# Patient Record
Sex: FEMALE | Race: WHITE | NOT HISPANIC OR LATINO | Employment: OTHER | URBAN - METROPOLITAN AREA
[De-identification: names, ages, dates, MRNs, and addresses within clinical notes are randomized per-mention and may not be internally consistent; named-entity substitution may affect disease eponyms.]

---

## 2017-01-11 ENCOUNTER — OFFICE VISIT (OUTPATIENT)
Dept: INTERNAL MEDICINE | Facility: CLINIC | Age: 77
End: 2017-01-11

## 2017-01-11 VITALS
OXYGEN SATURATION: 98 % | HEART RATE: 94 BPM | SYSTOLIC BLOOD PRESSURE: 124 MMHG | WEIGHT: 241 LBS | BODY MASS INDEX: 38.73 KG/M2 | HEIGHT: 66 IN | DIASTOLIC BLOOD PRESSURE: 70 MMHG

## 2017-01-11 DIAGNOSIS — R30.0 DYSURIA: Primary | ICD-10-CM

## 2017-01-11 DIAGNOSIS — J40 BRONCHITIS: ICD-10-CM

## 2017-01-11 LAB
BILIRUB BLD-MCNC: NEGATIVE MG/DL
CLARITY, POC: CLEAR
COLOR UR: ABNORMAL
GLUCOSE UR STRIP-MCNC: NEGATIVE MG/DL
KETONES UR QL: NEGATIVE
LEUKOCYTE EST, POC: ABNORMAL
NITRITE UR-MCNC: NEGATIVE MG/ML
PH UR: 5.5 [PH] (ref 5–8)
PROT UR STRIP-MCNC: NEGATIVE MG/DL
RBC # UR STRIP: NEGATIVE /UL
SP GR UR: 1.02 (ref 1–1.03)
UROBILINOGEN UR QL: NORMAL

## 2017-01-11 PROCEDURE — 81003 URINALYSIS AUTO W/O SCOPE: CPT | Performed by: INTERNAL MEDICINE

## 2017-01-11 PROCEDURE — 99214 OFFICE O/P EST MOD 30 MIN: CPT | Performed by: INTERNAL MEDICINE

## 2017-01-11 RX ORDER — AMOXICILLIN 500 MG/1
1000 CAPSULE ORAL 2 TIMES DAILY
Qty: 40 CAPSULE | Refills: 0 | Status: SHIPPED | OUTPATIENT
Start: 2017-01-11 | End: 2017-01-17 | Stop reason: CLARIF

## 2017-01-11 NOTE — MR AVS SNAPSHOT
Anitra Orta   1/11/2017 11:50 AM   Office Visit    Dept Phone:  [phone]   Encounter #:  [id number]    Provider:  Rosibel Hamilton MD   Department:  [facility] INTERNAL MED AND PEDS                Your Full Care Plan              Today's Medication Changes          These changes are accurate as of: 1/11/17 12:48 PM.  If you have any questions, ask your nurse or doctor.               New Medication(s)Ordered:     amoxicillin 500 MG capsule   Commonly known as:  AMOXIL   Take 2 capsules by mouth 2 (Two) Times a Day.   Started by:  Rosibel Hamilton MD            Where to Get Your Medications      These medications were sent to [id number] - [place], [place] - [address] AT [facility] [address] - [phone]  - [phone]   [address], [place] [address]     Phone:  [phone]     amoxicillin 500 MG capsule                  Your Updated Medication List          This list is accurate as of: 1/11/17 12:48 PM.  Always use your most recent med list.                amoxicillin 500 MG capsule   Commonly known as:  AMOXIL   Take 2 capsules by mouth 2 (Two) Times a Day.       aspirin 81 MG EC tablet       benzonatate 200 MG capsule   Commonly known as:  TESSALON   One PO Q8H for cough       Biotin 10 MG capsule       calcium citrate-vitamin d 200-250 MG-UNIT tablet tablet   Commonly known as:  CITRACAL       folic acid 1 MG tablet   Commonly known as:  FOLVITE       guaifenesin-codeine 100-10 MG/5ML liquid   Commonly known as:  CHERATUSSIN AC   10 ml po Q4H prn cough       imipramine 50 MG tablet   Commonly known as:  TOFRANIL   TAKE 2 TO 3 TABLETS BY MOUTH EVERY NIGHT AT BEDTIME       lisinopril 20 MG tablet   Commonly known as:  PRINIVIL,ZESTRIL   Take 1 tablet by mouth Daily With Breakfast.       omeprazole 20 MG capsule   Commonly known as:  priLOSEC   Take 1 capsule by mouth Daily.       simvastatin 20 MG tablet      Commonly known as:  ZOCOR   Take 1 tablet by mouth Every Night.       vitamin C 500 MG tablet   Commonly known as:  ASCORBIC ACID               We Performed the Following     POC Urinalysis Dipstick, Automated       You Were Diagnosed With        Codes Comments    Bronchitis    -  Primary ICD-10-CM: J40  ICD-9-CM: 490     Dysuria     ICD-10-CM: R30.0  ICD-9-CM: 788.1       Instructions     None    Patient Instructions History      Upcoming Appointments     Visit Type Date Time Department    ACUTE           1/11/2017 11:50 AM [facility] [place]    [facility] 1/13/2017 10:20 AM [facility] [place]    FOLLOW UP 1/18/2017  3:20 PM [facility] [place]    [facility] RENAL BILATERAL 10/18/2017  2:00 PM [facility]      MyChart Signup     Our records indicate that you have an active [employer] account.    You can view your After Visit Summary by going to [employer] and logging in with your [employer] username and password.  If you don't have a [employer] username and password but a parent or guardian has access to your record, the parent or guardian should login with their own [employer] username and password and access your record to view the After Visit Summary.    If you have questions, you can email [email or web address] or call [phone] to talk to our Map[employer] staff.  Remember, Mapori is NOT to be used for urgent needs.  For medical emergencies, dial 911.               Other Info from Your Visit           Your Appointments     Jan 13, 2017 10:20 AM EST   [facility]   [facility] INTERNAL MED AND PEDS (--)    [address]  [place] [address] [phone]            Jan 18, 2017  3:20 PM EST   Follow Up with Rosibel Hamilton MD   [facility] INTERNAL MED AND PEDS (--)    [address]  [place] [address] [phone]           Arrive 15 minutes prior to appointment.            Oct 18, 2017   "2:00 PM EDT   US lag renal bilateral with [facility] US 1   [facility] [place] [facility] ([place])    [address]  [place] [address] [phone]           No prep. Arrive 15 minutes before appointment.              Allergies     Bactrim [Sulfamethoxazole-trimethoprim]  Other (See Comments), Nausea Only    UNKNOWN    Ciprofloxacin  Other (See Comments)    UNKNOWN    Levaquin [Levofloxacin]  Other (See Comments)    SEVERE HEADACHE AND N/V    Macrobid [Nitrofurantoin]  Other (See Comments)    UNKNOWN; PT CAN TAKE IN SMALL DOSES- FLU LIKE SYMPTOMS    Cortisone Contraindication     Pt states had headache with IM injection states has been ok with epidural steroid injections      Reason for Visit     Cough X1wk, went to  on Sunday. Stuffy nose, phelgm. Was given tessalon and guaifnesin, says that they are not helping.      Vital Signs     Blood Pressure Pulse Height Weight Oxygen Saturation Body Mass Index    124/70 (BP Location: Left arm, Patient Position: Sitting, Cuff Size: Adult) 94 66\" (167.6 cm) 241 lb (109 kg) 98% 38.9 kg/m2    Smoking Status                   Former Smoker           Problems and Diagnoses Noted     Bronchitis    -  Primary    Difficult or painful urination            "

## 2017-01-13 LAB
BACTERIA UR CULT: ABNORMAL
BACTERIA UR CULT: ABNORMAL
OTHER ANTIBIOTIC SUSC ISLT: ABNORMAL

## 2017-01-17 ENCOUNTER — TELEPHONE (OUTPATIENT)
Dept: INTERNAL MEDICINE | Facility: CLINIC | Age: 77
End: 2017-01-17

## 2017-01-17 RX ORDER — CEFDINIR 300 MG/1
300 CAPSULE ORAL 2 TIMES DAILY
Qty: 14 CAPSULE | Refills: 0 | Status: SHIPPED | OUTPATIENT
Start: 2017-01-17 | End: 2017-02-06

## 2017-01-17 NOTE — TELEPHONE ENCOUNTER
Patient has been advised of results and voiced understanding. Patient states there is no pain, no odor but she has had frequent urination for some time. She states when she has to use the restroom, she will be headed that way and start to urinated on herself. Dr. Hamilton has been notified and advises the patient to discontinue RX amoxicillin and start RX omnicef 300mg bid x7 days- e scribed to [facility] in [place].  Dr. Hamilton also wants patient to contact Urologist (Dr. Clark) to follow up on this issue- pt states she will call today/tomorrow to schedule with his office. Also pt is advised to return to our office to leave a UA after finishing rx omnicef    ----- Message from Rosibel Hamilton MD sent at 1/15/2017 10:22 PM EST -----  Call pt about labs.  Pt did have e. Coli UTI.  Are urinary sx improved?  If not switch abx to omnicef 300mg bid x 7 days

## 2017-01-30 ENCOUNTER — LAB (OUTPATIENT)
Dept: INTERNAL MEDICINE | Facility: CLINIC | Age: 77
End: 2017-01-30

## 2017-01-30 DIAGNOSIS — N39.0 URINARY TRACT INFECTION, SITE UNSPECIFIED: Primary | ICD-10-CM

## 2017-01-30 LAB
BILIRUB BLD-MCNC: NEGATIVE MG/DL
CLARITY, POC: CLEAR
COLOR UR: YELLOW
GLUCOSE UR STRIP-MCNC: NEGATIVE MG/DL
KETONES UR QL: NEGATIVE
LEUKOCYTE EST, POC: ABNORMAL
NITRITE UR-MCNC: NEGATIVE MG/ML
PH UR: 5.5 [PH] (ref 5–8)
PROT UR STRIP-MCNC: NEGATIVE MG/DL
RBC # UR STRIP: NEGATIVE /UL
SP GR UR: 1.03 (ref 1–1.03)
UROBILINOGEN UR QL: NORMAL

## 2017-02-06 ENCOUNTER — OFFICE VISIT (OUTPATIENT)
Dept: INTERNAL MEDICINE | Facility: CLINIC | Age: 77
End: 2017-02-06

## 2017-02-06 VITALS
HEART RATE: 75 BPM | OXYGEN SATURATION: 97 % | SYSTOLIC BLOOD PRESSURE: 134 MMHG | BODY MASS INDEX: 38.09 KG/M2 | HEIGHT: 66 IN | WEIGHT: 237 LBS | DIASTOLIC BLOOD PRESSURE: 88 MMHG

## 2017-02-06 DIAGNOSIS — E78.2 MIXED HYPERLIPIDEMIA: ICD-10-CM

## 2017-02-06 DIAGNOSIS — I10 ESSENTIAL HYPERTENSION: Primary | ICD-10-CM

## 2017-02-06 DIAGNOSIS — H65.93 MIDDLE EAR EFFUSION, BILATERAL: ICD-10-CM

## 2017-02-06 DIAGNOSIS — R09.82 POST-NASAL DRIP: ICD-10-CM

## 2017-02-06 DIAGNOSIS — R73.03 PREDIABETES: ICD-10-CM

## 2017-02-06 PROCEDURE — 99214 OFFICE O/P EST MOD 30 MIN: CPT | Performed by: INTERNAL MEDICINE

## 2017-02-06 RX ORDER — FLUTICASONE PROPIONATE 50 MCG
2 SPRAY, SUSPENSION (ML) NASAL DAILY
Qty: 1 EACH | Refills: 0 | Status: SHIPPED | OUTPATIENT
Start: 2017-02-06 | End: 2017-03-08

## 2017-02-06 NOTE — PROGRESS NOTES
Subjective     Anitra Orta is a 76 y.o. female, who presents with a chief complaint of   Chief Complaint   Patient presents with   • Follow-up   • Hypertension       Cough   This is a new problem. The current episode started 1 to 4 weeks ago. The problem has been unchanged. The problem occurs constantly. The cough is non-productive and productive of sputum. Associated symptoms include ear congestion, nasal congestion, postnasal drip, rhinorrhea and a sore throat. The symptoms are aggravated by other. Risk factors for lung disease include smoking/tobacco exposure.      The pt is here for f/u    She saw dr. Clark for recurrent UTI's and was put on estrace.  She has also been on vesicare.    She recently had a URI and still had a cough.  She has coughing spells.  Her ears keep popping.     htn - on lisinopril.  She has been on this med about 9 years.  No previous cough with that med.      Pre-diabetes - watching diet.      HLD - on statin. No cramps or myalgias.      The following portions of the patient's history were reviewed and updated as appropriate: allergies, current medications, past family history, past medical history, past social history, past surgical history and problem list.    Allergies: Bactrim [sulfamethoxazole-trimethoprim]; Ciprofloxacin; Levaquin [levofloxacin]; Macrobid [nitrofurantoin]; and Cortisone    Review of Systems   Constitutional: Negative.    HENT: Positive for postnasal drip, rhinorrhea and sore throat.    Eyes: Negative.    Respiratory: Positive for cough.    Cardiovascular: Negative.    Gastrointestinal: Negative.    Endocrine: Negative.    Genitourinary: Negative.    Musculoskeletal: Negative.    Skin: Negative.    Allergic/Immunologic: Negative.    Neurological: Negative.    Hematological: Negative.    Psychiatric/Behavioral: Negative.    All other systems reviewed and are negative.      Objective     Wt Readings from Last 3 Encounters:   02/06/17 237 lb (108 kg)   01/11/17  241 lb (109 kg)   11/30/16 236 lb 1.6 oz (107 kg)     Temp Readings from Last 3 Encounters:   01/08/17 98.5 °F (36.9 °C) (Oral)   11/30/16 97.6 °F (36.4 °C) (Oral)   11/15/16 97.8 °F (36.6 °C) (Oral)     BP Readings from Last 3 Encounters:   02/06/17 134/88   01/11/17 124/70   01/08/17 131/84     Pulse Readings from Last 3 Encounters:   02/06/17 75   01/11/17 94   01/08/17 93     Body mass index is 38.25 kg/(m^2).  SpO2 Readings from Last 3 Encounters:   02/06/17 97%   01/11/17 98%   01/08/17 97%       Physical Exam   Constitutional: She is oriented to person, place, and time. She appears well-developed and well-nourished. No distress.   HENT:   Head: Normocephalic and atraumatic.   Right Ear: External ear normal.   Left Ear: External ear normal.   Nose: Nose normal.   Mouth/Throat: Oropharynx is clear and moist.   Bilateral serous effusion of TM's   Eyes: Conjunctivae and EOM are normal. Pupils are equal, round, and reactive to light.   Neck: Normal range of motion. Neck supple.   Cardiovascular: Normal rate, regular rhythm, normal heart sounds and intact distal pulses.    Pulmonary/Chest: Effort normal and breath sounds normal. No respiratory distress. She has no wheezes.   Musculoskeletal: Normal range of motion.   Normal gait   Neurological: She is alert and oriented to person, place, and time.   Skin: Skin is warm and dry.   Psychiatric: She has a normal mood and affect. Her behavior is normal. Judgment and thought content normal.   Nursing note and vitals reviewed.      Results for orders placed or performed in visit on 01/30/17   POC Urinalysis Dipstick, Automated   Result Value Ref Range    Color Yellow Yellow, Straw, Dark Yellow, Lindsey    Clarity, UA Clear Clear    Glucose, UA Negative Negative, 1000 mg/dL (3+) mg/dL    Bilirubin Negative Negative    Ketones, UA Negative Negative    Specific Gravity  1.030 1.005 - 1.030    Blood, UA Negative Negative    pH, Urine 5.5 5.0 - 8.0    Protein, POC Negative  Negative mg/dL    Urobilinogen, UA Normal Normal    Leukocytes Trace (A) Negative    Nitrite, UA Negative Negative       Assessment/Plan   Anitra was seen today for follow-up and hypertension.    Diagnoses and all orders for this visit:    Essential hypertension    Middle ear effusion, bilateral  -     fluticasone (FLONASE) 50 MCG/ACT nasal spray; 2 sprays into each nostril Daily for 30 days. Administer 2 sprays in each nostril for each dose.    Post-nasal drip  -     fluticasone (FLONASE) 50 MCG/ACT nasal spray; 2 sprays into each nostril Daily for 30 days. Administer 2 sprays in each nostril for each dose.    Mixed hyperlipidemia    Prediabetes      Zyrtec or claritin daily    Cont current meds.  Ov/labs in 6mo    Outpatient Medications Prior to Visit   Medication Sig Dispense Refill   • aspirin 81 MG EC tablet Take 81 mg by mouth Daily. PT TO STOP PER MD INSTRUCTION     • Biotin 10 MG capsule Take 10 mg by mouth Daily.     • calcium citrate-vitamin d (CITRACAL) 200-250 MG-UNIT tablet tablet Take 1 tablet by mouth.     • folic acid (FOLVITE) 1 MG tablet Take 1 mg by mouth daily.     • imipramine (TOFRANIL) 50 MG tablet TAKE 2 TO 3 TABLETS BY MOUTH EVERY NIGHT AT BEDTIME 270 tablet 3   • lisinopril (PRINIVIL,ZESTRIL) 20 MG tablet Take 1 tablet by mouth Daily With Breakfast. 90 tablet 3   • omeprazole (PriLOSEC) 20 MG capsule Take 1 capsule by mouth Daily.     • simvastatin (ZOCOR) 20 MG tablet Take 1 tablet by mouth Every Night. 90 tablet 3   • vitamin C (ASCORBIC ACID) 500 MG tablet Take 500 mg by mouth Daily.     • benzonatate (TESSALON) 200 MG capsule One PO Q8H for cough 30 capsule 1   • cefdinir (OMNICEF) 300 MG capsule Take 1 capsule by mouth 2 (Two) Times a Day. 14 capsule 0   • guaifenesin-codeine (CHERATUSSIN AC) 100-10 MG/5ML liquid 10 ml po Q4H prn cough 118 mL 1     No facility-administered medications prior to visit.      New Medications Ordered This Visit   Medications   • fluticasone (FLONASE) 50  MCG/ACT nasal spray     Si sprays into each nostril Daily for 30 days. Administer 2 sprays in each nostril for each dose.     Dispense:  1 each     Refill:  0     Medications Discontinued During This Encounter   Medication Reason   • benzonatate (TESSALON) 200 MG capsule    • cefdinir (OMNICEF) 300 MG capsule    • guaifenesin-codeine (CHERATUSSIN AC) 100-10 MG/5ML liquid        Return in about 6 months (around 2017) for Recheck, labs.

## 2017-03-29 ENCOUNTER — HOSPITAL ENCOUNTER (EMERGENCY)
Facility: HOSPITAL | Age: 77
Discharge: HOME | End: 2017-03-29
Attending: EMERGENCY MEDICINE | Admitting: EMERGENCY MEDICINE

## 2017-03-29 ENCOUNTER — APPOINTMENT (OUTPATIENT)
Dept: GENERAL RADIOLOGY | Facility: HOSPITAL | Age: 77
End: 2017-03-29

## 2017-03-29 ENCOUNTER — APPOINTMENT (OUTPATIENT)
Dept: CT IMAGING | Facility: HOSPITAL | Age: 77
End: 2017-03-29

## 2017-03-29 VITALS
TEMPERATURE: 97.8 F | WEIGHT: 235.44 LBS | BODY MASS INDEX: 37.84 KG/M2 | RESPIRATION RATE: 16 BRPM | SYSTOLIC BLOOD PRESSURE: 163 MMHG | HEART RATE: 84 BPM | OXYGEN SATURATION: 98 % | HEIGHT: 66 IN | DIASTOLIC BLOOD PRESSURE: 78 MMHG

## 2017-03-29 DIAGNOSIS — J18.9 PNEUMONIA OF LEFT LOWER LOBE DUE TO INFECTIOUS ORGANISM: Primary | ICD-10-CM

## 2017-03-29 DIAGNOSIS — R07.89 LEFT-SIDED CHEST WALL PAIN: ICD-10-CM

## 2017-03-29 LAB
ALBUMIN SERPL-MCNC: 4.2 G/DL (ref 3.5–5.2)
ALBUMIN/GLOB SERPL: 1.3 G/DL
ALP SERPL-CCNC: 124 U/L (ref 40–129)
ALT SERPL W P-5'-P-CCNC: 19 U/L (ref 5–33)
ANION GAP SERPL CALCULATED.3IONS-SCNC: 14.2 MMOL/L
AST SERPL-CCNC: 17 U/L (ref 5–32)
BASOPHILS # BLD AUTO: 0.04 10*3/MM3 (ref 0–0.2)
BASOPHILS NFR BLD AUTO: 0.4 % (ref 0–2)
BILIRUB SERPL-MCNC: 0.4 MG/DL (ref 0.2–1.2)
BUN BLD-MCNC: 18 MG/DL (ref 8–23)
BUN/CREAT SERPL: 19.8 (ref 7–25)
CALCIUM SPEC-SCNC: 9.4 MG/DL (ref 8.8–10.5)
CHLORIDE SERPL-SCNC: 102 MMOL/L (ref 98–107)
CO2 SERPL-SCNC: 27.8 MMOL/L (ref 22–29)
CREAT BLD-MCNC: 0.91 MG/DL (ref 0.57–1)
D DIMER PPP FEU-MCNC: 0.59 MCGFEU/ML (ref 0–0.46)
DEPRECATED RDW RBC AUTO: 45.1 FL (ref 37–54)
EOSINOPHIL # BLD AUTO: 0.11 10*3/MM3 (ref 0.1–0.3)
EOSINOPHIL NFR BLD AUTO: 1 % (ref 0–4)
ERYTHROCYTE [DISTWIDTH] IN BLOOD BY AUTOMATED COUNT: 14.2 % (ref 11.5–14.5)
GFR SERPL CREATININE-BSD FRML MDRD: 60 ML/MIN/1.73
GLOBULIN UR ELPH-MCNC: 3.3 GM/DL
GLUCOSE BLD-MCNC: 91 MG/DL (ref 65–99)
HCT VFR BLD AUTO: 48 % (ref 37–47)
HGB BLD-MCNC: 15.1 G/DL (ref 12–16)
IMM GRANULOCYTES # BLD: 0.05 10*3/MM3 (ref 0–0.03)
IMM GRANULOCYTES NFR BLD: 0.5 % (ref 0–0.5)
LYMPHOCYTES # BLD AUTO: 1.68 10*3/MM3 (ref 0.6–4.8)
LYMPHOCYTES NFR BLD AUTO: 15.9 % (ref 20–45)
MCH RBC QN AUTO: 27.9 PG (ref 27–31)
MCHC RBC AUTO-ENTMCNC: 31.5 G/DL (ref 31–37)
MCV RBC AUTO: 88.7 FL (ref 81–99)
MONOCYTES # BLD AUTO: 0.86 10*3/MM3 (ref 0–1)
MONOCYTES NFR BLD AUTO: 8.1 % (ref 3–8)
NEUTROPHILS # BLD AUTO: 7.83 10*3/MM3 (ref 1.5–8.3)
NEUTROPHILS NFR BLD AUTO: 74.1 % (ref 45–70)
NRBC BLD MANUAL-RTO: 0 /100 WBC (ref 0–0)
PLATELET # BLD AUTO: 259 10*3/MM3 (ref 140–500)
PMV BLD AUTO: 8.8 FL (ref 7.4–10.4)
POTASSIUM BLD-SCNC: 3.9 MMOL/L (ref 3.5–5.2)
PROT SERPL-MCNC: 7.5 G/DL (ref 6–8.5)
RBC # BLD AUTO: 5.41 10*6/MM3 (ref 4.2–5.4)
SODIUM BLD-SCNC: 144 MMOL/L (ref 136–145)
TROPONIN T SERPL-MCNC: <0.01 NG/ML (ref 0–0.03)
WBC NRBC COR # BLD: 10.57 10*3/MM3 (ref 4.8–10.8)

## 2017-03-29 PROCEDURE — 93005 ELECTROCARDIOGRAM TRACING: CPT

## 2017-03-29 PROCEDURE — 84484 ASSAY OF TROPONIN QUANT: CPT | Performed by: EMERGENCY MEDICINE

## 2017-03-29 PROCEDURE — 71020 HC CHEST PA AND LATERAL: CPT

## 2017-03-29 PROCEDURE — 85025 COMPLETE CBC W/AUTO DIFF WBC: CPT | Performed by: EMERGENCY MEDICINE

## 2017-03-29 PROCEDURE — 96361 HYDRATE IV INFUSION ADD-ON: CPT

## 2017-03-29 PROCEDURE — 85379 FIBRIN DEGRADATION QUANT: CPT | Performed by: EMERGENCY MEDICINE

## 2017-03-29 PROCEDURE — 71275 CT ANGIOGRAPHY CHEST: CPT

## 2017-03-29 PROCEDURE — 99284 EMERGENCY DEPT VISIT MOD MDM: CPT | Performed by: EMERGENCY MEDICINE

## 2017-03-29 PROCEDURE — 93010 ELECTROCARDIOGRAM REPORT: CPT | Performed by: INTERNAL MEDICINE

## 2017-03-29 PROCEDURE — 96374 THER/PROPH/DIAG INJ IV PUSH: CPT

## 2017-03-29 PROCEDURE — 80053 COMPREHEN METABOLIC PANEL: CPT | Performed by: EMERGENCY MEDICINE

## 2017-03-29 PROCEDURE — 99284 EMERGENCY DEPT VISIT MOD MDM: CPT

## 2017-03-29 PROCEDURE — 25010000002 KETOROLAC TROMETHAMINE PER 15 MG: Performed by: EMERGENCY MEDICINE

## 2017-03-29 PROCEDURE — 0 IOPAMIDOL PER 1 ML: Performed by: EMERGENCY MEDICINE

## 2017-03-29 RX ORDER — KETOROLAC TROMETHAMINE 30 MG/ML
15 INJECTION, SOLUTION INTRAMUSCULAR; INTRAVENOUS ONCE
Status: COMPLETED | OUTPATIENT
Start: 2017-03-29 | End: 2017-03-29

## 2017-03-29 RX ORDER — AMOXICILLIN AND CLAVULANATE POTASSIUM 875; 125 MG/1; MG/1
1 TABLET, FILM COATED ORAL 2 TIMES DAILY
COMMUNITY
End: 2017-04-25

## 2017-03-29 RX ORDER — AZITHROMYCIN 250 MG/1
TABLET, FILM COATED ORAL
Qty: 6 TABLET | Refills: 0 | Status: SHIPPED | OUTPATIENT
Start: 2017-03-29 | End: 2017-04-25

## 2017-03-29 RX ORDER — SODIUM CHLORIDE 0.9 % (FLUSH) 0.9 %
10 SYRINGE (ML) INJECTION AS NEEDED
Status: DISCONTINUED | OUTPATIENT
Start: 2017-03-29 | End: 2017-03-29 | Stop reason: HOSPADM

## 2017-03-29 RX ORDER — LIDOCAINE 50 MG/G
PATCH TOPICAL
Qty: 5 PATCH | Refills: 0 | Status: SHIPPED | OUTPATIENT
Start: 2017-03-29 | End: 2017-04-25

## 2017-03-29 RX ADMIN — SODIUM CHLORIDE 500 ML: 900 INJECTION, SOLUTION INTRAVENOUS at 16:51

## 2017-03-29 RX ADMIN — IOPAMIDOL 100 ML: 755 INJECTION, SOLUTION INTRAVENOUS at 18:12

## 2017-03-29 RX ADMIN — KETOROLAC TROMETHAMINE 15 MG: 30 INJECTION, SOLUTION INTRAMUSCULAR at 16:08

## 2017-03-29 NOTE — ED PROVIDER NOTES
Subjective   History of Present Illness  History of Present Illness    Chief complaint: chest pain    Location: left chest wall, below left breast; non-radiating    Quality/Severity:  Moderately severe    Timing/Duration: abrupt onset last pm 22:30, constant since onset    Modifying Factors: worse c inspiration, rotation of thorax and lying supine    Associated Symptoms: pain c breathing but denies dyspnea. + cough without hemoptysis    Narrative: 75 yo female c recent cough who developed sharp left sided chest pain at 22:30 last pm without nausea, vomiting, fever, dyspnea.  No relief with Elham-Helen.    Review of Systems  No lower extremity swelling.  No hemoptysis.  No known trauma.  Patient does have regular whole body pain.  All other systems reviewed and are otherwise negative  Past Medical History:   Diagnosis Date   • Anxiety    • Chronic pain disorder    • Constipation    • DDD (degenerative disc disease), lumbar    • GERD (gastroesophageal reflux disease)    • HTN (hypertension)    • Hyperlipidemia    • Kidney stones    • Low back pain    • Osteoarthritis    • PONV (postoperative nausea and vomiting)    • Psoriasis    • Urinary tract infection        Allergies   Allergen Reactions   • Bactrim [Sulfamethoxazole-Trimethoprim] Other (See Comments) and Nausea Only     UNKNOWN   • Ciprofloxacin Other (See Comments)     UNKNOWN   • Levaquin [Levofloxacin] Other (See Comments)     SEVERE HEADACHE AND N/V   • Macrobid [Nitrofurantoin] Other (See Comments)     UNKNOWN; PT CAN TAKE IN SMALL DOSES- FLU LIKE SYMPTOMS   • Cortisone      Pt states had headache with IM injection states has been ok with epidural steroid injections       Past Surgical History:   Procedure Laterality Date   • BREAST LUMPECTOMY Left    • BUNIONECTOMY Right    • COLONOSCOPY N/A 11/30/2016    Procedure: COLONOSCOPY polypectomy;  Surgeon: Adali Willard MD;  Location: [facility];  Service:    • CYSTOSCOPY W/ LITHOLAPAXY / EHL     • CYSTOSCOPY  W/ URETERAL STENT PLACEMENT Left 9/12/2016    Procedure: CYSTOSCOPY URETERAL STENT INSERTION;  Surgeon: Kevon Clark MD;  Location:  [facility];  Service:    • KNEE ARTHROPLASTY Right    • TONSILLECTOMY AND ADENOIDECTOMY     • URETEROSCOPY LASER LITHOTRIPSY WITH STENT INSERTION Left 10/5/2016    Procedure: LT URETEROSCOPY LASER LITHOTRIPSY STONE BASKET EXTRACTION AND STENT ;  Surgeon: Kevon Clark MD;  Location: [facility] MAIN OR;  Service:        Family History   Problem Relation Age of Onset   • Heart defect Mother    • Heart defect Father        Social History     Social History   • Marital status:      Spouse name: N/A   • Number of children: N/A   • Years of education: N/A     Social History Main Topics   • Smoking status: Former Smoker     Years: 40.00     Quit date: 10/4/1985   • Smokeless tobacco: Never Used      Comment: quit 1994   • Alcohol use No   • Drug use: No   • Sexual activity: Defer     Other Topics Concern   • None     Social History Narrative   • None     ED Triage Vitals   Temp Heart Rate Resp BP SpO2   03/29/17 1517 03/29/17 1517 03/29/17 1517 03/29/17 1517 03/29/17 1517   97.8 °F (36.6 °C) 108 24 153/72 97 %      Temp src Heart Rate Source Patient Position BP Location FiO2 (%)   -- 03/29/17 1517 -- -- --    Monitor          Objective   Physical Exam   Constitutional: She is oriented to person, place, and time. She appears well-developed. No distress.   Uncomfortable appearing but in no acute distress.  Nontoxic.   HENT:   Head: Normocephalic.   Mouth/Throat: Oropharynx is clear and moist.   Eyes: Conjunctivae are normal. No scleral icterus.   Neck: Neck supple.   Painless movement   Cardiovascular: Regular rhythm and normal pulses.  Tachycardia present.        Pulmonary/Chest: Effort normal and breath sounds normal. No respiratory distress.   Abdominal: Soft. There is no tenderness.   Musculoskeletal:   MAEE, normal strength   Neurological: She is alert and oriented to person,  place, and time.   Skin: Skin is warm and dry.   Psychiatric: She has a normal mood and affect. Thought content normal.   Nursing note and vitals reviewed.      Procedures    EKG           EKG time / Interpretation time: 1513 / 1515  Rhythm/Rate: Sinus, 100  P waves and MT: GOKUL within normal limits  QRS, axis: Narrow QRS complex   ST and T waves: No ST elevation or depression to suggest acute ischemia; inferior T wave inversions; QTC within normal limits     Interpreted Contemporaneously by me, independently viewed  Unchanged compared to prior 9/8/16    Results for orders placed or performed during the hospital encounter of 03/29/17   Comprehensive Metabolic Panel   Result Value Ref Range    Glucose 91 65 - 99 mg/dL    BUN 18 8 - 23 mg/dL    Creatinine 0.91 0.57 - 1.00 mg/dL    Sodium 144 136 - 145 mmol/L    Potassium 3.9 3.5 - 5.2 mmol/L    Chloride 102 98 - 107 mmol/L    CO2 27.8 22.0 - 29.0 mmol/L    Calcium 9.4 8.8 - 10.5 mg/dL    Total Protein 7.5 6.0 - 8.5 g/dL    Albumin 4.20 3.50 - 5.20 g/dL    ALT (SGPT) 19 5 - 33 U/L    AST (SGOT) 17 5 - 32 U/L    Alkaline Phosphatase 124 40 - 129 U/L    Total Bilirubin 0.4 0.2 - 1.2 mg/dL    eGFR Non African Amer 60 (L) >60 mL/min/1.73    Globulin 3.3 gm/dL    A/G Ratio 1.3 g/dL    BUN/Creatinine Ratio 19.8 7.0 - 25.0    Anion Gap 14.2 mmol/L   Troponin   Result Value Ref Range    Troponin T <0.010 0.000 - 0.030 ng/mL   CBC Auto Differential   Result Value Ref Range    WBC 10.57 4.80 - 10.80 10*3/mm3    RBC 5.41 (H) 4.20 - 5.40 10*6/mm3    Hemoglobin 15.1 12.0 - 16.0 g/dL    Hematocrit 48.0 (H) 37.0 - 47.0 %    MCV 88.7 81.0 - 99.0 fL    MCH 27.9 27.0 - 31.0 pg    MCHC 31.5 31.0 - 37.0 g/dL    RDW 14.2 11.5 - 14.5 %    RDW-SD 45.1 37.0 - 54.0 fl    MPV 8.8 7.4 - 10.4 fL    Platelets 259 140 - 500 10*3/mm3    Neutrophil % 74.1 (H) 45.0 - 70.0 %    Lymphocyte % 15.9 (L) 20.0 - 45.0 %    Monocyte % 8.1 (H) 3.0 - 8.0 %    Eosinophil % 1.0 0.0 - 4.0 %    Basophil % 0.4 0.0 -  2.0 %    Immature Grans % 0.5 0.0 - 0.5 %    Neutrophils, Absolute 7.83 1.50 - 8.30 10*3/mm3    Lymphocytes, Absolute 1.68 0.60 - 4.80 10*3/mm3    Monocytes, Absolute 0.86 0.00 - 1.00 10*3/mm3    Eosinophils, Absolute 0.11 0.10 - 0.30 10*3/mm3    Basophils, Absolute 0.04 0.00 - 0.20 10*3/mm3    Immature Grans, Absolute 0.05 (H) 0.00 - 0.03 10*3/mm3    nRBC 0.0 0.0 - 0.0 /100 WBC   D-dimer, Quantitative   Result Value Ref Range    D-Dimer, Quantitative 0.59 (H) 0.00 - 0.46 MCGFEU/mL     Xr Chest 2 View    Result Date: 3/29/2017  Narrative: INDICATION:  Shortness of air  COMPARISON:  09/14/2016  FINDINGS: PA and lateral views of the chest.  Heart and mediastinal contours are normal.  There is a left lower lobe airspace opacity most consistent with pneumonia. Mild background COPD..  No pleural effusion..      Impression: Left lower lobe airspace opacity consistent with a pneumonia. Follow-up radiographs are recommended.  This report was finalized on 3/29/2017 4:16 PM by Dr. Ramos Leiva MD.      RADIOLOGY        Study: CT chest with IV contrast    Findings: No pulmonary most.  Left lung base finding concerning for infectious versus inflammatory process.  Small left pleural effusion.  Numerous other benign findings.  Stable 5 mm nodule right lower lobe unchanged since 8/16    Interpreted Contemporaneously by Dr. Bliss-radiologist, independently viewed by me             ED Course  ED Course   Comment By Time   Pain not significantly improved though pt able to recline at this time. No coughing in ED.  Recommend CT at this time, pt agreeable. Ramos Ndiaye MD 03/29 5498   I reviewed CT result with the patient, recommended outpatient follow-up for the lung nodule and resolution of pulmonary infiltrate.  We also reviewed red flags in for which the patient should return to the ED.  She expresses understanding agreement with plan.  Add azithromycin to her Augmentin that she is already taking to cover atypicals. Raoms LYNN  MD Senthil 03/29 1918                  [facility]  Number of Diagnoses or Management Options     Amount and/or Complexity of Data Reviewed  Clinical lab tests: reviewed and ordered  Tests in the radiology section of CPT®: ordered and reviewed  Decide to obtain previous medical records or to obtain history from someone other than the patient: yes  Review and summarize past medical records: yes  Independent visualization of images, tracings, or specimens: yes        Final diagnoses:   Pneumonia of left lower lobe due to infectious organism   Left-sided chest wall pain              Medication List      New Prescriptions          azithromycin 250 MG tablet   Commonly known as:  ZITHROMAX   Take 2 tablets the first day, then 1 tablet daily for 4 days.       lidocaine 5 %   Commonly known as:  LIDODERM   Place 1 patch on the skin every day overlying the area of discomfort.   Remove & Discard patch within 12 hours                  Ramos Ndiaye MD  03/29/17 1918

## 2017-03-29 NOTE — ED NOTES
Pt was treated by Dr. Clark for a UTI with Augmentin on 3/23     Christina Riggs, RN  03/29/17 9109

## 2017-03-29 NOTE — ED NOTES
Pt feels better with sitting up on the edge of the bed.     Christina Riggs, CONNIE  03/29/17 0533

## 2017-03-30 ENCOUNTER — HOSPITAL ENCOUNTER (EMERGENCY)
Facility: HOSPITAL | Age: 77
Discharge: HOME | End: 2017-03-31
Attending: EMERGENCY MEDICINE | Admitting: EMERGENCY MEDICINE

## 2017-03-30 VITALS
DIASTOLIC BLOOD PRESSURE: 102 MMHG | HEIGHT: 66 IN | HEART RATE: 110 BPM | SYSTOLIC BLOOD PRESSURE: 137 MMHG | OXYGEN SATURATION: 97 % | TEMPERATURE: 98.2 F | WEIGHT: 240 LBS | BODY MASS INDEX: 38.57 KG/M2 | RESPIRATION RATE: 18 BRPM

## 2017-03-30 DIAGNOSIS — M62.838 MUSCLE SPASM: ICD-10-CM

## 2017-03-30 DIAGNOSIS — R07.89 LEFT-SIDED CHEST WALL PAIN: Primary | ICD-10-CM

## 2017-03-30 PROCEDURE — 99283 EMERGENCY DEPT VISIT LOW MDM: CPT

## 2017-03-30 PROCEDURE — 99284 EMERGENCY DEPT VISIT MOD MDM: CPT | Performed by: EMERGENCY MEDICINE

## 2017-03-31 RX ORDER — CYCLOBENZAPRINE HCL 10 MG
10 TABLET ORAL ONCE
Status: COMPLETED | OUTPATIENT
Start: 2017-03-31 | End: 2017-03-31

## 2017-03-31 RX ORDER — CYCLOBENZAPRINE HCL 10 MG
TABLET ORAL
Qty: 20 TABLET | Refills: 0 | Status: SHIPPED | OUTPATIENT
Start: 2017-03-31 | End: 2017-04-25

## 2017-03-31 RX ADMIN — CYCLOBENZAPRINE HYDROCHLORIDE 10 MG: 10 TABLET, FILM COATED ORAL at 00:12

## 2017-04-24 PROBLEM — N39.0 URINARY TRACT INFECTION: Status: ACTIVE | Noted: 2017-04-24

## 2017-04-25 ENCOUNTER — HOSPITAL ENCOUNTER (OUTPATIENT)
Dept: INFUSION THERAPY | Facility: HOSPITAL | Age: 77
Discharge: HOME | End: 2017-04-25
Admitting: UROLOGY

## 2017-04-25 VITALS
TEMPERATURE: 97.8 F | HEART RATE: 95 BPM | RESPIRATION RATE: 16 BRPM | SYSTOLIC BLOOD PRESSURE: 146 MMHG | DIASTOLIC BLOOD PRESSURE: 68 MMHG | OXYGEN SATURATION: 97 %

## 2017-04-25 DIAGNOSIS — N39.0 URINARY TRACT INFECTION, SITE UNSPECIFIED: Primary | ICD-10-CM

## 2017-04-25 PROCEDURE — 96372 THER/PROPH/DIAG INJ SC/IM: CPT

## 2017-04-25 PROCEDURE — 25010000002 CEFTRIAXONE PER 250 MG: Performed by: UROLOGY

## 2017-04-25 RX ORDER — OXYBUTYNIN CHLORIDE 10 MG/1
10 TABLET, EXTENDED RELEASE ORAL
COMMUNITY
Start: 2016-02-08 | End: 2017-07-14

## 2017-04-25 RX ORDER — CEFTRIAXONE 1 G/1
1 INJECTION, POWDER, FOR SOLUTION INTRAMUSCULAR; INTRAVENOUS ONCE
Status: DISCONTINUED | OUTPATIENT
Start: 2017-04-26 | End: 2017-04-26 | Stop reason: CLARIF

## 2017-04-25 RX ORDER — CEFTRIAXONE 1 G/1
1 INJECTION, POWDER, FOR SOLUTION INTRAMUSCULAR; INTRAVENOUS ONCE
Status: CANCELLED | OUTPATIENT
Start: 2017-04-26 | End: 2017-04-26

## 2017-04-25 RX ADMIN — LIDOCAINE HYDROCHLORIDE 1 G: 10 INJECTION, SOLUTION EPIDURAL; INFILTRATION; INTRACAUDAL; PERINEURAL at 15:49

## 2017-04-25 NOTE — PATIENT INSTRUCTIONS
Call  Dr. Kevon Clark, [facility] at [phone] Ceftriaxone injection  What is this medicine?  CEFTRIAXONE (sef try AX one) is a cephalosporin antibiotic. It is used to treat certain kinds of bacterial infections. It will not work for colds, flu, or other viral infections.  This medicine may be used for other purposes; ask your health care provider or pharmacist if you have questions.  COMMON BRAND NAME(S): Delaney  What should I tell my health care provider before I take this medicine?  They need to know if you have any of these conditions:  -any chronic illness  -bowel disease, like colitis  -both kidney and liver disease  -high bilirubin level in  patients  -an unusual or allergic reaction to ceftriaxone, other cephalosporin or penicillin antibiotics, foods, dyes, or preservatives  -pregnant or trying to get pregnant  -breast-feeding  How should I use this medicine?  This medicine is injected into a muscle or infused it into a vein. It is usually given in a medical office or clinic. If you are to give this medicine you will be taught how to inject it. Follow instructions carefully. Use your doses at regular intervals. Do not take your medicine more often than directed. Do not skip doses or stop your medicine early even if you feel better. Do not stop taking except on your doctor's advice.  Talk to your pediatrician regarding the use of this medicine in children. Special care may be needed.  Overdosage: If you think you have taken too much of this medicine contact a poison control center or emergency room at once.  NOTE: This medicine is only for you. Do not share this medicine with others.  What if I miss a dose?  If you miss a dose, take it as soon as you can. If it is almost time for your next dose, take only that dose. Do not take double or extra doses.  What may interact with this medicine?  Do not take this medicine with any of the following medications:  -intravenous calcium  This  medicine may also interact with the following medications:  -birth control pills  This list may not describe all possible interactions. Give your health care provider a list of all the medicines, herbs, non-prescription drugs, or dietary supplements you use. Also tell them if you smoke, drink alcohol, or use illegal drugs. Some items may interact with your medicine.  What should I watch for while using this medicine?  Tell your doctor or health care professional if your symptoms do not improve or if they get worse.  Do not treat diarrhea with over the counter products. Contact your doctor if you have diarrhea that lasts more than 2 days or if it is severe and watery.  If you are being treated for a sexually transmitted disease, avoid sexual contact until you have finished your treatment. Having sex can infect your sexual partner.  Calcium may bind to this medicine and cause lung or kidney problems. Avoid calcium products while taking this medicine and for 48 hours after taking the last dose of this medicine.  What side effects may I notice from receiving this medicine?  Side effects that you should report to your doctor or health care professional as soon as possible:  -allergic reactions like skin rash, itching or hives, swelling of the face, lips, or tongue  -breathing problems  -fever, chills  -irregular heartbeat  -pain when passing urine  -seizures  -stomach pain, cramps  -unusual bleeding, bruising  -unusually weak or tired  Side effects that usually do not require medical attention (report to your doctor or health care professional if they continue or are bothersome):  -diarrhea  -dizzy, drowsy  -headache  -nausea, vomiting  -pain, swelling, irritation where injected  -stomach upset  -sweating  This list may not describe all possible side effects. Call your doctor for medical advice about side effects. You may report side effects to FDA at 0-197-FDA-0697.  Where should I keep my medicine?  Keep out of the  "reach of children.  Store at room temperature below 25 degrees C (77 degrees F). Protect from light. Throw away any unused vials after the expiration date.  NOTE: This sheet is a summary. It may not cover all possible information. If you have questions about this medicine, talk to your doctor, pharmacist, or health care provider.     © 2017, [employer]/Gold [employer]. (2015-07-06 09:14:54)   if you have any problems or concerns.    We know you have  Choice in healthcare and appreciate you using [facility].  Our purpose is to provide you \"Excellent Care\".  We hope that you will always choose us in the future and continue to recommend us to your family and friends.                "

## 2017-04-25 NOTE — PROGRESS NOTES
NURSE PROGRESS NOTE    PT. ARRIVED @ [facility] FOR SCHEDULED INJECTION AT 1500 .  INJECTION WAS PERFORMED WITHOUT INCIDENT.  PT. DISCHARGED TO HOME AT 1618.AVS PRINTED & REVIEWED WITH PT. & , DISCHARGED AMBULATORY WITHOUT C/O. TKatie CHIRINOS RN

## 2017-04-26 ENCOUNTER — HOSPITAL ENCOUNTER (OUTPATIENT)
Dept: INFUSION THERAPY | Facility: HOSPITAL | Age: 77
Discharge: HOME | End: 2017-04-26
Admitting: UROLOGY

## 2017-04-26 VITALS
DIASTOLIC BLOOD PRESSURE: 81 MMHG | RESPIRATION RATE: 18 BRPM | SYSTOLIC BLOOD PRESSURE: 164 MMHG | WEIGHT: 240 LBS | HEIGHT: 66 IN | OXYGEN SATURATION: 98 % | BODY MASS INDEX: 38.57 KG/M2 | HEART RATE: 96 BPM

## 2017-04-26 DIAGNOSIS — N39.0 URINARY TRACT INFECTION, SITE UNSPECIFIED: ICD-10-CM

## 2017-04-26 PROCEDURE — 96372 THER/PROPH/DIAG INJ SC/IM: CPT

## 2017-04-26 PROCEDURE — 25010000002 CEFTRIAXONE PER 250 MG: Performed by: UROLOGY

## 2017-04-26 RX ORDER — CEFTRIAXONE 1 G/1
1 INJECTION, POWDER, FOR SOLUTION INTRAMUSCULAR; INTRAVENOUS ONCE
Status: DISCONTINUED | OUTPATIENT
Start: 2017-04-27 | End: 2017-04-26 | Stop reason: CLARIF

## 2017-04-26 RX ORDER — CEFTRIAXONE 1 G/1
1 INJECTION, POWDER, FOR SOLUTION INTRAMUSCULAR; INTRAVENOUS ONCE
Status: CANCELLED | OUTPATIENT
Start: 2017-04-27 | End: 2017-04-27

## 2017-04-26 RX ADMIN — CEFTRIAXONE 1 G: 1 INJECTION, POWDER, FOR SOLUTION INTRAMUSCULAR; INTRAVENOUS at 15:32

## 2017-04-26 NOTE — PROGRESS NOTES
NURSING PROGRESS NOTE:    PATIENT ARRIVED AMBULATORY TO [facility] AT 1455 FOR SCHEDULED INJECTION.  PROCEDURE PERFORMED WITHOUT INCIDENT.  DISCHARGED HOME AT 1535.  CONNIE DE LEÓN

## 2017-04-27 ENCOUNTER — HOSPITAL ENCOUNTER (OUTPATIENT)
Dept: INFUSION THERAPY | Facility: HOSPITAL | Age: 77
Discharge: HOME | End: 2017-04-27
Admitting: UROLOGY

## 2017-04-27 VITALS
RESPIRATION RATE: 16 BRPM | WEIGHT: 240 LBS | DIASTOLIC BLOOD PRESSURE: 74 MMHG | SYSTOLIC BLOOD PRESSURE: 131 MMHG | HEIGHT: 66 IN | HEART RATE: 87 BPM | BODY MASS INDEX: 38.57 KG/M2 | TEMPERATURE: 98 F | OXYGEN SATURATION: 94 %

## 2017-04-27 DIAGNOSIS — N39.0 URINARY TRACT INFECTION, SITE UNSPECIFIED: ICD-10-CM

## 2017-04-27 DIAGNOSIS — B96.89 URINARY TRACT INFECTION DUE TO ESBL KLEBSIELLA: ICD-10-CM

## 2017-04-27 DIAGNOSIS — N39.0 URINARY TRACT INFECTION DUE TO ESBL KLEBSIELLA: ICD-10-CM

## 2017-04-27 PROCEDURE — 25010000002 CEFTRIAXONE PER 250 MG: Performed by: UROLOGY

## 2017-04-27 PROCEDURE — 96372 THER/PROPH/DIAG INJ SC/IM: CPT

## 2017-04-27 RX ORDER — CEFTRIAXONE 1 G/1
1 INJECTION, POWDER, FOR SOLUTION INTRAMUSCULAR; INTRAVENOUS ONCE
Status: CANCELLED | OUTPATIENT
Start: 2017-04-28 | End: 2017-04-28

## 2017-04-27 RX ORDER — CEFTRIAXONE 1 G/1
1 INJECTION, POWDER, FOR SOLUTION INTRAMUSCULAR; INTRAVENOUS ONCE
Status: DISCONTINUED | OUTPATIENT
Start: 2017-04-28 | End: 2017-04-27

## 2017-04-27 RX ORDER — CEFTRIAXONE 1 G/1
1 INJECTION, POWDER, FOR SOLUTION INTRAMUSCULAR; INTRAVENOUS ONCE
Status: CANCELLED | OUTPATIENT
Start: 2017-04-27 | End: 2017-04-27

## 2017-04-27 RX ORDER — CEFTRIAXONE 1 G/1
1 INJECTION, POWDER, FOR SOLUTION INTRAMUSCULAR; INTRAVENOUS ONCE
Status: COMPLETED | OUTPATIENT
Start: 2017-04-27 | End: 2017-04-27

## 2017-04-27 RX ADMIN — CEFTRIAXONE 1 G: 1 INJECTION, POWDER, FOR SOLUTION INTRAMUSCULAR; INTRAVENOUS at 15:36

## 2017-04-28 ENCOUNTER — HOSPITAL ENCOUNTER (OUTPATIENT)
Dept: INFUSION THERAPY | Facility: HOSPITAL | Age: 77
Discharge: HOME | End: 2017-04-28
Admitting: UROLOGY

## 2017-04-28 VITALS
TEMPERATURE: 97.8 F | HEART RATE: 88 BPM | RESPIRATION RATE: 16 BRPM | OXYGEN SATURATION: 95 % | SYSTOLIC BLOOD PRESSURE: 129 MMHG | DIASTOLIC BLOOD PRESSURE: 66 MMHG

## 2017-04-28 DIAGNOSIS — N39.0 URINARY TRACT INFECTION DUE TO ESBL KLEBSIELLA: ICD-10-CM

## 2017-04-28 DIAGNOSIS — B96.89 URINARY TRACT INFECTION DUE TO ESBL KLEBSIELLA: ICD-10-CM

## 2017-04-28 DIAGNOSIS — N39.0 URINARY TRACT INFECTION, SITE UNSPECIFIED: Primary | ICD-10-CM

## 2017-04-28 PROCEDURE — 25010000002 CEFTRIAXONE PER 250 MG: Performed by: UROLOGY

## 2017-04-28 PROCEDURE — 96372 THER/PROPH/DIAG INJ SC/IM: CPT

## 2017-04-28 RX ORDER — CEFTRIAXONE 1 G/1
1 INJECTION, POWDER, FOR SOLUTION INTRAMUSCULAR; INTRAVENOUS ONCE
Status: COMPLETED | OUTPATIENT
Start: 2017-04-28 | End: 2017-04-28

## 2017-04-28 RX ORDER — CEFTRIAXONE 1 G/1
1 INJECTION, POWDER, FOR SOLUTION INTRAMUSCULAR; INTRAVENOUS ONCE
Status: CANCELLED | OUTPATIENT
Start: 2017-04-28 | End: 2017-04-28

## 2017-04-28 RX ADMIN — CEFTRIAXONE 1 G: 1 INJECTION, POWDER, FOR SOLUTION INTRAMUSCULAR; INTRAVENOUS at 15:10

## 2017-04-29 ENCOUNTER — HOSPITAL ENCOUNTER (OUTPATIENT)
Dept: INFUSION THERAPY | Facility: HOSPITAL | Age: 77
Discharge: HOME | End: 2017-04-29
Admitting: UROLOGY

## 2017-04-29 VITALS — HEART RATE: 108 BPM | SYSTOLIC BLOOD PRESSURE: 141 MMHG | DIASTOLIC BLOOD PRESSURE: 88 MMHG | TEMPERATURE: 97.7 F

## 2017-04-29 DIAGNOSIS — N39.0 URINARY TRACT INFECTION, SITE UNSPECIFIED: Primary | ICD-10-CM

## 2017-04-29 DIAGNOSIS — B96.89 URINARY TRACT INFECTION DUE TO ESBL KLEBSIELLA: ICD-10-CM

## 2017-04-29 DIAGNOSIS — N39.0 URINARY TRACT INFECTION DUE TO ESBL KLEBSIELLA: ICD-10-CM

## 2017-04-29 PROCEDURE — 96372 THER/PROPH/DIAG INJ SC/IM: CPT

## 2017-04-29 PROCEDURE — 25010000002 CEFTRIAXONE PER 250 MG: Performed by: UROLOGY

## 2017-04-29 RX ORDER — CEFTRIAXONE 1 G/1
1 INJECTION, POWDER, FOR SOLUTION INTRAMUSCULAR; INTRAVENOUS ONCE
Status: COMPLETED | OUTPATIENT
Start: 2017-04-29 | End: 2017-04-29

## 2017-04-29 RX ORDER — CEFTRIAXONE 1 G/1
1 INJECTION, POWDER, FOR SOLUTION INTRAMUSCULAR; INTRAVENOUS ONCE
Status: CANCELLED | OUTPATIENT
Start: 2017-04-29 | End: 2017-04-29

## 2017-04-29 RX ADMIN — CEFTRIAXONE 1 G: 1 INJECTION, POWDER, FOR SOLUTION INTRAMUSCULAR; INTRAVENOUS at 15:34

## 2017-04-30 ENCOUNTER — HOSPITAL ENCOUNTER (OUTPATIENT)
Dept: INFUSION THERAPY | Facility: HOSPITAL | Age: 77
Discharge: HOME | End: 2017-04-30
Admitting: UROLOGY

## 2017-04-30 ENCOUNTER — HOSPITAL ENCOUNTER (EMERGENCY)
Facility: HOSPITAL | Age: 77
End: 2017-04-30

## 2017-04-30 VITALS
OXYGEN SATURATION: 95 % | SYSTOLIC BLOOD PRESSURE: 124 MMHG | TEMPERATURE: 97.9 F | RESPIRATION RATE: 16 BRPM | HEART RATE: 94 BPM | DIASTOLIC BLOOD PRESSURE: 77 MMHG

## 2017-04-30 DIAGNOSIS — N39.0 URINARY TRACT INFECTION, SITE UNSPECIFIED: Primary | ICD-10-CM

## 2017-04-30 DIAGNOSIS — N39.0 URINARY TRACT INFECTION DUE TO ESBL KLEBSIELLA: ICD-10-CM

## 2017-04-30 DIAGNOSIS — B96.89 URINARY TRACT INFECTION DUE TO ESBL KLEBSIELLA: ICD-10-CM

## 2017-04-30 PROCEDURE — 96372 THER/PROPH/DIAG INJ SC/IM: CPT

## 2017-04-30 PROCEDURE — 25010000002 CEFTRIAXONE PER 250 MG: Performed by: UROLOGY

## 2017-04-30 RX ORDER — CEFTRIAXONE 1 G/1
1 INJECTION, POWDER, FOR SOLUTION INTRAMUSCULAR; INTRAVENOUS ONCE
Status: CANCELLED | OUTPATIENT
Start: 2017-04-30 | End: 2017-04-30

## 2017-04-30 RX ORDER — CEFTRIAXONE 1 G/1
1 INJECTION, POWDER, FOR SOLUTION INTRAMUSCULAR; INTRAVENOUS ONCE
Status: COMPLETED | OUTPATIENT
Start: 2017-04-30 | End: 2017-04-30

## 2017-04-30 RX ADMIN — CEFTRIAXONE 1 G: 1 INJECTION, POWDER, FOR SOLUTION INTRAMUSCULAR; INTRAVENOUS at 15:11

## 2017-05-01 ENCOUNTER — HOSPITAL ENCOUNTER (OUTPATIENT)
Dept: INFUSION THERAPY | Facility: HOSPITAL | Age: 77
Discharge: HOME | End: 2017-05-01
Admitting: UROLOGY

## 2017-05-01 VITALS
WEIGHT: 240 LBS | HEART RATE: 100 BPM | DIASTOLIC BLOOD PRESSURE: 70 MMHG | SYSTOLIC BLOOD PRESSURE: 118 MMHG | RESPIRATION RATE: 18 BRPM | OXYGEN SATURATION: 97 % | HEIGHT: 66 IN | TEMPERATURE: 98 F | BODY MASS INDEX: 38.57 KG/M2

## 2017-05-01 DIAGNOSIS — B96.89 URINARY TRACT INFECTION DUE TO ESBL KLEBSIELLA: ICD-10-CM

## 2017-05-01 DIAGNOSIS — N39.0 URINARY TRACT INFECTION DUE TO ESBL KLEBSIELLA: ICD-10-CM

## 2017-05-01 DIAGNOSIS — N39.0 URINARY TRACT INFECTION, SITE UNSPECIFIED: Primary | ICD-10-CM

## 2017-05-01 PROCEDURE — 25010000002 CEFTRIAXONE PER 250 MG: Performed by: UROLOGY

## 2017-05-01 PROCEDURE — 96372 THER/PROPH/DIAG INJ SC/IM: CPT

## 2017-05-01 RX ORDER — CEFTRIAXONE 1 G/1
1 INJECTION, POWDER, FOR SOLUTION INTRAMUSCULAR; INTRAVENOUS ONCE
Status: COMPLETED | OUTPATIENT
Start: 2017-05-01 | End: 2017-05-01

## 2017-05-01 RX ORDER — CEFTRIAXONE 1 G/1
1 INJECTION, POWDER, FOR SOLUTION INTRAMUSCULAR; INTRAVENOUS ONCE
Status: CANCELLED | OUTPATIENT
Start: 2017-05-01 | End: 2017-05-01

## 2017-05-01 RX ADMIN — CEFTRIAXONE 1 G: 1 INJECTION, POWDER, FOR SOLUTION INTRAMUSCULAR; INTRAVENOUS at 15:49

## 2017-07-14 ENCOUNTER — APPOINTMENT (OUTPATIENT)
Dept: PREADMISSION TESTING | Facility: HOSPITAL | Age: 77
End: 2017-07-14

## 2017-07-14 VITALS
RESPIRATION RATE: 16 BRPM | HEIGHT: 66 IN | DIASTOLIC BLOOD PRESSURE: 79 MMHG | BODY MASS INDEX: 39.86 KG/M2 | OXYGEN SATURATION: 98 % | HEART RATE: 83 BPM | WEIGHT: 248 LBS | TEMPERATURE: 97.5 F | SYSTOLIC BLOOD PRESSURE: 146 MMHG

## 2017-07-14 LAB
ANION GAP SERPL CALCULATED.3IONS-SCNC: 11.8 MMOL/L
BACTERIA UR QL AUTO: ABNORMAL /HPF
BILIRUB UR QL STRIP: NEGATIVE
BUN BLD-MCNC: 17 MG/DL (ref 8–23)
BUN/CREAT SERPL: 20.5 (ref 7–25)
CALCIUM SPEC-SCNC: 9.6 MG/DL (ref 8.6–10.5)
CHLORIDE SERPL-SCNC: 104 MMOL/L (ref 98–107)
CLARITY UR: CLEAR
CO2 SERPL-SCNC: 27.2 MMOL/L (ref 22–29)
COLOR UR: YELLOW
CREAT BLD-MCNC: 0.83 MG/DL (ref 0.57–1)
DEPRECATED RDW RBC AUTO: 45 FL (ref 37–54)
ERYTHROCYTE [DISTWIDTH] IN BLOOD BY AUTOMATED COUNT: 13.5 % (ref 11.7–13)
GFR SERPL CREATININE-BSD FRML MDRD: 67 ML/MIN/1.73
GLUCOSE BLD-MCNC: 104 MG/DL (ref 65–99)
GLUCOSE UR STRIP-MCNC: NEGATIVE MG/DL
HCT VFR BLD AUTO: 43.6 % (ref 35.6–45.5)
HGB BLD-MCNC: 13.9 G/DL (ref 11.9–15.5)
HGB UR QL STRIP.AUTO: NEGATIVE
HYALINE CASTS UR QL AUTO: ABNORMAL /LPF
KETONES UR QL STRIP: NEGATIVE
LEUKOCYTE ESTERASE UR QL STRIP.AUTO: ABNORMAL
MCH RBC QN AUTO: 29.4 PG (ref 26.9–32)
MCHC RBC AUTO-ENTMCNC: 31.9 G/DL (ref 32.4–36.3)
MCV RBC AUTO: 92.2 FL (ref 80.5–98.2)
NITRITE UR QL STRIP: NEGATIVE
PH UR STRIP.AUTO: 6.5 [PH] (ref 5–8)
PLATELET # BLD AUTO: 196 10*3/MM3 (ref 140–500)
PMV BLD AUTO: 9.5 FL (ref 6–12)
POTASSIUM BLD-SCNC: 4.4 MMOL/L (ref 3.5–5.2)
PROT UR QL STRIP: NEGATIVE
RBC # BLD AUTO: 4.73 10*6/MM3 (ref 3.9–5.2)
RBC # UR: ABNORMAL /HPF
REF LAB TEST METHOD: ABNORMAL
SODIUM BLD-SCNC: 143 MMOL/L (ref 136–145)
SP GR UR STRIP: 1.02 (ref 1–1.03)
SQUAMOUS #/AREA URNS HPF: ABNORMAL /HPF
UROBILINOGEN UR QL STRIP: ABNORMAL
WBC NRBC COR # BLD: 5.65 10*3/MM3 (ref 4.5–10.7)
WBC UR QL AUTO: ABNORMAL /HPF

## 2017-07-14 PROCEDURE — 36415 COLL VENOUS BLD VENIPUNCTURE: CPT

## 2017-07-14 PROCEDURE — 80048 BASIC METABOLIC PNL TOTAL CA: CPT | Performed by: UROLOGY

## 2017-07-14 PROCEDURE — 87086 URINE CULTURE/COLONY COUNT: CPT | Performed by: UROLOGY

## 2017-07-14 PROCEDURE — 85027 COMPLETE CBC AUTOMATED: CPT | Performed by: UROLOGY

## 2017-07-14 PROCEDURE — 93010 ELECTROCARDIOGRAM REPORT: CPT | Performed by: INTERNAL MEDICINE

## 2017-07-14 PROCEDURE — 81001 URINALYSIS AUTO W/SCOPE: CPT | Performed by: UROLOGY

## 2017-07-14 PROCEDURE — 93005 ELECTROCARDIOGRAM TRACING: CPT

## 2017-07-14 PROCEDURE — 87186 SC STD MICRODIL/AGAR DIL: CPT | Performed by: UROLOGY

## 2017-07-14 RX ORDER — IMIPRAMINE HCL 50 MG
50 TABLET ORAL NIGHTLY
COMMUNITY
End: 2018-01-22 | Stop reason: SDUPTHER

## 2017-07-14 NOTE — DISCHARGE INSTRUCTIONS
Take the following medications the morning of surgery with a small sip of water:        General Instructions:  • Do not eat solid food after midnight the night before surgery.  • You may drink clear liquids day of surgery but must stop at least one hour before your hospital arrival time.  • It is beneficial for you to have a clear drink that contains carbohydrates the day of surgery.  We suggest a 20 ounce bottle of Gatorade or Powerade for non-diabetic patients or a 20 ounce bottle of G2 or Powerade Zero for diabetic patients. (Pediatric patients, are not advised to drink a 20 ounce carbohydrate drink)    Clear liquids are liquids you can see through.  Nothing red in color.     Plain water                               Sports drinks  Sodas                                   Gelatin (Jell-O)  Fruit juices without pulp such as white grape juice and apple juice  Popsicles that contain no fruit or yogurt  Tea or coffee (no cream or milk added)  Gatorade / Powerade  G2 / Powerade Zero    • Infants may have breast milk up to four hours before surgery.  • Infants drinking formula may drink formula up to six hours before surgery.   • Patients who avoid smoking, chewing tobacco and alcohol for 4 weeks prior to surgery have a reduced risk of post-operative complications.  Quit smoking as many days before surgery as you can.  • Do not smoke, use chewing tobacco or drink alcohol the day of surgery.   • If applicable bring your C-PAP/ BI-PAP machine.  • Bring any papers given to you in the doctor’s office.  • Wear clean comfortable clothes and socks.  • Do not wear contact lenses or make-up.  Bring a case for your glasses.   • Bring crutches or walker if applicable.  • Leave all other valuables and jewelry at home.  • The Pre-Admission Testing nurse will instruct you to bring medications if unable to obtain an accurate list in Pre-Admission Testing.        If you were given a blood bank ID arm band remember to bring it with you  the day of surgery.    Preventing a Surgical Site Infection:  • For 2 to 3 days before surgery, avoid shaving with a razor because the razor can irritate skin and make it easier to develop an infection.  • The night prior to surgery sleep in a clean bed with clean clothing.  Do not allow pets to sleep with you.  • Shower on the morning of surgery using a fresh bar of anti-bacterial soap (such as Dial) and clean washcloth.  Dry with a clean towel and dress in clean clothing.  • Ask your surgeon if you will be receiving antibiotics prior to surgery.  • Make sure you, your family, and all healthcare providers clean their hands with soap and water or an alcohol based hand  before caring for you or your wound.    Day of surgery:  Upon arrival, a Pre-op nurse and Anesthesiologist will review your health history, obtain vital signs, and answer questions you may have.  The only belongings needed at this time will be your home medications and if applicable your C-PAP/BI-PAP machine.  If you are staying overnight your family can leave the rest of your belongings in the car and bring them to your room later.  A Pre-op nurse will start an IV and you may receive medication in preparation for surgery, including something to help you relax.  Your family will be able to see you in the Pre-op area.  While you are in surgery your family should notify the waiting room  if they leave the waiting room area and provide a contact phone number.    Please be aware that surgery does come with discomfort.  We want to make every effort to control your discomfort so please discuss any uncontrolled symptoms with your nurse.   Your doctor will most likely have prescribed pain medications.      If you are going home after surgery you will receive individualized written care instructions before being discharged.  A responsible adult must drive you to and from the hospital on the day of your surgery and stay with you for 24  hours.    If you are staying overnight following surgery, you will be transported to your hospital room following the recovery period.  [facility] has all private rooms.    If you have any questions please call Pre-Admission Testing at [phone].  Deductibles and co-payments are collected on the day of service. Please be prepared to pay the required co-pay, deductible or deposit on the day of service as defined by your plan.

## 2017-07-16 LAB — BACTERIA SPEC AEROBE CULT: ABNORMAL

## 2017-07-20 RX ORDER — AMOXICILLIN AND CLAVULANATE POTASSIUM 500; 125 MG/1; MG/1
1 TABLET, FILM COATED ORAL EVERY 12 HOURS SCHEDULED
Status: CANCELLED | OUTPATIENT
Start: 2017-07-20

## 2017-07-21 ENCOUNTER — HOSPITAL ENCOUNTER (OUTPATIENT)
Facility: HOSPITAL | Age: 77
Setting detail: HOSPITAL OUTPATIENT SURGERY
Discharge: HOME | End: 2017-07-21
Attending: UROLOGY | Admitting: UROLOGY

## 2017-07-21 ENCOUNTER — ANESTHESIA EVENT (OUTPATIENT)
Dept: PERIOP | Facility: HOSPITAL | Age: 77
End: 2017-07-21

## 2017-07-21 ENCOUNTER — ANESTHESIA (OUTPATIENT)
Dept: PERIOP | Facility: HOSPITAL | Age: 77
End: 2017-07-21

## 2017-07-21 VITALS
BODY MASS INDEX: 39.53 KG/M2 | WEIGHT: 246 LBS | TEMPERATURE: 97.9 F | SYSTOLIC BLOOD PRESSURE: 150 MMHG | OXYGEN SATURATION: 96 % | HEIGHT: 66 IN | HEART RATE: 79 BPM | RESPIRATION RATE: 18 BRPM | DIASTOLIC BLOOD PRESSURE: 80 MMHG

## 2017-07-21 PROCEDURE — L8606 SYNTHETIC IMPLNT URINARY 1ML: HCPCS | Performed by: UROLOGY

## 2017-07-21 PROCEDURE — 25010000002 PROPOFOL 10 MG/ML EMULSION: Performed by: ANESTHESIOLOGY

## 2017-07-21 PROCEDURE — 25010000002 DEXAMETHASONE PER 1 MG: Performed by: ANESTHESIOLOGY

## 2017-07-21 PROCEDURE — 87086 URINE CULTURE/COLONY COUNT: CPT | Performed by: UROLOGY

## 2017-07-21 PROCEDURE — 25010000002 ONDANSETRON PER 1 MG: Performed by: ANESTHESIOLOGY

## 2017-07-21 PROCEDURE — 25010000002 KETOROLAC TROMETHAMINE PER 15 MG: Performed by: ANESTHESIOLOGY

## 2017-07-21 PROCEDURE — 25010000002 FENTANYL CITRATE (PF) 100 MCG/2ML SOLUTION: Performed by: ANESTHESIOLOGY

## 2017-07-21 DEVICE — INJ AGENT/BULK URETH COAPITITE 1ML: Type: IMPLANTABLE DEVICE | Status: FUNCTIONAL

## 2017-07-21 RX ORDER — DIPHENHYDRAMINE HYDROCHLORIDE 50 MG/ML
12.5 INJECTION INTRAMUSCULAR; INTRAVENOUS
Status: DISCONTINUED | OUTPATIENT
Start: 2017-07-21 | End: 2017-07-21 | Stop reason: HOSPADM

## 2017-07-21 RX ORDER — MIDAZOLAM HYDROCHLORIDE 1 MG/ML
1 INJECTION INTRAMUSCULAR; INTRAVENOUS
Status: DISCONTINUED | OUTPATIENT
Start: 2017-07-21 | End: 2017-07-21 | Stop reason: HOSPADM

## 2017-07-21 RX ORDER — HYDROMORPHONE HYDROCHLORIDE 1 MG/ML
0.5 INJECTION, SOLUTION INTRAMUSCULAR; INTRAVENOUS; SUBCUTANEOUS
Status: DISCONTINUED | OUTPATIENT
Start: 2017-07-21 | End: 2017-07-21 | Stop reason: HOSPADM

## 2017-07-21 RX ORDER — PROMETHAZINE HYDROCHLORIDE 25 MG/1
25 TABLET ORAL ONCE AS NEEDED
Status: DISCONTINUED | OUTPATIENT
Start: 2017-07-21 | End: 2017-07-21 | Stop reason: HOSPADM

## 2017-07-21 RX ORDER — PROPOFOL 10 MG/ML
VIAL (ML) INTRAVENOUS AS NEEDED
Status: DISCONTINUED | OUTPATIENT
Start: 2017-07-21 | End: 2017-07-21 | Stop reason: SURG

## 2017-07-21 RX ORDER — HYDRALAZINE HYDROCHLORIDE 20 MG/ML
5 INJECTION INTRAMUSCULAR; INTRAVENOUS
Status: DISCONTINUED | OUTPATIENT
Start: 2017-07-21 | End: 2017-07-21 | Stop reason: HOSPADM

## 2017-07-21 RX ORDER — IBUPROFEN 200 MG
800 TABLET ORAL 2 TIMES DAILY
Status: ON HOLD | COMMUNITY
End: 2019-07-31

## 2017-07-21 RX ORDER — FAMOTIDINE 10 MG/ML
20 INJECTION, SOLUTION INTRAVENOUS ONCE
Status: COMPLETED | OUTPATIENT
Start: 2017-07-21 | End: 2017-07-21

## 2017-07-21 RX ORDER — LIDOCAINE HYDROCHLORIDE 10 MG/ML
0.5 INJECTION, SOLUTION EPIDURAL; INFILTRATION; INTRACAUDAL; PERINEURAL ONCE AS NEEDED
Status: DISCONTINUED | OUTPATIENT
Start: 2017-07-21 | End: 2017-07-21 | Stop reason: HOSPADM

## 2017-07-21 RX ORDER — FENTANYL CITRATE 50 UG/ML
50 INJECTION, SOLUTION INTRAMUSCULAR; INTRAVENOUS
Status: DISCONTINUED | OUTPATIENT
Start: 2017-07-21 | End: 2017-07-21 | Stop reason: HOSPADM

## 2017-07-21 RX ORDER — PROMETHAZINE HYDROCHLORIDE 25 MG/ML
12.5 INJECTION, SOLUTION INTRAMUSCULAR; INTRAVENOUS ONCE AS NEEDED
Status: DISCONTINUED | OUTPATIENT
Start: 2017-07-21 | End: 2017-07-21 | Stop reason: HOSPADM

## 2017-07-21 RX ORDER — NALOXONE HCL 0.4 MG/ML
0.2 VIAL (ML) INJECTION AS NEEDED
Status: DISCONTINUED | OUTPATIENT
Start: 2017-07-21 | End: 2017-07-21 | Stop reason: HOSPADM

## 2017-07-21 RX ORDER — HYDROCODONE BITARTRATE AND ACETAMINOPHEN 7.5; 325 MG/1; MG/1
1 TABLET ORAL ONCE AS NEEDED
Status: DISCONTINUED | OUTPATIENT
Start: 2017-07-21 | End: 2017-07-21 | Stop reason: HOSPADM

## 2017-07-21 RX ORDER — FENTANYL CITRATE 50 UG/ML
INJECTION, SOLUTION INTRAMUSCULAR; INTRAVENOUS AS NEEDED
Status: DISCONTINUED | OUTPATIENT
Start: 2017-07-21 | End: 2017-07-21 | Stop reason: SURG

## 2017-07-21 RX ORDER — KETOROLAC TROMETHAMINE 30 MG/ML
INJECTION, SOLUTION INTRAMUSCULAR; INTRAVENOUS AS NEEDED
Status: DISCONTINUED | OUTPATIENT
Start: 2017-07-21 | End: 2017-07-21 | Stop reason: SURG

## 2017-07-21 RX ORDER — SODIUM CHLORIDE 0.9 % (FLUSH) 0.9 %
1-10 SYRINGE (ML) INJECTION AS NEEDED
Status: DISCONTINUED | OUTPATIENT
Start: 2017-07-21 | End: 2017-07-21 | Stop reason: HOSPADM

## 2017-07-21 RX ORDER — PROMETHAZINE HYDROCHLORIDE 25 MG/1
12.5 TABLET ORAL ONCE AS NEEDED
Status: DISCONTINUED | OUTPATIENT
Start: 2017-07-21 | End: 2017-07-21 | Stop reason: HOSPADM

## 2017-07-21 RX ORDER — FLUMAZENIL 0.1 MG/ML
0.2 INJECTION INTRAVENOUS AS NEEDED
Status: DISCONTINUED | OUTPATIENT
Start: 2017-07-21 | End: 2017-07-21 | Stop reason: HOSPADM

## 2017-07-21 RX ORDER — CEPHALEXIN 500 MG/1
500 CAPSULE ORAL 4 TIMES DAILY
COMMUNITY
End: 2018-02-19

## 2017-07-21 RX ORDER — PROMETHAZINE HYDROCHLORIDE 25 MG/1
25 SUPPOSITORY RECTAL ONCE AS NEEDED
Status: DISCONTINUED | OUTPATIENT
Start: 2017-07-21 | End: 2017-07-21 | Stop reason: HOSPADM

## 2017-07-21 RX ORDER — EPHEDRINE SULFATE 50 MG/ML
5 INJECTION, SOLUTION INTRAVENOUS ONCE AS NEEDED
Status: DISCONTINUED | OUTPATIENT
Start: 2017-07-21 | End: 2017-07-21 | Stop reason: HOSPADM

## 2017-07-21 RX ORDER — ONDANSETRON 2 MG/ML
INJECTION INTRAMUSCULAR; INTRAVENOUS AS NEEDED
Status: DISCONTINUED | OUTPATIENT
Start: 2017-07-21 | End: 2017-07-21 | Stop reason: SURG

## 2017-07-21 RX ORDER — LIDOCAINE HYDROCHLORIDE 20 MG/ML
INJECTION, SOLUTION INFILTRATION; PERINEURAL AS NEEDED
Status: DISCONTINUED | OUTPATIENT
Start: 2017-07-21 | End: 2017-07-21 | Stop reason: SURG

## 2017-07-21 RX ORDER — AMOXICILLIN AND CLAVULANATE POTASSIUM 500; 125 MG/1; MG/1
1 TABLET, FILM COATED ORAL ONCE
Status: COMPLETED | OUTPATIENT
Start: 2017-07-21 | End: 2017-07-21

## 2017-07-21 RX ORDER — MAGNESIUM HYDROXIDE 1200 MG/15ML
LIQUID ORAL AS NEEDED
Status: DISCONTINUED | OUTPATIENT
Start: 2017-07-21 | End: 2017-07-21 | Stop reason: HOSPADM

## 2017-07-21 RX ORDER — ONDANSETRON 2 MG/ML
4 INJECTION INTRAMUSCULAR; INTRAVENOUS ONCE AS NEEDED
Status: DISCONTINUED | OUTPATIENT
Start: 2017-07-21 | End: 2017-07-21 | Stop reason: HOSPADM

## 2017-07-21 RX ORDER — SCOLOPAMINE TRANSDERMAL SYSTEM 1 MG/1
1 PATCH, EXTENDED RELEASE TRANSDERMAL ONCE
Status: DISCONTINUED | OUTPATIENT
Start: 2017-07-21 | End: 2017-07-21 | Stop reason: HOSPADM

## 2017-07-21 RX ORDER — FENTANYL CITRATE 50 UG/ML
100 INJECTION, SOLUTION INTRAMUSCULAR; INTRAVENOUS
Status: DISCONTINUED | OUTPATIENT
Start: 2017-07-21 | End: 2017-07-21 | Stop reason: HOSPADM

## 2017-07-21 RX ORDER — OXYCODONE AND ACETAMINOPHEN 7.5; 325 MG/1; MG/1
1 TABLET ORAL ONCE AS NEEDED
Status: DISCONTINUED | OUTPATIENT
Start: 2017-07-21 | End: 2017-07-21 | Stop reason: HOSPADM

## 2017-07-21 RX ORDER — SODIUM CHLORIDE, SODIUM LACTATE, POTASSIUM CHLORIDE, CALCIUM CHLORIDE 600; 310; 30; 20 MG/100ML; MG/100ML; MG/100ML; MG/100ML
9 INJECTION, SOLUTION INTRAVENOUS CONTINUOUS
Status: DISCONTINUED | OUTPATIENT
Start: 2017-07-21 | End: 2017-07-21 | Stop reason: HOSPADM

## 2017-07-21 RX ORDER — LABETALOL HYDROCHLORIDE 5 MG/ML
5 INJECTION, SOLUTION INTRAVENOUS
Status: DISCONTINUED | OUTPATIENT
Start: 2017-07-21 | End: 2017-07-21 | Stop reason: HOSPADM

## 2017-07-21 RX ORDER — DEXAMETHASONE SODIUM PHOSPHATE 10 MG/ML
INJECTION INTRAMUSCULAR; INTRAVENOUS AS NEEDED
Status: DISCONTINUED | OUTPATIENT
Start: 2017-07-21 | End: 2017-07-21 | Stop reason: SURG

## 2017-07-21 RX ORDER — MIDAZOLAM HYDROCHLORIDE 1 MG/ML
2 INJECTION INTRAMUSCULAR; INTRAVENOUS
Status: DISCONTINUED | OUTPATIENT
Start: 2017-07-21 | End: 2017-07-21 | Stop reason: HOSPADM

## 2017-07-21 RX ADMIN — KETOROLAC TROMETHAMINE 30 MG: 30 INJECTION, SOLUTION INTRAMUSCULAR; INTRAVENOUS at 09:45

## 2017-07-21 RX ADMIN — PROPOFOL 200 MG: 10 INJECTION, EMULSION INTRAVENOUS at 09:36

## 2017-07-21 RX ADMIN — ONDANSETRON 4 MG: 2 INJECTION INTRAMUSCULAR; INTRAVENOUS at 09:45

## 2017-07-21 RX ADMIN — DEXAMETHASONE SODIUM PHOSPHATE 8 MG: 10 INJECTION INTRAMUSCULAR; INTRAVENOUS at 09:45

## 2017-07-21 RX ADMIN — FAMOTIDINE 20 MG: 10 INJECTION INTRAVENOUS at 08:31

## 2017-07-21 RX ADMIN — SCOPALAMINE 1 PATCH: 1 PATCH, EXTENDED RELEASE TRANSDERMAL at 08:31

## 2017-07-21 RX ADMIN — AMOXICILLIN AND CLAVULANATE POTASSIUM 500 MG: 500; 125 TABLET, FILM COATED ORAL at 08:03

## 2017-07-21 RX ADMIN — FENTANYL CITRATE 50 MCG: 50 INJECTION INTRAMUSCULAR; INTRAVENOUS at 09:46

## 2017-07-21 RX ADMIN — SODIUM CHLORIDE, POTASSIUM CHLORIDE, SODIUM LACTATE AND CALCIUM CHLORIDE 9 ML/HR: 600; 310; 30; 20 INJECTION, SOLUTION INTRAVENOUS at 08:31

## 2017-07-21 RX ADMIN — FENTANYL CITRATE 50 MCG: 50 INJECTION INTRAMUSCULAR; INTRAVENOUS at 09:33

## 2017-07-21 RX ADMIN — LIDOCAINE HYDROCHLORIDE 60 MG: 20 INJECTION, SOLUTION INFILTRATION; PERINEURAL at 09:36

## 2017-07-21 NOTE — ANESTHESIA POSTPROCEDURE EVALUATION
Patient: Anitra Orta    Procedure Summary     Date Anesthesia Start Anesthesia Stop Room / Location    07/21/17 0930 1007  [facility] OR 01 / [facility] MAIN OR       Procedure Diagnosis Surgeon Provider    CYSTOSCOPY COAPTITE INJECTION (N/A ) No diagnosis on file. MD Alexandro Vazquez MD          Anesthesia Type: general  Last vitals  BP   150/80 (07/21/17 1125)    Temp        Pulse   79 (07/21/17 1125)   Resp   18 (07/21/17 1125)    SpO2   96 % (07/21/17 1125)      Post Anesthesia Care and Evaluation    Patient location during evaluation: PACU  Patient participation: complete - patient participated  Level of consciousness: awake and alert  Pain management: adequate  Airway patency: patent  Anesthetic complications: No anesthetic complications    Cardiovascular status: acceptable  Respiratory status: acceptable  Hydration status: acceptable    Comments: --------------------            07/21/17 1125     --------------------   BP:       150/80     Pulse:      79       Resp:       18       Temp:                SpO2:      96%      --------------------

## 2017-07-21 NOTE — PLAN OF CARE
Problem: Patient Care Overview (Adult)  Goal: Plan of Care Review  Outcome: Ongoing (interventions implemented as appropriate)    07/21/17 0725   Coping/Psychosocial Response Interventions   Plan Of Care Reviewed With patient   Patient Care Overview   Progress no change       Goal: Adult Individualization and Mutuality  Outcome: Ongoing (interventions implemented as appropriate)    07/21/17 0725   Individualization   Patient Specific Preferences chronic UTIs

## 2017-07-21 NOTE — ANESTHESIA PROCEDURE NOTES
Airway  Urgency: elective    Airway not difficult    General Information and Staff    Patient location during procedure: OR  Anesthesiologist: WERO ÁLVAREZ    Indications and Patient Condition  Indications for airway management: airway protection    Preoxygenated: yes  Mask difficulty assessment: 0 - not attempted    Final Airway Details  Final airway type: supraglottic airway      Successful airway: classic  Size 4    Number of attempts at approach: 1

## 2017-07-21 NOTE — PLAN OF CARE
Problem: Perioperative Period (Adult)  Goal: Signs and Symptoms of Listed Potential Problems Will be Absent or Manageable (Perioperative Period)  Outcome: Ongoing (interventions implemented as appropriate)    07/21/17 1034   Perioperative Period   Problems Assessed (Perioperative Period) all

## 2017-07-21 NOTE — PLAN OF CARE
Problem: Perioperative Period (Adult)  Goal: Signs and Symptoms of Listed Potential Problems Will be Absent or Manageable (Perioperative Period)  Outcome: Ongoing (interventions implemented as appropriate)    07/21/17 2898   Perioperative Period   Problems Assessed (Perioperative Period) pain   Problems Present (Perioperative Period) pain

## 2017-07-21 NOTE — OP NOTE
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    Procedure note 76-year-old with type III stress urinary incontinence recurrent infections of vulvar atrophy to undergo the above-mentioned procedure procedure and attendant risks not limited to infection bleeding a continued incontinence and urinary retention possible ancillary procedures understood agree to proceed    Brought back to the operative suite Timeout was taken antibodies were given after adequate general was placed in a modified dorsolithotomy position on the cystoscopy table she was prepped and draped in sterile fashion over the genitalia and marked vulvar atrophy.     under anesthesia revealed no pelvic organ prolapse mild urethral prolapse intraurethral Xylocaine jelly was administered scope was advanced bladder neck was open trigone was normal or was normal orifices with clear reflux    3 cc of coaptite was injected around the bladder neck with good apposition attar was left partially filled the patient's heart tolerated procedure well and was sent to the recovery room in satisfactory condition findings  were discussed with her  disposition discharged on Norco Phenergan Pyridium and Keflex with follow-up in the office in 2 weeks

## 2017-07-21 NOTE — BRIEF OP NOTE
CYSTOSCOPY BOTOX INJECTION OF BLADDER  Procedure Note    Anitra Orta  7/21/2017    Pre-op Diagnosis: type 3 sallie  Vulvar atrophy rec uti    * No pre-op diagnosis entered *    Post-op Diagnosis:   Same   * No Diagnosis Codes entered *    Procedure/CPT® Codes:      Procedure(s):  CYSTOSCOPY COAPTITE INJECTION 3 cc  Circumscribe bladder neck      Surgeon(s):  Kevon Clark MD    Anesthesia: General    Staff:   Circulator: Justyn Niño RN  Scrub Person: Yaw Harrison    Estimated Blood Loss: *No blood loss documented*  Urine Voided: * No values recorded between 7/21/2017  9:22 AM and 7/21/2017 10:02 AM *    Specimens:                * No specimens in log *      Drains:           Findings:   Open bladder neck apposed   Complications:       Kevon Clark MD     Date: 7/21/2017  Time: 10:04 AM

## 2017-07-21 NOTE — PLAN OF CARE
Problem: Patient Care Overview (Adult)  Goal: Discharge Needs Assessment  Outcome: Ongoing (interventions implemented as appropriate)  PT FULLY AWAKE, ALERT AND ORIENTED X4.. CALM AND COOPERATIVE.  MAEW,  NO DEFICITS NOTED. RESP EVEN AND  CLEAR. . ASSESSMENT INTACT. IV INTACT. DENIES PAIIN.  SCOPE INTACT BEHIND RIGHT EAR. NO INCISION NOTED. NO BLEEDING NOTED.  WAITING FOR PHASE 2 BED.  NO DISTRESS NOTED

## 2017-07-21 NOTE — ANESTHESIA PREPROCEDURE EVALUATION
Anesthesia Evaluation     history of anesthetic complications: PONV  NPO Solid Status: > 8 hours       Airway   Mallampati: III  TM distance: >3 FB  Neck ROM: full  possible difficult intubation  Dental - normal exam     Pulmonary - normal exam   Cardiovascular - normal exam    (+) hypertension,       Neuro/Psych  GI/Hepatic/Renal/Endo      Musculoskeletal     Abdominal  - normal exam   Substance History      OB/GYN          Other                                        Anesthesia Plan    ASA 3     general     intravenous induction   Anesthetic plan and risks discussed with patient.    Plan discussed with CRNA.

## 2017-07-22 LAB — BACTERIA SPEC AEROBE CULT: ABNORMAL

## 2017-08-04 ENCOUNTER — OFFICE VISIT (OUTPATIENT)
Dept: INTERNAL MEDICINE | Facility: CLINIC | Age: 77
End: 2017-08-04

## 2017-08-04 VITALS
HEART RATE: 79 BPM | OXYGEN SATURATION: 97 % | DIASTOLIC BLOOD PRESSURE: 82 MMHG | SYSTOLIC BLOOD PRESSURE: 142 MMHG | HEIGHT: 66 IN | WEIGHT: 247.8 LBS | BODY MASS INDEX: 39.82 KG/M2

## 2017-08-04 DIAGNOSIS — M54.2 NECK PAIN: Primary | ICD-10-CM

## 2017-08-04 PROCEDURE — 99213 OFFICE O/P EST LOW 20 MIN: CPT | Performed by: INTERNAL MEDICINE

## 2017-08-04 NOTE — PROGRESS NOTES
Subjective     Anitra Orta is a 77 y.o. female, who presents with a chief complaint of   Chief Complaint   Patient presents with   • Neck Pain     Thought she was sleeping funny, X3wks, not getting any better. She explains that she has been having a lot of UTI's and she has been seeing Dr. Clark for this, she has been on a lot of antbx       HPI   The pt is here because of right sided neck pain for the past 3 weeks.  She can't tell if she is swollen or not.  She has not noted any specific lumps or bumps that she can palpate.  The pain is more of an ache.  She has had lots of UTI's and has been on lots of abx recently.  She broke a tooth and had a filling a couple of weeks ago on the right side.  She has also had some right ear pain.  No spasm or cramp. The pain is just annoying.  She says the pain has stayed the same. She can't think of any aggravating or alleviating factors.  No help with ibuprofen.      The following portions of the patient's history were reviewed and updated as appropriate: allergies, current medications, past family history, past medical history, past social history, past surgical history and problem list.    Allergies: Bactrim [sulfamethoxazole-trimethoprim]; Ciprofloxacin; Levaquin [levofloxacin]; Macrobid [nitrofurantoin]; and Cortisone    Review of Systems   Constitutional: Negative.    HENT: Negative.    Eyes: Negative.    Respiratory: Negative.    Cardiovascular: Negative.    Gastrointestinal: Negative.    Endocrine: Negative.    Genitourinary: Negative.    Musculoskeletal: Positive for neck pain.   Skin: Negative.    Allergic/Immunologic: Negative.    Neurological: Negative.    Hematological: Negative.    Psychiatric/Behavioral: Negative.    All other systems reviewed and are negative.      Objective     Wt Readings from Last 3 Encounters:   08/04/17 247 lb 12.8 oz (112 kg)   07/21/17 246 lb (112 kg)   07/14/17 248 lb (112 kg)     Temp Readings from Last 3 Encounters:   07/21/17  97.9 °F (36.6 °C) (Oral)   07/14/17 97.5 °F (36.4 °C) (Oral)   05/01/17 98 °F (36.7 °C) (Tympanic)     BP Readings from Last 3 Encounters:   08/04/17 142/82   07/21/17 150/80   07/14/17 146/79     Pulse Readings from Last 3 Encounters:   08/04/17 79   07/21/17 79   07/14/17 83     Body mass index is 40 kg/(m^2).  SpO2 Readings from Last 3 Encounters:   08/04/17 97%   07/21/17 96%   07/14/17 98%       Physical Exam   Constitutional: She is oriented to person, place, and time. She appears well-developed and well-nourished. No distress.   HENT:   Head: Normocephalic and atraumatic.   Right Ear: External ear normal.   Left Ear: External ear normal.   Nose: Nose normal.   Mouth/Throat: Oropharynx is clear and moist.   Eyes: Conjunctivae and EOM are normal. Pupils are equal, round, and reactive to light.   Neck: Normal range of motion.   Neck obese   Cardiovascular: Normal rate, regular rhythm, normal heart sounds and intact distal pulses.    Pulmonary/Chest: Effort normal and breath sounds normal. No respiratory distress. She has no wheezes.   Musculoskeletal: Normal range of motion.   Normal gait   Lymphadenopathy:     She has no cervical adenopathy.   Neurological: She is alert and oriented to person, place, and time.   Skin: Skin is warm and dry.   Psychiatric: She has a normal mood and affect. Her behavior is normal. Judgment and thought content normal.   Nursing note and vitals reviewed.      Results for orders placed or performed during the hospital encounter of 07/21/17   Urine Culture   Result Value Ref Range    Urine Culture <10,000 CFU/mL Gram Positive Cocci (A)        Assessment/Plan   Anitra was seen today for neck pain.    Diagnoses and all orders for this visit:    Neck pain      No specific mass able to be palpated.  Possible tender lymph node after dental work on right side.  Supportive care. Ok to try warm/cold compresses.  If not improved in 2 weeks we will check neck/soft tissue u/s.      Outpatient  "Medications Prior to Visit   Medication Sig Dispense Refill   • aspirin 81 MG EC tablet Take 81 mg by mouth Daily. PT TO STOP PER MD INSTRUCTION     • Biotin 10 MG capsule Take 10 mg by mouth Daily.     • calcium citrate-vitamin d (CITRACAL) 200-250 MG-UNIT tablet tablet Take 1 tablet by mouth Daily.     • cephalexin (KEFLEX) 500 MG capsule Take 500 mg by mouth 4 (Four) Times a Day.     • folic acid (FOLVITE) 1 MG tablet Take 1 mg by mouth daily.     • ibuprofen (ADVIL,MOTRIN) 200 MG tablet Take 800 mg by mouth 2 (Two) Times a Day.     • imipramine (TOFRANIL) 50 MG tablet Take 50 mg by mouth Every Night.     • lisinopril (PRINIVIL,ZESTRIL) 20 MG tablet Take 1 tablet by mouth Daily With Breakfast. 90 tablet 3   • Mirabegron ER (MYRBETRIQ) 50 MG tablet sustained-release 24 hour 24 hr tablet Take 50 mg by mouth Daily.     • NON FORMULARY Take 1 tablet by mouth 2 (Two) Times a Day. \"pressor vision\" for macular degeneration. OTC     • omeprazole (PriLOSEC) 20 MG capsule Take 1 capsule by mouth Daily.     • simvastatin (ZOCOR) 20 MG tablet Take 1 tablet by mouth Every Night. 90 tablet 3   • vitamin C (ASCORBIC ACID) 500 MG tablet Take 500 mg by mouth Daily.       No facility-administered medications prior to visit.      No orders of the defined types were placed in this encounter.      There are no discontinued medications.      Return if symptoms worsen or fail to improve.  "

## 2017-09-05 ENCOUNTER — LAB (OUTPATIENT)
Dept: INTERNAL MEDICINE | Facility: CLINIC | Age: 77
End: 2017-09-05

## 2017-09-05 DIAGNOSIS — I10 ESSENTIAL HYPERTENSION: ICD-10-CM

## 2017-09-05 DIAGNOSIS — E78.49 OTHER HYPERLIPIDEMIA: ICD-10-CM

## 2017-09-05 DIAGNOSIS — E55.9 VITAMIN D DEFICIENCY: ICD-10-CM

## 2017-09-05 DIAGNOSIS — I10 ESSENTIAL HYPERTENSION: Primary | ICD-10-CM

## 2017-09-05 LAB
25(OH)D3+25(OH)D2 SERPL-MCNC: 16.2 NG/ML
ALBUMIN SERPL-MCNC: 4 G/DL (ref 3.5–5.2)
ALBUMIN/GLOB SERPL: 1.5 G/DL
ALP SERPL-CCNC: 107 U/L (ref 40–129)
ALT SERPL-CCNC: 20 U/L (ref 5–33)
AST SERPL-CCNC: 22 U/L (ref 5–32)
BASOPHILS # BLD AUTO: 0.02 10*3/MM3 (ref 0–0.2)
BASOPHILS NFR BLD AUTO: 0.4 % (ref 0–2)
BILIRUB SERPL-MCNC: 0.4 MG/DL (ref 0.2–1.2)
BUN SERPL-MCNC: 13 MG/DL (ref 8–23)
BUN/CREAT SERPL: 16.3 (ref 7–25)
CALCIUM SERPL-MCNC: 8.8 MG/DL (ref 8.8–10.5)
CHLORIDE SERPL-SCNC: 105 MMOL/L (ref 98–107)
CHOLEST SERPL-MCNC: 178 MG/DL (ref 0–200)
CO2 SERPL-SCNC: 27.5 MMOL/L (ref 22–29)
CREAT SERPL-MCNC: 0.8 MG/DL (ref 0.57–1)
EOSINOPHIL # BLD AUTO: 0.13 10*3/MM3 (ref 0.1–0.3)
EOSINOPHIL NFR BLD AUTO: 2.7 % (ref 0–4)
ERYTHROCYTE [DISTWIDTH] IN BLOOD BY AUTOMATED COUNT: 13.3 % (ref 11.5–14.5)
GLOBULIN SER CALC-MCNC: 2.7 GM/DL
GLUCOSE SERPL-MCNC: 113 MG/DL (ref 65–99)
HCT VFR BLD AUTO: 42.4 % (ref 37–47)
HDLC SERPL-MCNC: 48 MG/DL (ref 40–60)
HGB BLD-MCNC: 13.8 G/DL (ref 12–16)
IMM GRANULOCYTES # BLD: 0.02 10*3/MM3 (ref 0–0.03)
IMM GRANULOCYTES NFR BLD: 0.4 % (ref 0–0.5)
LDLC SERPL CALC-MCNC: 96 MG/DL (ref 0–100)
LDLC/HDLC SERPL: 2 {RATIO}
LYMPHOCYTES # BLD AUTO: 1.05 10*3/MM3 (ref 0.6–4.8)
LYMPHOCYTES NFR BLD AUTO: 22.2 % (ref 20–45)
MCH RBC QN AUTO: 29.1 PG (ref 27–31)
MCHC RBC AUTO-ENTMCNC: 32.5 G/DL (ref 31–37)
MCV RBC AUTO: 89.5 FL (ref 81–99)
MONOCYTES # BLD AUTO: 0.35 10*3/MM3 (ref 0–1)
MONOCYTES NFR BLD AUTO: 7.4 % (ref 3–8)
NEUTROPHILS # BLD AUTO: 3.16 10*3/MM3 (ref 1.5–8.3)
NEUTROPHILS NFR BLD AUTO: 66.9 % (ref 45–70)
NRBC BLD AUTO-RTO: 0 /100 WBC (ref 0–0)
PLATELET # BLD AUTO: 211 10*3/MM3 (ref 140–500)
POTASSIUM SERPL-SCNC: 4 MMOL/L (ref 3.5–5.2)
PROT SERPL-MCNC: 6.7 G/DL (ref 6–8.5)
RBC # BLD AUTO: 4.74 10*6/MM3 (ref 4.2–5.4)
SODIUM SERPL-SCNC: 144 MMOL/L (ref 136–145)
TRIGL SERPL-MCNC: 170 MG/DL (ref 0–150)
VLDLC SERPL CALC-MCNC: 34 MG/DL (ref 7–27)
WBC # BLD AUTO: 4.73 10*3/MM3 (ref 4.8–10.8)

## 2017-09-08 ENCOUNTER — OFFICE VISIT (OUTPATIENT)
Dept: INTERNAL MEDICINE | Facility: CLINIC | Age: 77
End: 2017-09-08

## 2017-09-08 VITALS
WEIGHT: 244 LBS | SYSTOLIC BLOOD PRESSURE: 124 MMHG | HEART RATE: 101 BPM | DIASTOLIC BLOOD PRESSURE: 82 MMHG | OXYGEN SATURATION: 95 % | HEIGHT: 66 IN | BODY MASS INDEX: 39.21 KG/M2

## 2017-09-08 DIAGNOSIS — K21.9 GASTROESOPHAGEAL REFLUX DISEASE, ESOPHAGITIS PRESENCE NOT SPECIFIED: ICD-10-CM

## 2017-09-08 DIAGNOSIS — E78.1 HYPERTRIGLYCERIDEMIA: ICD-10-CM

## 2017-09-08 DIAGNOSIS — S39.012A LOW BACK STRAIN, INITIAL ENCOUNTER: ICD-10-CM

## 2017-09-08 DIAGNOSIS — M81.0 OSTEOPOROSIS: ICD-10-CM

## 2017-09-08 DIAGNOSIS — N39.41 URGE INCONTINENCE OF URINE: ICD-10-CM

## 2017-09-08 DIAGNOSIS — I10 ESSENTIAL HYPERTENSION: Primary | ICD-10-CM

## 2017-09-08 DIAGNOSIS — E78.2 MIXED HYPERLIPIDEMIA: ICD-10-CM

## 2017-09-08 DIAGNOSIS — E88.81 INSULIN RESISTANCE: ICD-10-CM

## 2017-09-08 DIAGNOSIS — R73.9 HYPERGLYCEMIA: ICD-10-CM

## 2017-09-08 PROBLEM — E88.819 INSULIN RESISTANCE: Status: ACTIVE | Noted: 2017-09-08

## 2017-09-08 PROCEDURE — 99214 OFFICE O/P EST MOD 30 MIN: CPT | Performed by: INTERNAL MEDICINE

## 2017-09-08 RX ORDER — LISINOPRIL 20 MG/1
20 TABLET ORAL
Qty: 90 TABLET | Refills: 3 | Status: SHIPPED | OUTPATIENT
Start: 2017-09-08 | End: 2018-10-07 | Stop reason: SDUPTHER

## 2017-09-08 RX ORDER — OMEPRAZOLE 20 MG/1
20 CAPSULE, DELAYED RELEASE ORAL DAILY
Start: 2017-09-08 | End: 2021-02-19 | Stop reason: SDUPTHER

## 2017-09-08 RX ORDER — CYCLOBENZAPRINE HCL 10 MG
10 TABLET ORAL 3 TIMES DAILY PRN
Qty: 30 TABLET | Refills: 0 | Status: SHIPPED | OUTPATIENT
Start: 2017-09-08 | End: 2018-03-12

## 2017-09-08 RX ORDER — SIMVASTATIN 20 MG
20 TABLET ORAL NIGHTLY
Qty: 90 TABLET | Refills: 3 | Status: SHIPPED | OUTPATIENT
Start: 2017-09-08 | End: 2018-10-29 | Stop reason: SDUPTHER

## 2017-09-08 NOTE — PROGRESS NOTES
Subjective     Anitra Orta is a 77 y.o. female, who presents with a chief complaint of   Chief Complaint   Patient presents with   • Follow-up     Had labs last week   • Fall     Last Sunday, was trying to get something off of a high shelf and she fell.       HPI   The pt is here for follow up.    She still has some chronic back and neck pain.  She fell last week trying to get a storage tote off a tall shelf.  She thinks she twisted her back with this.  She has used heat, cold, tens unit, and advil.  She isn't sleeping because of urinary frequency.  She has had luck with chiropractic therapy in the past.  Epidural injections used to help her in the past but aren't really helping now.   She knows she needs to lose weight.  She is limited in her exercise bc of her back pain.      Nocturia - she is going to the bathroom q 2 hours.  She has had urologic surgery bc of the frequency.  She recently had another UTI and was treated with Augmentin x 7 days which she finished a little over a week ago.  She is worried that her urethra repair has come undone.  She feels like the UTI is cleared.  She is already on myrbetriq.    htn - no ha/dizziness    HLD - celia statin well.  No cramps or myalgias    Pre diabetes - sugars still elevated.  Pt likes to eat sweets.      The following portions of the patient's history were reviewed and updated as appropriate: allergies, current medications, past family history, past medical history, past social history, past surgical history and problem list.    Allergies: Bactrim [sulfamethoxazole-trimethoprim]; Ciprofloxacin; Levaquin [levofloxacin]; Macrobid [nitrofurantoin]; and Cortisone    Review of Systems   Constitutional: Negative.    HENT: Negative.    Eyes: Negative.    Respiratory: Negative.    Cardiovascular: Negative.    Gastrointestinal: Negative.    Endocrine: Negative.    Genitourinary: Negative.    Musculoskeletal: Positive for back pain and neck pain.   Skin: Negative.     Allergic/Immunologic: Negative.    Neurological: Negative.    Hematological: Negative.    Psychiatric/Behavioral: Negative.    All other systems reviewed and are negative.      Objective     Wt Readings from Last 3 Encounters:   09/08/17 244 lb (111 kg)   08/04/17 247 lb 12.8 oz (112 kg)   07/21/17 246 lb (112 kg)     Temp Readings from Last 3 Encounters:   07/21/17 97.9 °F (36.6 °C) (Oral)   07/14/17 97.5 °F (36.4 °C) (Oral)   05/01/17 98 °F (36.7 °C) (Tympanic)     BP Readings from Last 3 Encounters:   09/08/17 124/82   08/04/17 142/82   07/21/17 150/80     Pulse Readings from Last 3 Encounters:   09/08/17 101   08/04/17 79   07/21/17 79     Body mass index is 39.38 kg/(m^2).  SpO2 Readings from Last 3 Encounters:   09/08/17 95%   08/04/17 97%   07/21/17 96%       Physical Exam   Constitutional: She is oriented to person, place, and time. She appears well-developed and well-nourished. No distress.   HENT:   Head: Normocephalic and atraumatic.   Right Ear: External ear normal.   Left Ear: External ear normal.   Nose: Nose normal.   Mouth/Throat: Oropharynx is clear and moist.   Eyes: Conjunctivae and EOM are normal. Pupils are equal, round, and reactive to light.   Neck: Normal range of motion. Neck supple.   Cardiovascular: Normal rate, regular rhythm, normal heart sounds and intact distal pulses.    Pulmonary/Chest: Effort normal and breath sounds normal. No respiratory distress. She has no wheezes.   Musculoskeletal: Normal range of motion.   Normal gait   Neurological: She is alert and oriented to person, place, and time.   Skin: Skin is warm and dry.   Psychiatric: She has a normal mood and affect. Her behavior is normal. Judgment and thought content normal.   Nursing note and vitals reviewed.      Results for orders placed or performed in visit on 09/05/17   Comprehensive metabolic panel   Result Value Ref Range    Glucose 113 (H) 65 - 99 mg/dL    BUN 13 8 - 23 mg/dL    Creatinine 0.80 0.57 - 1.00 mg/dL     eGFR Non African Am 70 >60 mL/min/1.73    eGFR African Am 85 >60 mL/min/1.73    BUN/Creatinine Ratio 16.3 7.0 - 25.0    Sodium 144 136 - 145 mmol/L    Potassium 4.0 3.5 - 5.2 mmol/L    Chloride 105 98 - 107 mmol/L    Total CO2 27.5 22.0 - 29.0 mmol/L    Calcium 8.8 8.8 - 10.5 mg/dL    Total Protein 6.7 6.0 - 8.5 g/dL    Albumin 4.00 3.50 - 5.20 g/dL    Globulin 2.7 gm/dL    A/G Ratio 1.5 g/dL    Total Bilirubin 0.4 0.2 - 1.2 mg/dL    Alkaline Phosphatase 107 40 - 129 U/L    AST (SGOT) 22 5 - 32 U/L    ALT (SGPT) 20 5 - 33 U/L   Lipid Panel With LDL/HDL Ratio   Result Value Ref Range    Total Cholesterol 178 0 - 200 mg/dL    Triglycerides 170 (H) 0 - 150 mg/dL    HDL Cholesterol 48 40 - 60 mg/dL    VLDL Cholesterol 34 (H) 7 - 27 mg/dL    LDL Cholesterol  96 0 - 100 mg/dL    LDL/HDL Ratio 2.00    Vitamin D 25 hydroxy   Result Value Ref Range    25 Hydroxy, Vitamin D 16.2 ng/mL   CBC w AUTO Differential   Result Value Ref Range    WBC 4.73 (L) 4.80 - 10.80 10*3/mm3    RBC 4.74 4.20 - 5.40 10*6/mm3    Hemoglobin 13.8 12.0 - 16.0 g/dL    Hematocrit 42.4 37.0 - 47.0 %    MCV 89.5 81.0 - 99.0 fL    MCH 29.1 27.0 - 31.0 pg    MCHC 32.5 31.0 - 37.0 g/dL    RDW 13.3 11.5 - 14.5 %    Platelets 211 140 - 500 10*3/mm3    Neutrophil Rel % 66.9 45.0 - 70.0 %    Lymphocyte Rel % 22.2 20.0 - 45.0 %    Monocyte Rel % 7.4 3.0 - 8.0 %    Eosinophil Rel % 2.7 0.0 - 4.0 %    Basophil Rel % 0.4 0.0 - 2.0 %    Neutrophils Absolute 3.16 1.50 - 8.30 10*3/mm3    Lymphocytes Absolute 1.05 0.60 - 4.80 10*3/mm3    Monocytes Absolute 0.35 0.00 - 1.00 10*3/mm3    Eosinophils Absolute 0.13 0.10 - 0.30 10*3/mm3    Basophils Absolute 0.02 0.00 - 0.20 10*3/mm3    Immature Granulocyte Rel % 0.4 0.0 - 0.5 %    Immature Grans Absolute 0.02 0.00 - 0.03 10*3/mm3    nRBC 0.0 0.0 - 0.0 /100 WBC       Assessment/Plan   Anitra was seen today for follow-up and fall.    Diagnoses and all orders for this visit:    Essential hypertension  -     lisinopril  "(PRINIVIL,ZESTRIL) 20 MG tablet; Take 1 tablet by mouth Daily With Breakfast.  -     Comprehensive Metabolic Panel; Future  -     CBC & Differential; Future  -     Lipid Panel With LDL / HDL Ratio; Future    Mixed hyperlipidemia  -     simvastatin (ZOCOR) 20 MG tablet; Take 1 tablet by mouth Every Night.  -     Comprehensive Metabolic Panel; Future  -     Lipid Panel With LDL / HDL Ratio; Future    Hypertriglyceridemia    Osteoporosis  -     Comprehensive Metabolic Panel; Future    Urge incontinence of urine    Insulin resistance  -     Comprehensive Metabolic Panel; Future  -     CBC & Differential; Future  -     Lipid Panel With LDL / HDL Ratio; Future  -     Hemoglobin A1c; Future    Gastroesophageal reflux disease, esophagitis presence not specified  -     omeprazole (priLOSEC) 20 MG capsule; Take 1 capsule by mouth Daily.    Hyperglycemia   -     Hemoglobin A1c; Future      Cont current meds. Ov/labs in 6mo    Outpatient Medications Prior to Visit   Medication Sig Dispense Refill   • aspirin 81 MG EC tablet Take 81 mg by mouth Daily. PT TO STOP PER MD INSTRUCTION     • Biotin 10 MG capsule Take 10 mg by mouth Daily.     • calcium citrate-vitamin d (CITRACAL) 200-250 MG-UNIT tablet tablet Take 1 tablet by mouth Daily.     • cephalexin (KEFLEX) 500 MG capsule Take 500 mg by mouth 4 (Four) Times a Day.     • folic acid (FOLVITE) 1 MG tablet Take 1 mg by mouth daily.     • ibuprofen (ADVIL,MOTRIN) 200 MG tablet Take 800 mg by mouth 2 (Two) Times a Day.     • imipramine (TOFRANIL) 50 MG tablet Take 50 mg by mouth Every Night.     • Mirabegron ER (MYRBETRIQ) 50 MG tablet sustained-release 24 hour 24 hr tablet Take 50 mg by mouth Daily.     • NON FORMULARY Take 1 tablet by mouth 2 (Two) Times a Day. \"pressor vision\" for macular degeneration. OTC     • vitamin C (ASCORBIC ACID) 500 MG tablet Take 500 mg by mouth Daily.     • lisinopril (PRINIVIL,ZESTRIL) 20 MG tablet Take 1 tablet by mouth Daily With Breakfast. 90 " tablet 3   • omeprazole (PriLOSEC) 20 MG capsule Take 1 capsule by mouth Daily.     • simvastatin (ZOCOR) 20 MG tablet Take 1 tablet by mouth Every Night. 90 tablet 3     No facility-administered medications prior to visit.      New Medications Ordered This Visit   Medications   • simvastatin (ZOCOR) 20 MG tablet     Sig: Take 1 tablet by mouth Every Night.     Dispense:  90 tablet     Refill:  3   • omeprazole (priLOSEC) 20 MG capsule     Sig: Take 1 capsule by mouth Daily.   • lisinopril (PRINIVIL,ZESTRIL) 20 MG tablet     Sig: Take 1 tablet by mouth Daily With Breakfast.     Dispense:  90 tablet     Refill:  3     Medications Discontinued During This Encounter   Medication Reason   • simvastatin (ZOCOR) 20 MG tablet Reorder   • omeprazole (PriLOSEC) 20 MG capsule Reorder   • lisinopril (PRINIVIL,ZESTRIL) 20 MG tablet Reorder         Return in about 6 months (around 3/8/2018) for Recheck, labs.

## 2017-09-13 ENCOUNTER — RESULTS ENCOUNTER (OUTPATIENT)
Dept: INTERNAL MEDICINE | Facility: CLINIC | Age: 77
End: 2017-09-13

## 2017-09-13 DIAGNOSIS — E88.81 INSULIN RESISTANCE: ICD-10-CM

## 2017-09-13 DIAGNOSIS — E78.2 MIXED HYPERLIPIDEMIA: ICD-10-CM

## 2017-09-13 DIAGNOSIS — R73.9 HYPERGLYCEMIA: ICD-10-CM

## 2017-09-13 DIAGNOSIS — M81.0 OSTEOPOROSIS: ICD-10-CM

## 2017-09-13 DIAGNOSIS — I10 ESSENTIAL HYPERTENSION: ICD-10-CM

## 2017-10-11 ENCOUNTER — TRANSCRIBE ORDERS (OUTPATIENT)
Dept: ADMINISTRATIVE | Facility: HOSPITAL | Age: 77
End: 2017-10-11

## 2017-10-11 DIAGNOSIS — Z12.39 BREAST SCREENING: Primary | ICD-10-CM

## 2017-10-18 ENCOUNTER — HOSPITAL ENCOUNTER (OUTPATIENT)
Dept: ULTRASOUND IMAGING | Facility: HOSPITAL | Age: 77
Discharge: HOME | End: 2017-10-18
Attending: UROLOGY | Admitting: UROLOGY

## 2017-10-18 DIAGNOSIS — N20.0 KIDNEY STONES: ICD-10-CM

## 2017-10-18 PROCEDURE — 76775 US EXAM ABDO BACK WALL LIM: CPT

## 2017-10-25 ENCOUNTER — HOSPITAL ENCOUNTER (OUTPATIENT)
Dept: MAMMOGRAPHY | Facility: HOSPITAL | Age: 77
Discharge: HOME | End: 2017-10-25
Attending: OBSTETRICS & GYNECOLOGY | Admitting: OBSTETRICS & GYNECOLOGY

## 2017-10-25 DIAGNOSIS — Z12.39 BREAST SCREENING: ICD-10-CM

## 2017-10-25 PROCEDURE — G0202 SCR MAMMO BI INCL CAD: HCPCS

## 2017-10-25 PROCEDURE — 77063 BREAST TOMOSYNTHESIS BI: CPT

## 2017-12-07 ENCOUNTER — TRANSCRIBE ORDERS (OUTPATIENT)
Dept: ADMINISTRATIVE | Facility: HOSPITAL | Age: 77
End: 2017-12-07

## 2017-12-07 DIAGNOSIS — N20.0 KIDNEY CALCULUS: Primary | ICD-10-CM

## 2018-01-22 RX ORDER — IMIPRAMINE HCL 50 MG
TABLET ORAL
Qty: 270 TABLET | Refills: 1 | Status: SHIPPED | OUTPATIENT
Start: 2018-01-22 | End: 2018-11-30 | Stop reason: SDUPTHER

## 2018-02-15 LAB
ALBUMIN SERPL-MCNC: 3.9 G/DL (ref 3.5–5.2)
ALBUMIN/GLOB SERPL: 1.6 G/DL
ALP SERPL-CCNC: 125 U/L (ref 40–129)
ALT SERPL-CCNC: 16 U/L (ref 5–33)
AST SERPL-CCNC: 17 U/L (ref 5–32)
BASOPHILS # BLD AUTO: 0.02 10*3/MM3 (ref 0–0.2)
BASOPHILS NFR BLD AUTO: 0.4 % (ref 0–2)
BILIRUB SERPL-MCNC: 0.4 MG/DL (ref 0.2–1.2)
BUN SERPL-MCNC: 19 MG/DL (ref 8–23)
BUN/CREAT SERPL: 22.4 (ref 7–25)
CALCIUM SERPL-MCNC: 8.8 MG/DL (ref 8.8–10.5)
CHLORIDE SERPL-SCNC: 104 MMOL/L (ref 98–107)
CHOLEST SERPL-MCNC: 176 MG/DL (ref 0–200)
CO2 SERPL-SCNC: 28.2 MMOL/L (ref 22–29)
CREAT SERPL-MCNC: 0.85 MG/DL (ref 0.57–1)
EOSINOPHIL # BLD AUTO: 0.12 10*3/MM3 (ref 0.1–0.3)
EOSINOPHIL NFR BLD AUTO: 2.3 % (ref 0–4)
ERYTHROCYTE [DISTWIDTH] IN BLOOD BY AUTOMATED COUNT: 13.2 % (ref 11.5–14.5)
GFR SERPLBLD CREATININE-BSD FMLA CKD-EPI: 65 ML/MIN/1.73
GFR SERPLBLD CREATININE-BSD FMLA CKD-EPI: 79 ML/MIN/1.73
GLOBULIN SER CALC-MCNC: 2.5 GM/DL
GLUCOSE SERPL-MCNC: 103 MG/DL (ref 65–99)
HBA1C MFR BLD: 5.9 % (ref 4.8–5.6)
HCT VFR BLD AUTO: 40.9 % (ref 37–47)
HDLC SERPL-MCNC: 53 MG/DL (ref 40–60)
HGB BLD-MCNC: 13.6 G/DL (ref 12–16)
IMM GRANULOCYTES # BLD: 0.01 10*3/MM3 (ref 0–0.03)
IMM GRANULOCYTES NFR BLD: 0.2 % (ref 0–0.5)
LDLC SERPL CALC-MCNC: 104 MG/DL (ref 0–100)
LDLC/HDLC SERPL: 1.96 {RATIO}
LYMPHOCYTES # BLD AUTO: 1.05 10*3/MM3 (ref 0.6–4.8)
LYMPHOCYTES NFR BLD AUTO: 20.3 % (ref 20–45)
MCH RBC QN AUTO: 29.8 PG (ref 27–31)
MCHC RBC AUTO-ENTMCNC: 33.3 G/DL (ref 31–37)
MCV RBC AUTO: 89.5 FL (ref 81–99)
MONOCYTES # BLD AUTO: 0.36 10*3/MM3 (ref 0–1)
MONOCYTES NFR BLD AUTO: 7 % (ref 3–8)
NEUTROPHILS # BLD AUTO: 3.6 10*3/MM3 (ref 1.5–8.3)
NEUTROPHILS NFR BLD AUTO: 69.8 % (ref 45–70)
NRBC BLD AUTO-RTO: 0 /100 WBC (ref 0–0)
PLATELET # BLD AUTO: 204 10*3/MM3 (ref 140–500)
POTASSIUM SERPL-SCNC: 4.2 MMOL/L (ref 3.5–5.2)
PROT SERPL-MCNC: 6.4 G/DL (ref 6–8.5)
RBC # BLD AUTO: 4.57 10*6/MM3 (ref 4.2–5.4)
SODIUM SERPL-SCNC: 146 MMOL/L (ref 136–145)
TRIGL SERPL-MCNC: 95 MG/DL (ref 0–150)
VLDLC SERPL CALC-MCNC: 19 MG/DL (ref 7–27)
WBC # BLD AUTO: 5.16 10*3/MM3 (ref 4.8–10.8)

## 2018-02-19 ENCOUNTER — OFFICE VISIT (OUTPATIENT)
Dept: INTERNAL MEDICINE | Facility: CLINIC | Age: 78
End: 2018-02-19

## 2018-02-19 VITALS
DIASTOLIC BLOOD PRESSURE: 88 MMHG | OXYGEN SATURATION: 98 % | RESPIRATION RATE: 15 BRPM | BODY MASS INDEX: 39.53 KG/M2 | SYSTOLIC BLOOD PRESSURE: 134 MMHG | TEMPERATURE: 98.1 F | HEIGHT: 66 IN | WEIGHT: 246 LBS | HEART RATE: 78 BPM

## 2018-02-19 DIAGNOSIS — M48.061 FORAMINAL STENOSIS OF LUMBAR REGION: ICD-10-CM

## 2018-02-19 DIAGNOSIS — I10 ESSENTIAL HYPERTENSION: ICD-10-CM

## 2018-02-19 DIAGNOSIS — G89.29 CHRONIC BILATERAL LOW BACK PAIN WITH SCIATICA, SCIATICA LATERALITY UNSPECIFIED: ICD-10-CM

## 2018-02-19 DIAGNOSIS — K21.9 GASTROESOPHAGEAL REFLUX DISEASE, ESOPHAGITIS PRESENCE NOT SPECIFIED: ICD-10-CM

## 2018-02-19 DIAGNOSIS — Z00.00 MEDICARE ANNUAL WELLNESS VISIT, INITIAL: Primary | ICD-10-CM

## 2018-02-19 DIAGNOSIS — M54.40 CHRONIC BILATERAL LOW BACK PAIN WITH SCIATICA, SCIATICA LATERALITY UNSPECIFIED: ICD-10-CM

## 2018-02-19 DIAGNOSIS — E88.81 INSULIN RESISTANCE: ICD-10-CM

## 2018-02-19 DIAGNOSIS — E78.2 MIXED HYPERLIPIDEMIA: ICD-10-CM

## 2018-02-19 PROCEDURE — 99213 OFFICE O/P EST LOW 20 MIN: CPT | Performed by: INTERNAL MEDICINE

## 2018-02-19 PROCEDURE — G0439 PPPS, SUBSEQ VISIT: HCPCS | Performed by: INTERNAL MEDICINE

## 2018-02-19 RX ORDER — DIAZEPAM 2 MG/1
TABLET ORAL
Refills: 0 | COMMUNITY
Start: 2018-02-08 | End: 2018-02-28

## 2018-02-19 NOTE — PATIENT INSTRUCTIONS
Medicare Wellness  Personal Prevention Plan of Service     Date of Office Visit:  2018  Encounter Provider:  Rosibel Hamilton MD  Place of Service:  [facility]  Patient Name: Anitra Orta  :  1940    As part of the Medicare Wellness portion of your visit today, we are providing you with this personalized preventive plan of services (PPPS). This plan is based upon recommendations of the [place] Preventive Services Task Force (USPSTF) and the Advisory Committee on Immunization Practices (ACIP).    This lists the preventive care services that should be considered, and provides dates of when you are due. Items listed as completed are up-to-date and do not require any further intervention.    Health Maintenance   Topic Date Due   • TDAP/TD VACCINES (1 - Tdap) 1997   • DXA SCAN  10/28/2018   • LIPID PANEL  02/15/2019   • MEDICARE ANNUAL WELLNESS  2019   • MAMMOGRAM  10/25/2019   • COLONOSCOPY  2021   • INFLUENZA VACCINE  Completed   • PNEUMOCOCCAL VACCINES (65+ LOW/MEDIUM RISK)  Completed   • ZOSTER VACCINE  Completed       Orders Placed This Encounter   Procedures   • Ambulatory Referral to Neurosurgery     Referral Priority:   Routine     Referral Type:   Consultation     Referral Reason:   Specialty Services Required     Referred to Provider:   Mervin Valdivia IV, MD     Requested Specialty:   Neurosurgery     Number of Visits Requested:   1       Return in about 3 months (around 2018) for Recheck, labs.

## 2018-02-19 NOTE — PROGRESS NOTES
QUICK REFERENCE INFORMATION:  The ABCs of the Annual Wellness Visit    Initial Medicare Wellness Visit and follow up    HEALTH RISK ASSESSMENT    1940    Recent Hospitalizations:  No hospitalization(s) within the last year..        Current Medical Providers:  Patient Care Team:  Rosibel Hamilton MD as PCP - General (Internal Medicine & Pediatrics)  Kevon Clark MD as PCP - Claims Attributed        Smoking Status:  History   Smoking Status   • Former Smoker   • Years: 40.00   • Quit date: 10/4/1985   Smokeless Tobacco   • Never Used     Comment: quit 1994       Alcohol Consumption:  History   Alcohol Use No       Depression Screen:   PHQ-2/PHQ-9 Depression Screening 2/19/2018   Little interest or pleasure in doing things 0   Feeling down, depressed, or hopeless 0   Total Score 0       Health Habits and Functional and Cognitive Screening:  Functional & Cognitive Status 2/19/2018   Do you have difficulty preparing food and eating? No   Do you have difficulty bathing yourself, getting dressed or grooming yourself? No   Do you have difficulty using the toilet? No   Do you have difficulty moving around from place to place? No   Do you have trouble with steps or getting out of a bed or a chair? No   In the past year have you fallen or experienced a near fall? No   Current Diet Well Balanced Diet   Dental Exam Up to date   Eye Exam Up to date   Exercise (times per week) 0 times per week   Current Exercise Activities Include None   Do you need help using the phone?  No   Are you deaf or do you have serious difficulty hearing?  No   Do you need help with transportation? No   Do you need help shopping? No   Do you need help preparing meals?  No   Do you need help with housework?  No   Do you need help with laundry? No   Do you need help taking your medications? No   Do you need help managing money? No   Have you felt unusual stress, anger or loneliness in the last month? No   Who do you live with? Spouse   If  you need help, do you have trouble finding someone available to you? No   Have you been bothered in the last four weeks by sexual problems? No   Do you have difficulty concentrating, remembering or making decisions? No           Does the patient have evidence of cognitive impairment? No    Aspirin use counseling: Taking ASA appropriately as indicated      Recent Lab Results:  CMP:  Lab Results   Component Value Date     (H) 02/15/2018    BUN 19 02/15/2018    CREATININE 0.85 02/15/2018    EGFRIFNONA 65 02/15/2018    EGFRIFAFRI 79 02/15/2018    BCR 22.4 02/15/2018     (H) 02/15/2018    K 4.2 02/15/2018    CO2 28.2 02/15/2018    CALCIUM 8.8 02/15/2018    PROTENTOTREF 6.4 02/15/2018    ALBUMIN 3.90 02/15/2018    LABGLOBREF 2.5 02/15/2018    LABIL2 1.6 02/15/2018    BILITOT 0.4 02/15/2018    ALKPHOS 125 02/15/2018    AST 17 02/15/2018    ALT 16 02/15/2018     Lipid Panel:  Lab Results   Component Value Date    TRIG 95 02/15/2018    HDL 53 02/15/2018    VLDL 19 02/15/2018    LDLHDL 1.96 02/15/2018     HbA1c:  Lab Results   Component Value Date    HGBA1C 5.90 (H) 02/15/2018       Visual Acuity:  No exam data present    Age-appropriate Screening Schedule:  Refer to the list below for future screening recommendations based on patient's age, sex and/or medical conditions. Orders for these recommended tests are listed in the plan section. The patient has been provided with a written plan.    Health Maintenance   Topic Date Due   • TDAP/TD VACCINES (1 - Tdap) 07/08/1997   • DXA SCAN  10/28/2018   • LIPID PANEL  02/15/2019   • MAMMOGRAM  10/25/2019   • COLONOSCOPY  11/30/2021   • INFLUENZA VACCINE  Completed   • PNEUMOCOCCAL VACCINES (65+ LOW/MEDIUM RISK)  Completed   • ZOSTER VACCINE  Completed        Subjective   History of Present Illness    Anitra Orta is a 77 y.o. female who presents for an Subsequent Wellness Visit.    The pt is also here for follow up.    She has chronic back pain.  She saw dr. Herbert  and had an MRI of her back   Answers for HPI/ROS submitted by the patient on 2/17/2018   Back pain  Chronicity: chronic  Onset: more than 1 year ago  Frequency: constantly  Progression since onset: rapidly worsening  Pain location: sacro-iliac  Pain quality: stabbing  Radiates to: left knee, left thigh  Pain - numeric: 10/10  Pain is: the same all the time  Aggravated by: lying down, sitting, standing  Stiffness is present: all day  bladder incontinence: Yes  bowel incontinence: No  leg pain: Yes  paresis: No  paresthesias: Yes  perianal numbness: No  weight loss: No  Risk factors: history of osteoporosis, lack of exercise, obesity, sedentary lifestyle      Pre-diabetes/insulin resistance - a1c up from 5.7 to 5.9  Pt has been trying to eat a healthy diet. She has a fairly sedentary lifestyle and is limited in her activity by her back pain.      HTN - on lisinopril.  No ha/dizziness.  No cough.    HLD - on statin.  No cramps or myalgias    Hx urinary incontinence - on myrbetriq per dr. gomez      The following portions of the patient's history were reviewed and updated as appropriate: allergies, current medications, past family history, past medical history, past social history, past surgical history and problem list.    Outpatient Medications Prior to Visit   Medication Sig Dispense Refill   • aspirin 81 MG EC tablet Take 81 mg by mouth Daily. PT TO STOP PER MD INSTRUCTION     • Biotin 10 MG capsule Take 10 mg by mouth Daily.     • calcium citrate-vitamin d (CITRACAL) 200-250 MG-UNIT tablet tablet Take 1 tablet by mouth Daily.     • cyclobenzaprine (FLEXERIL) 10 MG tablet Take 1 tablet by mouth 3 (Three) Times a Day As Needed for Muscle Spasms. 30 tablet 0   • folic acid (FOLVITE) 1 MG tablet Take 1 mg by mouth daily.     • ibuprofen (ADVIL,MOTRIN) 200 MG tablet Take 800 mg by mouth 2 (Two) Times a Day.     • imipramine (TOFRANIL) 50 MG tablet TAKE 2 TO 3 TABLETS BY MOUTH EVERY NIGHT AT BEDTIME 270 tablet 1   •  "lisinopril (PRINIVIL,ZESTRIL) 20 MG tablet Take 1 tablet by mouth Daily With Breakfast. 90 tablet 3   • Mirabegron ER (MYRBETRIQ) 50 MG tablet sustained-release 24 hour 24 hr tablet Take 50 mg by mouth Daily.     • NON FORMULARY Take 1 tablet by mouth 2 (Two) Times a Day. \"pressor vision\" for macular degeneration. OTC     • omeprazole (priLOSEC) 20 MG capsule Take 1 capsule by mouth Daily.     • simvastatin (ZOCOR) 20 MG tablet Take 1 tablet by mouth Every Night. 90 tablet 3   • vitamin C (ASCORBIC ACID) 500 MG tablet Take 500 mg by mouth Daily.     • cephalexin (KEFLEX) 500 MG capsule Take 500 mg by mouth 4 (Four) Times a Day.       No facility-administered medications prior to visit.        Patient Active Problem List   Diagnosis   • Urinary tract infection due to ESBL Klebsiella   • Simple renal cyst   • Former smoker   • Gastroesophageal reflux disease   • Essential hypertension   • Hyperlipidemia   • Hypertriglyceridemia   • Osteoporosis   • Panic disorder   • Psoriasis   • Urge incontinence of urine   • Vitamin D deficiency   • Post-menopause   • Urinary tract infection   • Insulin resistance       Advance Care Planning:  has an advance directive - a copy HAS NOT been provided. Have asked the patient to send this to us to add to record.    Identification of Risk Factors:  Risk factors include: weight , unhealthy diet, cardiovascular risk, inactivity, chronic pain and polypharmacy.    Review of Systems   Constitutional: Negative.  Negative for fever.   HENT: Negative.    Eyes: Negative.    Respiratory: Negative.    Cardiovascular: Negative.  Negative for chest pain.   Gastrointestinal: Negative.  Negative for abdominal pain.   Endocrine: Negative.    Genitourinary: Positive for pelvic pain. Negative for dysuria.   Musculoskeletal: Positive for back pain.   Skin: Negative.    Allergic/Immunologic: Negative.    Neurological: Positive for numbness. Negative for weakness and headaches.   Hematological: Negative.  " "  Psychiatric/Behavioral: Negative.    All other systems reviewed and are negative.      Compared to one year ago, the patient feels her physical health is the same.  Compared to one year ago, the patient feels her mental health is the same.    Objective     Physical Exam   Constitutional: She is oriented to person, place, and time. She appears well-developed and well-nourished. No distress.   HENT:   Head: Normocephalic and atraumatic.   Right Ear: External ear normal.   Left Ear: External ear normal.   Nose: Nose normal.   Mouth/Throat: Oropharynx is clear and moist.   Eyes: Conjunctivae and EOM are normal. Pupils are equal, round, and reactive to light.   Neck: Normal range of motion. Neck supple.   Cardiovascular: Normal rate, regular rhythm, normal heart sounds and intact distal pulses.    Pulmonary/Chest: Effort normal and breath sounds normal. No respiratory distress. She has no wheezes.   Musculoskeletal: Normal range of motion. She exhibits edema.   Normal gait   Neurological: She is alert and oriented to person, place, and time.   Skin: Skin is warm and dry.   Psychiatric: She has a normal mood and affect. Her behavior is normal. Judgment and thought content normal.   Nursing note and vitals reviewed.      Vitals:    02/19/18 1351   BP: 134/88   BP Location: Left arm   Patient Position: Sitting   Cuff Size: Adult   Pulse: 78   Resp: 15   Temp: 98.1 °F (36.7 °C)   SpO2: 98%   Weight: 112 kg (246 lb)   Height: 167.6 cm (65.98\")       Body mass index is 39.72 kg/(m^2).  Discussed the patient's BMI with her. BMI is above normal parameters. Follow-up plan includes:  educational material, exercise counseling and nutrition counseling.    MRI 2/13/18  Multilevel spinal foraminal stenosis and DDD    Assessment/Plan      Anitra was seen today for annual exam and back pain.    Diagnoses and all orders for this visit:    Medicare annual wellness visit, initial    Essential hypertension    Mixed " hyperlipidemia    Gastroesophageal reflux disease, esophagitis presence not specified    Insulin resistance    Foraminal stenosis of lumbar region  -     Ambulatory Referral to Neurosurgery    Chronic bilateral low back pain with sciatica, sciatica laterality unspecified  -     Ambulatory Referral to Neurosurgery    cont current meds.  Ov/labs in 3mo  Pt needs to have back issues addressed so she can increase physical activity.     Patient Self-Management and Personalized Health Advice  The patient has been provided with information about: diet, exercise, weight management, the relationship between weight and GERD and designing advance directives and preventive services including:   · Advance directive, Fall Risk assessment done, Nutrition counseling provided, Urinary Incontinence assessment done.    Visit Diagnoses:    ICD-10-CM ICD-9-CM   1. Medicare annual wellness visit, initial Z00.00 V70.0   2. Essential hypertension I10 401.9   3. Mixed hyperlipidemia E78.2 272.2   4. Gastroesophageal reflux disease, esophagitis presence not specified K21.9 530.81   5. Insulin resistance E88.81 277.7   6. Foraminal stenosis of lumbar region M99.83 724.02   7. Chronic bilateral low back pain with sciatica, sciatica laterality unspecified M54.40 724.2    G89.29 724.3     338.29       Orders Placed This Encounter   Procedures   • Ambulatory Referral to Neurosurgery     Referral Priority:   Routine     Referral Type:   Consultation     Referral Reason:   Specialty Services Required     Referred to Provider:   Mervin Valdivia IV, MD     Requested Specialty:   Neurosurgery     Number of Visits Requested:   1       Outpatient Encounter Prescriptions as of 2/19/2018   Medication Sig Dispense Refill   • aspirin 81 MG EC tablet Take 81 mg by mouth Daily. PT TO STOP PER MD INSTRUCTION     • Biotin 10 MG capsule Take 10 mg by mouth Daily.     • calcium citrate-vitamin d (CITRACAL) 200-250 MG-UNIT tablet tablet Take 1 tablet by mouth  "Daily.     • cyclobenzaprine (FLEXERIL) 10 MG tablet Take 1 tablet by mouth 3 (Three) Times a Day As Needed for Muscle Spasms. 30 tablet 0   • diazePAM (VALIUM) 2 MG tablet TK 1 T PO PRIOR TO MRI  0   • folic acid (FOLVITE) 1 MG tablet Take 1 mg by mouth daily.     • ibuprofen (ADVIL,MOTRIN) 200 MG tablet Take 800 mg by mouth 2 (Two) Times a Day.     • imipramine (TOFRANIL) 50 MG tablet TAKE 2 TO 3 TABLETS BY MOUTH EVERY NIGHT AT BEDTIME 270 tablet 1   • lisinopril (PRINIVIL,ZESTRIL) 20 MG tablet Take 1 tablet by mouth Daily With Breakfast. 90 tablet 3   • Mirabegron ER (MYRBETRIQ) 50 MG tablet sustained-release 24 hour 24 hr tablet Take 50 mg by mouth Daily.     • NON FORMULARY Take 1 tablet by mouth 2 (Two) Times a Day. \"pressor vision\" for macular degeneration. OTC     • omeprazole (priLOSEC) 20 MG capsule Take 1 capsule by mouth Daily.     • simvastatin (ZOCOR) 20 MG tablet Take 1 tablet by mouth Every Night. 90 tablet 3   • vitamin C (ASCORBIC ACID) 500 MG tablet Take 500 mg by mouth Daily.     • [DISCONTINUED] cephalexin (KEFLEX) 500 MG capsule Take 500 mg by mouth 4 (Four) Times a Day.       No facility-administered encounter medications on file as of 2/19/2018.        Reviewed use of high risk medication in the elderly: yes  Reviewed for potential of harmful drug interactions in the elderly: yes    Follow Up:  Return in about 3 months (around 5/19/2018) for Recheck, labs.     An After Visit Summary and PPPS with all of these plans were given to the patient.           "

## 2018-02-28 ENCOUNTER — OFFICE VISIT (OUTPATIENT)
Dept: NEUROSURGERY | Facility: CLINIC | Age: 78
End: 2018-02-28

## 2018-02-28 VITALS
HEIGHT: 66 IN | BODY MASS INDEX: 39.05 KG/M2 | WEIGHT: 243 LBS | SYSTOLIC BLOOD PRESSURE: 158 MMHG | DIASTOLIC BLOOD PRESSURE: 80 MMHG

## 2018-02-28 DIAGNOSIS — M81.0 AGE-RELATED OSTEOPOROSIS WITHOUT CURRENT PATHOLOGICAL FRACTURE: Primary | ICD-10-CM

## 2018-02-28 PROCEDURE — 99204 OFFICE O/P NEW MOD 45 MIN: CPT | Performed by: NEUROLOGICAL SURGERY

## 2018-02-28 RX ORDER — DIAZEPAM 2 MG/1
TABLET ORAL
Qty: 2 TABLET | Refills: 0 | Status: SHIPPED | OUTPATIENT
Start: 2018-02-28 | End: 2018-03-12

## 2018-02-28 NOTE — PROGRESS NOTES
Subjective   Patient ID: Anitra Orta is a 77 y.o. female who is being seen for consultation today at the request of Rosibel Morales for low back pain. She had an MRI of the lumbar at [facility] on 2/13/18. She presents accompanied by her .    History of Present Illness 76 yo female with history of severe LBP for decades.  She was referred for surgery to Dr. Aguilera and cancelled surgery 10 years ago.  In the interim she has had SHAQUILLE's in [place] with some assistance 5 years ago or so.  She was again referred to pain management 2  Years ago.  She reports some progression of her pain.  She reports most of pain is in her back.  Occasionally in her hips.  She reports some left sided weakness for years.  She reports some burning in her legs bilaterally for 2 years. She reports urinary incontinence for some years.  She has osteopenia by report.  SHe is a former smoker (quit decades ago).  No PT in past year, No SHAQUILLE's lately, No spinal cord stimulator trial.  She was evaluated for this at one point in 2017.  She takes 800mg ibuprofen bid for years.  She denies leg pain below the knee.  She trialed gabapentin previously for back pain but is unsure of timing.  .  She denies perineal numbness.  Unsure if she has a neurogenic bladder. She is a somewhat meandering historian.  She has used a cane for 10 years.  She does report more pain when up as opposed to when recumbent.    The following portions of the patient's history were reviewed and updated as appropriate: allergies, current medications, past family history, past medical history, past social history, past surgical history and problem list.    Review of Systems   Constitutional: Negative for chills and fever.   Respiratory: Negative for cough and shortness of breath.    Cardiovascular: Negative for chest pain, palpitations and leg swelling.   Gastrointestinal: Negative for abdominal pain and constipation.   Genitourinary: Negative for  "difficulty urinating, dysuria, enuresis and pelvic pain.   Musculoskeletal: Positive for back pain. Negative for gait problem and neck pain.   Skin: Negative for rash.   Neurological: Positive for weakness (LLE) and numbness ( \"Burning\" BLE ). Negative for headaches.   Hematological: Does not bruise/bleed easily.   Psychiatric/Behavioral: Negative for sleep disturbance.       Objective    243 lbs, 5'6\"  Physical Exam   Constitutional: She is oriented to person, place, and time.   Neurological: She is oriented to person, place, and time. Gait normal.   Reflex Scores:       Tricep reflexes are 1+ on the right side and 1+ on the left side.       Bicep reflexes are 1+ on the right side and 1+ on the left side.       Brachioradialis reflexes are 2+ on the right side and 2+ on the left side.       Patellar reflexes are 2+ on the right side and 2+ on the left side.       Achilles reflexes are 1+ on the right side and 1+ on the left side.    Neurologic Exam     Mental Status   Oriented to person, place, and time.     Motor Exam   Muscle bulk: normal  Overall muscle tone: normal    Strength   Right deltoid: 5/5  Left deltoid: 5/5  Right biceps: 5/5  Left biceps: 5/5  Right triceps: 5/5  Left triceps: 5/5  Right wrist flexion: 5/5  Left wrist flexion: 5/5  Right wrist extension: 5/5  Left wrist extension: 5/5  Right interossei: 5/5  Left interossei: 5/5  Right iliopsoas: 5/5  Left iliopsoas: 5/5  Right quadriceps: 5/5  Left quadriceps: 5/5  Right hamstrin/5  Left hamstrin/5  Right anterior tibial: 5/5  Left anterior tibial: 5/5  Right gastroc: 5/5  Left gastroc: 5/5    Sensory Exam   Right arm light touch: normal  Left arm light touch: normal  Right leg light touch: normal  Left leg light touch: normal  Right arm pinprick: normal  Left arm pinprick: normal  Right leg pinprick: normal  Left leg pinprick: normal    Gait, Coordination, and Reflexes     Gait  Gait: normal    Reflexes   Right brachioradialis: 2+  Left " brachioradialis: 2+  Right biceps: 1+  Left biceps: 1+  Right triceps: 1+  Left triceps: 1+  Right patellar: 2+  Left patellar: 2+  Right achilles: 1+  Left achilles: 1+  Right Martin: absent  Left Martin: absent  Right ankle clonus: absent  Left ankle clonus: absent     No slr  No xSLR    Assessment/Plan   Independent Review of Radiographic Studies:    Severe multilevel degenerative change L5S1 slip at L5S1 and L45 slip which is worse.  SHe also has L23 slips.    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  No radicular complaints.  She has severe disease which would necessitate a multilevel ddd.  I suggest we obtain a thoracic MRI to evaluate her for consideration of a spinal cord stimulator trial.  I will refer her to a trusted colleague for consideration of this.   Traditional surgery for back pain is a fusion and in ths patient carries a 30-40% complication rate for 65% improvement at 2 years statistically speaking.  I offered to refer her for a SCS trial.      Anitra was seen today for back pain.    Diagnoses and all orders for this visit:    Age-related osteoporosis without current pathological fracture  -     Cancel: MRI Thoracic Spine Without Contrast; Future  -     MRI Thoracic Spine Without Contrast; Future  -     Ambulatory Referral to Hospital Pain Management Department    Other orders  -     diazePAM (VALIUM) 2 MG tablet; 1 po 30 minutes before MRI, may repeat x1      No Follow-up on file.

## 2018-03-12 ENCOUNTER — OFFICE VISIT (OUTPATIENT)
Dept: PAIN MEDICINE | Facility: CLINIC | Age: 78
End: 2018-03-12

## 2018-03-12 VITALS
HEIGHT: 66 IN | HEART RATE: 99 BPM | OXYGEN SATURATION: 98 % | RESPIRATION RATE: 18 BRPM | WEIGHT: 245.8 LBS | SYSTOLIC BLOOD PRESSURE: 176 MMHG | TEMPERATURE: 97.7 F | DIASTOLIC BLOOD PRESSURE: 81 MMHG | BODY MASS INDEX: 39.5 KG/M2

## 2018-03-12 DIAGNOSIS — M54.50 CHRONIC BILATERAL LOW BACK PAIN WITHOUT SCIATICA: Primary | ICD-10-CM

## 2018-03-12 DIAGNOSIS — M48.062 SPINAL STENOSIS OF LUMBAR REGION WITH NEUROGENIC CLAUDICATION: ICD-10-CM

## 2018-03-12 DIAGNOSIS — G89.29 CHRONIC BILATERAL LOW BACK PAIN WITHOUT SCIATICA: Primary | ICD-10-CM

## 2018-03-12 LAB
POC AMPHETAMINES: NEGATIVE
POC BARBITURATES: NEGATIVE
POC BENZODIAZEPHINES: POSITIVE
POC COCAINE: NEGATIVE
POC METHADONE: NEGATIVE
POC METHAMPHETAMINE SCREEN URINE: NEGATIVE
POC OPIATES: NEGATIVE
POC OXYCODONE: NEGATIVE
POC PHENCYCLIDINE: NEGATIVE
POC PROPOXYPHENE: NEGATIVE
POC THC: NEGATIVE
POC TRICYCLIC ANTIDEPRESSANTS: POSITIVE

## 2018-03-12 PROCEDURE — 99204 OFFICE O/P NEW MOD 45 MIN: CPT | Performed by: PAIN MEDICINE

## 2018-03-12 PROCEDURE — 80305 DRUG TEST PRSMV DIR OPT OBS: CPT | Performed by: PAIN MEDICINE

## 2018-03-12 RX ORDER — ESTRADIOL 0.5 MG/1
0.5 TABLET ORAL EVERY OTHER DAY
Status: ON HOLD | COMMUNITY
End: 2019-07-31

## 2018-03-12 NOTE — PROGRESS NOTES
CHIEF COMPLAINT: Back Pain    Anitra Orta is a 77 y.o. female.   He was referred here by Mervin Valdivia IV, MD. He presents to the office for evaluation and treatment of Back Pain    HPI  Back Pain  Started in 1992 while she lived in [place] and worse of last 5 years. She states that she had several lumbar epidurals while she lived there at [facility] in [place]. She states that she moved to [place] in 2008. She started going to pain management with Dr. Olivera in 2009. She has also had pain management with Dr. Issac green, [facility] and Dr. Macrina Martinez in [place].  Has only received LESI at pain management, did not receive medication.  She then seen neurosurgeon Dr. Aguilera who she states told her she need surgery but she decided not to. She has recently seen neurosurgeon Dr. Mervin Valdivia who has referred her here for a possible SCS trial. Traditional surgery would carry a high complication rate and not very high success rate.   Has already seen psych for SCS evaluation by Dr[facility]on [facility].     The patient states their pain is a 9 on a scale of 1-10.  The patient describes this pain as constant dull and ache.  The pain is located in bilateral low back and does radiate posterior bilateral thigh. This painful problem is aggravated by standing, sitting, laying down and walking and is alleviated by nothing.    Frequent UTIs. Sees urologist on Thursday.     Cardiac Screening Questions:  1)  Any chest pain?  No  2)  Any shortness of breath? No  3)  Can patient walk a block without being short of breath? Yes, describe: limited by back pain  4)  Can patient climb 2 flights of stairs without being severely short of breath? Yes, describe: limited by back pain.     Not DM, no sleep apnea. Frequent urination at night.       Current pain medications: Ibuprofen when necessary    Past therapies:  Physical Therapy: yes (didn't help)  Chiropractor: yes (didn't help)  Massage Therapy: yes  (didn't help)  TENS: yes (didn't help)  Neck or back surgery: no  Past pain management: yes- at [facility] in 2009 with Dr. Juan Carlos Olivera and Dr. Issac Dozier 2015 and [facility] 1 visit Nov 2017.  Acupucnture- didn't help    Previous Injections: several lumbar epidurals over the last 9 years  Effect of Injection (%): some relief     PEG Assessment   What number best describes your pain on average in the past week? 9  What number best describes how, during the past week, pain has interfered with your enjoyment of life? 10  What number best describes how, during the past week, pain has interfered with your general activity? 10      Current Outpatient Prescriptions:   •  aspirin 81 MG EC tablet, Take 81 mg by mouth Daily. PT TO STOP PER MD INSTRUCTION, Disp: , Rfl:   •  Biotin 10 MG capsule, Take 10 mg by mouth Daily., Disp: , Rfl:   •  calcium citrate-vitamin d (CITRACAL) 200-250 MG-UNIT tablet tablet, Take 1 tablet by mouth Daily., Disp: , Rfl:   •  estradiol (ESTRACE) 0.5 MG tablet, Take 0.5 mg by mouth Every Other Day., Disp: , Rfl:   •  folic acid (FOLVITE) 1 MG tablet, Take 1 mg by mouth daily., Disp: , Rfl:   •  ibuprofen (ADVIL,MOTRIN) 200 MG tablet, Take 800 mg by mouth 2 (Two) Times a Day., Disp: , Rfl:   •  imipramine (TOFRANIL) 50 MG tablet, TAKE 2 TO 3 TABLETS BY MOUTH EVERY NIGHT AT BEDTIME, Disp: 270 tablet, Rfl: 1  •  lisinopril (PRINIVIL,ZESTRIL) 20 MG tablet, Take 1 tablet by mouth Daily With Breakfast., Disp: 90 tablet, Rfl: 3  •  Mirabegron ER (MYRBETRIQ) 50 MG tablet sustained-release 24 hour 24 hr tablet, Take 50 mg by mouth Daily., Disp: , Rfl:   •  omeprazole (priLOSEC) 20 MG capsule, Take 1 capsule by mouth Daily., Disp: , Rfl:   •  simvastatin (ZOCOR) 20 MG tablet, Take 1 tablet by mouth Every Night., Disp: 90 tablet, Rfl: 3  •  vitamin C (ASCORBIC ACID) 500 MG tablet, Take 500 mg by mouth Daily., Disp: , Rfl:   •  NON FORMULARY, Take 1 tablet by mouth 2 (Two) Times  "a Day. \"pressor vision\" for macular degeneration. OTC, Disp: , Rfl:     REVIEW OF PERTINENT MEDICAL DATA  Chart reviewed and summarization of all medical records up to new patient visit performed.  Up to date on wellness exams. UC in 1/2018 for BLE pain.     IMAGING    Lumbar MRI - 2/13/2018      Thoracic MRI - 3/2018      PFSH:  The following portions of the patient's history were reviewed and updated as appropriate: problem list, past medical history, past surgery history, social history, family history, medications, and allergies    Review of Systems   Constitutional: Negative for fever.   HENT: Negative for congestion.    Eyes: Negative for visual disturbance.   Respiratory: Negative for cough, shortness of breath and wheezing.    Cardiovascular: Negative for chest pain, palpitations and leg swelling.   Gastrointestinal: Positive for abdominal pain.   Genitourinary: Positive for pelvic pain. Negative for dysuria.   Musculoskeletal: Positive for back pain.   Skin: Negative for rash and wound.   Allergic/Immunologic: Negative for immunocompromised state.   Neurological: Positive for weakness. Negative for numbness and headaches.   Hematological: Does not bruise/bleed easily.   Psychiatric/Behavioral: Positive for sleep disturbance. Negative for suicidal ideas. The patient is nervous/anxious.    All other systems reviewed and are negative.      Vitals:    03/12/18 1342   BP: 176/81   Pulse: 99   Resp: 18   Temp: 97.7 °F (36.5 °C)   SpO2: 98%   Weight: 111 kg (245 lb 12.8 oz)   Height: 167.6 cm (66\")   PainSc:   9   PainLoc: Back       Physical Exam   Constitutional: She appears well-developed and well-nourished. No distress.   HENT:   Head: Normocephalic and atraumatic.   Nose: Nose normal.   Mouth/Throat: Oropharynx is clear and moist.   Eyes: Conjunctivae and EOM are normal.   Neck: Normal range of motion. Neck supple.   Cardiovascular: Normal rate, regular rhythm and normal heart sounds.    Pulmonary/Chest: " Effort normal and breath sounds normal. No stridor. No respiratory distress.   Abdominal: Soft. Bowel sounds are normal. She exhibits no distension. There is no tenderness.   Musculoskeletal:        Lumbar back: She exhibits decreased range of motion, tenderness and pain.   Neurological: She is alert. She has normal strength. No cranial nerve deficit or sensory deficit.   Skin: Skin is warm and dry. No rash noted. She is not diaphoretic.   Psychiatric: She has a normal mood and affect. Her speech is normal and behavior is normal.   Nursing note and vitals reviewed.    Back Exam     Tests   Straight leg raise right: negative  Straight leg raise left: negative          Neurologic Exam     Mental Status   Speech: speech is normal     Cranial Nerves     CN III, IV, VI   Extraocular motions are normal.     Motor Exam     Strength   Strength 5/5 throughout.       Lab Results   Component Value Date    POCMETH Negative 03/12/2018    POCAMPHET Negative 03/12/2018    POCBARBITUR Negative 03/12/2018    POCBENZO Positive 03/12/2018    POCCOCAINE Negative 03/12/2018    POCMETHADO Negative 03/12/2018    POCOPIATES Negative 03/12/2018    POCOXYCODO Negative 03/12/2018    POCPHENCYC Negative 03/12/2018    POCPROPOXY Negative 03/12/2018    POCTHC Negative 03/12/2018    POCTRICYC Positive 03/12/2018       Comments: Reviewed POC today      Date of last RODRIGO reviewed : 03/13/18   Comments: Consistent         Anitra was seen today for back pain.    Diagnoses and all orders for this visit:    Chronic bilateral low back pain without sciatica  -     Case Request  -     Urine Drug Screen Confirmation - Urine, Urine, Clean Catch; Future  -     POC Urine Drug Screen, Triage  -     Ambulatory Referral to Psychology    Spinal stenosis of lumbar region with neurogenic claudication  -     Urine Drug Screen Confirmation - Urine, Urine, Clean Catch; Future  -     POC Urine Drug Screen, Triage  -     Ambulatory Referral to  Psychology      Requested Prescriptions      No prescriptions requested or ordered in this encounter     - Education about SCS therapy:   - This was an extended office visit in which we entered into discussion about advanced pain relieving techniques, and discussed implantable pain therapies. We discussed advanced neuromodulation in the form of Spinal Cord Stimulation. This is a reasonable therapy for patients who have exhausted basic nonnarcotic options, basic modalities and physical therapies, and do not have any other reasonable surgical options. This therapy as an alternative to long term high dose opioid therapy.   - Risks include but are not limited to bleeding, infection, injury, paralysis, nerve injury, dural puncture, and risk for postprocedural pain. Implanted equipment risks include but are not limited to lead migration, lead fracture, risk of loss of pain relieving stimulation, risk of electrical shock, and risk of system failure.   - We discussed the theory and basic science behind SCS therapy including but not limited to energy delivery and relevant anatomy, in terms that are easy to understand and also with use of illustrative devices. Spinal Cord Stimulation therapies apply an electromagnetic field to a specific area on the spinal cord (Dorsal Column) to attempt to block transmission of painful signals from the peripheral nerves to the brain.   - We discussed that prior to trialing, I request that patients review relevant materials and perform some research, and also have a follow up education session with a device specialist from the . Also, insurance requires a presurgical psychological evaluation. When these have been completed, and all the patient's questions have been answered to their satisfaction, then we will plan to request authorization for trialing.   - We discussed the trialing process (aka Phase 1) that usually lasts a week, and the temporary nature of this trial. Trial  success will determine whether or not we proceed to implant. We discussed reasonable expectations, and that I feel that consistent 50% pain relief if medically successful and is a reasonable expectation to justify moving forward to permanent implant.   - Additional risks of Phase 1 include but are not limited to bleeding on insertion, bleeding on lead removal, and procedural site soreness.  - We discussed the percutaneous surgical implant, including postsurgical restrictions, risks, and alternatives. For spinal cord stimulation implanted device (aka SCS Phase 2) there is usually a midline vertical incision for the spinally implanted leads, and also a horizontal incision in the posterior lumbar flank for implantation of the battery & computer (aka IPG). The leads are tunneled from the midline incision to the medial aspect of the battery pocket incision.   - Postoperative restrictions include limiting the following activity as much as possible for 90 days: Lifting >10 lbs, bending at the waist, stretching/reaching overhead, and twisting    - Not a good surgical candidate.  Has failed all other conservative measures.  - set up with SCS company.  Patient has a appointment with urology this week about placement of a bladder stimulator for urinary incontinence.  Patient will call back and let us know which company she will be using and we will try to set up her low back stimulator with the same company so she will have one contact instead of multiple companies.  Update: Patient called back and stated urology is using [facility].  We will contact [facility] and have them educate patient.  - Patient does have frequent urinary tract infections would have to be clear of any current infection and not on antibiotics to perform trial.  - Will perform trial in the outpatient surgery Center under conscious sedation.  - Will set up education with [facility] and obtain evaluation for spinal cord stimulator.  - Will place order  for case request once psychological evaluation is obtained.    Wt Readings from Last 3 Encounters:   03/12/18 111 kg (245 lb 12.8 oz)   02/28/18 110 kg (243 lb)   02/19/18 112 kg (246 lb)     Body mass index is 39.67 kg/m². Patient counseled on the importance of weight loss to help with overall health and pain control. Patient instructed to attempt weight loss.   Plan: Calorie counting increase physical activity, cut out extra servings and reduce fast food intake    Follow-up for trial        Eli Ortega MD  Pain Management

## 2018-03-14 DIAGNOSIS — G89.29 CHRONIC BILATERAL LOW BACK PAIN WITHOUT SCIATICA: Primary | ICD-10-CM

## 2018-03-14 DIAGNOSIS — M48.062 SPINAL STENOSIS OF LUMBAR REGION WITH NEUROGENIC CLAUDICATION: ICD-10-CM

## 2018-03-14 DIAGNOSIS — M54.50 CHRONIC BILATERAL LOW BACK PAIN WITHOUT SCIATICA: Primary | ICD-10-CM

## 2018-03-17 ENCOUNTER — RESULTS ENCOUNTER (OUTPATIENT)
Dept: PAIN MEDICINE | Facility: CLINIC | Age: 78
End: 2018-03-17

## 2018-03-17 DIAGNOSIS — M48.062 SPINAL STENOSIS OF LUMBAR REGION WITH NEUROGENIC CLAUDICATION: ICD-10-CM

## 2018-03-17 DIAGNOSIS — G89.29 CHRONIC BILATERAL LOW BACK PAIN WITHOUT SCIATICA: ICD-10-CM

## 2018-03-17 DIAGNOSIS — M54.50 CHRONIC BILATERAL LOW BACK PAIN WITHOUT SCIATICA: ICD-10-CM

## 2018-04-25 ENCOUNTER — APPOINTMENT (OUTPATIENT)
Dept: CT IMAGING | Facility: HOSPITAL | Age: 78
End: 2018-04-25

## 2018-04-25 ENCOUNTER — HOSPITAL ENCOUNTER (EMERGENCY)
Facility: HOSPITAL | Age: 78
Discharge: HOME | End: 2018-04-25
Attending: EMERGENCY MEDICINE | Admitting: EMERGENCY MEDICINE

## 2018-04-25 ENCOUNTER — APPOINTMENT (OUTPATIENT)
Dept: GENERAL RADIOLOGY | Facility: HOSPITAL | Age: 78
End: 2018-04-25

## 2018-04-25 VITALS
BODY MASS INDEX: 39.37 KG/M2 | HEART RATE: 118 BPM | RESPIRATION RATE: 20 BRPM | HEIGHT: 66 IN | WEIGHT: 245 LBS | DIASTOLIC BLOOD PRESSURE: 84 MMHG | OXYGEN SATURATION: 93 % | TEMPERATURE: 98.3 F | SYSTOLIC BLOOD PRESSURE: 162 MMHG

## 2018-04-25 DIAGNOSIS — N13.2 HYDRONEPHROSIS WITH URINARY OBSTRUCTION DUE TO RENAL CALCULUS: ICD-10-CM

## 2018-04-25 DIAGNOSIS — N20.0 KIDNEY STONE: Primary | ICD-10-CM

## 2018-04-25 DIAGNOSIS — N30.01 ACUTE CYSTITIS WITH HEMATURIA: ICD-10-CM

## 2018-04-25 LAB
ALBUMIN SERPL-MCNC: 4 G/DL (ref 3.5–5.2)
ALBUMIN/GLOB SERPL: 1.2 G/DL
ALP SERPL-CCNC: 137 U/L (ref 40–129)
ALT SERPL W P-5'-P-CCNC: 23 U/L (ref 5–33)
ANION GAP SERPL CALCULATED.3IONS-SCNC: 12 MMOL/L
AST SERPL-CCNC: 22 U/L (ref 5–32)
BACTERIA UR QL AUTO: ABNORMAL /HPF
BASOPHILS # BLD AUTO: 0.01 10*3/MM3 (ref 0–0.2)
BASOPHILS NFR BLD AUTO: 0.1 % (ref 0–2)
BILIRUB SERPL-MCNC: 0.8 MG/DL (ref 0.2–1.2)
BILIRUB UR QL STRIP: NEGATIVE
BUN BLD-MCNC: 13 MG/DL (ref 8–23)
BUN/CREAT SERPL: 16.5 (ref 7–25)
CALCIUM SPEC-SCNC: 8.4 MG/DL (ref 8.8–10.5)
CHLORIDE SERPL-SCNC: 101 MMOL/L (ref 98–107)
CLARITY UR: ABNORMAL
CO2 SERPL-SCNC: 27 MMOL/L (ref 22–29)
COLOR UR: ABNORMAL
CREAT BLD-MCNC: 0.79 MG/DL (ref 0.57–1)
D-LACTATE SERPL-SCNC: 1.7 MMOL/L (ref 0.5–2)
DEPRECATED RDW RBC AUTO: 41.5 FL (ref 37–54)
EOSINOPHIL # BLD AUTO: 0 10*3/MM3 (ref 0.1–0.3)
EOSINOPHIL NFR BLD AUTO: 0 % (ref 0–4)
ERYTHROCYTE [DISTWIDTH] IN BLOOD BY AUTOMATED COUNT: 12.8 % (ref 11.5–14.5)
GFR SERPL CREATININE-BSD FRML MDRD: 71 ML/MIN/1.73
GLOBULIN UR ELPH-MCNC: 3.4 GM/DL
GLUCOSE BLD-MCNC: 170 MG/DL (ref 65–99)
GLUCOSE UR STRIP-MCNC: NEGATIVE MG/DL
HCT VFR BLD AUTO: 42.2 % (ref 37–47)
HGB BLD-MCNC: 14 G/DL (ref 12–16)
HGB UR QL STRIP.AUTO: ABNORMAL
HYALINE CASTS UR QL AUTO: ABNORMAL /LPF
IMM GRANULOCYTES # BLD: 0.04 10*3/MM3 (ref 0–0.03)
IMM GRANULOCYTES NFR BLD: 0.5 % (ref 0–0.5)
KETONES UR QL STRIP: NEGATIVE
LEUKOCYTE ESTERASE UR QL STRIP.AUTO: ABNORMAL
LYMPHOCYTES # BLD AUTO: 0.33 10*3/MM3 (ref 0.6–4.8)
LYMPHOCYTES NFR BLD AUTO: 3.8 % (ref 20–45)
MCH RBC QN AUTO: 29.5 PG (ref 27–31)
MCHC RBC AUTO-ENTMCNC: 33.2 G/DL (ref 31–37)
MCV RBC AUTO: 89 FL (ref 81–99)
MONOCYTES # BLD AUTO: 0.3 10*3/MM3 (ref 0–1)
MONOCYTES NFR BLD AUTO: 3.5 % (ref 3–8)
NEUTROPHILS # BLD AUTO: 8 10*3/MM3 (ref 1.5–8.3)
NEUTROPHILS NFR BLD AUTO: 92.1 % (ref 45–70)
NITRITE UR QL STRIP: POSITIVE
NRBC BLD MANUAL-RTO: 0 /100 WBC (ref 0–0)
PH UR STRIP.AUTO: 7.5 [PH] (ref 4.5–8)
PLATELET # BLD AUTO: 154 10*3/MM3 (ref 140–500)
PMV BLD AUTO: 9 FL (ref 7.4–10.4)
POTASSIUM BLD-SCNC: 3.7 MMOL/L (ref 3.5–5.2)
PROT SERPL-MCNC: 7.4 G/DL (ref 6–8.5)
PROT UR QL STRIP: ABNORMAL
RBC # BLD AUTO: 4.74 10*6/MM3 (ref 4.2–5.4)
RBC # UR: ABNORMAL /HPF
REF LAB TEST METHOD: ABNORMAL
SODIUM BLD-SCNC: 140 MMOL/L (ref 136–145)
SP GR UR STRIP: 1.02 (ref 1–1.03)
SQUAMOUS #/AREA URNS HPF: ABNORMAL /HPF
UROBILINOGEN UR QL STRIP: ABNORMAL
WBC NRBC COR # BLD: 8.68 10*3/MM3 (ref 4.8–10.8)
WBC UR QL AUTO: ABNORMAL /HPF

## 2018-04-25 PROCEDURE — 96365 THER/PROPH/DIAG IV INF INIT: CPT

## 2018-04-25 PROCEDURE — 71046 X-RAY EXAM CHEST 2 VIEWS: CPT

## 2018-04-25 PROCEDURE — 83605 ASSAY OF LACTIC ACID: CPT | Performed by: PHYSICIAN ASSISTANT

## 2018-04-25 PROCEDURE — 25010000002 CEFTRIAXONE PER 250 MG: Performed by: EMERGENCY MEDICINE

## 2018-04-25 PROCEDURE — 81001 URINALYSIS AUTO W/SCOPE: CPT | Performed by: EMERGENCY MEDICINE

## 2018-04-25 PROCEDURE — 99284 EMERGENCY DEPT VISIT MOD MDM: CPT

## 2018-04-25 PROCEDURE — 87086 URINE CULTURE/COLONY COUNT: CPT | Performed by: EMERGENCY MEDICINE

## 2018-04-25 PROCEDURE — 87186 SC STD MICRODIL/AGAR DIL: CPT | Performed by: PHYSICIAN ASSISTANT

## 2018-04-25 PROCEDURE — 87040 BLOOD CULTURE FOR BACTERIA: CPT | Performed by: PHYSICIAN ASSISTANT

## 2018-04-25 PROCEDURE — 85025 COMPLETE CBC W/AUTO DIFF WBC: CPT | Performed by: EMERGENCY MEDICINE

## 2018-04-25 PROCEDURE — 87150 DNA/RNA AMPLIFIED PROBE: CPT | Performed by: PHYSICIAN ASSISTANT

## 2018-04-25 PROCEDURE — 74176 CT ABD & PELVIS W/O CONTRAST: CPT

## 2018-04-25 PROCEDURE — 99284 EMERGENCY DEPT VISIT MOD MDM: CPT | Performed by: PHYSICIAN ASSISTANT

## 2018-04-25 PROCEDURE — P9612 CATHETERIZE FOR URINE SPEC: HCPCS

## 2018-04-25 PROCEDURE — 87186 SC STD MICRODIL/AGAR DIL: CPT | Performed by: EMERGENCY MEDICINE

## 2018-04-25 PROCEDURE — 80053 COMPREHEN METABOLIC PANEL: CPT | Performed by: EMERGENCY MEDICINE

## 2018-04-25 RX ORDER — TAMSULOSIN HYDROCHLORIDE 0.4 MG/1
0.4 CAPSULE ORAL ONCE
Status: COMPLETED | OUTPATIENT
Start: 2018-04-25 | End: 2018-04-25

## 2018-04-25 RX ORDER — HYDROCODONE BITARTRATE AND ACETAMINOPHEN 5; 325 MG/1; MG/1
1 TABLET ORAL EVERY 6 HOURS PRN
Qty: 15 TABLET | Refills: 0 | Status: SHIPPED | OUTPATIENT
Start: 2018-04-25 | End: 2018-05-22

## 2018-04-25 RX ORDER — ONDANSETRON 4 MG/1
4 TABLET, ORALLY DISINTEGRATING ORAL 4 TIMES DAILY PRN
Qty: 12 TABLET | Refills: 0 | Status: SHIPPED | OUTPATIENT
Start: 2018-04-25 | End: 2018-05-22

## 2018-04-25 RX ORDER — HYDROCODONE BITARTRATE AND ACETAMINOPHEN 5; 325 MG/1; MG/1
1 TABLET ORAL ONCE
Status: DISCONTINUED | OUTPATIENT
Start: 2018-04-25 | End: 2018-04-25 | Stop reason: HOSPADM

## 2018-04-25 RX ORDER — ACETAMINOPHEN 325 MG/1
650 TABLET ORAL ONCE
Status: COMPLETED | OUTPATIENT
Start: 2018-04-25 | End: 2018-04-25

## 2018-04-25 RX ORDER — CEFUROXIME AXETIL 250 MG/1
250 TABLET ORAL 2 TIMES DAILY
Qty: 14 TABLET | Refills: 0 | Status: SHIPPED | OUTPATIENT
Start: 2018-04-25 | End: 2018-05-02

## 2018-04-25 RX ORDER — TAMSULOSIN HYDROCHLORIDE 0.4 MG/1
1 CAPSULE ORAL DAILY
Qty: 7 CAPSULE | Refills: 0 | Status: SHIPPED | OUTPATIENT
Start: 2018-04-25 | End: 2018-05-02

## 2018-04-25 RX ADMIN — ACETAMINOPHEN 650 MG: 325 TABLET, FILM COATED ORAL at 12:15

## 2018-04-25 RX ADMIN — TAMSULOSIN HYDROCHLORIDE 0.4 MG: 0.4 CAPSULE ORAL at 13:43

## 2018-04-25 RX ADMIN — CEFTRIAXONE 1 G: 1 INJECTION, POWDER, FOR SOLUTION INTRAMUSCULAR; INTRAVENOUS at 12:38

## 2018-04-25 NOTE — ED PROVIDER NOTES
"Subjective   History of Present Illness  History of Present Illness    Chief complaint: urinary frequency    Location: bilat flank    Quality/Severity:  \"I don't know, it just is\"    Timing/Duration: 2 weeks, worse past 2 days    Modifying Factors: nothing makes worse or better    Associated Symptoms: Positive shaking that started this morning.  Denies nausea or vomiting.  Positive bilateral flank and lower abdominal pain.  Patient is unsure if she has had fevers.  Denies chest pain or shortness of breath.  Denies nausea or vomiting.    Narrative: 78 y/o female, who is a poor historian, resents with urinary frequency for the past 2 weeks.  She stated that she went to her primary care provider and was told that she \"had something.  I don't know what it is, I just have something\".     Review of Systems  General: Denies fevers or chills.  Denies any weakness or fatigue.  Denies any weight loss or weight gain.  SKIN: Denies any rashes lesions or ulcers.  Denies color change.  ENT: Denies sore throat or rhinorrhea.  Denies ear pain.    EYES: Denies any blurred vision.  Denies any change in vision.  Denies any photophobia.  Denies any vision loss.  LUNGS: Denies any shortness of breath or wheezing.  Denies any cough.  Denies any hemoptysis.  CARDIAC: Denies any chest pain.  Denies palpitations.  Denies syncope.  Denies any edema  ABD: Denies any abdominal pain.  Denies any nausea or vomiting or diarrhea.  Denies any rectal bleeding.  Denies constipation  : + frequency, urgency. Denies any dysuria or hematuria.  Denies discharge.  Denies flank pain.  NEURO: Denies any focal weakness.  Denies headache.  Denies seizures.  Denies changes in speech or difficulty walking.  ENDOCRINE: Denies polydipsia and polyuria  M/S: Denies arthralgias, back pain, myalgias or neck pain  HEME/LYMPH: Negative for adenopathy. Does not bruise/bleed easily.   PSYCH: Negative for suicidal ideas. Denies anxiety or depression  review was performed " in addition to those in the above all other reviews are negative.      Past Medical History:   Diagnosis Date   • Anxiety    • Chronic pain disorder    • Constipation    • DDD (degenerative disc disease), lumbar    • GERD (gastroesophageal reflux disease)    • HTN (hypertension)    • Hyperlipidemia    • Kidney stones    • Low back pain    • Osteoarthritis    • Osteopenia    • Osteoporosis    • PONV (postoperative nausea and vomiting)    • Psoriasis    • Urinary tract infection        Allergies   Allergen Reactions   • Bactrim [Sulfamethoxazole-Trimethoprim] Other (See Comments) and Nausea Only     UNKNOWN   • Ciprofloxacin Other (See Comments)     UNKNOWN   • Levaquin [Levofloxacin] Other (See Comments)     SEVERE HEADACHE AND N/V   • Macrobid [Nitrofurantoin] Other (See Comments)     UNKNOWN; PT CAN TAKE IN SMALL DOSES- FLU LIKE SYMPTOMS   • Cortisone      Pt states had headache with IM injection states has been ok with epidural steroid injections       Past Surgical History:   Procedure Laterality Date   • BREAST BIOPSY Left     benign   • BREAST LUMPECTOMY Left     benign   • BUNIONECTOMY Right    • COLONOSCOPY N/A 11/30/2016    Procedure: COLONOSCOPY polypectomy;  Surgeon: Adali Willard MD;  Location: [facility] OR;  Service:    • CYSTOSCOPY BOTOX INJECTION OF BLADDER N/A 7/21/2017    Procedure: CYSTOSCOPY COAPTITE INJECTION;  Surgeon: Kevon Clark MD;  Location: [facility] OR;  Service:    • CYSTOSCOPY W/ LITHOLAPAXY / EHL     • CYSTOSCOPY W/ URETERAL STENT PLACEMENT Left 9/12/2016    Procedure: CYSTOSCOPY URETERAL STENT INSERTION;  Surgeon: Kevon Clark MD;  Location: [facility] OR;  Service:    • KNEE ARTHROPLASTY Right    • TONSILLECTOMY AND ADENOIDECTOMY     • URETEROSCOPY LASER LITHOTRIPSY WITH STENT INSERTION Left 10/5/2016    Procedure: LT URETEROSCOPY LASER LITHOTRIPSY STONE BASKET EXTRACTION AND STENT ;  Surgeon: Kevon Clark MD;  Location: [facility] OR;  Service:        Family History  "  Problem Relation Age of Onset   • Heart defect Mother    • Heart defect Father    • Malig Hyperthermia Neg Hx    • Breast cancer Neg Hx        Social History     Social History   • Marital status:      Social History Main Topics   • Smoking status: Former Smoker     Years: 40.00     Quit date: 10/4/1980   • Smokeless tobacco: Never Used      Comment: quit 1994   • Alcohol use No   • Drug use: No   • Sexual activity: Defer     Other Topics Concern   • Not on file   No current facility-administered medications for this encounter.     Current Outpatient Prescriptions:   •  aspirin 81 MG EC tablet, Take 81 mg by mouth Daily. PT TO STOP PER MD INSTRUCTION, Disp: , Rfl:   •  Biotin 10 MG capsule, Take 10 mg by mouth Daily., Disp: , Rfl:   •  calcium citrate-vitamin d (CITRACAL) 200-250 MG-UNIT tablet tablet, Take 1 tablet by mouth Daily., Disp: , Rfl:   •  estradiol (ESTRACE) 0.5 MG tablet, Take 0.5 mg by mouth Every Other Day., Disp: , Rfl:   •  folic acid (FOLVITE) 1 MG tablet, Take 1 mg by mouth daily., Disp: , Rfl:   •  ibuprofen (ADVIL,MOTRIN) 200 MG tablet, Take 800 mg by mouth 2 (Two) Times a Day., Disp: , Rfl:   •  lisinopril (PRINIVIL,ZESTRIL) 20 MG tablet, Take 1 tablet by mouth Daily With Breakfast., Disp: 90 tablet, Rfl: 3  •  Mirabegron ER (MYRBETRIQ) 50 MG tablet sustained-release 24 hour 24 hr tablet, Take 50 mg by mouth Daily., Disp: , Rfl:   •  NON FORMULARY, Take 1 tablet by mouth 2 (Two) Times a Day. \"pressor vision\" for macular degeneration. OTC, Disp: , Rfl:   •  omeprazole (priLOSEC) 20 MG capsule, Take 1 capsule by mouth Daily., Disp: , Rfl:   •  simvastatin (ZOCOR) 20 MG tablet, Take 1 tablet by mouth Every Night., Disp: 90 tablet, Rfl: 3  •  vitamin C (ASCORBIC ACID) 500 MG tablet, Take 500 mg by mouth Daily., Disp: , Rfl:   •  imipramine (TOFRANIL) 50 MG tablet, TAKE 2 TO 3 TABLETS BY MOUTH EVERY NIGHT AT BEDTIME, Disp: 270 tablet, Rfl: 1          Objective   Physical Exam  Vitals:    " 04/25/18 1111   BP:    Pulse:    Resp: 20   Temp:    SpO2:    Temp 99.9, blood pressure 183/100, heart rate 138, oxygen saturation is 92% on room air    GENERAL: a/o x 4, NAD  SKIN: Warm pink and dry   HEENT:  PERRLA, EOM intact, conjunctiva normal, sclera clear  NECK: supple, no JVD  LUNGS: Clear to auscultation bilaterally without wheezes, rales or rhonchi.  No accessory muscle use and no nasal flaring.  CARDIAC:  Regular rate and rhythm, S1-S2.  No murmurs, rubs or gallops.  No peripheral edema.  Equal pulses bilaterally.  ABDOMEN: Soft, mild suprapubic tenderness, nondistended.  No guarding or rebound tenderness.  Normal bowel sounds. No CVA tenderness.  MUSCULOSKELETAL: Moves all extremities well.  No deformity.  NEURO: Cranial nerves II through XII grossly intact.  No gross focal deficits.  Alert.  Normal speech and motor.  PSYCH: Normal mood and affect      Procedures         ED Course  ED Course    Rocephin given.    Results for orders placed or performed during the hospital encounter of 04/25/18   Comprehensive Metabolic Panel   Result Value Ref Range    Glucose 170 (H) 65 - 99 mg/dL    BUN 13 8 - 23 mg/dL    Creatinine 0.79 0.57 - 1.00 mg/dL    Sodium 140 136 - 145 mmol/L    Potassium 3.7 3.5 - 5.2 mmol/L    Chloride 101 98 - 107 mmol/L    CO2 27.0 22.0 - 29.0 mmol/L    Calcium 8.4 (L) 8.8 - 10.5 mg/dL    Total Protein 7.4 6.0 - 8.5 g/dL    Albumin 4.00 3.50 - 5.20 g/dL    ALT (SGPT) 23 5 - 33 U/L    AST (SGOT) 22 5 - 32 U/L    Alkaline Phosphatase 137 (H) 40 - 129 U/L    Total Bilirubin 0.8 0.2 - 1.2 mg/dL    eGFR Non African Amer 71 >60 mL/min/1.73    Globulin 3.4 gm/dL    A/G Ratio 1.2 g/dL    BUN/Creatinine Ratio 16.5 7.0 - 25.0    Anion Gap 12.0 mmol/L   Urinalysis With / Culture If Indicated - Urine, Catheter   Result Value Ref Range    Color, UA Straw Yellow, Straw    Appearance, UA Slightly Cloudy (A) Clear    pH, UA 7.5 4.5 - 8.0    Specific Gravity, UA 1.020 1.003 - 1.030    Glucose, UA Negative  Negative    Ketones, UA Negative Negative, 80 mg/dL (3+), >=160 mg/dL (4+)    Bilirubin, UA Negative Negative    Blood, UA Small (1+) (A) Negative    Protein, UA Trace (A) Negative    Leuk Esterase, UA Small (1+) (A) Negative    Nitrite, UA Positive (A) Negative    Urobilinogen, UA 0.2 E.U./dL 0.2 - 1.0 E.U./dL   CBC Auto Differential   Result Value Ref Range    WBC 8.68 4.80 - 10.80 10*3/mm3    RBC 4.74 4.20 - 5.40 10*6/mm3    Hemoglobin 14.0 12.0 - 16.0 g/dL    Hematocrit 42.2 37.0 - 47.0 %    MCV 89.0 81.0 - 99.0 fL    MCH 29.5 27.0 - 31.0 pg    MCHC 33.2 31.0 - 37.0 g/dL    RDW 12.8 11.5 - 14.5 %    RDW-SD 41.5 37.0 - 54.0 fl    MPV 9.0 7.4 - 10.4 fL    Platelets 154 140 - 500 10*3/mm3    Neutrophil % 92.1 (H) 45.0 - 70.0 %    Lymphocyte % 3.8 (L) 20.0 - 45.0 %    Monocyte % 3.5 3.0 - 8.0 %    Eosinophil % 0.0 0.0 - 4.0 %    Basophil % 0.1 0.0 - 2.0 %    Immature Grans % 0.5 0.0 - 0.5 %    Neutrophils, Absolute 8.00 1.50 - 8.30 10*3/mm3    Lymphocytes, Absolute 0.33 (L) 0.60 - 4.80 10*3/mm3    Monocytes, Absolute 0.30 0.00 - 1.00 10*3/mm3    Eosinophils, Absolute 0.00 (L) 0.10 - 0.30 10*3/mm3    Basophils, Absolute 0.01 0.00 - 0.20 10*3/mm3    Immature Grans, Absolute 0.04 (H) 0.00 - 0.03 10*3/mm3    nRBC 0.0 0.0 - 0.0 /100 WBC   Urinalysis, Microscopic Only - Urine, Clean Catch   Result Value Ref Range    RBC, UA 6-12 (A) None Seen /HPF    WBC, UA 6-12 (A) None Seen /HPF    Bacteria, UA 3+ (A) None Seen /HPF    Squamous Epithelial Cells, UA 0-2 None Seen, 0-2 /HPF    Hyaline Casts, UA None Seen None Seen /LPF    Methodology Manual Light Microscopy    Lactic Acid, Plasma   Result Value Ref Range    Lactate 1.7 0.5 - 2.0 mmol/L     Reviewed CT a/p, CXR. Independently viewed by me. Interpreted by radiologist. Discussed with pt.  Ct Abdomen Pelvis Without Contrast    Result Date: 4/25/2018  Narrative: CT ABDOMEN AND PELVIS, NONCONTRAST KIDNEY STONE PROTOCOL, 4/25/2018:  HISTORY: 77-year-old female in the ED  complaining of two day history of urinary frequency. Diaphoresis and shortness of breath.  TECHNIQUE: CT examination of the abdomen and pelvis without oral or IV contrast. Radiation dose reduction techniques included automated exposure control or exposure modulation based on body size. Radiation audit for CT and nuclear cardiology exams in the last 12 months: 0.  COMPARISON: *  CT abdomen/pelvis with contrast, 9/11/2016.  ABDOMEN FINDINGS: There is an 8 mm obstructing calculus in the right upper ureter at the UPJ causing mild right hydronephrosis. One or two tiny nonobstructing calculi within the left kidney. 5.9 cm left lower pole renal cyst is unchanged.  Liver, pancreas and spleen are normal in size and appearance without contrast. Mildly distended gallbladder. No bile duct dilatation. Normal caliber abdominal aorta.  Minimal sigmoid colonic diverticulosis. Small bowel and colon are otherwise normal in caliber and appearance, as imaged. Normal appendix.  PELVIS FINDINGS: Uterus, ovaries, urinary bladder and rectum are within normal limits. No inguinal hernia.  Limited lung base images show no active disease stable tiny benign nodule in the right middle lobe.      Impression: 1. Obstructing right upper ureter calculus at the UVJ measuring 8 mm. This causes moderate hydronephrosis. 2. Several tiny nonobstructing left renal calculi. Stable left renal cyst. 3. Minimal colonic diverticulosis. Normal appendix.  This report was finalized on 4/25/2018 1:22 PM by Dr. Maulik Clark MD.      Xr Chest 2 View    Result Date: 4/25/2018  Narrative: CHEST X-RAY, 4/25/2018:  HISTORY: 77-year-old female the ED complaining of shortness of air and diaphoresis today. Urinary frequency. CT examination shows an obstructing proximal right ureter stone.  TECHNIQUE: AP and lateral upright chest series.  FINDINGS: Shallow lung expansion with mild bibasilar atelectasis. Lung bases are clear on CT examination today.  Mild cardiomegaly.  Pulmonary vascularity is normal. Spinal curvature.      Impression: No active disease. Shallow lung expansion.  This report was finalized on 4/25/2018 1:25 PM by Dr. Maulik Clark MD.      1325- patient states that will go home.  She absolutely does not want a ureteral stent placed. She will not stay.  I advised her of the risk with infected stones.  She states she understands and will come back if needed.  She is agreeable to me calling Dr. Clark's office.   is also with her and understands when he would need to bring her back. She does have a history of kidney stones.  Her urine culture from July showed Klebsiella which was sensitive to Rocephin.  Flomax given.    CONSULT  Time 1348  Discussed case with Dr Ndiaye  Reviewed history, exam, results and treatments.  Discussed concerns and plan of care. Dr Ndiaye understands that the patient does not want a stent.  Her only other option is a lithotripsy, but her urine needs to be sterile.  He or Dr. Clark will see the patient in the office tomorrow and get things rolling.  We will give dose of Rocephin here, as already given.  Discharge with oral antibiotics and pain medications.  She is agreeable to this plan. Her  is also here with her.    Discussed pertinent labs and imaging findings with the patient/family.  Patient/Family voiced understanding of need to follow-up for recheck, further testing as needed.  Return to the emergency Department warnings were given.  D/c with norco, ceftin, zofran, flomax    Appt tomorrow 8:00 with Dr. Clark              MDM  Number of Diagnoses or Management Options  Acute cystitis with hematuria: new and requires workup  Hydronephrosis with urinary obstruction due to renal calculus: new and requires workup  Kidney stone: new and requires workup     Amount and/or Complexity of Data Reviewed  Clinical lab tests: reviewed and ordered  Tests in the radiology section of CPT®: ordered and reviewed  Tests in the medicine  section of CPT®: ordered and reviewed  Discuss the patient with other providers: yes  Independent visualization of images, tracings, or specimens: yes    Risk of Complications, Morbidity, and/or Mortality  Presenting problems: moderate  Diagnostic procedures: moderate  Management options: moderate    Patient Progress  Patient progress: improved      Final diagnoses:   Kidney stone   Acute cystitis with hematuria   Hydronephrosis with urinary obstruction due to renal calculus     Dictated utilizing Dragon dictation         Edda Joyce PA-C  04/25/18 5109    D/W Dr. Clark + e.coli blood cx, who is planning to do surgery on the patient tomorrow at [facility].  He does not want to admit the patient today.  He requested that she come here for one dose of IV antibiotics and he will take care of the rest of her treatment tomorrow.     Updated the patient.  Her  is not currently home, but should be home shortly.  When he arrives she will have him drive her up here and get a dose of IV antibiotics.     Edda Joyce PA-C  04/26/18 1372

## 2018-04-25 NOTE — ED NOTES
Patient has appointment with Dr Clark at [facility] on 4/26 @8am     Danielle Ndiaye  04/25/18 5597

## 2018-04-26 ENCOUNTER — HOSPITAL ENCOUNTER (EMERGENCY)
Facility: HOSPITAL | Age: 78
Discharge: HOME | End: 2018-04-26
Attending: EMERGENCY MEDICINE | Admitting: EMERGENCY MEDICINE

## 2018-04-26 VITALS
BODY MASS INDEX: 39.37 KG/M2 | SYSTOLIC BLOOD PRESSURE: 149 MMHG | DIASTOLIC BLOOD PRESSURE: 83 MMHG | WEIGHT: 245 LBS | HEART RATE: 109 BPM | RESPIRATION RATE: 20 BRPM | TEMPERATURE: 98.6 F | OXYGEN SATURATION: 94 % | HEIGHT: 66 IN

## 2018-04-26 DIAGNOSIS — R78.81 BACTEREMIA: Primary | ICD-10-CM

## 2018-04-26 DIAGNOSIS — N30.01 ACUTE CYSTITIS WITH HEMATURIA: ICD-10-CM

## 2018-04-26 DIAGNOSIS — N20.0 KIDNEY STONE: ICD-10-CM

## 2018-04-26 LAB — BACTERIA BLD CULT: ABNORMAL

## 2018-04-26 PROCEDURE — 96365 THER/PROPH/DIAG IV INF INIT: CPT

## 2018-04-26 PROCEDURE — 25010000002 CEFTRIAXONE: Performed by: PHYSICIAN ASSISTANT

## 2018-04-26 PROCEDURE — 99284 EMERGENCY DEPT VISIT MOD MDM: CPT | Performed by: PHYSICIAN ASSISTANT

## 2018-04-26 PROCEDURE — 99283 EMERGENCY DEPT VISIT LOW MDM: CPT

## 2018-04-26 RX ORDER — CEFTRIAXONE 2 G/1
INJECTION, POWDER, FOR SOLUTION INTRAMUSCULAR; INTRAVENOUS
Status: DISCONTINUED
Start: 2018-04-26 | End: 2018-04-26 | Stop reason: HOSPADM

## 2018-04-26 RX ORDER — SODIUM CHLORIDE 0.9 % (FLUSH) 0.9 %
10 SYRINGE (ML) INJECTION AS NEEDED
Status: DISCONTINUED | OUTPATIENT
Start: 2018-04-26 | End: 2018-04-26 | Stop reason: HOSPADM

## 2018-04-26 RX ADMIN — CEFTRIAXONE 2 G: 2 INJECTION, POWDER, FOR SOLUTION INTRAMUSCULAR; INTRAVENOUS at 15:55

## 2018-04-27 ENCOUNTER — EPISODE CHANGES (OUTPATIENT)
Dept: CASE MANAGEMENT | Facility: OTHER | Age: 78
End: 2018-04-27

## 2018-04-27 ENCOUNTER — PATIENT OUTREACH (OUTPATIENT)
Dept: CASE MANAGEMENT | Facility: OTHER | Age: 78
End: 2018-04-27

## 2018-04-27 LAB
BACTERIA SPEC AEROBE CULT: ABNORMAL
BACTERIA SPEC AEROBE CULT: ABNORMAL

## 2018-04-27 NOTE — OUTREACH NOTE
Talked with patient. Discuss briefly ED visit of 4/26/18. Patient states had follow up appointment with physician this morning and is doing well. Reviewed with patient HRCM program. She verbalized understanding; was appreciative of phone call and declines to participate at this time.

## 2018-04-29 LAB
BACTERIA SPEC AEROBE CULT: ABNORMAL
GRAM STN SPEC: ABNORMAL

## 2018-04-30 LAB — BACTERIA SPEC AEROBE CULT: NORMAL

## 2018-05-22 ENCOUNTER — OFFICE VISIT (OUTPATIENT)
Dept: INTERNAL MEDICINE | Facility: CLINIC | Age: 78
End: 2018-05-22

## 2018-05-22 VITALS
DIASTOLIC BLOOD PRESSURE: 74 MMHG | WEIGHT: 242.4 LBS | SYSTOLIC BLOOD PRESSURE: 118 MMHG | OXYGEN SATURATION: 98 % | HEIGHT: 66 IN | BODY MASS INDEX: 38.96 KG/M2 | HEART RATE: 85 BPM

## 2018-05-22 DIAGNOSIS — G89.29 CHRONIC BILATERAL LOW BACK PAIN WITHOUT SCIATICA: ICD-10-CM

## 2018-05-22 DIAGNOSIS — R20.8 BURNING SENSATION OF FOOT: ICD-10-CM

## 2018-05-22 DIAGNOSIS — N20.0 KIDNEY STONE: ICD-10-CM

## 2018-05-22 DIAGNOSIS — M54.50 CHRONIC BILATERAL LOW BACK PAIN WITHOUT SCIATICA: ICD-10-CM

## 2018-05-22 DIAGNOSIS — E88.81 INSULIN RESISTANCE: Primary | ICD-10-CM

## 2018-05-22 DIAGNOSIS — R73.9 HYPERGLYCEMIA: ICD-10-CM

## 2018-05-22 DIAGNOSIS — I10 ESSENTIAL HYPERTENSION: ICD-10-CM

## 2018-05-22 DIAGNOSIS — N39.0 RECURRENT UTI: ICD-10-CM

## 2018-05-22 LAB
ALBUMIN SERPL-MCNC: 4.1 G/DL (ref 3.5–5.2)
ALBUMIN/GLOB SERPL: 1.7 G/DL
ALP SERPL-CCNC: 113 U/L (ref 40–129)
ALT SERPL-CCNC: 18 U/L (ref 5–33)
AST SERPL-CCNC: 19 U/L (ref 5–32)
BASOPHILS # BLD AUTO: 0.02 10*3/MM3 (ref 0–0.2)
BASOPHILS NFR BLD AUTO: 0.4 % (ref 0–2)
BILIRUB SERPL-MCNC: 0.3 MG/DL (ref 0.2–1.2)
BUN SERPL-MCNC: 18 MG/DL (ref 8–23)
BUN/CREAT SERPL: 21.2 (ref 7–25)
CALCIUM SERPL-MCNC: 10.1 MG/DL (ref 8.8–10.5)
CHLORIDE SERPL-SCNC: 105 MMOL/L (ref 98–107)
CO2 SERPL-SCNC: 28.8 MMOL/L (ref 22–29)
CREAT SERPL-MCNC: 0.85 MG/DL (ref 0.57–1)
EOSINOPHIL # BLD AUTO: 0.12 10*3/MM3 (ref 0.1–0.3)
EOSINOPHIL NFR BLD AUTO: 2.1 % (ref 0–4)
ERYTHROCYTE [DISTWIDTH] IN BLOOD BY AUTOMATED COUNT: 13.3 % (ref 11.5–14.5)
GFR SERPLBLD CREATININE-BSD FMLA CKD-EPI: 65 ML/MIN/1.73
GFR SERPLBLD CREATININE-BSD FMLA CKD-EPI: 79 ML/MIN/1.73
GLOBULIN SER CALC-MCNC: 2.4 GM/DL
GLUCOSE SERPL-MCNC: 134 MG/DL (ref 65–99)
HBA1C MFR BLD: 6.1 % (ref 4.8–5.6)
HCT VFR BLD AUTO: 43.7 % (ref 37–47)
HGB BLD-MCNC: 14.2 G/DL (ref 12–16)
IMM GRANULOCYTES # BLD: 0.01 10*3/MM3 (ref 0–0.03)
IMM GRANULOCYTES NFR BLD: 0.2 % (ref 0–0.5)
LYMPHOCYTES # BLD AUTO: 1.24 10*3/MM3 (ref 0.6–4.8)
LYMPHOCYTES NFR BLD AUTO: 22 % (ref 20–45)
MCH RBC QN AUTO: 29.8 PG (ref 27–31)
MCHC RBC AUTO-ENTMCNC: 32.5 G/DL (ref 31–37)
MCV RBC AUTO: 91.6 FL (ref 81–99)
MONOCYTES # BLD AUTO: 0.44 10*3/MM3 (ref 0–1)
MONOCYTES NFR BLD AUTO: 7.8 % (ref 3–8)
NEUTROPHILS # BLD AUTO: 3.8 10*3/MM3 (ref 1.5–8.3)
NEUTROPHILS NFR BLD AUTO: 67.5 % (ref 45–70)
NRBC BLD AUTO-RTO: 0 /100 WBC (ref 0–0)
PLATELET # BLD AUTO: 217 10*3/MM3 (ref 140–500)
POTASSIUM SERPL-SCNC: 4.4 MMOL/L (ref 3.5–5.2)
PROT SERPL-MCNC: 6.5 G/DL (ref 6–8.5)
RBC # BLD AUTO: 4.77 10*6/MM3 (ref 4.2–5.4)
SODIUM SERPL-SCNC: 144 MMOL/L (ref 136–145)
WBC # BLD AUTO: 5.63 10*3/MM3 (ref 4.8–10.8)

## 2018-05-22 PROCEDURE — 99214 OFFICE O/P EST MOD 30 MIN: CPT | Performed by: INTERNAL MEDICINE

## 2018-05-22 RX ORDER — GABAPENTIN 300 MG/1
300 CAPSULE ORAL NIGHTLY
Qty: 30 CAPSULE | Refills: 0 | Status: SHIPPED | OUTPATIENT
Start: 2018-05-22 | End: 2018-07-11 | Stop reason: SINTOL

## 2018-05-22 RX ORDER — CEFUROXIME AXETIL 250 MG/1
TABLET ORAL
Refills: 0 | COMMUNITY
Start: 2018-05-10 | End: 2018-08-13

## 2018-05-22 NOTE — PROGRESS NOTES
Anitra Orta is a 77 y.o. female, who presents with a chief complaint of   Chief Complaint   Patient presents with   • Leg Pain     Waking up in the middle of the night with a burning sensation in both of her legs. off/on.        HPI   The pt is here bc of pain in her feet.  She has had chronic back issues.  She sleeps with a form under her feet to help keep them there.  She is going to follow up with pain management and try to get a stimulator for pain control.  Her hold up has been her recurrent UTI's and recurrent need for antibiotics.  She has to be off antibiotics x 2 weeks before she can be considered for the stimulator.  She sees dr. Clark.  She recently had a UTI and had to go to the Er on 4/26.  She was confused bc of the infection. She had to get IV abx. She had a 8mm stone moderate hydronephrosis.  She had to have lithotripsy with dr. Valdez. She didn't want a stent but ended up getting  Stent and recently had the stent removed on 5/15.  Pt was on ceftin.    She has had insulin resistance in past but recently had glucoses that were higher.  Due for labs    htn- well controlled.  No ha/dizziness    The following portions of the patient's history were reviewed and updated as appropriate: allergies, current medications, past family history, past medical history, past social history, past surgical history and problem list.    Allergies: Bactrim [sulfamethoxazole-trimethoprim]; Ciprofloxacin; Levaquin [levofloxacin]; Macrobid [nitrofurantoin]; and Cortisone    Review of Systems   Constitutional: Negative.    HENT: Negative.    Eyes: Negative.    Respiratory: Negative.    Cardiovascular: Negative.    Gastrointestinal: Negative.    Endocrine: Negative.    Genitourinary: Negative.    Musculoskeletal: Negative.    Skin: Negative.    Allergic/Immunologic: Negative.    Neurological: Negative.         Burning in feet   Hematological: Negative.    Psychiatric/Behavioral: Negative.    All other  systems reviewed and are negative.            Wt Readings from Last 3 Encounters:   05/22/18 110 kg (242 lb 6.4 oz)   04/26/18 111 kg (245 lb)   04/25/18 111 kg (245 lb)     Temp Readings from Last 3 Encounters:   04/26/18 98.6 °F (37 °C) (Oral)   04/25/18 98.3 °F (36.8 °C) (Oral)   03/12/18 97.7 °F (36.5 °C)     BP Readings from Last 3 Encounters:   05/22/18 118/74   04/26/18 149/83   04/25/18 162/84     Pulse Readings from Last 3 Encounters:   05/22/18 85   04/26/18 109   04/25/18 118     Body mass index is 39.14 kg/m².  @LASTSAO2(3)@    Physical Exam   Constitutional: She is oriented to person, place, and time. She appears well-developed and well-nourished. No distress.   HENT:   Head: Normocephalic and atraumatic.   Right Ear: External ear normal.   Left Ear: External ear normal.   Nose: Nose normal.   Mouth/Throat: Oropharynx is clear and moist.   Eyes: Conjunctivae and EOM are normal. Pupils are equal, round, and reactive to light.   Neck: Normal range of motion. Neck supple.   Cardiovascular: Normal rate, regular rhythm, normal heart sounds and intact distal pulses.    Pulmonary/Chest: Effort normal and breath sounds normal. No respiratory distress. She has no wheezes.   Musculoskeletal: Normal range of motion.   Normal gait   Neurological: She is alert and oriented to person, place, and time.   Skin: Skin is warm and dry.   Psychiatric: She has a normal mood and affect. Her behavior is normal. Judgment and thought content normal.   Nursing note and vitals reviewed.      Results for orders placed or performed during the hospital encounter of 04/25/18   Urine Culture - Urine, Urine, Clean Catch   Result Value Ref Range    Urine Culture      Urine Culture >100,000 CFU/mL Escherichia coli (A)        Susceptibility    Escherichia coli - YOLY     Ampicillin >=32 Resistant ug/ml     Ampicillin + Sulbactam 16 Intermediate ug/ml     Cefazolin <=4 Susceptible ug/ml     Cefepime <=1 Susceptible ug/ml     Ceftriaxone <=1  Susceptible ug/ml     Ciprofloxacin <=0.25 Susceptible ug/ml     Ertapenem <=0.5 Susceptible ug/ml     Gentamicin <=1 Susceptible ug/ml     Levofloxacin <=0.12 Susceptible ug/ml     Nitrofurantoin <=16 Susceptible ug/ml     Piperacillin + Tazobactam <=4 Susceptible ug/ml     Tetracycline <=1 Susceptible ug/ml     Trimethoprim + Sulfamethoxazole <=20 Susceptible ug/ml   Blood Culture - Blood,   Result Value Ref Range    Blood Culture No growth at 5 days    Blood Culture - Blood,   Result Value Ref Range    Blood Culture      Blood Culture Escherichia coli (A)     Blood Culture Escherichia coli (A)     Gram Stain Result Anaerobic Bottle Gram negative bacilli        Susceptibility    Escherichia coli - YOLY     Ampicillin >=32 Resistant ug/ml     Ampicillin + Sulbactam 16 Intermediate ug/ml     Cefazolin <=4 Susceptible ug/ml     Cefepime <=1 Susceptible ug/ml     Cefoxitin <=4 Susceptible ug/ml     Ceftriaxone <=1 Susceptible ug/ml     Ertapenem <=0.5 Susceptible ug/ml     Gentamicin <=1 Susceptible ug/ml     Levofloxacin <=0.12 Susceptible ug/ml     Meropenem <=0.25 Susceptible ug/ml     Piperacillin + Tazobactam <=4 Susceptible ug/ml     Trimethoprim + Sulfamethoxazole <=20 Susceptible ug/ml    Escherichia coli - YOLY     Ampicillin >=32 Resistant ug/ml     Ampicillin + Sulbactam 4 Susceptible ug/ml     Cefazolin <=4 Susceptible ug/ml     Cefepime <=1 Susceptible ug/ml     Cefoxitin <=4 Susceptible ug/ml     Ceftriaxone <=1 Susceptible ug/ml     Ertapenem <=0.5 Susceptible ug/ml     Gentamicin <=1 Susceptible ug/ml     Levofloxacin <=0.12 Susceptible ug/ml     Meropenem <=0.25 Susceptible ug/ml     Piperacillin + Tazobactam <=4 Susceptible ug/ml     Trimethoprim + Sulfamethoxazole <=20 Susceptible ug/ml   Blood Culture ID, PCR - Blood,   Result Value Ref Range    BCID, PCR Escherichia coli. Identification by BCID PCR. (C) Negative by BCID PCR. Culture to Follow.   Comprehensive Metabolic Panel   Result Value Ref Range     Glucose 170 (H) 65 - 99 mg/dL    BUN 13 8 - 23 mg/dL    Creatinine 0.79 0.57 - 1.00 mg/dL    Sodium 140 136 - 145 mmol/L    Potassium 3.7 3.5 - 5.2 mmol/L    Chloride 101 98 - 107 mmol/L    CO2 27.0 22.0 - 29.0 mmol/L    Calcium 8.4 (L) 8.8 - 10.5 mg/dL    Total Protein 7.4 6.0 - 8.5 g/dL    Albumin 4.00 3.50 - 5.20 g/dL    ALT (SGPT) 23 5 - 33 U/L    AST (SGOT) 22 5 - 32 U/L    Alkaline Phosphatase 137 (H) 40 - 129 U/L    Total Bilirubin 0.8 0.2 - 1.2 mg/dL    eGFR Non African Amer 71 >60 mL/min/1.73    Globulin 3.4 gm/dL    A/G Ratio 1.2 g/dL    BUN/Creatinine Ratio 16.5 7.0 - 25.0    Anion Gap 12.0 mmol/L   Urinalysis With / Culture If Indicated - Urine, Catheter   Result Value Ref Range    Color, UA Straw Yellow, Straw    Appearance, UA Slightly Cloudy (A) Clear    pH, UA 7.5 4.5 - 8.0    Specific Gravity, UA 1.020 1.003 - 1.030    Glucose, UA Negative Negative    Ketones, UA Negative Negative, 80 mg/dL (3+), >=160 mg/dL (4+)    Bilirubin, UA Negative Negative    Blood, UA Small (1+) (A) Negative    Protein, UA Trace (A) Negative    Leuk Esterase, UA Small (1+) (A) Negative    Nitrite, UA Positive (A) Negative    Urobilinogen, UA 0.2 E.U./dL 0.2 - 1.0 E.U./dL   CBC Auto Differential   Result Value Ref Range    WBC 8.68 4.80 - 10.80 10*3/mm3    RBC 4.74 4.20 - 5.40 10*6/mm3    Hemoglobin 14.0 12.0 - 16.0 g/dL    Hematocrit 42.2 37.0 - 47.0 %    MCV 89.0 81.0 - 99.0 fL    MCH 29.5 27.0 - 31.0 pg    MCHC 33.2 31.0 - 37.0 g/dL    RDW 12.8 11.5 - 14.5 %    RDW-SD 41.5 37.0 - 54.0 fl    MPV 9.0 7.4 - 10.4 fL    Platelets 154 140 - 500 10*3/mm3    Neutrophil % 92.1 (H) 45.0 - 70.0 %    Lymphocyte % 3.8 (L) 20.0 - 45.0 %    Monocyte % 3.5 3.0 - 8.0 %    Eosinophil % 0.0 0.0 - 4.0 %    Basophil % 0.1 0.0 - 2.0 %    Immature Grans % 0.5 0.0 - 0.5 %    Neutrophils, Absolute 8.00 1.50 - 8.30 10*3/mm3    Lymphocytes, Absolute 0.33 (L) 0.60 - 4.80 10*3/mm3    Monocytes, Absolute 0.30 0.00 - 1.00 10*3/mm3    Eosinophils,  Absolute 0.00 (L) 0.10 - 0.30 10*3/mm3    Basophils, Absolute 0.01 0.00 - 0.20 10*3/mm3    Immature Grans, Absolute 0.04 (H) 0.00 - 0.03 10*3/mm3    nRBC 0.0 0.0 - 0.0 /100 WBC   Urinalysis, Microscopic Only - Urine, Clean Catch   Result Value Ref Range    RBC, UA 6-12 (A) None Seen /HPF    WBC, UA 6-12 (A) None Seen /HPF    Bacteria, UA 3+ (A) None Seen /HPF    Squamous Epithelial Cells, UA 0-2 None Seen, 0-2 /HPF    Hyaline Casts, UA None Seen None Seen /LPF    Methodology Manual Light Microscopy    Lactic Acid, Plasma   Result Value Ref Range    Lactate 1.7 0.5 - 2.0 mmol/L           Anitra was seen today for leg pain.    Diagnoses and all orders for this visit:    Insulin resistance  -     Comprehensive Metabolic Panel; Future  -     CBC & Differential; Future  -     Hemoglobin A1c; Future    Chronic bilateral low back pain without sciatica    Recurrent UTI  -     CBC & Differential; Future    Kidney stone    Essential hypertension  -     Comprehensive Metabolic Panel; Future  -     CBC & Differential; Future  -     Hemoglobin A1c; Future    Burning sensation of foot  -     gabapentin (NEURONTIN) 300 MG capsule; Take 1 capsule by mouth Every Night.    Hyperglycemia  -     Hemoglobin A1c; Future      Trial of gabapentin to help with foot burning.  Check labs to make sure hyperglycemia not contributing to neuropathy and recurrent UTI.     Outpatient Medications Prior to Visit   Medication Sig Dispense Refill   • aspirin 81 MG EC tablet Take 81 mg by mouth Daily. PT TO STOP PER MD INSTRUCTION     • Biotin 10 MG capsule Take 10 mg by mouth Daily.     • calcium citrate-vitamin d (CITRACAL) 200-250 MG-UNIT tablet tablet Take 1 tablet by mouth Daily.     • estradiol (ESTRACE) 0.5 MG tablet Take 0.5 mg by mouth Every Other Day.     • folic acid (FOLVITE) 1 MG tablet Take 1 mg by mouth daily.     • ibuprofen (ADVIL,MOTRIN) 200 MG tablet Take 800 mg by mouth 2 (Two) Times a Day.     • imipramine (TOFRANIL) 50 MG tablet  "TAKE 2 TO 3 TABLETS BY MOUTH EVERY NIGHT AT BEDTIME 270 tablet 1   • lisinopril (PRINIVIL,ZESTRIL) 20 MG tablet Take 1 tablet by mouth Daily With Breakfast. 90 tablet 3   • Mirabegron ER (MYRBETRIQ) 50 MG tablet sustained-release 24 hour 24 hr tablet Take 50 mg by mouth Daily.     • NON FORMULARY Take 1 tablet by mouth 2 (Two) Times a Day. \"pressor vision\" for macular degeneration. OTC     • omeprazole (priLOSEC) 20 MG capsule Take 1 capsule by mouth Daily.     • simvastatin (ZOCOR) 20 MG tablet Take 1 tablet by mouth Every Night. 90 tablet 3   • vitamin C (ASCORBIC ACID) 500 MG tablet Take 500 mg by mouth Daily.     • HYDROcodone-acetaminophen (NORCO) 5-325 MG per tablet Take 1 tablet by mouth Every 6 (Six) Hours As Needed for Severe Pain . 15 tablet 0   • ondansetron ODT (ZOFRAN-ODT) 4 MG disintegrating tablet Take 1 tablet by mouth 4 (Four) Times a Day As Needed for Nausea or Vomiting. 12 tablet 0     No facility-administered medications prior to visit.      New Medications Ordered This Visit   Medications   • gabapentin (NEURONTIN) 300 MG capsule     Sig: Take 1 capsule by mouth Every Night.     Dispense:  30 capsule     Refill:  0     [unfilled]  Medications Discontinued During This Encounter   Medication Reason   • ondansetron ODT (ZOFRAN-ODT) 4 MG disintegrating tablet *Error   • HYDROcodone-acetaminophen (NORCO) 5-325 MG per tablet *Error         Return in about 4 weeks (around 6/19/2018) for Recheck.  "

## 2018-05-24 ENCOUNTER — TELEPHONE (OUTPATIENT)
Dept: INTERNAL MEDICINE | Facility: CLINIC | Age: 78
End: 2018-05-24

## 2018-05-24 ENCOUNTER — TELEPHONE (OUTPATIENT)
Dept: PAIN MEDICINE | Facility: CLINIC | Age: 78
End: 2018-05-24

## 2018-05-24 NOTE — TELEPHONE ENCOUNTER
"Left message on patients voicemail for her to call back.   ----- Message from Neda Clark MA sent at 5/23/2018  4:17 PM EDT -----  Regarding: FW: LABS  Contact: [phone]      ----- Message -----  From: Neda Clark MA  Sent: 5/23/2018  11:58 AM  To: Neda Clark MA  Subject: FW: LABS                                             ----- Message -----  From: Ashlie Moore  Sent: 5/23/2018  11:42 AM  To: [facility]  Subject: LABS                                             FRANCISCO PT    Patient called to say that she saw on \"mychart\", that she needs to have more labs and she would like for someone to call her to explain why she will need to have labs again. She said that her A1C was 134 , and she wanted dr diamond to know that she was not fasting when she had those labs. She said that she had went to [facility] that day.    Thanks!  ashlie"

## 2018-05-24 NOTE — TELEPHONE ENCOUNTER
"Patient returned phone call, she already had spoke to someone about this and got the issue resolved.  ----- Message from Neda Clark MA sent at 5/23/2018  4:17 PM EDT -----  Regarding: FW: LABS  Contact: [phone]      ----- Message -----  From: Neda Clark MA  Sent: 5/23/2018  11:58 AM  To: Neda Clark MA  Subject: FW: LABS                                             ----- Message -----  From: Ashlie Moore  Sent: 5/23/2018  11:42 AM  To: [facility]  Subject: LABS                                             FRANCISCO PT    Patient called to say that she saw on \"mychart\", that she needs to have more labs and she would like for someone to call her to explain why she will need to have labs again. She said that her A1C was 134 , and she wanted dr diamond to know that she was not fasting when she had those labs. She said that she had went to [facility] that day.    Thanks!  ashlie"

## 2018-06-11 ENCOUNTER — TELEPHONE (OUTPATIENT)
Dept: PAIN MEDICINE | Facility: CLINIC | Age: 78
End: 2018-06-11

## 2018-06-11 NOTE — TELEPHONE ENCOUNTER
Patient called and states she still wants her SCS trial but she has had UTI and she is on an antibiotic which she will finish on Thursday, she states she has to wait for 10 days and then have her urine checked at her urology.

## 2018-07-05 ENCOUNTER — OUTSIDE FACILITY SERVICE (OUTPATIENT)
Dept: PAIN MEDICINE | Facility: CLINIC | Age: 78
End: 2018-07-05

## 2018-07-05 ENCOUNTER — DOCUMENTATION (OUTPATIENT)
Dept: PAIN MEDICINE | Facility: CLINIC | Age: 78
End: 2018-07-05

## 2018-07-05 PROCEDURE — 63650 IMPLANT NEUROELECTRODES: CPT | Performed by: PAIN MEDICINE

## 2018-07-05 PROCEDURE — 95971 ALYS SMPL SP/PN NPGT W/PRGRM: CPT | Performed by: PAIN MEDICINE

## 2018-07-05 RX ORDER — TRAMADOL HYDROCHLORIDE 50 MG/1
50 TABLET ORAL EVERY 6 HOURS PRN
Qty: 20 TABLET | Refills: 0 | Status: SHIPPED | OUTPATIENT
Start: 2018-07-05 | End: 2018-08-21

## 2018-07-05 NOTE — PROGRESS NOTES
Spinal Cord Stimulation - Phase 1 (SCS Trialing)  Two-Lead Technique    [facility]    SURGEON: Eli Ortega MD    Preop Dx: Chronic low back pain  Postop Dx: Same as preop dx    SCS System: [employer]    Lead one...   16 contact  lead entering into the T11/T12 interspace, advanced to a point with the distal tip at the top of T7 vertebral level, lying just left of midline      Lead two...   16 contact lead entering into the T11/T12 interspace, advanced to a point with the distal tip at the top of T7 vertebral level, lying just right of midline      Preprocedure Discussion with Patient:  Risks and complications were discussed with the patient prior to starting the procedure and informed consent was obtained.  We discussed various topics including but not limited to bleeding, infection, injury, allergic reactions, spinal cord and/or neural injury, implant site pain, post procedural site soreness, equipment malfunction including but not limited to lead fracture or lead migration or risk of electrical shock or risk of loss stimulation, risk of the lack of pain relief, and risk of worsening clinical picture.      Reason for procedure:  Failed conservative therapy.  This patient had a favorable psychological profile noted prior to trialing.         Sedation:  Local Anesthetics only  Antibiotics:   Cefazolin 2g IV immediately prior to incision    Description of Procedure:    After obtaining informed consent, IV access had been obtained by nursing staff in the preoperative area.  The patient was transported to the operative suite and was placed in a prone position.  Appropriate padding was placed under the patient's abdomen.  Anesthesia care and cardiopulmonary monitoring maintained by member of the anesthesiology team throughout the procedure.  Perioperative antibodies were given prior to incision per routine as indicated in the anesthesia record and indicated above.  The patient's spine was  cleansed appropriately with routine cleansing, and then prepped in a sterile routine fashion.  The area was then draped in sterile routine fashion.     The midline of the patient's spine was identified and marked.  The skin and subcutaneous tissue at the needle entry sites were anesthetized with appropriate amounts of local anesthetic solution.     Needle entry sites were about 1 1/2-2 vertebral levels below the intended interlaminar space for epidural access.  The entry points were medial to the pedicles, and inserted at acute angles of less than 45° and in to the interlaminar space noted above.  Loss-of-resistance technique was utilized to identify entry into the epidural space.  Aspiration was negative.  The leads were inserted in the epidural needles as noted above to the aforementioned target.  Lateral fluoroscopic view was utilized to confirm posterior placement in the epidural space of each lead.    Temporary wire extensions were attached to the leads and the connectors were passed out the sterile field to the spinal cord stimulation device representative.  Stimulator testing was performed with representative's assistance.  Impedances were acceptable.  The patient reported good quality of capture of stimulation covering the painful areas.    Epidural needle was removed slowly from the skin at each insertion with care not to advance to retract needle tip position.  AP fluoroscopic imaging was checked sequentially to confirm no gross changes in lead position had occurred.      Each lead was secured to the skin using a StayFix dressing.  Once more in AP fluoroscopic view was checked to confirm final lead placement after the bandage placement was completed.  The sites were dressed with a Tegaderm dressing in my routine fashion.        Estimated Blood Loss: 5 mL  Specimens:   None  Complications:  No complications were noted. There was difficulty in placing the leads due to scoliosis and natural curvature of her  spine. Multiple passes were made trying to get optimal position of splitting midline.  Ended up with the leads and starting just right of midline at bottom of lead, and ending up just left of midline at top of lead.  This was the best placement that was able to be obtained. Appropriate intra op testing.     Tolerance and discharge condition:    The patient tolerated the procedure well and was awakened from any sedation without incident.  The patient was transported to the recovery area without difficulties.  Further device programming was performed by the spinal cord stimulation device representative in the recovery area.  The patient was again cautioned not to drive at this time and avoid strenuous activities and to avoid reaching overhead and to avoid bending and avoid lifting objects over 5 pounds.  The patient was discharged home under the care of family members in stable and satisfactory condition.      Plan of care:    - The patient was given our standard instruction sheet and will resume all medications as per the medication reconciliation sheet.    - The patient will return to my office at the appropriate time scheduled in about one business day with the stimulation device representative for further device programming and education about device function if necessary.   - The patient will again return to my office in about a week for the final visit of the trial in order to end the trial and decide upon whether or not to proceed to Phase 2.   The patient understands that there is any signs of complication, bleeding, infection, fever, chills, increasing back pain or spine pain, any neurologic deficit, or any other concerning finding, that the patient of a family member should call my office at [phone] at any time to access the answering service.    INITIAL STIMULATION SETTINGS:  60/40 split  PW: 350  Rate: 40  Amp: 7.6

## 2018-07-11 ENCOUNTER — OFFICE VISIT (OUTPATIENT)
Dept: PAIN MEDICINE | Facility: CLINIC | Age: 78
End: 2018-07-11

## 2018-07-11 VITALS
RESPIRATION RATE: 16 BRPM | OXYGEN SATURATION: 97 % | HEART RATE: 103 BPM | DIASTOLIC BLOOD PRESSURE: 94 MMHG | TEMPERATURE: 97.9 F | HEIGHT: 66 IN | WEIGHT: 240 LBS | BODY MASS INDEX: 38.57 KG/M2 | SYSTOLIC BLOOD PRESSURE: 155 MMHG

## 2018-07-11 DIAGNOSIS — M48.062 SPINAL STENOSIS OF LUMBAR REGION WITH NEUROGENIC CLAUDICATION: ICD-10-CM

## 2018-07-11 DIAGNOSIS — G89.29 OTHER CHRONIC PAIN: Primary | ICD-10-CM

## 2018-07-11 PROCEDURE — 99024 POSTOP FOLLOW-UP VISIT: CPT | Performed by: NURSE PRACTITIONER

## 2018-07-11 NOTE — PROGRESS NOTES
CHIEF COMPLAINT  Pt is here for [place] SCS stage 1 lead pulls  Pt reports no back pain relief.  Initial Evaluation by Aylin Dutton   Anitra Orta is a 77 y.o. female  who presents to the office for follow-up of procedure.  She completed a BS SCS   on  7-5-18 performed by Dr. ORTEGA for management of low back pain. Patient reports minimal relief from the procedure.  Was given temporary prescription for Tramadol 50 mg q6hrs PRN.  She is not wanting to proceed with an implant at this time. She reports that she read a lot of stuff and is now worried about the potential complications with SCS.     Patient was initially evaluated as new patient the office on 3/12/18 by Dr. Ortega.  She was referred here by Dr. Valdivia for consideration of spinal cord simulator.  Has had previous pain management including LESI's.  Neurosurgery does not recommend surgery at this time.  Has already seen psych for SCS evaluation [facility] with a positive result.  Patient was given information regarding SCS. Had education session with SCS and positive psychological evaluation. Plan will be to proceed with  SCS trial.   She presents with her  who also reports she had almost no pain relief.     Back Pain   This is a chronic problem. The current episode started more than 1 year ago. The problem occurs constantly. The problem has been gradually worsening since onset. The pain is at a severity of 8/10 (pain ranges from 7-9/10VAS). Pertinent negatives include no abdominal pain, chest pain, dysuria, fever, headaches, numbness, pelvic pain or weakness.      Current pain medications: Ibuprofen when necessary  Gabapentin    Past therapies:  Physical Therapy: yes (didn't help)  Chiropractor: yes (didn't help)  Massage Therapy: yes (didn't help)  TENS: yes (didn't help)  Neck or back surgery: no  Past pain management: yes- at [facility] in 2009 with Dr. Juan Carlos Olivera and Dr. Issac Dozier 2015 and  "[facility] 1 visit Nov 2017.  Acupucnture- didn't help     Previous Injections: several lumbar epidurals over the last 9 years  Effect of Injection (%): some relief     PEG Assessment   What number best describes your pain on average in the past week?7  What number best describes how, during the past week, pain has interfered with your enjoyment of life?8  What number best describes how, during the past week, pain has interfered with your general activity?  8    The following portions of the patient's history were reviewed and updated as appropriate: allergies, current medications, past family history, past medical history, past social history, past surgical history and problem list.    Review of Systems   Constitutional: Positive for activity change (decreased). Negative for fever.   HENT: Negative for congestion.    Eyes: Negative for visual disturbance.   Respiratory: Negative for cough, shortness of breath and wheezing.    Cardiovascular: Negative for chest pain, palpitations and leg swelling.   Gastrointestinal: Negative for abdominal pain.   Genitourinary: Positive for frequency. Negative for dysuria and pelvic pain.   Musculoskeletal: Positive for back pain.   Skin: Negative for rash and wound.   Allergic/Immunologic: Negative for immunocompromised state.   Neurological: Negative for weakness, numbness and headaches.   Hematological: Does not bruise/bleed easily.   Psychiatric/Behavioral: Positive for sleep disturbance. Negative for suicidal ideas. The patient is nervous/anxious.    All other systems reviewed and are negative.      Vitals:    07/11/18 1324   BP: 155/94   Pulse: 103   Resp: 16   Temp: 97.9 °F (36.6 °C)   SpO2: 97%   Weight: 109 kg (240 lb)   Height: 167.6 cm (65.98\")   PainSc: 8  Comment: bilateral LBP ranges from 7-9/10   PainLoc: Back     Objective   Physical Exam   Constitutional: She is oriented to person, place, and time. Vital signs are normal. She appears " well-developed and well-nourished. She is cooperative.   HENT:   Head: Normocephalic and atraumatic.   Nose: Nose normal.   Eyes: Conjunctivae and lids are normal.   Cardiovascular: Normal rate.    Pulmonary/Chest: Effort normal.   Abdominal: Normal appearance.   Neurological: She is alert and oriented to person, place, and time. Gait normal.   Skin: Skin is warm and dry.        Psychiatric: She has a normal mood and affect. Her speech is normal and behavior is normal. Judgment and thought content normal. Cognition and memory are normal.   Nursing note and vitals reviewed.          Assessment/Plan   Anitra was seen today for back pain.    Diagnoses and all orders for this visit:    Other chronic pain    Spinal stenosis of lumbar region with neurogenic claudication      --- .removed SCS leads  --- Follow-up with DR. Ortega for future plan of care       RODRIGO REPORT    RODRIGO report has been reviewed and scanned into the patient's chart.    As the clinician, I personally reviewed the RODRIGO from 7-10-18 while the patient was in the office today.          EMR Dragon/Transcription disclaimer:   Much of this encounter note is an electronic transcription/translation of spoken language to printed text. The electronic translation of spoken language may permit erroneous, or at times, nonsensical words or phrases to be inadvertently transcribed; Although I have reviewed the note for such errors, some may still exist.

## 2018-08-02 DIAGNOSIS — E78.2 MIXED HYPERLIPIDEMIA: ICD-10-CM

## 2018-08-02 DIAGNOSIS — E78.1 HYPERTRIGLYCERIDEMIA: ICD-10-CM

## 2018-08-02 DIAGNOSIS — R73.01 IMPAIRED FASTING GLUCOSE: ICD-10-CM

## 2018-08-02 DIAGNOSIS — I10 ESSENTIAL HYPERTENSION: Primary | ICD-10-CM

## 2018-08-02 DIAGNOSIS — E88.81 INSULIN RESISTANCE: ICD-10-CM

## 2018-08-02 DIAGNOSIS — E55.9 VITAMIN D DEFICIENCY: ICD-10-CM

## 2018-08-07 ENCOUNTER — RESULTS ENCOUNTER (OUTPATIENT)
Dept: INTERNAL MEDICINE | Facility: CLINIC | Age: 78
End: 2018-08-07

## 2018-08-07 DIAGNOSIS — R73.01 IMPAIRED FASTING GLUCOSE: ICD-10-CM

## 2018-08-07 DIAGNOSIS — E55.9 VITAMIN D DEFICIENCY: ICD-10-CM

## 2018-08-07 DIAGNOSIS — E78.1 HYPERTRIGLYCERIDEMIA: ICD-10-CM

## 2018-08-07 DIAGNOSIS — E88.81 INSULIN RESISTANCE: ICD-10-CM

## 2018-08-07 DIAGNOSIS — E78.2 MIXED HYPERLIPIDEMIA: ICD-10-CM

## 2018-08-07 DIAGNOSIS — I10 ESSENTIAL HYPERTENSION: ICD-10-CM

## 2018-08-09 LAB
25(OH)D3+25(OH)D2 SERPL-MCNC: 29.4 NG/ML
ALBUMIN SERPL-MCNC: 4.3 G/DL (ref 3.5–5.2)
ALBUMIN/GLOB SERPL: 1.8 G/DL
ALP SERPL-CCNC: 110 U/L (ref 40–129)
ALT SERPL-CCNC: 20 U/L (ref 5–33)
AST SERPL-CCNC: 21 U/L (ref 5–32)
BASOPHILS # BLD AUTO: 0.02 10*3/MM3 (ref 0–0.2)
BASOPHILS NFR BLD AUTO: 0.4 % (ref 0–2)
BILIRUB SERPL-MCNC: 0.4 MG/DL (ref 0.2–1.2)
BUN SERPL-MCNC: 19 MG/DL (ref 8–23)
BUN/CREAT SERPL: 22.6 (ref 7–25)
CALCIUM SERPL-MCNC: 9.3 MG/DL (ref 8.8–10.5)
CHLORIDE SERPL-SCNC: 103 MMOL/L (ref 98–107)
CHOLEST SERPL-MCNC: 191 MG/DL (ref 0–200)
CO2 SERPL-SCNC: 29.2 MMOL/L (ref 22–29)
CREAT SERPL-MCNC: 0.84 MG/DL (ref 0.57–1)
EOSINOPHIL # BLD AUTO: 0.15 10*3/MM3 (ref 0.1–0.3)
EOSINOPHIL NFR BLD AUTO: 2.8 % (ref 0–4)
ERYTHROCYTE [DISTWIDTH] IN BLOOD BY AUTOMATED COUNT: 13.4 % (ref 11.5–14.5)
GLOBULIN SER CALC-MCNC: 2.4 GM/DL
GLUCOSE SERPL-MCNC: 100 MG/DL (ref 65–99)
HBA1C MFR BLD: 5.7 % (ref 4.8–5.6)
HCT VFR BLD AUTO: 41.6 % (ref 37–47)
HDLC SERPL-MCNC: 52 MG/DL (ref 40–60)
HGB BLD-MCNC: 13.7 G/DL (ref 12–16)
IMM GRANULOCYTES # BLD: 0.02 10*3/MM3 (ref 0–0.03)
IMM GRANULOCYTES NFR BLD: 0.4 % (ref 0–0.5)
LDLC SERPL CALC-MCNC: 107 MG/DL (ref 0–100)
LDLC/HDLC SERPL: 2.06 {RATIO}
LYMPHOCYTES # BLD AUTO: 1.05 10*3/MM3 (ref 0.6–4.8)
LYMPHOCYTES NFR BLD AUTO: 19.8 % (ref 20–45)
MCH RBC QN AUTO: 29.8 PG (ref 27–31)
MCHC RBC AUTO-ENTMCNC: 32.9 G/DL (ref 31–37)
MCV RBC AUTO: 90.4 FL (ref 81–99)
MONOCYTES # BLD AUTO: 0.3 10*3/MM3 (ref 0–1)
MONOCYTES NFR BLD AUTO: 5.6 % (ref 3–8)
NEUTROPHILS # BLD AUTO: 3.77 10*3/MM3 (ref 1.5–8.3)
NEUTROPHILS NFR BLD AUTO: 71 % (ref 45–70)
NRBC BLD AUTO-RTO: 0 /100 WBC (ref 0–0)
PLATELET # BLD AUTO: 196 10*3/MM3 (ref 140–500)
POTASSIUM SERPL-SCNC: 3.9 MMOL/L (ref 3.5–5.2)
PROT SERPL-MCNC: 6.7 G/DL (ref 6–8.5)
RBC # BLD AUTO: 4.6 10*6/MM3 (ref 4.2–5.4)
SODIUM SERPL-SCNC: 144 MMOL/L (ref 136–145)
TRIGL SERPL-MCNC: 159 MG/DL (ref 0–150)
VLDLC SERPL CALC-MCNC: 31.8 MG/DL (ref 7–27)
WBC # BLD AUTO: 5.31 10*3/MM3 (ref 4.8–10.8)

## 2018-08-10 ENCOUNTER — TELEPHONE (OUTPATIENT)
Dept: INTERNAL MEDICINE | Facility: CLINIC | Age: 78
End: 2018-08-10

## 2018-08-10 NOTE — TELEPHONE ENCOUNTER
----- Message from Rosibel Hamilton MD sent at 8/10/2018  2:38 PM EDT -----  Labs ok. Will discuss at upcoming appt.

## 2018-08-13 ENCOUNTER — OFFICE VISIT (OUTPATIENT)
Dept: INTERNAL MEDICINE | Facility: CLINIC | Age: 78
End: 2018-08-13

## 2018-08-13 VITALS
SYSTOLIC BLOOD PRESSURE: 120 MMHG | BODY MASS INDEX: 41.32 KG/M2 | OXYGEN SATURATION: 96 % | DIASTOLIC BLOOD PRESSURE: 86 MMHG | HEART RATE: 77 BPM | HEIGHT: 65 IN | WEIGHT: 248 LBS

## 2018-08-13 DIAGNOSIS — K21.9 GASTROESOPHAGEAL REFLUX DISEASE, ESOPHAGITIS PRESENCE NOT SPECIFIED: ICD-10-CM

## 2018-08-13 DIAGNOSIS — I10 ESSENTIAL HYPERTENSION: Primary | ICD-10-CM

## 2018-08-13 DIAGNOSIS — E78.2 MIXED HYPERLIPIDEMIA: ICD-10-CM

## 2018-08-13 DIAGNOSIS — G89.29 CHRONIC BILATERAL LOW BACK PAIN WITHOUT SCIATICA: ICD-10-CM

## 2018-08-13 DIAGNOSIS — N39.41 URGE INCONTINENCE OF URINE: ICD-10-CM

## 2018-08-13 DIAGNOSIS — R20.8 BURNING SENSATION OF FOOT: ICD-10-CM

## 2018-08-13 DIAGNOSIS — E88.81 INSULIN RESISTANCE: ICD-10-CM

## 2018-08-13 DIAGNOSIS — E78.1 HYPERTRIGLYCERIDEMIA: ICD-10-CM

## 2018-08-13 DIAGNOSIS — M54.50 CHRONIC BILATERAL LOW BACK PAIN WITHOUT SCIATICA: ICD-10-CM

## 2018-08-13 PROCEDURE — 99214 OFFICE O/P EST MOD 30 MIN: CPT | Performed by: INTERNAL MEDICINE

## 2018-08-13 NOTE — PROGRESS NOTES
"      Anitra Orta is a 78 y.o. female, who presents with a chief complaint of   Chief Complaint   Patient presents with   • Follow-up     Had labs done, she has seen pain management, she did not have the stimulator placed.    • Hypertension       HPI   The pt is here for follow up.      She cont to have lots of back pain issues.  He had a trial with a stimulator for back pain.  The stimulator helped some on the right but no help on the left side. She goes back to see Dr. Ortega on 8/24.  She stopped taking the gabapentin bc it made her dizzy and feel like \"a drunken .\"    HTN - well controlled.  No ha/dizziness.     Gerd - on PPI.  Sx fairly well controlled.      HLD - on statin.  No cramps or myagia    OAB - was on myrbetiq but stopped the med bc it was too expensive.  She is currently infection free.  She is going to try an azo product for oab she got on [employer].  She follows with dr. Clark.     She is going to [place] soon to visit family.      Pre-diabetes - A1c 6.1 -> 5.7.  Pt trying to eat a fairly healthy diet. Her exercise is very limited bc of her chronic back pain.     The following portions of the patient's history were reviewed and updated as appropriate: allergies, current medications, past family history, past medical history, past social history, past surgical history and problem list.    Allergies: Bactrim [sulfamethoxazole-trimethoprim]; Ciprofloxacin; Levaquin [levofloxacin]; Macrobid [nitrofurantoin]; Nitrofurantoin macrocrystal; and Cortisone    Review of Systems   Constitutional: Negative.    HENT: Negative.    Eyes: Negative.    Respiratory: Negative.    Cardiovascular: Negative.    Gastrointestinal: Negative.    Endocrine: Negative.    Genitourinary: Negative.    Musculoskeletal: Positive for back pain.   Skin: Negative.    Allergic/Immunologic: Negative.    Neurological: Negative.    Hematological: Negative.    Psychiatric/Behavioral: Negative.    All other systems reviewed " and are negative.            Wt Readings from Last 3 Encounters:   08/13/18 112 kg (248 lb)   07/11/18 109 kg (240 lb)   05/22/18 110 kg (242 lb 6.4 oz)     Temp Readings from Last 3 Encounters:   07/11/18 97.9 °F (36.6 °C)   04/26/18 98.6 °F (37 °C) (Oral)   04/25/18 98.3 °F (36.8 °C) (Oral)     BP Readings from Last 3 Encounters:   08/13/18 120/86   07/11/18 155/94   05/22/18 118/74     Pulse Readings from Last 3 Encounters:   08/13/18 77   07/11/18 103   05/22/18 85     Body mass index is 41.27 kg/m².  @LASTSAO2(3)@    Physical Exam   Constitutional: She is oriented to person, place, and time. She appears well-developed and well-nourished. No distress.   HENT:   Head: Normocephalic and atraumatic.   Right Ear: External ear normal.   Left Ear: External ear normal.   Nose: Nose normal.   Mouth/Throat: Oropharynx is clear and moist.   Eyes: Pupils are equal, round, and reactive to light. Conjunctivae and EOM are normal.   Neck: Normal range of motion. Neck supple.   Cardiovascular: Normal rate, regular rhythm, normal heart sounds and intact distal pulses.    Pulmonary/Chest: Effort normal and breath sounds normal. No respiratory distress. She has no wheezes.   Musculoskeletal: She exhibits edema.   Walks with cane   Neurological: She is alert and oriented to person, place, and time.   Skin: Skin is warm and dry.   Psychiatric: She has a normal mood and affect. Her behavior is normal. Judgment and thought content normal.   Nursing note and vitals reviewed.      Results for orders placed or performed in visit on 08/07/18   Comprehensive metabolic panel   Result Value Ref Range    Glucose 100 (H) 65 - 99 mg/dL    BUN 19 8 - 23 mg/dL    Creatinine 0.84 0.57 - 1.00 mg/dL    eGFR Non African Am 66 >60 mL/min/1.73    eGFR African Am 80 >60 mL/min/1.73    BUN/Creatinine Ratio 22.6 7.0 - 25.0    Sodium 144 136 - 145 mmol/L    Potassium 3.9 3.5 - 5.2 mmol/L    Chloride 103 98 - 107 mmol/L    Total CO2 29.2 (H) 22.0 - 29.0  mmol/L    Calcium 9.3 8.8 - 10.5 mg/dL    Total Protein 6.7 6.0 - 8.5 g/dL    Albumin 4.30 3.50 - 5.20 g/dL    Globulin 2.4 gm/dL    A/G Ratio 1.8 g/dL    Total Bilirubin 0.4 0.2 - 1.2 mg/dL    Alkaline Phosphatase 110 40 - 129 U/L    AST (SGOT) 21 5 - 32 U/L    ALT (SGPT) 20 5 - 33 U/L   Lipid Panel With LDL/HDL Ratio   Result Value Ref Range    Total Cholesterol 191 0 - 200 mg/dL    Triglycerides 159 (H) 0 - 150 mg/dL    HDL Cholesterol 52 40 - 60 mg/dL    VLDL Cholesterol 31.8 (H) 7 - 27 mg/dL    LDL Cholesterol  107 (H) 0 - 100 mg/dL    LDL/HDL Ratio 2.06    Vitamin D 25 hydroxy   Result Value Ref Range    25 Hydroxy, Vitamin D 29.4 ng/ml   Hemoglobin A1c   Result Value Ref Range    Hemoglobin A1C 5.70 (H) 4.80 - 5.60 %   CBC w AUTO Differential   Result Value Ref Range    WBC 5.31 4.80 - 10.80 10*3/mm3    RBC 4.60 4.20 - 5.40 10*6/mm3    Hemoglobin 13.7 12.0 - 16.0 g/dL    Hematocrit 41.6 37.0 - 47.0 %    MCV 90.4 81.0 - 99.0 fL    MCH 29.8 27.0 - 31.0 pg    MCHC 32.9 31.0 - 37.0 g/dL    RDW 13.4 11.5 - 14.5 %    Platelets 196 140 - 500 10*3/mm3    Neutrophil Rel % 71.0 (H) 45.0 - 70.0 %    Lymphocyte Rel % 19.8 (L) 20.0 - 45.0 %    Monocyte Rel % 5.6 3.0 - 8.0 %    Eosinophil Rel % 2.8 0.0 - 4.0 %    Basophil Rel % 0.4 0.0 - 2.0 %    Neutrophils Absolute 3.77 1.50 - 8.30 10*3/mm3    Lymphocytes Absolute 1.05 0.60 - 4.80 10*3/mm3    Monocytes Absolute 0.30 0.00 - 1.00 10*3/mm3    Eosinophils Absolute 0.15 0.10 - 0.30 10*3/mm3    Basophils Absolute 0.02 0.00 - 0.20 10*3/mm3    Immature Granulocyte Rel % 0.4 0.0 - 0.5 %    Immature Grans Absolute 0.02 0.00 - 0.03 10*3/mm3    nRBC 0.0 0.0 - 0.0 /100 WBC           Anitra was seen today for follow-up and hypertension.    Diagnoses and all orders for this visit:    Essential hypertension    Mixed hyperlipidemia    Hypertriglyceridemia    Gastroesophageal reflux disease, esophagitis presence not specified    Chronic bilateral low back pain without  "sciatica    Burning sensation of foot    Urge incontinence of urine    Insulin resistance      Encouraged healthy diet and exercise.  cont current meds.     Outpatient Medications Prior to Visit   Medication Sig Dispense Refill   • aspirin 81 MG EC tablet Take 81 mg by mouth Daily. PT TO STOP PER MD INSTRUCTION     • Biotin 10 MG capsule Take 10 mg by mouth Daily.     • calcium citrate-vitamin d (CITRACAL) 200-250 MG-UNIT tablet tablet Take 1 tablet by mouth Daily.     • estradiol (ESTRACE) 0.5 MG tablet Take 0.5 mg by mouth Every Other Day.     • ibuprofen (ADVIL,MOTRIN) 200 MG tablet Take 800 mg by mouth 2 (Two) Times a Day.     • imipramine (TOFRANIL) 50 MG tablet TAKE 2 TO 3 TABLETS BY MOUTH EVERY NIGHT AT BEDTIME 270 tablet 1   • lisinopril (PRINIVIL,ZESTRIL) 20 MG tablet Take 1 tablet by mouth Daily With Breakfast. 90 tablet 3   • omeprazole (priLOSEC) 20 MG capsule Take 1 capsule by mouth Daily.     • simvastatin (ZOCOR) 20 MG tablet Take 1 tablet by mouth Every Night. 90 tablet 3   • traMADol (ULTRAM) 50 MG tablet Take 1 tablet by mouth Every 6 (Six) Hours As Needed for Moderate Pain . 20 tablet 0   • vitamin C (ASCORBIC ACID) 500 MG tablet Take 500 mg by mouth Daily.     • folic acid (FOLVITE) 1 MG tablet Take 1 mg by mouth daily.     • cefuroxime (CEFTIN) 250 MG tablet TK 1 T PO  BID  0   • Mirabegron ER (MYRBETRIQ) 50 MG tablet sustained-release 24 hour 24 hr tablet Take 50 mg by mouth Daily.     • NON FORMULARY Take 1 tablet by mouth 2 (Two) Times a Day. \"pressor vision\" for macular degeneration. OTC       No facility-administered medications prior to visit.      No orders of the defined types were placed in this encounter.    [unfilled]  Medications Discontinued During This Encounter   Medication Reason   • Mirabegron ER (MYRBETRIQ) 50 MG tablet sustained-release 24 hour 24 hr tablet *Therapy completed   • NON FORMULARY *Therapy completed   • cefuroxime (CEFTIN) 250 MG tablet *Therapy completed   • " folic acid (FOLVITE) 1 MG tablet *Therapy completed         Return in about 6 months (around 2/13/2019) for Recheck, labs.

## 2018-08-21 ENCOUNTER — OFFICE VISIT (OUTPATIENT)
Dept: PAIN MEDICINE | Facility: CLINIC | Age: 78
End: 2018-08-21

## 2018-08-21 VITALS
DIASTOLIC BLOOD PRESSURE: 93 MMHG | HEART RATE: 78 BPM | SYSTOLIC BLOOD PRESSURE: 168 MMHG | OXYGEN SATURATION: 95 % | RESPIRATION RATE: 18 BRPM | BODY MASS INDEX: 41.32 KG/M2 | HEIGHT: 65 IN | TEMPERATURE: 98.4 F | WEIGHT: 248 LBS

## 2018-08-21 DIAGNOSIS — M48.062 SPINAL STENOSIS OF LUMBAR REGION WITH NEUROGENIC CLAUDICATION: ICD-10-CM

## 2018-08-21 DIAGNOSIS — M54.50 CHRONIC BILATERAL LOW BACK PAIN WITHOUT SCIATICA: Primary | ICD-10-CM

## 2018-08-21 DIAGNOSIS — G89.29 CHRONIC BILATERAL LOW BACK PAIN WITHOUT SCIATICA: Primary | ICD-10-CM

## 2018-08-21 DIAGNOSIS — G89.29 OTHER CHRONIC PAIN: ICD-10-CM

## 2018-08-21 PROCEDURE — 99213 OFFICE O/P EST LOW 20 MIN: CPT | Performed by: PAIN MEDICINE

## 2018-08-21 NOTE — PROGRESS NOTES
CHIEF COMPLAINT: Back Pain    HPI  Anitra Orta is a 78 y.o. female.  She is here to follow up for Back Pain    Anitra Orta is a 78 y.o. female  who presents to the office for follow-up. Since last visit their pain has worsened . Patient states she is not sure why she is here today and what other options there are for her since she has tried and failed every option and that the SCS did not help her at all.  As for surgeons: - Dr. Gandhi last year- stated possible surgery but she didn't like him and did not return.  - Dr. Valdivia - for 2nd opinion- possible surgery but high complication rate so suggested SCS system.   - underwent trial without success. Has tried several LESI but years ago and not sure how much they helped.   The patient states their pain is a 9 on a scale of 1-10.  The patient describes this pain as constant dull and ache.  The pain is located in bilateral low back and does radiate posterior bilateral buttock. This painful problem is aggravated by physical activity, standing and walking and is alleviated by pain medication and relaxation. Has burning sensation in BLE from bilateral knees down to ankles. Intermittent burning pain, worse at night.     Pain pain medications: gabapentin - side effects.     Current pain medications: Ibuprofen when necessary     Past therapies:  Physical Therapy: yes (didn't help)  Chiropractor: yes (didn't help)  Massage Therapy: yes (didn't help)  TENS: yes (didn't help)  Neck or back surgery: no  Past pain management: yes- at [facility] in 2009 with Dr. Juan Carlos Olivera and Dr. Issac Dozeir 2015 and [facility] 1 visit Nov 2017.  Acupucnture- didn't help     Previous Injections: several lumbar epidurals over the last 9 years  Effect of Injection (%): some relief     PEG Assessment   What number best describes your pain on average in the past week? 9  What number best describes how, during the past week, pain has interfered with your  enjoyment of life? 9  What number best describes how, during the past week, pain has interfered with your general activity? 9      Current Outpatient Prescriptions:   •  aspirin 81 MG EC tablet, Take 81 mg by mouth Daily. PT TO STOP PER MD INSTRUCTION, Disp: , Rfl:   •  Biotin 10 MG capsule, Take 10 mg by mouth Daily., Disp: , Rfl:   •  calcium citrate-vitamin d (CITRACAL) 200-250 MG-UNIT tablet tablet, Take 1 tablet by mouth Daily., Disp: , Rfl:   •  estradiol (ESTRACE) 0.5 MG tablet, Take 0.5 mg by mouth Every Other Day., Disp: , Rfl:   •  ibuprofen (ADVIL,MOTRIN) 200 MG tablet, Take 800 mg by mouth 2 (Two) Times a Day., Disp: , Rfl:   •  imipramine (TOFRANIL) 50 MG tablet, TAKE 2 TO 3 TABLETS BY MOUTH EVERY NIGHT AT BEDTIME, Disp: 270 tablet, Rfl: 1  •  lisinopril (PRINIVIL,ZESTRIL) 20 MG tablet, Take 1 tablet by mouth Daily With Breakfast., Disp: 90 tablet, Rfl: 3  •  omeprazole (priLOSEC) 20 MG capsule, Take 1 capsule by mouth Daily., Disp: , Rfl:   •  simvastatin (ZOCOR) 20 MG tablet, Take 1 tablet by mouth Every Night., Disp: 90 tablet, Rfl: 3  •  vitamin C (ASCORBIC ACID) 500 MG tablet, Take 500 mg by mouth Daily., Disp: , Rfl:     IMAGING  Lumbar MRI - 2/13/2018       Thoracic MRI - 3/2018      Imaging last reviewed: 08/23/18     PFSH:  The following portions of the patient's history were reviewed and updated as appropriate: problem list, past medical history, past surgery history, social history, family history, medications, and allergies    Review of Systems   Constitutional: Negative for chills and fever.   Respiratory: Negative for shortness of breath.    Cardiovascular: Negative for chest pain.   Gastrointestinal: Negative for constipation, diarrhea, nausea and vomiting.   Genitourinary: Negative for difficulty urinating, dyspareunia and dysuria.   Musculoskeletal: Positive for back pain.   Neurological: Negative for dizziness, weakness, light-headedness, numbness and headaches.  "  Psychiatric/Behavioral: Negative for confusion, hallucinations, self-injury and sleep disturbance. The patient is not nervous/anxious.        Vitals:    08/21/18 1356   BP: 168/93   Pulse: 78   Resp: 18   Temp: 98.4 °F (36.9 °C)   SpO2: 95%   Weight: 112 kg (248 lb)   Height: 165.1 cm (65\")   PainSc:   9   PainLoc: Back       Physical Exam   Constitutional: She is oriented to person, place, and time. Vital signs are normal. She appears well-developed and well-nourished. She is cooperative.   HENT:   Head: Normocephalic and atraumatic.   Nose: Nose normal.   Eyes: Conjunctivae and lids are normal.   Cardiovascular: Normal rate.    Pulmonary/Chest: Effort normal.   Abdominal: Normal appearance.   Musculoskeletal:        Lumbar back: She exhibits decreased range of motion, tenderness and pain.   Neurological: She is alert and oriented to person, place, and time. Gait normal.   Skin: Skin is warm and dry.   Psychiatric: She has a normal mood and affect. Her speech is normal and behavior is normal. Judgment and thought content normal. Cognition and memory are normal.   Nursing note and vitals reviewed.    Ortho Exam  Neurologic Exam     Mental Status   Oriented to person, place, and time.   Speech: speech is normal       Lab Results   Component Value Date    POCMETH Negative 03/12/2018    POCAMPHET Negative 03/12/2018    POCBARBITUR Negative 03/12/2018    POCBENZO Positive 03/12/2018    POCCOCAINE Negative 03/12/2018    POCMETHADO Negative 03/12/2018    POCOPIATES Negative 03/12/2018    POCOXYCODO Negative 03/12/2018    POCPHENCYC Negative 03/12/2018    POCPROPOXY Negative 03/12/2018    POCTHC Negative 03/12/2018    POCTRICYC Positive 03/12/2018     Last UDS results reviewed: 08/23/18   Last UDS: 3/2018  Comments: Consistent     Date of last RODRIGO reviewed : 08/23/18   Comments: Consistent         Anitra was seen today for back pain.    Diagnoses and all orders for this visit:    Chronic bilateral low back pain " without sciatica    Spinal stenosis of lumbar region with neurogenic claudication    Other chronic pain      Requested Prescriptions      No prescriptions requested or ordered in this encounter     - offered lyrica for bilateral neuropathy pain in BLE- she declined.   - offered small amount of narcotic medication to be taken sparingly and for extreme pain, she declined.   - Not a good surgical candidate.  Has failed all other conservative measures.  - Failed SCS trial. Discussion about surgical placement of leads given my difficulty in placing them percutaneously. She declined.   - She can call back if she wants to discuss or try any of these options. Besides these I do not have much else to offer her.     Wt Readings from Last 3 Encounters:   08/21/18 112 kg (248 lb)   08/13/18 112 kg (248 lb)   07/11/18 109 kg (240 lb)     Body mass index is 41.27 kg/m². Patient counseled on the importance of weight loss to help with overall health and pain control. Patient instructed to attempt weight loss.   Plan: Calorie counting  reduce portion size and cut out extra servings    Follow-up as needed for pain     Eli Ortega MD  Pain Management

## 2018-09-11 DIAGNOSIS — G89.29 CHRONIC BILATERAL LOW BACK PAIN WITHOUT SCIATICA: Primary | ICD-10-CM

## 2018-09-11 DIAGNOSIS — M54.50 CHRONIC BILATERAL LOW BACK PAIN WITHOUT SCIATICA: Primary | ICD-10-CM

## 2018-09-26 ENCOUNTER — DOCUMENTATION (OUTPATIENT)
Dept: PAIN MEDICINE | Facility: CLINIC | Age: 78
End: 2018-09-26

## 2018-09-26 ENCOUNTER — OUTSIDE FACILITY SERVICE (OUTPATIENT)
Dept: PAIN MEDICINE | Facility: CLINIC | Age: 78
End: 2018-09-26

## 2018-09-26 PROCEDURE — 62323 NJX INTERLAMINAR LMBR/SAC: CPT | Performed by: PAIN MEDICINE

## 2018-09-26 NOTE — PROGRESS NOTES
Lumbar Epidural Steroid Injection  [facility]    PREOPERATIVE DIAGNOSIS:   Chronic low back pain  POSTOPERATIVE DIAGNOSIS:  Same as preop diagnosis    PROCEDURE:   Lumbar Epidural Steroid Injection, Therapeutic Translaminar Injection, with epidurogram, at  L5/S1 level    PRE-PROCEDURE DISCUSSION WITH PATIENT:    Risks and complications were discussed with the patient prior to starting the procedure and informed consent was obtained.  We discussed various topics including but not limited to bleeding, infection, injury, paralysis, nerve injury, dural puncture, coma, death, worsening of clinical picture, lack of pain relief, and postprocedural soreness.    SURGEON:  Eli Ortega MD    REASON FOR PROCEDURE:    Diagnostic injection at this level is needed, Degenerative changes are noted in the area. and Stenotic area is noted, and is likely contributing to this chronic &/or recurrent pain.     SEDATION:  Patient declined administration of moderate sedation    ANESTHETIC:  Marcaine 0.25%  STEROID:   Methylprednisolone (DEPO MEDROL) 80mg/ml    DESCRIPTON OF PROCEDURE:    After obtaining informed consent, I.V. was not started in the preop area.   The patient was taken to the operating room and placed in the prone position.  EKG, blood pressure, and pulse oximeter were monitored throughout, and sedation was provided as needed by the RN under my guidance. All pressure points were well padded.  The lumbar spine area was prepped with Chloraprep and draped in a sterile fashion.  Under fluoroscopic guidance, the above mentioned interlaminar space was identified. Skin and subcutaneous tissues were anesthetized with 1% lidocaine in the middle of the space. A Tuohy needle was introduced through the skin and advanced to this interlaminar space and into the epidural space under fluoroscopic guidance and verified with loss-of-resistance technique to air.  After confirming the position of the needle with the  fluoroscope with all the views, and after aspiration was confirmed negative for blood and CSF, 1.5 mL of Omnipaque was injected.  After seeing appropriate epidurogram with lateral and PA views, a total of 3 cc solution was injected, consisting of 1cc of local anesthetic as above, with normal saline and injectable steroid as above.     ESTIMATED BLOOD LOSS:  <5 mL  SPECIMENS:  None    COMPLICATIONS:     No complications were noted. and There was no indication of vascular uptake on live injection of contrast dye.    TOLERANCE & DISCHARGE CONDITION:    The patient tolerated the procedure well.  The patient was transported to the recovery area without difficulties.  The patient was discharged to home under the care of family in stable and satisfactory condition.    PLAN OF CARE:  1. The patient was given our standard instruction sheet.  2. The patient will Return to clinic 4-6 wks  3. The patient will resume all medications as per the medication reconciliation sheet.

## 2018-10-07 DIAGNOSIS — I10 ESSENTIAL HYPERTENSION: ICD-10-CM

## 2018-10-08 RX ORDER — LISINOPRIL 20 MG/1
20 TABLET ORAL
Qty: 90 TABLET | Refills: 1 | Status: SHIPPED | OUTPATIENT
Start: 2018-10-08 | End: 2021-02-19

## 2018-10-11 ENCOUNTER — EPISODE CHANGES (OUTPATIENT)
Dept: CASE MANAGEMENT | Facility: OTHER | Age: 78
End: 2018-10-11

## 2018-10-22 ENCOUNTER — TRANSCRIBE ORDERS (OUTPATIENT)
Dept: ADMINISTRATIVE | Facility: HOSPITAL | Age: 78
End: 2018-10-22

## 2018-10-22 ENCOUNTER — TELEPHONE (OUTPATIENT)
Dept: OBSTETRICS AND GYNECOLOGY | Facility: CLINIC | Age: 78
End: 2018-10-22

## 2018-10-22 DIAGNOSIS — Z12.39 BREAST SCREENING: Primary | ICD-10-CM

## 2018-10-29 DIAGNOSIS — E78.2 MIXED HYPERLIPIDEMIA: ICD-10-CM

## 2018-10-29 RX ORDER — SIMVASTATIN 20 MG
20 TABLET ORAL NIGHTLY
Qty: 90 TABLET | Refills: 1 | Status: SHIPPED | OUTPATIENT
Start: 2018-10-29 | End: 2021-02-19

## 2018-11-01 ENCOUNTER — HOSPITAL ENCOUNTER (OUTPATIENT)
Dept: MAMMOGRAPHY | Facility: HOSPITAL | Age: 78
Discharge: HOME | End: 2018-11-01
Attending: OBSTETRICS & GYNECOLOGY | Admitting: OBSTETRICS & GYNECOLOGY

## 2018-11-01 DIAGNOSIS — Z12.39 BREAST SCREENING: ICD-10-CM

## 2018-11-01 PROCEDURE — 77067 SCR MAMMO BI INCL CAD: CPT

## 2018-11-01 PROCEDURE — 77063 BREAST TOMOSYNTHESIS BI: CPT

## 2018-11-07 ENCOUNTER — OFFICE VISIT (OUTPATIENT)
Dept: PAIN MEDICINE | Facility: CLINIC | Age: 78
End: 2018-11-07

## 2018-11-07 VITALS
WEIGHT: 244 LBS | DIASTOLIC BLOOD PRESSURE: 85 MMHG | OXYGEN SATURATION: 96 % | TEMPERATURE: 98.4 F | BODY MASS INDEX: 40.65 KG/M2 | HEART RATE: 76 BPM | RESPIRATION RATE: 18 BRPM | SYSTOLIC BLOOD PRESSURE: 157 MMHG | HEIGHT: 65 IN

## 2018-11-07 DIAGNOSIS — G89.29 CHRONIC BILATERAL LOW BACK PAIN WITHOUT SCIATICA: ICD-10-CM

## 2018-11-07 DIAGNOSIS — M54.50 CHRONIC BILATERAL LOW BACK PAIN WITHOUT SCIATICA: ICD-10-CM

## 2018-11-07 DIAGNOSIS — G89.29 OTHER CHRONIC PAIN: Primary | ICD-10-CM

## 2018-11-07 DIAGNOSIS — M48.062 SPINAL STENOSIS OF LUMBAR REGION WITH NEUROGENIC CLAUDICATION: ICD-10-CM

## 2018-11-07 PROCEDURE — 99212 OFFICE O/P EST SF 10 MIN: CPT | Performed by: NURSE PRACTITIONER

## 2018-11-07 NOTE — PROGRESS NOTES
"CHIEF COMPLAINT  Back pain is unchanged since last visit. She states she received 10% relief for 3 days from the injection.    Subjective   Anitra Orta is a 78 y.o. female  who presents to the office for follow-up of procedure.  She completed a Lumbar Epidural Steroid Injection   on  9/26/2018 performed by Dr. Ortega for management of low back pain. Patient reports minimal temporary relief from the procedure.     Complains of pain in her back. Today her pain is 9/10VAS(no respiratory problems, not crying, able to talk without any problems). Describes her pain as continuous and unchanged. \"I think I'm just going to wing it.\"\"I don't want any of the codones.\" Previously had a Quell device but hasn't used in quite some time. \"I think I might try that again.\"  She has not seen Dr. Valdivia since February, since being seen by Dr. Ortega and failing SCS trial.     Also having \"urology problems.\" Her urologist suggested stimulator for bladder.   Back Pain   This is a chronic problem. The current episode started more than 1 year ago. The problem occurs constantly. The problem has been gradually worsening since onset. The pain is at a severity of 9/10 (pain ranges from 7-9/10VAS). Pertinent negatives include no abdominal pain, chest pain, dysuria, fever, headaches, numbness, pelvic pain or weakness.      Past pain medicatino:  Gabapentin-- \"drunken \"    Current pain medications: Ibuprofen when necessary  Gabapentin     Past therapies:  Physical Therapy: yes (didn't help)  Chiropractor: yes (didn't help)  Massage Therapy: yes (didn't help)  TENS: yes (didn't help)  Neck or back surgery: no  Past pain management: yes- at [facility] in 2009 with Dr. Juan Carlos Olivera and Dr. Issac Dozier 2015 and [facility] 1 visit Nov 2017.  Acupucnture- didn't help     Failed SCS 7-11-18  - Dr. Gandhi last year- stated possible surgery but she didn't like him and did not return.  - Dr. Valdivia - for 2nd opinion- " "possible surgery but high complication rate so suggested SCS system.   - underwent trial without success. Has tried several LESI but years ago and not sure how much they helped.    Previous Injections: several lumbar epidurals over the last 9 years  Effect of Injection (%): some relief     PEG Assessment   What number best describes your pain on average in the past week?9  What number best describes how, during the past week, pain has interfered with your enjoyment of life?9  What number best describes how, during the past week, pain has interfered with your general activity?  9    The following portions of the patient's history were reviewed and updated as appropriate: allergies, current medications, past family history, past medical history, past social history, past surgical history and problem list.    Review of Systems   Constitutional: Negative for chills and fever.   Respiratory: Negative for shortness of breath.    Cardiovascular: Negative for chest pain.   Gastrointestinal: Negative for abdominal pain, constipation, diarrhea, nausea and vomiting.   Genitourinary: Negative for difficulty urinating, dyspareunia, dysuria and pelvic pain.   Musculoskeletal: Positive for back pain.   Neurological: Negative for dizziness, weakness, light-headedness, numbness and headaches.   Psychiatric/Behavioral: Positive for sleep disturbance. Negative for confusion, hallucinations, self-injury and suicidal ideas. The patient is not nervous/anxious.        Vitals:    11/07/18 1414   Resp: 18   Temp: 98.4 °F (36.9 °C)   Weight: 111 kg (244 lb)   Height: 165.1 cm (65\")   PainSc:   9   PainLoc: Back     Objective   Physical Exam   Constitutional: She is oriented to person, place, and time. Vital signs are normal. She appears well-developed and well-nourished. She is cooperative.   HENT:   Head: Normocephalic and atraumatic.   Nose: Nose normal.   Eyes: Conjunctivae and lids are normal.   Cardiovascular: Normal rate.  "   Pulmonary/Chest: Effort normal.   Abdominal:   obese   Neurological: She is alert and oriented to person, place, and time. Gait (cane) abnormal.   Non-focal   Skin: Skin is warm, dry and intact.   Psychiatric: She has a normal mood and affect. Her speech is normal and behavior is normal. Judgment and thought content normal. Cognition and memory are normal.   Nursing note and vitals reviewed.      Assessment/Plan   Anitra was seen today for back pain.    Diagnoses and all orders for this visit:    Other chronic pain    Spinal stenosis of lumbar region with neurogenic claudication    Chronic bilateral low back pain without sciatica      --- Nothing to offer at this time.  --- Follow-up PRN.       RODRIGO REPORT  RODRIGO report has been reviewed and scanned into the patient's chart.    As the clinician, I personally reviewed the RODRIGO from 11-6-18 while the patient was in the office today.          EMR Dragon/Transcription disclaimer:   Much of this encounter note is an electronic transcription/translation of spoken language to printed text. The electronic translation of spoken language may permit erroneous, or at times, nonsensical words or phrases to be inadvertently transcribed; Although I have reviewed the note for such errors, some may still exist.

## 2018-11-30 RX ORDER — IMIPRAMINE HCL 50 MG
TABLET ORAL
Qty: 270 TABLET | Refills: 1 | Status: SHIPPED | OUTPATIENT
Start: 2018-11-30 | End: 2021-02-19 | Stop reason: SDUPTHER

## 2018-12-05 ENCOUNTER — APPOINTMENT (OUTPATIENT)
Dept: CT IMAGING | Facility: HOSPITAL | Age: 78
End: 2018-12-05
Attending: EMERGENCY MEDICINE

## 2018-12-05 ENCOUNTER — HOSPITAL ENCOUNTER (EMERGENCY)
Facility: HOSPITAL | Age: 78
Discharge: HOME | End: 2018-12-05
Attending: EMERGENCY MEDICINE | Admitting: EMERGENCY MEDICINE

## 2018-12-05 VITALS
BODY MASS INDEX: 37.33 KG/M2 | WEIGHT: 232.25 LBS | SYSTOLIC BLOOD PRESSURE: 165 MMHG | HEIGHT: 66 IN | TEMPERATURE: 97.6 F | RESPIRATION RATE: 18 BRPM | OXYGEN SATURATION: 98 % | HEART RATE: 92 BPM | DIASTOLIC BLOOD PRESSURE: 115 MMHG

## 2018-12-05 DIAGNOSIS — I10 HYPERTENSION, UNSPECIFIED TYPE: ICD-10-CM

## 2018-12-05 DIAGNOSIS — M54.16 LUMBAR BACK PAIN WITH RADICULOPATHY AFFECTING LEFT LOWER EXTREMITY: Primary | ICD-10-CM

## 2018-12-05 LAB
ALBUMIN SERPL-MCNC: 4.3 G/DL (ref 3.5–5.2)
ALBUMIN/GLOB SERPL: 1.6 G/DL
ALP SERPL-CCNC: 99 U/L (ref 40–129)
ALT SERPL W P-5'-P-CCNC: 17 U/L (ref 5–33)
ANION GAP SERPL CALCULATED.3IONS-SCNC: 10.6 MMOL/L
AST SERPL-CCNC: 16 U/L (ref 5–32)
BASOPHILS # BLD AUTO: 0.02 10*3/MM3 (ref 0–0.2)
BASOPHILS NFR BLD AUTO: 0.3 % (ref 0–2)
BILIRUB SERPL-MCNC: 0.4 MG/DL (ref 0.2–1.2)
BILIRUB UR QL STRIP: NEGATIVE
BUN BLD-MCNC: 11 MG/DL (ref 8–23)
BUN/CREAT SERPL: 17.5 (ref 7–25)
CALCIUM SPEC-SCNC: 9.1 MG/DL (ref 8.8–10.5)
CHLORIDE SERPL-SCNC: 103 MMOL/L (ref 98–107)
CLARITY UR: CLEAR
CO2 SERPL-SCNC: 27.4 MMOL/L (ref 22–29)
COLOR UR: YELLOW
CREAT BLD-MCNC: 0.63 MG/DL (ref 0.57–1)
DEPRECATED RDW RBC AUTO: 43.8 FL (ref 37–54)
EOSINOPHIL # BLD AUTO: 0.05 10*3/MM3 (ref 0.1–0.3)
EOSINOPHIL NFR BLD AUTO: 0.9 % (ref 0–4)
ERYTHROCYTE [DISTWIDTH] IN BLOOD BY AUTOMATED COUNT: 13.2 % (ref 11.5–14.5)
GFR SERPL CREATININE-BSD FRML MDRD: 92 ML/MIN/1.73
GLOBULIN UR ELPH-MCNC: 2.7 GM/DL
GLUCOSE BLD-MCNC: 120 MG/DL (ref 65–99)
GLUCOSE UR STRIP-MCNC: NEGATIVE MG/DL
HCT VFR BLD AUTO: 42.5 % (ref 37–47)
HGB BLD-MCNC: 14.1 G/DL (ref 12–16)
HGB UR QL STRIP.AUTO: NEGATIVE
IMM GRANULOCYTES # BLD: 0.03 10*3/MM3 (ref 0–0.03)
IMM GRANULOCYTES NFR BLD: 0.5 % (ref 0–0.5)
KETONES UR QL STRIP: NEGATIVE
LEUKOCYTE ESTERASE UR QL STRIP.AUTO: NEGATIVE
LYMPHOCYTES # BLD AUTO: 0.87 10*3/MM3 (ref 0.6–4.8)
LYMPHOCYTES NFR BLD AUTO: 15.1 % (ref 20–45)
MCH RBC QN AUTO: 30.3 PG (ref 27–31)
MCHC RBC AUTO-ENTMCNC: 33.2 G/DL (ref 31–37)
MCV RBC AUTO: 91.2 FL (ref 81–99)
MONOCYTES # BLD AUTO: 0.3 10*3/MM3 (ref 0–1)
MONOCYTES NFR BLD AUTO: 5.2 % (ref 3–8)
NEUTROPHILS # BLD AUTO: 4.48 10*3/MM3 (ref 1.5–8.3)
NEUTROPHILS NFR BLD AUTO: 78 % (ref 45–70)
NITRITE UR QL STRIP: NEGATIVE
NRBC BLD MANUAL-RTO: 0 /100 WBC (ref 0–0)
PH UR STRIP.AUTO: 7 [PH] (ref 4.5–8)
PLATELET # BLD AUTO: 182 10*3/MM3 (ref 140–500)
PMV BLD AUTO: 8.7 FL (ref 7.4–10.4)
POTASSIUM BLD-SCNC: 4 MMOL/L (ref 3.5–5.2)
PROT SERPL-MCNC: 7 G/DL (ref 6–8.5)
PROT UR QL STRIP: NEGATIVE
RBC # BLD AUTO: 4.66 10*6/MM3 (ref 4.2–5.4)
SODIUM BLD-SCNC: 141 MMOL/L (ref 136–145)
SP GR UR STRIP: 1.02 (ref 1–1.03)
UROBILINOGEN UR QL STRIP: NORMAL
WBC NRBC COR # BLD: 5.75 10*3/MM3 (ref 4.8–10.8)

## 2018-12-05 PROCEDURE — 81003 URINALYSIS AUTO W/O SCOPE: CPT | Performed by: EMERGENCY MEDICINE

## 2018-12-05 PROCEDURE — 85025 COMPLETE CBC W/AUTO DIFF WBC: CPT | Performed by: EMERGENCY MEDICINE

## 2018-12-05 PROCEDURE — 99283 EMERGENCY DEPT VISIT LOW MDM: CPT

## 2018-12-05 PROCEDURE — 80053 COMPREHEN METABOLIC PANEL: CPT | Performed by: EMERGENCY MEDICINE

## 2018-12-05 PROCEDURE — 74176 CT ABD & PELVIS W/O CONTRAST: CPT

## 2018-12-05 PROCEDURE — 99284 EMERGENCY DEPT VISIT MOD MDM: CPT | Performed by: EMERGENCY MEDICINE

## 2018-12-05 RX ORDER — SODIUM CHLORIDE 0.9 % (FLUSH) 0.9 %
10 SYRINGE (ML) INJECTION AS NEEDED
Status: DISCONTINUED | OUTPATIENT
Start: 2018-12-05 | End: 2018-12-05 | Stop reason: HOSPADM

## 2018-12-05 RX ORDER — METHOCARBAMOL 500 MG/1
500 TABLET, FILM COATED ORAL 2 TIMES DAILY PRN
Qty: 20 TABLET | Refills: 0 | Status: SHIPPED | OUTPATIENT
Start: 2018-12-05 | End: 2018-12-15

## 2018-12-05 RX ORDER — METHYLPREDNISOLONE 4 MG/1
TABLET ORAL
Qty: 21 TABLET | Refills: 0 | Status: ON HOLD | OUTPATIENT
Start: 2018-12-05 | End: 2019-07-31

## 2018-12-05 NOTE — DISCHARGE INSTRUCTIONS
Please return to the emergency room for any worsening pain, fevers, weakness, numbness, bowel or bladder incontinence or any other concerns.

## 2018-12-05 NOTE — ED NOTES
Pt stated she is unable to provide a urine specimen at this time     Charlotte Eastman, RN  12/05/18 9866

## 2018-12-06 ENCOUNTER — EPISODE CHANGES (OUTPATIENT)
Dept: CASE MANAGEMENT | Facility: OTHER | Age: 78
End: 2018-12-06

## 2018-12-06 ENCOUNTER — HOSPITAL ENCOUNTER (OUTPATIENT)
Dept: GENERAL RADIOLOGY | Facility: HOSPITAL | Age: 78
Discharge: HOME | End: 2018-12-06

## 2018-12-06 ENCOUNTER — HOSPITAL ENCOUNTER (OUTPATIENT)
Dept: ULTRASOUND IMAGING | Facility: HOSPITAL | Age: 78
Discharge: HOME | End: 2018-12-06
Attending: UROLOGY | Admitting: UROLOGY

## 2018-12-06 DIAGNOSIS — N20.0 KIDNEY CALCULUS: ICD-10-CM

## 2018-12-06 PROCEDURE — 74018 RADEX ABDOMEN 1 VIEW: CPT

## 2018-12-06 PROCEDURE — 76775 US EXAM ABDO BACK WALL LIM: CPT

## 2018-12-06 NOTE — ED PROVIDER NOTES
Subjective   History of Present Illness  History of Present Illness    Chief complaint: flank pain, back pain    Location: right lower back, radiating around to abdomen    Quality/Severity:  Moderate aching pain    Timing/Duration: worse in recent days    Modifying Factors: worse with bending / twisting    Narrative: This patient presents for evaluation of worsening flank pain and low back pain.  She has a long history of chronic back pain problems.  She has been seen by neurosurgery in the past.  She had a spinal stimulator for a while but that treatment failed and it was removed.  She has not had any recent injuries or repetitive straining patterns that she can notify.  She was worried today that she may be having a kidney stone or kidney infection problem.  She says the pain is worse on the right side and radiates around to the right abdomen area.  She has had some mild dysuria symptoms as well.  She has not seen any blood in her urine.  She denies any fevers or vomiting.  Her bowels are moving normally.  She notes that she does occasionally have some pain and weakness shooting down into the right leg area that she associates with the pain coming from her right back and side    Associated Symptoms: As above  Review of Systems   Constitutional: Negative for activity change and fever.   HENT: Negative.    Respiratory: Negative for cough and shortness of breath.    Cardiovascular: Negative for chest pain.   Gastrointestinal: Positive for abdominal pain. Negative for blood in stool, diarrhea and vomiting.   Genitourinary: Positive for dysuria and flank pain. Negative for hematuria.   Musculoskeletal: Positive for back pain and myalgias.   Skin: Negative for color change.   Neurological: Negative for syncope and headaches.   All other systems reviewed and are negative.      Past Medical History:   Diagnosis Date   • Anxiety    • Chronic pain disorder    • Constipation    • DDD (degenerative disc disease), lumbar    •  GERD (gastroesophageal reflux disease)    • HTN (hypertension)    • Hyperlipidemia    • Kidney stones    • Low back pain    • Osteoarthritis    • Osteopenia    • Osteoporosis    • PONV (postoperative nausea and vomiting)    • Psoriasis    • Urinary tract infection        Allergies   Allergen Reactions   • Bactrim [Sulfamethoxazole-Trimethoprim] Other (See Comments) and Nausea Only     UNKNOWN   • Ciprofloxacin Other (See Comments)     UNKNOWN   • Levaquin [Levofloxacin] Other (See Comments)     SEVERE HEADACHE AND N/V   • Macrobid [Nitrofurantoin] Other (See Comments)     UNKNOWN; PT CAN TAKE IN SMALL DOSES- FLU LIKE SYMPTOMS   • Nitrofurantoin Macrocrystal Other (See Comments)   • Cortisone      Pt states had headache with IM injection states has been ok with epidural steroid injections       Past Surgical History:   Procedure Laterality Date   • BREAST BIOPSY Left     benign   • BREAST LUMPECTOMY Left     benign   • BUNIONECTOMY Right    • CYSTOSCOPY W/ LITHOLAPAXY / EHL     • KNEE ARTHROPLASTY Right    • TONSILLECTOMY AND ADENOIDECTOMY         Family History   Problem Relation Age of Onset   • Heart defect Mother    • Heart defect Father    • Malig Hyperthermia Neg Hx    • Breast cancer Neg Hx        Social History     Socioeconomic History   • Marital status:      Spouse name: Not on file   • Number of children: Not on file   • Years of education: Not on file   • Highest education level: Not on file   Tobacco Use   • Smoking status: Former Smoker     Years: 40.00     Last attempt to quit: 10/4/1980     Years since quittin.1   • Smokeless tobacco: Never Used   • Tobacco comment: quit    Substance and Sexual Activity   • Alcohol use: No   • Drug use: No   • Sexual activity: Defer       ED Triage Vitals [18 1327]   Temp Heart Rate Resp BP SpO2   97.6 °F (36.4 °C) 99 16 (!) 199/102 98 %      Temp src Heart Rate Source Patient Position BP Location FiO2 (%)   Oral Monitor Sitting Left arm --          Objective   Physical Exam   Constitutional: She is oriented to person, place, and time. She appears well-developed and well-nourished. No distress.   HENT:   Head: Normocephalic and atraumatic.   Eyes: EOM are normal. Pupils are equal, round, and reactive to light. Right eye exhibits no discharge. Left eye exhibits no discharge.   Neck: Normal range of motion. Neck supple.   Cardiovascular: Normal rate, regular rhythm, normal heart sounds and intact distal pulses. Exam reveals no friction rub.   No murmur heard.  Pulmonary/Chest: Effort normal. No stridor. No respiratory distress. She has no wheezes. She has no rales.   Abdominal: Soft. She exhibits no distension and no mass. There is no tenderness. There is no guarding.   Musculoskeletal: Normal range of motion. She exhibits tenderness. She exhibits no edema or deformity.   Mild diffuse tenderness along the entire lumbosacral back region.  No specific midline point tenderness elicited on deep palpation.  No step-offs or deformities.  CVA regions are nontender to palpation   Neurological: She is alert and oriented to person, place, and time. She exhibits normal muscle tone. Coordination normal.   Skin: Skin is warm and dry. No rash noted. She is not diaphoretic. No erythema.   Psychiatric: She has a normal mood and affect. Her behavior is normal. Judgment and thought content normal.   Nursing note and vitals reviewed.    Results for orders placed or performed during the hospital encounter of 12/05/18   Comprehensive Metabolic Panel   Result Value Ref Range    Glucose 120 (H) 65 - 99 mg/dL    BUN 11 8 - 23 mg/dL    Creatinine 0.63 0.57 - 1.00 mg/dL    Sodium 141 136 - 145 mmol/L    Potassium 4.0 3.5 - 5.2 mmol/L    Chloride 103 98 - 107 mmol/L    CO2 27.4 22.0 - 29.0 mmol/L    Calcium 9.1 8.8 - 10.5 mg/dL    Total Protein 7.0 6.0 - 8.5 g/dL    Albumin 4.30 3.50 - 5.20 g/dL    ALT (SGPT) 17 5 - 33 U/L    AST (SGOT) 16 5 - 32 U/L    Alkaline Phosphatase 99 40 -  129 U/L    Total Bilirubin 0.4 0.2 - 1.2 mg/dL    eGFR Non African Amer 92 >60 mL/min/1.73    Globulin 2.7 gm/dL    A/G Ratio 1.6 g/dL    BUN/Creatinine Ratio 17.5 7.0 - 25.0    Anion Gap 10.6 mmol/L   Urinalysis With Microscopic If Indicated (No Culture) - Urine, Clean Catch   Result Value Ref Range    Color, UA Yellow Yellow, Straw    Appearance, UA Clear Clear    pH, UA 7.0 4.5 - 8.0    Specific Gravity, UA 1.020 1.003 - 1.030    Glucose, UA Negative Negative    Ketones, UA Negative Negative, 80 mg/dL (3+), >=160 mg/dL (4+)    Bilirubin, UA Negative Negative    Blood, UA Negative Negative    Protein, UA Negative Negative    Leuk Esterase, UA Negative Negative    Nitrite, UA Negative Negative    Urobilinogen, UA 0.2 E.U./dL 0.2 - 1.0 E.U./dL   CBC Auto Differential   Result Value Ref Range    WBC 5.75 4.80 - 10.80 10*3/mm3    RBC 4.66 4.20 - 5.40 10*6/mm3    Hemoglobin 14.1 12.0 - 16.0 g/dL    Hematocrit 42.5 37.0 - 47.0 %    MCV 91.2 81.0 - 99.0 fL    MCH 30.3 27.0 - 31.0 pg    MCHC 33.2 31.0 - 37.0 g/dL    RDW 13.2 11.5 - 14.5 %    RDW-SD 43.8 37.0 - 54.0 fl    MPV 8.7 7.4 - 10.4 fL    Platelets 182 140 - 500 10*3/mm3    Neutrophil % 78.0 (H) 45.0 - 70.0 %    Lymphocyte % 15.1 (L) 20.0 - 45.0 %    Monocyte % 5.2 3.0 - 8.0 %    Eosinophil % 0.9 0.0 - 4.0 %    Basophil % 0.3 0.0 - 2.0 %    Immature Grans % 0.5 0.0 - 0.5 %    Neutrophils, Absolute 4.48 1.50 - 8.30 10*3/mm3    Lymphocytes, Absolute 0.87 0.60 - 4.80 10*3/mm3    Monocytes, Absolute 0.30 0.00 - 1.00 10*3/mm3    Eosinophils, Absolute 0.05 (L) 0.10 - 0.30 10*3/mm3    Basophils, Absolute 0.02 0.00 - 0.20 10*3/mm3    Immature Grans, Absolute 0.03 0.00 - 0.03 10*3/mm3    nRBC 0.0 0.0 - 0.0 /100 WBC       RADIOLOGY        Study: CT abdomen and pelvis without contrast    Findings:1.  No right renal stone or hydronephrosis detected.<BR> 2.  Unchanged appearance of the left kidney with multiple nonobstructing<BR> calculi. Largest calculus left kidney measures  "approximately 5 mm.<BR> 3.  7 mm nodule within the middle lobe. This has been stable since<BR> August 2016, over 2 years. As such, no further imaging follow-up of the<BR> nodule is necessary in accordance with Fleischner criteria.<BR> 4.  Atherosclerosis.    Interpreted contemporaneously with treatment by Dr. Ingram, independently viewed by me        Procedures           ED Course  ED Course as of Dec 06 0955   u Dec 06, 2018   0953 I have reviewed the labs and the CT scan from today's visit.  All findings are reassuring for no acute kidney stone or renal infection.  It seems that her pain is musculoskeletal in origin.  I prescribed a Medrol Dosepak and a muscle relaxer for her.  I encouraged her to continue follow up with her primary care doctor for outpatient management.  She agreed to the plan as outlined.  Discharged home in good condition with the usual \"return to ER\" instructions for any worsening signs or symptoms.  [CORAL]      ED Course User Index  [CORAL] Terence Kim MD                  MDM  Number of Diagnoses or Management Options  Hypertension, unspecified type:   Lumbar back pain with radiculopathy affecting left lower extremity:      Amount and/or Complexity of Data Reviewed  Clinical lab tests: reviewed and ordered  Tests in the radiology section of CPT®: reviewed and ordered  Decide to obtain previous medical records or to obtain history from someone other than the patient: yes  Review and summarize past medical records: yes  Independent visualization of images, tracings, or specimens: yes    Risk of Complications, Morbidity, and/or Mortality  Presenting problems: moderate  Diagnostic procedures: moderate  Management options: moderate          Final diagnoses:   Lumbar back pain with radiculopathy affecting left lower extremity   Hypertension, unspecified type            Terence Kim MD  12/06/18 0955       Terence Kim MD  12/06/18 0955    "

## 2018-12-07 ENCOUNTER — PATIENT OUTREACH (OUTPATIENT)
Dept: CASE MANAGEMENT | Facility: OTHER | Age: 78
End: 2018-12-07

## 2018-12-07 NOTE — OUTREACH NOTE
Pt. Reports she has chronic kidney and lumbar back pain. She is currently under the care of Dr. Clark (urologist) and is seen by Pain Management. Recent US of kidney's did not show any new stones per patient. Pt. Is aware of necessary care of back and kidneys. Health Maintenance gaps all reviewed, she will discuss them with Dr. Hamilton 1/14/19 and she will inquire at [facility] if she can get these completed. Encouraged her to call PCP office to order her DEXA prior to 1/14/19 appointment. Pt. Will also discuss B/P being elevated at ER visit. Encouraged patient to start taking B/P at home and present log to PCP in January. Pt. Declines further CA calls at this time.

## 2018-12-10 ENCOUNTER — EPISODE CHANGES (OUTPATIENT)
Dept: CASE MANAGEMENT | Facility: OTHER | Age: 78
End: 2018-12-10

## 2018-12-12 ENCOUNTER — TELEPHONE (OUTPATIENT)
Dept: INTERNAL MEDICINE | Facility: CLINIC | Age: 78
End: 2018-12-12

## 2018-12-12 NOTE — TELEPHONE ENCOUNTER
Message was given to patient. She was very upset that she was not called sooner.. She wants to talk with Dr Hamilton regarding the delay on call.  I explained I would certainly look into the matter.  She stated she called Fri, Mon, Tue.  I only see a message taken on Tuesday.           ----- Message from Rosibel Hamilton MD sent at 2018  5:28 PM EST -----  Regarding: RE: BP CONCERN  Contact: [phone]  Keep bp log for 1 week then we can adjust meds as needed since some numbers high and some low    ----- Message -----  From: Anitra Lopes MA  Sent: 2018   9:35 AM  To: Rosibel Hamilton MD  Subject: FW: BP CONCERN                                       ----- Message -----  From: Sharron Willson  Sent: 2018   8:53 AM  To: [facility] Clinical Pool  Subject: BP CONCERN                                       :  42  FRANCISCO PT.    PATIENT WAS RECENTLY IN THE ER AND HER BP WAS HIGH. SINCE BEING HOME, HER BP HAS BEEN 112/88 and 152/95. SHE WANTS TO KNOW IF SHE SHOULD DOUBLE HER DOSE TO 40 MG.    PLEASE ADVISE.

## 2019-01-23 ENCOUNTER — TRANSCRIBE ORDERS (OUTPATIENT)
Dept: ADMINISTRATIVE | Facility: HOSPITAL | Age: 79
End: 2019-01-23

## 2019-01-23 DIAGNOSIS — Z78.0 MENOPAUSE: Primary | ICD-10-CM

## 2019-01-28 ENCOUNTER — APPOINTMENT (OUTPATIENT)
Dept: BONE DENSITY | Facility: HOSPITAL | Age: 79
End: 2019-01-28
Attending: INTERNAL MEDICINE

## 2019-01-28 DIAGNOSIS — Z78.0 MENOPAUSE: ICD-10-CM

## 2019-01-28 PROCEDURE — 77080 DXA BONE DENSITY AXIAL: CPT

## 2019-02-06 ENCOUNTER — OFFICE VISIT (OUTPATIENT)
Dept: OBSTETRICS AND GYNECOLOGY | Facility: CLINIC | Age: 79
End: 2019-02-06

## 2019-02-06 VITALS
HEIGHT: 66 IN | SYSTOLIC BLOOD PRESSURE: 140 MMHG | BODY MASS INDEX: 38.57 KG/M2 | WEIGHT: 240 LBS | DIASTOLIC BLOOD PRESSURE: 70 MMHG

## 2019-02-06 DIAGNOSIS — Z01.419 WELL FEMALE EXAM WITH ROUTINE GYNECOLOGICAL EXAM: Primary | ICD-10-CM

## 2019-02-06 LAB
BILIRUB BLD-MCNC: NEGATIVE MG/DL
CLARITY, POC: CLEAR
COLOR UR: YELLOW
GLUCOSE UR STRIP-MCNC: NEGATIVE MG/DL
KETONES UR QL: NEGATIVE
LEUKOCYTE EST, POC: NEGATIVE
NITRITE UR-MCNC: NEGATIVE MG/ML
PH UR: 6 [PH] (ref 5–8)
PROT UR STRIP-MCNC: NEGATIVE MG/DL
RBC # UR STRIP: NEGATIVE /UL
SP GR UR: 1.02 (ref 1–1.03)
UROBILINOGEN UR QL: NORMAL

## 2019-02-06 PROCEDURE — 81002 URINALYSIS NONAUTO W/O SCOPE: CPT | Performed by: OBSTETRICS & GYNECOLOGY

## 2019-02-06 PROCEDURE — G0101 CA SCREEN;PELVIC/BREAST EXAM: HCPCS | Performed by: OBSTETRICS & GYNECOLOGY

## 2019-02-06 NOTE — PROGRESS NOTES
GYN Annual Exam     CC- Here for annual exam.     Anitra Orta is a 78 y.o. female who presents for annual well woman exam. Periods are absent.     OB History      Para Term  AB Living    4 3 3   1 3    SAB TAB Ectopic Molar Multiple Live Births    1                    Current contraception: post menopausal status  History of abnormal Pap smear: no  Family history of uterine, colon or ovarian cancer: no  History of abnormal mammogram: no  Family history of breast cancer: no  Last Pap : 2016    Past Medical History:   Diagnosis Date   • Anxiety    • Chronic pain disorder    • Constipation    • DDD (degenerative disc disease), lumbar    • GERD (gastroesophageal reflux disease)    • HTN (hypertension)    • Hyperlipidemia    • Kidney stones    • Low back pain    • Osteoarthritis    • Osteopenia    • Osteoporosis    • PONV (postoperative nausea and vomiting)    • Psoriasis    • Urinary tract infection        Past Surgical History:   Procedure Laterality Date   • BREAST BIOPSY Left     benign   • BREAST LUMPECTOMY Left     benign   • BUNIONECTOMY Right    • COLONOSCOPY N/A 2016    Procedure: COLONOSCOPY polypectomy;  Surgeon: Adali Willard MD;  Location: [facility] OR;  Service:    • CYSTOSCOPY BOTOX INJECTION OF BLADDER N/A 2017    Procedure: CYSTOSCOPY COAPTITE INJECTION;  Surgeon: Kevon Clark MD;  Location: [facility] OR;  Service:    • CYSTOSCOPY W/ LITHOLAPAXY / EHL     • CYSTOSCOPY W/ URETERAL STENT PLACEMENT Left 2016    Procedure: CYSTOSCOPY URETERAL STENT INSERTION;  Surgeon: Kevon Clark MD;  Location: [facility] OR;  Service:    • KNEE ARTHROPLASTY Right    • TONSILLECTOMY AND ADENOIDECTOMY     • URETEROSCOPY LASER LITHOTRIPSY WITH STENT INSERTION Left 10/5/2016    Procedure: LT URETEROSCOPY LASER LITHOTRIPSY STONE BASKET EXTRACTION AND STENT ;  Surgeon: Kevon Clark MD;  Location: [facility];  Service:          Current Outpatient Medications:   •  aspirin 81  MG EC tablet, Take 81 mg by mouth Daily. PT TO STOP PER MD INSTRUCTION, Disp: , Rfl:   •  Biotin 10 MG capsule, Take 10 mg by mouth Daily., Disp: , Rfl:   •  calcium citrate-vitamin d (CITRACAL) 200-250 MG-UNIT tablet tablet, Take 1 tablet by mouth Daily., Disp: , Rfl:   •  estradiol (ESTRACE) 0.5 MG tablet, Take 0.5 mg by mouth Every Other Day., Disp: , Rfl:   •  ibuprofen (ADVIL,MOTRIN) 200 MG tablet, Take 800 mg by mouth 2 (Two) Times a Day., Disp: , Rfl:   •  imipramine (TOFRANIL) 50 MG tablet, TAKE 2 TO 3 TABLETS BY MOUTH EVERY NIGHT AT BEDTIME, Disp: 270 tablet, Rfl: 1  •  lisinopril (PRINIVIL,ZESTRIL) 20 MG tablet, TAKE 1 TABLET BY MOUTH DAILY WITH BREAKFAST, Disp: 90 tablet, Rfl: 1  •  MethylPREDNISolone (MEDROL, RAD,) 4 MG tablet, Take as directed on package instructions., Disp: 21 tablet, Rfl: 0  •  omeprazole (priLOSEC) 20 MG capsule, Take 1 capsule by mouth Daily., Disp: , Rfl:   •  simvastatin (ZOCOR) 20 MG tablet, TAKE 1 TABLET BY MOUTH EVERY NIGHT, Disp: 90 tablet, Rfl: 1  •  vitamin C (ASCORBIC ACID) 500 MG tablet, Take 500 mg by mouth Daily., Disp: , Rfl:     Allergies   Allergen Reactions   • Bactrim [Sulfamethoxazole-Trimethoprim] Other (See Comments) and Nausea Only     UNKNOWN   • Ciprofloxacin Other (See Comments)     UNKNOWN   • Levaquin [Levofloxacin] Other (See Comments)     SEVERE HEADACHE AND N/V   • Macrobid [Nitrofurantoin] Other (See Comments)     UNKNOWN; PT CAN TAKE IN SMALL DOSES- FLU LIKE SYMPTOMS   • Nitrofurantoin Macrocrystal Other (See Comments)   • Cortisone      Pt states had headache with IM injection states has been ok with epidural steroid injections       Social History     Tobacco Use   • Smoking status: Former Smoker     Years: 40.00     Last attempt to quit: 10/4/1980     Years since quittin.3   • Smokeless tobacco: Never Used   • Tobacco comment: quit    Substance Use Topics   • Alcohol use: No   • Drug use: No       Family History   Problem Relation Age of  "Onset   • Heart defect Mother    • Heart defect Father    • Malig Hyperthermia Neg Hx    • Breast cancer Neg Hx        Review of Systems   Constitutional: Negative for appetite change, fever and unexpected weight change.   HENT: Negative for congestion and sore throat.    Respiratory: Negative for cough and shortness of breath.    Cardiovascular: Negative for chest pain and palpitations.   Gastrointestinal: Negative for abdominal distention, abdominal pain, constipation, diarrhea, nausea and vomiting.   Endocrine: Negative.    Genitourinary: Negative for dyspareunia, menstrual problem, pelvic pain and vaginal discharge.   Skin: Negative.    Neurological: Negative for dizziness and syncope.   Hematological: Negative.    Psychiatric/Behavioral: Negative for dysphoric mood and sleep disturbance. The patient is not nervous/anxious.        /70   Ht 167.6 cm (65.98\")   Wt 109 kg (240 lb)   BMI 38.76 kg/m²     Physical Exam   Constitutional: She is oriented to person, place, and time. She appears well-developed and well-nourished.   HENT:   Head: Normocephalic and atraumatic.   Neck: Normal range of motion. Neck supple. No thyromegaly present.   Cardiovascular: Normal rate and regular rhythm.   Pulmonary/Chest: Effort normal and breath sounds normal. Right breast exhibits no mass and no nipple discharge. Left breast exhibits no mass and no nipple discharge. Breasts are symmetrical. There is no breast swelling.   Abdominal: Soft. Bowel sounds are normal. She exhibits no distension and no mass. There is no tenderness. There is no rebound and no guarding.   Genitourinary: Vagina normal and uterus normal. No breast tenderness, discharge or bleeding. Pelvic exam was performed with patient prone. There is no lesion on the right labia. There is no lesion on the left labia. Cervix exhibits no motion tenderness and no discharge. Right adnexum displays no mass. Left adnexum displays no mass.   Musculoskeletal: Normal range " of motion. She exhibits no edema.   Neurological: She is alert and oriented to person, place, and time.   Skin: Skin is warm and dry.   Psychiatric: She has a normal mood and affect. Her behavior is normal. Judgment and thought content normal.   Nursing note and vitals reviewed.      Diagnoses and all orders for this visit:    Well female exam with routine gynecological exam  -     POC Urinalysis Dipstick  -     Pap IG (Image Guided)        Assessment     1) GYN annual well woman exam.   2) MMG UTD     Plan     1) Breast Health - Clinical breast exam & mammogram yearly, Self breast awareness monthly  2) Pap - done today  3) Smoking status- Never smoker  4) Colon health - screening colonoscopy recommended if not up to date  5) Bone health - Weight bearing exercise, dietary calcium recommendations and vitamin D reviewed.   6) Seat belts recommended  7) Follow up prn and one year    Encounter Diagnoses   Name Primary?   • Well female exam with routine gynecological exam Yes         Mike Prince MD  2/6/2019  3:25 PM

## 2019-02-08 LAB
CYTOLOGIST CVX/VAG CYTO: NORMAL
CYTOLOGY CVX/VAG DOC THIN PREP: NORMAL
DX ICD CODE: NORMAL
HIV 1 & 2 AB SER-IMP: NORMAL
OTHER STN SPEC: NORMAL
PATH REPORT.FINAL DX SPEC: NORMAL
STAT OF ADQ CVX/VAG CYTO-IMP: NORMAL

## 2019-07-31 ENCOUNTER — APPOINTMENT (OUTPATIENT)
Dept: CT IMAGING | Facility: HOSPITAL | Age: 79
End: 2019-07-31

## 2019-07-31 ENCOUNTER — HOSPITAL ENCOUNTER (OUTPATIENT)
Facility: HOSPITAL | Age: 79
Discharge: HOME | End: 2019-08-02
Attending: EMERGENCY MEDICINE | Admitting: HOSPITALIST

## 2019-07-31 ENCOUNTER — APPOINTMENT (OUTPATIENT)
Dept: GENERAL RADIOLOGY | Facility: HOSPITAL | Age: 79
End: 2019-07-31

## 2019-07-31 DIAGNOSIS — N20.0 STONE IN KIDNEY: ICD-10-CM

## 2019-07-31 DIAGNOSIS — N20.0 KIDNEY STONE: ICD-10-CM

## 2019-07-31 DIAGNOSIS — N39.0 ACUTE UTI: ICD-10-CM

## 2019-07-31 DIAGNOSIS — R00.0 TACHYCARDIA: Primary | ICD-10-CM

## 2019-07-31 LAB
ALBUMIN SERPL-MCNC: 3.9 G/DL (ref 3.5–5.2)
ALBUMIN/GLOB SERPL: 1.3 G/DL
ALP SERPL-CCNC: 109 U/L (ref 39–117)
ALT SERPL W P-5'-P-CCNC: 25 U/L (ref 1–33)
ANION GAP SERPL CALCULATED.3IONS-SCNC: 14.9 MMOL/L (ref 5–15)
APTT PPP: 26.7 SECONDS (ref 24.3–38.1)
AST SERPL-CCNC: 23 U/L (ref 1–32)
BACTERIA UR QL AUTO: ABNORMAL /HPF
BASOPHILS # BLD AUTO: 0.01 10*3/MM3 (ref 0–0.2)
BASOPHILS NFR BLD AUTO: 0.1 % (ref 0–1.5)
BILIRUB SERPL-MCNC: 0.6 MG/DL (ref 0.2–1.2)
BILIRUB UR QL STRIP: NEGATIVE
BUN BLD-MCNC: 22 MG/DL (ref 8–23)
BUN/CREAT SERPL: 20.8 (ref 7–25)
CALCIUM SPEC-SCNC: 9.2 MG/DL (ref 8.6–10.5)
CHLORIDE SERPL-SCNC: 103 MMOL/L (ref 98–107)
CLARITY UR: ABNORMAL
CO2 SERPL-SCNC: 24.1 MMOL/L (ref 22–29)
COLOR UR: ABNORMAL
CREAT BLD-MCNC: 1.06 MG/DL (ref 0.57–1)
D DIMER PPP FEU-MCNC: 1.95 MCGFEU/ML (ref 0–0.46)
D-LACTATE SERPL-SCNC: 1.9 MMOL/L (ref 0.5–2)
DEPRECATED RDW RBC AUTO: 44.5 FL (ref 37–54)
EOSINOPHIL # BLD AUTO: 0.01 10*3/MM3 (ref 0–0.4)
EOSINOPHIL NFR BLD AUTO: 0.1 % (ref 0.3–6.2)
ERYTHROCYTE [DISTWIDTH] IN BLOOD BY AUTOMATED COUNT: 13.1 % (ref 12.3–15.4)
GFR SERPL CREATININE-BSD FRML MDRD: 50 ML/MIN/1.73
GLOBULIN UR ELPH-MCNC: 3.1 GM/DL
GLUCOSE BLD-MCNC: 128 MG/DL (ref 65–99)
GLUCOSE UR STRIP-MCNC: NEGATIVE MG/DL
HCT VFR BLD AUTO: 44.9 % (ref 34–46.6)
HGB BLD-MCNC: 14.4 G/DL (ref 12–15.9)
HGB UR QL STRIP.AUTO: ABNORMAL
HYALINE CASTS UR QL AUTO: ABNORMAL /LPF
IMM GRANULOCYTES # BLD AUTO: 0.02 10*3/MM3 (ref 0–0.05)
IMM GRANULOCYTES NFR BLD AUTO: 0.2 % (ref 0–0.5)
INR PPP: 0.95 (ref 0.9–1.1)
KETONES UR QL STRIP: NEGATIVE
LEUKOCYTE ESTERASE UR QL STRIP.AUTO: NEGATIVE
LIPASE SERPL-CCNC: 84 U/L (ref 13–60)
LYMPHOCYTES # BLD AUTO: 0.35 10*3/MM3 (ref 0.7–3.1)
LYMPHOCYTES NFR BLD AUTO: 4.3 % (ref 19.6–45.3)
MCH RBC QN AUTO: 29.1 PG (ref 26.6–33)
MCHC RBC AUTO-ENTMCNC: 32.1 G/DL (ref 31.5–35.7)
MCV RBC AUTO: 90.9 FL (ref 79–97)
MONOCYTES # BLD AUTO: 0.06 10*3/MM3 (ref 0.1–0.9)
MONOCYTES NFR BLD AUTO: 0.7 % (ref 5–12)
NEUTROPHILS # BLD AUTO: 7.68 10*3/MM3 (ref 1.7–7)
NEUTROPHILS NFR BLD AUTO: 94.6 % (ref 42.7–76)
NITRITE UR QL STRIP: POSITIVE
NT-PROBNP SERPL-MCNC: 98.7 PG/ML (ref 5–1800)
PH UR STRIP.AUTO: 5.5 [PH] (ref 4.5–8)
PLATELET # BLD AUTO: 154 10*3/MM3 (ref 140–450)
PMV BLD AUTO: 8.7 FL (ref 6–12)
POTASSIUM BLD-SCNC: 4.3 MMOL/L (ref 3.5–5.2)
PROT SERPL-MCNC: 7 G/DL (ref 6–8.5)
PROT UR QL STRIP: NEGATIVE
PROTHROMBIN TIME: 12.4 SECONDS (ref 12.1–15)
RBC # BLD AUTO: 4.94 10*6/MM3 (ref 3.77–5.28)
RBC # UR: ABNORMAL /HPF
REF LAB TEST METHOD: ABNORMAL
SODIUM BLD-SCNC: 142 MMOL/L (ref 136–145)
SP GR UR STRIP: 1.01 (ref 1–1.03)
SQUAMOUS #/AREA URNS HPF: ABNORMAL /HPF
TRANS CELLS #/AREA URNS HPF: ABNORMAL /HPF
TROPONIN T SERPL-MCNC: <0.01 NG/ML (ref 0–0.03)
TROPONIN T SERPL-MCNC: <0.01 NG/ML (ref 0–0.03)
UROBILINOGEN UR QL STRIP: ABNORMAL
WBC NRBC COR # BLD: 8.13 10*3/MM3 (ref 3.4–10.8)
WBC UR QL AUTO: ABNORMAL /HPF
YEAST URNS QL MICRO: ABNORMAL /HPF

## 2019-07-31 PROCEDURE — 85025 COMPLETE CBC W/AUTO DIFF WBC: CPT | Performed by: EMERGENCY MEDICINE

## 2019-07-31 PROCEDURE — 25010000002 HYDROMORPHONE PER 4 MG: Performed by: EMERGENCY MEDICINE

## 2019-07-31 PROCEDURE — 87086 URINE CULTURE/COLONY COUNT: CPT | Performed by: UROLOGY

## 2019-07-31 PROCEDURE — 84484 ASSAY OF TROPONIN QUANT: CPT | Performed by: HOSPITALIST

## 2019-07-31 PROCEDURE — 87077 CULTURE AEROBIC IDENTIFY: CPT | Performed by: UROLOGY

## 2019-07-31 PROCEDURE — 85379 FIBRIN DEGRADATION QUANT: CPT | Performed by: PHYSICIAN ASSISTANT

## 2019-07-31 PROCEDURE — 25010000002 KETOROLAC TROMETHAMINE PER 15 MG: Performed by: HOSPITALIST

## 2019-07-31 PROCEDURE — 25010000002 HYDROMORPHONE PER 4 MG: Performed by: HOSPITALIST

## 2019-07-31 PROCEDURE — G0378 HOSPITAL OBSERVATION PER HR: HCPCS

## 2019-07-31 PROCEDURE — 93005 ELECTROCARDIOGRAM TRACING: CPT | Performed by: EMERGENCY MEDICINE

## 2019-07-31 PROCEDURE — 80053 COMPREHEN METABOLIC PANEL: CPT | Performed by: EMERGENCY MEDICINE

## 2019-07-31 PROCEDURE — 96376 TX/PRO/DX INJ SAME DRUG ADON: CPT

## 2019-07-31 PROCEDURE — 99219 PR INITIAL OBSERVATION CARE/DAY 50 MINUTES: CPT | Performed by: HOSPITALIST

## 2019-07-31 PROCEDURE — 83880 ASSAY OF NATRIURETIC PEPTIDE: CPT | Performed by: PHYSICIAN ASSISTANT

## 2019-07-31 PROCEDURE — 83605 ASSAY OF LACTIC ACID: CPT | Performed by: EMERGENCY MEDICINE

## 2019-07-31 PROCEDURE — 96375 TX/PRO/DX INJ NEW DRUG ADDON: CPT

## 2019-07-31 PROCEDURE — 83690 ASSAY OF LIPASE: CPT | Performed by: PHYSICIAN ASSISTANT

## 2019-07-31 PROCEDURE — 25010000002 ONDANSETRON PER 1 MG: Performed by: PHYSICIAN ASSISTANT

## 2019-07-31 PROCEDURE — 71045 X-RAY EXAM CHEST 1 VIEW: CPT

## 2019-07-31 PROCEDURE — 96361 HYDRATE IV INFUSION ADD-ON: CPT

## 2019-07-31 PROCEDURE — 25010000002 MORPHINE PER 10 MG: Performed by: EMERGENCY MEDICINE

## 2019-07-31 PROCEDURE — 74177 CT ABD & PELVIS W/CONTRAST: CPT

## 2019-07-31 PROCEDURE — 96367 TX/PROPH/DG ADDL SEQ IV INF: CPT

## 2019-07-31 PROCEDURE — 84484 ASSAY OF TROPONIN QUANT: CPT | Performed by: EMERGENCY MEDICINE

## 2019-07-31 PROCEDURE — 0 IOPAMIDOL PER 1 ML: Performed by: EMERGENCY MEDICINE

## 2019-07-31 PROCEDURE — 99285 EMERGENCY DEPT VISIT HI MDM: CPT

## 2019-07-31 PROCEDURE — 99284 EMERGENCY DEPT VISIT MOD MDM: CPT | Performed by: EMERGENCY MEDICINE

## 2019-07-31 PROCEDURE — 71275 CT ANGIOGRAPHY CHEST: CPT

## 2019-07-31 PROCEDURE — 85610 PROTHROMBIN TIME: CPT | Performed by: PHYSICIAN ASSISTANT

## 2019-07-31 PROCEDURE — 93010 ELECTROCARDIOGRAM REPORT: CPT | Performed by: INTERNAL MEDICINE

## 2019-07-31 PROCEDURE — 81001 URINALYSIS AUTO W/SCOPE: CPT | Performed by: PHYSICIAN ASSISTANT

## 2019-07-31 PROCEDURE — 85730 THROMBOPLASTIN TIME PARTIAL: CPT | Performed by: PHYSICIAN ASSISTANT

## 2019-07-31 PROCEDURE — 25010000002 CEFTRIAXONE SODIUM-DEXTROSE 1-3.74 GM-%(50ML) RECONSTITUTED SOLUTION: Performed by: PHYSICIAN ASSISTANT

## 2019-07-31 PROCEDURE — 96365 THER/PROPH/DIAG IV INF INIT: CPT

## 2019-07-31 PROCEDURE — 87186 SC STD MICRODIL/AGAR DIL: CPT | Performed by: UROLOGY

## 2019-07-31 RX ORDER — SODIUM CHLORIDE 0.9 % (FLUSH) 0.9 %
10 SYRINGE (ML) INJECTION AS NEEDED
Status: DISCONTINUED | OUTPATIENT
Start: 2019-07-31 | End: 2019-08-02 | Stop reason: HOSPADM

## 2019-07-31 RX ORDER — ASPIRIN 81 MG/1
324 TABLET, CHEWABLE ORAL ONCE
Status: COMPLETED | OUTPATIENT
Start: 2019-07-31 | End: 2019-07-31

## 2019-07-31 RX ORDER — PANTOPRAZOLE SODIUM 40 MG/10ML
40 INJECTION, POWDER, LYOPHILIZED, FOR SOLUTION INTRAVENOUS ONCE
Status: COMPLETED | OUTPATIENT
Start: 2019-07-31 | End: 2019-07-31

## 2019-07-31 RX ORDER — PANTOPRAZOLE SODIUM 40 MG/1
40 TABLET, DELAYED RELEASE ORAL EVERY MORNING
Status: DISCONTINUED | OUTPATIENT
Start: 2019-08-01 | End: 2019-07-31

## 2019-07-31 RX ORDER — KETOROLAC TROMETHAMINE 30 MG/ML
15 INJECTION, SOLUTION INTRAMUSCULAR; INTRAVENOUS EVERY 6 HOURS PRN
Status: DISCONTINUED | OUTPATIENT
Start: 2019-07-31 | End: 2019-08-02 | Stop reason: HOSPADM

## 2019-07-31 RX ORDER — ACETAMINOPHEN 500 MG
TABLET ORAL
Status: DISPENSED
Start: 2019-07-31 | End: 2019-08-01

## 2019-07-31 RX ORDER — SODIUM CHLORIDE 9 MG/ML
125 INJECTION, SOLUTION INTRAVENOUS CONTINUOUS
Status: DISCONTINUED | OUTPATIENT
Start: 2019-07-31 | End: 2019-08-02

## 2019-07-31 RX ORDER — CEFTRIAXONE 1 G/50ML
1 INJECTION, SOLUTION INTRAVENOUS ONCE
Status: COMPLETED | OUTPATIENT
Start: 2019-07-31 | End: 2019-07-31

## 2019-07-31 RX ORDER — SODIUM CHLORIDE 9 MG/ML
40 INJECTION, SOLUTION INTRAVENOUS AS NEEDED
Status: DISCONTINUED | OUTPATIENT
Start: 2019-07-31 | End: 2019-08-02 | Stop reason: HOSPADM

## 2019-07-31 RX ORDER — IMIPRAMINE HCL 25 MG
100 TABLET ORAL NIGHTLY
Status: DISCONTINUED | OUTPATIENT
Start: 2019-07-31 | End: 2019-08-02 | Stop reason: HOSPADM

## 2019-07-31 RX ORDER — ATORVASTATIN CALCIUM 10 MG/1
10 TABLET, FILM COATED ORAL DAILY
Status: DISCONTINUED | OUTPATIENT
Start: 2019-07-31 | End: 2019-08-02 | Stop reason: HOSPADM

## 2019-07-31 RX ORDER — HYDROMORPHONE HCL 110MG/55ML
0.5 PATIENT CONTROLLED ANALGESIA SYRINGE INTRAVENOUS EVERY 4 HOURS PRN
Status: DISCONTINUED | OUTPATIENT
Start: 2019-07-31 | End: 2019-08-02 | Stop reason: HOSPADM

## 2019-07-31 RX ORDER — HYDROMORPHONE HCL 110MG/55ML
0.5 PATIENT CONTROLLED ANALGESIA SYRINGE INTRAVENOUS ONCE
Status: COMPLETED | OUTPATIENT
Start: 2019-07-31 | End: 2019-07-31

## 2019-07-31 RX ORDER — SODIUM CHLORIDE 0.9 % (FLUSH) 0.9 %
3 SYRINGE (ML) INJECTION EVERY 12 HOURS SCHEDULED
Status: DISCONTINUED | OUTPATIENT
Start: 2019-07-31 | End: 2019-08-02 | Stop reason: HOSPADM

## 2019-07-31 RX ORDER — DILTIAZEM HYDROCHLORIDE 5 MG/ML
10 INJECTION INTRAVENOUS ONCE
Status: COMPLETED | OUTPATIENT
Start: 2019-07-31 | End: 2019-07-31

## 2019-07-31 RX ORDER — LISINOPRIL 20 MG/1
40 TABLET ORAL
Status: DISCONTINUED | OUTPATIENT
Start: 2019-08-01 | End: 2019-08-02 | Stop reason: HOSPADM

## 2019-07-31 RX ORDER — SODIUM CHLORIDE 0.9 % (FLUSH) 0.9 %
3-10 SYRINGE (ML) INJECTION AS NEEDED
Status: DISCONTINUED | OUTPATIENT
Start: 2019-07-31 | End: 2019-08-02 | Stop reason: HOSPADM

## 2019-07-31 RX ORDER — ONDANSETRON 2 MG/ML
8 INJECTION INTRAMUSCULAR; INTRAVENOUS ONCE
Status: COMPLETED | OUTPATIENT
Start: 2019-07-31 | End: 2019-07-31

## 2019-07-31 RX ORDER — NITROGLYCERIN 0.4 MG/1
0.4 TABLET SUBLINGUAL
Status: DISCONTINUED | OUTPATIENT
Start: 2019-07-31 | End: 2019-07-31

## 2019-07-31 RX ORDER — ASCORBIC ACID 500 MG
500 TABLET ORAL DAILY
Status: DISCONTINUED | OUTPATIENT
Start: 2019-08-01 | End: 2019-08-02 | Stop reason: HOSPADM

## 2019-07-31 RX ADMIN — DILTIAZEM HYDROCHLORIDE 10 MG: 5 INJECTION INTRAVENOUS at 15:38

## 2019-07-31 RX ADMIN — DILTIAZEM HYDROCHLORIDE 5 MG/HR: 5 INJECTION INTRAVENOUS at 15:38

## 2019-07-31 RX ADMIN — CEFTRIAXONE 1 G: 1 INJECTION, SOLUTION INTRAVENOUS at 13:04

## 2019-07-31 RX ADMIN — SODIUM CHLORIDE 125 ML/HR: 9 INJECTION, SOLUTION INTRAVENOUS at 13:30

## 2019-07-31 RX ADMIN — SODIUM CHLORIDE 125 ML/HR: 9 INJECTION, SOLUTION INTRAVENOUS at 19:46

## 2019-07-31 RX ADMIN — NITROGLYCERIN 0.4 MG: 0.4 TABLET SUBLINGUAL at 12:29

## 2019-07-31 RX ADMIN — SODIUM CHLORIDE, PRESERVATIVE FREE 3 ML: 5 INJECTION INTRAVENOUS at 20:45

## 2019-07-31 RX ADMIN — KETOROLAC TROMETHAMINE 15 MG: 30 INJECTION, SOLUTION INTRAMUSCULAR at 19:42

## 2019-07-31 RX ADMIN — ASPIRIN 81 MG 324 MG: 81 TABLET ORAL at 12:27

## 2019-07-31 RX ADMIN — IOPAMIDOL 118 ML: 755 INJECTION, SOLUTION INTRAVENOUS at 13:39

## 2019-07-31 RX ADMIN — ONDANSETRON 8 MG: 2 SOLUTION INTRAMUSCULAR; INTRAVENOUS at 12:39

## 2019-07-31 RX ADMIN — IMIPRAMINE HYDROCHLORIDE 100 MG: 25 TABLET, FILM COATED ORAL at 20:44

## 2019-07-31 RX ADMIN — DILTIAZEM HYDROCHLORIDE 10 MG: 5 INJECTION INTRAVENOUS at 15:03

## 2019-07-31 RX ADMIN — HYDROMORPHONE HYDROCHLORIDE 0.5 MG: 2 INJECTION, SOLUTION INTRAMUSCULAR; INTRAVENOUS; SUBCUTANEOUS at 21:22

## 2019-07-31 RX ADMIN — SODIUM CHLORIDE 500 ML: 900 INJECTION, SOLUTION INTRAVENOUS at 13:45

## 2019-07-31 RX ADMIN — MORPHINE SULFATE 4 MG: 4 INJECTION, SOLUTION INTRAMUSCULAR; INTRAVENOUS at 12:49

## 2019-07-31 RX ADMIN — ATORVASTATIN CALCIUM 10 MG: 10 TABLET, FILM COATED ORAL at 20:45

## 2019-07-31 RX ADMIN — HYDROMORPHONE HYDROCHLORIDE 0.5 MG: 2 INJECTION, SOLUTION INTRAMUSCULAR; INTRAVENOUS; SUBCUTANEOUS at 14:33

## 2019-07-31 RX ADMIN — PANTOPRAZOLE SODIUM 40 MG: 40 INJECTION, POWDER, FOR SOLUTION INTRAVENOUS at 20:44

## 2019-08-01 ENCOUNTER — ANESTHESIA EVENT (OUTPATIENT)
Dept: PERIOP | Facility: HOSPITAL | Age: 79
End: 2019-08-01

## 2019-08-01 ENCOUNTER — ANESTHESIA (OUTPATIENT)
Dept: PERIOP | Facility: HOSPITAL | Age: 79
End: 2019-08-01

## 2019-08-01 ENCOUNTER — APPOINTMENT (OUTPATIENT)
Dept: GENERAL RADIOLOGY | Facility: HOSPITAL | Age: 79
End: 2019-08-01

## 2019-08-01 LAB
ALBUMIN SERPL-MCNC: 2.9 G/DL (ref 3.5–5.2)
ALBUMIN/GLOB SERPL: 1 G/DL
ALP SERPL-CCNC: 99 U/L (ref 39–117)
ALT SERPL W P-5'-P-CCNC: 24 U/L (ref 1–33)
AMYLASE SERPL-CCNC: 213 U/L (ref 28–100)
ANION GAP SERPL CALCULATED.3IONS-SCNC: 15 MMOL/L (ref 5–15)
ANION GAP SERPL CALCULATED.3IONS-SCNC: 16.3 MMOL/L (ref 5–15)
AST SERPL-CCNC: 23 U/L (ref 1–32)
BILIRUB SERPL-MCNC: 0.4 MG/DL (ref 0.2–1.2)
BUN BLD-MCNC: 29 MG/DL (ref 8–23)
BUN BLD-MCNC: 31 MG/DL (ref 8–23)
BUN/CREAT SERPL: 15.7 (ref 7–25)
BUN/CREAT SERPL: 22.6 (ref 7–25)
CALCIUM SPEC-SCNC: 8 MG/DL (ref 8.6–10.5)
CALCIUM SPEC-SCNC: 8.1 MG/DL (ref 8.6–10.5)
CHLORIDE SERPL-SCNC: 103 MMOL/L (ref 98–107)
CHLORIDE SERPL-SCNC: 104 MMOL/L (ref 98–107)
CO2 SERPL-SCNC: 19.7 MMOL/L (ref 22–29)
CO2 SERPL-SCNC: 20 MMOL/L (ref 22–29)
CREAT BLD-MCNC: 1.37 MG/DL (ref 0.57–1)
CREAT BLD-MCNC: 1.85 MG/DL (ref 0.57–1)
GFR SERPL CREATININE-BSD FRML MDRD: 26 ML/MIN/1.73
GFR SERPL CREATININE-BSD FRML MDRD: 37 ML/MIN/1.73
GLOBULIN UR ELPH-MCNC: 2.8 GM/DL
GLUCOSE BLD-MCNC: 117 MG/DL (ref 65–99)
GLUCOSE BLD-MCNC: 156 MG/DL (ref 65–99)
LIPASE SERPL-CCNC: 238 U/L (ref 13–60)
POTASSIUM BLD-SCNC: 4.5 MMOL/L (ref 3.5–5.2)
POTASSIUM BLD-SCNC: 4.6 MMOL/L (ref 3.5–5.2)
PROT SERPL-MCNC: 5.7 G/DL (ref 6–8.5)
SODIUM BLD-SCNC: 139 MMOL/L (ref 136–145)
SODIUM BLD-SCNC: 139 MMOL/L (ref 136–145)

## 2019-08-01 PROCEDURE — 25010000002 IOPAMIDOL 61 % SOLUTION: Performed by: UROLOGY

## 2019-08-01 PROCEDURE — 83690 ASSAY OF LIPASE: CPT | Performed by: HOSPITALIST

## 2019-08-01 PROCEDURE — A9270 NON-COVERED ITEM OR SERVICE: HCPCS | Performed by: HOSPITALIST

## 2019-08-01 PROCEDURE — C2617 STENT, NON-COR, TEM W/O DEL: HCPCS | Performed by: UROLOGY

## 2019-08-01 PROCEDURE — 25010000002 ONDANSETRON PER 1 MG

## 2019-08-01 PROCEDURE — 25010000002 PROPOFOL 10 MG/ML EMULSION: Performed by: NURSE ANESTHETIST, CERTIFIED REGISTERED

## 2019-08-01 PROCEDURE — 96361 HYDRATE IV INFUSION ADD-ON: CPT

## 2019-08-01 PROCEDURE — 25010000002 HYDROMORPHONE PER 4 MG: Performed by: HOSPITALIST

## 2019-08-01 PROCEDURE — 63710000001 ATORVASTATIN 10 MG TABLET: Performed by: HOSPITALIST

## 2019-08-01 PROCEDURE — G0378 HOSPITAL OBSERVATION PER HR: HCPCS

## 2019-08-01 PROCEDURE — 96376 TX/PRO/DX INJ SAME DRUG ADON: CPT

## 2019-08-01 PROCEDURE — C1758 CATHETER, URETERAL: HCPCS | Performed by: UROLOGY

## 2019-08-01 PROCEDURE — 63710000001 PHENAZOPYRIDINE 100 MG TABLET: Performed by: UROLOGY

## 2019-08-01 PROCEDURE — 87086 URINE CULTURE/COLONY COUNT: CPT | Performed by: UROLOGY

## 2019-08-01 PROCEDURE — 80053 COMPREHEN METABOLIC PANEL: CPT | Performed by: HOSPITALIST

## 2019-08-01 PROCEDURE — C1769 GUIDE WIRE: HCPCS | Performed by: UROLOGY

## 2019-08-01 PROCEDURE — 63710000001 IMIPRAMINE 25 MG TABLET: Performed by: HOSPITALIST

## 2019-08-01 PROCEDURE — 25010000002 KETOROLAC TROMETHAMINE PER 15 MG: Performed by: HOSPITALIST

## 2019-08-01 PROCEDURE — 63710000001 VITAMIN C 500 MG TABLET: Performed by: HOSPITALIST

## 2019-08-01 PROCEDURE — 82150 ASSAY OF AMYLASE: CPT | Performed by: HOSPITALIST

## 2019-08-01 PROCEDURE — 25010000002 MIDAZOLAM PER 1 MG: Performed by: NURSE ANESTHETIST, CERTIFIED REGISTERED

## 2019-08-01 PROCEDURE — 25010000002 ONDANSETRON PER 1 MG: Performed by: NURSE ANESTHETIST, CERTIFIED REGISTERED

## 2019-08-01 PROCEDURE — A9270 NON-COVERED ITEM OR SERVICE: HCPCS | Performed by: UROLOGY

## 2019-08-01 PROCEDURE — 87186 SC STD MICRODIL/AGAR DIL: CPT | Performed by: UROLOGY

## 2019-08-01 PROCEDURE — 63710000001 ACETAMINOPHEN 325 MG TABLET

## 2019-08-01 PROCEDURE — A9270 NON-COVERED ITEM OR SERVICE: HCPCS

## 2019-08-01 PROCEDURE — 94799 UNLISTED PULMONARY SVC/PX: CPT

## 2019-08-01 PROCEDURE — 99225 PR SBSQ OBSERVATION CARE/DAY 25 MINUTES: CPT | Performed by: HOSPITALIST

## 2019-08-01 PROCEDURE — 25010000002 CEFTRIAXONE SODIUM-DEXTROSE 1-3.74 GM-%(50ML) RECONSTITUTED SOLUTION: Performed by: UROLOGY

## 2019-08-01 PROCEDURE — 76000 FLUOROSCOPY <1 HR PHYS/QHP: CPT

## 2019-08-01 DEVICE — URETERAL STENT
Type: IMPLANTABLE DEVICE | Site: URETER | Status: FUNCTIONAL
Brand: CONTOUR™

## 2019-08-01 RX ORDER — SODIUM CHLORIDE, SODIUM LACTATE, POTASSIUM CHLORIDE, CALCIUM CHLORIDE 600; 310; 30; 20 MG/100ML; MG/100ML; MG/100ML; MG/100ML
9 INJECTION, SOLUTION INTRAVENOUS CONTINUOUS
Status: DISCONTINUED | OUTPATIENT
Start: 2019-08-01 | End: 2019-08-01

## 2019-08-01 RX ORDER — ACETAMINOPHEN 325 MG/1
TABLET ORAL
Status: COMPLETED
Start: 2019-08-01 | End: 2019-08-01

## 2019-08-01 RX ORDER — MIDAZOLAM HYDROCHLORIDE 1 MG/ML
1 INJECTION INTRAMUSCULAR; INTRAVENOUS
Status: DISCONTINUED | OUTPATIENT
Start: 2019-08-01 | End: 2019-08-01

## 2019-08-01 RX ORDER — LIDOCAINE HYDROCHLORIDE 20 MG/ML
INJECTION, SOLUTION INFILTRATION; PERINEURAL AS NEEDED
Status: DISCONTINUED | OUTPATIENT
Start: 2019-08-01 | End: 2019-08-01 | Stop reason: SURG

## 2019-08-01 RX ORDER — FENTANYL CITRATE 50 UG/ML
25 INJECTION, SOLUTION INTRAMUSCULAR; INTRAVENOUS
Status: DISCONTINUED | OUTPATIENT
Start: 2019-08-01 | End: 2019-08-01 | Stop reason: HOSPADM

## 2019-08-01 RX ORDER — SCOLOPAMINE TRANSDERMAL SYSTEM 1 MG/1
1 PATCH, EXTENDED RELEASE TRANSDERMAL
Status: DISCONTINUED | OUTPATIENT
Start: 2019-08-01 | End: 2019-08-02 | Stop reason: HOSPADM

## 2019-08-01 RX ORDER — SODIUM CHLORIDE, SODIUM LACTATE, POTASSIUM CHLORIDE, CALCIUM CHLORIDE 600; 310; 30; 20 MG/100ML; MG/100ML; MG/100ML; MG/100ML
100 INJECTION, SOLUTION INTRAVENOUS CONTINUOUS
Status: DISCONTINUED | OUTPATIENT
Start: 2019-08-01 | End: 2019-08-01

## 2019-08-01 RX ORDER — MIDAZOLAM HYDROCHLORIDE 1 MG/ML
2 INJECTION INTRAMUSCULAR; INTRAVENOUS
Status: DISCONTINUED | OUTPATIENT
Start: 2019-08-01 | End: 2019-08-01

## 2019-08-01 RX ORDER — SODIUM CHLORIDE 0.9 % (FLUSH) 0.9 %
1-10 SYRINGE (ML) INJECTION AS NEEDED
Status: DISCONTINUED | OUTPATIENT
Start: 2019-08-01 | End: 2019-08-01

## 2019-08-01 RX ORDER — MEPERIDINE HYDROCHLORIDE 25 MG/ML
12.5 INJECTION INTRAMUSCULAR; INTRAVENOUS; SUBCUTANEOUS
Status: DISCONTINUED | OUTPATIENT
Start: 2019-08-01 | End: 2019-08-01 | Stop reason: HOSPADM

## 2019-08-01 RX ORDER — PROPOFOL 10 MG/ML
VIAL (ML) INTRAVENOUS AS NEEDED
Status: DISCONTINUED | OUTPATIENT
Start: 2019-08-01 | End: 2019-08-01 | Stop reason: SURG

## 2019-08-01 RX ORDER — ONDANSETRON 2 MG/ML
4 INJECTION INTRAMUSCULAR; INTRAVENOUS ONCE
Status: COMPLETED | OUTPATIENT
Start: 2019-08-01 | End: 2019-08-01

## 2019-08-01 RX ORDER — ONDANSETRON 2 MG/ML
4 INJECTION INTRAMUSCULAR; INTRAVENOUS ONCE AS NEEDED
Status: COMPLETED | OUTPATIENT
Start: 2019-08-01 | End: 2019-08-01

## 2019-08-01 RX ORDER — ONDANSETRON 2 MG/ML
INJECTION INTRAMUSCULAR; INTRAVENOUS
Status: COMPLETED
Start: 2019-08-01 | End: 2019-08-01

## 2019-08-01 RX ORDER — FENTANYL CITRATE 50 UG/ML
50 INJECTION, SOLUTION INTRAMUSCULAR; INTRAVENOUS
Status: DISCONTINUED | OUTPATIENT
Start: 2019-08-01 | End: 2019-08-01 | Stop reason: HOSPADM

## 2019-08-01 RX ORDER — PHENAZOPYRIDINE HYDROCHLORIDE 100 MG/1
100 TABLET, FILM COATED ORAL
Status: DISCONTINUED | OUTPATIENT
Start: 2019-08-01 | End: 2019-08-02 | Stop reason: HOSPADM

## 2019-08-01 RX ORDER — ONDANSETRON 2 MG/ML
4 INJECTION INTRAMUSCULAR; INTRAVENOUS ONCE AS NEEDED
Status: DISCONTINUED | OUTPATIENT
Start: 2019-08-01 | End: 2019-08-01 | Stop reason: HOSPADM

## 2019-08-01 RX ORDER — ACETAMINOPHEN 325 MG/1
650 TABLET ORAL ONCE
Status: COMPLETED | OUTPATIENT
Start: 2019-08-01 | End: 2019-08-01

## 2019-08-01 RX ORDER — MAGNESIUM HYDROXIDE 1200 MG/15ML
LIQUID ORAL AS NEEDED
Status: DISCONTINUED | OUTPATIENT
Start: 2019-08-01 | End: 2019-08-01 | Stop reason: HOSPADM

## 2019-08-01 RX ORDER — CEFTRIAXONE 1 G/50ML
1 INJECTION, SOLUTION INTRAVENOUS EVERY 24 HOURS
Status: DISCONTINUED | OUTPATIENT
Start: 2019-08-01 | End: 2019-08-02 | Stop reason: HOSPADM

## 2019-08-01 RX ORDER — SODIUM CHLORIDE 9 MG/ML
40 INJECTION, SOLUTION INTRAVENOUS AS NEEDED
Status: DISCONTINUED | OUTPATIENT
Start: 2019-08-01 | End: 2019-08-01

## 2019-08-01 RX ORDER — LIDOCAINE HYDROCHLORIDE 10 MG/ML
0.5 INJECTION, SOLUTION EPIDURAL; INFILTRATION; INTRACAUDAL; PERINEURAL ONCE AS NEEDED
Status: DISCONTINUED | OUTPATIENT
Start: 2019-08-01 | End: 2019-08-01

## 2019-08-01 RX ADMIN — SODIUM CHLORIDE, PRESERVATIVE FREE 3 ML: 5 INJECTION INTRAVENOUS at 20:28

## 2019-08-01 RX ADMIN — FAMOTIDINE 20 MG: 10 INJECTION, SOLUTION INTRAVENOUS at 12:03

## 2019-08-01 RX ADMIN — LISINOPRIL 40 MG: 20 TABLET ORAL at 10:25

## 2019-08-01 RX ADMIN — ONDANSETRON 4 MG: 2 INJECTION, SOLUTION INTRAMUSCULAR; INTRAVENOUS at 12:03

## 2019-08-01 RX ADMIN — LIDOCAINE HYDROCHLORIDE 60 MG: 20 INJECTION, SOLUTION INFILTRATION; PERINEURAL at 12:14

## 2019-08-01 RX ADMIN — PROPOFOL 150 MG: 10 INJECTION, EMULSION INTRAVENOUS at 12:14

## 2019-08-01 RX ADMIN — SODIUM CHLORIDE, PRESERVATIVE FREE 3 ML: 5 INJECTION INTRAVENOUS at 16:01

## 2019-08-01 RX ADMIN — OXYCODONE HYDROCHLORIDE AND ACETAMINOPHEN 500 MG: 500 TABLET ORAL at 16:00

## 2019-08-01 RX ADMIN — ACETAMINOPHEN 650 MG: 325 TABLET ORAL at 16:17

## 2019-08-01 RX ADMIN — SCOPALAMINE 1 PATCH: 1 PATCH, EXTENDED RELEASE TRANSDERMAL at 12:03

## 2019-08-01 RX ADMIN — SODIUM CHLORIDE 125 ML/HR: 9 INJECTION, SOLUTION INTRAVENOUS at 15:20

## 2019-08-01 RX ADMIN — ONDANSETRON 4 MG: 2 INJECTION INTRAMUSCULAR; INTRAVENOUS at 16:29

## 2019-08-01 RX ADMIN — KETOROLAC TROMETHAMINE 15 MG: 30 INJECTION, SOLUTION INTRAMUSCULAR at 14:33

## 2019-08-01 RX ADMIN — CEFTRIAXONE 1 G: 1 INJECTION, SOLUTION INTRAVENOUS at 16:00

## 2019-08-01 RX ADMIN — SODIUM CHLORIDE 125 ML/HR: 9 INJECTION, SOLUTION INTRAVENOUS at 23:59

## 2019-08-01 RX ADMIN — ATORVASTATIN CALCIUM 10 MG: 10 TABLET, FILM COATED ORAL at 15:59

## 2019-08-01 RX ADMIN — IMIPRAMINE HYDROCHLORIDE 100 MG: 25 TABLET, FILM COATED ORAL at 22:40

## 2019-08-01 RX ADMIN — SODIUM CHLORIDE 125 ML/HR: 9 INJECTION, SOLUTION INTRAVENOUS at 03:40

## 2019-08-01 RX ADMIN — ACETAMINOPHEN 650 MG: 325 TABLET, FILM COATED ORAL at 16:17

## 2019-08-01 RX ADMIN — HYDROMORPHONE HYDROCHLORIDE 0.5 MG: 2 INJECTION, SOLUTION INTRAMUSCULAR; INTRAVENOUS; SUBCUTANEOUS at 03:39

## 2019-08-01 RX ADMIN — PHENAZOPYRIDINE HYDROCHLORIDE 100 MG: 100 TABLET ORAL at 15:59

## 2019-08-01 RX ADMIN — ONDANSETRON 4 MG: 2 INJECTION, SOLUTION INTRAMUSCULAR; INTRAVENOUS at 16:29

## 2019-08-01 RX ADMIN — MIDAZOLAM HYDROCHLORIDE 2 MG: 1 INJECTION, SOLUTION INTRAMUSCULAR; INTRAVENOUS at 12:03

## 2019-08-01 RX ADMIN — SODIUM CHLORIDE, POTASSIUM CHLORIDE, SODIUM LACTATE AND CALCIUM CHLORIDE 9 ML/HR: 600; 310; 30; 20 INJECTION, SOLUTION INTRAVENOUS at 12:03

## 2019-08-01 NOTE — PLAN OF CARE
Problem: Patient Care Overview  Goal: Plan of Care Review  Outcome: Ongoing (interventions implemented as appropriate)   08/01/19 0616   Coping/Psychosocial   Plan of Care Reviewed With patient   Plan of Care Review   Progress no change   OTHER   Outcome Summary slept fairly well with intermit. c/o of abdominal discomfort that radiated into her back. toradol ineffective for pain releif and did receive couple of doses of iv dilaudid overnight. s/r on the monitor, afebrile. Urology consulted last pm-will see today.     Goal: Individualization and Mutuality  Outcome: Ongoing (interventions implemented as appropriate)    Goal: Discharge Needs Assessment  Outcome: Ongoing (interventions implemented as appropriate)    Goal: Interprofessional Rounds/Family Conf  Outcome: Ongoing (interventions implemented as appropriate)      Problem: Pain, Chronic (Adult)  Goal: Identify Related Risk Factors and Signs and Symptoms  Outcome: Outcome(s) achieved Date Met: 08/01/19 08/01/19 0616   Pain, Chronic (Adult)   Signs and Symptoms (Chronic Pain) fatigue/weakness;verbalization of pain descriptors     Goal: Acceptable Pain/Comfort Level and Functional Ability  Outcome: Ongoing (interventions implemented as appropriate)   08/01/19 0616   Pain, Chronic (Adult)   Acceptable Pain/Comfort Level and Functional Ability making progress toward outcome       Problem: Fall Risk (Adult)  Goal: Identify Related Risk Factors and Signs and Symptoms  Outcome: Outcome(s) achieved Date Met: 08/01/19 08/01/19 0616   Fall Risk (Adult)   Related Risk Factors (Fall Risk) fatigue/slow reaction;gait/mobility problems;environment unfamiliar   Signs and Symptoms (Fall Risk) presence of risk factors     Goal: Absence of Fall  Outcome: Ongoing (interventions implemented as appropriate)   08/01/19 0616   Fall Risk (Adult)   Absence of Fall making progress toward outcome       Problem: Skin Injury Risk (Adult)  Goal: Identify Related Risk Factors and Signs and  Symptoms  Outcome: Outcome(s) achieved Date Met: 08/01/19 08/01/19 0616   Skin Injury Risk (Adult)   Related Risk Factors (Skin Injury Risk) advanced age;body weight extremes;critical care admission;mobility impaired     Goal: Skin Health and Integrity  Outcome: Ongoing (interventions implemented as appropriate)   08/01/19 0616   Skin Injury Risk (Adult)   Skin Health and Integrity making progress toward outcome

## 2019-08-01 NOTE — ANESTHESIA PROCEDURE NOTES
Airway  Urgency: elective    Airway not difficult    General Information and Staff    Patient location during procedure: OR  CRNA: Tone Alston CRNA    Indications and Patient Condition  Indications for airway management: airway protection    Preoxygenated: yes  MILS not maintained throughout  Mask difficulty assessment: 0 - not attempted    Final Airway Details  Final airway type: supraglottic airway      Successful airway: classic  Size 4    Number of attempts at approach: 1    Additional Comments  Atraumatic.  LMA good seal

## 2019-08-01 NOTE — CONSULTS
FIRST UROLOGY CONSULT      Patient Identification:  NAME:  Anitra Orta  Age:  78 y.o.   Sex:  female   :  1940   MRN:  [id number]       Chief complaint: I am hurting on the left side another stone    History of present illness: 78-year-old white female with a history of urinary tract infections recurrent stone disease mixed urinary incontinence has been on Myrbetriq as well as a treatment for type III stress incontinence presenting herself with midepigastric pain radiating down to the left with nausea chills temperature 100 tachycardia stable this morning pain to the left flank to left groin approximately 7 out of 10 intermittent      Past medical history:  Past Medical History:   Diagnosis Date   • Anxiety    • Chronic pain disorder    • Constipation    • DDD (degenerative disc disease), lumbar    • GERD (gastroesophageal reflux disease)    • HTN (hypertension)    • Hyperlipidemia    • Kidney stones    • Low back pain    • Osteoarthritis    • Osteopenia    • Osteoporosis    • PONV (postoperative nausea and vomiting)    • Psoriasis    • Urinary tract infection        Past surgical history:  Past Surgical History:   Procedure Laterality Date   • BREAST BIOPSY Left     benign   • BREAST LUMPECTOMY Left     benign   • BUNIONECTOMY Right    • COLONOSCOPY N/A 2016    Procedure: COLONOSCOPY polypectomy;  Surgeon: Adali Willard MD;  Location: [facility];  Service:    • CYSTOSCOPY BOTOX INJECTION OF BLADDER N/A 2017    Procedure: CYSTOSCOPY COAPTITE INJECTION;  Surgeon: Kevon Clark MD;  Location: [facility];  Service:    • CYSTOSCOPY W/ LITHOLAPAXY / EHL     • CYSTOSCOPY W/ URETERAL STENT PLACEMENT Left 2016    Procedure: CYSTOSCOPY URETERAL STENT INSERTION;  Surgeon: Kevon Clark MD;  Location: [facility];  Service:    • KNEE ARTHROPLASTY Right    • TONSILLECTOMY AND ADENOIDECTOMY     • URETEROSCOPY LASER LITHOTRIPSY WITH STENT INSERTION Left 10/5/2016    Procedure:  LT URETEROSCOPY LASER LITHOTRIPSY STONE BASKET EXTRACTION AND STENT ;  Surgeon: Kevon Clark MD;  Location: [facility] MAIN OR;  Service:        Allergies:  Bactrim [sulfamethoxazole-trimethoprim]; Ciprofloxacin; Levaquin [levofloxacin]; Macrobid [nitrofurantoin]; Nitrofurantoin macrocrystal; and Cortisone    Home medications:  Medications Prior to Admission   Medication Sig Dispense Refill Last Dose   • aspirin 81 MG EC tablet Take 81 mg by mouth Daily. PT TO STOP PER MD INSTRUCTION   7/31/2019 at 1000   • Biotin 10 MG capsule Take 10 mg by mouth Daily.   7/31/2019 at 1000   • calcium citrate-vitamin d (CITRACAL) 200-250 MG-UNIT tablet tablet Take 1 tablet by mouth Daily.   7/31/2019 at 1000   • imipramine (TOFRANIL) 50 MG tablet TAKE 2 TO 3 TABLETS BY MOUTH EVERY NIGHT AT BEDTIME (Patient taking differently: TAKE 2  TABLETS BY MOUTH EVERY NIGHT AT BEDTIME) 270 tablet 1 7/30/2019 at 1130   • lisinopril (PRINIVIL,ZESTRIL) 20 MG tablet TAKE 1 TABLET BY MOUTH DAILY WITH BREAKFAST (Patient taking differently: Take 40 mg by mouth Daily With Breakfast.) 90 tablet 1 7/31/2019 at 1000   • NON FORMULARY Take 1 tablet by mouth Daily. viviscal tablet for hair loss      • NON FORMULARY Take 1 tablet by mouth Daily. Presser vision / Ardes 2 for macular degeneration / over the counter      • omeprazole (priLOSEC) 20 MG capsule Take 1 capsule by mouth Daily.   7/31/2019 at 1000   • simvastatin (ZOCOR) 20 MG tablet TAKE 1 TABLET BY MOUTH EVERY NIGHT (Patient taking differently: Take 40 mg by mouth Every Night.) 90 tablet 1 7/30/2019 at Unknown time   • vitamin C (ASCORBIC ACID) 500 MG tablet Take 500 mg by mouth Daily.   7/31/2019 at 1000       Hospital medications:    atorvastatin 10 mg Oral Daily   ceftriaxone 1 g Intravenous Q24H   imipramine 100 mg Oral Nightly   lisinopril 40 mg Oral Daily With Breakfast   sodium chloride 3 mL Intravenous Q12H   vitamin C 500 mg Oral Daily       sodium chloride 125 mL/hr Last Rate: 125 mL/hr  (19 0625)     HYDROmorphone  •  ketorolac  •  [COMPLETED] Insert peripheral IV **AND** sodium chloride  •  sodium chloride  •  sodium chloride    Family history:  Family History   Problem Relation Age of Onset   • Heart defect Mother    • Heart defect Father    • Malig Hyperthermia Neg Hx    • Breast cancer Neg Hx        Social history:  Social History     Tobacco Use   • Smoking status: Former Smoker     Years: 40.00     Last attempt to quit: 10/4/1980     Years since quittin.8   • Smokeless tobacco: Never Used   • Tobacco comment: quit    Substance Use Topics   • Alcohol use: No   • Drug use: No       Review of systems:    Negative 12-system ROS except for the following: Urine clear      Objective:  TMax 24 hours:   Temp (24hrs), Av.6 °F (37 °C), Min:97.4 °F (36.3 °C), Max:100.9 °F (38.3 °C)      Vitals Ranges:   Temp:  [97.4 °F (36.3 °C)-100.9 °F (38.3 °C)] 97.4 °F (36.3 °C)  Heart Rate:  [] 89  Resp:  [18-22] 20  BP: ()/() 100/62    Intake/Output Last 3 shifts:  I/O last 3 completed shifts:  In: 2345.6 [P.O.:360; I.V.:1435.6; IV Piggyback:550]  Out: 200 [Urine:200]     Physical Exam:       General Appearance:    Alert, cooperative, in mild acute distress   Head:    Normocephalic, without obvious abnormality, atraumatic   Eyes:          conjunctivae and corneas clear   Ears:    Normal external inspection   Throat:   No oral lesions, oral mucosa moist   Neck:   Supple, no LAD, trachea midline   Back:    Left CVA tenderness   Lungs:     Respirations unlabored, symmetric excursion on oxygen per nasal cannula    Heart:    RRR, intact peripheral pulses   Abdomen:     Soft, NDNT, no masses, no guarding   :       Extremities:   No edema, no deformity   Skin:   No bleeding, bruising or rashes   Neuro/Psych:   Orientation intact, mood/affect pleasant, no focal findings       Results review:   I reviewed the patient's new clinical results.    Data review:  Lab Results (last 24 hours)      Procedure Component Value Units Date/Time    Comprehensive Metabolic Panel [[id number]]  (Abnormal) Collected:  08/01/19 0357    Specimen:  Blood Updated:  08/01/19 0429     Glucose 156 mg/dL      BUN 29 mg/dL      Creatinine 1.85 mg/dL      Sodium 139 mmol/L      Potassium 4.6 mmol/L      Chloride 103 mmol/L      CO2 19.7 mmol/L      Calcium 8.0 mg/dL      Total Protein 5.7 g/dL      Albumin 2.90 g/dL      ALT (SGPT) 24 U/L      AST (SGOT) 23 U/L      Alkaline Phosphatase 99 U/L      Total Bilirubin 0.4 mg/dL      eGFR Non African Amer 26 mL/min/1.73      Globulin 2.8 gm/dL      A/G Ratio 1.0 g/dL      BUN/Creatinine Ratio 15.7     Anion Gap 16.3 mmol/L     Narrative:       GFR Normal >60  Chronic Kidney Disease <60  Kidney Failure <15    Lipase [[id number]]  (Abnormal) Collected:  08/01/19 0357    Specimen:  Blood Updated:  08/01/19 0426     Lipase 238 U/L     Troponin [[id number]]  (Normal) Collected:  07/31/19 2031    Specimen:  Blood Updated:  07/31/19 2056     Troponin T <0.010 ng/mL     Narrative:       Troponin T Reference Range:  <= 0.03 ng/mL-   Negative for AMI  >0.03 ng/mL-     Abnormal for myocardial necrosis.  Clinicians would have to utilize clinical acumen, EKG, Troponin and serial changes to determine if it is an Acute Myocardial Infarction or myocardial injury due to an underlying chronic condition.     BNP [[id number]]  (Normal) Collected:  07/31/19 1230    Specimen:  Blood Updated:  07/31/19 1305     proBNP 98.7 pg/mL     Narrative:       Among patients with dyspnea, NT-proBNP is highly sensitive for the detection of acute congestive heart failure. In addition NT-proBNP of <300 pg/ml effectively rules out acute congestive heart failure with 99% negative predictive value.    Urinalysis, Microscopic Only - Urine, Catheter [[id number]]  (Abnormal) Collected:  07/31/19 1236    Specimen:  Urine, Catheter Updated:  07/31/19 1255     RBC, UA 3-5 /HPF      WBC, UA 3-5 /HPF      Bacteria, UA 4+ /HPF       Squamous Epithelial Cells, UA None Seen /HPF      Transitional Epithelial Cells, UA 0-2 /HPF      Yeast, UA Small/1+ Budding Yeast /HPF      Hyaline Casts, UA None Seen /LPF      Methodology Manual Light Microscopy    Urinalysis With Microscopic If Indicated (No Culture) - Urine, Catheter [[id number]]  (Abnormal) Collected:  07/31/19 1236    Specimen:  Urine, Catheter Updated:  07/31/19 1244     Color, UA Straw     Appearance, UA Slightly Cloudy     pH, UA 5.5     Specific Gravity, UA 1.015     Glucose, UA Negative     Ketones, UA Negative     Bilirubin, UA Negative     Blood, UA Trace     Protein, UA Negative     Leuk Esterase, UA Negative     Nitrite, UA Positive     Urobilinogen, UA 0.2 E.U./dL    Lipase [[id number]]  (Abnormal) Collected:  07/31/19 1212    Specimen:  Blood Updated:  07/31/19 1240     Lipase 84 U/L     D-dimer, Quantitative [[id number]]  (Abnormal) Collected:  07/31/19 1230    Specimen:  Blood Updated:  07/31/19 1240     D-Dimer, Quantitative 1.95 MCGFEU/mL     Narrative:       Can be elevated in, but is not diagnostic for deep vein thrombosis (DVT) or pulmonary embolis (PE).  It is also elevated in other medical conditions.  Clinical correlation is required.  The negative cut-off value for the D-Dimer is 0.50 mcg FEU/mL for DVT and PE.    Protime-INR [[id number]]  (Normal) Collected:  07/31/19 1230    Specimen:  Blood Updated:  07/31/19 1240     Protime 12.4 Seconds      INR 0.95    Narrative:       Therapeutic Ranges for INR: 2.0-3.0 (PT 20-30)                              2.5-3.5 (PT 25-34)    aPTT [[id number]]  (Normal) Collected:  07/31/19 1230    Specimen:  Blood Updated:  07/31/19 1240     PTT 26.7 seconds     Narrative:       PTT = The equivalent PTT values for the therapeutic range of heparin levels at 0.1 to 0.7 U/ml are 53 to 110 seconds.    Troponin [[id number]]  (Normal) Collected:  07/31/19 1212    Specimen:  Blood Updated:  07/31/19 1235     Troponin T <0.010 ng/mL     Narrative:        Troponin T Reference Range:  <= 0.03 ng/mL-   Negative for AMI  >0.03 ng/mL-     Abnormal for myocardial necrosis.  Clinicians would have to utilize clinical acumen, EKG, Troponin and serial changes to determine if it is an Acute Myocardial Infarction or myocardial injury due to an underlying chronic condition.     Comprehensive Metabolic Panel [[id number]]  (Abnormal) Collected:  07/31/19 1212    Specimen:  Blood Updated:  07/31/19 1233     Glucose 128 mg/dL      BUN 22 mg/dL      Creatinine 1.06 mg/dL      Sodium 142 mmol/L      Potassium 4.3 mmol/L      Chloride 103 mmol/L      CO2 24.1 mmol/L      Calcium 9.2 mg/dL      Total Protein 7.0 g/dL      Albumin 3.90 g/dL      ALT (SGPT) 25 U/L      AST (SGOT) 23 U/L      Alkaline Phosphatase 109 U/L      Total Bilirubin 0.6 mg/dL      eGFR Non African Amer 50 mL/min/1.73      Globulin 3.1 gm/dL      A/G Ratio 1.3 g/dL      BUN/Creatinine Ratio 20.8     Anion Gap 14.9 mmol/L     Narrative:       GFR Normal >60  Chronic Kidney Disease <60  Kidney Failure <15    Lactic Acid, Plasma [[id number]]  (Normal) Collected:  07/31/19 1212    Specimen:  Blood Updated:  07/31/19 1229     Lactate 1.9 mmol/L     CBC & Differential [[id number]] Collected:  07/31/19 1212    Specimen:  Blood Updated:  07/31/19 1219    Narrative:       The following orders were created for panel order CBC & Differential.  Procedure                               Abnormality         Status                     ---------                               -----------         ------                     CBC Auto Differential[[id number]]        Abnormal            Final result                 Please view results for these tests on the individual orders.    CBC Auto Differential [[id number]]  (Abnormal) Collected:  07/31/19 1212    Specimen:  Blood Updated:  07/31/19 1219     WBC 8.13 10*3/mm3      RBC 4.94 10*6/mm3      Hemoglobin 14.4 g/dL      Hematocrit 44.9 %      MCV 90.9 fL      MCH 29.1 pg      MCHC 32.1 g/dL       RDW 13.1 %      RDW-SD 44.5 fl      MPV 8.7 fL      Platelets 154 10*3/mm3      Neutrophil % 94.6 %      Lymphocyte % 4.3 %      Monocyte % 0.7 %      Eosinophil % 0.1 %      Basophil % 0.1 %      Immature Grans % 0.2 %      Neutrophils, Absolute 7.68 10*3/mm3      Lymphocytes, Absolute 0.35 10*3/mm3      Monocytes, Absolute 0.06 10*3/mm3      Eosinophils, Absolute 0.01 10*3/mm3      Basophils, Absolute 0.01 10*3/mm3      Immature Grans, Absolute 0.02 10*3/mm3            Imaging:  Imaging Results (last 24 hours)     Procedure Component Value Units Date/Time    CT Abdomen Pelvis With Contrast [[id number]] Collected:  07/31/19 1402     Updated:  07/31/19 1409    Narrative:       CT abdomen and pelvis with contrast 07/30/2019        HISTORY:  abdominal pain for a few days which is getting severe. Shooting pains a  left-sided chest.        COMPARISON:  12/05/2018     TECHNIQUE:    CT of Abdomen and Pelvis with contrast performed.  Sagittal and Coronal  reconstructions performed. Radiation dose reduction techniques included  automated exposure control or exposure modulation based on body size.  Radiation audit for CT and nuclear cardiology exams in the last 12  months: 1.      FINDINGS:    Abdomen:  The liver, gallbladder, pancreas, adrenal glands and aorta are normal in  appearance. There is some subtle decreased density in the medial spleen.  This is better seen on the CT pulmonary embolus protocol study.     That study showed a large area of decreased perfusion in the spleen  measured about 3 to 4 cm in diameter. On the abdomen CT scan this is  mostly filled in. Probably this appearance was due to normal vascular  filling of the spleen.. The right kidney contains multiple small cysts  measuring less than a centimeter in diameter. Left kidney also has  multiple small cysts with one large cyst measuring up to 4 cm in  diameter. There is a nonobstructing 2 mm left renal stone. There is  stranding around the left kidney  and there is a 3 mm proximal left  ureteral stone with mild hydronephrosis. There is no adenopathy. The  bowel is normal.       Pelvis:  Uterus and adnexal regions and bladder are normal. The bones show  degenerative changes         Impression:       1. There is a 3 mm obstructing left ureteropelvic junction stone as well  as a 2 mm nonobstructing stone within the kidney. There is mild  perinephric edema.        Bilateral incidental renal cysts     Otherwise negative study     This report was finalized on 7/31/2019 2:07 PM by Dr. Kevin Padilla MD.       CT Angiogram Chest With Contrast [[id number]] Collected:  07/31/19 1359     Updated:  07/31/19 1404    Narrative:       CT CHEST WITH PULMONARY EMBOLUS PROTOCOL   07/31/2019     HISTORY:  Chest pain for a few days becoming severe today     COMPARISON:  03/29/2017     TECHNIQUE:    Spiral imaging was performed through the chest with IV contrast and  axial and 3-D reconstructions were generated. The images were reviewed.  Radiation dose reduction techniques included automated exposure control  or exposure modulation based on body size. Radiation audit for CT and  nuclear cardiology exams in the last 12 months:   1..      FINDINGS:    Lungs are clear. The aorta is normal in size. There is no dissection.  There is adequate opacification of the pulmonary arteries and there is  no CT evidence of pulmonary embolus. No mediastinal or hilar adenopathy  is identified. There is a sebaceous cyst in the anterior right chest  just to the right of midline. It measures 17 mm in diameter. Its in  front of the midsternum region. Please see the CT abdomen and pelvis.  FINDINGS below the diaphragm         Impression:       No CT evidence of pulmonary embolus or dissection     17 mm sebaceous cyst just right of midline at the midsternal region     Please see abdomen report for findings below the diaphragm     This report was finalized on 7/31/2019 2:02 PM by Dr. Kevin Padilla MD.       XR Chest  1 View [[id number]] Collected:  07/31/19 1248     Updated:  07/31/19 1251    Narrative:       AP PORTABLE CHEST, 07/31/2019     HISTORY:  Left-sided abdominal pain with nausea and vomiting     COMPARISON:  04/25/2018     TECHNIQUE:  AP portable chest x-ray.     FINDINGS:  Heart size and pulmonary vascularity are normal. The lungs are expanded  and clear. No visible pulmonary infiltrate or pleural effusion.           Impression:       Negative single view chest     This report was finalized on 7/31/2019 12:49 PM by Dr. Kevin Padilla MD.                Assessment:       Tachycardia    Left UPJ 3 mm stone hydronephrosis colic nausea chills temperature to 100 recurrent UTIs current bacteriuria  2 mm left renal stone  Overactive bladder mixed urinary incontinence  Plan:     I have called the lab to culture the urine from yesterday it was not done  I discussed the findings with the patient with a combination obstructing stone and a possible UTI placement of a stent because the irritation in the past but she understands site was marked continue Rocephin she has been on Myrbetriq which she has recently stopped discussed later    Kevon Clark MD  08/01/19  7:17 AM

## 2019-08-01 NOTE — NURSING NOTE
Discharge Planning Assessment   Amy Dimas     Patient Name: Anitra Orta  MRN: [id number]  Today's Date: 8/1/2019    Admit Date: 7/31/2019    Discharge Needs Assessment     Row Name 08/01/19 1602       Living Environment    Lives With  spouse    Name(s) of Who Lives With Patient  young Li    Current Living Arrangements  home/apartment/condo    Primary Care Provided by  self    Provides Primary Care For  no one    Family Caregiver if Needed  child(jass), adult;spouse    Quality of Family Relationships  supportive    Able to Return to Prior Arrangements  yes       Resource/Environmental Concerns    Resource/Environmental Concerns  none    Transportation Concerns  car, none       Transition Planning    Patient/Family Anticipates Transition to  home with family    Patient/Family Anticipated Services at Transition  none    Transportation Anticipated  family or friend will provide       Discharge Needs Assessment    Readmission Within the Last 30 Days  no previous admission in last 30 days    Concerns to be Addressed  no discharge needs identified    Equipment Currently Used at Home  cane, straight    Anticipated Changes Related to Illness  none    Equipment Needed After Discharge  none        Discharge Plan     Row Name 08/01/19 1610       Plan    Plan  Met with pt in her room, with daughter at bedside.  Permission to speak with daughter in room.  Facesheet verified, lives with  in home, no needs for DME, HH or rehab.  Pt is independent wiht ADL's except driving. No difficulty obtaining or paying for medications.    No advanced directives and pt does not want info about obtaining advanced directives.  No concerns with going home.  CM will continue to follow for D/C needs.     Patient/Family in Agreement with Plan  yes               Demographic Summary     Row Name 08/01/19 1600       General Information    Admission Type  observation    Arrived From  home    Required Notices Provided  Important  Message from Medicare IMM given 7//31/19    Referral Source  admission list    Reason for Consult  discharge planning    Preferred Language  English     Used During This Interaction  no        Functional Status     Row Name 08/01/19 1601       Functional Status    Usual Activity Tolerance  good    Current Activity Tolerance  good       Functional Status, IADL    Medications  independent    Meal Preparation  independent    Housekeeping  independent    Laundry  independent    Shopping  independent       Mental Status    General Appearance WDL  WDL       Mental Status Summary    Recent Changes in Mental Status/Cognitive Functioning  no changes                Nigel Connell

## 2019-08-01 NOTE — PROGRESS NOTES
"Hospitalist Team      Patient Care Team:  Rosibel Hamilton MD as PCP - General (Internal Medicine & Pediatrics)  Margot Chavez MD as PCP - Claims Attributed        Chief Complaint: Follow-up Abdominal Pain    Subjective    Night went OK.  She is tolerating PO...particularly water.  Still describes mid-epigastric pain, but no nausea.  No chest pain.  Not thrilled about being NPO.    Objective    Vital Signs  Temp:  [97.4 °F (36.3 °C)-100.9 °F (38.3 °C)] 98 °F (36.7 °C)  Heart Rate:  [] 105  Resp:  [18-22] 20  BP: ()/() 128/60  Oxygen Therapy  SpO2: 95 %  Pulse Oximetry Type: Continuous  Device (Oxygen Therapy): nasal cannula  Flow (L/min): 2}    Flowsheet Rows      First Filed Value   Admission Height  167.6 cm (65.98\") Documented at 07/31/2019 1824   Admission Weight  110 kg (242 lb) Documented at 07/31/2019 1824          Physical Exam:    General: Appears stated age in NAD  Lungs: Clear.  No accessory use noted.  Normal excursion.  CV: Regular w/o murmur.  Radial and pedal pulses are 2+ and symmetric.  Abdomen: Soft.  No peritoneal signs or guarding.  Bowel sounds are present.  MSK: No C/C.  No edema.  Neuro: CN II-XII grossly intact  Psych: Normal affect.  Ox3.    Results Review:     I reviewed the patient's new clinical results.    Lab Results (last 24 hours)     Procedure Component Value Units Date/Time    Urine Culture - Urine, Urine, Catheter [[id number]] Collected:  07/31/19 1236    Specimen:  Urine, Catheter Updated:  08/01/19 0873    Comprehensive Metabolic Panel [[id number]]  (Abnormal) Collected:  08/01/19 0357    Specimen:  Blood Updated:  08/01/19 0429     Glucose 156 mg/dL      BUN 29 mg/dL      Creatinine 1.85 mg/dL      Sodium 139 mmol/L      Potassium 4.6 mmol/L      Chloride 103 mmol/L      CO2 19.7 mmol/L      Calcium 8.0 mg/dL      Total Protein 5.7 g/dL      Albumin 2.90 g/dL      ALT (SGPT) 24 U/L      AST (SGOT) 23 U/L      Alkaline Phosphatase 99 U/L      Total " Bilirubin 0.4 mg/dL      eGFR Non African Amer 26 mL/min/1.73      Globulin 2.8 gm/dL      A/G Ratio 1.0 g/dL      BUN/Creatinine Ratio 15.7     Anion Gap 16.3 mmol/L     Narrative:       GFR Normal >60  Chronic Kidney Disease <60  Kidney Failure <15    Lipase [[id number]]  (Abnormal) Collected:  08/01/19 0357    Specimen:  Blood Updated:  08/01/19 0426     Lipase 238 U/L     Troponin [[id number]]  (Normal) Collected:  07/31/19 2031    Specimen:  Blood Updated:  07/31/19 2056     Troponin T <0.010 ng/mL     Narrative:       Troponin T Reference Range:  <= 0.03 ng/mL-   Negative for AMI  >0.03 ng/mL-     Abnormal for myocardial necrosis.  Clinicians would have to utilize clinical acumen, EKG, Troponin and serial changes to determine if it is an Acute Myocardial Infarction or myocardial injury due to an underlying chronic condition.     BNP [[id number]]  (Normal) Collected:  07/31/19 1230    Specimen:  Blood Updated:  07/31/19 1305     proBNP 98.7 pg/mL     Narrative:       Among patients with dyspnea, NT-proBNP is highly sensitive for the detection of acute congestive heart failure. In addition NT-proBNP of <300 pg/ml effectively rules out acute congestive heart failure with 99% negative predictive value.    Urinalysis, Microscopic Only - Urine, Catheter [[id number]]  (Abnormal) Collected:  07/31/19 1236    Specimen:  Urine, Catheter Updated:  07/31/19 1255     RBC, UA 3-5 /HPF      WBC, UA 3-5 /HPF      Bacteria, UA 4+ /HPF      Squamous Epithelial Cells, UA None Seen /HPF      Transitional Epithelial Cells, UA 0-2 /HPF      Yeast, UA Small/1+ Budding Yeast /HPF      Hyaline Casts, UA None Seen /LPF      Methodology Manual Light Microscopy    Urinalysis With Microscopic If Indicated (No Culture) - Urine, Catheter [[id number]]  (Abnormal) Collected:  07/31/19 1236    Specimen:  Urine, Catheter Updated:  07/31/19 1244     Color, UA Straw     Appearance, UA Slightly Cloudy     pH, UA 5.5     Specific [place], UA 1.015      Glucose, UA Negative     Ketones, UA Negative     Bilirubin, UA Negative     Blood, UA Trace     Protein, UA Negative     Leuk Esterase, UA Negative     Nitrite, UA Positive     Urobilinogen, UA 0.2 E.U./dL    Lipase [[id number]]  (Abnormal) Collected:  07/31/19 1212    Specimen:  Blood Updated:  07/31/19 1240     Lipase 84 U/L     D-dimer, Quantitative [[id number]]  (Abnormal) Collected:  07/31/19 1230    Specimen:  Blood Updated:  07/31/19 1240     D-Dimer, Quantitative 1.95 MCGFEU/mL     Narrative:       Can be elevated in, but is not diagnostic for deep vein thrombosis (DVT) or pulmonary embolis (PE).  It is also elevated in other medical conditions.  Clinical correlation is required.  The negative cut-off value for the D-Dimer is 0.50 mcg FEU/mL for DVT and PE.    Protime-INR [[id number]]  (Normal) Collected:  07/31/19 1230    Specimen:  Blood Updated:  07/31/19 1240     Protime 12.4 Seconds      INR 0.95    Narrative:       Therapeutic Ranges for INR: 2.0-3.0 (PT 20-30)                              2.5-3.5 (PT 25-34)    aPTT [[id number]]  (Normal) Collected:  07/31/19 1230    Specimen:  Blood Updated:  07/31/19 1240     PTT 26.7 seconds     Narrative:       PTT = The equivalent PTT values for the therapeutic range of heparin levels at 0.1 to 0.7 U/ml are 53 to 110 seconds.    Troponin [[id number]]  (Normal) Collected:  07/31/19 1212    Specimen:  Blood Updated:  07/31/19 1235     Troponin T <0.010 ng/mL     Narrative:       Troponin T Reference Range:  <= 0.03 ng/mL-   Negative for AMI  >0.03 ng/mL-     Abnormal for myocardial necrosis.  Clinicians would have to utilize clinical acumen, EKG, Troponin and serial changes to determine if it is an Acute Myocardial Infarction or myocardial injury due to an underlying chronic condition.     Comprehensive Metabolic Panel [[id number]]  (Abnormal) Collected:  07/31/19 1212    Specimen:  Blood Updated:  07/31/19 1233     Glucose 128 mg/dL      BUN 22 mg/dL      Creatinine  1.06 mg/dL      Sodium 142 mmol/L      Potassium 4.3 mmol/L      Chloride 103 mmol/L      CO2 24.1 mmol/L      Calcium 9.2 mg/dL      Total Protein 7.0 g/dL      Albumin 3.90 g/dL      ALT (SGPT) 25 U/L      AST (SGOT) 23 U/L      Alkaline Phosphatase 109 U/L      Total Bilirubin 0.6 mg/dL      eGFR Non African Amer 50 mL/min/1.73      Globulin 3.1 gm/dL      A/G Ratio 1.3 g/dL      BUN/Creatinine Ratio 20.8     Anion Gap 14.9 mmol/L     Narrative:       GFR Normal >60  Chronic Kidney Disease <60  Kidney Failure <15    Lactic Acid, Plasma [[id number]]  (Normal) Collected:  07/31/19 1212    Specimen:  Blood Updated:  07/31/19 1229     Lactate 1.9 mmol/L     CBC & Differential [[id number]] Collected:  07/31/19 1212    Specimen:  Blood Updated:  07/31/19 1219    Narrative:       The following orders were created for panel order CBC & Differential.  Procedure                               Abnormality         Status                     ---------                               -----------         ------                     CBC Auto Differential[[id number]]        Abnormal            Final result                 Please view results for these tests on the individual orders.    CBC Auto Differential [[id number]]  (Abnormal) Collected:  07/31/19 1212    Specimen:  Blood Updated:  07/31/19 1219     WBC 8.13 10*3/mm3      RBC 4.94 10*6/mm3      Hemoglobin 14.4 g/dL      Hematocrit 44.9 %      MCV 90.9 fL      MCH 29.1 pg      MCHC 32.1 g/dL      RDW 13.1 %      RDW-SD 44.5 fl      MPV 8.7 fL      Platelets 154 10*3/mm3      Neutrophil % 94.6 %      Lymphocyte % 4.3 %      Monocyte % 0.7 %      Eosinophil % 0.1 %      Basophil % 0.1 %      Immature Grans % 0.2 %      Neutrophils, Absolute 7.68 10*3/mm3      Lymphocytes, Absolute 0.35 10*3/mm3      Monocytes, Absolute 0.06 10*3/mm3      Eosinophils, Absolute 0.01 10*3/mm3      Basophils, Absolute 0.01 10*3/mm3      Immature Grans, Absolute 0.02 10*3/mm3           Imaging Results (last  24 hours)     Procedure Component Value Units Date/Time    CT Abdomen Pelvis With Contrast [[id number]] Collected:  07/31/19 1402     Updated:  07/31/19 1409    Narrative:       CT abdomen and pelvis with contrast 07/30/2019        HISTORY:  abdominal pain for a few days which is getting severe. Shooting pains a  left-sided chest.        COMPARISON:  12/05/2018     TECHNIQUE:    CT of Abdomen and Pelvis with contrast performed.  Sagittal and Coronal  reconstructions performed. Radiation dose reduction techniques included  automated exposure control or exposure modulation based on body size.  Radiation audit for CT and nuclear cardiology exams in the last 12  months: 1.      FINDINGS:    Abdomen:  The liver, gallbladder, pancreas, adrenal glands and aorta are normal in  appearance. There is some subtle decreased density in the medial spleen.  This is better seen on the CT pulmonary embolus protocol study.     That study showed a large area of decreased perfusion in the spleen  measured about 3 to 4 cm in diameter. On the abdomen CT scan this is  mostly filled in. Probably this appearance was due to normal vascular  filling of the spleen.. The right kidney contains multiple small cysts  measuring less than a centimeter in diameter. Left kidney also has  multiple small cysts with one large cyst measuring up to 4 cm in  diameter. There is a nonobstructing 2 mm left renal stone. There is  stranding around the left kidney and there is a 3 mm proximal left  ureteral stone with mild hydronephrosis. There is no adenopathy. The  bowel is normal.       Pelvis:  Uterus and adnexal regions and bladder are normal. The bones show  degenerative changes         Impression:       1. There is a 3 mm obstructing left ureteropelvic junction stone as well  as a 2 mm nonobstructing stone within the kidney. There is mild  perinephric edema.        Bilateral incidental renal cysts     Otherwise negative study     This report was finalized on  7/31/2019 2:07 PM by Dr. Kevin Padilla MD.       CT Angiogram Chest With Contrast [[id number]] Collected:  07/31/19 1359     Updated:  07/31/19 1404    Narrative:       CT CHEST WITH PULMONARY EMBOLUS PROTOCOL   07/31/2019     HISTORY:  Chest pain for a few days becoming severe today     COMPARISON:  03/29/2017     TECHNIQUE:    Spiral imaging was performed through the chest with IV contrast and  axial and 3-D reconstructions were generated. The images were reviewed.  Radiation dose reduction techniques included automated exposure control  or exposure modulation based on body size. Radiation audit for CT and  nuclear cardiology exams in the last 12 months:   1..      FINDINGS:    Lungs are clear. The aorta is normal in size. There is no dissection.  There is adequate opacification of the pulmonary arteries and there is  no CT evidence of pulmonary embolus. No mediastinal or hilar adenopathy  is identified. There is a sebaceous cyst in the anterior right chest  just to the right of midline. It measures 17 mm in diameter. Its in  front of the midsternum region. Please see the CT abdomen and pelvis.  FINDINGS below the diaphragm         Impression:       No CT evidence of pulmonary embolus or dissection     17 mm sebaceous cyst just right of midline at the midsternal region     Please see abdomen report for findings below the diaphragm     This report was finalized on 7/31/2019 2:02 PM by Dr. Kevin Padilla MD.       XR Chest 1 View [[id number]] Collected:  07/31/19 1248     Updated:  07/31/19 1251    Narrative:       AP PORTABLE CHEST, 07/31/2019     HISTORY:  Left-sided abdominal pain with nausea and vomiting     COMPARISON:  04/25/2018     TECHNIQUE:  AP portable chest x-ray.     FINDINGS:  Heart size and pulmonary vascularity are normal. The lungs are expanded  and clear. No visible pulmonary infiltrate or pleural effusion.           Impression:       Negative single view chest     This report was finalized on 7/31/2019  12:49 PM by Dr. Kevin Padilla MD.               Medication Review:   I have reviewed the patient's current medication list    Current Facility-Administered Medications:   •  atorvastatin (LIPITOR) tablet 10 mg, 10 mg, Oral, Daily, William Villatoro MD, 10 mg at 07/31/19 2045  •  cefTRIAXone (ROCEPHIN) IVPB 1 g/50ml dextrose (premix), 1 g, Intravenous, Q24H, Ajith Cabral MD  •  HYDROmorphone (DILAUDID) injection 0.5 mg, 0.5 mg, Intravenous, Q4H PRN, William Villatoro MD, 0.5 mg at 08/01/19 0339  •  imipramine (TOFRANIL) tablet 100 mg, 100 mg, Oral, Nightly, William Villatoro MD, 100 mg at 07/31/19 2044  •  ketorolac (TORADOL) injection 15 mg, 15 mg, Intravenous, Q6H PRN, William Villatoro MD, 15 mg at 07/31/19 1942  •  lisinopril (PRINIVIL,ZESTRIL) tablet 40 mg, 40 mg, Oral, Daily With Breakfast, William Villatoro MD  •  [COMPLETED] Insert peripheral IV, , , Once **AND** sodium chloride 0.9 % flush 10 mL, 10 mL, Intravenous, PRN, Rosibel Mathew PA-C  •  sodium chloride 0.9 % flush 3 mL, 3 mL, Intravenous, Q12H, William Villatoro MD, 3 mL at 07/31/19 2045  •  sodium chloride 0.9 % flush 3-10 mL, 3-10 mL, Intravenous, PRN, William Villatoro MD  •  sodium chloride 0.9 % infusion 40 mL, 40 mL, Intravenous, PRN, William Villatoro MD  •  sodium chloride 0.9 % infusion, 125 mL/hr, Intravenous, Continuous, Rosibel Mathew PA-C, Last Rate: 125 mL/hr at 08/01/19 0625, 125 mL/hr at 08/01/19 0625  •  vitamin C (ASCORBIC ACID) tablet 500 mg, 500 mg, Oral, Daily, William Villatoro MD      Assessment/Plan     1.  Abdominal Pain: Lipase has increased so I've added an Amylase.  Belly exam not consistent w/ pancreatitis and CT was negative and she has no evidence of small bowel disease.  I'm going to trend her numbers for now, but I want to see how she does after Urology finishes in the OR.    2.  Tachycardia: Improved.  No evidence of fib or flutter.  Continue to follow on tele.    3.  Obstructing Left Renal Stone: As per   Eduardo.    4.  HTN: At goal.  No change. To regimen.    Plan for disposition: Home when able.    William Villatoro MD  08/01/19  9:20 AM

## 2019-08-01 NOTE — PLAN OF CARE
Problem: Patient Care Overview  Goal: Discharge Needs Assessment  Outcome: Ongoing (interventions implemented as appropriate)   08/01/19 1602   Discharge Needs Assessment   Readmission Within the Last 30 Days no previous admission in last 30 days   Concerns to be Addressed no discharge needs identified   Patient/Family Anticipates Transition to home with family   Patient/Family Anticipated Services at Transition none   Transportation Concerns car, none   Transportation Anticipated family or friend will provide   Anticipated Changes Related to Illness none   Equipment Needed After Discharge none   Disability   Equipment Currently Used at Home cane, straight

## 2019-08-01 NOTE — ANESTHESIA PREPROCEDURE EVALUATION
Anesthesia Evaluation     Patient summary reviewed and Nursing notes reviewed   history of anesthetic complications: PONV  NPO Solid Status: > 8 hours  NPO Liquid Status: > 8 hours           Airway   Mallampati: II  TM distance: >3 FB  Neck ROM: full  Dental    (+) partials    Pulmonary - normal exam    breath sounds clear to auscultation  (+) a smoker Former,   Cardiovascular   Exercise tolerance: poor (<4 METS)    ECG reviewed  Rhythm: regular    (+) hypertension, hyperlipidemia,     ROS comment: EKG 7/31/19:  - ABNORMAL ECG -  Sinus tachycardia  Ventricular premature complex  Right bundle branch block    Neuro/Psych  (+) psychiatric history Anxiety,     GI/Hepatic/Renal/Endo    (+) morbid obesity, GERD,  renal disease stones,     Musculoskeletal     (+) back pain, chronic pain,   Abdominal   (+) obese,    Substance History - negative use     OB/GYN negative ob/gyn ROS         Other   (+) arthritis                   Anesthesia Plan    ASA 3     general     intravenous induction   Anesthetic plan, all risks, benefits, and alternatives have been provided, discussed and informed consent has been obtained with: patient.  Use of blood products discussed with patient  Consented to blood products.

## 2019-08-01 NOTE — ANESTHESIA POSTPROCEDURE EVALUATION
Patient: Anitra Orta    Procedure Summary     Date:  08/01/19 Room / Location:  [facility] OR 4 /  [facility] OR    Anesthesia Start:  1210 Anesthesia Stop:  1250    Procedure:  CYSTOSCOPY URETERAL CATHETER/STENT INSERTION (Left Ureter) Diagnosis:      Surgeon:  Kevon Clark MD Provider:  Tone Alston CRNA    Anesthesia Type:  general ASA Status:  3          Anesthesia Type: general  Last vitals  BP   159/88 (08/01/19 1317)   Temp   97.2 °F (36.2 °C) (08/01/19 1313)   Pulse   103 (08/01/19 1317)   Resp   18 (08/01/19 1317)     SpO2   95 % (08/01/19 1317)     Post Anesthesia Care and Evaluation    Patient location during evaluation: bedside  Patient participation: complete - patient participated  Level of consciousness: awake and alert  Pain score: 0  Pain management: adequate  Airway patency: patent  Anesthetic complications: No anesthetic complications  PONV Status: none  Cardiovascular status: acceptable  Respiratory status: acceptable  Hydration status: acceptable

## 2019-08-01 NOTE — OP NOTE
Preop diagnosis left 3 mm UPJ stone hydronephrosis colic urinary tract infection    Postoperative diagnosis same stone dislodged stent placed to taken left kidney    Operative procedure cystoscopy stone manipulation left retrograde pyelogram left renal pelvis urine culture left 6 [place] by 26 cm Percuflex double-J stent without tether under fluoroscopic guidance placement    Surgeon Eduardo    Anesthesia General    Procedure note 78-year-old recurrent stone former presenting with the above-mentioned findings undergo the above-mentioned procedure culture pending on Rocephin procedure and attendant risks of been discussed understood proceed previous stent caused a fair amount of discomfort obviously patient was apprehensive I explained the circumstances she understands agrees to proceed    Site was marked Rocephin was given timeout was taken after general anesthesia was placed very carefully modified lithotomy position with all pressure points relieved prepped draped draped sterile fashion of genitalia 2% intraurethral Xylocaine jelly administered scope was advanced the bladder the bladder neck with previous treatment with coapt tight was closed trigone was displaced mildly posteriorly orifices normal in position configuration the bladder was mildly injected with a few areas of cystitis follicularis    Wire was placed on the left with use of a Pollick catheter stone was manipulated hydronephrotic drip returned culture was taken gentle retrograde pyelogram delineating her anatomy a 6 [place] by 26 cm stent was placed with curling the renal pelvis and bladder no tether the patient's procedure well was sent to the recovery room in satisfactory condition  is in the right shin's room which I will discuss with him at the case and    Follow back up in the office next week for re-scheduling for a left ureteroscopy and laser fragmentation stone prescriptions written by me include Norco Zofran and Pyridium antibiotics  will be written when the results return outpatient cystoscopy sheet with phone numbers given concerns and questions to contact us

## 2019-08-02 VITALS
BODY MASS INDEX: 40.11 KG/M2 | HEART RATE: 76 BPM | RESPIRATION RATE: 16 BRPM | OXYGEN SATURATION: 95 % | HEIGHT: 66 IN | TEMPERATURE: 97.6 F | WEIGHT: 249.6 LBS | SYSTOLIC BLOOD PRESSURE: 166 MMHG | DIASTOLIC BLOOD PRESSURE: 82 MMHG

## 2019-08-02 LAB
ALBUMIN SERPL-MCNC: 3 G/DL (ref 3.5–5.2)
ALBUMIN/GLOB SERPL: 1.1 G/DL
ALP SERPL-CCNC: 102 U/L (ref 39–117)
ALT SERPL W P-5'-P-CCNC: 19 U/L (ref 1–33)
AMYLASE SERPL-CCNC: 49 U/L (ref 28–100)
ANION GAP SERPL CALCULATED.3IONS-SCNC: 10.8 MMOL/L (ref 5–15)
AST SERPL-CCNC: 16 U/L (ref 1–32)
BILIRUB SERPL-MCNC: 0.3 MG/DL (ref 0.2–1.2)
BUN BLD-MCNC: 27 MG/DL (ref 8–23)
BUN/CREAT SERPL: 26.7 (ref 7–25)
CALCIUM SPEC-SCNC: 7.9 MG/DL (ref 8.6–10.5)
CHLORIDE SERPL-SCNC: 109 MMOL/L (ref 98–107)
CO2 SERPL-SCNC: 20.2 MMOL/L (ref 22–29)
CREAT BLD-MCNC: 1.01 MG/DL (ref 0.57–1)
GFR SERPL CREATININE-BSD FRML MDRD: 53 ML/MIN/1.73
GLOBULIN UR ELPH-MCNC: 2.8 GM/DL
GLUCOSE BLD-MCNC: 94 MG/DL (ref 65–99)
LIPASE SERPL-CCNC: 28 U/L (ref 13–60)
POTASSIUM BLD-SCNC: 4.3 MMOL/L (ref 3.5–5.2)
PROT SERPL-MCNC: 5.8 G/DL (ref 6–8.5)
SODIUM BLD-SCNC: 140 MMOL/L (ref 136–145)

## 2019-08-02 PROCEDURE — 25010000002 KETOROLAC TROMETHAMINE PER 15 MG: Performed by: HOSPITALIST

## 2019-08-02 PROCEDURE — A9270 NON-COVERED ITEM OR SERVICE: HCPCS | Performed by: HOSPITALIST

## 2019-08-02 PROCEDURE — 63710000001 ASPIRIN-ACETAMINOPHEN-CAFFEINE 250-250-65 MG TABLET: Performed by: FAMILY MEDICINE

## 2019-08-02 PROCEDURE — A9270 NON-COVERED ITEM OR SERVICE: HCPCS | Performed by: FAMILY MEDICINE

## 2019-08-02 PROCEDURE — 63710000001 PHENAZOPYRIDINE 100 MG TABLET: Performed by: UROLOGY

## 2019-08-02 PROCEDURE — 63710000001 VITAMIN C 500 MG TABLET: Performed by: HOSPITALIST

## 2019-08-02 PROCEDURE — 63710000001 LISINOPRIL 20 MG TABLET: Performed by: HOSPITALIST

## 2019-08-02 PROCEDURE — 82150 ASSAY OF AMYLASE: CPT | Performed by: HOSPITALIST

## 2019-08-02 PROCEDURE — 94799 UNLISTED PULMONARY SVC/PX: CPT

## 2019-08-02 PROCEDURE — 99217 PR OBSERVATION CARE DISCHARGE MANAGEMENT: CPT | Performed by: HOSPITALIST

## 2019-08-02 PROCEDURE — G0378 HOSPITAL OBSERVATION PER HR: HCPCS

## 2019-08-02 PROCEDURE — 63710000001 ATORVASTATIN 10 MG TABLET: Performed by: HOSPITALIST

## 2019-08-02 PROCEDURE — A9270 NON-COVERED ITEM OR SERVICE: HCPCS | Performed by: UROLOGY

## 2019-08-02 PROCEDURE — 83690 ASSAY OF LIPASE: CPT | Performed by: HOSPITALIST

## 2019-08-02 PROCEDURE — 80053 COMPREHEN METABOLIC PANEL: CPT | Performed by: HOSPITALIST

## 2019-08-02 RX ORDER — ONDANSETRON 4 MG/1
4 TABLET, FILM COATED ORAL EVERY 8 HOURS PRN
Qty: 10 TABLET | Refills: 0
Start: 2019-08-02 | End: 2019-09-09

## 2019-08-02 RX ORDER — PHENAZOPYRIDINE HYDROCHLORIDE 100 MG/1
100 TABLET, FILM COATED ORAL
Qty: 21 TABLET | Refills: 0
Start: 2019-08-02 | End: 2019-09-09

## 2019-08-02 RX ORDER — ACETAMINOPHEN, ASPIRIN AND CAFFEINE 250; 250; 65 MG/1; MG/1; MG/1
2 TABLET, FILM COATED ORAL ONCE
Status: COMPLETED | OUTPATIENT
Start: 2019-08-02 | End: 2019-08-02

## 2019-08-02 RX ORDER — HYDROCODONE BITARTRATE AND ACETAMINOPHEN 5; 325 MG/1; MG/1
1 TABLET ORAL EVERY 4 HOURS PRN
Qty: 20 TABLET | Refills: 0
Start: 2019-08-02 | End: 2021-02-19

## 2019-08-02 RX ADMIN — ATORVASTATIN CALCIUM 10 MG: 10 TABLET, FILM COATED ORAL at 08:43

## 2019-08-02 RX ADMIN — OXYCODONE HYDROCHLORIDE AND ACETAMINOPHEN 500 MG: 500 TABLET ORAL at 08:44

## 2019-08-02 RX ADMIN — LISINOPRIL 40 MG: 20 TABLET ORAL at 08:44

## 2019-08-02 RX ADMIN — PHENAZOPYRIDINE HYDROCHLORIDE 100 MG: 100 TABLET ORAL at 08:44

## 2019-08-02 RX ADMIN — KETOROLAC TROMETHAMINE 15 MG: 30 INJECTION, SOLUTION INTRAMUSCULAR at 01:19

## 2019-08-02 RX ADMIN — ACETAMINOPHEN, ASPIRIN AND CAFFEINE 2 TABLET: 250; 250; 65 TABLET, FILM COATED ORAL at 02:13

## 2019-08-02 NOTE — DISCHARGE SUMMARY
Anitra Orta  1940  [id number]    Hospitalists Discharge Summary    Date of Admission: 7/31/2019  Date of Discharge:  8/2/2019    Primary Discharge Diagnoses:    1.  Mid-epigastric Abdominal Pain  2.  Tachycardia  3.  Obstructing Left Renal Stone    Secondary Discharge Diagnoses:    1.  HTN  2.  GERD  3.  Obese    History of Present Illness (taken from H&P):    Ms. Orta is a 77 y/o  female who presents with the sudden onset of sharp, non-radiating mid-epigastric pain.  She has been in her normal state of health, although she did see her PCP on Monday for some left shoulder pain.  She describes the pain as how you feel when you're hungry, but the pain did not improve w/ a heating pad or use of Prilosec.  As the morning endured, the pain seemed to wrap around to her left.  She denies nausea and vomiting although she threw-up the ASA and Nitro they gave her in the ER.  She denies diarrhea and dysuria.  She denies hematuria.  She denies overt chest pain and dyspnea.  She denies diaphoresis.    Hospital Course:    Ms. mena was initially admitted to the intensive care unit due to concern for tachycardia.  She showed no evidence of flutter or fibrillation so she was transferred out of the ICU and no further diltiazem or any other intervention was pursued.  She just demonstrated sinus tachycardia on the monitor.  Curiously, her lipase and amylase were elevated.,  Not quite where we typically see patients with acute pancreatitis but still met criteria for 3 times the upper limit of normal.  However, her exam was never consistent with acute pancreatitis so unclear why this lab value was abnormal especially given normal organ finding on her contrasted CT scan of the abdomen and pelvis.  She was seen in consultation by Dr. Clark or Maile for stent placement.  Pain is now much better so I presume that this was some radiation of the pain from the obstructing left stone.  Urine culture was mixed  ally so abx were stopped.    Her blood pressure remained near goal but mostly not at goal during her stay.  I think a lot of this was due to anxiety because she was not thrilled about having a stent placed as she is fought this with Dr. Clark for quite some time.  I did not escalate her therapy for fear of adverse event and further morbidity.  This was discussed with Ms. Orta and her .  Asked for close PCP follow-up.    PCP  Patient Care Team:  Rosibel Hamilton MD as PCP - General (Internal Medicine & Pediatrics)  Margot Chavez MD as PCP - Claims Attributed    Consults:   Consults     Date and Time Order Name Status Description    7/31/2019 1835 Inpatient Urology Consult Completed           Operations and Procedures Performed:  Procedure(s):  CYSTOSCOPY URETERAL CATHETER/STENT INSERTION     Ct Abdomen Pelvis With Contrast    Result Date: 7/31/2019  Narrative: CT abdomen and pelvis with contrast 07/30/2019   HISTORY: abdominal pain for a few days which is getting severe. Shooting pains a left-sided chest.   COMPARISON: 12/05/2018  TECHNIQUE:  CT of Abdomen and Pelvis with contrast performed.  Sagittal and Coronal reconstructions performed. Radiation dose reduction techniques included automated exposure control or exposure modulation based on body size. Radiation audit for CT and nuclear cardiology exams in the last 12 months: 1.  FINDINGS:  Abdomen: The liver, gallbladder, pancreas, adrenal glands and aorta are normal in appearance. There is some subtle decreased density in the medial spleen. This is better seen on the CT pulmonary embolus protocol study.  That study showed a large area of decreased perfusion in the spleen measured about 3 to 4 cm in diameter. On the abdomen CT scan this is mostly filled in. Probably this appearance was due to normal vascular filling of the spleen.. The right kidney contains multiple small cysts measuring less than a centimeter in diameter. Left kidney  also has multiple small cysts with one large cyst measuring up to 4 cm in diameter. There is a nonobstructing 2 mm left renal stone. There is stranding around the left kidney and there is a 3 mm proximal left ureteral stone with mild hydronephrosis. There is no adenopathy. The bowel is normal.   Pelvis: Uterus and adnexal regions and bladder are normal. The bones show degenerative changes       Impression: 1. There is a 3 mm obstructing left ureteropelvic junction stone as well as a 2 mm nonobstructing stone within the kidney. There is mild perinephric edema.   Bilateral incidental renal cysts  Otherwise negative study  This report was finalized on 7/31/2019 2:07 PM by Dr. Kevin Padilla MD.      Xr Chest 1 View    Result Date: 7/31/2019  Narrative: AP PORTABLE CHEST, 07/31/2019  HISTORY: Left-sided abdominal pain with nausea and vomiting  COMPARISON: 04/25/2018  TECHNIQUE: AP portable chest x-ray.  FINDINGS: Heart size and pulmonary vascularity are normal. The lungs are expanded and clear. No visible pulmonary infiltrate or pleural effusion.       Impression: Negative single view chest  This report was finalized on 7/31/2019 12:49 PM by Dr. Kevin Padilla MD.      Fl C Arm During Surgery    Result Date: 8/1/2019  Narrative: Exam: Intraoperative imaging during stent placement into left kidney    Fluoroscopy time was 80 seconds. 4 images were obtained. These images show contrast being injected through a ureteral stent and there is opacification of the left renal collecting system. please see the operative notes  This report was finalized on 8/1/2019 1:39 PM by Dr. Kevin Padilla MD.      Ct Angiogram Chest With Contrast    Result Date: 7/31/2019  Narrative: CT CHEST WITH PULMONARY EMBOLUS PROTOCOL   07/31/2019  HISTORY: Chest pain for a few days becoming severe today  COMPARISON: 03/29/2017  TECHNIQUE:  Spiral imaging was performed through the chest with IV contrast and axial and 3-D reconstructions were generated. The images  were reviewed. Radiation dose reduction techniques included automated exposure control or exposure modulation based on body size. Radiation audit for CT and nuclear cardiology exams in the last 12 months: 1..  FINDINGS:  Lungs are clear. The aorta is normal in size. There is no dissection. There is adequate opacification of the pulmonary arteries and there is no CT evidence of pulmonary embolus. No mediastinal or hilar adenopathy is identified. There is a sebaceous cyst in the anterior right chest just to the right of midline. It measures 17 mm in diameter. Its in front of the midsternum region. Please see the CT abdomen and pelvis. FINDINGS below the diaphragm       Impression: No CT evidence of pulmonary embolus or dissection  17 mm sebaceous cyst just right of midline at the midsternal region  Please see abdomen report for findings below the diaphragm  This report was finalized on 7/31/2019 2:02 PM by Dr. Kevin Padilla MD.        Allergies:  is allergic to bactrim [sulfamethoxazole-trimethoprim]; ciprofloxacin; levaquin [levofloxacin]; macrobid [nitrofurantoin]; nitrofurantoin macrocrystal; and cortisone.      Discharge Medications:     Discharge Medications      New Medications      Instructions Start Date   HYDROcodone-acetaminophen 5-325 MG per tablet  Commonly known as:  NORCO   1 tablet, Oral, Every 4 Hours PRN      ondansetron 4 MG tablet  Commonly known as:  ZOFRAN   4 mg, Oral, Every 8 Hours PRN      phenazopyridine 100 MG tablet  Commonly known as:  PYRIDIUM   100 mg, Oral, 3 Times Daily With Meals         Changes to Medications      Instructions Start Date   imipramine 50 MG tablet  Commonly known as:  TOFRANIL  What changed:  See the new instructions.   TAKE 2 TO 3 TABLETS BY MOUTH EVERY NIGHT AT BEDTIME      lisinopril 20 MG tablet  Commonly known as:  PRINIVIL,ZESTRIL  What changed:  how much to take   20 mg, Oral, Daily With Breakfast      simvastatin 20 MG tablet  Commonly known as:  ZOCOR  What  changed:  how much to take   20 mg, Oral, Nightly         Continue These Medications      Instructions Start Date   aspirin 81 MG EC tablet   81 mg, Oral, Daily, PT TO STOP PER MD INSTRUCTION      Biotin 10 MG capsule   10 mg, Oral, Daily      NON FORMULARY   1 tablet, Oral, Daily, viviscal tablet for hair loss       NON FORMULARY   1 tablet, Oral, Daily, Presser vision / Ardes 2 for macular degeneration / over the counter       omeprazole 20 MG capsule  Commonly known as:  priLOSEC   20 mg, Oral, Daily      vitamin C 500 MG tablet  Commonly known as:  ASCORBIC ACID   500 mg, Oral, Daily         Stop These Medications    calcium citrate-vitamin d 200-250 MG-UNIT tablet tablet  Commonly known as:  CITRACAL            Last Lab Results:   Lab Results (most recent)     Procedure Component Value Units Date/Time    Urine Culture - Urine, Kidney, Left [[id number]] Collected:  08/01/19 1224    Specimen:  Urine from Kidney, Left Updated:  08/02/19 0905     Urine Culture >100,000 CFU/mL Mixed Ally Isolated    Narrative:       Specimen contains mixed organisms of questionable pathogenicity which indicates contamination with commensal ally.  Further identification is unlikely to provide clinically useful information.  Suggest recollection.    Urine Culture - Urine, Urine, Catheter [[id number]]  (Normal) Collected:  07/31/19 1236    Specimen:  Urine, Catheter Updated:  08/02/19 0850     Urine Culture Culture in progress    Amylase [[id number]]  (Normal) Collected:  08/02/19 0618    Specimen:  Blood Updated:  08/02/19 0647     Amylase 49 U/L     Lipase [[id number]]  (Normal) Collected:  08/02/19 0618    Specimen:  Blood Updated:  08/02/19 0646     Lipase 28 U/L     Comprehensive Metabolic Panel [[id number]]  (Abnormal) Collected:  08/02/19 0618    Specimen:  Blood Updated:  08/02/19 0646     Glucose 94 mg/dL      BUN 27 mg/dL      Creatinine 1.01 mg/dL      Sodium 140 mmol/L      Potassium 4.3 mmol/L      Chloride 109 mmol/L       CO2 20.2 mmol/L      Calcium 7.9 mg/dL      Total Protein 5.8 g/dL      Albumin 3.00 g/dL      ALT (SGPT) 19 U/L      AST (SGOT) 16 U/L      Alkaline Phosphatase 102 U/L      Total Bilirubin 0.3 mg/dL      eGFR Non African Amer 53 mL/min/1.73      Globulin 2.8 gm/dL      A/G Ratio 1.1 g/dL      BUN/Creatinine Ratio 26.7     Anion Gap 10.8 mmol/L     Narrative:       GFR Normal >60  Chronic Kidney Disease <60  Kidney Failure <15    Basic Metabolic Panel [[id number]]  (Abnormal) Collected:  08/01/19 1858    Specimen:  Blood Updated:  08/01/19 1918     Glucose 117 mg/dL      BUN 31 mg/dL      Creatinine 1.37 mg/dL      Sodium 139 mmol/L      Potassium 4.5 mmol/L      Chloride 104 mmol/L      CO2 20.0 mmol/L      Calcium 8.1 mg/dL      eGFR Non African Amer 37 mL/min/1.73      BUN/Creatinine Ratio 22.6     Anion Gap 15.0 mmol/L     Narrative:       GFR Normal >60  Chronic Kidney Disease <60  Kidney Failure <15    Amylase [[id number]]  (Abnormal) Collected:  08/01/19 0357    Specimen:  Blood Updated:  08/01/19 0939     Amylase 213 U/L     Comprehensive Metabolic Panel [[id number]]  (Abnormal) Collected:  08/01/19 0357    Specimen:  Blood Updated:  08/01/19 0429     Glucose 156 mg/dL      BUN 29 mg/dL      Creatinine 1.85 mg/dL      Sodium 139 mmol/L      Potassium 4.6 mmol/L      Chloride 103 mmol/L      CO2 19.7 mmol/L      Calcium 8.0 mg/dL      Total Protein 5.7 g/dL      Albumin 2.90 g/dL      ALT (SGPT) 24 U/L      AST (SGOT) 23 U/L      Alkaline Phosphatase 99 U/L      Total Bilirubin 0.4 mg/dL      eGFR Non African Amer 26 mL/min/1.73      Globulin 2.8 gm/dL      A/G Ratio 1.0 g/dL      BUN/Creatinine Ratio 15.7     Anion Gap 16.3 mmol/L     Narrative:       GFR Normal >60  Chronic Kidney Disease <60  Kidney Failure <15    Lipase [[id number]]  (Abnormal) Collected:  08/01/19 0357    Specimen:  Blood Updated:  08/01/19 0426     Lipase 238 U/L     Troponin [[id number]]  (Normal) Collected:  07/31/19 2031    Specimen:   Blood Updated:  07/31/19 2056     Troponin T <0.010 ng/mL     Narrative:       Troponin T Reference Range:  <= 0.03 ng/mL-   Negative for AMI  >0.03 ng/mL-     Abnormal for myocardial necrosis.  Clinicians would have to utilize clinical acumen, EKG, Troponin and serial changes to determine if it is an Acute Myocardial Infarction or myocardial injury due to an underlying chronic condition.     BNP [[id number]]  (Normal) Collected:  07/31/19 1230    Specimen:  Blood Updated:  07/31/19 1305     proBNP 98.7 pg/mL     Narrative:       Among patients with dyspnea, NT-proBNP is highly sensitive for the detection of acute congestive heart failure. In addition NT-proBNP of <300 pg/ml effectively rules out acute congestive heart failure with 99% negative predictive value.    Urinalysis, Microscopic Only - Urine, Catheter [[id number]]  (Abnormal) Collected:  07/31/19 1236    Specimen:  Urine, Catheter Updated:  07/31/19 1255     RBC, UA 3-5 /HPF      WBC, UA 3-5 /HPF      Bacteria, UA 4+ /HPF      Squamous Epithelial Cells, UA None Seen /HPF      Transitional Epithelial Cells, UA 0-2 /HPF      Yeast, UA Small/1+ Budding Yeast /HPF      Hyaline Casts, UA None Seen /LPF      Methodology Manual Light Microscopy    Urinalysis With Microscopic If Indicated (No Culture) - Urine, Catheter [[id number]]  (Abnormal) Collected:  07/31/19 1236    Specimen:  Urine, Catheter Updated:  07/31/19 1244     Color, UA Straw     Appearance, UA Slightly Cloudy     pH, UA 5.5     Specific Gravity, UA 1.015     Glucose, UA Negative     Ketones, UA Negative     Bilirubin, UA Negative     Blood, UA Trace     Protein, UA Negative     Leuk Esterase, UA Negative     Nitrite, UA Positive     Urobilinogen, UA 0.2 E.U./dL    D-dimer, Quantitative [[id number]]  (Abnormal) Collected:  07/31/19 1230    Specimen:  Blood Updated:  07/31/19 1240     D-Dimer, Quantitative 1.95 MCGFEU/mL     Narrative:       Can be elevated in, but is not diagnostic for deep vein  thrombosis (DVT) or pulmonary embolis (PE).  It is also elevated in other medical conditions.  Clinical correlation is required.  The negative cut-off value for the D-Dimer is 0.50 mcg FEU/mL for DVT and PE.    Protime-INR [[id number]]  (Normal) Collected:  07/31/19 1230    Specimen:  Blood Updated:  07/31/19 1240     Protime 12.4 Seconds      INR 0.95    Narrative:       Therapeutic Ranges for INR: 2.0-3.0 (PT 20-30)                              2.5-3.5 (PT 25-34)    aPTT [[id number]]  (Normal) Collected:  07/31/19 1230    Specimen:  Blood Updated:  07/31/19 1240     PTT 26.7 seconds     Narrative:       PTT = The equivalent PTT values for the therapeutic range of heparin levels at 0.1 to 0.7 U/ml are 53 to 110 seconds.    Troponin [[id number]]  (Normal) Collected:  07/31/19 1212    Specimen:  Blood Updated:  07/31/19 1235     Troponin T <0.010 ng/mL     Narrative:       Troponin T Reference Range:  <= 0.03 ng/mL-   Negative for AMI  >0.03 ng/mL-     Abnormal for myocardial necrosis.  Clinicians would have to utilize clinical acumen, EKG, Troponin and serial changes to determine if it is an Acute Myocardial Infarction or myocardial injury due to an underlying chronic condition.     Lactic Acid, Plasma [[id number]]  (Normal) Collected:  07/31/19 1212    Specimen:  Blood Updated:  07/31/19 1229     Lactate 1.9 mmol/L     CBC & Differential [[id number]] Collected:  07/31/19 1212    Specimen:  Blood Updated:  07/31/19 1219    Narrative:       The following orders were created for panel order CBC & Differential.  Procedure                               Abnormality         Status                     ---------                               -----------         ------                     CBC Auto Differential[[id number]]        Abnormal            Final result                 Please view results for these tests on the individual orders.    CBC Auto Differential [[id number]]  (Abnormal) Collected:  07/31/19 1212    Specimen:  Blood  Updated:  07/31/19 1219     WBC 8.13 10*3/mm3      RBC 4.94 10*6/mm3      Hemoglobin 14.4 g/dL      Hematocrit 44.9 %      MCV 90.9 fL      MCH 29.1 pg      MCHC 32.1 g/dL      RDW 13.1 %      RDW-SD 44.5 fl      MPV 8.7 fL      Platelets 154 10*3/mm3      Neutrophil % 94.6 %      Lymphocyte % 4.3 %      Monocyte % 0.7 %      Eosinophil % 0.1 %      Basophil % 0.1 %      Immature Grans % 0.2 %      Neutrophils, Absolute 7.68 10*3/mm3      Lymphocytes, Absolute 0.35 10*3/mm3      Monocytes, Absolute 0.06 10*3/mm3      Eosinophils, Absolute 0.01 10*3/mm3      Basophils, Absolute 0.01 10*3/mm3      Immature Grans, Absolute 0.02 10*3/mm3         Imaging Results (most recent)     Procedure Component Value Units Date/Time    FL C Arm During Surgery [[id number]] Collected:  08/01/19 1337     Updated:  08/01/19 1341    Narrative:       Exam: Intraoperative imaging during stent placement into left kidney           Fluoroscopy time was 80 seconds. 4 images were obtained. These images  show contrast being injected through a ureteral stent and there is  opacification of the left renal collecting system. please see the  operative notes     This report was finalized on 8/1/2019 1:39 PM by Dr. Kevin Padilla MD.       CT Abdomen Pelvis With Contrast [[id number]] Collected:  07/31/19 1402     Updated:  07/31/19 1409    Narrative:       CT abdomen and pelvis with contrast 07/30/2019        HISTORY:  abdominal pain for a few days which is getting severe. Shooting pains a  left-sided chest.        COMPARISON:  12/05/2018     TECHNIQUE:    CT of Abdomen and Pelvis with contrast performed.  Sagittal and Coronal  reconstructions performed. Radiation dose reduction techniques included  automated exposure control or exposure modulation based on body size.  Radiation audit for CT and nuclear cardiology exams in the last 12  months: 1.      FINDINGS:    Abdomen:  The liver, gallbladder, pancreas, adrenal glands and aorta are normal in  appearance.  There is some subtle decreased density in the medial spleen.  This is better seen on the CT pulmonary embolus protocol study.     That study showed a large area of decreased perfusion in the spleen  measured about 3 to 4 cm in diameter. On the abdomen CT scan this is  mostly filled in. Probably this appearance was due to normal vascular  filling of the spleen.. The right kidney contains multiple small cysts  measuring less than a centimeter in diameter. Left kidney also has  multiple small cysts with one large cyst measuring up to 4 cm in  diameter. There is a nonobstructing 2 mm left renal stone. There is  stranding around the left kidney and there is a 3 mm proximal left  ureteral stone with mild hydronephrosis. There is no adenopathy. The  bowel is normal.       Pelvis:  Uterus and adnexal regions and bladder are normal. The bones show  degenerative changes         Impression:       1. There is a 3 mm obstructing left ureteropelvic junction stone as well  as a 2 mm nonobstructing stone within the kidney. There is mild  perinephric edema.        Bilateral incidental renal cysts     Otherwise negative study     This report was finalized on 7/31/2019 2:07 PM by Dr. Kevin Padilla MD.       CT Angiogram Chest With Contrast [[id number]] Collected:  07/31/19 1359     Updated:  07/31/19 1404    Narrative:       CT CHEST WITH PULMONARY EMBOLUS PROTOCOL   07/31/2019     HISTORY:  Chest pain for a few days becoming severe today     COMPARISON:  03/29/2017     TECHNIQUE:    Spiral imaging was performed through the chest with IV contrast and  axial and 3-D reconstructions were generated. The images were reviewed.  Radiation dose reduction techniques included automated exposure control  or exposure modulation based on body size. Radiation audit for CT and  nuclear cardiology exams in the last 12 months:   1..      FINDINGS:    Lungs are clear. The aorta is normal in size. There is no dissection.  There is adequate opacification of  the pulmonary arteries and there is  no CT evidence of pulmonary embolus. No mediastinal or hilar adenopathy  is identified. There is a sebaceous cyst in the anterior right chest  just to the right of midline. It measures 17 mm in diameter. Its in  front of the midsternum region. Please see the CT abdomen and pelvis.  FINDINGS below the diaphragm         Impression:       No CT evidence of pulmonary embolus or dissection     17 mm sebaceous cyst just right of midline at the midsternal region     Please see abdomen report for findings below the diaphragm     This report was finalized on 7/31/2019 2:02 PM by Dr. Kevin Padilla MD.       XR Chest 1 View [[id number]] Collected:  07/31/19 1248     Updated:  07/31/19 1251    Narrative:       AP PORTABLE CHEST, 07/31/2019     HISTORY:  Left-sided abdominal pain with nausea and vomiting     COMPARISON:  04/25/2018     TECHNIQUE:  AP portable chest x-ray.     FINDINGS:  Heart size and pulmonary vascularity are normal. The lungs are expanded  and clear. No visible pulmonary infiltrate or pleural effusion.           Impression:       Negative single view chest     This report was finalized on 7/31/2019 12:49 PM by Dr. Kevin Padilla MD.             PROCEDURES  Procedure(s):  CYSTOSCOPY URETERAL CATHETER/STENT INSERTION    Condition on Discharge:  Stable    Physical Exam at Discharge  Vital Signs  Temp:  [96.9 °F (36.1 °C)-98.6 °F (37 °C)] 97.6 °F (36.4 °C)  Heart Rate:  [] 76  Resp:  [12-24] 16  BP: (123-187)/(57-95) 166/82    Physical Exam:  Physical Exam   Constitutional: Patient appears well-developed and well-nourished and in no acute distress   Cardiovascular: Regular rate, regular rhythm, S1 normal and S2 normal.  Exam reveals no gallop and no friction rub.  No murmur heard.  Pulmonary/Chest: Lungs are clear to auscultation bilaterally. No respiratory distress. No wheezes. No rhonchi. No rales.   Abdominal: Obese. Soft. Bowel sounds are normal. No distension and no mass.  There is no tenderness.   Musculoskeletal: Normal Muscle tone  Extremities: No edema. Pulses are palpable in all 4 extremities.  Neurological: Cranial nerves II-XII are grossly intact with no focal deficits.    Discharge Disposition  Home    Visiting Nurse:    No     Home PT/OT:  No     Home Safety Evaluation:  No     DME  None    Discharge Diet:      Dietary Orders (From admission, onward)    Start     Ordered    08/01/19 1359  Diet Regular  Diet Effective Now     Question:  Diet Texture / Consistency  Answer:  Regular    08/01/19 1358          Activity at Discharge:  As tolerated      Follow-up Appointments  No future appointments.  Additional Instructions for the Follow-ups that You Need to Schedule     Discharge Follow-up with PCP   As directed       Currently Documented PCP:    Rosibel Hamilton MD    PCP Phone Number:    [phone]     Follow Up Details:  Early next week for BP check.  Does not see Dr. Hamilton.         Discharge Follow-up with Specified Provider: Dr. Clark as scheduled.   As directed      To:  Dr. Clark as scheduled.               Test Results Pending at Discharge   Order Current Status    Urine Culture - Urine, Urine, Catheter Preliminary result           William Villatoro MD  08/02/19  10:50 AM

## 2019-08-02 NOTE — NURSING NOTE
Case Management Discharge Note    Final Note: discharged home    Destination      No service has been selected for the patient.      Durable Medical Equipment      No service has been selected for the patient.      Dialysis/Infusion      No service has been selected for the patient.      Home Medical Care      No service has been selected for the patient.      Therapy      No service has been selected for the patient.      Community Resources      No service has been selected for the patient.             Final Discharge Disposition Code: 01 - home or self-care

## 2019-08-02 NOTE — PLAN OF CARE
Problem: Patient Care Overview  Goal: Plan of Care Review  Outcome: Ongoing (interventions implemented as appropriate)   08/02/19 0508   Coping/Psychosocial   Plan of Care Reviewed With patient   Plan of Care Review   Progress improving   OTHER   Outcome Summary Pt. downgraded to M/S tonight; the only pain reported was a headache - toradol then excederin x1 dose given with some relief noted, BP also elevated - MD made aware; no falls this shift - fall precautions in place; sleeping intermittently       Problem: Pain, Chronic (Adult)  Goal: Acceptable Pain/Comfort Level and Functional Ability  Outcome: Ongoing (interventions implemented as appropriate)   08/02/19 0508   Pain, Chronic (Adult)   Acceptable Pain/Comfort Level and Functional Ability making progress toward outcome       Problem: Fall Risk (Adult)  Goal: Absence of Fall  Outcome: Ongoing (interventions implemented as appropriate)   08/02/19 0508   Fall Risk (Adult)   Absence of Fall making progress toward outcome       Problem: Skin Injury Risk (Adult)  Goal: Skin Health and Integrity  Outcome: Ongoing (interventions implemented as appropriate)   08/02/19 0508   Skin Injury Risk (Adult)   Skin Health and Integrity making progress toward outcome

## 2019-08-02 NOTE — PROGRESS NOTES
"  [facility] Progress Note    Chief Complaint:  Lt flank pain       mild stent freq urge     Review of Systems:              Vital Signs  /84 (BP Location: Left arm, Patient Position: Lying)   Pulse 76   Temp 97.6 °F (36.4 °C) (Oral)   Resp 20   Ht 167.6 cm (66\")   Wt 113 kg (249 lb 9.6 oz)   SpO2 98%   BMI 40.29 kg/m²     Physical Exam:        Results Review:     I reviewed the patient's new clinical results.  Lab Results (last 24 hours)     Procedure Component Value Units Date/Time    Amylase [[id number]]  (Normal) Collected:  08/02/19 0618    Specimen:  Blood Updated:  08/02/19 0647     Amylase 49 U/L     Lipase [[id number]]  (Normal) Collected:  08/02/19 0618    Specimen:  Blood Updated:  08/02/19 0646     Lipase 28 U/L     Comprehensive Metabolic Panel [[id number]]  (Abnormal) Collected:  08/02/19 0618    Specimen:  Blood Updated:  08/02/19 0646     Glucose 94 mg/dL      BUN 27 mg/dL      Creatinine 1.01 mg/dL      Sodium 140 mmol/L      Potassium 4.3 mmol/L      Chloride 109 mmol/L      CO2 20.2 mmol/L      Calcium 7.9 mg/dL      Total Protein 5.8 g/dL      Albumin 3.00 g/dL      ALT (SGPT) 19 U/L      AST (SGOT) 16 U/L      Alkaline Phosphatase 102 U/L      Total Bilirubin 0.3 mg/dL      eGFR Non African Amer 53 mL/min/1.73      Globulin 2.8 gm/dL      A/G Ratio 1.1 g/dL      BUN/Creatinine Ratio 26.7     Anion Gap 10.8 mmol/L     Narrative:       GFR Normal >60  Chronic Kidney Disease <60  Kidney Failure <15    Basic Metabolic Panel [[id number]]  (Abnormal) Collected:  08/01/19 1858    Specimen:  Blood Updated:  08/01/19 1918     Glucose 117 mg/dL      BUN 31 mg/dL      Creatinine 1.37 mg/dL      Sodium 139 mmol/L      Potassium 4.5 mmol/L      Chloride 104 mmol/L      CO2 20.0 mmol/L      Calcium 8.1 mg/dL      eGFR Non African Amer 37 mL/min/1.73      BUN/Creatinine Ratio 22.6     Anion Gap 15.0 mmol/L     Narrative:       GFR Normal >60  Chronic Kidney Disease <60  Kidney Failure <15    Urine " Culture - Urine, Kidney, Left [[id number]] Collected:  08/01/19 1224    Specimen:  Urine from Kidney, Left Updated:  08/01/19 1303    Amylase [[id number]]  (Abnormal) Collected:  08/01/19 0357    Specimen:  Blood Updated:  08/01/19 0939     Amylase 213 U/L     Urine Culture - Urine, Urine, Catheter [[id number]] Collected:  07/31/19 1236    Specimen:  Urine, Catheter Updated:  08/01/19 0823        Imaging Results (last 24 hours)     Procedure Component Value Units Date/Time    FL C Arm During Surgery [[id number]] Collected:  08/01/19 1337     Updated:  08/01/19 1341    Narrative:       Exam: Intraoperative imaging during stent placement into left kidney           Fluoroscopy time was 80 seconds. 4 images were obtained. These images  show contrast being injected through a ureteral stent and there is  opacification of the left renal collecting system. please see the  operative notes     This report was finalized on 8/1/2019 1:39 PM by Dr. Kevin Padilla MD.             Medication Review:   I have personally reviewed    Current Facility-Administered Medications:   •  atorvastatin (LIPITOR) tablet 10 mg, 10 mg, Oral, Daily, William Villatoro MD, 10 mg at 08/01/19 1559  •  cefTRIAXone (ROCEPHIN) IVPB 1 g/50ml dextrose (premix), 1 g, Intravenous, Q24H, Ajith Cabral MD, Stopped at 08/01/19 2015  •  HYDROmorphone (DILAUDID) injection 0.5 mg, 0.5 mg, Intravenous, Q4H PRN, William Villatoro MD, 0.5 mg at 08/01/19 0339  •  imipramine (TOFRANIL) tablet 100 mg, 100 mg, Oral, Nightly, William Villatoro MD, 100 mg at 08/01/19 2240  •  ketorolac (TORADOL) injection 15 mg, 15 mg, Intravenous, Q6H PRN, William Villatoro MD, 15 mg at 08/02/19 0119  •  lisinopril (PRINIVIL,ZESTRIL) tablet 40 mg, 40 mg, Oral, Daily With Breakfast, William Villatoro MD, 40 mg at 08/01/19 1025  •  phenazopyridine (PYRIDIUM) tablet 100 mg, 100 mg, Oral, TID With Meals, Kevon Clark MD, 100 mg at 08/01/19 1559  •  Scopolamine (TRANSDERM-SCOP) 1.5 MG/3DAYS patch 1  patch, 1 patch, Transdermal, Q72H, David Aguayo, CRNA, 1 patch at 08/01/19 1203  •  [COMPLETED] Insert peripheral IV, , , Once **AND** sodium chloride 0.9 % flush 10 mL, 10 mL, Intravenous, PRN, Rosibel Mathew PA-C  •  sodium chloride 0.9 % flush 3 mL, 3 mL, Intravenous, Q12H, William Villatoro MD, 3 mL at 08/01/19 2028  •  sodium chloride 0.9 % flush 3-10 mL, 3-10 mL, Intravenous, PRN, William Villatoro MD  •  sodium chloride 0.9 % infusion 40 mL, 40 mL, Intravenous, PRN, William Villatoro MD  •  sodium chloride 0.9 % infusion, 125 mL/hr, Intravenous, Continuous, Rosibel Mathew PA-C, Last Rate: 125 mL/hr at 08/02/19 0651, 125 mL/hr at 08/02/19 0651  •  vitamin C (ASCORBIC ACID) tablet 500 mg, 500 mg, Oral, Daily, William Villatoro MD, 500 mg at 08/01/19 1600    Allergies:    Bactrim [sulfamethoxazole-trimethoprim]; Ciprofloxacin; Levaquin [levofloxacin]; Macrobid [nitrofurantoin]; Nitrofurantoin macrocrystal; and Cortisone    Assessment:    Active Problems:  Patient Active Problem List   Diagnosis   • Urinary tract infection due to ESBL Klebsiella   • Simple renal cyst   • Former smoker   • Gastroesophageal reflux disease   • Essential hypertension   • Hyperlipidemia   • Hypertriglyceridemia   • Osteoporosis   • Panic disorder   • Psoriasis   • Urge incontinence of urine   • Vitamin D deficiency   • Post-menopause   • Urinary tract infection   • Insulin resistance   • Chronic bilateral low back pain without sciatica   • Spinal stenosis of lumbar region with neurogenic claudication   • Burning sensation of foot   • Other chronic pain   • Tachycardia       uti lt upj 3 mm stone post stent placement culture pending      Plan:    D/w nurse jered culture pending scripts in chart except  Antibiotic   Will see in office next week        Kevon Clark MD    8/2/2019  7:40 AM

## 2019-08-02 NOTE — DISCHARGE INSTR - APPOINTMENTS
Dr Clark's office will contact to schedule follow up appointment. If you do not hear from the office by Monday call [phone].    Pt has a Follow up apt scheduled with DR Chavez PCP for Monday August 5th

## 2019-08-03 ENCOUNTER — READMISSION MANAGEMENT (OUTPATIENT)
Dept: CALL CENTER | Facility: HOSPITAL | Age: 79
End: 2019-08-03

## 2019-08-03 LAB — BACTERIA SPEC AEROBE CULT: ABNORMAL

## 2019-08-03 NOTE — OUTREACH NOTE
Prep Survey      Responses   Facility patient discharged from?  [facility]   Is LACE score < 7 ?  Yes   Is patient eligible?  Yes   Discharge diagnosis  abdominal pain, tachycardia, obstructing left renal stone   Does the patient have one of the following disease processes/diagnoses(primary or secondary)?  Other   Does the patient have Home health ordered?  No   Is there a DME ordered?  No   Comments regarding appointments  see AVS   Prep survey completed?  Yes          Christine Ndiaye RN

## 2019-08-05 ENCOUNTER — READMISSION MANAGEMENT (OUTPATIENT)
Dept: CALL CENTER | Facility: HOSPITAL | Age: 79
End: 2019-08-05

## 2019-08-05 NOTE — OUTREACH NOTE
LAG < 7 Survey      Responses   Facility patient discharged from?  [facility]   Does the patient have one of the following disease processes/diagnoses(primary or secondary)?  Other   Is there a successful TCM telephone encounter documented?  No   BHLAG <7 Attempt successful?  Yes   Call start time  0931   Call end time  0943   Discharge diagnosis  abdominal pain, tachycardia, obstructing left renal stone   Meds reviewed with patient/caregiver?  Yes   Is the patient having any side effects they believe may be caused by any medication additions or changes?  No   Does the patient have all medications ordered at discharge?  Yes   Is the patient taking all medications as directed (includes completed medication regime)?  Yes   Does the patient have a primary care provider?   Yes   Does the patient have an appointment with their PCP within 7 days of discharge?  Yes   Has the patient kept scheduled appointments due by today?  Yes   Has home health visited the patient within 72 hours of discharge?  N/A   Psychosocial issues?  No   Comments  Complains that she is constantly leaking urine and is completely incontinent. She is wearing multiple layers of pads/depends. Advised her to call urology and update them on this issue. Advised to return to ER PRN. No reported issues with pain.   Did the patient receive a copy of their discharge instructions?  Yes   Nursing interventions  Reviewed instructions with patient   What is the patient's perception of their health status since discharge?  New symptoms unrelated to diagnosis   Is the patient/caregiver able to teach back signs and symptoms related to disease process for when to call PCP?  Yes   Is the patient/caregiver able to teach back signs and symptoms related to disease process for when to call 911?  Yes   Is the patient/caregiver able to teach back the hierarchy of who to call/visit for symptoms/problems? PCP, Specialist, Home health nurse, Urgent Care, ED, 911  Yes   Graduated   Yes          Jose Mendoza RN

## 2019-08-06 ENCOUNTER — TRANSCRIBE ORDERS (OUTPATIENT)
Dept: ADMINISTRATIVE | Facility: HOSPITAL | Age: 79
End: 2019-08-06

## 2019-08-06 DIAGNOSIS — R00.2 PALPITATIONS: Primary | ICD-10-CM

## 2019-08-06 DIAGNOSIS — R00.0 TACHYCARDIA, UNSPECIFIED: ICD-10-CM

## 2019-08-07 LAB — BACTERIA SPEC AEROBE CULT: ABNORMAL

## 2019-08-19 ENCOUNTER — HOSPITAL ENCOUNTER (OUTPATIENT)
Dept: CARDIOLOGY | Facility: HOSPITAL | Age: 79
Discharge: HOME | End: 2019-08-19
Admitting: INTERNAL MEDICINE

## 2019-08-19 DIAGNOSIS — R00.2 PALPITATIONS: ICD-10-CM

## 2019-08-19 DIAGNOSIS — R00.0 TACHYCARDIA, UNSPECIFIED: ICD-10-CM

## 2019-08-19 PROCEDURE — 93226 XTRNL ECG REC<48 HR SCAN A/R: CPT

## 2019-08-19 PROCEDURE — 93225 XTRNL ECG REC<48 HRS REC: CPT

## 2019-08-21 PROCEDURE — 93227 XTRNL ECG REC<48 HR R&I: CPT | Performed by: INTERNAL MEDICINE

## 2019-08-23 ENCOUNTER — TELEPHONE (OUTPATIENT)
Dept: CARDIOLOGY | Facility: CLINIC | Age: 79
End: 2019-08-23

## 2019-08-23 NOTE — TELEPHONE ENCOUNTER
Dr. Fernandez's office called and said that you talk to Dr. Fernandez about msKatie Graziolo holter and you want to see her. Would you like me to add her on sooner than September 09th at [facility] or would the 9-9th be okay?    Thanks

## 2019-08-23 NOTE — TELEPHONE ENCOUNTER
I called them because her holter was abnormal.  This is a new pt for us; any of us can see here, it does not have to be me

## 2019-09-09 ENCOUNTER — OFFICE VISIT (OUTPATIENT)
Dept: CARDIOLOGY | Facility: CLINIC | Age: 79
End: 2019-09-09

## 2019-09-09 VITALS
WEIGHT: 243 LBS | BODY MASS INDEX: 39.05 KG/M2 | DIASTOLIC BLOOD PRESSURE: 80 MMHG | OXYGEN SATURATION: 98 % | HEIGHT: 66 IN | HEART RATE: 102 BPM | SYSTOLIC BLOOD PRESSURE: 134 MMHG

## 2019-09-09 DIAGNOSIS — R06.02 SOB (SHORTNESS OF BREATH): ICD-10-CM

## 2019-09-09 DIAGNOSIS — R00.2 PALPITATIONS: ICD-10-CM

## 2019-09-09 DIAGNOSIS — I47.20 VENTRICULAR TACHYCARDIA (HCC): Primary | ICD-10-CM

## 2019-09-09 PROCEDURE — 99204 OFFICE O/P NEW MOD 45 MIN: CPT | Performed by: INTERNAL MEDICINE

## 2019-09-09 RX ORDER — IBUPROFEN 200 MG
200 TABLET ORAL AS NEEDED
COMMUNITY
End: 2021-02-19

## 2019-09-09 NOTE — PROGRESS NOTES
Subjective:     Encounter Date:09/09/2019      Patient ID: Anitra Orta is a 79 y.o. female.    Chief Complaint:  History of Present Illness    Dear Dr. Chavez,    Had the pleasure of seeing this patient in the office today for initial evaluation and consultation.  I appreciate that you sent her to see us.  She comes in for evaluation after recent Holter monitor demonstrated runs of ventricular tachycardia.    Holter was placed because she has been having palpitations.  She was hospitalized at [facility] for kidney stones and was noted to be tachycardic on arrival.  EKG was felt to show sinus tachycardia with a right bundle branch block morphology which was new.  No cardiac investigation was performed while she was hospitalized there and she was not seen by cardiology.  Because of the palpitation she had a monitor placed after discharge.  This showed numerous episodes of a wide-complex tachycardia consistent with ventricular tachycardia at rates around 130 bpm.  The longest duration was 33 beats:  Interpretation Summary     · An abnormal monitor study.  · Numerous episodes of wide complex tachycardia consistent with ventricular tachycardia, rate typically 130 BPM, max duration 33 beats at 3:40 PM  · Frequent PVCs, rare PACs        Patient does note that she has frequent feelings of palpitations.  Sometimes she has what she thinks her anxiety spells where it seems like her heart starts racing.  She will notice that sometimes when she is reading a book or watching TV.    She states she had a cardiac catheterization maybe 10 years ago and that at that point everything looked fine.  She was seen by Dr. Blount at that time.  Many many years ago she had a chemical stress test that was normal.  She is never had an echocardiogram.    She denies any chest pain or chest discomfort.  No chest heaviness.  No indigestion symptoms.  She does get dyspneic with activity.  No spells of dizziness or lightheadedness  and no presyncope or syncope.  She is never any thyroid issues.  She is never been tested for sleep apnea-her  says that she does not really snore to any particular amount.  The kidney stones have improved and she thinks that they have gotten that taken care of now.    The following portions of the patient's history were reviewed and updated as appropriate: allergies, current medications, past family history, past medical history, past social history, past surgical history and problem list.    Past Medical History:   Diagnosis Date   • Anxiety    • Chronic pain disorder    • Constipation    • DDD (degenerative disc disease), lumbar    • GERD (gastroesophageal reflux disease)    • HTN (hypertension)    • Hyperlipidemia    • Kidney stones    • Low back pain    • Osteoarthritis    • Osteopenia    • Osteoporosis    • PONV (postoperative nausea and vomiting)    • Psoriasis    • Urinary tract infection        Past Surgical History:   Procedure Laterality Date   • BREAST BIOPSY Left     benign   • BREAST LUMPECTOMY Left     benign   • BUNIONECTOMY Right    • COLONOSCOPY N/A 11/30/2016    Procedure: COLONOSCOPY polypectomy;  Surgeon: Adali Willard MD;  Location: [facility] OR;  Service:    • CYSTOSCOPY BOTOX INJECTION OF BLADDER N/A 7/21/2017    Procedure: CYSTOSCOPY COAPTITE INJECTION;  Surgeon: Kevon Clark MD;  Location: [facility] OR;  Service:    • CYSTOSCOPY W/ LITHOLAPAXY / EHL     • CYSTOSCOPY W/ URETERAL STENT PLACEMENT Left 9/12/2016    Procedure: CYSTOSCOPY URETERAL STENT INSERTION;  Surgeon: Kevon Clark MD;  Location: [facility] OR;  Service:    • CYSTOSCOPY W/ URETERAL STENT PLACEMENT Left 8/1/2019    Procedure: CYSTOSCOPY URETERAL CATHETER/STENT INSERTION;  Surgeon: Kevon Clark MD;  Location: [facility] OR;  Service: Urology   • KNEE ARTHROPLASTY Right    • TONSILLECTOMY AND ADENOIDECTOMY     • URETEROSCOPY LASER LITHOTRIPSY WITH STENT INSERTION Left 10/5/2016    Procedure: LT URETEROSCOPY LASER  LITHOTRIPSY STONE BASKET EXTRACTION AND STENT ;  Surgeon: Kevon Clark MD;  Location: [facility];  Service:        Social History     Socioeconomic History   • Marital status:      Spouse name: Not on file   • Number of children: Not on file   • Years of education: Not on file   • Highest education level: Not on file   Tobacco Use   • Smoking status: Former Smoker     Years: 40.00     Last attempt to quit: 10/4/1980     Years since quittin.9   • Smokeless tobacco: Never Used   • Tobacco comment: quit    Substance and Sexual Activity   • Alcohol use: No   • Drug use: No   • Sexual activity: Defer       Review of Systems   Constitution: Negative for chills, decreased appetite, fever and night sweats.   HENT: Negative for ear discharge, ear pain, hearing loss, nosebleeds and sore throat.    Eyes: Negative for blurred vision, double vision and pain.   Cardiovascular: Negative for cyanosis.   Respiratory: Negative for hemoptysis and sputum production.    Endocrine: Negative for cold intolerance and heat intolerance.   Hematologic/Lymphatic: Negative for adenopathy.   Skin: Negative for dry skin, itching, nail changes, rash and suspicious lesions.   Musculoskeletal: Negative for arthritis, gout, muscle cramps, muscle weakness, myalgias and neck pain.   Gastrointestinal: Negative for anorexia, bowel incontinence, constipation, diarrhea, dysphagia, hematemesis and jaundice.   Genitourinary: Positive for frequency and nocturia. Negative for bladder incontinence, dysuria, flank pain and hematuria.   Neurological: Negative for focal weakness, numbness, paresthesias and seizures.   Psychiatric/Behavioral: Negative for altered mental status, hallucinations, hypervigilance, suicidal ideas and thoughts of violence.   Allergic/Immunologic: Negative for persistent infections.       Procedures       Objective:     Vitals:    19 1247   BP: 134/80   Pulse: 102   SpO2: 98%   Weight: 110 kg (243 lb)  "  Height: 167.6 cm (66\")         Physical Exam   Constitutional: She is oriented to person, place, and time. She appears well-developed and well-nourished. No distress.   HENT:   Head: Normocephalic and atraumatic.   Nose: Nose normal.   Mouth/Throat: Oropharynx is clear and moist.   Eyes: Conjunctivae and EOM are normal. Pupils are equal, round, and reactive to light. Right eye exhibits no discharge. Left eye exhibits no discharge.   Neck: Normal range of motion. Neck supple. No tracheal deviation present. No thyromegaly present.   Cardiovascular: Normal rate, regular rhythm, S1 normal, S2 normal, normal heart sounds and normal pulses. Exam reveals no S3.   Pulmonary/Chest: Effort normal and breath sounds normal. No stridor. No respiratory distress. She exhibits no tenderness.   Abdominal: Soft. Bowel sounds are normal. She exhibits no distension and no mass. There is no tenderness. There is no rebound and no guarding.   Musculoskeletal: Normal range of motion. She exhibits no tenderness or deformity.   Lymphadenopathy:     She has no cervical adenopathy.   Neurological: She is alert and oriented to person, place, and time. She has normal reflexes.   Skin: Skin is warm and dry. No rash noted. She is not diaphoretic. No erythema.   Psychiatric: She has a normal mood and affect. Thought content normal.       Lab Review:             Performed        Assessment:          Diagnosis Plan   1. Ventricular tachycardia (CMS/HCC)  Adult Transthoracic Echo Complete W/ Cont if Necessary Per Protocol    Stress Test With Myocardial Perfusion One Day    Thyroid Panel With TSH   2. SOB (shortness of breath)  Adult Transthoracic Echo Complete W/ Cont if Necessary Per Protocol    Stress Test With Myocardial Perfusion One Day    Thyroid Panel With TSH   3. Palpitations  Adult Transthoracic Echo Complete W/ Cont if Necessary Per Protocol    Stress Test With Myocardial Perfusion One Day    Thyroid Panel With TSH          Plan:       1. "  Wide-complex tachycardia consistent with ventricular tachycardia-multiple episodes documented on the Holter monitor.  We will arrange for an echocardiogram and a Cardiolite scan.  She cannot walk on a treadmill because of joint disease, so we will perform this with [employer].  Additionally, no recent thyroid function test so we will obtain that as well.  I am not can change her medical therapy until we see the above.  Thank you very much for allowing us to participate in the care of this pleasant patient.  Please don't hesitate to call if I can be of assistance in any way.      Current Outpatient Medications:   •  aspirin 81 MG EC tablet, Take 81 mg by mouth Daily. PT TO STOP PER MD INSTRUCTION, Disp: , Rfl:   •  Biotin 10 MG capsule, Take 10 mg by mouth Daily., Disp: , Rfl:   •  Calcium Carb-Cholecalciferol (CALCIUM 600 + D PO), Take  by mouth Daily., Disp: , Rfl:   •  HYDROcodone-acetaminophen (NORCO) 5-325 MG per tablet, Take 1 tablet by mouth Every 4 (Four) Hours As Needed for Moderate Pain ., Disp: 20 tablet, Rfl: 0  •  ibuprofen (ADVIL,MOTRIN) 200 MG tablet, Take 200 mg by mouth As Needed., Disp: , Rfl:   •  imipramine (TOFRANIL) 50 MG tablet, TAKE 2 TO 3 TABLETS BY MOUTH EVERY NIGHT AT BEDTIME (Patient taking differently: TAKE 2  TABLETS BY MOUTH EVERY NIGHT AT BEDTIME), Disp: 270 tablet, Rfl: 1  •  lisinopril (PRINIVIL,ZESTRIL) 20 MG tablet, TAKE 1 TABLET BY MOUTH DAILY WITH BREAKFAST (Patient taking differently: Take 40 mg by mouth Daily With Breakfast.), Disp: 90 tablet, Rfl: 1  •  NON FORMULARY, Take 1 tablet by mouth Daily. viviscal tablet for hair loss, Disp: , Rfl:   •  NON FORMULARY, Take 1 tablet by mouth Daily. [employer] vision / Ardes 2 for macular degeneration / over the counter, Disp: , Rfl:   •  omeprazole (priLOSEC) 20 MG capsule, Take 1 capsule by mouth Daily., Disp: , Rfl:   •  simvastatin (ZOCOR) 20 MG tablet, TAKE 1 TABLET BY MOUTH EVERY NIGHT (Patient taking differently: Take 40 mg by mouth  Every Night.), Disp: 90 tablet, Rfl: 1  •  vitamin C (ASCORBIC ACID) 500 MG tablet, Take 500 mg by mouth Daily., Disp: , Rfl:

## 2019-09-14 ENCOUNTER — RESULTS ENCOUNTER (OUTPATIENT)
Dept: CARDIOLOGY | Facility: CLINIC | Age: 79
End: 2019-09-14

## 2019-09-14 DIAGNOSIS — I47.20 VENTRICULAR TACHYCARDIA (HCC): ICD-10-CM

## 2019-09-14 DIAGNOSIS — R06.02 SOB (SHORTNESS OF BREATH): ICD-10-CM

## 2019-09-14 DIAGNOSIS — R00.2 PALPITATIONS: ICD-10-CM

## 2019-09-16 ENCOUNTER — APPOINTMENT (OUTPATIENT)
Dept: LAB | Facility: HOSPITAL | Age: 79
End: 2019-09-16

## 2019-09-16 ENCOUNTER — TELEPHONE (OUTPATIENT)
Dept: CARDIOLOGY | Facility: CLINIC | Age: 79
End: 2019-09-16

## 2019-09-16 LAB
T-UPTAKE NFR SERPL: 1.06 TBI (ref 0.8–1.3)
T4 SERPL-MCNC: 6.78 MCG/DL (ref 4.5–11.7)
TSH SERPL DL<=0.05 MIU/L-ACNC: 1.82 UIU/ML (ref 0.27–4.2)

## 2019-09-16 PROCEDURE — 36415 COLL VENOUS BLD VENIPUNCTURE: CPT | Performed by: INTERNAL MEDICINE

## 2019-09-16 PROCEDURE — 84443 ASSAY THYROID STIM HORMONE: CPT | Performed by: INTERNAL MEDICINE

## 2019-09-16 PROCEDURE — 84436 ASSAY OF TOTAL THYROXINE: CPT | Performed by: INTERNAL MEDICINE

## 2019-09-16 PROCEDURE — 84479 ASSAY OF THYROID (T3 OR T4): CPT | Performed by: INTERNAL MEDICINE

## 2019-10-03 ENCOUNTER — TRANSCRIBE ORDERS (OUTPATIENT)
Dept: OBSTETRICS AND GYNECOLOGY | Facility: CLINIC | Age: 79
End: 2019-10-03

## 2019-10-03 DIAGNOSIS — Z12.31 SCREENING MAMMOGRAM FOR HIGH-RISK PATIENT: Primary | ICD-10-CM

## 2019-10-04 ENCOUNTER — APPOINTMENT (OUTPATIENT)
Dept: GENERAL RADIOLOGY | Facility: HOSPITAL | Age: 79
End: 2019-10-04

## 2019-10-04 PROCEDURE — 73610 X-RAY EXAM OF ANKLE: CPT | Performed by: INTERNAL MEDICINE

## 2019-10-04 PROCEDURE — 73630 X-RAY EXAM OF FOOT: CPT | Performed by: INTERNAL MEDICINE

## 2019-10-10 ENCOUNTER — HOSPITAL ENCOUNTER (OUTPATIENT)
Dept: NUCLEAR MEDICINE | Facility: HOSPITAL | Age: 79
Discharge: HOME | End: 2019-10-10

## 2019-10-10 ENCOUNTER — HOSPITAL ENCOUNTER (OUTPATIENT)
Dept: CARDIOLOGY | Facility: HOSPITAL | Age: 79
Discharge: HOME | End: 2019-10-10
Admitting: INTERNAL MEDICINE

## 2019-10-10 ENCOUNTER — APPOINTMENT (OUTPATIENT)
Dept: CARDIOLOGY | Facility: HOSPITAL | Age: 79
End: 2019-10-10

## 2019-10-10 ENCOUNTER — HOSPITAL ENCOUNTER (OUTPATIENT)
Dept: CARDIOLOGY | Facility: HOSPITAL | Age: 79
Discharge: HOME | End: 2019-10-10

## 2019-10-10 VITALS
DIASTOLIC BLOOD PRESSURE: 90 MMHG | BODY MASS INDEX: 39.05 KG/M2 | HEIGHT: 66 IN | WEIGHT: 243 LBS | SYSTOLIC BLOOD PRESSURE: 175 MMHG

## 2019-10-10 DIAGNOSIS — R00.2 PALPITATIONS: ICD-10-CM

## 2019-10-10 DIAGNOSIS — I47.20 VENTRICULAR TACHYCARDIA (HCC): ICD-10-CM

## 2019-10-10 DIAGNOSIS — R06.02 SOB (SHORTNESS OF BREATH): ICD-10-CM

## 2019-10-10 LAB
BH CV ECHO MEAS - ACS: 2 CM
BH CV ECHO MEAS - AO MAX PG (FULL): 2.8 MMHG
BH CV ECHO MEAS - AO MAX PG: 7 MMHG
BH CV ECHO MEAS - AO MEAN PG (FULL): 2 MMHG
BH CV ECHO MEAS - AO MEAN PG: 4 MMHG
BH CV ECHO MEAS - AO ROOT AREA (BSA CORRECTED): 1.2
BH CV ECHO MEAS - AO ROOT AREA: 5.7 CM^2
BH CV ECHO MEAS - AO ROOT DIAM: 2.7 CM
BH CV ECHO MEAS - AO V2 MAX: 132 CM/SEC
BH CV ECHO MEAS - AO V2 MEAN: 88.6 CM/SEC
BH CV ECHO MEAS - AO V2 VTI: 30.5 CM
BH CV ECHO MEAS - ASC AORTA: 3 CM
BH CV ECHO MEAS - AVA(I,A): 2.3 CM^2
BH CV ECHO MEAS - AVA(I,D): 2.3 CM^2
BH CV ECHO MEAS - AVA(V,A): 2.7 CM^2
BH CV ECHO MEAS - AVA(V,D): 2.7 CM^2
BH CV ECHO MEAS - BSA(HAYCOCK): 2.3 M^2
BH CV ECHO MEAS - BSA: 2.2 M^2
BH CV ECHO MEAS - BZI_BMI: 39.2 KILOGRAMS/M^2
BH CV ECHO MEAS - BZI_METRIC_HEIGHT: 167.6 CM
BH CV ECHO MEAS - BZI_METRIC_WEIGHT: 110.2 KG
BH CV ECHO MEAS - EDV(CUBED): 76 ML
BH CV ECHO MEAS - EDV(MOD-SP2): 81.3 ML
BH CV ECHO MEAS - EDV(MOD-SP4): 63.2 ML
BH CV ECHO MEAS - EDV(TEICH): 80.1 ML
BH CV ECHO MEAS - EF(CUBED): 65.9 %
BH CV ECHO MEAS - EF(MOD-BP): 62 %
BH CV ECHO MEAS - EF(MOD-SP2): 63.8 %
BH CV ECHO MEAS - EF(MOD-SP4): 61.2 %
BH CV ECHO MEAS - EF(TEICH): 57.7 %
BH CV ECHO MEAS - ESV(CUBED): 25.9 ML
BH CV ECHO MEAS - ESV(MOD-SP2): 29.4 ML
BH CV ECHO MEAS - ESV(MOD-SP4): 24.5 ML
BH CV ECHO MEAS - ESV(TEICH): 33.9 ML
BH CV ECHO MEAS - FS: 30.1 %
BH CV ECHO MEAS - IVS/LVPW: 1.2
BH CV ECHO MEAS - IVSD: 1.4 CM
BH CV ECHO MEAS - LAT PEAK E' VEL: 9 CM/SEC
BH CV ECHO MEAS - LV DIASTOLIC VOL/BSA (35-75): 29.1 ML/M^2
BH CV ECHO MEAS - LV MASS(C)D: 203.1 GRAMS
BH CV ECHO MEAS - LV MASS(C)DI: 93.5 GRAMS/M^2
BH CV ECHO MEAS - LV MAX PG: 4.2 MMHG
BH CV ECHO MEAS - LV MEAN PG: 2 MMHG
BH CV ECHO MEAS - LV SYSTOLIC VOL/BSA (12-30): 11.3 ML/M^2
BH CV ECHO MEAS - LV V1 MAX: 102 CM/SEC
BH CV ECHO MEAS - LV V1 MEAN: 59.8 CM/SEC
BH CV ECHO MEAS - LV V1 VTI: 20.3 CM
BH CV ECHO MEAS - LVIDD: 4.2 CM
BH CV ECHO MEAS - LVIDS: 3 CM
BH CV ECHO MEAS - LVLD AP2: 8.2 CM
BH CV ECHO MEAS - LVLD AP4: 7.8 CM
BH CV ECHO MEAS - LVLS AP2: 6.9 CM
BH CV ECHO MEAS - LVLS AP4: 6.4 CM
BH CV ECHO MEAS - LVOT AREA (M): 3.5 CM^2
BH CV ECHO MEAS - LVOT AREA: 3.5 CM^2
BH CV ECHO MEAS - LVOT DIAM: 2.1 CM
BH CV ECHO MEAS - LVPWD: 1.2 CM
BH CV ECHO MEAS - MED PEAK E' VEL: 6 CM/SEC
BH CV ECHO MEAS - MV A DUR: 0.14 SEC
BH CV ECHO MEAS - MV A MAX VEL: 83.9 CM/SEC
BH CV ECHO MEAS - MV DEC SLOPE: 322 CM/SEC^2
BH CV ECHO MEAS - MV DEC TIME: 224 SEC
BH CV ECHO MEAS - MV E MAX VEL: 71.3 CM/SEC
BH CV ECHO MEAS - MV E/A: 0.85
BH CV ECHO MEAS - MV MAX PG: 3 MMHG
BH CV ECHO MEAS - MV MEAN PG: 1 MMHG
BH CV ECHO MEAS - MV P1/2T MAX VEL: 75.2 CM/SEC
BH CV ECHO MEAS - MV P1/2T: 68.4 MSEC
BH CV ECHO MEAS - MV V2 MAX: 86.2 CM/SEC
BH CV ECHO MEAS - MV V2 MEAN: 57.6 CM/SEC
BH CV ECHO MEAS - MV V2 VTI: 18.7 CM
BH CV ECHO MEAS - MVA P1/2T LCG: 2.9 CM^2
BH CV ECHO MEAS - MVA(P1/2T): 3.2 CM^2
BH CV ECHO MEAS - MVA(VTI): 3.8 CM^2
BH CV ECHO MEAS - PA ACC TIME: 0.12 SEC
BH CV ECHO MEAS - PA MAX PG (FULL): 1.7 MMHG
BH CV ECHO MEAS - PA MAX PG: 3.4 MMHG
BH CV ECHO MEAS - PA PR(ACCEL): 26.8 MMHG
BH CV ECHO MEAS - PA V2 MAX: 92.1 CM/SEC
BH CV ECHO MEAS - PULM A REVS DUR: 0.09 SEC
BH CV ECHO MEAS - PULM A REVS VEL: 31.1 CM/SEC
BH CV ECHO MEAS - PULM DIAS VEL: 37.4 CM/SEC
BH CV ECHO MEAS - PULM S/D: 1.8
BH CV ECHO MEAS - PULM SYS VEL: 67.2 CM/SEC
BH CV ECHO MEAS - PVA(V,A): 2.4 CM^2
BH CV ECHO MEAS - PVA(V,D): 2.4 CM^2
BH CV ECHO MEAS - QP/QS: 0.62
BH CV ECHO MEAS - RAP SYSTOLE: 3 MMHG
BH CV ECHO MEAS - RV MAX PG: 1.7 MMHG
BH CV ECHO MEAS - RV MEAN PG: 1 MMHG
BH CV ECHO MEAS - RV V1 MAX: 65 CM/SEC
BH CV ECHO MEAS - RV V1 MEAN: 42.1 CM/SEC
BH CV ECHO MEAS - RV V1 VTI: 12.5 CM
BH CV ECHO MEAS - RVOT AREA: 3.5 CM^2
BH CV ECHO MEAS - RVOT DIAM: 2.1 CM
BH CV ECHO MEAS - RVSP: 17.4 MMHG
BH CV ECHO MEAS - SI(AO): 80.4 ML/M^2
BH CV ECHO MEAS - SI(CUBED): 23 ML/M^2
BH CV ECHO MEAS - SI(LVOT): 32.4 ML/M^2
BH CV ECHO MEAS - SI(MOD-SP2): 23.9 ML/M^2
BH CV ECHO MEAS - SI(MOD-SP4): 17.8 ML/M^2
BH CV ECHO MEAS - SI(TEICH): 21.3 ML/M^2
BH CV ECHO MEAS - SV(AO): 174.6 ML
BH CV ECHO MEAS - SV(CUBED): 50 ML
BH CV ECHO MEAS - SV(LVOT): 70.3 ML
BH CV ECHO MEAS - SV(MOD-SP2): 51.9 ML
BH CV ECHO MEAS - SV(MOD-SP4): 38.7 ML
BH CV ECHO MEAS - SV(RVOT): 43.3 ML
BH CV ECHO MEAS - SV(TEICH): 46.3 ML
BH CV ECHO MEAS - TR MAX VEL: 190 CM/SEC
BH CV ECHO MEASUREMENTS AVERAGE E/E' RATIO: 9.51
BH CV NUCLEAR PRIOR STUDY: 3
BH CV STRESS BP STAGE 1: NORMAL
BH CV STRESS COMMENTS STAGE 1: NORMAL
BH CV STRESS DOSE REGADENOSON STAGE 1: 0.4
BH CV STRESS DURATION MIN STAGE 1: 0
BH CV STRESS DURATION SEC STAGE 1: 30
BH CV STRESS HR STAGE 1: 68
BH CV STRESS O2 STAGE 1: 99
BH CV STRESS PROTOCOL 1: NORMAL
BH CV STRESS RECOVERY BP: NORMAL MMHG
BH CV STRESS RECOVERY HR: 80 BPM
BH CV STRESS RECOVERY O2: 96 %
BH CV STRESS STAGE 1: 1
BH CV VAS BP RIGHT ARM: NORMAL MMHG
BH CV XLRA - TDI S': 9 CM/SEC
LEFT ATRIUM VOLUME INDEX: 20 ML/M2
LV EF 2D ECHO EST: 62 %
LV EF NUC BP: 64 %
MAXIMAL PREDICTED HEART RATE: 142 BPM
MAXIMAL PREDICTED HEART RATE: 142 BPM
PERCENT MAX PREDICTED HR: 107.04 %
STRESS BASELINE BP: NORMAL MMHG
STRESS BASELINE HR: 70 BPM
STRESS O2 SAT REST: 98 %
STRESS PERCENT HR: 126 %
STRESS POST ESTIMATED WORKLOAD: 1 METS
STRESS POST EXERCISE DUR SEC: 30 SEC
STRESS POST O2 SAT PEAK: 99 %
STRESS POST PEAK BP: NORMAL MMHG
STRESS POST PEAK HR: 152 BPM
STRESS TARGET HR: 121 BPM
STRESS TARGET HR: 121 BPM

## 2019-10-10 PROCEDURE — 93306 TTE W/DOPPLER COMPLETE: CPT | Performed by: INTERNAL MEDICINE

## 2019-10-10 PROCEDURE — A9500 TC99M SESTAMIBI: HCPCS | Performed by: INTERNAL MEDICINE

## 2019-10-10 PROCEDURE — 93306 TTE W/DOPPLER COMPLETE: CPT

## 2019-10-10 PROCEDURE — 93017 CV STRESS TEST TRACING ONLY: CPT

## 2019-10-10 PROCEDURE — 93016 CV STRESS TEST SUPVJ ONLY: CPT | Performed by: INTERNAL MEDICINE

## 2019-10-10 PROCEDURE — 0 TECHNETIUM SESTAMIBI: Performed by: INTERNAL MEDICINE

## 2019-10-10 PROCEDURE — 25010000002 PERFLUTREN (DEFINITY) 8.476 MG IN SODIUM CHLORIDE 0.9 % 10 ML INJECTION: Performed by: INTERNAL MEDICINE

## 2019-10-10 PROCEDURE — 93018 CV STRESS TEST I&R ONLY: CPT | Performed by: INTERNAL MEDICINE

## 2019-10-10 PROCEDURE — 78452 HT MUSCLE IMAGE SPECT MULT: CPT

## 2019-10-10 PROCEDURE — 25010000002 REGADENOSON 0.4 MG/5ML SOLUTION: Performed by: INTERNAL MEDICINE

## 2019-10-10 PROCEDURE — 78452 HT MUSCLE IMAGE SPECT MULT: CPT | Performed by: INTERNAL MEDICINE

## 2019-10-10 RX ADMIN — TECHNETIUM TC 99M SESTAMIBI 1 DOSE: 1 INJECTION INTRAVENOUS at 07:50

## 2019-10-10 RX ADMIN — REGADENOSON 0.4 MG: 0.08 INJECTION, SOLUTION INTRAVENOUS at 10:05

## 2019-10-10 RX ADMIN — PERFLUTREN 2 ML: 6.52 INJECTION, SUSPENSION INTRAVENOUS at 11:00

## 2019-10-10 RX ADMIN — TECHNETIUM TC 99M SESTAMIBI 1 DOSE: 1 INJECTION INTRAVENOUS at 10:05

## 2019-11-04 ENCOUNTER — HOSPITAL ENCOUNTER (OUTPATIENT)
Dept: MAMMOGRAPHY | Facility: HOSPITAL | Age: 79
Discharge: HOME | End: 2019-11-04
Admitting: OBSTETRICS & GYNECOLOGY

## 2019-11-04 DIAGNOSIS — Z12.31 SCREENING MAMMOGRAM FOR HIGH-RISK PATIENT: ICD-10-CM

## 2019-11-04 PROCEDURE — 77063 BREAST TOMOSYNTHESIS BI: CPT

## 2019-11-04 PROCEDURE — 77067 SCR MAMMO BI INCL CAD: CPT

## 2020-08-20 ENCOUNTER — TRANSCRIBE ORDERS (OUTPATIENT)
Dept: ADMINISTRATIVE | Facility: HOSPITAL | Age: 80
End: 2020-08-20

## 2020-08-24 ENCOUNTER — LAB (OUTPATIENT)
Dept: LAB | Facility: HOSPITAL | Age: 80
End: 2020-08-24

## 2020-08-24 ENCOUNTER — TRANSCRIBE ORDERS (OUTPATIENT)
Dept: ADMINISTRATIVE | Facility: HOSPITAL | Age: 80
End: 2020-08-24

## 2020-08-24 DIAGNOSIS — N30.21 OTHER CHRONIC CYSTITIS WITH HEMATURIA: ICD-10-CM

## 2020-08-24 DIAGNOSIS — N20.0 URIC ACID NEPHROLITHIASIS: Primary | ICD-10-CM

## 2020-08-24 DIAGNOSIS — N20.0 URIC ACID NEPHROLITHIASIS: ICD-10-CM

## 2020-08-24 LAB
ANION GAP SERPL CALCULATED.3IONS-SCNC: 10.6 MMOL/L (ref 5–15)
BUN SERPL-MCNC: 20 MG/DL (ref 8–23)
BUN/CREAT SERPL: 17.5 (ref 7–25)
CALCIUM SPEC-SCNC: 10.1 MG/DL (ref 8.6–10.5)
CHLORIDE SERPL-SCNC: 104 MMOL/L (ref 98–107)
CO2 SERPL-SCNC: 25.4 MMOL/L (ref 22–29)
CREAT SERPL-MCNC: 1.14 MG/DL (ref 0.57–1)
GFR SERPL CREATININE-BSD FRML MDRD: 46 ML/MIN/1.73
GLUCOSE SERPL-MCNC: 127 MG/DL (ref 65–99)
POTASSIUM SERPL-SCNC: 4.2 MMOL/L (ref 3.5–5.2)
SODIUM SERPL-SCNC: 140 MMOL/L (ref 136–145)

## 2020-08-24 PROCEDURE — 80048 BASIC METABOLIC PNL TOTAL CA: CPT

## 2020-08-24 PROCEDURE — 36415 COLL VENOUS BLD VENIPUNCTURE: CPT

## 2020-10-12 ENCOUNTER — TRANSCRIBE ORDERS (OUTPATIENT)
Dept: ADMINISTRATIVE | Facility: HOSPITAL | Age: 80
End: 2020-10-12

## 2020-10-12 DIAGNOSIS — N20.0 KIDNEY STONE: Primary | ICD-10-CM

## 2020-10-26 ENCOUNTER — TRANSCRIBE ORDERS (OUTPATIENT)
Dept: ADMINISTRATIVE | Facility: HOSPITAL | Age: 80
End: 2020-10-26

## 2020-10-26 DIAGNOSIS — Z12.31 VISIT FOR SCREENING MAMMOGRAM: Primary | ICD-10-CM

## 2020-11-19 ENCOUNTER — HOSPITAL ENCOUNTER (OUTPATIENT)
Dept: MAMMOGRAPHY | Facility: HOSPITAL | Age: 80
Discharge: HOME | End: 2020-11-19
Admitting: OBSTETRICS & GYNECOLOGY

## 2020-11-19 DIAGNOSIS — Z12.31 VISIT FOR SCREENING MAMMOGRAM: ICD-10-CM

## 2020-11-19 PROCEDURE — 77063 BREAST TOMOSYNTHESIS BI: CPT

## 2020-11-19 PROCEDURE — 77067 SCR MAMMO BI INCL CAD: CPT

## 2021-01-04 ENCOUNTER — HOSPITAL ENCOUNTER (OUTPATIENT)
Dept: ULTRASOUND IMAGING | Facility: HOSPITAL | Age: 81
Discharge: HOME | End: 2021-01-04
Admitting: UROLOGY

## 2021-01-04 DIAGNOSIS — N20.0 KIDNEY STONE: ICD-10-CM

## 2021-01-04 PROCEDURE — 76775 US EXAM ABDO BACK WALL LIM: CPT

## 2021-02-19 ENCOUNTER — TELEPHONE (OUTPATIENT)
Dept: INTERNAL MEDICINE | Facility: CLINIC | Age: 81
End: 2021-02-19

## 2021-02-19 ENCOUNTER — OFFICE VISIT (OUTPATIENT)
Dept: INTERNAL MEDICINE | Facility: CLINIC | Age: 81
End: 2021-02-19

## 2021-02-19 VITALS
OXYGEN SATURATION: 98 % | SYSTOLIC BLOOD PRESSURE: 142 MMHG | DIASTOLIC BLOOD PRESSURE: 90 MMHG | WEIGHT: 245 LBS | RESPIRATION RATE: 16 BRPM | TEMPERATURE: 96.8 F | HEIGHT: 66 IN | BODY MASS INDEX: 39.37 KG/M2 | HEART RATE: 88 BPM

## 2021-02-19 DIAGNOSIS — E78.2 MIXED HYPERLIPIDEMIA: Chronic | ICD-10-CM

## 2021-02-19 DIAGNOSIS — K21.00 GASTROESOPHAGEAL REFLUX DISEASE WITH ESOPHAGITIS WITHOUT HEMORRHAGE: ICD-10-CM

## 2021-02-19 DIAGNOSIS — K21.9 GASTROESOPHAGEAL REFLUX DISEASE: ICD-10-CM

## 2021-02-19 DIAGNOSIS — R73.9 HYPERGLYCEMIA: Chronic | ICD-10-CM

## 2021-02-19 DIAGNOSIS — N39.46 MIXED INCONTINENCE: ICD-10-CM

## 2021-02-19 DIAGNOSIS — I10 ESSENTIAL HYPERTENSION: Primary | ICD-10-CM

## 2021-02-19 PROCEDURE — 99214 OFFICE O/P EST MOD 30 MIN: CPT | Performed by: INTERNAL MEDICINE

## 2021-02-19 RX ORDER — IMIPRAMINE HCL 50 MG
100 TABLET ORAL
Qty: 180 TABLET | Refills: 1 | Status: SHIPPED | OUTPATIENT
Start: 2021-02-19 | End: 2021-02-19 | Stop reason: SDUPTHER

## 2021-02-19 RX ORDER — SIMVASTATIN 40 MG
40 TABLET ORAL NIGHTLY
Qty: 90 TABLET | Refills: 1 | Status: SHIPPED | OUTPATIENT
Start: 2021-02-19 | End: 2021-11-03 | Stop reason: SDUPTHER

## 2021-02-19 RX ORDER — LISINOPRIL 40 MG/1
40 TABLET ORAL DAILY
COMMUNITY
End: 2021-02-19 | Stop reason: SDUPTHER

## 2021-02-19 RX ORDER — IMIPRAMINE HCL 50 MG
100 TABLET ORAL
Qty: 180 TABLET | Refills: 1 | Status: SHIPPED | OUTPATIENT
Start: 2021-02-19 | End: 2021-08-09

## 2021-02-19 RX ORDER — LISINOPRIL 40 MG/1
40 TABLET ORAL DAILY
Qty: 90 TABLET | Refills: 1 | Status: SHIPPED | OUTPATIENT
Start: 2021-02-19 | End: 2021-07-26 | Stop reason: HOSPADM

## 2021-02-19 RX ORDER — SIMVASTATIN 40 MG
40 TABLET ORAL NIGHTLY
COMMUNITY
Start: 2021-01-08 | End: 2021-02-19 | Stop reason: SDUPTHER

## 2021-02-19 RX ORDER — OMEPRAZOLE 20 MG/1
20 CAPSULE, DELAYED RELEASE ORAL DAILY
Qty: 90 CAPSULE | Refills: 1 | Status: SHIPPED | OUTPATIENT
Start: 2021-02-19

## 2021-02-19 NOTE — TELEPHONE ENCOUNTER
PATIENT CALLED AND SAID THAT ALL OF HER PRESCRIPTIONS ARE FINE, EXCEPT THE IMIPRAMINE.  IT IS SUPPOSED TO BE SENT TO [facility] IN [place].  COULD YOU PLEASE SEND IT THERE?  SHE TOLD [facility] TO CANCEL IT.    THANK YOU

## 2021-02-19 NOTE — PROGRESS NOTES
"Chief Complaint  Med Refill, Hypertension, and Hyperlipidemia    Subjective          Anitra Orta presents to [facility] for med refill and follow up HTN, HLD, GERD. Taking all medications daily without concern. No side effects.   Had UTI recently but has resolved with Amox.   Has macular degeneration and is now seeing Dr. Phillips and getting intraocular injections for treatment.     Objective   Vital Signs:     /90   Pulse 88   Temp 96.8 °F (36 °C)   Resp 16   Ht 167.6 cm (66\")   Wt 111 kg (245 lb)   SpO2 98%   BMI 39.54 kg/m²     Physical Exam  Vitals signs and nursing note reviewed.   Constitutional:       General: She is not in acute distress.     Appearance: Normal appearance.   Cardiovascular:      Rate and Rhythm: Normal rate and regular rhythm.      Pulses: Normal pulses.      Heart sounds: Normal heart sounds. No murmur.   Pulmonary:      Effort: Pulmonary effort is normal. No respiratory distress.      Breath sounds: Normal breath sounds.   Abdominal:      General: Abdomen is flat. Bowel sounds are normal.      Palpations: Abdomen is soft.      Tenderness: There is no abdominal tenderness.   Musculoskeletal:      Right lower leg: No edema.      Left lower leg: No edema.   Neurological:      Mental Status: She is alert and oriented to person, place, and time. Mental status is at baseline.   Psychiatric:         Mood and Affect: Mood normal.         Behavior: Behavior normal.          Result Review :   The following data was reviewed by: Margot Chavez MD on 02/19/2021:  Labs: CMP, FLP, A1C, Vit D 8/2020 in practice fusion         Assessment and Plan      Diagnoses and all orders for this visit:    1. Essential hypertension (Primary)  Assessment & Plan:  UNCONTROLLED  - outside of goal range today  - for now, cont on max dose of ACEi--> refills sent  - Cont checking BP at home intermittently, call if values trend outside of goal " range      Orders:  -     Comprehensive Metabolic Panel  -     lisinopril (PRINIVIL,ZESTRIL) 40 MG tablet; Take 1 tablet by mouth Daily.  Dispense: 90 tablet; Refill: 1    2. Mixed hyperlipidemia  Assessment & Plan:  STABLE  - will check CMP, FLP today for ongoing monitoring  - cont on moderate intensity statin for now, will adjust as indicated based on labs    Orders:  -     Lipid Panel  -     simvastatin (ZOCOR) 40 MG tablet; Take 1 tablet by mouth Every Night. for cholesterol  Dispense: 90 tablet; Refill: 1    3. Gastroesophageal reflux disease with esophagitis without hemorrhage  Assessment & Plan:  CONTROLLED  - cont on PPI once daily before breakfast  - reflux precautions reviewed and cont to encourage dietary trigger avoidance        4. Mixed incontinence  Assessment & Plan:  CONTROLLED  - cont imipramine 2 tabs nightly--> refills sent    Orders:  -     imipramine (TOFRANIL) 50 MG tablet; Take 2 tablets by mouth every night at bedtime.  Dispense: 180 tablet; Refill: 1    5. Hyperglycemia  Assessment & Plan:  STABLE  - will check A1C today for ongoing monitoring  - cont to work on low carb diet with goal of weight loss    Orders:  -     Hemoglobin A1c    6. Gastroesophageal reflux disease  Assessment & Plan:  CONTROLLED  - cont on PPI once daily before breakfast  - reflux precautions reviewed and cont to encourage dietary trigger avoidance      Orders:  -     omeprazole (priLOSEC) 20 MG capsule; Take 1 capsule by mouth Daily.  Dispense: 90 capsule; Refill: 1      Follow Up   Return in about 6 months (around 8/19/2021) for Annual physical.    Patient was given instructions and counseling regarding her condition or for health maintenance advice. Please see specific information pulled into the AVS if appropriate.     Hawa Chavez MD  [facility]  Phone: [phone]  Fax: [phone]

## 2021-02-19 NOTE — ASSESSMENT & PLAN NOTE
STABLE  - will check CMP, FLP today for ongoing monitoring  - cont on moderate intensity statin for now, will adjust as indicated based on labs

## 2021-02-19 NOTE — ASSESSMENT & PLAN NOTE
CONTROLLED  - cont on PPI once daily before breakfast  - reflux precautions reviewed and cont to encourage dietary trigger avoidance

## 2021-02-19 NOTE — ASSESSMENT & PLAN NOTE
UNCONTROLLED  - outside of goal range today  - for now, cont on max dose of ACEi--> refills sent  - Cont checking BP at home intermittently, call if values trend outside of goal range

## 2021-02-19 NOTE — ASSESSMENT & PLAN NOTE
STABLE  - will check A1C today for ongoing monitoring  - cont to work on low carb diet with goal of weight loss

## 2021-02-20 LAB
ALBUMIN SERPL-MCNC: 4.1 G/DL (ref 3.7–4.7)
ALBUMIN/GLOB SERPL: 1.5 {RATIO} (ref 1.2–2.2)
ALP SERPL-CCNC: 115 IU/L (ref 39–117)
ALT SERPL-CCNC: 18 IU/L (ref 0–32)
AST SERPL-CCNC: 19 IU/L (ref 0–40)
BILIRUB SERPL-MCNC: 0.4 MG/DL (ref 0–1.2)
BUN SERPL-MCNC: 19 MG/DL (ref 8–27)
BUN/CREAT SERPL: 18 (ref 12–28)
CALCIUM SERPL-MCNC: 9.8 MG/DL (ref 8.7–10.3)
CHLORIDE SERPL-SCNC: 104 MMOL/L (ref 96–106)
CHOLEST SERPL-MCNC: 163 MG/DL (ref 100–199)
CO2 SERPL-SCNC: 25 MMOL/L (ref 20–29)
CREAT SERPL-MCNC: 1.03 MG/DL (ref 0.57–1)
GLOBULIN SER CALC-MCNC: 2.7 G/DL (ref 1.5–4.5)
GLUCOSE SERPL-MCNC: 113 MG/DL (ref 65–99)
HBA1C MFR BLD: 6.1 % (ref 4.8–5.6)
HDLC SERPL-MCNC: 46 MG/DL
LDLC SERPL CALC-MCNC: 89 MG/DL (ref 0–99)
POTASSIUM SERPL-SCNC: 4.3 MMOL/L (ref 3.5–5.2)
PROT SERPL-MCNC: 6.8 G/DL (ref 6–8.5)
SODIUM SERPL-SCNC: 144 MMOL/L (ref 134–144)
TRIGL SERPL-MCNC: 164 MG/DL (ref 0–149)
VLDLC SERPL CALC-MCNC: 28 MG/DL (ref 5–40)

## 2021-03-24 ENCOUNTER — HOSPITAL ENCOUNTER (INPATIENT)
Facility: HOSPITAL | Age: 81
LOS: 4 days | Discharge: HOME | End: 2021-03-29
Attending: EMERGENCY MEDICINE | Admitting: HOSPITALIST

## 2021-03-24 DIAGNOSIS — N20.0 KIDNEY STONE ON LEFT SIDE: ICD-10-CM

## 2021-03-24 DIAGNOSIS — A41.50 SEPSIS DUE TO GRAM NEGATIVE BACTERIA (HCC): ICD-10-CM

## 2021-03-24 DIAGNOSIS — E87.20 LACTIC ACIDOSIS: ICD-10-CM

## 2021-03-24 DIAGNOSIS — N13.2 URETERAL STONE WITH HYDRONEPHROSIS: ICD-10-CM

## 2021-03-24 DIAGNOSIS — N39.0 URINARY TRACT INFECTION IN FEMALE: ICD-10-CM

## 2021-03-24 DIAGNOSIS — N11.1 OBSTRUCTIVE PYELONEPHRITIS: ICD-10-CM

## 2021-03-24 DIAGNOSIS — Z86.79 HISTORY OF HYPERTENSION: ICD-10-CM

## 2021-03-24 DIAGNOSIS — A41.9 SEPSIS WITHOUT ACUTE ORGAN DYSFUNCTION, DUE TO UNSPECIFIED ORGANISM (HCC): Primary | ICD-10-CM

## 2021-03-24 LAB
ALBUMIN SERPL-MCNC: 4 G/DL (ref 3.5–5.2)
ALBUMIN/GLOB SERPL: 1.5 G/DL
ALP SERPL-CCNC: 129 U/L (ref 39–117)
ALT SERPL W P-5'-P-CCNC: 64 U/L (ref 1–33)
ANION GAP SERPL CALCULATED.3IONS-SCNC: 15.5 MMOL/L (ref 5–15)
AST SERPL-CCNC: 71 U/L (ref 1–32)
BACTERIA UR QL AUTO: ABNORMAL /HPF
BILIRUB SERPL-MCNC: 1.1 MG/DL (ref 0–1.2)
BILIRUB UR QL STRIP: NEGATIVE
BUN SERPL-MCNC: 13 MG/DL (ref 8–23)
BUN/CREAT SERPL: 14.4 (ref 7–25)
CALCIUM SPEC-SCNC: 8.6 MG/DL (ref 8.6–10.5)
CHLORIDE SERPL-SCNC: 106 MMOL/L (ref 98–107)
CLARITY UR: ABNORMAL
CO2 SERPL-SCNC: 19.5 MMOL/L (ref 22–29)
COLOR UR: YELLOW
CREAT SERPL-MCNC: 0.9 MG/DL (ref 0.57–1)
D DIMER PPP FEU-MCNC: 3.32 MCGFEU/ML (ref 0–0.49)
D-LACTATE SERPL-SCNC: 3.4 MMOL/L (ref 0.5–2)
DEPRECATED RDW RBC AUTO: 40.5 FL (ref 37–54)
ERYTHROCYTE [DISTWIDTH] IN BLOOD BY AUTOMATED COUNT: 12.5 % (ref 12.3–15.4)
GFR SERPL CREATININE-BSD FRML MDRD: 60 ML/MIN/1.73
GLOBULIN UR ELPH-MCNC: 2.7 GM/DL
GLUCOSE SERPL-MCNC: 159 MG/DL (ref 65–99)
GLUCOSE UR STRIP-MCNC: NEGATIVE MG/DL
HCT VFR BLD AUTO: 41.9 % (ref 34–46.6)
HGB BLD-MCNC: 14.5 G/DL (ref 12–15.9)
HGB UR QL STRIP.AUTO: ABNORMAL
HYALINE CASTS UR QL AUTO: ABNORMAL /LPF
INR PPP: 1.11 (ref 0.9–1.1)
KETONES UR QL STRIP: ABNORMAL
LEUKOCYTE ESTERASE UR QL STRIP.AUTO: ABNORMAL
MAGNESIUM SERPL-MCNC: 1.6 MG/DL (ref 1.6–2.4)
MCH RBC QN AUTO: 30.7 PG (ref 26.6–33)
MCHC RBC AUTO-ENTMCNC: 34.6 G/DL (ref 31.5–35.7)
MCV RBC AUTO: 88.6 FL (ref 79–97)
NITRITE UR QL STRIP: POSITIVE
NRBC BLD AUTO-RTO: 0.1 /100 WBC (ref 0–0.2)
NT-PROBNP SERPL-MCNC: 964.6 PG/ML (ref 0–1800)
PH UR STRIP.AUTO: 6 [PH] (ref 5–8)
PLATELET # BLD AUTO: 154 10*3/MM3 (ref 140–450)
PMV BLD AUTO: 9 FL (ref 6–12)
POTASSIUM SERPL-SCNC: 3.2 MMOL/L (ref 3.5–5.2)
PROT SERPL-MCNC: 6.7 G/DL (ref 6–8.5)
PROT UR QL STRIP: ABNORMAL
PROTHROMBIN TIME: 14.1 SECONDS (ref 11.7–14.2)
RBC # BLD AUTO: 4.73 10*6/MM3 (ref 3.77–5.28)
RBC # UR: ABNORMAL /HPF
REF LAB TEST METHOD: ABNORMAL
SODIUM SERPL-SCNC: 141 MMOL/L (ref 136–145)
SP GR UR STRIP: 1.01 (ref 1–1.03)
SQUAMOUS #/AREA URNS HPF: ABNORMAL /HPF
T4 FREE SERPL-MCNC: 0.93 NG/DL (ref 0.93–1.7)
TROPONIN T SERPL-MCNC: <0.01 NG/ML (ref 0–0.03)
TSH SERPL DL<=0.05 MIU/L-ACNC: 1.99 UIU/ML (ref 0.27–4.2)
UROBILINOGEN UR QL STRIP: ABNORMAL
WBC # BLD AUTO: 8.98 10*3/MM3 (ref 3.4–10.8)
WBC UR QL AUTO: ABNORMAL /HPF

## 2021-03-24 PROCEDURE — 87186 SC STD MICRODIL/AGAR DIL: CPT | Performed by: EMERGENCY MEDICINE

## 2021-03-24 PROCEDURE — 83605 ASSAY OF LACTIC ACID: CPT | Performed by: EMERGENCY MEDICINE

## 2021-03-24 PROCEDURE — 87086 URINE CULTURE/COLONY COUNT: CPT | Performed by: NURSE PRACTITIONER

## 2021-03-24 PROCEDURE — 83880 ASSAY OF NATRIURETIC PEPTIDE: CPT | Performed by: EMERGENCY MEDICINE

## 2021-03-24 PROCEDURE — 36415 COLL VENOUS BLD VENIPUNCTURE: CPT

## 2021-03-24 PROCEDURE — 87186 SC STD MICRODIL/AGAR DIL: CPT | Performed by: NURSE PRACTITIONER

## 2021-03-24 PROCEDURE — 93005 ELECTROCARDIOGRAM TRACING: CPT | Performed by: EMERGENCY MEDICINE

## 2021-03-24 PROCEDURE — 85610 PROTHROMBIN TIME: CPT | Performed by: EMERGENCY MEDICINE

## 2021-03-24 PROCEDURE — 87040 BLOOD CULTURE FOR BACTERIA: CPT | Performed by: EMERGENCY MEDICINE

## 2021-03-24 PROCEDURE — U0004 COV-19 TEST NON-CDC HGH THRU: HCPCS | Performed by: EMERGENCY MEDICINE

## 2021-03-24 PROCEDURE — 80053 COMPREHEN METABOLIC PANEL: CPT | Performed by: EMERGENCY MEDICINE

## 2021-03-24 PROCEDURE — 85007 BL SMEAR W/DIFF WBC COUNT: CPT | Performed by: EMERGENCY MEDICINE

## 2021-03-24 PROCEDURE — 93010 ELECTROCARDIOGRAM REPORT: CPT | Performed by: INTERNAL MEDICINE

## 2021-03-24 PROCEDURE — 84439 ASSAY OF FREE THYROXINE: CPT | Performed by: EMERGENCY MEDICINE

## 2021-03-24 PROCEDURE — 81001 URINALYSIS AUTO W/SCOPE: CPT | Performed by: EMERGENCY MEDICINE

## 2021-03-24 PROCEDURE — 87077 CULTURE AEROBIC IDENTIFY: CPT | Performed by: NURSE PRACTITIONER

## 2021-03-24 PROCEDURE — 84443 ASSAY THYROID STIM HORMONE: CPT | Performed by: EMERGENCY MEDICINE

## 2021-03-24 PROCEDURE — 83735 ASSAY OF MAGNESIUM: CPT | Performed by: EMERGENCY MEDICINE

## 2021-03-24 PROCEDURE — 85379 FIBRIN DEGRADATION QUANT: CPT | Performed by: EMERGENCY MEDICINE

## 2021-03-24 PROCEDURE — 85025 COMPLETE CBC W/AUTO DIFF WBC: CPT | Performed by: EMERGENCY MEDICINE

## 2021-03-24 PROCEDURE — U0005 INFEC AGEN DETEC AMPLI PROBE: HCPCS | Performed by: EMERGENCY MEDICINE

## 2021-03-24 PROCEDURE — 84484 ASSAY OF TROPONIN QUANT: CPT | Performed by: EMERGENCY MEDICINE

## 2021-03-24 PROCEDURE — 99285 EMERGENCY DEPT VISIT HI MDM: CPT

## 2021-03-24 RX ORDER — CEFTRIAXONE SODIUM 2 G/50ML
2 INJECTION, SOLUTION INTRAVENOUS ONCE
Status: COMPLETED | OUTPATIENT
Start: 2021-03-25 | End: 2021-03-25

## 2021-03-24 RX ORDER — ACETAMINOPHEN 500 MG
1000 TABLET ORAL ONCE
Status: COMPLETED | OUTPATIENT
Start: 2021-03-24 | End: 2021-03-24

## 2021-03-24 RX ADMIN — SODIUM CHLORIDE 1000 ML: 9 INJECTION, SOLUTION INTRAVENOUS at 23:09

## 2021-03-24 RX ADMIN — ACETAMINOPHEN 1000 MG: 500 TABLET, FILM COATED ORAL at 23:25

## 2021-03-25 ENCOUNTER — APPOINTMENT (OUTPATIENT)
Dept: CT IMAGING | Facility: HOSPITAL | Age: 81
End: 2021-03-25

## 2021-03-25 ENCOUNTER — ANESTHESIA (OUTPATIENT)
Dept: PERIOP | Facility: HOSPITAL | Age: 81
End: 2021-03-25

## 2021-03-25 ENCOUNTER — APPOINTMENT (OUTPATIENT)
Dept: GENERAL RADIOLOGY | Facility: HOSPITAL | Age: 81
End: 2021-03-25

## 2021-03-25 ENCOUNTER — ANESTHESIA EVENT (OUTPATIENT)
Dept: PERIOP | Facility: HOSPITAL | Age: 81
End: 2021-03-25

## 2021-03-25 PROBLEM — G93.41 METABOLIC ENCEPHALOPATHY: Status: ACTIVE | Noted: 2021-03-25

## 2021-03-25 PROBLEM — A41.9 SEPSIS WITHOUT ACUTE ORGAN DYSFUNCTION: Status: ACTIVE | Noted: 2021-03-25

## 2021-03-25 PROBLEM — E87.6 HYPOKALEMIA: Status: ACTIVE | Noted: 2021-03-25

## 2021-03-25 PROBLEM — R79.89 POSITIVE D DIMER: Status: ACTIVE | Noted: 2021-03-25

## 2021-03-25 PROBLEM — N13.2 URETERAL STONE WITH HYDRONEPHROSIS: Status: ACTIVE | Noted: 2021-03-25

## 2021-03-25 PROBLEM — A41.50 SEPSIS DUE TO GRAM NEGATIVE BACTERIA (HCC): Status: ACTIVE | Noted: 2021-03-25

## 2021-03-25 PROBLEM — N30.00 ACUTE CYSTITIS: Status: ACTIVE | Noted: 2021-03-25

## 2021-03-25 LAB
ANION GAP SERPL CALCULATED.3IONS-SCNC: 13.4 MMOL/L (ref 5–15)
BASOPHILS # BLD AUTO: 0.03 10*3/MM3 (ref 0–0.2)
BASOPHILS NFR BLD AUTO: 0.2 % (ref 0–1.5)
BUN SERPL-MCNC: 12 MG/DL (ref 8–23)
BUN/CREAT SERPL: 15.2 (ref 7–25)
CALCIUM SPEC-SCNC: 8.1 MG/DL (ref 8.6–10.5)
CHLORIDE SERPL-SCNC: 105 MMOL/L (ref 98–107)
CO2 SERPL-SCNC: 21.6 MMOL/L (ref 22–29)
CREAT SERPL-MCNC: 0.79 MG/DL (ref 0.57–1)
D-LACTATE SERPL-SCNC: 1.8 MMOL/L (ref 0.5–2)
DEPRECATED RDW RBC AUTO: 41.9 FL (ref 37–54)
EOSINOPHIL # BLD AUTO: 0 10*3/MM3 (ref 0–0.4)
EOSINOPHIL NFR BLD AUTO: 0 % (ref 0.3–6.2)
ERYTHROCYTE [DISTWIDTH] IN BLOOD BY AUTOMATED COUNT: 12.5 % (ref 12.3–15.4)
GFR SERPL CREATININE-BSD FRML MDRD: 70 ML/MIN/1.73
GLUCOSE SERPL-MCNC: 154 MG/DL (ref 65–99)
HCT VFR BLD AUTO: 39.3 % (ref 34–46.6)
HGB BLD-MCNC: 12.9 G/DL (ref 12–15.9)
IMM GRANULOCYTES # BLD AUTO: 0.16 10*3/MM3 (ref 0–0.05)
IMM GRANULOCYTES NFR BLD AUTO: 0.9 % (ref 0–0.5)
INR PPP: 1.14 (ref 0.9–1.1)
LYMPHOCYTES # BLD AUTO: 0.34 10*3/MM3 (ref 0.7–3.1)
LYMPHOCYTES # BLD MANUAL: 0.22 10*3/MM3 (ref 0.7–3.1)
LYMPHOCYTES NFR BLD AUTO: 2 % (ref 19.6–45.3)
LYMPHOCYTES NFR BLD MANUAL: 1.2 % (ref 5–12)
LYMPHOCYTES NFR BLD MANUAL: 2.4 % (ref 19.6–45.3)
MAGNESIUM SERPL-MCNC: 1.8 MG/DL (ref 1.6–2.4)
MCH RBC QN AUTO: 29.9 PG (ref 26.6–33)
MCHC RBC AUTO-ENTMCNC: 32.8 G/DL (ref 31.5–35.7)
MCV RBC AUTO: 91 FL (ref 79–97)
MONOCYTES # BLD AUTO: 0.11 10*3/MM3 (ref 0.1–0.9)
MONOCYTES # BLD AUTO: 0.61 10*3/MM3 (ref 0.1–0.9)
MONOCYTES NFR BLD AUTO: 3.6 % (ref 5–12)
NEUTROPHILS # BLD AUTO: 8.67 10*3/MM3 (ref 1.7–7)
NEUTROPHILS NFR BLD AUTO: 15.74 10*3/MM3 (ref 1.7–7)
NEUTROPHILS NFR BLD AUTO: 93.3 % (ref 42.7–76)
NEUTROPHILS NFR BLD MANUAL: 96.5 % (ref 42.7–76)
NRBC BLD AUTO-RTO: 0 /100 WBC (ref 0–0.2)
PLAT MORPH BLD: NORMAL
PLATELET # BLD AUTO: 159 10*3/MM3 (ref 140–450)
PMV BLD AUTO: 9.1 FL (ref 6–12)
POTASSIUM SERPL-SCNC: 3 MMOL/L (ref 3.5–5.2)
PROTHROMBIN TIME: 14.4 SECONDS (ref 11.7–14.2)
QT INTERVAL: 326 MS
RBC # BLD AUTO: 4.32 10*6/MM3 (ref 3.77–5.28)
RBC MORPH BLD: NORMAL
SARS-COV-2 ORF1AB RESP QL NAA+PROBE: NOT DETECTED
SODIUM SERPL-SCNC: 140 MMOL/L (ref 136–145)
TROPONIN T SERPL-MCNC: 0.02 NG/ML (ref 0–0.03)
TSH SERPL DL<=0.05 MIU/L-ACNC: 1.38 UIU/ML (ref 0.27–4.2)
WBC # BLD AUTO: 16.88 10*3/MM3 (ref 3.4–10.8)
WBC MORPH BLD: NORMAL

## 2021-03-25 PROCEDURE — 83605 ASSAY OF LACTIC ACID: CPT | Performed by: EMERGENCY MEDICINE

## 2021-03-25 PROCEDURE — 25010000002 PROPOFOL 10 MG/ML EMULSION: Performed by: ANESTHESIOLOGY

## 2021-03-25 PROCEDURE — 80048 BASIC METABOLIC PNL TOTAL CA: CPT | Performed by: NURSE PRACTITIONER

## 2021-03-25 PROCEDURE — 0 IOPAMIDOL PER 1 ML: Performed by: EMERGENCY MEDICINE

## 2021-03-25 PROCEDURE — 74176 CT ABD & PELVIS W/O CONTRAST: CPT

## 2021-03-25 PROCEDURE — 25010000002 FENTANYL CITRATE (PF) 100 MCG/2ML SOLUTION: Performed by: ANESTHESIOLOGY

## 2021-03-25 PROCEDURE — 71275 CT ANGIOGRAPHY CHEST: CPT

## 2021-03-25 PROCEDURE — 87176 TISSUE HOMOGENIZATION CULTR: CPT | Performed by: UROLOGY

## 2021-03-25 PROCEDURE — 25010000003 CEFTRIAXONE PER 250 MG: Performed by: EMERGENCY MEDICINE

## 2021-03-25 PROCEDURE — 84443 ASSAY THYROID STIM HORMONE: CPT | Performed by: NURSE PRACTITIONER

## 2021-03-25 PROCEDURE — 85610 PROTHROMBIN TIME: CPT | Performed by: NURSE PRACTITIONER

## 2021-03-25 PROCEDURE — 25010000002 ONDANSETRON PER 1 MG: Performed by: NURSE PRACTITIONER

## 2021-03-25 PROCEDURE — 87070 CULTURE OTHR SPECIMN AEROBIC: CPT | Performed by: UROLOGY

## 2021-03-25 PROCEDURE — C1769 GUIDE WIRE: HCPCS | Performed by: UROLOGY

## 2021-03-25 PROCEDURE — 25010000002 CEFTRIAXONE PER 250 MG: Performed by: UROLOGY

## 2021-03-25 PROCEDURE — 85025 COMPLETE CBC W/AUTO DIFF WBC: CPT | Performed by: NURSE PRACTITIONER

## 2021-03-25 PROCEDURE — C2617 STENT, NON-COR, TEM W/O DEL: HCPCS | Performed by: UROLOGY

## 2021-03-25 PROCEDURE — 83735 ASSAY OF MAGNESIUM: CPT | Performed by: NURSE PRACTITIONER

## 2021-03-25 PROCEDURE — 74018 RADEX ABDOMEN 1 VIEW: CPT

## 2021-03-25 PROCEDURE — 0T778DZ DILATION OF LEFT URETER WITH INTRALUMINAL DEVICE, VIA NATURAL OR ARTIFICIAL OPENING ENDOSCOPIC: ICD-10-PCS | Performed by: UROLOGY

## 2021-03-25 PROCEDURE — 87205 SMEAR GRAM STAIN: CPT | Performed by: UROLOGY

## 2021-03-25 PROCEDURE — 25010000003 CEFTRIAXONE PER 250 MG: Performed by: HOSPITALIST

## 2021-03-25 PROCEDURE — C1758 CATHETER, URETERAL: HCPCS | Performed by: UROLOGY

## 2021-03-25 PROCEDURE — 25010000003 CEFTRIAXONE PER 250 MG: Performed by: UROLOGY

## 2021-03-25 PROCEDURE — 25010000002 KETOROLAC TROMETHAMINE PER 15 MG: Performed by: HOSPITALIST

## 2021-03-25 PROCEDURE — 76000 FLUOROSCOPY <1 HR PHYS/QHP: CPT

## 2021-03-25 PROCEDURE — 84484 ASSAY OF TROPONIN QUANT: CPT | Performed by: NURSE PRACTITIONER

## 2021-03-25 PROCEDURE — 25010000002 PHENYLEPHRINE PER 1 ML: Performed by: ANESTHESIOLOGY

## 2021-03-25 PROCEDURE — 36415 COLL VENOUS BLD VENIPUNCTURE: CPT | Performed by: EMERGENCY MEDICINE

## 2021-03-25 DEVICE — URETERAL STENT
Type: IMPLANTABLE DEVICE | Site: URETER | Status: FUNCTIONAL
Brand: CONTOUR™

## 2021-03-25 RX ORDER — SODIUM CHLORIDE 9 MG/ML
9 INJECTION, SOLUTION INTRAVENOUS CONTINUOUS
Status: DISCONTINUED | OUTPATIENT
Start: 2021-03-25 | End: 2021-03-25

## 2021-03-25 RX ORDER — OXYCODONE AND ACETAMINOPHEN 7.5; 325 MG/1; MG/1
1 TABLET ORAL ONCE AS NEEDED
Status: DISCONTINUED | OUTPATIENT
Start: 2021-03-25 | End: 2021-03-25 | Stop reason: HOSPADM

## 2021-03-25 RX ORDER — IMIPRAMINE HCL 50 MG
100 TABLET ORAL NIGHTLY
Status: DISCONTINUED | OUTPATIENT
Start: 2021-03-25 | End: 2021-03-29 | Stop reason: HOSPADM

## 2021-03-25 RX ORDER — BUTALBITAL, ACETAMINOPHEN AND CAFFEINE 50; 325; 40 MG/1; MG/1; MG/1
1 TABLET ORAL EVERY 4 HOURS PRN
Status: DISCONTINUED | OUTPATIENT
Start: 2021-03-25 | End: 2021-03-29 | Stop reason: HOSPADM

## 2021-03-25 RX ORDER — EPHEDRINE SULFATE 50 MG/ML
5 INJECTION, SOLUTION INTRAVENOUS ONCE AS NEEDED
Status: DISCONTINUED | OUTPATIENT
Start: 2021-03-25 | End: 2021-03-25 | Stop reason: HOSPADM

## 2021-03-25 RX ORDER — PANTOPRAZOLE SODIUM 40 MG/10ML
40 INJECTION, POWDER, LYOPHILIZED, FOR SOLUTION INTRAVENOUS
Status: DISCONTINUED | OUTPATIENT
Start: 2021-03-25 | End: 2021-03-27

## 2021-03-25 RX ORDER — ACETAMINOPHEN 650 MG/1
650 SUPPOSITORY RECTAL EVERY 4 HOURS PRN
Status: DISCONTINUED | OUTPATIENT
Start: 2021-03-25 | End: 2021-03-29 | Stop reason: HOSPADM

## 2021-03-25 RX ORDER — PANTOPRAZOLE SODIUM 40 MG/1
40 TABLET, DELAYED RELEASE ORAL EVERY MORNING
Refills: 1 | Status: DISCONTINUED | OUTPATIENT
Start: 2021-03-25 | End: 2021-03-25

## 2021-03-25 RX ORDER — FENTANYL CITRATE 50 UG/ML
INJECTION, SOLUTION INTRAMUSCULAR; INTRAVENOUS AS NEEDED
Status: DISCONTINUED | OUTPATIENT
Start: 2021-03-25 | End: 2021-03-25 | Stop reason: SURG

## 2021-03-25 RX ORDER — FAMOTIDINE 10 MG/ML
20 INJECTION, SOLUTION INTRAVENOUS ONCE
Status: COMPLETED | OUTPATIENT
Start: 2021-03-25 | End: 2021-03-25

## 2021-03-25 RX ORDER — POTASSIUM CHLORIDE 7.45 MG/ML
10 INJECTION INTRAVENOUS
Status: DISCONTINUED | OUTPATIENT
Start: 2021-03-25 | End: 2021-03-29 | Stop reason: HOSPADM

## 2021-03-25 RX ORDER — SODIUM CHLORIDE 0.9 % (FLUSH) 0.9 %
3-10 SYRINGE (ML) INJECTION AS NEEDED
Status: DISCONTINUED | OUTPATIENT
Start: 2021-03-25 | End: 2021-03-25 | Stop reason: HOSPADM

## 2021-03-25 RX ORDER — DIPHENHYDRAMINE HCL 25 MG
25 CAPSULE ORAL
Status: DISCONTINUED | OUTPATIENT
Start: 2021-03-25 | End: 2021-03-25 | Stop reason: HOSPADM

## 2021-03-25 RX ORDER — LABETALOL HYDROCHLORIDE 5 MG/ML
5 INJECTION, SOLUTION INTRAVENOUS
Status: DISCONTINUED | OUTPATIENT
Start: 2021-03-25 | End: 2021-03-25 | Stop reason: HOSPADM

## 2021-03-25 RX ORDER — IBUPROFEN 400 MG/1
400 TABLET ORAL ONCE
Status: COMPLETED | OUTPATIENT
Start: 2021-03-25 | End: 2021-03-25

## 2021-03-25 RX ORDER — CEFTRIAXONE SODIUM 1 G/50ML
1 INJECTION, SOLUTION INTRAVENOUS ONCE
Status: COMPLETED | OUTPATIENT
Start: 2021-03-25 | End: 2021-03-25

## 2021-03-25 RX ORDER — SODIUM CHLORIDE 0.9 % (FLUSH) 0.9 %
3 SYRINGE (ML) INJECTION EVERY 12 HOURS SCHEDULED
Status: DISCONTINUED | OUTPATIENT
Start: 2021-03-25 | End: 2021-03-25 | Stop reason: HOSPADM

## 2021-03-25 RX ORDER — SODIUM CHLORIDE 9 MG/ML
100 INJECTION, SOLUTION INTRAVENOUS CONTINUOUS
Status: DISCONTINUED | OUTPATIENT
Start: 2021-03-25 | End: 2021-03-27

## 2021-03-25 RX ORDER — ACETAMINOPHEN 325 MG/1
650 TABLET ORAL EVERY 4 HOURS PRN
Status: DISCONTINUED | OUTPATIENT
Start: 2021-03-25 | End: 2021-03-29 | Stop reason: HOSPADM

## 2021-03-25 RX ORDER — NITROGLYCERIN 0.4 MG/1
0.4 TABLET SUBLINGUAL
Status: DISCONTINUED | OUTPATIENT
Start: 2021-03-25 | End: 2021-03-29 | Stop reason: HOSPADM

## 2021-03-25 RX ORDER — SODIUM CHLORIDE, SODIUM LACTATE, POTASSIUM CHLORIDE, CALCIUM CHLORIDE 600; 310; 30; 20 MG/100ML; MG/100ML; MG/100ML; MG/100ML
9 INJECTION, SOLUTION INTRAVENOUS CONTINUOUS
Status: DISCONTINUED | OUTPATIENT
Start: 2021-03-25 | End: 2021-03-25

## 2021-03-25 RX ORDER — ONDANSETRON 2 MG/ML
4 INJECTION INTRAMUSCULAR; INTRAVENOUS EVERY 6 HOURS PRN
Status: DISCONTINUED | OUTPATIENT
Start: 2021-03-25 | End: 2021-03-29 | Stop reason: HOSPADM

## 2021-03-25 RX ORDER — ACETAMINOPHEN 160 MG/5ML
650 SOLUTION ORAL EVERY 4 HOURS PRN
Status: DISCONTINUED | OUTPATIENT
Start: 2021-03-25 | End: 2021-03-29 | Stop reason: HOSPADM

## 2021-03-25 RX ORDER — ASPIRIN 81 MG/1
81 TABLET ORAL DAILY
Status: DISCONTINUED | OUTPATIENT
Start: 2021-03-25 | End: 2021-03-29 | Stop reason: HOSPADM

## 2021-03-25 RX ORDER — SODIUM CHLORIDE 0.9 % (FLUSH) 0.9 %
10 SYRINGE (ML) INJECTION AS NEEDED
Status: DISCONTINUED | OUTPATIENT
Start: 2021-03-25 | End: 2021-03-29 | Stop reason: HOSPADM

## 2021-03-25 RX ORDER — POTASSIUM CHLORIDE 750 MG/1
40 TABLET, FILM COATED, EXTENDED RELEASE ORAL AS NEEDED
Status: DISCONTINUED | OUTPATIENT
Start: 2021-03-25 | End: 2021-03-29 | Stop reason: HOSPADM

## 2021-03-25 RX ORDER — NALOXONE HCL 0.4 MG/ML
0.2 VIAL (ML) INJECTION AS NEEDED
Status: DISCONTINUED | OUTPATIENT
Start: 2021-03-25 | End: 2021-03-25 | Stop reason: HOSPADM

## 2021-03-25 RX ORDER — DIPHENHYDRAMINE HYDROCHLORIDE 50 MG/ML
12.5 INJECTION INTRAMUSCULAR; INTRAVENOUS
Status: DISCONTINUED | OUTPATIENT
Start: 2021-03-25 | End: 2021-03-25 | Stop reason: HOSPADM

## 2021-03-25 RX ORDER — FLUMAZENIL 0.1 MG/ML
0.2 INJECTION INTRAVENOUS AS NEEDED
Status: DISCONTINUED | OUTPATIENT
Start: 2021-03-25 | End: 2021-03-25 | Stop reason: HOSPADM

## 2021-03-25 RX ORDER — ATORVASTATIN CALCIUM 20 MG/1
20 TABLET, FILM COATED ORAL DAILY
Refills: 1 | Status: DISCONTINUED | OUTPATIENT
Start: 2021-03-25 | End: 2021-03-29 | Stop reason: HOSPADM

## 2021-03-25 RX ORDER — HYDRALAZINE HYDROCHLORIDE 20 MG/ML
5 INJECTION INTRAMUSCULAR; INTRAVENOUS
Status: DISCONTINUED | OUTPATIENT
Start: 2021-03-25 | End: 2021-03-25 | Stop reason: HOSPADM

## 2021-03-25 RX ORDER — ONDANSETRON 2 MG/ML
4 INJECTION INTRAMUSCULAR; INTRAVENOUS ONCE AS NEEDED
Status: DISCONTINUED | OUTPATIENT
Start: 2021-03-25 | End: 2021-03-25 | Stop reason: HOSPADM

## 2021-03-25 RX ORDER — PROMETHAZINE HYDROCHLORIDE 25 MG/1
25 TABLET ORAL ONCE AS NEEDED
Status: DISCONTINUED | OUTPATIENT
Start: 2021-03-25 | End: 2021-03-25 | Stop reason: HOSPADM

## 2021-03-25 RX ORDER — CEFTRIAXONE SODIUM 2 G/50ML
2 INJECTION, SOLUTION INTRAVENOUS EVERY 24 HOURS
Status: COMPLETED | OUTPATIENT
Start: 2021-03-25 | End: 2021-03-28

## 2021-03-25 RX ORDER — POTASSIUM CHLORIDE 1.5 G/1.77G
40 POWDER, FOR SOLUTION ORAL AS NEEDED
Status: DISCONTINUED | OUTPATIENT
Start: 2021-03-25 | End: 2021-03-29 | Stop reason: HOSPADM

## 2021-03-25 RX ORDER — MAGNESIUM HYDROXIDE 1200 MG/15ML
LIQUID ORAL AS NEEDED
Status: DISCONTINUED | OUTPATIENT
Start: 2021-03-25 | End: 2021-03-25 | Stop reason: HOSPADM

## 2021-03-25 RX ORDER — HYDROCODONE BITARTRATE AND ACETAMINOPHEN 7.5; 325 MG/1; MG/1
1 TABLET ORAL ONCE AS NEEDED
Status: DISCONTINUED | OUTPATIENT
Start: 2021-03-25 | End: 2021-03-25 | Stop reason: HOSPADM

## 2021-03-25 RX ORDER — KETOROLAC TROMETHAMINE 15 MG/ML
15 INJECTION, SOLUTION INTRAMUSCULAR; INTRAVENOUS ONCE
Status: COMPLETED | OUTPATIENT
Start: 2021-03-25 | End: 2021-03-25

## 2021-03-25 RX ORDER — SODIUM CHLORIDE 0.9 % (FLUSH) 0.9 %
10 SYRINGE (ML) INJECTION EVERY 12 HOURS SCHEDULED
Status: DISCONTINUED | OUTPATIENT
Start: 2021-03-25 | End: 2021-03-29 | Stop reason: HOSPADM

## 2021-03-25 RX ORDER — LIDOCAINE HYDROCHLORIDE 20 MG/ML
INJECTION, SOLUTION INFILTRATION; PERINEURAL AS NEEDED
Status: DISCONTINUED | OUTPATIENT
Start: 2021-03-25 | End: 2021-03-25 | Stop reason: SURG

## 2021-03-25 RX ORDER — LIDOCAINE HYDROCHLORIDE 10 MG/ML
0.5 INJECTION, SOLUTION EPIDURAL; INFILTRATION; INTRACAUDAL; PERINEURAL ONCE AS NEEDED
Status: DISCONTINUED | OUTPATIENT
Start: 2021-03-25 | End: 2021-03-25 | Stop reason: HOSPADM

## 2021-03-25 RX ORDER — CEFTRIAXONE SODIUM 1 G/50ML
1 INJECTION, SOLUTION INTRAVENOUS EVERY 24 HOURS
Status: DISCONTINUED | OUTPATIENT
Start: 2021-03-25 | End: 2021-03-25

## 2021-03-25 RX ORDER — FENTANYL CITRATE 50 UG/ML
50 INJECTION, SOLUTION INTRAMUSCULAR; INTRAVENOUS
Status: DISCONTINUED | OUTPATIENT
Start: 2021-03-25 | End: 2021-03-25 | Stop reason: HOSPADM

## 2021-03-25 RX ORDER — HYDROMORPHONE HYDROCHLORIDE 1 MG/ML
0.5 INJECTION, SOLUTION INTRAMUSCULAR; INTRAVENOUS; SUBCUTANEOUS
Status: DISCONTINUED | OUTPATIENT
Start: 2021-03-25 | End: 2021-03-25 | Stop reason: HOSPADM

## 2021-03-25 RX ORDER — PROMETHAZINE HYDROCHLORIDE 25 MG/1
25 SUPPOSITORY RECTAL ONCE AS NEEDED
Status: DISCONTINUED | OUTPATIENT
Start: 2021-03-25 | End: 2021-03-25 | Stop reason: HOSPADM

## 2021-03-25 RX ORDER — PROPOFOL 10 MG/ML
VIAL (ML) INTRAVENOUS AS NEEDED
Status: DISCONTINUED | OUTPATIENT
Start: 2021-03-25 | End: 2021-03-25 | Stop reason: SURG

## 2021-03-25 RX ADMIN — SODIUM CHLORIDE, PRESERVATIVE FREE 10 ML: 5 INJECTION INTRAVENOUS at 05:32

## 2021-03-25 RX ADMIN — POTASSIUM CHLORIDE 40 MEQ: 750 TABLET, EXTENDED RELEASE ORAL at 11:31

## 2021-03-25 RX ADMIN — IOPAMIDOL 95 ML: 755 INJECTION, SOLUTION INTRAVENOUS at 00:13

## 2021-03-25 RX ADMIN — FENTANYL CITRATE 50 MCG: 50 INJECTION INTRAMUSCULAR; INTRAVENOUS at 17:59

## 2021-03-25 RX ADMIN — FENTANYL CITRATE 50 MCG: 50 INJECTION, SOLUTION INTRAMUSCULAR; INTRAVENOUS at 19:15

## 2021-03-25 RX ADMIN — ATORVASTATIN CALCIUM 20 MG: 20 TABLET, FILM COATED ORAL at 13:30

## 2021-03-25 RX ADMIN — PHENYLEPHRINE HYDROCHLORIDE 100 MCG: 10 INJECTION INTRAVENOUS at 18:07

## 2021-03-25 RX ADMIN — BUTALBITAL, ACETAMINOPHEN, AND CAFFEINE 1 TABLET: 50; 325; 40 TABLET ORAL at 13:30

## 2021-03-25 RX ADMIN — IBUPROFEN 400 MG: 400 TABLET ORAL at 05:34

## 2021-03-25 RX ADMIN — CEFTRIAXONE SODIUM 2 G: 2 INJECTION, SOLUTION INTRAVENOUS at 18:09

## 2021-03-25 RX ADMIN — POTASSIUM CHLORIDE 40 MEQ: 750 TABLET, EXTENDED RELEASE ORAL at 21:06

## 2021-03-25 RX ADMIN — SODIUM CHLORIDE 100 ML/HR: 9 INJECTION, SOLUTION INTRAVENOUS at 20:46

## 2021-03-25 RX ADMIN — SODIUM CHLORIDE 100 ML/HR: 9 INJECTION, SOLUTION INTRAVENOUS at 05:31

## 2021-03-25 RX ADMIN — SODIUM CHLORIDE 9 ML/HR: 9 INJECTION, SOLUTION INTRAVENOUS at 17:35

## 2021-03-25 RX ADMIN — CEFTRIAXONE SODIUM 1 G: 1 INJECTION, SOLUTION INTRAVENOUS at 17:36

## 2021-03-25 RX ADMIN — PANTOPRAZOLE SODIUM 40 MG: 40 TABLET, DELAYED RELEASE ORAL at 13:30

## 2021-03-25 RX ADMIN — PANTOPRAZOLE SODIUM 40 MG: 40 INJECTION, POWDER, FOR SOLUTION INTRAVENOUS at 16:55

## 2021-03-25 RX ADMIN — SODIUM CHLORIDE, PRESERVATIVE FREE 10 ML: 5 INJECTION INTRAVENOUS at 20:34

## 2021-03-25 RX ADMIN — PHENYLEPHRINE HYDROCHLORIDE 100 MCG: 10 INJECTION INTRAVENOUS at 18:17

## 2021-03-25 RX ADMIN — KETOROLAC TROMETHAMINE 15 MG: 15 INJECTION, SOLUTION INTRAMUSCULAR; INTRAVENOUS at 16:55

## 2021-03-25 RX ADMIN — FAMOTIDINE 20 MG: 10 INJECTION INTRAVENOUS at 17:38

## 2021-03-25 RX ADMIN — CEFTRIAXONE SODIUM 2 G: 2 INJECTION, SOLUTION INTRAVENOUS at 00:49

## 2021-03-25 RX ADMIN — PROPOFOL 150 MG: 10 INJECTION, EMULSION INTRAVENOUS at 18:01

## 2021-03-25 RX ADMIN — PHENYLEPHRINE HYDROCHLORIDE 100 MCG: 10 INJECTION INTRAVENOUS at 18:09

## 2021-03-25 RX ADMIN — FENTANYL CITRATE 50 MCG: 50 INJECTION, SOLUTION INTRAMUSCULAR; INTRAVENOUS at 19:05

## 2021-03-25 RX ADMIN — IMIPRAMINE HYDROCHLORIDE 100 MG: 50 TABLET ORAL at 20:34

## 2021-03-25 RX ADMIN — ASPIRIN 81 MG: 81 TABLET, FILM COATED ORAL at 13:30

## 2021-03-25 RX ADMIN — PHENYLEPHRINE HYDROCHLORIDE 100 MCG: 10 INJECTION INTRAVENOUS at 18:12

## 2021-03-25 RX ADMIN — POTASSIUM CHLORIDE 40 MEQ: 750 TABLET, EXTENDED RELEASE ORAL at 11:32

## 2021-03-25 RX ADMIN — POTASSIUM CHLORIDE 40 MEQ: 750 TABLET, EXTENDED RELEASE ORAL at 08:07

## 2021-03-25 RX ADMIN — LIDOCAINE HYDROCHLORIDE 100 MG: 20 INJECTION, SOLUTION INFILTRATION; PERINEURAL at 18:00

## 2021-03-25 RX ADMIN — ONDANSETRON HYDROCHLORIDE 4 MG: 2 SOLUTION INTRAMUSCULAR; INTRAVENOUS at 18:11

## 2021-03-25 RX ADMIN — CEFTRIAXONE SODIUM 2 G: 2 INJECTION, SOLUTION INTRAVENOUS at 20:34

## 2021-03-25 NOTE — ANESTHESIA PREPROCEDURE EVALUATION
Anesthesia Evaluation     Patient summary reviewed   history of anesthetic complications: PONV  NPO Solid Status: > 6 hours  NPO Liquid Status: > 2 hours           Airway   Mallampati: II  TM distance: >3 FB  Neck ROM: full  Comment: Prior LMA 4 with good seal.   Dental    (+) partials and poor dentition    Pulmonary     breath sounds clear to auscultation  (+) a smoker Former,   (-) shortness of breath  Cardiovascular   Exercise tolerance: good (4-7 METS)    Rhythm: regular  Rate: normal    (+) hypertension, hyperlipidemia,   (-) angina, JOHNSON      Neuro/Psych  (+) psychiatric history Anxiety,     GI/Hepatic/Renal/Endo    (+) morbid obesity, GERD well controlled,  renal disease,     Musculoskeletal     Abdominal    Substance History      OB/GYN          Other   arthritis,                      Anesthesia Plan    ASA 3     general   (She claims to have eaten one small bite of lasagna >6 hrs ago. )

## 2021-03-25 NOTE — ANESTHESIA PROCEDURE NOTES
Airway  Urgency: elective    Date/Time: 3/25/2021 6:02 PM  Airway not difficult    General Information and Staff    Patient location during procedure: OR  Anesthesiologist: Pipo Lewis MD    Indications and Patient Condition  Indications for airway management: airway protection    Preoxygenated: yes  Mask difficulty assessment: 0 - not attempted    Final Airway Details  Final airway type: supraglottic airway      Successful airway: classic  Size 4  Airway Seal Pressure (cm H2O): 20    Number of attempts at approach: 1  Assessment: lips, teeth, and gum same as pre-op and atraumatic intubation

## 2021-03-26 ENCOUNTER — APPOINTMENT (OUTPATIENT)
Dept: CARDIOLOGY | Facility: HOSPITAL | Age: 81
End: 2021-03-26

## 2021-03-26 LAB
ALBUMIN SERPL-MCNC: 3.2 G/DL (ref 3.5–5.2)
ANION GAP SERPL CALCULATED.3IONS-SCNC: 8.6 MMOL/L (ref 5–15)
BACTERIA SPEC AEROBE CULT: ABNORMAL
BACTERIA SPEC AEROBE CULT: ABNORMAL
BH CV LOWER VASCULAR LEFT COMMON FEMORAL AUGMENT: NORMAL
BH CV LOWER VASCULAR LEFT COMMON FEMORAL COMPETENT: NORMAL
BH CV LOWER VASCULAR LEFT COMMON FEMORAL COMPRESS: NORMAL
BH CV LOWER VASCULAR LEFT COMMON FEMORAL PHASIC: NORMAL
BH CV LOWER VASCULAR LEFT COMMON FEMORAL SPONT: NORMAL
BH CV LOWER VASCULAR LEFT DISTAL FEMORAL COMPRESS: NORMAL
BH CV LOWER VASCULAR LEFT GASTRONEMIUS COMPRESS: NORMAL
BH CV LOWER VASCULAR LEFT GREATER SAPH AK COMPRESS: NORMAL
BH CV LOWER VASCULAR LEFT GREATER SAPH BK COMPRESS: NORMAL
BH CV LOWER VASCULAR LEFT LESSER SAPH COMPRESS: NORMAL
BH CV LOWER VASCULAR LEFT MID FEMORAL AUGMENT: NORMAL
BH CV LOWER VASCULAR LEFT MID FEMORAL COMPETENT: NORMAL
BH CV LOWER VASCULAR LEFT MID FEMORAL COMPRESS: NORMAL
BH CV LOWER VASCULAR LEFT MID FEMORAL PHASIC: NORMAL
BH CV LOWER VASCULAR LEFT MID FEMORAL SPONT: NORMAL
BH CV LOWER VASCULAR LEFT PERONEAL COMPRESS: NORMAL
BH CV LOWER VASCULAR LEFT POPLITEAL AUGMENT: NORMAL
BH CV LOWER VASCULAR LEFT POPLITEAL COMPETENT: NORMAL
BH CV LOWER VASCULAR LEFT POPLITEAL COMPRESS: NORMAL
BH CV LOWER VASCULAR LEFT POPLITEAL PHASIC: NORMAL
BH CV LOWER VASCULAR LEFT POPLITEAL SPONT: NORMAL
BH CV LOWER VASCULAR LEFT POSTERIOR TIBIAL COMPRESS: NORMAL
BH CV LOWER VASCULAR LEFT PROFUNDA FEMORAL COMPRESS: NORMAL
BH CV LOWER VASCULAR LEFT PROXIMAL FEMORAL COMPRESS: NORMAL
BH CV LOWER VASCULAR LEFT SAPHENOFEMORAL JUNCTION COMPRESS: NORMAL
BH CV LOWER VASCULAR RIGHT COMMON FEMORAL AUGMENT: NORMAL
BH CV LOWER VASCULAR RIGHT COMMON FEMORAL COMPETENT: NORMAL
BH CV LOWER VASCULAR RIGHT COMMON FEMORAL COMPRESS: NORMAL
BH CV LOWER VASCULAR RIGHT COMMON FEMORAL PHASIC: NORMAL
BH CV LOWER VASCULAR RIGHT COMMON FEMORAL SPONT: NORMAL
BH CV LOWER VASCULAR RIGHT DISTAL FEMORAL COMPRESS: NORMAL
BH CV LOWER VASCULAR RIGHT GASTRONEMIUS COMPRESS: NORMAL
BH CV LOWER VASCULAR RIGHT GREATER SAPH AK COMPRESS: NORMAL
BH CV LOWER VASCULAR RIGHT GREATER SAPH BK COMPRESS: NORMAL
BH CV LOWER VASCULAR RIGHT LESSER SAPH COMPRESS: NORMAL
BH CV LOWER VASCULAR RIGHT MID FEMORAL AUGMENT: NORMAL
BH CV LOWER VASCULAR RIGHT MID FEMORAL COMPETENT: NORMAL
BH CV LOWER VASCULAR RIGHT MID FEMORAL COMPRESS: NORMAL
BH CV LOWER VASCULAR RIGHT MID FEMORAL PHASIC: NORMAL
BH CV LOWER VASCULAR RIGHT MID FEMORAL SPONT: NORMAL
BH CV LOWER VASCULAR RIGHT PERONEAL COMPRESS: NORMAL
BH CV LOWER VASCULAR RIGHT POPLITEAL AUGMENT: NORMAL
BH CV LOWER VASCULAR RIGHT POPLITEAL COMPETENT: NORMAL
BH CV LOWER VASCULAR RIGHT POPLITEAL COMPRESS: NORMAL
BH CV LOWER VASCULAR RIGHT POPLITEAL PHASIC: NORMAL
BH CV LOWER VASCULAR RIGHT POPLITEAL SPONT: NORMAL
BH CV LOWER VASCULAR RIGHT POSTERIOR TIBIAL COMPRESS: NORMAL
BH CV LOWER VASCULAR RIGHT PROFUNDA FEMORAL COMPRESS: NORMAL
BH CV LOWER VASCULAR RIGHT PROXIMAL FEMORAL COMPRESS: NORMAL
BH CV LOWER VASCULAR RIGHT SAPHENOFEMORAL JUNCTION COMPRESS: NORMAL
BUN SERPL-MCNC: 16 MG/DL (ref 8–23)
BUN/CREAT SERPL: 22.2 (ref 7–25)
CALCIUM SPEC-SCNC: 8.7 MG/DL (ref 8.6–10.5)
CHLORIDE SERPL-SCNC: 109 MMOL/L (ref 98–107)
CO2 SERPL-SCNC: 22.4 MMOL/L (ref 22–29)
CREAT SERPL-MCNC: 0.72 MG/DL (ref 0.57–1)
DEPRECATED RDW RBC AUTO: 40.6 FL (ref 37–54)
ERYTHROCYTE [DISTWIDTH] IN BLOOD BY AUTOMATED COUNT: 12.5 % (ref 12.3–15.4)
GFR SERPL CREATININE-BSD FRML MDRD: 78 ML/MIN/1.73
GLUCOSE SERPL-MCNC: 176 MG/DL (ref 65–99)
GRAM STN SPEC: ABNORMAL
HBA1C MFR BLD: 6 % (ref 4.8–5.6)
HCT VFR BLD AUTO: 35.2 % (ref 34–46.6)
HGB BLD-MCNC: 11.6 G/DL (ref 12–15.9)
MAGNESIUM SERPL-MCNC: 2.1 MG/DL (ref 1.6–2.4)
MCH RBC QN AUTO: 29.6 PG (ref 26.6–33)
MCHC RBC AUTO-ENTMCNC: 33 G/DL (ref 31.5–35.7)
MCV RBC AUTO: 89.8 FL (ref 79–97)
PHOSPHATE SERPL-MCNC: 2.1 MG/DL (ref 2.5–4.5)
PLATELET # BLD AUTO: 138 10*3/MM3 (ref 140–450)
PMV BLD AUTO: 9.1 FL (ref 6–12)
POTASSIUM SERPL-SCNC: 4.8 MMOL/L (ref 3.5–5.2)
PROCALCITONIN SERPL-MCNC: 48.41 NG/ML (ref 0–0.25)
RBC # BLD AUTO: 3.92 10*6/MM3 (ref 3.77–5.28)
SODIUM SERPL-SCNC: 140 MMOL/L (ref 136–145)
WBC # BLD AUTO: 7.24 10*3/MM3 (ref 3.4–10.8)

## 2021-03-26 PROCEDURE — 80069 RENAL FUNCTION PANEL: CPT | Performed by: HOSPITALIST

## 2021-03-26 PROCEDURE — 84145 PROCALCITONIN (PCT): CPT | Performed by: HOSPITALIST

## 2021-03-26 PROCEDURE — 83735 ASSAY OF MAGNESIUM: CPT | Performed by: HOSPITALIST

## 2021-03-26 PROCEDURE — 25010000003 CEFTRIAXONE PER 250 MG: Performed by: UROLOGY

## 2021-03-26 PROCEDURE — 87040 BLOOD CULTURE FOR BACTERIA: CPT | Performed by: HOSPITALIST

## 2021-03-26 PROCEDURE — 85027 COMPLETE CBC AUTOMATED: CPT | Performed by: HOSPITALIST

## 2021-03-26 PROCEDURE — 93970 EXTREMITY STUDY: CPT

## 2021-03-26 PROCEDURE — 83036 HEMOGLOBIN GLYCOSYLATED A1C: CPT | Performed by: HOSPITALIST

## 2021-03-26 RX ADMIN — PANTOPRAZOLE SODIUM 40 MG: 40 INJECTION, POWDER, FOR SOLUTION INTRAVENOUS at 05:19

## 2021-03-26 RX ADMIN — ATORVASTATIN CALCIUM 20 MG: 20 TABLET, FILM COATED ORAL at 08:24

## 2021-03-26 RX ADMIN — ASPIRIN 81 MG: 81 TABLET, FILM COATED ORAL at 08:24

## 2021-03-26 RX ADMIN — IMIPRAMINE HYDROCHLORIDE 100 MG: 50 TABLET ORAL at 21:08

## 2021-03-26 RX ADMIN — BUTALBITAL, ACETAMINOPHEN, AND CAFFEINE 1 TABLET: 50; 325; 40 TABLET ORAL at 05:25

## 2021-03-26 RX ADMIN — SODIUM CHLORIDE 100 ML/HR: 9 INJECTION, SOLUTION INTRAVENOUS at 19:29

## 2021-03-26 RX ADMIN — CEFTRIAXONE SODIUM 2 G: 2 INJECTION, SOLUTION INTRAVENOUS at 21:07

## 2021-03-26 RX ADMIN — SODIUM CHLORIDE, PRESERVATIVE FREE 10 ML: 5 INJECTION INTRAVENOUS at 08:24

## 2021-03-26 NOTE — ANESTHESIA POSTPROCEDURE EVALUATION
"Patient: Anitra Orta    Procedure Summary     Date: 03/25/21 Room / Location: [facility] OR 01 / [facility] MAIN OR    Anesthesia Start: 1753 Anesthesia Stop: 1831    Procedure: CYSTOSCOPY URETERAL CATHETER/STENT INSERTION (Left ) Diagnosis:     Surgeons: Kevon Clark MD Provider: Pipo Lewis MD    Anesthesia Type: general ASA Status: 3          Anesthesia Type: general    Vitals  Vitals Value Taken Time   /75 03/25/21 1933   Temp 37.1 °C (98.8 °F) 03/25/21 1830   Pulse 80 03/25/21 1937   Resp 16 03/25/21 1930   SpO2 94 % 03/25/21 1937   Vitals shown include unvalidated device data.        Post Anesthesia Care and Evaluation    Patient location during evaluation: bedside  Patient participation: complete - patient participated  Level of consciousness: awake  Pain score: 2  Pain management: adequate  Airway patency: patent  Anesthetic complications: No anesthetic complications  PONV Status: none  Cardiovascular status: acceptable  Respiratory status: acceptable  Hydration status: acceptable    Comments: /84 (BP Location: Left arm, Patient Position: Lying)   Pulse 80   Temp 36.9 °C (98.5 °F) (Oral)   Resp 18   Ht 167.6 cm (66\")   Wt 118 kg (259 lb 9.6 oz)   SpO2 94%   BMI 41.90 kg/m²         "

## 2021-03-27 LAB — BACTERIA SPEC AEROBE CULT: ABNORMAL

## 2021-03-27 PROCEDURE — 25010000003 CEFTRIAXONE PER 250 MG: Performed by: UROLOGY

## 2021-03-27 RX ORDER — PANTOPRAZOLE SODIUM 40 MG/1
40 TABLET, DELAYED RELEASE ORAL EVERY MORNING
Status: DISCONTINUED | OUTPATIENT
Start: 2021-03-28 | End: 2021-03-29 | Stop reason: HOSPADM

## 2021-03-27 RX ORDER — LISINOPRIL 40 MG/1
40 TABLET ORAL
Status: DISCONTINUED | OUTPATIENT
Start: 2021-03-27 | End: 2021-03-29 | Stop reason: HOSPADM

## 2021-03-27 RX ADMIN — SODIUM CHLORIDE 100 ML/HR: 9 INJECTION, SOLUTION INTRAVENOUS at 05:25

## 2021-03-27 RX ADMIN — ATORVASTATIN CALCIUM 20 MG: 20 TABLET, FILM COATED ORAL at 09:03

## 2021-03-27 RX ADMIN — CEFTRIAXONE SODIUM 2 G: 2 INJECTION, SOLUTION INTRAVENOUS at 20:34

## 2021-03-27 RX ADMIN — ASPIRIN 81 MG: 81 TABLET, FILM COATED ORAL at 08:59

## 2021-03-27 RX ADMIN — LISINOPRIL 40 MG: 40 TABLET ORAL at 10:37

## 2021-03-27 RX ADMIN — SODIUM CHLORIDE, PRESERVATIVE FREE 10 ML: 5 INJECTION INTRAVENOUS at 08:58

## 2021-03-27 RX ADMIN — SODIUM CHLORIDE, PRESERVATIVE FREE 10 ML: 5 INJECTION INTRAVENOUS at 20:58

## 2021-03-27 RX ADMIN — PANTOPRAZOLE SODIUM 40 MG: 40 INJECTION, POWDER, FOR SOLUTION INTRAVENOUS at 05:29

## 2021-03-27 RX ADMIN — IMIPRAMINE HYDROCHLORIDE 100 MG: 50 TABLET ORAL at 20:34

## 2021-03-28 LAB
BACTERIA SPEC AEROBE CULT: NORMAL
GRAM STN SPEC: NORMAL
GRAM STN SPEC: NORMAL

## 2021-03-28 PROCEDURE — C1751 CATH, INF, PER/CENT/MIDLINE: HCPCS

## 2021-03-28 PROCEDURE — 25010000003 CEFTRIAXONE PER 250 MG: Performed by: UROLOGY

## 2021-03-28 RX ORDER — SODIUM CHLORIDE 0.9 % (FLUSH) 0.9 %
10 SYRINGE (ML) INJECTION EVERY 12 HOURS SCHEDULED
Status: DISCONTINUED | OUTPATIENT
Start: 2021-03-28 | End: 2021-03-29 | Stop reason: HOSPADM

## 2021-03-28 RX ORDER — SODIUM CHLORIDE 0.9 % (FLUSH) 0.9 %
10 SYRINGE (ML) INJECTION AS NEEDED
Status: DISCONTINUED | OUTPATIENT
Start: 2021-03-28 | End: 2021-03-29 | Stop reason: HOSPADM

## 2021-03-28 RX ORDER — HYDRALAZINE HYDROCHLORIDE 25 MG/1
25 TABLET, FILM COATED ORAL EVERY 6 HOURS PRN
Status: DISCONTINUED | OUTPATIENT
Start: 2021-03-28 | End: 2021-03-29 | Stop reason: HOSPADM

## 2021-03-28 RX ADMIN — ATORVASTATIN CALCIUM 20 MG: 20 TABLET, FILM COATED ORAL at 08:19

## 2021-03-28 RX ADMIN — PANTOPRAZOLE SODIUM 40 MG: 40 TABLET, DELAYED RELEASE ORAL at 06:47

## 2021-03-28 RX ADMIN — SODIUM CHLORIDE, PRESERVATIVE FREE 10 ML: 5 INJECTION INTRAVENOUS at 08:19

## 2021-03-28 RX ADMIN — ASPIRIN 81 MG: 81 TABLET, FILM COATED ORAL at 08:19

## 2021-03-28 RX ADMIN — CEFTRIAXONE SODIUM 2 G: 2 INJECTION, SOLUTION INTRAVENOUS at 20:05

## 2021-03-28 RX ADMIN — SODIUM CHLORIDE, PRESERVATIVE FREE 10 ML: 5 INJECTION INTRAVENOUS at 20:06

## 2021-03-28 RX ADMIN — HYDRALAZINE HYDROCHLORIDE 25 MG: 25 TABLET, FILM COATED ORAL at 23:08

## 2021-03-28 RX ADMIN — IMIPRAMINE HYDROCHLORIDE 100 MG: 50 TABLET ORAL at 20:05

## 2021-03-28 RX ADMIN — LISINOPRIL 40 MG: 40 TABLET ORAL at 08:19

## 2021-03-28 RX ADMIN — SODIUM CHLORIDE, PRESERVATIVE FREE 10 ML: 5 INJECTION INTRAVENOUS at 20:07

## 2021-03-29 VITALS
DIASTOLIC BLOOD PRESSURE: 79 MMHG | BODY MASS INDEX: 41.72 KG/M2 | SYSTOLIC BLOOD PRESSURE: 155 MMHG | TEMPERATURE: 97 F | WEIGHT: 259.6 LBS | HEART RATE: 95 BPM | RESPIRATION RATE: 16 BRPM | HEIGHT: 66 IN | OXYGEN SATURATION: 96 %

## 2021-03-29 DIAGNOSIS — A41.50 SEPSIS DUE TO GRAM NEGATIVE BACTERIA (HCC): Primary | ICD-10-CM

## 2021-03-29 PROCEDURE — 25010000003 CEFTRIAXONE PER 250 MG: Performed by: INTERNAL MEDICINE

## 2021-03-29 RX ORDER — CEFTRIAXONE SODIUM 2 G/50ML
2 INJECTION, SOLUTION INTRAVENOUS EVERY 24 HOURS
Status: CANCELLED
Start: 2021-03-31

## 2021-03-29 RX ORDER — CEFTRIAXONE SODIUM 2 G/50ML
2 INJECTION, SOLUTION INTRAVENOUS ONCE
Status: COMPLETED | OUTPATIENT
Start: 2021-03-29 | End: 2021-03-29

## 2021-03-29 RX ORDER — CEFTRIAXONE 1 G/1
2 INJECTION, POWDER, FOR SOLUTION INTRAMUSCULAR; INTRAVENOUS EVERY 24 HOURS
Qty: 22 G | Refills: 0
Start: 2021-03-29 | End: 2021-04-09

## 2021-03-29 RX ORDER — CEFTRIAXONE SODIUM 2 G/50ML
2 INJECTION, SOLUTION INTRAVENOUS EVERY 24 HOURS
Status: CANCELLED | OUTPATIENT
Start: 2021-03-30

## 2021-03-29 RX ORDER — AMLODIPINE BESYLATE 2.5 MG/1
2.5 TABLET ORAL
Qty: 30 TABLET | Refills: 0 | Status: SHIPPED | OUTPATIENT
Start: 2021-03-30 | End: 2021-04-29

## 2021-03-29 RX ORDER — AMLODIPINE BESYLATE 2.5 MG/1
2.5 TABLET ORAL
Status: DISCONTINUED | OUTPATIENT
Start: 2021-03-30 | End: 2021-03-29 | Stop reason: HOSPADM

## 2021-03-29 RX ORDER — AMLODIPINE BESYLATE 5 MG/1
5 TABLET ORAL
Status: DISCONTINUED | OUTPATIENT
Start: 2021-03-29 | End: 2021-03-29

## 2021-03-29 RX ORDER — CEFTRIAXONE 1 G/1
2 INJECTION, POWDER, FOR SOLUTION INTRAMUSCULAR; INTRAVENOUS EVERY 24 HOURS
Start: 2021-03-29 | End: 2021-03-29 | Stop reason: SDUPTHER

## 2021-03-29 RX ADMIN — AMLODIPINE BESYLATE 5 MG: 5 TABLET ORAL at 10:01

## 2021-03-29 RX ADMIN — ASPIRIN 81 MG: 81 TABLET, FILM COATED ORAL at 08:16

## 2021-03-29 RX ADMIN — PANTOPRAZOLE SODIUM 40 MG: 40 TABLET, DELAYED RELEASE ORAL at 06:06

## 2021-03-29 RX ADMIN — CEFTRIAXONE SODIUM 2 G: 2 INJECTION, SOLUTION INTRAVENOUS at 13:03

## 2021-03-29 RX ADMIN — SODIUM CHLORIDE, PRESERVATIVE FREE 10 ML: 5 INJECTION INTRAVENOUS at 08:16

## 2021-03-29 RX ADMIN — ATORVASTATIN CALCIUM 20 MG: 20 TABLET, FILM COATED ORAL at 08:16

## 2021-03-29 RX ADMIN — BUTALBITAL, ACETAMINOPHEN, AND CAFFEINE 1 TABLET: 50; 325; 40 TABLET ORAL at 07:39

## 2021-03-29 RX ADMIN — LISINOPRIL 40 MG: 40 TABLET ORAL at 08:16

## 2021-03-30 ENCOUNTER — HOSPITAL ENCOUNTER (OUTPATIENT)
Dept: INFUSION THERAPY | Facility: HOSPITAL | Age: 81
Discharge: HOME | End: 2021-03-30
Admitting: INTERNAL MEDICINE

## 2021-03-30 VITALS
SYSTOLIC BLOOD PRESSURE: 157 MMHG | HEIGHT: 66 IN | DIASTOLIC BLOOD PRESSURE: 75 MMHG | BODY MASS INDEX: 39.37 KG/M2 | WEIGHT: 245 LBS | HEART RATE: 82 BPM | OXYGEN SATURATION: 95 % | TEMPERATURE: 97.5 F | RESPIRATION RATE: 18 BRPM

## 2021-03-30 DIAGNOSIS — A41.50 SEPSIS DUE TO GRAM NEGATIVE BACTERIA (HCC): Primary | ICD-10-CM

## 2021-03-30 PROCEDURE — 96365 THER/PROPH/DIAG IV INF INIT: CPT

## 2021-03-30 PROCEDURE — 25010000002 CEFTRIAXONE SODIUM-DEXTROSE 2-2.22 GM-%(50ML) RECONSTITUTED SOLUTION: Performed by: INTERNAL MEDICINE

## 2021-03-30 RX ORDER — CEFTRIAXONE 2 G/50ML
2 INJECTION, SOLUTION INTRAVENOUS EVERY 24 HOURS
Status: CANCELLED | OUTPATIENT
Start: 2021-03-30

## 2021-03-30 RX ORDER — CEFTRIAXONE 2 G/50ML
2 INJECTION, SOLUTION INTRAVENOUS EVERY 24 HOURS
Status: DISCONTINUED | OUTPATIENT
Start: 2021-03-30 | End: 2021-04-01 | Stop reason: HOSPADM

## 2021-03-30 RX ADMIN — CEFTRIAXONE 2 G: 2 INJECTION, SOLUTION INTRAVENOUS at 14:43

## 2021-03-31 ENCOUNTER — HOSPITAL ENCOUNTER (OUTPATIENT)
Dept: INFUSION THERAPY | Facility: HOSPITAL | Age: 81
Discharge: HOME | End: 2021-03-31
Admitting: INTERNAL MEDICINE

## 2021-03-31 VITALS
SYSTOLIC BLOOD PRESSURE: 147 MMHG | WEIGHT: 245 LBS | HEIGHT: 66 IN | RESPIRATION RATE: 18 BRPM | HEART RATE: 90 BPM | DIASTOLIC BLOOD PRESSURE: 64 MMHG | OXYGEN SATURATION: 95 % | TEMPERATURE: 97.6 F | BODY MASS INDEX: 39.37 KG/M2

## 2021-03-31 DIAGNOSIS — A41.50 SEPSIS DUE TO GRAM NEGATIVE BACTERIA (HCC): Primary | ICD-10-CM

## 2021-03-31 LAB
BACTERIA SPEC AEROBE CULT: NORMAL
BACTERIA SPEC AEROBE CULT: NORMAL

## 2021-03-31 PROCEDURE — 96365 THER/PROPH/DIAG IV INF INIT: CPT

## 2021-03-31 PROCEDURE — 25010000002 CEFTRIAXONE SODIUM-DEXTROSE 2-2.22 GM-%(50ML) RECONSTITUTED SOLUTION: Performed by: INTERNAL MEDICINE

## 2021-03-31 RX ORDER — CEFTRIAXONE 2 G/50ML
2 INJECTION, SOLUTION INTRAVENOUS EVERY 24 HOURS
Status: CANCELLED | OUTPATIENT
Start: 2021-03-31

## 2021-03-31 RX ORDER — CEFTRIAXONE 2 G/50ML
2 INJECTION, SOLUTION INTRAVENOUS EVERY 24 HOURS
Status: DISCONTINUED | OUTPATIENT
Start: 2021-03-31 | End: 2021-04-02 | Stop reason: HOSPADM

## 2021-03-31 RX ADMIN — CEFTRIAXONE 2 G: 2 INJECTION, SOLUTION INTRAVENOUS at 14:08

## 2021-04-01 ENCOUNTER — HOSPITAL ENCOUNTER (OUTPATIENT)
Dept: INFUSION THERAPY | Facility: HOSPITAL | Age: 81
Discharge: HOME | End: 2021-04-01
Admitting: INTERNAL MEDICINE

## 2021-04-01 ENCOUNTER — HOSPITAL ENCOUNTER (EMERGENCY)
Facility: HOSPITAL | Age: 81
Discharge: HOME | End: 2021-04-01
Attending: EMERGENCY MEDICINE | Admitting: EMERGENCY MEDICINE

## 2021-04-01 ENCOUNTER — APPOINTMENT (OUTPATIENT)
Dept: GENERAL RADIOLOGY | Facility: HOSPITAL | Age: 81
End: 2021-04-01

## 2021-04-01 VITALS
WEIGHT: 248.9 LBS | RESPIRATION RATE: 32 BRPM | BODY MASS INDEX: 40 KG/M2 | HEART RATE: 107 BPM | SYSTOLIC BLOOD PRESSURE: 175 MMHG | TEMPERATURE: 98 F | OXYGEN SATURATION: 96 % | HEIGHT: 66 IN | DIASTOLIC BLOOD PRESSURE: 79 MMHG

## 2021-04-01 VITALS
SYSTOLIC BLOOD PRESSURE: 144 MMHG | TEMPERATURE: 96.8 F | DIASTOLIC BLOOD PRESSURE: 85 MMHG | RESPIRATION RATE: 16 BRPM | OXYGEN SATURATION: 99 % | HEART RATE: 86 BPM

## 2021-04-01 DIAGNOSIS — N39.0 ACUTE UTI: Primary | ICD-10-CM

## 2021-04-01 DIAGNOSIS — R68.83 CHILLS (WITHOUT FEVER): ICD-10-CM

## 2021-04-01 DIAGNOSIS — A41.50 SEPSIS DUE TO GRAM NEGATIVE BACTERIA (HCC): Primary | ICD-10-CM

## 2021-04-01 LAB
ALBUMIN SERPL-MCNC: 3.3 G/DL (ref 3.5–5.2)
ALBUMIN/GLOB SERPL: 1 G/DL
ALP SERPL-CCNC: 117 U/L (ref 39–117)
ALT SERPL W P-5'-P-CCNC: 23 U/L (ref 1–33)
ANION GAP SERPL CALCULATED.3IONS-SCNC: 12.1 MMOL/L (ref 5–15)
AST SERPL-CCNC: 15 U/L (ref 1–32)
BACTERIA UR QL AUTO: ABNORMAL /HPF
BASOPHILS # BLD AUTO: 0.02 10*3/MM3 (ref 0–0.2)
BASOPHILS NFR BLD AUTO: 0.2 % (ref 0–1.5)
BILIRUB SERPL-MCNC: 0.3 MG/DL (ref 0–1.2)
BILIRUB UR QL STRIP: NEGATIVE
BUN SERPL-MCNC: 15 MG/DL (ref 8–23)
BUN/CREAT SERPL: 18.8 (ref 7–25)
CALCIUM SPEC-SCNC: 8.6 MG/DL (ref 8.6–10.5)
CHLORIDE SERPL-SCNC: 104 MMOL/L (ref 98–107)
CLARITY UR: CLEAR
CO2 SERPL-SCNC: 23.9 MMOL/L (ref 22–29)
COLOR UR: YELLOW
CREAT SERPL-MCNC: 0.8 MG/DL (ref 0.57–1)
D-LACTATE SERPL-SCNC: 1.4 MMOL/L (ref 0.5–2)
DEPRECATED RDW RBC AUTO: 44.6 FL (ref 37–54)
EOSINOPHIL # BLD AUTO: 0.12 10*3/MM3 (ref 0–0.4)
EOSINOPHIL NFR BLD AUTO: 1.5 % (ref 0.3–6.2)
ERYTHROCYTE [DISTWIDTH] IN BLOOD BY AUTOMATED COUNT: 13.1 % (ref 12.3–15.4)
FLUAV RNA RESP QL NAA+PROBE: NOT DETECTED
FLUBV RNA RESP QL NAA+PROBE: NOT DETECTED
GFR SERPL CREATININE-BSD FRML MDRD: 69 ML/MIN/1.73
GLOBULIN UR ELPH-MCNC: 3.4 GM/DL
GLUCOSE SERPL-MCNC: 136 MG/DL (ref 65–99)
GLUCOSE UR STRIP-MCNC: NEGATIVE MG/DL
HCT VFR BLD AUTO: 39 % (ref 34–46.6)
HGB BLD-MCNC: 12.2 G/DL (ref 12–15.9)
HGB UR QL STRIP.AUTO: ABNORMAL
HYALINE CASTS UR QL AUTO: ABNORMAL /LPF
IMM GRANULOCYTES # BLD AUTO: 0.02 10*3/MM3 (ref 0–0.05)
IMM GRANULOCYTES NFR BLD AUTO: 0.2 % (ref 0–0.5)
KETONES UR QL STRIP: NEGATIVE
LEUKOCYTE ESTERASE UR QL STRIP.AUTO: ABNORMAL
LYMPHOCYTES # BLD AUTO: 0.58 10*3/MM3 (ref 0.7–3.1)
LYMPHOCYTES NFR BLD AUTO: 7.1 % (ref 19.6–45.3)
MCH RBC QN AUTO: 28.8 PG (ref 26.6–33)
MCHC RBC AUTO-ENTMCNC: 31.3 G/DL (ref 31.5–35.7)
MCV RBC AUTO: 92 FL (ref 79–97)
MONOCYTES # BLD AUTO: 0.2 10*3/MM3 (ref 0.1–0.9)
MONOCYTES NFR BLD AUTO: 2.4 % (ref 5–12)
NEUTROPHILS NFR BLD AUTO: 7.27 10*3/MM3 (ref 1.7–7)
NEUTROPHILS NFR BLD AUTO: 88.6 % (ref 42.7–76)
NITRITE UR QL STRIP: NEGATIVE
PH UR STRIP.AUTO: 6.5 [PH] (ref 4.5–8)
PLATELET # BLD AUTO: 225 10*3/MM3 (ref 140–450)
PMV BLD AUTO: 9 FL (ref 6–12)
POTASSIUM SERPL-SCNC: 3.8 MMOL/L (ref 3.5–5.2)
PROCALCITONIN SERPL-MCNC: 1.21 NG/ML (ref 0–0.25)
PROT SERPL-MCNC: 6.7 G/DL (ref 6–8.5)
PROT UR QL STRIP: ABNORMAL
RBC # BLD AUTO: 4.24 10*6/MM3 (ref 3.77–5.28)
RBC # UR: ABNORMAL /HPF
REF LAB TEST METHOD: ABNORMAL
SARS-COV-2 RNA RESP QL NAA+PROBE: NOT DETECTED
SODIUM SERPL-SCNC: 140 MMOL/L (ref 136–145)
SP GR UR STRIP: 1.02 (ref 1–1.03)
SQUAMOUS #/AREA URNS HPF: ABNORMAL /HPF
UROBILINOGEN UR QL STRIP: ABNORMAL
WBC # BLD AUTO: 8.21 10*3/MM3 (ref 3.4–10.8)
WBC UR QL AUTO: ABNORMAL /HPF

## 2021-04-01 PROCEDURE — P9612 CATHETERIZE FOR URINE SPEC: HCPCS

## 2021-04-01 PROCEDURE — 87040 BLOOD CULTURE FOR BACTERIA: CPT | Performed by: EMERGENCY MEDICINE

## 2021-04-01 PROCEDURE — 99284 EMERGENCY DEPT VISIT MOD MDM: CPT

## 2021-04-01 PROCEDURE — 81001 URINALYSIS AUTO W/SCOPE: CPT | Performed by: EMERGENCY MEDICINE

## 2021-04-01 PROCEDURE — 84145 PROCALCITONIN (PCT): CPT | Performed by: EMERGENCY MEDICINE

## 2021-04-01 PROCEDURE — 80053 COMPREHEN METABOLIC PANEL: CPT | Performed by: EMERGENCY MEDICINE

## 2021-04-01 PROCEDURE — 85025 COMPLETE CBC W/AUTO DIFF WBC: CPT | Performed by: EMERGENCY MEDICINE

## 2021-04-01 PROCEDURE — 71046 X-RAY EXAM CHEST 2 VIEWS: CPT

## 2021-04-01 PROCEDURE — 87086 URINE CULTURE/COLONY COUNT: CPT | Performed by: EMERGENCY MEDICINE

## 2021-04-01 PROCEDURE — 96365 THER/PROPH/DIAG IV INF INIT: CPT

## 2021-04-01 PROCEDURE — 87636 SARSCOV2 & INF A&B AMP PRB: CPT | Performed by: EMERGENCY MEDICINE

## 2021-04-01 PROCEDURE — 25010000002 CEFTRIAXONE SODIUM-DEXTROSE 2-2.22 GM-%(50ML) RECONSTITUTED SOLUTION: Performed by: INTERNAL MEDICINE

## 2021-04-01 PROCEDURE — 99283 EMERGENCY DEPT VISIT LOW MDM: CPT | Performed by: EMERGENCY MEDICINE

## 2021-04-01 PROCEDURE — 83605 ASSAY OF LACTIC ACID: CPT | Performed by: EMERGENCY MEDICINE

## 2021-04-01 RX ORDER — CLONIDINE HYDROCHLORIDE 0.1 MG/1
0.1 TABLET ORAL ONCE
Status: DISCONTINUED | OUTPATIENT
Start: 2021-04-01 | End: 2021-04-01

## 2021-04-01 RX ORDER — SODIUM CHLORIDE 0.9 % (FLUSH) 0.9 %
10 SYRINGE (ML) INJECTION AS NEEDED
Status: DISCONTINUED | OUTPATIENT
Start: 2021-04-01 | End: 2021-04-01 | Stop reason: HOSPADM

## 2021-04-01 RX ORDER — CEFTRIAXONE 2 G/50ML
2 INJECTION, SOLUTION INTRAVENOUS EVERY 24 HOURS
Status: CANCELLED | OUTPATIENT
Start: 2021-04-01

## 2021-04-01 RX ORDER — CEFTRIAXONE 2 G/50ML
2 INJECTION, SOLUTION INTRAVENOUS EVERY 24 HOURS
Status: DISCONTINUED | OUTPATIENT
Start: 2021-04-01 | End: 2021-04-03 | Stop reason: HOSPADM

## 2021-04-01 RX ADMIN — CEFTRIAXONE 2 G: 2 INJECTION, SOLUTION INTRAVENOUS at 14:14

## 2021-04-01 NOTE — ED PROVIDER NOTES
Subjective   Anitra Orta 10-year-old white female who presents secondary to chills.  Patient states she was recently hospitalized at [facility] secondary to a kidney stone and sepsis.  She was just discharged home on 3/29/2021.  She is receiving IV Rocephin daily at [facility].  Patient reports she awakened several times during the night and was not feeling well.  However this morning patient began chilling and shaking uncontrollably.  Patient called to her .  He brought her warm blankets.  He was insistent that patient come to the emergency room for evaluation.  Patient has a chronic cough secondary to lisinopril which is unchanged.  She has no other complaint.  She presents for evaluation.      History provided by:  Patient      Review of Systems   Constitutional: Positive for chills (With associated shaking). Negative for fever.   HENT: Negative.  Negative for rhinorrhea.    Eyes: Negative.  Negative for redness.   Respiratory: Negative for cough.    Cardiovascular: Negative for chest pain.   Gastrointestinal: Negative for abdominal pain.   Endocrine: Negative.    Genitourinary: Negative.  Negative for difficulty urinating.   Musculoskeletal: Negative.  Negative for back pain.   Skin: Negative.  Negative for color change.   Neurological: Negative.  Negative for syncope.   Hematological: Negative.    Psychiatric/Behavioral: Negative.    All other systems reviewed and are negative.      Past Medical History:   Diagnosis Date   • Abdominal pain 12/2018    ER VISIT FOR ABD PAIN AND /O RENAL STONES  NAKUL NORMAL  CT NEG FOR OBSTRUCTIVE KIDNEY STONE   • Anxiety    • Chronic pain disorder    • Constipation    • DDD (degenerative disc disease), lumbar    • Frequency of micturition    • GERD (gastroesophageal reflux disease)    • H/O bone density study 01/2019    WITH L SPINE 1.5 L HIP 2.4 FRAX 10% MAJOR FRACTURE AND 2% HIP FRACTURE (NOT CHANGED MUCH FROM 2016 PT PREVIOUSLY ON FOSAMAX X5 YRS  BACK IN 1970s   • H/O CT scan     ABD L UTEROPELVIC JUNCTION NEPHROLITHIASIS   • H/O CT scan     PE PROTOCOL NEG   • H/O mammogram 09/2019    NORMAL +H/O OF L BREAST LUMPECTOMY WITH BENIGN PATHOLOGY MANAGED BY GYN   • History of Holter monitoring     NSVT BUT LASTING UP TO 33 BEATS AT A TIME   • History of MRI     L SPINE HOSP 8/19 EPIGASTRIC PAIN AND ABD PAIN   • History of nuclear stress test     NLM MYOCARDIAL PERFUSION NO SIGNS OF ISCHEMIA   • HTN (hypertension)    • Hyperglycemia 2/19/2021   • Hyperlipidemia    • Kidney stones    • Low back pain    • Mixed hyperlipidemia 9/16/2016   • Mixed incontinence    • Osteoarthritis    • Osteopenia    • Osteoporosis    • Papanicolaou smear 02/2019    DONE AND NORMAL NO LONGER INDICATED MANAGED BY GYN   • Personal history of urinary calculi    • PONV (postoperative nausea and vomiting)    • Psoriasis    • Tachycardia, unspecified    • Urinary tract infection     HOSP FOR RECURRENT UTI WITH ID CONSULT       Allergies   Allergen Reactions   • Bactrim [Sulfamethoxazole-Trimethoprim] Nausea Only and Other (See Comments)     UNKNOWN   • Ciprofloxacin Other (See Comments)     UNKNOWN   • Levaquin [Levofloxacin] Other (See Comments)     SEVERE HEADACHE AND N/V   • Macrobid [Nitrofurantoin] Other (See Comments)     UNKNOWN; PT CAN TAKE IN SMALL DOSES- FLU LIKE SYMPTOMS   • Nitrofurantoin Macrocrystal Other (See Comments)     Can take in small doses   • Cortisone      Pt states had headache with IM injection states has been ok with epidural steroid injections       Past Surgical History:   Procedure Laterality Date   • BREAST BIOPSY Left     benign   • BREAST LUMPECTOMY Left     benign   • BUNIONECTOMY Right    • COLONOSCOPY N/A 11/30/2016    Procedure: COLONOSCOPY polypectomy;  Surgeon: Adali Willard MD;  Location: [facility] OR;  Service:    • CYSTOSCOPY BOTOX INJECTION OF BLADDER N/A 7/21/2017    Procedure: CYSTOSCOPY COAPTITE INJECTION;  Surgeon: Kevon Clark MD;  Location:  [facility] MAIN OR;  Service:    • CYSTOSCOPY W/ LITHOLAPAXY / EHL     • CYSTOSCOPY W/ URETERAL STENT PLACEMENT Left 2016    Procedure: CYSTOSCOPY URETERAL STENT INSERTION;  Surgeon: Kevon Clark MD;  Location:  [facility] OR;  Service:    • CYSTOSCOPY W/ URETERAL STENT PLACEMENT Left 2019    Procedure: CYSTOSCOPY URETERAL CATHETER/STENT INSERTION;  Surgeon: Kevon Clark MD;  Location: [facility] ED OR;  Service: Urology   • CYSTOSCOPY W/ URETERAL STENT PLACEMENT Left 3/25/2021    Procedure: CYSTOSCOPY URETERAL CATHETER/STENT INSERTION;  Surgeon: Kevon Clark MD;  Location: [facility] MAIN OR;  Service: Urology;  Laterality: Left;   • KNEE ARTHROPLASTY Right    • TONSILLECTOMY AND ADENOIDECTOMY     • URETEROSCOPY LASER LITHOTRIPSY WITH STENT INSERTION Left 10/5/2016    Procedure: LT URETEROSCOPY LASER LITHOTRIPSY STONE BASKET EXTRACTION AND STENT ;  Surgeon: Kevon Clark MD;  Location: [facility] OR;  Service:        Family History   Problem Relation Age of Onset   • Heart defect Mother    • Heart attack Mother 70   • Sudden death Mother    • Heart defect Father    • Heart attack Father 49   • Hypertension Father    • Sudden death Father    • Valvular heart disease Brother    • Malig Hyperthermia Neg Hx    • Breast cancer Neg Hx        Social History     Socioeconomic History   • Marital status:      Spouse name: Not on file   • Number of children: Not on file   • Years of education: Not on file   • Highest education level: Not on file   Tobacco Use   • Smoking status: Former Smoker     Years: 40.00     Types: Cigarettes     Quit date: 10/4/1980     Years since quittin.5   • Smokeless tobacco: Never Used   • Tobacco comment: quit    Vaping Use   • Vaping Use: Never used   Substance and Sexual Activity   • Alcohol use: No   • Drug use: No   • Sexual activity: Defer           Objective   Physical Exam  Vitals and nursing note reviewed.   Constitutional:       General: She is not in acute  distress.     Appearance: Normal appearance. She is well-developed. She is obese. She is not diaphoretic.      Comments: 80-year-old white female sitting in bed.  Patient is obese.  She appears in fair health for age.  Vital signs notable for heart rate of 116, respiratory rate of 32, blood pressure 218/105.  Patient is afebrile with temperature 98.0.  Oxygen saturation 94% on room air.   HENT:      Head: Normocephalic and atraumatic.      Right Ear: Tympanic membrane, ear canal and external ear normal.      Left Ear: Tympanic membrane, ear canal and external ear normal.      Nose: Nose normal.      Mouth/Throat:      Mouth: Mucous membranes are moist.      Pharynx: Oropharynx is clear.   Eyes:      Extraocular Movements: Extraocular movements intact.      Conjunctiva/sclera: Conjunctivae normal.      Pupils: Pupils are equal, round, and reactive to light.   Cardiovascular:      Rate and Rhythm: Regular rhythm. Tachycardia present.      Pulses: Normal pulses.      Heart sounds: Normal heart sounds. No murmur heard.   No friction rub. No gallop.    Pulmonary:      Effort: Pulmonary effort is normal. Tachypnea present.      Breath sounds: Normal breath sounds.   Abdominal:      General: Bowel sounds are normal. There is no distension.      Palpations: Abdomen is soft.      Tenderness: There is no abdominal tenderness.   Musculoskeletal:         General: Normal range of motion.      Cervical back: Normal range of motion and neck supple.   Skin:     General: Skin is warm and dry.      Capillary Refill: Capillary refill takes less than 2 seconds.          Neurological:      General: No focal deficit present.      Mental Status: She is alert and oriented to person, place, and time.      Deep Tendon Reflexes: Reflexes are normal and symmetric.   Psychiatric:         Mood and Affect: Mood normal.         Behavior: Behavior normal.         Procedures           ED Course  ED Course as of Apr 01 1001   Thu Apr 01, 2021   6767  "Patient is concerned that her sepsis has reoccurred.  Obtaining full set of labs including lactic acid, procalcitonin and blood cultures.  Obtaining UA and chest x-ray.  Patient and her spouse have both received 2 doses of Covid vaccine.  However it is still possible to contract Covid despite having the vaccine.  Thus will obtain flu and Covid swab.    [SS]   0833 CBC unremarkable.  White count is normal.  CMP notable for mildly elevated blood sugar 136.  Otherwise unremarkable.  Lactic acid is normal at 1.4.  Procalcitonin is elevated at 1.21.  Awaiting UA microscopic and Covid/influenza swabs.    [SS]   0901 UA shows moderate blood, trace protein and small leukocyte.  Nitrite negative.  Trace bacteria present with 6-12 whites and 21-30 reds.  Patient is being treated for UTI.  She also has a ureteral stent in place.  Awaiting chest x-ray reading.    [SS]   0902 Patient's heart rate has been in the 1 teens to 120s during her ER stay.  Patient reports she is chronically tachycardic.  She was placed on a new medication to help control her heart rate during her recent hospitalization.  Patient states she took it once and \"got real sick\".  She stopped taking the medication immediately.    [SS]   0903 Patient was prescribed Norvasc    [SS]   0956 Chest x-ray unremarkable.  Urine microscopic does show trace bacteria with 6-12 whites and 21-30 reds.  While patient's UTI is improving it obviously has not yet completely resolved.  No evidence of sepsis.  I feel patient needs to continue IV antibiotics as planned.  Discussed at length with patient and her spouse all results, diagnoses and treatment.  Patient initially stated she could not come back to the hospital for antibiotics scheduled for 2 PM.  When I ask why this was not possible she stated \"I am exhausted\".  Patient then stated \"Just send me home!  I will go home and rest.\"  Patient's  says he will drive her back to her [facility] appointment at 2 PM.  I will DC " home.Blood pressure improved when cuff was moved to patient's forearm.  Clonidine was not given.    [SS]      ED Course User Index  [SS] Joe May MD      Labs Reviewed   COMPREHENSIVE METABOLIC PANEL - Abnormal; Notable for the following components:       Result Value    Glucose 136 (*)     Albumin 3.30 (*)     All other components within normal limits    Narrative:     GFR Normal >60  Chronic Kidney Disease <60  Kidney Failure <15     URINALYSIS W/ CULTURE IF INDICATED - Abnormal; Notable for the following components:    Blood, UA Moderate (2+) (*)     Protein, UA Trace (*)     Leuk Esterase, UA Small (1+) (*)     All other components within normal limits   CBC WITH AUTO DIFFERENTIAL - Abnormal; Notable for the following components:    MCHC 31.3 (*)     Neutrophil % 88.6 (*)     Lymphocyte % 7.1 (*)     Monocyte % 2.4 (*)     Neutrophils, Absolute 7.27 (*)     Lymphocytes, Absolute 0.58 (*)     All other components within normal limits   PROCALCITONIN - Abnormal; Notable for the following components:    Procalcitonin 1.21 (*)     All other components within normal limits    Narrative:     Results may be falsely decreased if patient taking Biotin.    URINALYSIS, MICROSCOPIC ONLY - Abnormal; Notable for the following components:    RBC, UA 21-30 (*)     WBC, UA 6-12 (*)     Bacteria, UA Trace (*)     All other components within normal limits   COVID-19 AND FLU A/B, NP SWAB IN TRANSPORT MEDIA 8-12 HR TAT - Normal    Narrative:     Fact sheet for providers: https://www.fda.gov/media/809439/download    Fact sheet for patients: https://www.fda.gov/media/587287/download    Test performed by PCR.   LACTIC ACID, PLASMA - Normal   BLOOD CULTURE   BLOOD CULTURE   URINE CULTURE   CBC AND DIFFERENTIAL    Narrative:     The following orders were created for panel order CBC & Differential.  Procedure                               Abnormality         Status                     ---------                                -----------         ------                     CBC Auto Differential[[id number]]        Abnormal            Final result                 Please view results for these tests on the individual orders.     XR Chest 2 View    Result Date: 4/1/2021  Narrative: CR Chest 2 Vws INDICATION:  80-year-old female with shortness of air and chills today. COMPARISON:  Chest 7/31/2019 FINDINGS: PA and lateral views of the chest. 3 total images. Heart and mediastinal contours are normal. The lungs are clear. No pneumothorax or pleural effusion.      Impression: No acute cardiopulmonary findings. Signer Name: Camacho Tidwell MD  Signed: 4/1/2021 9:29 AM  Workstation Name: [id number]  [facility]    XR Abdomen 1 View    Result Date: 3/25/2021  Narrative: XR ABDOMEN 1 VW-  CLINICAL: Left UPJ calculus.  Fluoroscopy time 54 seconds  One image of the abdomen taken in the cystoscopy suite.  FINDINGS: Left-sided ureteroscopy and stent placement performed. Image shows a left-sided double-J ureteral stent in satisfactory position.  This report was finalized on 3/25/2021 8:47 PM by Dr. Gabo Scanlon M.D.      Duplex Venous Lower Extremity - Bilateral CAR    Result Date: 3/26/2021  Narrative: · Normal bilateral lower extremity venous duplex scan.      FL C Arm During Surgery    Result Date: 3/25/2021  Narrative: This procedure was auto-finalized with no dictation required.    CT Abdomen Pelvis Stone Protocol    Result Date: 3/26/2021  Narrative: CT ABDOMEN AND PELVIS WITHOUT IV CONTRAST  HISTORY: 80-year-old female with hematuria.  TECHNIQUE: Radiation dose reduction techniques were utilized, including automated exposure control and exposure modulation based on body size. 3 mm images were obtained through the abdomen and pelvis without the administration of IV contrast. There is no previous CT of the abdomen for comparison.  FINDINGS: There is a 5 mm stone within the left UPJ with very mild hydronephrosis. There are a few  other nonobstructing left renal stones and there is a single tiny nonobstructing stone within the right kidney. There is residual intravenous contrast within both collecting systems from the CTA of the chest performed earlier today. There is a 2 cm right renal cyst. There are multiple hyperdense left renal lesions with the largest exophytic from the lower pole measuring 4 cm, internal density measurements are 22 Hounsfield units. There are also several intraparenchymal lesions which have a peripheral rim of enhancement. Urinary bladder is not abnormally distended.  Noncontrasted liver, gallbladder, spleen, and adrenals appear unremarkable. There are multiple pancreatic calcifications at the pancreatic body and tail and there is at least 1 ectatic side branch at the tail, image 43. There is minimal peripancreatic haziness at the tail. There is a small hiatal hernia. No acute bowel abnormality is seen. Appendix appears normal. Uterus and adnexa appear unremarkable. There are moderately extensive abdominal aortic atherosclerotic changes without aneurysmal dilatation.      Impression: 1. There is a 5 mm stone within the left UPJ with very mild hydronephrosis. Bilateral nephrolithiasis. 2. Hyperdense renal lesions likely represent proteinaceous cysts. The peripheral rim of contrast surrounding some of the left renal cysts is likely related to the hydronephrosis and retained contrast within the tubules surrounding the cyst. To exclude enhancing renal lesions, reevaluation with a renal mass protocol CT of the abdomen following resolution of the left ureteral stone and hydronephrosis is suggested. 3. There are changes of chronic pancreatitis. Please correlate clinically for very mild or early pancreatitis predominantly involving the pancreatic tail.  This report was finalized on 3/26/2021 11:21 AM by Dr. Shanthi Adame M.D.      CT Angiogram Chest    Result Date: 3/25/2021  Narrative: CT ANGIOGRAM OF THE CHEST  HISTORY:  Syncope  COMPARISON: None available.  TECHNIQUE: Axial CT imaging was obtained through the thorax. IV contrast was administered. Three-D reformatted images were obtained.  FINDINGS: No acute pulmonary thromboembolus is identified. The thoracic aorta is normal in caliber. There is no evidence of dissection, although there is atherosclerotic involvement of the thoracic aorta and the coronary arteries. The thyroid gland and trachea appear within normal limits. There is a small hiatal hernia. Mediastinal lymph nodes do not appear pathologically enlarged. Background emphysematous changes are seen, and the patient appears to have chronic scarring at the lung bases bilaterally. There is an aberrant right subclavian artery. Images through the upper abdomen do not demonstrate any acute abnormalities. No acute osseous abnormalities are seen. There is discogenic degenerative disease in the spine. Soft tissue lesion seen within the right anterior chest wall likely represents a benign lesion such as a sebaceous cyst.      Impression: No acute intrathoracic findings.    Radiation dose reduction techniques were utilized, including automated exposure control and exposure modulation based on body size.  This report was finalized on 3/25/2021 12:41 AM by Dr. Nanda Nieves M.D.                                           [facility]    Final diagnoses:   Acute UTI   Chills (without fever)       ED Disposition  ED Disposition     ED Disposition Condition Comment    Discharge Stable           Margot Chavez MD  [address]  [facility] [address] [phone]    Schedule an appointment as soon as possible for a visit in 1 week  Sooner if needed for continued or worsened symptoms    Kevon Clark MD  [address] [address] [phone]      Follow-up as scheduled,  sooner for worsening symptoms.         Medication List      No changes were made to your prescriptions during this visit.          Joe May,  MD  04/01/21 1001

## 2021-04-01 NOTE — DISCHARGE INSTRUCTIONS
Continue current medications.  Continue IV antibiotics as prescribed by infectious disease.  Follow-up with your PCP as above.  Follow-up with urology as above.  Follow-up with infectious disease per their recommendation.  Return to ED for worsening symptoms, medical emergencies.

## 2021-04-01 NOTE — ED NOTES
Dr May aware of pt's 's before clonidine given.  Will not give clonidine at this time.     Edda Cuevas RN  04/01/21 [id number]

## 2021-04-02 ENCOUNTER — PATIENT OUTREACH (OUTPATIENT)
Dept: CASE MANAGEMENT | Facility: OTHER | Age: 81
End: 2021-04-02

## 2021-04-02 ENCOUNTER — OFFICE VISIT (OUTPATIENT)
Dept: INTERNAL MEDICINE | Facility: CLINIC | Age: 81
End: 2021-04-02

## 2021-04-02 ENCOUNTER — HOSPITAL ENCOUNTER (OUTPATIENT)
Dept: INFUSION THERAPY | Facility: HOSPITAL | Age: 81
Discharge: HOME | End: 2021-04-02
Admitting: INTERNAL MEDICINE

## 2021-04-02 VITALS
OXYGEN SATURATION: 98 % | BODY MASS INDEX: 39.86 KG/M2 | TEMPERATURE: 95.5 F | DIASTOLIC BLOOD PRESSURE: 82 MMHG | HEART RATE: 82 BPM | WEIGHT: 248 LBS | SYSTOLIC BLOOD PRESSURE: 156 MMHG | HEIGHT: 66 IN | RESPIRATION RATE: 16 BRPM

## 2021-04-02 VITALS
OXYGEN SATURATION: 99 % | DIASTOLIC BLOOD PRESSURE: 76 MMHG | TEMPERATURE: 98 F | RESPIRATION RATE: 16 BRPM | WEIGHT: 246.91 LBS | HEART RATE: 90 BPM | BODY MASS INDEX: 39.85 KG/M2 | SYSTOLIC BLOOD PRESSURE: 148 MMHG

## 2021-04-02 DIAGNOSIS — N13.2 URETERAL STONE WITH HYDRONEPHROSIS: ICD-10-CM

## 2021-04-02 DIAGNOSIS — I10 ESSENTIAL HYPERTENSION: ICD-10-CM

## 2021-04-02 DIAGNOSIS — A41.50 SEPSIS DUE TO GRAM NEGATIVE BACTERIA (HCC): Primary | ICD-10-CM

## 2021-04-02 LAB — BACTERIA SPEC AEROBE CULT: NO GROWTH

## 2021-04-02 PROCEDURE — 99496 TRANSJ CARE MGMT HIGH F2F 7D: CPT | Performed by: INTERNAL MEDICINE

## 2021-04-02 PROCEDURE — 25010000002 CEFTRIAXONE SODIUM-DEXTROSE 2-2.22 GM-%(50ML) RECONSTITUTED SOLUTION: Performed by: INTERNAL MEDICINE

## 2021-04-02 PROCEDURE — 1111F DSCHRG MED/CURRENT MED MERGE: CPT | Performed by: INTERNAL MEDICINE

## 2021-04-02 PROCEDURE — 96365 THER/PROPH/DIAG IV INF INIT: CPT

## 2021-04-02 RX ORDER — CEFTRIAXONE 2 G/50ML
2 INJECTION, SOLUTION INTRAVENOUS EVERY 24 HOURS
Status: DISCONTINUED | OUTPATIENT
Start: 2021-04-02 | End: 2021-04-04 | Stop reason: HOSPADM

## 2021-04-02 RX ORDER — BUTALBITAL, ACETAMINOPHEN AND CAFFEINE 50; 325; 40 MG/1; MG/1; MG/1
1 TABLET ORAL EVERY 6 HOURS PRN
Qty: 30 TABLET | Refills: 1 | Status: ON HOLD | OUTPATIENT
Start: 2021-04-02 | End: 2021-06-25

## 2021-04-02 RX ORDER — CEFTRIAXONE 2 G/50ML
2 INJECTION, SOLUTION INTRAVENOUS EVERY 24 HOURS
Status: CANCELLED | OUTPATIENT
Start: 2021-04-02

## 2021-04-02 RX ADMIN — CEFTRIAXONE 2 G: 2 INJECTION, SOLUTION INTRAVENOUS at 14:08

## 2021-04-02 NOTE — OUTREACH NOTE
ED Potential Covid Discharge Follow-up        Shena Carcamo, CONNIE  Ambulatory   COVID 19 Teaching: Not Detected    REVIEW OF SYMPTOMS: Deferred- Currently called in to PCP office, Dr. Chavez, for follow-up related to yesterday's ED visit.  Has Rocephin IV infusion today.   PREVENTIVE MEASURES:Has received both COVID 19 vaccinations.    Requests information related to ED visit be forwarded to Dr. Leahy and Dr. Chavez.  Denies further needs or concerns with ACM.    ROUTE CALL TO PCP and Dr. Leahy  ACM:  Shena Carcamo RN, BSN, [facility]  [phone]        4/2/2021, 10:32 EDT

## 2021-04-02 NOTE — ASSESSMENT & PLAN NOTE
CONTROLLED  - reviewed all hosp data including blood culture and labs from ER visit yesterda  - cultures all negative at this time  - cont on Rocephin daily to complete 2 weeks of therapy per ID recs  - cont following iwht Dr. Leahy until cleared

## 2021-04-02 NOTE — PROGRESS NOTES
Transitional Care Follow Up Visit    Chief Complaint   Patient presents with   • Hospital Follow Up Visit   • Urinary Tract Infection       Subjective     Anitra Orta is a 80 y.o. female who presents for a transitional care management visit.    DSC Date: 3/29/21  DSC Diagnosis: Sepsis without acute organ dysfunction    Within 48 business hours after discharge our office contacted her via telephone to coordinate her care and needs.      I reviewed and discussed the details of that call along with the discharge summary, hospital problems, inpatient lab results, inpatient diagnostic studies, and consultation reports with Anitra.     Current outpatient and discharge medications have been reconciled for the patient.  Reviewed by: Margot Chavez MD    No flowsheet data found.  Risk for Readmission (LACE) Score: 7 (3/29/2021  6:00 AM)    History of Present Illness   Course During Hospital Stay:  Pt admitted after  called EMS after finding her unresponsive. In ER she has CT chest which ruled out PE, then CT abd which showed obstructive UPJ stone. Urology placed stent to regain urinary flow and she was placed on Rocephin for coverage of UTI. Blood cultures showed E.Coli sepsis.   Her BP has geronimo very elevated since admission, Amlodipine was added to her regimen but she has not liked this medication.   She has had headache since admission, was being given Fiorcet in hospital which has been very effective for her, but was not discharged with this.      The following portions of the patient's history were reviewed and updated as appropriate: allergies, current medications, past family history, past medical history, past social history, past surgical history and problem list.    Review of Systems   Constitutional: Negative for appetite change, chills and fever.   HENT: Negative for hearing loss and sore throat.    Eyes: Negative for visual disturbance.   Respiratory: Negative for cough and shortness of  breath.    Cardiovascular: Negative for chest pain, palpitations and leg swelling.   Gastrointestinal: Negative for constipation, diarrhea and vomiting.   Genitourinary: Negative for difficulty urinating and frequency.   Musculoskeletal: Negative for arthralgias and myalgias.   Skin: Negative for rash.   Neurological: Positive for headaches. Negative for weakness.   Hematological: Negative for adenopathy.   Psychiatric/Behavioral: Negative for confusion and sleep disturbance.       Objective    Vitals:    04/02/21 1036   BP: 156/82   Pulse: 82   Resp: 16   Temp: 95.5 °F (35.3 °C)   SpO2: 98%       Physical Exam  Vitals and nursing note reviewed.   Constitutional:       General: She is not in acute distress.     Appearance: Normal appearance.   Cardiovascular:      Rate and Rhythm: Normal rate and regular rhythm.      Pulses: Normal pulses.      Heart sounds: Normal heart sounds. No murmur heard.     Pulmonary:      Effort: Pulmonary effort is normal. No respiratory distress.      Breath sounds: Normal breath sounds.   Abdominal:      General: Abdomen is flat. Bowel sounds are normal.      Palpations: Abdomen is soft.      Tenderness: There is no abdominal tenderness.   Musculoskeletal:      Right lower leg: No edema.      Left lower leg: No edema.   Neurological:      Mental Status: She is alert and oriented to person, place, and time. Mental status is at baseline.   Psychiatric:         Mood and Affect: Mood normal.         Behavior: Behavior normal.         Assessment/Plan   Diagnoses and all orders for this visit:    1. e.coli bacteremia (Primary)  Assessment & Plan:  CONTROLLED  - reviewed all hosp data including blood culture and labs from ER visit yesterda  - cultures all negative at this time  - cont on Rocephin daily to complete 2 weeks of therapy per ID recs  - cont following iw Dr. Leahy until cleared      2. Ureteral stone with hydronephrosis  Assessment & Plan:  STABLE  - has ureteral stone on L side  s/p stent placement with urology  - follow up with Dr. Clark planned next week to discuss next steps with stent removal and stone follow up        3. Essential hypertension  Assessment & Plan:  UNCONTROLLED but IMPROVING  - BP today slightly above goal at 156/82, but improved from hospitalization and ER visits  - was prescribed CCB at [facility] but was not able to tolerate and has not been taking  - last BP in office prior ot hospitalization was elevated slightly at 140/90, so she has been living slightly elevated, but also suspect her acute illness is contributing  - most anti-HTN meds function via kidneys and do not want to put undue stress on her kidneys at this time, so will agree to cont monitoring closely for now, pt has BP taken daily at infusion center, pt to call back in 1-2 weeks with udpate on values and if staying consistently >140/90 will plan to add BB to her regimen, if slowly trending down, will cont to monitor closely  - Fiorect as below for headache management which is likely multifactorial as side effect from meds, possible AE to COVID vaccination 1 week ago, and elevated BP, but this treatment was effective for her in the hosp    Orders:  -     butalbital-acetaminophen-caffeine (FIORICET, ESGIC) -40 MG per tablet; Take 1 tablet by mouth Every 6 (Six) Hours As Needed for Headache.  Dispense: 30 tablet; Refill: 1    Return for Next scheduled follow up.    Hawa Chavez MD  [facility]  Phone: [phone]  Fax: [phone]

## 2021-04-02 NOTE — ASSESSMENT & PLAN NOTE
UNCONTROLLED but IMPROVING  - BP today slightly above goal at 156/82, but improved from hospitalization and ER visits  - was prescribed CCB at [facility] but was not able to tolerate and has not been taking  - last BP in office prior ot hospitalization was elevated slightly at 140/90, so she has been living slightly elevated, but also suspect her acute illness is contributing  - most anti-HTN meds function via kidneys and do not want to put undue stress on her kidneys at this time, so will agree to cont monitoring closely for now, pt has BP taken daily at infusion center, pt to call back in 1-2 weeks with udpate on values and if staying consistently >140/90 will plan to add BB to her regimen, if slowly trending down, will cont to monitor closely  - Fiorect as below for headache management which is likely multifactorial as side effect from meds, possible AE to COVID vaccination 1 week ago, and elevated BP, but this treatment was effective for her in the hosp

## 2021-04-02 NOTE — ASSESSMENT & PLAN NOTE
STABLE  - has ureteral stone on L side s/p stent placement with urology  - follow up with Dr. Clark planned next week to discuss next steps with stent removal and stone follow up

## 2021-04-03 ENCOUNTER — HOSPITAL ENCOUNTER (OUTPATIENT)
Dept: INFUSION THERAPY | Facility: HOSPITAL | Age: 81
Discharge: HOME | End: 2021-04-03
Admitting: INTERNAL MEDICINE

## 2021-04-03 VITALS
TEMPERATURE: 96.4 F | DIASTOLIC BLOOD PRESSURE: 85 MMHG | HEART RATE: 103 BPM | RESPIRATION RATE: 18 BRPM | OXYGEN SATURATION: 97 % | SYSTOLIC BLOOD PRESSURE: 159 MMHG

## 2021-04-03 DIAGNOSIS — A41.50 SEPSIS DUE TO GRAM NEGATIVE BACTERIA (HCC): Primary | ICD-10-CM

## 2021-04-03 PROCEDURE — 25010000002 CEFTRIAXONE SODIUM-DEXTROSE 2-2.22 GM-%(50ML) RECONSTITUTED SOLUTION: Performed by: INTERNAL MEDICINE

## 2021-04-03 PROCEDURE — 96365 THER/PROPH/DIAG IV INF INIT: CPT

## 2021-04-03 RX ORDER — CEFTRIAXONE 2 G/50ML
2 INJECTION, SOLUTION INTRAVENOUS EVERY 24 HOURS
Status: DISCONTINUED | OUTPATIENT
Start: 2021-04-03 | End: 2021-04-06 | Stop reason: HOSPADM

## 2021-04-03 RX ORDER — CEFTRIAXONE 2 G/50ML
2 INJECTION, SOLUTION INTRAVENOUS EVERY 24 HOURS
Status: CANCELLED | OUTPATIENT
Start: 2021-04-03

## 2021-04-03 RX ADMIN — CEFTRIAXONE 2 G: 2 INJECTION, SOLUTION INTRAVENOUS at 14:17

## 2021-04-03 NOTE — NURSING NOTE
Pt brought to 1403 for outpatient infusion.  Given without incident via right AC midline.  Antibiotc completed. Discharged home.  To front lobby per wheelchair accompanied per RN

## 2021-04-04 ENCOUNTER — HOSPITAL ENCOUNTER (OUTPATIENT)
Dept: INFUSION THERAPY | Facility: HOSPITAL | Age: 81
Discharge: HOME | End: 2021-04-04
Admitting: INTERNAL MEDICINE

## 2021-04-04 VITALS
DIASTOLIC BLOOD PRESSURE: 93 MMHG | OXYGEN SATURATION: 99 % | SYSTOLIC BLOOD PRESSURE: 156 MMHG | HEART RATE: 99 BPM | RESPIRATION RATE: 18 BRPM | TEMPERATURE: 97.5 F

## 2021-04-04 DIAGNOSIS — A41.50 SEPSIS DUE TO GRAM NEGATIVE BACTERIA (HCC): Primary | ICD-10-CM

## 2021-04-04 PROCEDURE — 25010000002 CEFTRIAXONE SODIUM-DEXTROSE 2-2.22 GM-%(50ML) RECONSTITUTED SOLUTION: Performed by: INTERNAL MEDICINE

## 2021-04-04 PROCEDURE — 96365 THER/PROPH/DIAG IV INF INIT: CPT

## 2021-04-04 RX ORDER — CEFTRIAXONE 2 G/50ML
2 INJECTION, SOLUTION INTRAVENOUS EVERY 24 HOURS
Status: CANCELLED | OUTPATIENT
Start: 2021-04-04

## 2021-04-04 RX ORDER — CEFTRIAXONE 2 G/50ML
2 INJECTION, SOLUTION INTRAVENOUS EVERY 24 HOURS
Status: DISCONTINUED | OUTPATIENT
Start: 2021-04-04 | End: 2021-04-06 | Stop reason: HOSPADM

## 2021-04-04 RX ADMIN — CEFTRIAXONE 2 G: 2 INJECTION, SOLUTION INTRAVENOUS at 14:18

## 2021-04-04 NOTE — NURSING NOTE
Pt arrives to 1403 after passing screening at front lobby.  Arrives per wheelchair. Midline patent left AC. Antibiotic administered as ordered.

## 2021-04-05 ENCOUNTER — HOSPITAL ENCOUNTER (OUTPATIENT)
Dept: INFUSION THERAPY | Facility: HOSPITAL | Age: 81
Discharge: HOME | End: 2021-04-05
Admitting: INTERNAL MEDICINE

## 2021-04-05 VITALS
SYSTOLIC BLOOD PRESSURE: 146 MMHG | TEMPERATURE: 97 F | BODY MASS INDEX: 39.37 KG/M2 | WEIGHT: 245 LBS | OXYGEN SATURATION: 95 % | RESPIRATION RATE: 18 BRPM | HEIGHT: 66 IN | HEART RATE: 77 BPM | DIASTOLIC BLOOD PRESSURE: 71 MMHG

## 2021-04-05 DIAGNOSIS — A41.50 SEPSIS DUE TO GRAM NEGATIVE BACTERIA (HCC): Primary | ICD-10-CM

## 2021-04-05 PROCEDURE — 96365 THER/PROPH/DIAG IV INF INIT: CPT

## 2021-04-05 PROCEDURE — 25010000002 CEFTRIAXONE SODIUM-DEXTROSE 2-2.22 GM-%(50ML) RECONSTITUTED SOLUTION: Performed by: INTERNAL MEDICINE

## 2021-04-05 RX ORDER — CEFTRIAXONE 2 G/50ML
2 INJECTION, SOLUTION INTRAVENOUS EVERY 24 HOURS
Status: CANCELLED | OUTPATIENT
Start: 2021-04-05

## 2021-04-05 RX ORDER — CEFTRIAXONE 2 G/50ML
2 INJECTION, SOLUTION INTRAVENOUS EVERY 24 HOURS
Status: DISCONTINUED | OUTPATIENT
Start: 2021-04-05 | End: 2021-04-07 | Stop reason: HOSPADM

## 2021-04-05 RX ADMIN — CEFTRIAXONE 2 G: 2 INJECTION, SOLUTION INTRAVENOUS at 14:29

## 2021-04-05 NOTE — NURSING NOTE
NURSING PROGRESS NOTE      Pt to [facility] per  scheduled appointment.  Pt ID verified by (2) identifiers. Allergies, medications and medical history reviewed and verified. Tolerated prescribed treatment without incident. Patient  declined AVS, Pt verbalized understanding.  Discharged to home @ . Condition Satisfactory .  Vania Summers RN

## 2021-04-06 ENCOUNTER — TRANSCRIBE ORDERS (OUTPATIENT)
Dept: ADMINISTRATIVE | Facility: HOSPITAL | Age: 81
End: 2021-04-06

## 2021-04-06 ENCOUNTER — HOSPITAL ENCOUNTER (OUTPATIENT)
Dept: GENERAL RADIOLOGY | Facility: HOSPITAL | Age: 81
Discharge: HOME | End: 2021-04-06
Admitting: UROLOGY

## 2021-04-06 ENCOUNTER — HOSPITAL ENCOUNTER (OUTPATIENT)
Dept: INFUSION THERAPY | Facility: HOSPITAL | Age: 81
Discharge: HOME | End: 2021-04-06
Admitting: INTERNAL MEDICINE

## 2021-04-06 VITALS
DIASTOLIC BLOOD PRESSURE: 75 MMHG | OXYGEN SATURATION: 99 % | HEART RATE: 100 BPM | BODY MASS INDEX: 39.5 KG/M2 | WEIGHT: 244.71 LBS | SYSTOLIC BLOOD PRESSURE: 167 MMHG | RESPIRATION RATE: 16 BRPM | TEMPERATURE: 97 F

## 2021-04-06 DIAGNOSIS — A41.50 SEPSIS DUE TO GRAM NEGATIVE BACTERIA (HCC): Primary | ICD-10-CM

## 2021-04-06 DIAGNOSIS — N20.0 URIC ACID NEPHROLITHIASIS: Primary | ICD-10-CM

## 2021-04-06 DIAGNOSIS — N20.2 CALCULUS OF KIDNEY AND URETER: ICD-10-CM

## 2021-04-06 DIAGNOSIS — N20.1 CALCULUS OF URETER: ICD-10-CM

## 2021-04-06 DIAGNOSIS — N20.0 URIC ACID NEPHROLITHIASIS: ICD-10-CM

## 2021-04-06 LAB
BACTERIA SPEC AEROBE CULT: NORMAL
BACTERIA SPEC AEROBE CULT: NORMAL

## 2021-04-06 PROCEDURE — 96365 THER/PROPH/DIAG IV INF INIT: CPT

## 2021-04-06 PROCEDURE — 74018 RADEX ABDOMEN 1 VIEW: CPT

## 2021-04-06 PROCEDURE — 25010000002 CEFTRIAXONE SODIUM-DEXTROSE 2-2.22 GM-%(50ML) RECONSTITUTED SOLUTION: Performed by: INTERNAL MEDICINE

## 2021-04-06 RX ORDER — CEFTRIAXONE 2 G/50ML
2 INJECTION, SOLUTION INTRAVENOUS EVERY 24 HOURS
Status: CANCELLED | OUTPATIENT
Start: 2021-04-06

## 2021-04-06 RX ORDER — CEFTRIAXONE 2 G/50ML
2 INJECTION, SOLUTION INTRAVENOUS EVERY 24 HOURS
Status: DISCONTINUED | OUTPATIENT
Start: 2021-04-06 | End: 2021-04-08 | Stop reason: HOSPADM

## 2021-04-06 RX ADMIN — CEFTRIAXONE 2 G: 2 INJECTION, SOLUTION INTRAVENOUS at 14:08

## 2021-04-07 ENCOUNTER — HOSPITAL ENCOUNTER (OUTPATIENT)
Dept: INFUSION THERAPY | Facility: HOSPITAL | Age: 81
Setting detail: INFUSION SERIES
Discharge: HOME | End: 2021-04-07

## 2021-04-07 VITALS
RESPIRATION RATE: 14 BRPM | TEMPERATURE: 97.1 F | HEIGHT: 66 IN | SYSTOLIC BLOOD PRESSURE: 147 MMHG | WEIGHT: 245 LBS | OXYGEN SATURATION: 96 % | DIASTOLIC BLOOD PRESSURE: 80 MMHG | BODY MASS INDEX: 39.37 KG/M2 | HEART RATE: 89 BPM

## 2021-04-07 DIAGNOSIS — A41.50 SEPSIS DUE TO GRAM NEGATIVE BACTERIA (HCC): Primary | ICD-10-CM

## 2021-04-07 PROCEDURE — 25010000002 CEFTRIAXONE SODIUM-DEXTROSE 2-2.22 GM-%(50ML) RECONSTITUTED SOLUTION: Performed by: INTERNAL MEDICINE

## 2021-04-07 PROCEDURE — 96365 THER/PROPH/DIAG IV INF INIT: CPT

## 2021-04-07 RX ORDER — CEFTRIAXONE 2 G/50ML
2 INJECTION, SOLUTION INTRAVENOUS EVERY 24 HOURS
Status: CANCELLED | OUTPATIENT
Start: 2021-04-07

## 2021-04-07 RX ORDER — CEFTRIAXONE 2 G/50ML
2 INJECTION, SOLUTION INTRAVENOUS EVERY 24 HOURS
Status: DISCONTINUED | OUTPATIENT
Start: 2021-04-07 | End: 2021-04-09 | Stop reason: HOSPADM

## 2021-04-07 RX ADMIN — CEFTRIAXONE 2 G: 2 INJECTION, SOLUTION INTRAVENOUS at 14:07

## 2021-04-07 NOTE — PATIENT INSTRUCTIONS
Ceftriaxone Injection  What is this medicine?  CEFTRIAXONE (sef try AX one) is a cephalosporin antibiotic. It treats some infections caused by bacteria. It will not work for colds, the flu, or other viruses.  This medicine may be used for other purposes; ask your health care provider or pharmacist if you have questions.  COMMON BRAND NAME(S): Ceftrisol Plus, Rocephin  What should I tell my health care provider before I take this medicine?  They need to know if you have any of these conditions:  · any chronic illness  · bowel disease, like colitis  · both kidney and liver disease  · high bilirubin level in  patients  · an unusual or allergic reaction to ceftriaxone, other cephalosporin or penicillin antibiotics, foods, dyes, or preservatives  · pregnant or trying to get pregnant  · breast-feeding  How should I use this medicine?  This drug is injected into a muscle or a vein. It is usually given by a health care provider in a hospital or clinic setting.  If you get this drug at home, you will be taught how to prepare and give it. Use exactly as directed. Take it as directed on the prescription label at the same time every day. Keep taking it unless your health care provider tells you to stop.  It is important that you put your used needles and syringes in a special sharps container. Do not put them in a trash can. If you do not have a sharps container, call your pharmacist or health care provider to get one.  Talk to your health care provider about the use of this drug in children. While it may be prescribed for children as young as newborns for selected conditions, precautions do apply.  Overdosage: If you think you have taken too much of this medicine contact a poison control center or emergency room at once.  NOTE: This medicine is only for you. Do not share this medicine with others.  What if I miss a dose?  It is important not to miss your dose. Call your health care provider if you are unable to keep an  appointment. If you give yourself this drug at home and you miss a dose, take it as soon as you can. If it is almost time for your next dose, take only that dose. Do not take double or extra doses.  What may interact with this medicine?  Do not take this medicine with any of the following medications:  · intravenous calcium  This medicine may also interact with the following medications:  · birth control pills  This list may not describe all possible interactions. Give your health care provider a list of all the medicines, herbs, non-prescription drugs, or dietary supplements you use. Also tell them if you smoke, drink alcohol, or use illegal drugs. Some items may interact with your medicine.  What should I watch for while using this medicine?  Tell your doctor or health care provider if your symptoms do not improve or if they get worse.  This medicine may cause serious skin reactions. They can happen weeks to months after starting the medicine. Contact your health care provider right away if you notice fevers or flu-like symptoms with a rash. The rash may be red or purple and then turn into blisters or peeling of the skin. Or, you might notice a red rash with swelling of the face, lips or lymph nodes in your neck or under your arms.  Do not treat diarrhea with over the counter products. Contact your doctor if you have diarrhea that lasts more than 2 days or if it is severe and watery.  If you are being treated for a sexually transmitted disease, avoid sexual contact until you have finished your treatment. Having sex can infect your sexual partner.  Calcium may bind to this medicine and cause lung or kidney problems. Avoid calcium products while taking this medicine and for 48 hours after taking the last dose of this medicine.  What side effects may I notice from receiving this medicine?  Side effects that you should report to your doctor or health care professional as soon as possible:  · allergic reactions like  skin rash, itching or hives, swelling of the face, lips, or tongue  · breathing problems  · fever, chills  · irregular heartbeat  · pain when passing urine  · redness, blistering, peeling, or loosening of the skin, including inside the mouth  · seizures  · stomach pain, cramps  · unusual bleeding, bruising  · unusually weak or tired  Side effects that usually do not require medical attention (report to your doctor or health care professional if they continue or are bothersome):  · diarrhea  · dizzy, drowsy  · headache  · nausea, vomiting  · pain, swelling, irritation where injected  · stomach upset  · sweating  This list may not describe all possible side effects. Call your doctor for medical advice about side effects. You may report side effects to FDA at 7-123-NUR-8518.  Where should I keep my medicine?  Keep out of the reach of children and pets.  You will be instructed on how to store this drug. Protect from light. Throw away any unused drug after the expiration date.  NOTE: This sheet is a summary. It may not cover all possible information. If you have questions about this medicine, talk to your doctor, pharmacist, or health care provider.  © 2021 Elsevier/Gold Standard (2020-07-23 18:29:21)

## 2021-04-07 NOTE — NURSING NOTE
NURSING PROGRESS NOTE      Pt to [facility] per  scheduled appointment.  Pt ID verified by (2) identifiers. Allergies, medications and medical history reviewed and verified. Tolerated prescribed treatment without incident. Patient received AVS, Pt verbalized understanding. Call placed to md for removal of midline.  Discharged to home.. Condition Satisfactory .  Vania Summers RN

## 2021-04-08 ENCOUNTER — HOSPITAL ENCOUNTER (OUTPATIENT)
Dept: INFUSION THERAPY | Facility: HOSPITAL | Age: 81
Discharge: HOME | End: 2021-04-08
Admitting: INTERNAL MEDICINE

## 2021-04-08 ENCOUNTER — TRANSCRIBE ORDERS (OUTPATIENT)
Dept: ADMINISTRATIVE | Facility: HOSPITAL | Age: 81
End: 2021-04-08

## 2021-04-08 VITALS
RESPIRATION RATE: 16 BRPM | SYSTOLIC BLOOD PRESSURE: 153 MMHG | DIASTOLIC BLOOD PRESSURE: 78 MMHG | HEART RATE: 81 BPM | OXYGEN SATURATION: 97 % | TEMPERATURE: 97.2 F

## 2021-04-08 DIAGNOSIS — N20.0 KIDNEY STONES: Primary | ICD-10-CM

## 2021-04-08 DIAGNOSIS — N39.0 URINARY TRACT INFECTION DUE TO ESBL KLEBSIELLA: ICD-10-CM

## 2021-04-08 DIAGNOSIS — A41.50 SEPSIS DUE TO GRAM NEGATIVE BACTERIA (HCC): Primary | ICD-10-CM

## 2021-04-08 DIAGNOSIS — B96.89 URINARY TRACT INFECTION DUE TO ESBL KLEBSIELLA: ICD-10-CM

## 2021-04-08 LAB
ALBUMIN SERPL-MCNC: 3.6 G/DL (ref 3.5–5.2)
ALBUMIN/GLOB SERPL: 1.2 G/DL
ALP SERPL-CCNC: 112 U/L (ref 39–117)
ALT SERPL W P-5'-P-CCNC: 20 U/L (ref 1–33)
ANION GAP SERPL CALCULATED.3IONS-SCNC: 11.4 MMOL/L (ref 5–15)
AST SERPL-CCNC: 16 U/L (ref 1–32)
BASOPHILS # BLD AUTO: 0.03 10*3/MM3 (ref 0–0.2)
BASOPHILS NFR BLD AUTO: 1.3 % (ref 0–1.5)
BILIRUB SERPL-MCNC: 0.2 MG/DL (ref 0–1.2)
BUN SERPL-MCNC: 13 MG/DL (ref 8–23)
BUN/CREAT SERPL: 15.1 (ref 7–25)
CALCIUM SPEC-SCNC: 8.9 MG/DL (ref 8.6–10.5)
CHLORIDE SERPL-SCNC: 108 MMOL/L (ref 98–107)
CO2 SERPL-SCNC: 22.6 MMOL/L (ref 22–29)
CREAT SERPL-MCNC: 0.86 MG/DL (ref 0.57–1)
DEPRECATED RDW RBC AUTO: 43.7 FL (ref 37–54)
EOSINOPHIL # BLD AUTO: 0.12 10*3/MM3 (ref 0–0.4)
EOSINOPHIL NFR BLD AUTO: 5.3 % (ref 0.3–6.2)
ERYTHROCYTE [DISTWIDTH] IN BLOOD BY AUTOMATED COUNT: 13.2 % (ref 12.3–15.4)
GFR SERPL CREATININE-BSD FRML MDRD: 64 ML/MIN/1.73
GLOBULIN UR ELPH-MCNC: 3.1 GM/DL
GLUCOSE SERPL-MCNC: 154 MG/DL (ref 65–99)
HCT VFR BLD AUTO: 37 % (ref 34–46.6)
HGB BLD-MCNC: 11.7 G/DL (ref 12–15.9)
IMM GRANULOCYTES # BLD AUTO: 0 10*3/MM3 (ref 0–0.05)
IMM GRANULOCYTES NFR BLD AUTO: 0 % (ref 0–0.5)
LYMPHOCYTES # BLD AUTO: 1.02 10*3/MM3 (ref 0.7–3.1)
LYMPHOCYTES NFR BLD AUTO: 44.7 % (ref 19.6–45.3)
MCH RBC QN AUTO: 28.7 PG (ref 26.6–33)
MCHC RBC AUTO-ENTMCNC: 31.6 G/DL (ref 31.5–35.7)
MCV RBC AUTO: 90.9 FL (ref 79–97)
MONOCYTES # BLD AUTO: 0.54 10*3/MM3 (ref 0.1–0.9)
MONOCYTES NFR BLD AUTO: 23.7 % (ref 5–12)
NEUTROPHILS NFR BLD AUTO: 0.57 10*3/MM3 (ref 1.7–7)
NEUTROPHILS NFR BLD AUTO: 25 % (ref 42.7–76)
NRBC BLD AUTO-RTO: 0 /100 WBC (ref 0–0.2)
PLAT MORPH BLD: NORMAL
PLATELET # BLD AUTO: 305 10*3/MM3 (ref 140–450)
PMV BLD AUTO: 8.6 FL (ref 6–12)
POTASSIUM SERPL-SCNC: 3.8 MMOL/L (ref 3.5–5.2)
PROT SERPL-MCNC: 6.7 G/DL (ref 6–8.5)
RBC # BLD AUTO: 4.07 10*6/MM3 (ref 3.77–5.28)
RBC MORPH BLD: NORMAL
SODIUM SERPL-SCNC: 142 MMOL/L (ref 136–145)
WBC # BLD AUTO: 2.28 10*3/MM3 (ref 3.4–10.8)
WBC MORPH BLD: NORMAL

## 2021-04-08 PROCEDURE — 25010000002 CEFTRIAXONE SODIUM-DEXTROSE 2-2.22 GM-%(50ML) RECONSTITUTED SOLUTION: Performed by: INTERNAL MEDICINE

## 2021-04-08 PROCEDURE — 85025 COMPLETE CBC W/AUTO DIFF WBC: CPT | Performed by: INTERNAL MEDICINE

## 2021-04-08 PROCEDURE — 96365 THER/PROPH/DIAG IV INF INIT: CPT

## 2021-04-08 PROCEDURE — 80053 COMPREHEN METABOLIC PANEL: CPT | Performed by: INTERNAL MEDICINE

## 2021-04-08 PROCEDURE — 85007 BL SMEAR W/DIFF WBC COUNT: CPT | Performed by: INTERNAL MEDICINE

## 2021-04-08 RX ORDER — CEFTRIAXONE 2 G/50ML
2 INJECTION, SOLUTION INTRAVENOUS EVERY 24 HOURS
Status: DISCONTINUED | OUTPATIENT
Start: 2021-04-08 | End: 2021-04-10 | Stop reason: HOSPADM

## 2021-04-08 RX ORDER — CEFTRIAXONE 2 G/50ML
2 INJECTION, SOLUTION INTRAVENOUS EVERY 24 HOURS
Status: CANCELLED | OUTPATIENT
Start: 2021-04-08

## 2021-04-08 RX ADMIN — CEFTRIAXONE 2 G: 2 INJECTION, SOLUTION INTRAVENOUS at 14:10

## 2021-04-09 ENCOUNTER — HOSPITAL ENCOUNTER (OUTPATIENT)
Dept: INFUSION THERAPY | Facility: HOSPITAL | Age: 81
Setting detail: INFUSION SERIES
Discharge: HOME | End: 2021-04-09

## 2021-04-09 VITALS
TEMPERATURE: 97.6 F | OXYGEN SATURATION: 97 % | RESPIRATION RATE: 16 BRPM | DIASTOLIC BLOOD PRESSURE: 85 MMHG | SYSTOLIC BLOOD PRESSURE: 147 MMHG | HEART RATE: 74 BPM

## 2021-04-09 DIAGNOSIS — A41.50 SEPSIS DUE TO GRAM NEGATIVE BACTERIA (HCC): Primary | ICD-10-CM

## 2021-04-09 PROCEDURE — 96365 THER/PROPH/DIAG IV INF INIT: CPT

## 2021-04-09 PROCEDURE — 25010000002 CEFTRIAXONE SODIUM-DEXTROSE 2-2.22 GM-%(50ML) RECONSTITUTED SOLUTION: Performed by: INTERNAL MEDICINE

## 2021-04-09 RX ORDER — CEFTRIAXONE 2 G/50ML
2 INJECTION, SOLUTION INTRAVENOUS EVERY 24 HOURS
Status: CANCELLED | OUTPATIENT
Start: 2021-04-09

## 2021-04-09 RX ORDER — CEFTRIAXONE 2 G/50ML
2 INJECTION, SOLUTION INTRAVENOUS EVERY 24 HOURS
Status: DISCONTINUED | OUTPATIENT
Start: 2021-04-09 | End: 2021-04-11 | Stop reason: HOSPADM

## 2021-04-09 RX ADMIN — CEFTRIAXONE 2 G: 2 INJECTION, SOLUTION INTRAVENOUS at 14:14

## 2021-04-09 NOTE — NURSING NOTE
1400  Pt here per wheelchair to [facility] for antibiotic therapy.  Today is last dose.  1450  Midline removed Right Basilic; line and tip intact.  Pressure held with sterile 2x2 s  Then sterile 2x2 placed with transparent dressing and wrapped with coban.  Pt instructed to remove dressing in 24 hours but can remove Coban in 30 mins.  1500  Pt discharged per wheelchair.  Celia med without any adverse effects and celia removal of midline without probs.

## 2021-04-09 NOTE — PATIENT INSTRUCTIONS
Call MD if probs Dr Justo Leahy [phone]          PICC Removal, Adult    A peripherally inserted central catheter (PICC) is a form of IV access that allows medicines and IV fluids to be quickly distributed throughout the body. The PICC is a thin, flexible tube (catheter) that is inserted into a vein in your arm or leg. The PICC is guided through your vein until the tip sits in a large vein just outside your heart (superior vena cava or SVC). A PICC may be removed if it is no longer needed, if it causes infection, or if it is not working properly.  This is usually a painless procedure. Only a trained health care provider should do this procedure. Do not try to remove a PICC line yourself.  Tell a health care provider about:  · Any allergies you have.  · All medicines you are taking, including vitamins, herbs, eye drops, creams, and over-the-counter medicines.  · Any problems you or family members have had with anesthetic medicines.  · Any blood disorders you have.  · Any surgeries you have had.  · Any medical conditions you have.  · Whether you are pregnant or may be pregnant.  What are the risks?  Generally, this is a safe procedure. However, problems may occur, including:  · Bleeding.  · Infection.  · Vein blockage due to an air bubble (air embolism).  What happens before the procedure?  · A conversation with your health care team. You need an order from your health care provider to have the PICC removed.  · Follow instructions from your health care provider about eating or drinking restrictions.  · Ask your health care provider about:  ? Changing or stopping your regular medicines. This is especially important if you are taking diabetes medicines or blood thinners.  ? Taking over-the-counter medicines, vitamins, herbs, and supplements.  ? Taking medicines such as aspirin and ibuprofen. These medicines can thin your blood. Do not take these medicines unless your health care provider tells you to take  them.  · Plan to have a responsible adult care for you for at least 24 hours after you leave the hospital or clinic. This is important.  What happens during the procedure?  · To reduce your risk of infection:  ? Your health care team will wash or sanitize their hands.  ? Your skin will be washed with soap.  ? Hair may be removed from the surgical area.  · You will lie down flat. Your head may be positioned slightly lower than your heart. You may be instructed to keep as still as possible during the procedure.  · The bandage (dressing) over the PICC will be removed.  · The place where the PICC tube exits the body (exit site) will be cleaned with a germ-killing (antiseptic) solution.  · A germ-free (sterile) gauze pad will be held gently over the exit site.  · The health care provider will pull out the PICC tube slowly and steadily. You may be asked to hold your breath or exhale while this is done.  · After the PICC tube is removed, the health care provider will gently press on the exit site for about five minutes.  · Antibiotic or petroleum-based ointment will be applied to the exit site.  · The exit site will be covered with an airtight (occlusive) sterile dressing, or another type of dressing.  · The PICC will be inspected to make sure that the entire tube has been removed. Part of the PICC may be tested for bacteria.  The procedure may vary among health care providers and hospitals.  What happens after the procedure?  · You may need to stay lying down.  · You will be monitored to make sure that:  ? There is no fluid draining from the exit site.  ? There are no signs of an air embolism.  Summary  · A PICC may be removed if it is no longer needed, if it causes infection, or if it is not working properly.  · Only a trained health care provider should do this procedure. Do not try to remove a PICC line yourself.  · Generally, this is a safe procedure. However, problems may occur, including bleeding, infection, or air  embolism.  · Plan to have someone with you for 24 hours after the procedure.  · After the procedure, you will be monitored to make sure that there is no fluid draining from the site and that there are no signs of an air embolism.  This information is not intended to replace advice given to you by your health care provider. Make sure you discuss any questions you have with your health care provider.  Document Revised: 2018 Document Reviewed: 2018  [employer] Patient Education 2021 Elsevier Inc.  Call Ceftriaxone Injection  What is this medicine?  CEFTRIAXONE (sef try AX one) is a cephalosporin antibiotic. It treats some infections caused by bacteria. It will not work for colds, the flu, or other viruses.  This medicine may be used for other purposes; ask your health care provider or pharmacist if you have questions.  COMMON BRAND NAME(S): Ceftrisol Plus, Rocephin  What should I tell my health care provider before I take this medicine?  They need to know if you have any of these conditions:  · any chronic illness  · bowel disease, like colitis  · both kidney and liver disease  · high bilirubin level in  patients  · an unusual or allergic reaction to ceftriaxone, other cephalosporin or penicillin antibiotics, foods, dyes, or preservatives  · pregnant or trying to get pregnant  · breast-feeding  How should I use this medicine?  This drug is injected into a muscle or a vein. It is usually given by a health care provider in a hospital or clinic setting.  If you get this drug at home, you will be taught how to prepare and give it. Use exactly as directed. Take it as directed on the prescription label at the same time every day. Keep taking it unless your health care provider tells you to stop.  It is important that you put your used needles and syringes in a special sharps container. Do not put them in a trash can. If you do not have a sharps container, call your pharmacist or health care provider to get  one.  Talk to your health care provider about the use of this drug in children. While it may be prescribed for children as young as newborns for selected conditions, precautions do apply.  Overdosage: If you think you have taken too much of this medicine contact a poison control center or emergency room at once.  NOTE: This medicine is only for you. Do not share this medicine with others.  What if I miss a dose?  It is important not to miss your dose. Call your health care provider if you are unable to keep an appointment. If you give yourself this drug at home and you miss a dose, take it as soon as you can. If it is almost time for your next dose, take only that dose. Do not take double or extra doses.  What may interact with this medicine?  Do not take this medicine with any of the following medications:  · intravenous calcium  This medicine may also interact with the following medications:  · birth control pills  This list may not describe all possible interactions. Give your health care provider a list of all the medicines, herbs, non-prescription drugs, or dietary supplements you use. Also tell them if you smoke, drink alcohol, or use illegal drugs. Some items may interact with your medicine.  What should I watch for while using this medicine?  Tell your doctor or health care provider if your symptoms do not improve or if they get worse.  This medicine may cause serious skin reactions. They can happen weeks to months after starting the medicine. Contact your health care provider right away if you notice fevers or flu-like symptoms with a rash. The rash may be red or purple and then turn into blisters or peeling of the skin. Or, you might notice a red rash with swelling of the face, lips or lymph nodes in your neck or under your arms.  Do not treat diarrhea with over the counter products. Contact your doctor if you have diarrhea that lasts more than 2 days or if it is severe and watery.  If you are being  "treated for a sexually transmitted disease, avoid sexual contact until you have finished your treatment. Having sex can infect your sexual partner.  Calcium may bind to this medicine and cause lung or kidney problems. Avoid calcium products while taking this medicine and for 48 hours after taking the last dose of this medicine.  What side effects may I notice from receiving this medicine?  Side effects that you should report to your doctor or health care professional as soon as possible:  · allergic reactions like skin rash, itching or hives, swelling of the face, lips, or tongue  · breathing problems  · fever, chills  · irregular heartbeat  · pain when passing urine  · redness, blistering, peeling, or loosening of the skin, including inside the mouth  · seizures  · stomach pain, cramps  · unusual bleeding, bruising  · unusually weak or tired  Side effects that usually do not require medical attention (report to your doctor or health care professional if they continue or are bothersome):  · diarrhea  · dizzy, drowsy  · headache  · nausea, vomiting  · pain, swelling, irritation where injected  · stomach upset  · sweating  This list may not describe all possible side effects. Call your doctor for medical advice about side effects. You may report side effects to FDA at 6-656-FDA-5246.  Where should I keep my medicine?  Keep out of the reach of children and pets.  You will be instructed on how to store this drug. Protect from light. Throw away any unused drug after the expiration date.  NOTE: This sheet is a summary. It may not cover all possible information. If you have questions about this medicine, talk to your doctor, pharmacist, or health care provider.  © 2021 Elsevier/Gold Standard (2020-07-23 18:29:21)   if you have any problems or concerns.    We know you have a Choice in healthcare and appreciate you using [facility].  Our purpose is to provide you \"Excellent Care\".  We hope that you will always " choose us in the future and continue to recommend us to your family and friends.

## 2021-04-13 NOTE — PROGRESS NOTES
"Enter Query Response Below      Query Response:       “Metabolic encephalopathy” was not supported       If applicable, please update the problem list.        Patient: Anitra Orta        : 1940  Account: [id number]           Admit Date: 3/24/2021        Options to Respond to Query:    1. Access the Encounter     a. From the To-Do Side bar, click Respond With Note.     b. Click New Note     c. Answer query within the yellow box.                d. Update the Problem List if applicable.         Patient admitted with sepsis, UTI and pyelonephritis. ED physical exam reflects Alert and oriented x3, GCS 15.  H&P lists encephalopathy, noting that \"Patient is alert and oriented x3, but forgetful and unsure of some events leading up to her hospitalization. No focal neuro events.\"  Progress note on  per attending physician notes AOx3. [place] Coma Scale score of 15 throughout hospitalization. Discharge Summary includes diagnosis of metabolic encephalopathy. Patient was treated with cystoscopy with stent and IV antibiotics, to be continued on discharge.     After study, was “metabolic encephalopathy” clinically supported during this admission?    “Metabolic encephalopathy” was supported with additional clinical indicators:____________    “Metabolic encephalopathy” was not supported    Other- specify_________    Unable to determine    By submitting this query, we are merely seeking further clarification of documentation to accurately reflect all conditions that you are monitoring, evaluating, treating or that extend the hospitalization or utilize additional resources of care. Please utilize your independent clinical judgment when addressing the question(s) above.     This query and your response, once completed, will be entered into the legal medical record.    Sincerely,  Maryse GUAJARDO RN CDI CCDS  Clinical Documentation Integrity Program   [email or web address]  "

## 2021-04-15 ENCOUNTER — TELEPHONE (OUTPATIENT)
Dept: INTERNAL MEDICINE | Facility: CLINIC | Age: 81
End: 2021-04-15

## 2021-04-15 NOTE — TELEPHONE ENCOUNTER
Most recent labs in the system show a normal to slightly elevated blood Glc, so nothing that I am worried abotu, probably just related to her being sick and not having eaten normally around that time  How is her BP doing?

## 2021-04-15 NOTE — TELEPHONE ENCOUNTER
PATIENT STATES SHE WAS RECENTLY IN THE HOSPITAL. PATIENT STATES WHEN THEY ADAM HER BLOOD, THEY TOLD HER SHE HAD LOW BLOOD SUGAR. PATIENT HAS AN APPT WITH DR FABIAN ON 8/20/21.     DOES PATIENT NEED TO SCHEDULE APPT BEFORE THEN TO CHECK BLOOD SUGAR?    PLEASE ADVISE: [phone]

## 2021-05-06 ENCOUNTER — HOSPITAL ENCOUNTER (OUTPATIENT)
Dept: GENERAL RADIOLOGY | Facility: HOSPITAL | Age: 81
Discharge: HOME | End: 2021-05-06

## 2021-05-06 ENCOUNTER — HOSPITAL ENCOUNTER (OUTPATIENT)
Dept: ULTRASOUND IMAGING | Facility: HOSPITAL | Age: 81
Discharge: HOME | End: 2021-05-06

## 2021-05-06 DIAGNOSIS — N20.0 KIDNEY STONES: ICD-10-CM

## 2021-05-06 PROCEDURE — 74018 RADEX ABDOMEN 1 VIEW: CPT

## 2021-05-06 PROCEDURE — 76775 US EXAM ABDO BACK WALL LIM: CPT

## 2021-05-20 ENCOUNTER — TELEPHONE (OUTPATIENT)
Dept: INTERNAL MEDICINE | Facility: CLINIC | Age: 81
End: 2021-05-20

## 2021-05-20 RX ORDER — HYDROCHLOROTHIAZIDE 12.5 MG/1
12.5 TABLET ORAL DAILY
Qty: 30 TABLET | Refills: 0 | Status: SHIPPED | OUTPATIENT
Start: 2021-05-20 | End: 2021-06-01 | Stop reason: SINTOL

## 2021-05-20 RX ORDER — HYDROCHLOROTHIAZIDE 12.5 MG/1
12.5 TABLET ORAL DAILY
Qty: 90 TABLET | OUTPATIENT
Start: 2021-05-20

## 2021-05-20 NOTE — TELEPHONE ENCOUNTER
So that is definitely too high, will send in a different BP medication for her to take to add to her lisinopril, should hopefully be better tolerated than the one they gave her in the hospital and help bring her numbers down, should make an appt to see me in 2-3 weeks to follow up or call back sooner if numbers still staying that elevated, but could take 2-3 days for meds to be fully effective

## 2021-05-20 NOTE — TELEPHONE ENCOUNTER
Pt called stating that she just got out of bed and her BP is 180/98; asked if she needs to do anything about this? States has been consistently high but hasn't crossed the 200 carmita

## 2021-05-24 ENCOUNTER — TELEPHONE (OUTPATIENT)
Dept: INTERNAL MEDICINE | Facility: CLINIC | Age: 81
End: 2021-05-24

## 2021-05-24 NOTE — TELEPHONE ENCOUNTER
Pt informed; 140-160 has been her range; pt states will try at night for the next week and then update us through [employer]

## 2021-05-24 NOTE — TELEPHONE ENCOUNTER
Is it intolerable? What have her BP values been since adding this medication, better? If it is working, could she try to take it at night before bed to see if maybe that way she didn't notice the upset stomach?

## 2021-06-01 ENCOUNTER — PATIENT MESSAGE (OUTPATIENT)
Dept: INTERNAL MEDICINE | Facility: CLINIC | Age: 81
End: 2021-06-01

## 2021-06-01 DIAGNOSIS — I10 ESSENTIAL HYPERTENSION: Primary | ICD-10-CM

## 2021-06-01 RX ORDER — NIFEDIPINE 30 MG/1
30 TABLET, EXTENDED RELEASE ORAL DAILY
Qty: 30 TABLET | Refills: 1 | Status: SHIPPED | OUTPATIENT
Start: 2021-06-01 | End: 2021-07-27

## 2021-06-01 NOTE — TELEPHONE ENCOUNTER
Ok, can we tell her that she can stop that medication and I have sent in a new one, nifedipine, that she should take once per day. Lets see if this one works better and holds her BP down

## 2021-06-01 NOTE — TELEPHONE ENCOUNTER
From: Anitra Orta  To: Margot Chavez MD  Sent: 6/1/2021 1:01 PM EDT  Subject: Non-Urgent Medical Question    Miguel Ángel Hyatt   Please tell Dr. Chavez that I have been taking the Hydro medication as she has directed.  I am still getting stomachaches. My Blood Pressure is down.  According to the side effects stated on the information that u receive with the RX stomachaches are listed as one side affect. I also read that eye problems can also occur. 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 I am having a cataract taken care of in my left eye this month.   I do not want to jeopardize the sight in my left eye.  Please ask Dr. Chavez if she can prescribe another RX for me.  Thanks Susana Orta

## 2021-06-08 ENCOUNTER — APPOINTMENT (OUTPATIENT)
Dept: CT IMAGING | Facility: HOSPITAL | Age: 81
End: 2021-06-08

## 2021-06-08 ENCOUNTER — HOSPITAL ENCOUNTER (EMERGENCY)
Facility: HOSPITAL | Age: 81
Discharge: HOME | End: 2021-06-08
Attending: EMERGENCY MEDICINE | Admitting: EMERGENCY MEDICINE

## 2021-06-08 ENCOUNTER — APPOINTMENT (OUTPATIENT)
Dept: GENERAL RADIOLOGY | Facility: HOSPITAL | Age: 81
End: 2021-06-08

## 2021-06-08 VITALS
DIASTOLIC BLOOD PRESSURE: 91 MMHG | BODY MASS INDEX: 39.37 KG/M2 | TEMPERATURE: 98 F | HEIGHT: 66 IN | OXYGEN SATURATION: 96 % | RESPIRATION RATE: 16 BRPM | HEART RATE: 100 BPM | WEIGHT: 245 LBS | SYSTOLIC BLOOD PRESSURE: 153 MMHG

## 2021-06-08 DIAGNOSIS — S42.212A CLOSED DISPLACED FRACTURE OF SURGICAL NECK OF LEFT HUMERUS, UNSPECIFIED FRACTURE MORPHOLOGY, INITIAL ENCOUNTER: Primary | ICD-10-CM

## 2021-06-08 DIAGNOSIS — S52.501A CLOSED FRACTURE OF DISTAL END OF RIGHT RADIUS, UNSPECIFIED FRACTURE MORPHOLOGY, INITIAL ENCOUNTER: ICD-10-CM

## 2021-06-08 PROCEDURE — 70450 CT HEAD/BRAIN W/O DYE: CPT

## 2021-06-08 PROCEDURE — 73110 X-RAY EXAM OF WRIST: CPT

## 2021-06-08 PROCEDURE — 29125 APPL SHORT ARM SPLINT STATIC: CPT | Performed by: PHYSICIAN ASSISTANT

## 2021-06-08 PROCEDURE — 73030 X-RAY EXAM OF SHOULDER: CPT

## 2021-06-08 PROCEDURE — 99284 EMERGENCY DEPT VISIT MOD MDM: CPT | Performed by: PHYSICIAN ASSISTANT

## 2021-06-08 PROCEDURE — 73060 X-RAY EXAM OF HUMERUS: CPT

## 2021-06-08 PROCEDURE — 99284 EMERGENCY DEPT VISIT MOD MDM: CPT

## 2021-06-08 RX ORDER — HYDROCODONE BITARTRATE AND ACETAMINOPHEN 5; 325 MG/1; MG/1
1 TABLET ORAL ONCE
Status: COMPLETED | OUTPATIENT
Start: 2021-06-08 | End: 2021-06-08

## 2021-06-08 RX ORDER — OXYCODONE HYDROCHLORIDE AND ACETAMINOPHEN 5; 325 MG/1; MG/1
1 TABLET ORAL EVERY 6 HOURS PRN
Qty: 20 TABLET | Refills: 0 | Status: SHIPPED | OUTPATIENT
Start: 2021-06-08 | End: 2021-06-25

## 2021-06-08 RX ADMIN — HYDROCODONE BITARTRATE AND ACETAMINOPHEN 1 TABLET: 5; 325 TABLET ORAL at 15:08

## 2021-06-08 NOTE — ED PROVIDER NOTES
EMERGENCY DEPARTMENT ENCOUNTER      Room Number: 09/09    History is provided by the patient, no translation services needed    HPI:    Chief complaint: Fall    Location: At home    Quality/Severity: Severe    Timing/Duration: Just prior to    Modifying Factors: Worse with movement    Associated Symptoms: Left arm pain and right wrist pain    Narrative: Pt is a 80 y.o. female who presents complaining of leave a fall just prior to arrival.  Patient is that she fell and hit her head.  Patient also complained that she has pain with movement of her left arm and her right wrist.  Patient denies any loss of consciousness.  Patient that she is currently not taking any blood thinners.      PMD: Margot Chavez MD    REVIEW OF SYSTEMS  Review of Systems   Constitutional: Negative for chills and fever.   Eyes: Negative for pain and visual disturbance.   Respiratory: Negative for cough and shortness of breath.    Cardiovascular: Negative for chest pain and leg swelling.   Gastrointestinal: Negative for abdominal pain, constipation, diarrhea, nausea and vomiting.   Genitourinary: Negative for dysuria and flank pain.   Musculoskeletal: Positive for arthralgias, joint swelling and myalgias. Negative for back pain, gait problem, neck pain and neck stiffness.   Skin: Negative for color change, pallor, rash and wound.   Neurological: Negative for dizziness, syncope and headaches.   Psychiatric/Behavioral: Negative for suicidal ideas. The patient is not nervous/anxious.          PAST MEDICAL HISTORY  Active Ambulatory Problems     Diagnosis Date Noted   • Urinary tract infection due to ESBL Klebsiella 09/10/2016   • Simple renal cyst 09/16/2016   • Former smoker 09/16/2016   • Gastroesophageal reflux disease 09/16/2016   • Essential hypertension 09/16/2016   • Mixed hyperlipidemia 09/16/2016   • Hypertriglyceridemia 09/16/2016   • Osteoporosis 09/16/2016   • Panic disorder 09/16/2016   • Psoriasis 09/16/2016   • Urge  incontinence of urine 09/16/2016   • Vitamin D deficiency 09/16/2016   • Post-menopause 10/25/2016   • Urinary tract infection 04/24/2017   • Insulin resistance 09/08/2017   • Chronic bilateral low back pain without sciatica 03/12/2018   • Spinal stenosis of lumbar region with neurogenic claudication 03/12/2018   • Burning sensation of foot 05/22/2018   • Other chronic pain 07/11/2018   • Tachycardia 07/31/2019   • Ventricular tachycardia (CMS/[facility]) 09/09/2019   • Mixed incontinence    • GERD (gastroesophageal reflux disease)    • Anxiety    • Hyperglycemia 02/19/2021   • Sepsis without acute organ dysfunction (CMS/Former[facility]) 03/25/2021   • Acute cystitis 03/25/2021   • Metabolic encephalopathy 03/25/2021   • Hypokalemia 03/25/2021   • Positive D dimer 03/25/2021   • e.coli bacteremia 03/25/2021   • Ureteral stone with hydronephrosis 03/25/2021     Resolved Ambulatory Problems     Diagnosis Date Noted   • Colon polyp 09/16/2016   • Prediabetes 09/16/2016   • HTN (hypertension)    • DDD (degenerative disc disease), lumbar      Past Medical History:   Diagnosis Date   • Abdominal pain 12/2018   • Chronic pain disorder    • Constipation    • Frequency of micturition    • H/O bone density study 01/2019   • H/O CT scan    • H/O CT scan    • H/O mammogram 09/2019   • History of Holter monitoring    • History of MRI    • History of nuclear stress test    • Hyperlipidemia    • Kidney stones    • Low back pain    • Osteoarthritis    • Osteopenia    • Papanicolaou smear 02/2019   • Personal history of urinary calculi    • PONV (postoperative nausea and vomiting)    • Tachycardia, unspecified        PAST SURGICAL HISTORY  Past Surgical History:   Procedure Laterality Date   • BREAST BIOPSY Left     benign   • BREAST LUMPECTOMY Left     benign   • BUNIONECTOMY Right    • COLONOSCOPY N/A 11/30/2016    Procedure: COLONOSCOPY polypectomy;  Surgeon: Adali Willard MD;  Location: [facility];  Service:    • CYSTOSCOPY BOTOX INJECTION OF  BLADDER N/A 2017    Procedure: CYSTOSCOPY COAPTITE INJECTION;  Surgeon: Kevon Clark MD;  Location: [facility] MAIN OR;  Service:    • CYSTOSCOPY W/ LITHOLAPAXY / EHL     • CYSTOSCOPY W/ URETERAL STENT PLACEMENT Left 2016    Procedure: CYSTOSCOPY URETERAL STENT INSERTION;  Surgeon: Kevon Clark MD;  Location: [facility] OR;  Service:    • CYSTOSCOPY W/ URETERAL STENT PLACEMENT Left 2019    Procedure: CYSTOSCOPY URETERAL CATHETER/STENT INSERTION;  Surgeon: Kevon Clark MD;  Location:  [facility] OR;  Service: Urology   • CYSTOSCOPY W/ URETERAL STENT PLACEMENT Left 3/25/2021    Procedure: CYSTOSCOPY URETERAL CATHETER/STENT INSERTION;  Surgeon: Kevon Clark MD;  Location: [facility] OR;  Service: Urology;  Laterality: Left;   • KNEE ARTHROPLASTY Right    • TONSILLECTOMY AND ADENOIDECTOMY     • URETEROSCOPY LASER LITHOTRIPSY WITH STENT INSERTION Left 10/5/2016    Procedure: LT URETEROSCOPY LASER LITHOTRIPSY STONE BASKET EXTRACTION AND STENT ;  Surgeon: Kevon Clark MD;  Location: [facility] MAIN OR;  Service:        FAMILY HISTORY  Family History   Problem Relation Age of Onset   • Heart defect Mother    • Heart attack Mother 70   • Sudden death Mother    • Heart defect Father    • Heart attack Father 49   • Hypertension Father    • Sudden death Father    • Valvular heart disease Brother    • Malig Hyperthermia Neg Hx    • Breast cancer Neg Hx        SOCIAL HISTORY  Social History     Socioeconomic History   • Marital status:      Spouse name: Not on file   • Number of children: Not on file   • Years of education: Not on file   • Highest education level: Not on file   Tobacco Use   • Smoking status: Former Smoker     Years: 40.00     Types: Cigarettes     Quit date: 10/4/1980     Years since quittin.7   • Smokeless tobacco: Never Used   • Tobacco comment: quit    Vaping Use   • Vaping Use: Never used   Substance and Sexual Activity   • Alcohol use: No   • Drug use: No   • Sexual  activity: Defer       ALLERGIES  Bactrim [sulfamethoxazole-trimethoprim], Ciprofloxacin, Levaquin [levofloxacin], Macrobid [nitrofurantoin], Nitrofurantoin macrocrystal, and Cortisone    No current facility-administered medications for this encounter.    Current Outpatient Medications:   •  aspirin 81 MG EC tablet, Take 81 mg by mouth Daily. PT TO STOP PER MD INSTRUCTION, Disp: , Rfl:   •  butalbital-acetaminophen-caffeine (FIORICET, ESGIC) -40 MG per tablet, Take 1 tablet by mouth Every 6 (Six) Hours As Needed for Headache., Disp: 30 tablet, Rfl: 1  •  estradiol (ESTRACE) 0.1 MG/GM vaginal cream, Insert 1 application into the vagina., Disp: , Rfl:   •  imipramine (TOFRANIL) 50 MG tablet, Take 2 tablets by mouth every night at bedtime., Disp: 180 tablet, Rfl: 1  •  lisinopril (PRINIVIL,ZESTRIL) 40 MG tablet, Take 1 tablet by mouth Daily., Disp: 90 tablet, Rfl: 1  •  NIFEdipine XL (PROCARDIA XL) 30 MG 24 hr tablet, Take 1 tablet by mouth Daily., Disp: 30 tablet, Rfl: 1  •  omeprazole (priLOSEC) 20 MG capsule, Take 1 capsule by mouth Daily., Disp: 90 capsule, Rfl: 1  •  oxyCODONE-acetaminophen (PERCOCET) 5-325 MG per tablet, Take 1 tablet by mouth Every 6 (Six) Hours As Needed for Moderate Pain  or Severe Pain  for up to 20 doses., Disp: 20 tablet, Rfl: 0  •  simvastatin (ZOCOR) 40 MG tablet, Take 1 tablet by mouth Every Night. for cholesterol, Disp: 90 tablet, Rfl: 1  •  vitamin C (ASCORBIC ACID) 500 MG tablet, Take 500 mg by mouth Daily., Disp: , Rfl:     PHYSICAL EXAM  ED Triage Vitals [06/08/21 1333]   Temp Heart Rate Resp BP SpO2   98 °F (36.7 °C) 107 18 166/85 96 %      Temp src Heart Rate Source Patient Position BP Location FiO2 (%)   Oral Monitor -- -- --       Physical Exam  Vitals and nursing note reviewed.   Constitutional:       Appearance: Normal appearance. She is obese.   HENT:      Head: Normocephalic and atraumatic.   Eyes:      Conjunctiva/sclera: Conjunctivae normal.   Cardiovascular:       Rate and Rhythm: Normal rate and regular rhythm.      Heart sounds: Normal heart sounds.   Pulmonary:      Effort: Pulmonary effort is normal. No respiratory distress.      Breath sounds: Normal breath sounds.   Abdominal:      General: Bowel sounds are normal. There is no distension.      Palpations: Abdomen is soft.      Tenderness: There is no abdominal tenderness.   Musculoskeletal:         General: Normal range of motion.      Left shoulder: No swelling, deformity or laceration.      Left upper arm: Tenderness and bony tenderness present. No swelling or edema.      Right wrist: Tenderness and bony tenderness present. No swelling, deformity or lacerations. Normal pulse.      Left wrist: Normal pulse.      Cervical back: Normal range of motion and neck supple.   Skin:     General: Skin is warm and dry.   Neurological:      Mental Status: She is alert and oriented to person, place, and time.   Psychiatric:         Mood and Affect: Mood and affect normal.           LAB RESULTS  Lab Results (last 24 hours)     ** No results found for the last 24 hours. **                RADIOLOGY  XR Shoulder 2+ View Left    Result Date: 6/8/2021  XR SHOULDER 2+ VW LEFT-: 6/8/2021 3:15 PM  INDICATION: Pain in the left shoulder arm and wrist. Fell. Emergency department patient..  COMPARISON: None available.  FINDINGS: 1 view of the left shoulder.  There is a complete slightly impacted comminuted fracture of the surgical neck of the humerus. There appears to be an oblique fracture line extending into the humeral head along its lateral aspect. No additional fracture. No secondary sign of dislocation.  No foreign body.      Complete partially comminuted fracture of the surgical neck of the humerus with extension into the lateral aspect of the humeral head.  This report was finalized on 6/8/2021 4:17 PM by Dr. Terence Ridley MD.      XR Humerus Left    Result Date: 6/8/2021  XR HUMERUS LEFT-: 6/8/2021 3:14 PM  INDICATION: Fell. Shoulder  arm and wrist pain. Emergency department patient  COMPARISON: None available.  FINDINGS: 2 views of the left humerus.  There is a complete minimally impacted fracture involving the surgical neck of the humerus. There is an 11 mm fracture fragment displaced about 3 mm medially. No additional fracture. No secondary sign of dislocation, however, assessment is limited due to nonstandard positioning.  No bone erosion or destruction. No foreign body.       1. Complete minimally impacted fracture of the surgical neck of the humerus..  This report was finalized on 6/8/2021 4:16 PM by Dr. Terence Ridley MD.      XR Wrist 3+ View Right    Result Date: 6/8/2021  XR WRIST 3+ VW RIGHT-: 6/8/2021 3:15 PM  INDICATION: Fell. Pain in the left shoulder, arm and right wrist..  COMPARISON: None available.  FINDINGS: 3 views of the right wrist.  The bones are osteoporotic. Diffuse degenerative change. There is a subtle fracture involving the distal radius along its ulnar margin best seen on the frontal projection with some smudging of the trabecular margins. No convincing evidence of intra-articular extension. No associated ulnar fracture. There is soft tissue swelling. No bone erosion or destruction.  No foreign body.       1. Subtle fracture of the distal radius along its ulnar margin best seen on the frontal view.  This report was finalized on 6/8/2021 4:20 PM by Dr. Terence Ridley MD.      CT Head Without Contrast    Result Date: 6/8/2021  HEAD CT WITHOUT CONTRAST 06/08/2021  HISTORY: Fell. Head injury. Emergency department patient denies headache, dizziness or loss of consciousness.  TECHNIQUE:  Noncontrast CT brain was performed. No comparisons. Radiation dose reduction techniques included automated exposure control or exposure modulation based on body size. Radiation audit for CT and nuclear cardiology exams in the last 12 months: 0.  FINDINGS:  There is generalized cerebral atrophy. Sulci and ventricles otherwise unremarkable. No  midline shift. Probable asymmetrically prominent physiologic basal ganglia calcifications on the right compared to the left. No distinct evidence of acute intracranial hemorrhage otherwise identified. There is no mass, mass effect or edema to suggest acute infarct and no extra-axial fluid collection is identified. Probable mild chronic ischemic changes in the periventricular white matter. The globes are intact. Bones are intact. Chronic appearing ethmoid sinus disease.       1. No clearly acute intracranial process. Asymmetrically prominent physiologic appearing basal ganglia calcifications on the right compared to the left. This is slightly exacerbated by oblique imaging of the patient. No convincing evidence of acute intracranial hemorrhage. 2. Atrophy and chronic ischemic changes. 3. Chronic appearing ethmoid sinus disease.  This report was finalized on 6/8/2021 4:14 PM by Dr. Terence Ridley MD.        I ordered the above radiologic testing and reviewed the results    PROCEDURES  Splint - Cast - Strapping    Date/Time: 6/8/2021 5:29 PM  Performed by: Debbie Batista PA-C  Authorized by: David Solis MD     Consent:     Consent obtained:  Verbal    Consent given by:  Patient    Alternatives discussed:  Delayed treatment and no treatment  Pre-procedure details:     Sensation:  Normal  Procedure details:     Laterality:  Right    Location:  Wrist    Wrist:  R wrist    Splint type:  Sugar tong    Supplies:  Elastic bandage, cotton padding and Ortho-Glass  Post-procedure details:     Pain:  Improved    Sensation:  Normal    Patient tolerance of procedure:  Tolerated well, no immediate complications          PROGRESS AND CONSULTS  ED Course as of Jun 08 2200   Tue Jun 08, 2021   1631 Spoke with Dr. Noyola and discussed the case with him. He reviewed the images and states that patient to be placed in a sling for her humeral head fracture.    [GT]   [id number] 80-year-old female presents to the ED with complaints of a  fall prior to arrival.  Patient is complaining of wrist pain and left arm pain.  Patient states that the worst pain is when she moves her left arm.  Patient is that she hit her head but has no loss of consciousness.  After history and physical exam, patient is noted to have tenderness with palpation of her right wrist and her left proximal humerus.  X-rays were done to rule out fracture or dislocation.  X-rays showed a radial fracture and a humeral fracture.  Patient CT of her head showed no acute disease process.  Dr. Noyola, orthopedist was consulted.  He has recommended that the patient be placed in a sling and follow-up in the office in a week.  Patient was placed in a reverse sugar tong splint to her right wrist.  Patient then was also placed in a sling to her left arm.  Patient was discharged home with Percocet.  Instructed patient that if she had any worsening symptoms to return to the ED.  Patient and her  expressed verbal understanding plan of care.    [GT]      ED Course User Index  [GT] Debbie Batista, ANA           MEDICAL DECISION MAKING    MDM       DIAGNOSIS  Final diagnoses:   Closed displaced fracture of surgical neck of left humerus, unspecified fracture morphology, initial encounter   Closed fracture of distal end of right radius, unspecified fracture morphology, initial encounter       Latest Documented Vital Signs:  As of 22:00 EDT  BP- 153/91 HR- 100 Temp- 98 °F (36.7 °C) (Oral) O2 sat- 96%    DISPOSITION  Discharged home        Discussed pertinent findings with the patient/family.  Patient/Family voiced understanding of need to follow-up for recheck and further testing as needed.  Return to the Emergency Department warnings were given.         Medication List      New Prescriptions    oxyCODONE-acetaminophen 5-325 MG per tablet  Commonly known as: PERCOCET  Take 1 tablet by mouth Every 6 (Six) Hours As Needed for Moderate Pain  or Severe Pain  for up to 20 doses.           Where to  Get Your Medications      These medications were sent to [id number] - [place], [place] - [address] AT [facility] OF [facility] [address] - [phone] PH - [phone] FX  [address], [place] [address]    Phone: [phone]   · oxyCODONE-acetaminophen 5-325 MG per tablet             Follow-up Information     Rm Noyola MD. Call in 1 day.    Specialties: Orthopedic Surgery, Sports Medicine  Why: To schedule a follow up appointment in one week  Contact information:  [address]  [place] [address] [phone]                     Dictated utilizing Dragon dictation     Debbie Batista PAAmberC  06/08/21 2200

## 2021-06-15 ENCOUNTER — OFFICE VISIT (OUTPATIENT)
Dept: ORTHOPEDIC SURGERY | Facility: CLINIC | Age: 81
End: 2021-06-15

## 2021-06-15 VITALS
BODY MASS INDEX: 39.37 KG/M2 | SYSTOLIC BLOOD PRESSURE: 130 MMHG | HEIGHT: 66 IN | HEART RATE: 84 BPM | WEIGHT: 245 LBS | DIASTOLIC BLOOD PRESSURE: 78 MMHG

## 2021-06-15 DIAGNOSIS — S52.571A OTHER CLOSED INTRA-ARTICULAR FRACTURE OF DISTAL END OF RIGHT RADIUS, INITIAL ENCOUNTER: ICD-10-CM

## 2021-06-15 DIAGNOSIS — M25.512 ACUTE PAIN OF LEFT SHOULDER: Primary | ICD-10-CM

## 2021-06-15 DIAGNOSIS — S42.292A OTHER CLOSED DISPLACED FRACTURE OF PROXIMAL END OF LEFT HUMERUS, INITIAL ENCOUNTER: ICD-10-CM

## 2021-06-15 DIAGNOSIS — M25.531 RIGHT WRIST PAIN: ICD-10-CM

## 2021-06-15 PROBLEM — S52.501A CLOSED FRACTURE OF RIGHT DISTAL RADIUS: Status: ACTIVE | Noted: 2021-06-15

## 2021-06-15 PROBLEM — S42.202A CLOSED FRACTURE OF LEFT PROXIMAL HUMERUS: Status: ACTIVE | Noted: 2021-06-15

## 2021-06-15 PROCEDURE — 99204 OFFICE O/P NEW MOD 45 MIN: CPT | Performed by: ORTHOPAEDIC SURGERY

## 2021-06-15 PROCEDURE — 25600 CLTX DST RDL FX/EPHYS SEP WO: CPT | Performed by: ORTHOPAEDIC SURGERY

## 2021-06-15 PROCEDURE — 73110 X-RAY EXAM OF WRIST: CPT | Performed by: ORTHOPAEDIC SURGERY

## 2021-06-15 PROCEDURE — 23600 CLTX PROX HUMRL FX W/O MNPJ: CPT | Performed by: ORTHOPAEDIC SURGERY

## 2021-06-15 PROCEDURE — 73030 X-RAY EXAM OF SHOULDER: CPT | Performed by: ORTHOPAEDIC SURGERY

## 2021-06-15 RX ORDER — DICLOFENAC SODIUM 75 MG/1
75 TABLET, DELAYED RELEASE ORAL 2 TIMES DAILY
Qty: 42 TABLET | Refills: 0 | Status: SHIPPED | OUTPATIENT
Start: 2021-06-15 | End: 2021-06-28 | Stop reason: HOSPADM

## 2021-06-15 NOTE — PROGRESS NOTES
Subjective:     Patient ID: Anitra Orta is a 80 y.o. female.    Chief Complaint: Left shoulder and right wrist pain, new patient, DOI 2021    History of Present Illness  Anitra Orta presents to clinic today for evaluation of left humerus pain following fall in the kitchen on 2021 trying to catch herself with her hands and hitting her head. Her pain is rated 10/10 in severity today and is sharp in quality.  The pain is aggravated by motion but alleviated by rest. She reports right-side finger tingling dependent on specific wrist loading. She tried oxycodone-acetaminophen without relief and discontinued it due to opioid-induced constipation.      Social History     Occupational History   • Not on file   Tobacco Use   • Smoking status: Former Smoker     Packs/day: 1.00     Years: 15.00     Pack years: 15.00     Types: Cigarettes     Start date: 1956     Quit date: 1980     Years since quittin.4   • Smokeless tobacco: Never Used   • Tobacco comment: quit    Vaping Use   • Vaping Use: Never used   Substance and Sexual Activity   • Alcohol use: No   • Drug use: No   • Sexual activity: Not Currently     Partners: Male     Comment: Frequent UTIs      Past Medical History:   Diagnosis Date   • Abdominal pain 2018    ER VISIT FOR ABD PAIN AND /O RENAL STONES  NAKUL NORMAL  CT NEG FOR OBSTRUCTIVE KIDNEY STONE   • Ankle sprain    • Anxiety    • Arthritis of back    • Cervical disc disorder    • Chronic pain disorder    • Constipation    • DDD (degenerative disc disease), lumbar    • Fracture of wrist    • Fracture, foot    • Fracture, humerus    • Frequency of micturition    • GERD (gastroesophageal reflux disease)    • H/O bone density study 2019    WITH L SPINE 1.5 L HIP 2.4 FRAX 10% MAJOR FRACTURE AND 2% HIP FRACTURE (NOT CHANGED MUCH FROM 2016 PT PREVIOUSLY ON FOSAMAX X5 YRS BACK IN 1970s   • H/O CT scan     ABD L UTEROPELVIC JUNCTION NEPHROLITHIASIS   • H/O CT scan     PE  PROTOCOL NEG   • H/O mammogram 09/2019    NORMAL +H/O OF L BREAST LUMPECTOMY WITH BENIGN PATHOLOGY MANAGED BY GYN   • History of Holter monitoring     NSVT BUT LASTING UP TO 33 BEATS AT A TIME   • History of MRI     L SPINE HOSP 8/19 EPIGASTRIC PAIN AND ABD PAIN   • History of nuclear stress test     NLM MYOCARDIAL PERFUSION NO SIGNS OF ISCHEMIA   • HTN (hypertension)    • Hyperglycemia 2/19/2021   • Hyperlipidemia    • Kidney stones    • Knee swelling    • Low back pain    • Low back strain    • Lumbosacral disc disease    • Mixed hyperlipidemia 9/16/2016   • Mixed incontinence    • Osteoarthritis    • Osteopenia    • Osteoporosis    • Papanicolaou smear 02/2019    DONE AND NORMAL NO LONGER INDICATED MANAGED BY GYN   • Personal history of urinary calculi    • PONV (postoperative nausea and vomiting)    • Psoriasis    • Scoliosis    • Tachycardia, unspecified    • Tear of meniscus of knee knee replacement right   • Thoracic disc disorder    • Urinary tract infection     HOSP FOR RECURRENT UTI WITH ID CONSULT   • Wrist sprain      Past Surgical History:   Procedure Laterality Date   • BREAST BIOPSY Left     benign   • BREAST LUMPECTOMY Left     benign   • BUNIONECTOMY Right    • COLONOSCOPY N/A 11/30/2016    Procedure: COLONOSCOPY polypectomy;  Surgeon: Adali Willard MD;  Location: [facility] OR;  Service:    • CYSTOSCOPY BOTOX INJECTION OF BLADDER N/A 7/21/2017    Procedure: CYSTOSCOPY COAPTITE INJECTION;  Surgeon: Kevon Clark MD;  Location: [facility] OR;  Service:    • CYSTOSCOPY W/ LITHOLAPAXY / EHL     • CYSTOSCOPY W/ URETERAL STENT PLACEMENT Left 9/12/2016    Procedure: CYSTOSCOPY URETERAL STENT INSERTION;  Surgeon: Kevon Clark MD;  Location: [facility] OR;  Service:    • CYSTOSCOPY W/ URETERAL STENT PLACEMENT Left 8/1/2019    Procedure: CYSTOSCOPY URETERAL CATHETER/STENT INSERTION;  Surgeon: Kevon Clark MD;  Location: [facility] OR;  Service: Urology   • CYSTOSCOPY W/ URETERAL STENT PLACEMENT Left  "3/25/2021    Procedure: CYSTOSCOPY URETERAL CATHETER/STENT INSERTION;  Surgeon: Kevon Clark MD;  Location: [facility];  Service: Urology;  Laterality: Left;   • JOINT REPLACEMENT     • KNEE SURGERY Right 2015    tka   • TONSILLECTOMY AND ADENOIDECTOMY     • TRIGGER POINT INJECTION     • URETEROSCOPY LASER LITHOTRIPSY WITH STENT INSERTION Left 10/5/2016    Procedure: LT URETEROSCOPY LASER LITHOTRIPSY STONE BASKET EXTRACTION AND STENT ;  Surgeon: Kevon Clark MD;  Location: [facility];  Service:        Family History   Problem Relation Age of Onset   • Heart defect Mother    • Heart attack Mother 70   • Sudden death Mother    • Heart defect Father    • Heart attack Father 49   • Hypertension Father    • Sudden death Father    • Valvular heart disease Brother    • Malig Hyperthermia Neg Hx    • Breast cancer Neg Hx          Review of Systems        Objective:  Vitals:    06/15/21 1334   BP: 130/78   Pulse: 84   Weight: 111 kg (245 lb)   Height: 167.6 cm (66\")         06/15/21  1334   Weight: 111 kg (245 lb)     Body mass index is 39.54 kg/m².  Physical Exam    Vital signs reviewed.   General: No acute distress, alert and oriented  Eyes: conjunctiva clear; pupils equally round and reactive  ENT: external ears and nose atraumatic; oropharynx clear  CV: no peripheral edema  Resp: normal respiratory effort  Skin: no rashes or wounds; normal turgor  Psych: mood and affect appropriate; recent and remote memory intact          Ortho Exam     left shoulder-   Tenderness  located anterior   Brisk cap refill to all digits, palpable radial pulse   Positive sensation to light touch palmar, dorsal aspects of small and index fingers and anatomic snuffbox left hand      Right Wrist-  Finger and Thumb Flexion- 4/5 strength  Finger and Thumb Extension- 4/5 strength  Positive sensation anatomic snuffbox   Positive sensation light touch all distributions  Brisk cap refill, 2+ radial pulse            Imaging:  Left " Shoulder X-Ray  Indication: Pain  AP, scapular Y, and axillary lateral views    Findings:  Displaced proximal humerus fracture with posterior relation of the proximal segment in relation to the shaft, overall alignment on AP view appears to be near anatomic, minimal evidence of any callus formation at this time, glenohumeral joint is well aligned.  Compared to prior x-rays from ER.    Right Wrist X-Ray  Indication: Pain  AP, Lateral, and Oblique views    Findings:  Distal radius fracture involving primarily the lunate facet with displacement in a ulnar and dorsal direction of the comminuted segment, overall alignment of the radiocarpal joint appears to be acceptable at this point time.  Compared to prior x-rays from [facility] department.    CT Head without Contrast  IMPRESSION:  1. No clearly acute intracranial process. Asymmetrically prominent  physiologic appearing basal ganglia calcifications on the right compared  to the left. This is slightly exacerbated by oblique imaging of the  patient. No convincing evidence of acute intracranial hemorrhage.  2. Atrophy and chronic ischemic changes.  3. Chronic appearing ethmoid sinus disease.     This report was finalized on 6/8/2021 4:14 PM by Dr. Terence Ridley MD.    XR Humerus Left  IMPRESSION:     1. Complete minimally impacted fracture of the surgical neck of the  humerus..     This report was finalized on 6/8/2021 4:16 PM by Dr. Terence Ridley MD.    XR Shoulder 2+ View Left  IMPRESSION:  Complete partially comminuted fracture of the surgical neck of the  humerus with extension into the lateral aspect of the humeral head.     This report was finalized on 6/8/2021 4:17 PM by Dr. Terence Ridley MD.    XR Wrist 3+ View Right  IMPRESSION:     1. Subtle fracture of the distal radius along its ulnar margin best seen  on the frontal view.     This report was finalized on 6/8/2021 4:20 PM by Dr. Terence Ridley MD.      Assessment:        1. Acute pain of left shoulder    2. Right  wrist pain    3. Other closed intra-articular fracture of distal end of right radius, initial encounter    4. Other closed displaced fracture of proximal end of left humerus, initial encounter           Plan:          1. Discussed treatment options at length with patient at today's visit including closed treatment of left proximal humerus fracture and right distal radius fracture, open reduction internal fixation of left proximal humerus fracture, or left reverse total shoulder arthroplasty. Patient would like to progress with closed treatment of left proximal humerus fracture at this time.  We discussed risk for nonunion, malunion, and need for surgical treatment.  She understood these risks and wished to proceed with closed treatment.  2. Provided sling to be worn for left shoulder stability. She may remove the sling while at rest if the shoulder is supported.   3. Provided right wrist brace to be worn while upright. She may remove the brace while at rest.   4. Prescribe diclofenac for pain relief.   5. Follow-up in three weeks with left shoulder and right wrist X-rays.      Anitra Orta and  were in agreement with plan and had all questions answered.     Orders:  Orders Placed This Encounter   Procedures   • XR Shoulder 2+ View Left   • XR Wrist 3+ View Right       Medications:  New Medications Ordered This Visit   Medications   • diclofenac (VOLTAREN) 75 MG EC tablet     Sig: Take 1 tablet by mouth 2 (Two) Times a Day.     Dispense:  42 tablet     Refill:  0       Followup:  Return in about 3 weeks (around 7/6/2021) for xrays needed at follow up.    Diagnoses and all orders for this visit:    1. Acute pain of left shoulder (Primary)  -     XR Shoulder 2+ View Left    2. Right wrist pain  -     XR Wrist 3+ View Right    3. Other closed intra-articular fracture of distal end of right radius, initial encounter    4. Other closed displaced fracture of proximal end of left humerus, initial  encounter    Other orders  -     diclofenac (VOLTAREN) 75 MG EC tablet; Take 1 tablet by mouth 2 (Two) Times a Day.  Dispense: 42 tablet; Refill: 0        SCRIBE ATTESTATION:  I, Joe Guerrero, attest that all medical record entries for this patient were documented by me acting as a medical scribe for Rm Noyola MD.    PROVIDER ATTESTATION:  I, Rm Noyola MD, personally performed the services described in this documentation. All medical record entries made by the scribe were at my direction and in my presence. I have reviewed the chart and discharge instructions and agree that the record reflects my personal performance and is accurate and complete.  Rm Noyola MD.    Electronically signed: Rm Noyola MD 6/15/2021 14:52 EDT       Dictated utilizing Dragon dictation

## 2021-06-21 ENCOUNTER — TELEPHONE (OUTPATIENT)
Dept: ORTHOPEDIC SURGERY | Facility: CLINIC | Age: 81
End: 2021-06-21

## 2021-06-21 ENCOUNTER — APPOINTMENT (OUTPATIENT)
Dept: GENERAL RADIOLOGY | Facility: HOSPITAL | Age: 81
End: 2021-06-21

## 2021-06-21 ENCOUNTER — HOSPITAL ENCOUNTER (EMERGENCY)
Facility: HOSPITAL | Age: 81
Discharge: HOME | End: 2021-06-21
Attending: EMERGENCY MEDICINE | Admitting: EMERGENCY MEDICINE

## 2021-06-21 VITALS
HEART RATE: 96 BPM | HEIGHT: 66 IN | BODY MASS INDEX: 39.58 KG/M2 | OXYGEN SATURATION: 95 % | WEIGHT: 246.3 LBS | SYSTOLIC BLOOD PRESSURE: 162 MMHG | DIASTOLIC BLOOD PRESSURE: 80 MMHG | RESPIRATION RATE: 18 BRPM | TEMPERATURE: 98.4 F

## 2021-06-21 DIAGNOSIS — R68.83 CHILLS: ICD-10-CM

## 2021-06-21 DIAGNOSIS — M79.601 PAIN IN BOTH UPPER EXTREMITIES: Primary | ICD-10-CM

## 2021-06-21 DIAGNOSIS — M79.602 PAIN IN BOTH UPPER EXTREMITIES: Primary | ICD-10-CM

## 2021-06-21 LAB
ALBUMIN SERPL-MCNC: 3.3 G/DL (ref 3.5–5.2)
ALBUMIN/GLOB SERPL: 1 G/DL
ALP SERPL-CCNC: 130 U/L (ref 39–117)
ALT SERPL W P-5'-P-CCNC: 54 U/L (ref 1–33)
ANION GAP SERPL CALCULATED.3IONS-SCNC: 10.3 MMOL/L (ref 5–15)
AST SERPL-CCNC: 27 U/L (ref 1–32)
BACTERIA UR QL AUTO: ABNORMAL /HPF
BASOPHILS # BLD AUTO: 0.01 10*3/MM3 (ref 0–0.2)
BASOPHILS NFR BLD AUTO: 0.1 % (ref 0–1.5)
BILIRUB SERPL-MCNC: 0.5 MG/DL (ref 0–1.2)
BILIRUB UR QL STRIP: NEGATIVE
BUN SERPL-MCNC: 16 MG/DL (ref 8–23)
BUN/CREAT SERPL: 21.1 (ref 7–25)
CALCIUM SPEC-SCNC: 9 MG/DL (ref 8.6–10.5)
CHLORIDE SERPL-SCNC: 102 MMOL/L (ref 98–107)
CLARITY UR: CLEAR
CO2 SERPL-SCNC: 23.7 MMOL/L (ref 22–29)
COLOR UR: YELLOW
CREAT SERPL-MCNC: 0.76 MG/DL (ref 0.57–1)
D-LACTATE SERPL-SCNC: 1.6 MMOL/L (ref 0.5–2)
DEPRECATED RDW RBC AUTO: 46.7 FL (ref 37–54)
EOSINOPHIL # BLD AUTO: 0.02 10*3/MM3 (ref 0–0.4)
EOSINOPHIL NFR BLD AUTO: 0.2 % (ref 0.3–6.2)
ERYTHROCYTE [DISTWIDTH] IN BLOOD BY AUTOMATED COUNT: 14.5 % (ref 12.3–15.4)
GFR SERPL CREATININE-BSD FRML MDRD: 73 ML/MIN/1.73
GLOBULIN UR ELPH-MCNC: 3.3 GM/DL
GLUCOSE SERPL-MCNC: 160 MG/DL (ref 65–99)
GLUCOSE UR STRIP-MCNC: NEGATIVE MG/DL
HCT VFR BLD AUTO: 33.7 % (ref 34–46.6)
HGB BLD-MCNC: 11.1 G/DL (ref 12–15.9)
HGB UR QL STRIP.AUTO: ABNORMAL
HYALINE CASTS UR QL AUTO: ABNORMAL /LPF
IMM GRANULOCYTES # BLD AUTO: 0.06 10*3/MM3 (ref 0–0.05)
IMM GRANULOCYTES NFR BLD AUTO: 0.6 % (ref 0–0.5)
KETONES UR QL STRIP: NEGATIVE
LEUKOCYTE ESTERASE UR QL STRIP.AUTO: ABNORMAL
LYMPHOCYTES # BLD AUTO: 0.41 10*3/MM3 (ref 0.7–3.1)
LYMPHOCYTES NFR BLD AUTO: 4 % (ref 19.6–45.3)
MCH RBC QN AUTO: 29.1 PG (ref 26.6–33)
MCHC RBC AUTO-ENTMCNC: 32.9 G/DL (ref 31.5–35.7)
MCV RBC AUTO: 88.2 FL (ref 79–97)
MONOCYTES # BLD AUTO: 0.75 10*3/MM3 (ref 0.1–0.9)
MONOCYTES NFR BLD AUTO: 7.4 % (ref 5–12)
NEUTROPHILS NFR BLD AUTO: 8.89 10*3/MM3 (ref 1.7–7)
NEUTROPHILS NFR BLD AUTO: 87.7 % (ref 42.7–76)
NITRITE UR QL STRIP: NEGATIVE
NRBC BLD AUTO-RTO: 0 /100 WBC (ref 0–0.2)
PH UR STRIP.AUTO: 6 [PH] (ref 4.5–8)
PLATELET # BLD AUTO: 229 10*3/MM3 (ref 140–450)
PMV BLD AUTO: 8.7 FL (ref 6–12)
POTASSIUM SERPL-SCNC: 3.6 MMOL/L (ref 3.5–5.2)
PROCALCITONIN SERPL-MCNC: 0.13 NG/ML (ref 0–0.25)
PROT SERPL-MCNC: 6.6 G/DL (ref 6–8.5)
PROT UR QL STRIP: ABNORMAL
RBC # BLD AUTO: 3.82 10*6/MM3 (ref 3.77–5.28)
RBC # UR: ABNORMAL /HPF
REF LAB TEST METHOD: ABNORMAL
SODIUM SERPL-SCNC: 136 MMOL/L (ref 136–145)
SP GR UR STRIP: 1.02 (ref 1–1.03)
SQUAMOUS #/AREA URNS HPF: ABNORMAL /HPF
UROBILINOGEN UR QL STRIP: ABNORMAL
WBC # BLD AUTO: 10.14 10*3/MM3 (ref 3.4–10.8)
WBC UR QL AUTO: ABNORMAL /HPF

## 2021-06-21 PROCEDURE — 71045 X-RAY EXAM CHEST 1 VIEW: CPT

## 2021-06-21 PROCEDURE — 80053 COMPREHEN METABOLIC PANEL: CPT | Performed by: PHYSICIAN ASSISTANT

## 2021-06-21 PROCEDURE — 85025 COMPLETE CBC W/AUTO DIFF WBC: CPT | Performed by: PHYSICIAN ASSISTANT

## 2021-06-21 PROCEDURE — 83605 ASSAY OF LACTIC ACID: CPT | Performed by: PHYSICIAN ASSISTANT

## 2021-06-21 PROCEDURE — 81001 URINALYSIS AUTO W/SCOPE: CPT | Performed by: PHYSICIAN ASSISTANT

## 2021-06-21 PROCEDURE — 84145 PROCALCITONIN (PCT): CPT | Performed by: PHYSICIAN ASSISTANT

## 2021-06-21 PROCEDURE — 99284 EMERGENCY DEPT VISIT MOD MDM: CPT

## 2021-06-21 PROCEDURE — 99283 EMERGENCY DEPT VISIT LOW MDM: CPT

## 2021-06-21 PROCEDURE — 87040 BLOOD CULTURE FOR BACTERIA: CPT | Performed by: PHYSICIAN ASSISTANT

## 2021-06-21 RX ORDER — METHYLPREDNISOLONE 4 MG/1
TABLET ORAL
Qty: 1 EACH | Refills: 0 | Status: SHIPPED | OUTPATIENT
Start: 2021-06-21 | End: 2021-06-28 | Stop reason: HOSPADM

## 2021-06-21 RX ORDER — SODIUM CHLORIDE 0.9 % (FLUSH) 0.9 %
10 SYRINGE (ML) INJECTION AS NEEDED
Status: DISCONTINUED | OUTPATIENT
Start: 2021-06-21 | End: 2021-06-21 | Stop reason: HOSPADM

## 2021-06-21 NOTE — ED PROVIDER NOTES
EMERGENCY DEPARTMENT ENCOUNTER      Room Number: 12/12    History is provided by the patient, no translation services needed    HPI:    Chief complaint: Chills    Location: At home    Quality/Severity: Moderate    Timing/Duration: X2 days    Modifying Factors: Nothing makes it better or worse    Associated Symptoms: No cough, no fever, no dysuria, no abdominal pain    Narrative: Pt is a 80 y.o. female who presents complaining of chills x2 days.  Patient states that in the past she has had chills with urinary tract infections.  Patient denies any dysuria.  Patient denies any abdominal pain.  Patient denies any nausea or vomiting or diarrhea.  Patient denies any cough.      PMD: Margot Chavez MD    REVIEW OF SYSTEMS  Review of Systems   Constitutional: Positive for chills and fever.   Eyes: Negative for pain and visual disturbance.   Respiratory: Negative for cough and shortness of breath.    Cardiovascular: Negative for chest pain and leg swelling.   Gastrointestinal: Negative for abdominal pain, constipation, diarrhea, nausea and vomiting.   Genitourinary: Negative for dysuria and flank pain.   Musculoskeletal: Positive for arthralgias and back pain. Negative for myalgias.   Skin: Positive for color change. Negative for pallor, rash and wound.   Neurological: Negative for dizziness, syncope and headaches.   Psychiatric/Behavioral: Negative for suicidal ideas. The patient is not nervous/anxious.          PAST MEDICAL HISTORY  Active Ambulatory Problems     Diagnosis Date Noted   • Urinary tract infection due to ESBL Klebsiella 09/10/2016   • Simple renal cyst 09/16/2016   • Former smoker 09/16/2016   • Gastroesophageal reflux disease 09/16/2016   • Essential hypertension 09/16/2016   • Mixed hyperlipidemia 09/16/2016   • Hypertriglyceridemia 09/16/2016   • Osteoporosis 09/16/2016   • Panic disorder 09/16/2016   • Psoriasis 09/16/2016   • Urge incontinence of urine 09/16/2016   • Vitamin D deficiency 09/16/2016    • Post-menopause 10/25/2016   • Urinary tract infection 04/24/2017   • Insulin resistance 09/08/2017   • Chronic bilateral low back pain without sciatica 03/12/2018   • Spinal stenosis of lumbar region with neurogenic claudication 03/12/2018   • Burning sensation of foot 05/22/2018   • Other chronic pain 07/11/2018   • Tachycardia 07/31/2019   • Ventricular tachycardia (CMS/[facility]) 09/09/2019   • Mixed incontinence    • GERD (gastroesophageal reflux disease)    • Anxiety    • Hyperglycemia 02/19/2021   • Sepsis without acute organ dysfunction (CMS/Formerly [facility]) 03/25/2021   • Acute cystitis 03/25/2021   • Metabolic encephalopathy 03/25/2021   • Hypokalemia 03/25/2021   • Positive D dimer 03/25/2021   • e.coli bacteremia 03/25/2021   • Ureteral stone with hydronephrosis 03/25/2021   • Closed fracture of right distal radius 06/15/2021   • Closed fracture of left proximal humerus 06/15/2021     Resolved Ambulatory Problems     Diagnosis Date Noted   • Colon polyp 09/16/2016   • Prediabetes 09/16/2016   • HTN (hypertension)    • DDD (degenerative disc disease), lumbar      Past Medical History:   Diagnosis Date   • Abdominal pain 12/2018   • Ankle sprain    • Arthritis of back    • Cervical disc disorder    • Chronic pain disorder    • Constipation    • Fracture of wrist    • Fracture, foot    • Fracture, humerus    • Frequency of micturition    • H/O bone density study 01/2019   • H/O CT scan    • H/O CT scan    • H/O mammogram 09/2019   • History of Holter monitoring    • History of MRI    • History of nuclear stress test    • Hyperlipidemia    • Kidney stones    • Knee swelling    • Low back pain    • Low back strain    • Lumbosacral disc disease    • Osteoarthritis    • Osteopenia    • Papanicolaou smear 02/2019   • Personal history of urinary calculi    • PONV (postoperative nausea and vomiting)    • Scoliosis    • Tachycardia, unspecified    • Tear of meniscus of knee knee replacement right   • Thoracic disc disorder    •  Wrist sprain        PAST SURGICAL HISTORY  Past Surgical History:   Procedure Laterality Date   • BREAST BIOPSY Left     benign   • BREAST LUMPECTOMY Left     benign   • BUNIONECTOMY Right    • COLONOSCOPY N/A 11/30/2016    Procedure: COLONOSCOPY polypectomy;  Surgeon: Adali Willard MD;  Location: [facility] OR;  Service:    • CYSTOSCOPY BOTOX INJECTION OF BLADDER N/A 7/21/2017    Procedure: CYSTOSCOPY COAPTITE INJECTION;  Surgeon: Kevon Clark MD;  Location: [facility] OR;  Service:    • CYSTOSCOPY W/ LITHOLAPAXY / EHL     • CYSTOSCOPY W/ URETERAL STENT PLACEMENT Left 9/12/2016    Procedure: CYSTOSCOPY URETERAL STENT INSERTION;  Surgeon: Kevon Clark MD;  Location: [facility] OR;  Service:    • CYSTOSCOPY W/ URETERAL STENT PLACEMENT Left 8/1/2019    Procedure: CYSTOSCOPY URETERAL CATHETER/STENT INSERTION;  Surgeon: Kevon Clark MD;  Location: [facility] OR;  Service: Urology   • CYSTOSCOPY W/ URETERAL STENT PLACEMENT Left 3/25/2021    Procedure: CYSTOSCOPY URETERAL CATHETER/STENT INSERTION;  Surgeon: Kevon Clark MD;  Location: [facility] OR;  Service: Urology;  Laterality: Left;   • JOINT REPLACEMENT     • KNEE SURGERY Right 2015    tka   • TONSILLECTOMY AND ADENOIDECTOMY     • TRIGGER POINT INJECTION     • URETEROSCOPY LASER LITHOTRIPSY WITH STENT INSERTION Left 10/5/2016    Procedure: LT URETEROSCOPY LASER LITHOTRIPSY STONE BASKET EXTRACTION AND STENT ;  Surgeon: Kevon Clark MD;  Location: [facility];  Service:        FAMILY HISTORY  Family History   Problem Relation Age of Onset   • Heart defect Mother    • Heart attack Mother 70   • Sudden death Mother    • Heart defect Father    • Heart attack Father 49   • Hypertension Father    • Sudden death Father    • Valvular heart disease Brother    • Malig Hyperthermia Neg Hx    • Breast cancer Neg Hx        SOCIAL HISTORY  Social History     Socioeconomic History   • Marital status:      Spouse name: Not on file   • Number of children:  Not on file   • Years of education: Not on file   • Highest education level: Not on file   Tobacco Use   • Smoking status: Former Smoker     Packs/day: 1.00     Years: 15.00     Pack years: 15.00     Types: Cigarettes     Start date: 1956     Quit date: 1980     Years since quittin.4   • Smokeless tobacco: Never Used   • Tobacco comment: quit    Vaping Use   • Vaping Use: Never used   Substance and Sexual Activity   • Alcohol use: No   • Drug use: No   • Sexual activity: Not Currently     Partners: Male     Comment: Frequent UTIs       ALLERGIES  Bactrim [sulfamethoxazole-trimethoprim], Ciprofloxacin, Levaquin [levofloxacin], Macrobid [nitrofurantoin], Nitrofurantoin macrocrystal, and Cortisone      Current Facility-Administered Medications:   •  [COMPLETED] Insert peripheral IV, , , Once **AND** sodium chloride 0.9 % flush 10 mL, 10 mL, Intravenous, PRN, Debbie Batista PA-C    Current Outpatient Medications:   •  aspirin 81 MG EC tablet, Take 81 mg by mouth Daily. PT TO STOP PER MD INSTRUCTION, Disp: , Rfl:   •  butalbital-acetaminophen-caffeine (FIORICET, ESGIC) -40 MG per tablet, Take 1 tablet by mouth Every 6 (Six) Hours As Needed for Headache., Disp: 30 tablet, Rfl: 1  •  diclofenac (VOLTAREN) 75 MG EC tablet, Take 1 tablet by mouth 2 (Two) Times a Day., Disp: 42 tablet, Rfl: 0  •  estradiol (ESTRACE) 0.1 MG/GM vaginal cream, Insert 1 application into the vagina., Disp: , Rfl:   •  imipramine (TOFRANIL) 50 MG tablet, Take 2 tablets by mouth every night at bedtime., Disp: 180 tablet, Rfl: 1  •  lisinopril (PRINIVIL,ZESTRIL) 40 MG tablet, Take 1 tablet by mouth Daily., Disp: 90 tablet, Rfl: 1  •  methylPREDNISolone (MEDROL) 4 MG dose pack, Take as directed on package instructions., Disp: 1 each, Rfl: 0  •  NIFEdipine XL (PROCARDIA XL) 30 MG 24 hr tablet, Take 1 tablet by mouth Daily., Disp: 30 tablet, Rfl: 1  •  omeprazole (priLOSEC) 20 MG capsule, Take 1 capsule by mouth Daily., Disp:  90 capsule, Rfl: 1  •  oxyCODONE-acetaminophen (PERCOCET) 5-325 MG per tablet, Take 1 tablet by mouth Every 6 (Six) Hours As Needed for Moderate Pain  or Severe Pain  for up to 20 doses., Disp: 20 tablet, Rfl: 0  •  simvastatin (ZOCOR) 40 MG tablet, Take 1 tablet by mouth Every Night. for cholesterol, Disp: 90 tablet, Rfl: 1  •  vitamin C (ASCORBIC ACID) 500 MG tablet, Take 500 mg by mouth Daily., Disp: , Rfl:     PHYSICAL EXAM  ED Triage Vitals [06/21/21 1727]   Temp Heart Rate Resp BP SpO2   (!) 100.6 °F (38.1 °C) 119 22 (!) 192/93 96 %      Temp src Heart Rate Source Patient Position BP Location FiO2 (%)   Oral Apical Lying Right arm --       Physical Exam  Vitals and nursing note reviewed.   HENT:      Head: Normocephalic and atraumatic.   Eyes:      Conjunctiva/sclera: Conjunctivae normal.   Cardiovascular:      Rate and Rhythm: Normal rate and regular rhythm.      Heart sounds: Normal heart sounds.   Pulmonary:      Effort: Pulmonary effort is normal. No respiratory distress.      Breath sounds: Normal breath sounds.   Abdominal:      General: Bowel sounds are normal. There is no distension.      Palpations: Abdomen is soft.      Tenderness: There is no abdominal tenderness.   Musculoskeletal:         General: Normal range of motion.      Cervical back: Normal range of motion and neck supple.   Skin:     General: Skin is warm and dry.      Findings: Bruising (Right upper arm) present.   Neurological:      Mental Status: She is alert and oriented to person, place, and time.   Psychiatric:         Mood and Affect: Mood and affect normal.           LAB RESULTS  Lab Results (last 24 hours)     Procedure Component Value Units Date/Time    CBC & Differential [[id number]]  (Abnormal) Collected: 06/21/21 1748    Specimen: Blood from Arm, Right Updated: 06/21/21 1816    Narrative:      The following orders were created for panel order CBC & Differential.  Procedure                               Abnormality          Status                     ---------                               -----------         ------                     CBC Auto Differential[[id number]]        Abnormal            Final result                 Please view results for these tests on the individual orders.    Comprehensive Metabolic Panel [[id number]]  (Abnormal) Collected: 06/21/21 1748    Specimen: Blood from Arm, Right Updated: 06/21/21 1835     Glucose 160 mg/dL      BUN 16 mg/dL      Creatinine 0.76 mg/dL      Sodium 136 mmol/L      Potassium 3.6 mmol/L      Chloride 102 mmol/L      CO2 23.7 mmol/L      Calcium 9.0 mg/dL      Total Protein 6.6 g/dL      Albumin 3.30 g/dL      ALT (SGPT) 54 U/L      AST (SGOT) 27 U/L      Alkaline Phosphatase 130 U/L      Total Bilirubin 0.5 mg/dL      eGFR Non African Amer 73 mL/min/1.73      Globulin 3.3 gm/dL      A/G Ratio 1.0 g/dL      BUN/Creatinine Ratio 21.1     Anion Gap 10.3 mmol/L     Narrative:      GFR Normal >60  Chronic Kidney Disease <60  Kidney Failure <15      Blood Culture - Blood, Arm, Right [[id number]] Collected: 06/21/21 1748    Specimen: Blood from Arm, Right Updated: 06/21/21 1821    Lactic Acid, Plasma [[id number]]  (Normal) Collected: 06/21/21 1748    Specimen: Blood from Arm, Right Updated: 06/21/21 1832     Lactate 1.6 mmol/L     Procalcitonin [[id number]]  (Normal) Collected: 06/21/21 1748    Specimen: Blood from Arm, Right Updated: 06/21/21 1844     Procalcitonin 0.13 ng/mL     Narrative:      Results may be falsely decreased if patient taking Biotin.     CBC Auto Differential [[id number]]  (Abnormal) Collected: 06/21/21 1748    Specimen: Blood from Arm, Right Updated: 06/21/21 1816     WBC 10.14 10*3/mm3      RBC 3.82 10*6/mm3      Hemoglobin 11.1 g/dL      Hematocrit 33.7 %      MCV 88.2 fL      MCH 29.1 pg      MCHC 32.9 g/dL      RDW 14.5 %      RDW-SD 46.7 fl      MPV 8.7 fL      Platelets 229 10*3/mm3      Neutrophil % 87.7 %      Lymphocyte % 4.0 %      Monocyte % 7.4 %      Eosinophil %  0.2 %      Basophil % 0.1 %      Immature Grans % 0.6 %      Neutrophils, Absolute 8.89 10*3/mm3      Lymphocytes, Absolute 0.41 10*3/mm3      Monocytes, Absolute 0.75 10*3/mm3      Eosinophils, Absolute 0.02 10*3/mm3      Basophils, Absolute 0.01 10*3/mm3      Immature Grans, Absolute 0.06 10*3/mm3      nRBC 0.0 /100 WBC     Blood Culture - Blood, Hand, Right [[id number]] Collected: 06/21/21 1801    Specimen: Blood from Hand, Right Updated: 06/21/21 1821    Urinalysis With Microscopic If Indicated (No Culture) - Urine, Clean Catch [[id number]]  (Abnormal) Collected: 06/21/21 1816    Specimen: Urine, Clean Catch Updated: 06/21/21 1828     Color, UA Yellow     Appearance, UA Clear     pH, UA 6.0     Specific Gravity, UA 1.020     Glucose, UA Negative     Ketones, UA Negative     Bilirubin, UA Negative     Blood, UA Trace     Protein, UA 30 mg/dL (1+)     Leuk Esterase, UA Moderate (2+)     Nitrite, UA Negative     Urobilinogen, UA 1.0 E.U./dL    Urinalysis, Microscopic Only - Urine, Clean Catch [[id number]]  (Abnormal) Collected: 06/21/21 1816    Specimen: Urine, Clean Catch Updated: 06/21/21 1846     RBC, UA 3-5 /HPF      WBC, UA 21-30 /HPF      Bacteria, UA Trace /HPF      Squamous Epithelial Cells, UA 3-6 /HPF      Hyaline Casts, UA None Seen /LPF      Methodology Manual Light Microscopy            I ordered the above labs and reviewed the results    RADIOLOGY  XR Chest 1 View    Result Date: 6/21/2021  CR Chest 1 Vw INDICATION: Vomiting COMPARISON:  None available. FINDINGS: Single portable AP view(s) of the chest. Exam is somewhat limited. The heart and mediastinal contours are normal. The lungs are grossly clear. No pneumothorax or pleural effusion.     No definite acute cardiopulmonary findings. Signer Name: Etta Rodriguez MD  Signed: 6/21/2021 6:19 PM  Workstation Name: [id number]  [facility]      I ordered the above radiologic testing and reviewed the  results    PROCEDURES  Procedures      PROGRESS AND CONSULTS  ED Course as of Jun 21 2044   Mon Jun 21, 2021   1904 80-year-old female presents to the ED with complaints of chills and bilateral arm pain.  Patient states that she was seen previously worked as a fracture right wrist fracture humerus.  Patient denies any fever.  Patient denies any abdominal pain or back pain or dysuria.  Patient denies any history of cough or shortness of breath.  After history physical exam patient had a soft and nontender abdomen.  Patient is noted to have ecchymosis to the left upper arm.  Patient is wearing a brace to her right wrist.  Laboratory studies were done showing a normal white blood cell count.  Patient's lactate was normal at 1.6.  Patient's urinalysis showed moderate leukocytes with 21-30 WBCs and trace bacteria.  Patient's urinalysis also showed spondylosis but felt this was most likely a dirty sample.  Discussed with patient results.  Discussed with patient I did not feel this need to be treated during that that she was not having any dysuria.  Instructed patient that if she had any worsening symptoms that she should return to the ED.  Patient expressed verbal understanding of plan of care.    [GT]      ED Course User Index  [GT] Debbie Batista, ANA           MEDICAL DECISION MAKING    MDM       DIAGNOSIS  Final diagnoses:   Pain in both upper extremities   Chills       Latest Documented Vital Signs:  As of 20:44 EDT  BP- 162/80 HR- 96 Temp- 98.4 °F (36.9 °C) (Oral) O2 sat- 95%    DISPOSITION  Discharged home        Discussed pertinent findings with the patient/family.  Patient/Family voiced understanding of need to follow-up for recheck and further testing as needed.  Return to the Emergency Department warnings were given.         Medication List      No changes were made to your prescriptions during this visit.             Follow-up Information     Margot Chavez MD. Call in 1 day.    Specialty: Internal  Medicine & Pediatrics  Why: To schedule a follow up appointment  Contact information:  [address]  [facility] [address]  [phone]                     Dictated utilizing Dragon dictation     Debbie Batista PA-C  06/21/21 2044

## 2021-06-21 NOTE — TELEPHONE ENCOUNTER
Pt has been taking the DIclofenac 75mg bid with no improvement in the pain.  She cannot take narcotics due to constipation issues.    Can you switch her to something else, please?

## 2021-06-21 NOTE — ED NOTES
Left arm sling and right wrist brace intact upon patient arrival     Edwige May RN  06/21/21 2534

## 2021-06-25 ENCOUNTER — HOSPITAL ENCOUNTER (INPATIENT)
Facility: HOSPITAL | Age: 81
LOS: 1 days | Discharge: HOME | End: 2021-06-28
Attending: EMERGENCY MEDICINE | Admitting: HOSPITALIST

## 2021-06-25 ENCOUNTER — TELEPHONE (OUTPATIENT)
Dept: ORTHOPEDIC SURGERY | Facility: CLINIC | Age: 81
End: 2021-06-25

## 2021-06-25 ENCOUNTER — APPOINTMENT (OUTPATIENT)
Dept: GENERAL RADIOLOGY | Facility: HOSPITAL | Age: 81
End: 2021-06-25

## 2021-06-25 DIAGNOSIS — S42.212A CLOSED DISPLACED FRACTURE OF SURGICAL NECK OF LEFT HUMERUS, UNSPECIFIED FRACTURE MORPHOLOGY, INITIAL ENCOUNTER: Primary | ICD-10-CM

## 2021-06-25 DIAGNOSIS — N13.2 URETERAL STONE WITH HYDRONEPHROSIS: ICD-10-CM

## 2021-06-25 LAB — SARS-COV-2 RNA RESP QL NAA+PROBE: NOT DETECTED

## 2021-06-25 PROCEDURE — U0005 INFEC AGEN DETEC AMPLI PROBE: HCPCS | Performed by: EMERGENCY MEDICINE

## 2021-06-25 PROCEDURE — 99284 EMERGENCY DEPT VISIT MOD MDM: CPT

## 2021-06-25 PROCEDURE — 25010000002 MORPHINE PER 10 MG: Performed by: EMERGENCY MEDICINE

## 2021-06-25 PROCEDURE — G0378 HOSPITAL OBSERVATION PER HR: HCPCS

## 2021-06-25 PROCEDURE — 25010000002 MORPHINE PER 10 MG: Performed by: HOSPITALIST

## 2021-06-25 PROCEDURE — 73060 X-RAY EXAM OF HUMERUS: CPT

## 2021-06-25 PROCEDURE — 25010000002 HYDROMORPHONE PER 4 MG: Performed by: EMERGENCY MEDICINE

## 2021-06-25 PROCEDURE — U0003 INFECTIOUS AGENT DETECTION BY NUCLEIC ACID (DNA OR RNA); SEVERE ACUTE RESPIRATORY SYNDROME CORONAVIRUS 2 (SARS-COV-2) (CORONAVIRUS DISEASE [COVID-19]), AMPLIFIED PROBE TECHNIQUE, MAKING USE OF HIGH THROUGHPUT TECHNOLOGIES AS DESCRIBED BY CMS-2020-01-R: HCPCS | Performed by: EMERGENCY MEDICINE

## 2021-06-25 PROCEDURE — 25010000002 ONDANSETRON PER 1 MG: Performed by: EMERGENCY MEDICINE

## 2021-06-25 RX ORDER — ATORVASTATIN CALCIUM 20 MG/1
20 TABLET, FILM COATED ORAL DAILY
Refills: 1 | Status: DISCONTINUED | OUTPATIENT
Start: 2021-06-25 | End: 2021-06-28 | Stop reason: HOSPADM

## 2021-06-25 RX ORDER — PANTOPRAZOLE SODIUM 40 MG/1
40 TABLET, DELAYED RELEASE ORAL EVERY MORNING
Refills: 1 | Status: DISCONTINUED | OUTPATIENT
Start: 2021-06-26 | End: 2021-06-28 | Stop reason: HOSPADM

## 2021-06-25 RX ORDER — MORPHINE SULFATE 2 MG/ML
2 INJECTION, SOLUTION INTRAMUSCULAR; INTRAVENOUS EVERY 4 HOURS PRN
Status: DISCONTINUED | OUTPATIENT
Start: 2021-06-25 | End: 2021-06-28

## 2021-06-25 RX ORDER — NIFEDIPINE 30 MG/1
30 TABLET, EXTENDED RELEASE ORAL DAILY
Status: DISCONTINUED | OUTPATIENT
Start: 2021-06-25 | End: 2021-06-28 | Stop reason: HOSPADM

## 2021-06-25 RX ORDER — ASCORBIC ACID 500 MG
500 TABLET ORAL DAILY
Status: DISCONTINUED | OUTPATIENT
Start: 2021-06-25 | End: 2021-06-28 | Stop reason: HOSPADM

## 2021-06-25 RX ORDER — HYDROMORPHONE HYDROCHLORIDE 1 MG/ML
0.5 INJECTION, SOLUTION INTRAMUSCULAR; INTRAVENOUS; SUBCUTANEOUS ONCE
Status: DISCONTINUED | OUTPATIENT
Start: 2021-06-25 | End: 2021-06-25

## 2021-06-25 RX ORDER — SENNOSIDES 8.6 MG
650 CAPSULE ORAL EVERY 8 HOURS PRN
COMMUNITY
End: 2021-07-21

## 2021-06-25 RX ORDER — IMIPRAMINE HCL 50 MG
100 TABLET ORAL NIGHTLY
Status: DISCONTINUED | OUTPATIENT
Start: 2021-06-25 | End: 2021-06-28 | Stop reason: HOSPADM

## 2021-06-25 RX ORDER — LISINOPRIL 40 MG/1
40 TABLET ORAL DAILY
Status: DISCONTINUED | OUTPATIENT
Start: 2021-06-25 | End: 2021-06-28 | Stop reason: HOSPADM

## 2021-06-25 RX ORDER — HYDROMORPHONE HYDROCHLORIDE 1 MG/ML
0.5 INJECTION, SOLUTION INTRAMUSCULAR; INTRAVENOUS; SUBCUTANEOUS ONCE
Status: COMPLETED | OUTPATIENT
Start: 2021-06-25 | End: 2021-06-25

## 2021-06-25 RX ORDER — HYDROCODONE BITARTRATE AND ACETAMINOPHEN 7.5; 325 MG/1; MG/1
1 TABLET ORAL EVERY 4 HOURS PRN
Status: DISCONTINUED | OUTPATIENT
Start: 2021-06-25 | End: 2021-06-28 | Stop reason: HOSPADM

## 2021-06-25 RX ORDER — MORPHINE SULFATE 2 MG/ML
2 INJECTION, SOLUTION INTRAMUSCULAR; INTRAVENOUS ONCE
Status: COMPLETED | OUTPATIENT
Start: 2021-06-25 | End: 2021-06-25

## 2021-06-25 RX ORDER — ONDANSETRON 2 MG/ML
4 INJECTION INTRAMUSCULAR; INTRAVENOUS ONCE
Status: COMPLETED | OUTPATIENT
Start: 2021-06-25 | End: 2021-06-25

## 2021-06-25 RX ORDER — ASPIRIN 81 MG/1
81 TABLET ORAL DAILY
Status: DISCONTINUED | OUTPATIENT
Start: 2021-06-25 | End: 2021-06-28 | Stop reason: HOSPADM

## 2021-06-25 RX ORDER — HYDROCODONE BITARTRATE AND ACETAMINOPHEN 7.5; 325 MG/1; MG/1
1 TABLET ORAL ONCE
Status: COMPLETED | OUTPATIENT
Start: 2021-06-25 | End: 2021-06-25

## 2021-06-25 RX ORDER — ONDANSETRON 2 MG/ML
4 INJECTION INTRAMUSCULAR; INTRAVENOUS EVERY 6 HOURS PRN
Status: DISCONTINUED | OUTPATIENT
Start: 2021-06-25 | End: 2021-06-28

## 2021-06-25 RX ADMIN — IMIPRAMINE HYDROCHLORIDE 100 MG: 50 TABLET ORAL at 21:04

## 2021-06-25 RX ADMIN — MORPHINE SULFATE 2 MG: 2 INJECTION, SOLUTION INTRAMUSCULAR; INTRAVENOUS at 12:43

## 2021-06-25 RX ADMIN — ONDANSETRON 4 MG: 2 INJECTION INTRAMUSCULAR; INTRAVENOUS at 12:42

## 2021-06-25 RX ADMIN — HYDROMORPHONE HYDROCHLORIDE 0.5 MG: 1 INJECTION, SOLUTION INTRAMUSCULAR; INTRAVENOUS; SUBCUTANEOUS at 14:29

## 2021-06-25 RX ADMIN — HYDROCODONE BITARTRATE AND ACETAMINOPHEN 1 TABLET: 7.5; 325 TABLET ORAL at 12:39

## 2021-06-25 RX ADMIN — HYDROCODONE BITARTRATE AND ACETAMINOPHEN 1 TABLET: 7.5; 325 TABLET ORAL at 21:04

## 2021-06-25 RX ADMIN — MORPHINE SULFATE 2 MG: 2 INJECTION, SOLUTION INTRAMUSCULAR; INTRAVENOUS at 21:04

## 2021-06-25 RX ADMIN — ATORVASTATIN CALCIUM 20 MG: 20 TABLET, FILM COATED ORAL at 21:04

## 2021-06-25 NOTE — TELEPHONE ENCOUNTER
.Tell patient to call her family doctor.  If she can get into see her family doctor today she is to go back to the emergency room for evaluation of the abnormal UA

## 2021-06-25 NOTE — TELEPHONE ENCOUNTER
Patient's  calling stating that his wife has been up for days with extreme pain all over her body and chills, the report she was seen in the ED on 06.21.2021 but they could find nothing wrong-They have not taken the patients temp-but I did verify all her medications and she is just about to finish her medrol dose back as prescribed by Dr. Noyola-per the chart they stopped the oxycodone due to severe constipation. So she is taking OTC Tylenol.      I had Dr. Aaron review the chart as Dr. Noyola was in the OR at the time- he reccommended that the patient called there PCP Dr. Chavez to be seen today or seek urgent treatment thru urgent care or the ED for further evaluation of her extreme body pain and chills.  DX:closed intra-articular fracture of distal end of right radiusclosed displaced fracture of proximal end of left humerus    Thanks.

## 2021-06-26 LAB
ALBUMIN SERPL-MCNC: 3.1 G/DL (ref 3.5–5.2)
ALBUMIN/GLOB SERPL: 1.1 G/DL
ALP SERPL-CCNC: 139 U/L (ref 39–117)
ALT SERPL W P-5'-P-CCNC: 42 U/L (ref 1–33)
ANION GAP SERPL CALCULATED.3IONS-SCNC: 11 MMOL/L (ref 5–15)
AST SERPL-CCNC: 17 U/L (ref 1–32)
BACTERIA SPEC AEROBE CULT: NORMAL
BACTERIA SPEC AEROBE CULT: NORMAL
BACTERIA UR QL AUTO: ABNORMAL /HPF
BASOPHILS # BLD AUTO: 0.03 10*3/MM3 (ref 0–0.2)
BASOPHILS NFR BLD AUTO: 0.2 % (ref 0–1.5)
BILIRUB SERPL-MCNC: 0.4 MG/DL (ref 0–1.2)
BILIRUB UR QL STRIP: NEGATIVE
BUN SERPL-MCNC: 28 MG/DL (ref 8–23)
BUN/CREAT SERPL: 25 (ref 7–25)
CALCIUM SPEC-SCNC: 8.3 MG/DL (ref 8.6–10.5)
CHLORIDE SERPL-SCNC: 104 MMOL/L (ref 98–107)
CHOLEST SERPL-MCNC: 108 MG/DL (ref 0–200)
CLARITY UR: ABNORMAL
CO2 SERPL-SCNC: 24 MMOL/L (ref 22–29)
COLOR UR: YELLOW
CREAT SERPL-MCNC: 1.12 MG/DL (ref 0.57–1)
DEPRECATED RDW RBC AUTO: 47.7 FL (ref 37–54)
EOSINOPHIL # BLD AUTO: 0.09 10*3/MM3 (ref 0–0.4)
EOSINOPHIL NFR BLD AUTO: 0.5 % (ref 0.3–6.2)
ERYTHROCYTE [DISTWIDTH] IN BLOOD BY AUTOMATED COUNT: 14.3 % (ref 12.3–15.4)
GFR SERPL CREATININE-BSD FRML MDRD: 47 ML/MIN/1.73
GLOBULIN UR ELPH-MCNC: 2.8 GM/DL
GLUCOSE SERPL-MCNC: 117 MG/DL (ref 65–99)
GLUCOSE UR STRIP-MCNC: NEGATIVE MG/DL
HBA1C MFR BLD: 6.3 % (ref 4.8–5.6)
HCT VFR BLD AUTO: 34 % (ref 34–46.6)
HDLC SERPL-MCNC: 29 MG/DL (ref 40–60)
HGB BLD-MCNC: 10.9 G/DL (ref 12–15.9)
HGB UR QL STRIP.AUTO: ABNORMAL
HYALINE CASTS UR QL AUTO: ABNORMAL /LPF
IMM GRANULOCYTES # BLD AUTO: 0.13 10*3/MM3 (ref 0–0.05)
IMM GRANULOCYTES NFR BLD AUTO: 0.7 % (ref 0–0.5)
KETONES UR QL STRIP: NEGATIVE
LDLC SERPL CALC-MCNC: 52 MG/DL (ref 0–100)
LDLC/HDLC SERPL: 1.66 {RATIO}
LEUKOCYTE ESTERASE UR QL STRIP.AUTO: ABNORMAL
LYMPHOCYTES # BLD AUTO: 0.84 10*3/MM3 (ref 0.7–3.1)
LYMPHOCYTES NFR BLD AUTO: 4.5 % (ref 19.6–45.3)
MCH RBC QN AUTO: 28.9 PG (ref 26.6–33)
MCHC RBC AUTO-ENTMCNC: 32.1 G/DL (ref 31.5–35.7)
MCV RBC AUTO: 90.2 FL (ref 79–97)
MONOCYTES # BLD AUTO: 0.78 10*3/MM3 (ref 0.1–0.9)
MONOCYTES NFR BLD AUTO: 4.2 % (ref 5–12)
NEUTROPHILS NFR BLD AUTO: 16.66 10*3/MM3 (ref 1.7–7)
NEUTROPHILS NFR BLD AUTO: 89.9 % (ref 42.7–76)
NITRITE UR QL STRIP: POSITIVE
NRBC BLD AUTO-RTO: 0 /100 WBC (ref 0–0.2)
NT-PROBNP SERPL-MCNC: 596.3 PG/ML (ref 0–1800)
PH UR STRIP.AUTO: 6 [PH] (ref 5–8)
PLATELET # BLD AUTO: 273 10*3/MM3 (ref 140–450)
PMV BLD AUTO: 8.6 FL (ref 6–12)
POTASSIUM SERPL-SCNC: 4.1 MMOL/L (ref 3.5–5.2)
PROT SERPL-MCNC: 5.9 G/DL (ref 6–8.5)
PROT UR QL STRIP: ABNORMAL
RBC # BLD AUTO: 3.77 10*6/MM3 (ref 3.77–5.28)
RBC # UR: ABNORMAL /HPF
REF LAB TEST METHOD: ABNORMAL
SODIUM SERPL-SCNC: 139 MMOL/L (ref 136–145)
SP GR UR STRIP: 1.01 (ref 1–1.03)
SQUAMOUS #/AREA URNS HPF: ABNORMAL /HPF
TRIGL SERPL-MCNC: 155 MG/DL (ref 0–150)
TSH SERPL DL<=0.05 MIU/L-ACNC: 1.41 UIU/ML (ref 0.27–4.2)
UROBILINOGEN UR QL STRIP: ABNORMAL
VLDLC SERPL-MCNC: 27 MG/DL (ref 5–40)
WBC # BLD AUTO: 18.53 10*3/MM3 (ref 3.4–10.8)
WBC UR QL AUTO: ABNORMAL /HPF
YEAST URNS QL MICRO: ABNORMAL /HPF

## 2021-06-26 PROCEDURE — 85025 COMPLETE CBC W/AUTO DIFF WBC: CPT | Performed by: HOSPITALIST

## 2021-06-26 PROCEDURE — 36415 COLL VENOUS BLD VENIPUNCTURE: CPT | Performed by: HOSPITALIST

## 2021-06-26 PROCEDURE — 80061 LIPID PANEL: CPT | Performed by: HOSPITALIST

## 2021-06-26 PROCEDURE — 81001 URINALYSIS AUTO W/SCOPE: CPT | Performed by: HOSPITALIST

## 2021-06-26 PROCEDURE — 80053 COMPREHEN METABOLIC PANEL: CPT | Performed by: HOSPITALIST

## 2021-06-26 PROCEDURE — G0378 HOSPITAL OBSERVATION PER HR: HCPCS

## 2021-06-26 PROCEDURE — 83036 HEMOGLOBIN GLYCOSYLATED A1C: CPT | Performed by: HOSPITALIST

## 2021-06-26 PROCEDURE — 84443 ASSAY THYROID STIM HORMONE: CPT | Performed by: HOSPITALIST

## 2021-06-26 PROCEDURE — 83880 ASSAY OF NATRIURETIC PEPTIDE: CPT | Performed by: HOSPITALIST

## 2021-06-26 RX ADMIN — HYDROCODONE BITARTRATE AND ACETAMINOPHEN 1 TABLET: 7.5; 325 TABLET ORAL at 14:54

## 2021-06-26 RX ADMIN — HYDROCODONE BITARTRATE AND ACETAMINOPHEN 1 TABLET: 7.5; 325 TABLET ORAL at 02:21

## 2021-06-26 RX ADMIN — LISINOPRIL 40 MG: 40 TABLET ORAL at 08:20

## 2021-06-26 RX ADMIN — PANTOPRAZOLE SODIUM 40 MG: 40 TABLET, DELAYED RELEASE ORAL at 08:22

## 2021-06-26 RX ADMIN — HYDROCODONE BITARTRATE AND ACETAMINOPHEN 1 TABLET: 7.5; 325 TABLET ORAL at 23:42

## 2021-06-26 RX ADMIN — HYDROCODONE BITARTRATE AND ACETAMINOPHEN 1 TABLET: 7.5; 325 TABLET ORAL at 19:15

## 2021-06-26 RX ADMIN — NIFEDIPINE 30 MG: 30 TABLET, FILM COATED, EXTENDED RELEASE ORAL at 08:20

## 2021-06-26 RX ADMIN — ATORVASTATIN CALCIUM 20 MG: 20 TABLET, FILM COATED ORAL at 08:21

## 2021-06-26 RX ADMIN — OXYCODONE HYDROCHLORIDE AND ACETAMINOPHEN 500 MG: 500 TABLET ORAL at 08:21

## 2021-06-26 RX ADMIN — HYDROCODONE BITARTRATE AND ACETAMINOPHEN 1 TABLET: 7.5; 325 TABLET ORAL at 08:22

## 2021-06-26 RX ADMIN — IMIPRAMINE HYDROCHLORIDE 100 MG: 50 TABLET ORAL at 19:16

## 2021-06-26 RX ADMIN — ASPIRIN 81 MG: 81 TABLET, COATED ORAL at 08:21

## 2021-06-27 LAB
ANION GAP SERPL CALCULATED.3IONS-SCNC: 8.9 MMOL/L (ref 5–15)
BASOPHILS # BLD AUTO: 0.02 10*3/MM3 (ref 0–0.2)
BASOPHILS NFR BLD AUTO: 0.2 % (ref 0–1.5)
BUN SERPL-MCNC: 22 MG/DL (ref 8–23)
BUN/CREAT SERPL: 27.2 (ref 7–25)
CALCIUM SPEC-SCNC: 8.5 MG/DL (ref 8.6–10.5)
CHLORIDE SERPL-SCNC: 104 MMOL/L (ref 98–107)
CO2 SERPL-SCNC: 24.1 MMOL/L (ref 22–29)
CREAT SERPL-MCNC: 0.81 MG/DL (ref 0.57–1)
DEPRECATED RDW RBC AUTO: 47 FL (ref 37–54)
EOSINOPHIL # BLD AUTO: 0.05 10*3/MM3 (ref 0–0.4)
EOSINOPHIL NFR BLD AUTO: 0.4 % (ref 0.3–6.2)
ERYTHROCYTE [DISTWIDTH] IN BLOOD BY AUTOMATED COUNT: 13.9 % (ref 12.3–15.4)
GFR SERPL CREATININE-BSD FRML MDRD: 68 ML/MIN/1.73
GLUCOSE SERPL-MCNC: 147 MG/DL (ref 65–99)
HCT VFR BLD AUTO: 33.7 % (ref 34–46.6)
HGB BLD-MCNC: 10.7 G/DL (ref 12–15.9)
IMM GRANULOCYTES # BLD AUTO: 0.2 10*3/MM3 (ref 0–0.05)
IMM GRANULOCYTES NFR BLD AUTO: 1.6 % (ref 0–0.5)
LYMPHOCYTES # BLD AUTO: 0.6 10*3/MM3 (ref 0.7–3.1)
LYMPHOCYTES NFR BLD AUTO: 4.7 % (ref 19.6–45.3)
MCH RBC QN AUTO: 28.8 PG (ref 26.6–33)
MCHC RBC AUTO-ENTMCNC: 31.8 G/DL (ref 31.5–35.7)
MCV RBC AUTO: 90.8 FL (ref 79–97)
MONOCYTES # BLD AUTO: 0.58 10*3/MM3 (ref 0.1–0.9)
MONOCYTES NFR BLD AUTO: 4.5 % (ref 5–12)
NEUTROPHILS NFR BLD AUTO: 11.3 10*3/MM3 (ref 1.7–7)
NEUTROPHILS NFR BLD AUTO: 88.6 % (ref 42.7–76)
NRBC BLD AUTO-RTO: 0.1 /100 WBC (ref 0–0.2)
PLATELET # BLD AUTO: 241 10*3/MM3 (ref 140–450)
PMV BLD AUTO: 8.8 FL (ref 6–12)
POTASSIUM SERPL-SCNC: 4.2 MMOL/L (ref 3.5–5.2)
RBC # BLD AUTO: 3.71 10*6/MM3 (ref 3.77–5.28)
SODIUM SERPL-SCNC: 137 MMOL/L (ref 136–145)
WBC # BLD AUTO: 12.75 10*3/MM3 (ref 3.4–10.8)

## 2021-06-27 PROCEDURE — 97110 THERAPEUTIC EXERCISES: CPT

## 2021-06-27 PROCEDURE — 80048 BASIC METABOLIC PNL TOTAL CA: CPT | Performed by: HOSPITALIST

## 2021-06-27 PROCEDURE — 85025 COMPLETE CBC W/AUTO DIFF WBC: CPT | Performed by: HOSPITALIST

## 2021-06-27 PROCEDURE — 25010000002 CEFTRIAXONE PER 250 MG: Performed by: HOSPITALIST

## 2021-06-27 PROCEDURE — 97162 PT EVAL MOD COMPLEX 30 MIN: CPT

## 2021-06-27 RX ORDER — HYDROCHLOROTHIAZIDE 12.5 MG/1
12.5 TABLET ORAL DAILY
Qty: 90 TABLET | OUTPATIENT
Start: 2021-06-27

## 2021-06-27 RX ORDER — HYDROCHLOROTHIAZIDE 12.5 MG/1
12.5 TABLET ORAL DAILY
Qty: 30 TABLET | Refills: 0 | OUTPATIENT
Start: 2021-06-27 | End: 2021-06-28 | Stop reason: HOSPADM

## 2021-06-27 RX ORDER — CEFTRIAXONE SODIUM 1 G/50ML
1 INJECTION, SOLUTION INTRAVENOUS EVERY 24 HOURS
Status: DISCONTINUED | OUTPATIENT
Start: 2021-06-27 | End: 2021-06-28

## 2021-06-27 RX ADMIN — LISINOPRIL 40 MG: 40 TABLET ORAL at 08:40

## 2021-06-27 RX ADMIN — NIFEDIPINE 30 MG: 30 TABLET, FILM COATED, EXTENDED RELEASE ORAL at 08:39

## 2021-06-27 RX ADMIN — IMIPRAMINE HYDROCHLORIDE 100 MG: 50 TABLET ORAL at 20:53

## 2021-06-27 RX ADMIN — HYDROCODONE BITARTRATE AND ACETAMINOPHEN 1 TABLET: 7.5; 325 TABLET ORAL at 16:39

## 2021-06-27 RX ADMIN — HYDROCODONE BITARTRATE AND ACETAMINOPHEN 1 TABLET: 7.5; 325 TABLET ORAL at 04:38

## 2021-06-27 RX ADMIN — CEFTRIAXONE SODIUM 1 G: 1 INJECTION, SOLUTION INTRAVENOUS at 14:34

## 2021-06-27 RX ADMIN — ATORVASTATIN CALCIUM 20 MG: 20 TABLET, FILM COATED ORAL at 20:53

## 2021-06-27 RX ADMIN — HYDROCODONE BITARTRATE AND ACETAMINOPHEN 1 TABLET: 7.5; 325 TABLET ORAL at 20:53

## 2021-06-27 RX ADMIN — ASPIRIN 81 MG: 81 TABLET, COATED ORAL at 08:40

## 2021-06-27 RX ADMIN — HYDROCODONE BITARTRATE AND ACETAMINOPHEN 1 TABLET: 7.5; 325 TABLET ORAL at 12:42

## 2021-06-27 RX ADMIN — PANTOPRAZOLE SODIUM 40 MG: 40 TABLET, DELAYED RELEASE ORAL at 04:38

## 2021-06-27 RX ADMIN — HYDROCODONE BITARTRATE AND ACETAMINOPHEN 1 TABLET: 7.5; 325 TABLET ORAL at 08:40

## 2021-06-28 ENCOUNTER — READMISSION MANAGEMENT (OUTPATIENT)
Dept: CALL CENTER | Facility: HOSPITAL | Age: 81
End: 2021-06-28

## 2021-06-28 VITALS
TEMPERATURE: 97.7 F | SYSTOLIC BLOOD PRESSURE: 133 MMHG | WEIGHT: 250 LBS | HEART RATE: 81 BPM | HEIGHT: 66 IN | DIASTOLIC BLOOD PRESSURE: 70 MMHG | BODY MASS INDEX: 40.18 KG/M2 | OXYGEN SATURATION: 94 % | RESPIRATION RATE: 16 BRPM

## 2021-06-28 LAB
ANION GAP SERPL CALCULATED.3IONS-SCNC: 10.3 MMOL/L (ref 5–15)
BASOPHILS # BLD AUTO: 0.03 10*3/MM3 (ref 0–0.2)
BASOPHILS NFR BLD AUTO: 0.3 % (ref 0–1.5)
BUN SERPL-MCNC: 18 MG/DL (ref 8–23)
BUN/CREAT SERPL: 20.5 (ref 7–25)
CALCIUM SPEC-SCNC: 8.7 MG/DL (ref 8.6–10.5)
CHLORIDE SERPL-SCNC: 102 MMOL/L (ref 98–107)
CO2 SERPL-SCNC: 22.7 MMOL/L (ref 22–29)
CREAT SERPL-MCNC: 0.88 MG/DL (ref 0.57–1)
DEPRECATED RDW RBC AUTO: 45.4 FL (ref 37–54)
EOSINOPHIL # BLD AUTO: 0.05 10*3/MM3 (ref 0–0.4)
EOSINOPHIL NFR BLD AUTO: 0.5 % (ref 0.3–6.2)
ERYTHROCYTE [DISTWIDTH] IN BLOOD BY AUTOMATED COUNT: 14 % (ref 12.3–15.4)
GFR SERPL CREATININE-BSD FRML MDRD: 62 ML/MIN/1.73
GLUCOSE SERPL-MCNC: 147 MG/DL (ref 65–99)
HCT VFR BLD AUTO: 32.6 % (ref 34–46.6)
HGB BLD-MCNC: 10.7 G/DL (ref 12–15.9)
LYMPHOCYTES # BLD AUTO: 0.55 10*3/MM3 (ref 0.7–3.1)
LYMPHOCYTES NFR BLD AUTO: 5.5 % (ref 19.6–45.3)
MCH RBC QN AUTO: 29.1 PG (ref 26.6–33)
MCHC RBC AUTO-ENTMCNC: 32.8 G/DL (ref 31.5–35.7)
MCV RBC AUTO: 88.6 FL (ref 79–97)
MONOCYTES # BLD AUTO: 0.52 10*3/MM3 (ref 0.1–0.9)
MONOCYTES NFR BLD AUTO: 5.2 % (ref 5–12)
NEUTROPHILS NFR BLD AUTO: 8.71 10*3/MM3 (ref 1.7–7)
NEUTROPHILS NFR BLD AUTO: 87.1 % (ref 42.7–76)
PLATELET # BLD AUTO: 265 10*3/MM3 (ref 140–450)
PMV BLD AUTO: 9.1 FL (ref 6–12)
POTASSIUM SERPL-SCNC: 4.6 MMOL/L (ref 3.5–5.2)
RBC # BLD AUTO: 3.68 10*6/MM3 (ref 3.77–5.28)
SODIUM SERPL-SCNC: 135 MMOL/L (ref 136–145)
WBC # BLD AUTO: 10 10*3/MM3 (ref 3.4–10.8)

## 2021-06-28 PROCEDURE — 85025 COMPLETE CBC W/AUTO DIFF WBC: CPT | Performed by: HOSPITALIST

## 2021-06-28 PROCEDURE — 80048 BASIC METABOLIC PNL TOTAL CA: CPT | Performed by: HOSPITALIST

## 2021-06-28 RX ORDER — HYDROCODONE BITARTRATE AND ACETAMINOPHEN 7.5; 325 MG/1; MG/1
1 TABLET ORAL EVERY 4 HOURS PRN
Qty: 20 TABLET | Refills: 0 | Status: SHIPPED | OUTPATIENT
Start: 2021-06-28 | End: 2021-07-05

## 2021-06-28 RX ORDER — CEFDINIR 300 MG/1
300 CAPSULE ORAL EVERY 12 HOURS SCHEDULED
Status: DISCONTINUED | OUTPATIENT
Start: 2021-06-28 | End: 2021-06-28 | Stop reason: HOSPADM

## 2021-06-28 RX ORDER — CEFDINIR 300 MG/1
300 CAPSULE ORAL EVERY 12 HOURS SCHEDULED
Qty: 10 CAPSULE | Refills: 0 | Status: SHIPPED | OUTPATIENT
Start: 2021-06-28 | End: 2021-07-03

## 2021-06-28 RX ORDER — ASPIRIN 81 MG/1
81 TABLET ORAL DAILY
Qty: 30 TABLET | Refills: 0 | Status: SHIPPED | OUTPATIENT
Start: 2021-06-28 | End: 2021-07-29

## 2021-06-28 RX ADMIN — PANTOPRAZOLE SODIUM 40 MG: 40 TABLET, DELAYED RELEASE ORAL at 06:03

## 2021-06-28 RX ADMIN — LISINOPRIL 40 MG: 40 TABLET ORAL at 08:13

## 2021-06-28 RX ADMIN — ATORVASTATIN CALCIUM 20 MG: 20 TABLET, FILM COATED ORAL at 08:13

## 2021-06-28 RX ADMIN — OXYCODONE HYDROCHLORIDE AND ACETAMINOPHEN 500 MG: 500 TABLET ORAL at 08:13

## 2021-06-28 RX ADMIN — HYDROCODONE BITARTRATE AND ACETAMINOPHEN 1 TABLET: 7.5; 325 TABLET ORAL at 05:51

## 2021-06-28 RX ADMIN — HYDROCODONE BITARTRATE AND ACETAMINOPHEN 1 TABLET: 7.5; 325 TABLET ORAL at 09:50

## 2021-06-28 RX ADMIN — ASPIRIN 81 MG: 81 TABLET, COATED ORAL at 08:13

## 2021-06-28 NOTE — OUTREACH NOTE
Prep Survey      Responses   [facility] patient discharged from?  [place]   Is LACE score < 7 ?  No   Emergency Room discharge w/ pulse ox?  No   Eligibility  [facility]   Date of Admission  06/25/21   Date of Discharge  06/28/21   Discharge Disposition  Home or Self Care   Discharge diagnosis  Acute UTI, left humerus surgical neck fracture   Does the patient have one of the following disease processes/diagnoses(primary or secondary)?  Other   Does the patient have Home health ordered?  No   Is there a DME ordered?  No   Prep survey completed?  Yes          Hilda Burris RN

## 2021-06-28 NOTE — TELEPHONE ENCOUNTER
I belive she is admitted to the hospital right now, but last itme we talked she was no longer taking this medication and we had changed it to the nifedipine, so I am denying this for now but will see if this changes at all after she is DSC   No

## 2021-06-29 ENCOUNTER — TRANSITIONAL CARE MANAGEMENT TELEPHONE ENCOUNTER (OUTPATIENT)
Dept: CALL CENTER | Facility: HOSPITAL | Age: 81
End: 2021-06-29

## 2021-06-29 NOTE — OUTREACH NOTE
Call Center TCM Note      Responses   [facility] patient discharged from?  [place]   Does the patient have one of the following disease processes/diagnoses(primary or secondary)?  Other   TCM attempt successful?  Yes   Discharge diagnosis  Acute UTI, left humerus surgical neck fracture   Meds reviewed with patient/caregiver?  Yes   Is the patient having any side effects they believe may be caused by any medication additions or changes?  No   Does the patient have all medications ordered at discharge?  Yes   Is the patient taking all medications as directed (includes completed medication regime)?  Yes   Does the patient have a primary care provider?   Yes   Does the patient have an appointment with their PCP within 7 days of discharge?  No   Comments regarding PCP  Pt declines to sched TCM FWP with PCP Margot Chavez MD stating she will keep fwp sched in 08/2021, despite being diagnosed with UTI during hospital stay. Pt does have fwp with ortho MD on 07/02/2021.   Has the patient kept scheduled appointments due by today?  Yes   Has home health visited the patient within 72 hours of discharge?  N/A   Psychosocial issues?  No   Did the patient receive a copy of their discharge instructions?  Yes   Nursing interventions  Reviewed instructions with patient   What is the patient's perception of their health status since discharge?  Improving   Is the patient/caregiver able to teach back signs and symptoms related to disease process for when to call PCP?  Yes   Is the patient/caregiver able to teach back signs and symptoms related to disease process for when to call 911?  Yes   Is the patient/caregiver able to teach back the hierarchy of who to call/visit for symptoms/problems? PCP, Specialist, Home health nurse, Urgent Care, ED, 911  Yes   If the patient is a current smoker, are they able to teach back resources for cessation?  Not a smoker   TCM call completed?  Yes   Wrap up additional comments  Pt is  improving, she is now taking presribed Norco for fracture pain (humerus non surgical) and Omnicef for UTI. No questions at this time. Pt declines to sched TCM FWP with PCP Margot Chavez MD stating she will keep fwp sched in 08/2021, despite being diagnosed with UTI during hospital stay. Pt does have fwp with ortho MD on 07/02/2021.          Nanette Stock MA    6/29/2021, 12:20 EDT

## 2021-07-08 ENCOUNTER — READMISSION MANAGEMENT (OUTPATIENT)
Dept: CALL CENTER | Facility: HOSPITAL | Age: 81
End: 2021-07-08

## 2021-07-08 NOTE — OUTREACH NOTE
Medical Week 2 Survey      Responses   [facility] patient discharged from?  [place]   Does the patient have one of the following disease processes/diagnoses(primary or secondary)?  Other   Week 2 attempt successful?  Yes   Call start time  0939   Discharge diagnosis  Acute UTI, left humerus surgical neck fracture   Call end time  0942   Is patient permission given to speak with other caregiver?  Yes   Person spoke with today (if not patient) and relationship   Guillermo Saleh reviewed with patient/caregiver?  Yes   Is the patient having any side effects they believe may be caused by any medication additions or changes?  No   Does the patient have all medications ordered at discharge?  Yes   Is the patient taking all medications as directed (includes completed medication regime)?  Yes   Does the patient have a primary care provider?   Yes   Does the patient have an appointment with their PCP within 7 days of discharge?  No   Nursing Interventions  Educated patient on importance of making appointment, Advised patient to make appointment   Has the patient kept scheduled appointments due by today?  N/A   Has home health visited the patient within 72 hours of discharge?  N/A   Psychosocial issues?  No   Did the patient receive a copy of their discharge instructions?  Yes   Nursing interventions  Reviewed instructions with patient   What is the patient's perception of their health status since discharge?  Improving   Is the patient/caregiver able to teach back signs and symptoms related to disease process for when to call PCP?  Yes   Is the patient/caregiver able to teach back signs and symptoms related to disease process for when to call 911?  Yes   Is the patient/caregiver able to teach back the hierarchy of who to call/visit for symptoms/problems? PCP, Specialist, Home health nurse, Urgent Care, ED, 911  Yes   If the patient is a current smoker, are they able to teach back resources for cessation?  Not a  smoker   Week 2 Call Completed?  Yes   Wrap up additional comments  Quick call.          Yang Mcnair RN

## 2021-07-11 ENCOUNTER — APPOINTMENT (OUTPATIENT)
Dept: CT IMAGING | Facility: HOSPITAL | Age: 81
End: 2021-07-11

## 2021-07-11 ENCOUNTER — ANESTHESIA EVENT (OUTPATIENT)
Dept: PERIOP | Facility: HOSPITAL | Age: 81
End: 2021-07-11

## 2021-07-11 ENCOUNTER — HOSPITAL ENCOUNTER (INPATIENT)
Facility: HOSPITAL | Age: 81
LOS: 3 days | Discharge: HOME | End: 2021-07-14
Attending: EMERGENCY MEDICINE | Admitting: UROLOGY

## 2021-07-11 ENCOUNTER — APPOINTMENT (OUTPATIENT)
Dept: GENERAL RADIOLOGY | Facility: HOSPITAL | Age: 81
End: 2021-07-11

## 2021-07-11 ENCOUNTER — ANESTHESIA (OUTPATIENT)
Dept: PERIOP | Facility: HOSPITAL | Age: 81
End: 2021-07-11

## 2021-07-11 DIAGNOSIS — N20.0 NEPHROLITHIASIS: ICD-10-CM

## 2021-07-11 DIAGNOSIS — N20.1 LEFT URETERAL STONE: ICD-10-CM

## 2021-07-11 DIAGNOSIS — S42.215A CLOSED NONDISPLACED FRACTURE OF SURGICAL NECK OF LEFT HUMERUS, UNSPECIFIED FRACTURE MORPHOLOGY, INITIAL ENCOUNTER: ICD-10-CM

## 2021-07-11 DIAGNOSIS — R65.20 SEPSIS WITH ACUTE RENAL FAILURE WITHOUT SEPTIC SHOCK, DUE TO UNSPECIFIED ORGANISM, UNSPECIFIED ACUTE RENAL FAILURE TYPE (HCC): Primary | ICD-10-CM

## 2021-07-11 DIAGNOSIS — A41.9 SEPSIS WITH ACUTE RENAL FAILURE WITHOUT SEPTIC SHOCK, DUE TO UNSPECIFIED ORGANISM, UNSPECIFIED ACUTE RENAL FAILURE TYPE (HCC): Primary | ICD-10-CM

## 2021-07-11 DIAGNOSIS — N39.0 RECURRENT URINARY TRACT INFECTION: ICD-10-CM

## 2021-07-11 DIAGNOSIS — N17.9 SEPSIS WITH ACUTE RENAL FAILURE WITHOUT SEPTIC SHOCK, DUE TO UNSPECIFIED ORGANISM, UNSPECIFIED ACUTE RENAL FAILURE TYPE (HCC): Primary | ICD-10-CM

## 2021-07-11 PROBLEM — I10 ESSENTIAL HYPERTENSION: Status: ACTIVE | Noted: 2021-07-11

## 2021-07-11 PROBLEM — E87.20 METABOLIC ACIDOSIS: Status: ACTIVE | Noted: 2021-07-11

## 2021-07-11 PROBLEM — Z86.79 HISTORY OF VENTRICULAR TACHYCARDIA: Status: ACTIVE | Noted: 2021-07-11

## 2021-07-11 PROBLEM — S42.302A LEFT HUMERAL FRACTURE: Status: ACTIVE | Noted: 2021-07-11

## 2021-07-11 PROBLEM — N13.9 OBSTRUCTIVE UROPATHY: Status: ACTIVE | Noted: 2021-07-11

## 2021-07-11 PROBLEM — R73.9 HYPERGLYCEMIA: Status: ACTIVE | Noted: 2021-07-11

## 2021-07-11 PROBLEM — D64.9 ANEMIA: Status: ACTIVE | Noted: 2021-07-11

## 2021-07-11 LAB
ALBUMIN SERPL-MCNC: 3.3 G/DL (ref 3.5–5.2)
ALBUMIN/GLOB SERPL: 0.9 G/DL
ALP SERPL-CCNC: 184 U/L (ref 39–117)
ALT SERPL W P-5'-P-CCNC: 25 U/L (ref 1–33)
ANION GAP SERPL CALCULATED.3IONS-SCNC: 15.1 MMOL/L (ref 5–15)
AST SERPL-CCNC: 25 U/L (ref 1–32)
BACTERIA UR QL AUTO: ABNORMAL /HPF
BASOPHILS # BLD AUTO: 0.01 10*3/MM3 (ref 0–0.2)
BASOPHILS NFR BLD AUTO: 0.2 % (ref 0–1.5)
BILIRUB SERPL-MCNC: 0.6 MG/DL (ref 0–1.2)
BILIRUB UR QL STRIP: NEGATIVE
BUN SERPL-MCNC: 27 MG/DL (ref 8–23)
BUN/CREAT SERPL: 20.8 (ref 7–25)
CALCIUM SPEC-SCNC: 8.8 MG/DL (ref 8.6–10.5)
CHLORIDE SERPL-SCNC: 104 MMOL/L (ref 98–107)
CLARITY UR: ABNORMAL
CO2 SERPL-SCNC: 16.9 MMOL/L (ref 22–29)
COLOR UR: YELLOW
CREAT SERPL-MCNC: 1.3 MG/DL (ref 0.57–1)
D-LACTATE SERPL-SCNC: 2.5 MMOL/L (ref 0.5–2)
DEPRECATED RDW RBC AUTO: 47.6 FL (ref 37–54)
EOSINOPHIL # BLD AUTO: 0.01 10*3/MM3 (ref 0–0.4)
EOSINOPHIL NFR BLD AUTO: 0.2 % (ref 0.3–6.2)
ERYTHROCYTE [DISTWIDTH] IN BLOOD BY AUTOMATED COUNT: 14.6 % (ref 12.3–15.4)
GFR SERPL CREATININE-BSD FRML MDRD: 40 ML/MIN/1.73
GLOBULIN UR ELPH-MCNC: 3.7 GM/DL
GLUCOSE SERPL-MCNC: 187 MG/DL (ref 65–99)
GLUCOSE UR STRIP-MCNC: NEGATIVE MG/DL
HCT VFR BLD AUTO: 32.1 % (ref 34–46.6)
HGB BLD-MCNC: 10 G/DL (ref 12–15.9)
HGB UR QL STRIP.AUTO: ABNORMAL
HOLD SPECIMEN: NORMAL
HOLD SPECIMEN: NORMAL
HYALINE CASTS UR QL AUTO: ABNORMAL /LPF
IMM GRANULOCYTES # BLD AUTO: 0.03 10*3/MM3 (ref 0–0.05)
IMM GRANULOCYTES NFR BLD AUTO: 0.5 % (ref 0–0.5)
KETONES UR QL STRIP: NEGATIVE
LEUKOCYTE ESTERASE UR QL STRIP.AUTO: ABNORMAL
LYMPHOCYTES # BLD AUTO: 0.32 10*3/MM3 (ref 0.7–3.1)
LYMPHOCYTES NFR BLD AUTO: 4.9 % (ref 19.6–45.3)
MCH RBC QN AUTO: 27.9 PG (ref 26.6–33)
MCHC RBC AUTO-ENTMCNC: 31.2 G/DL (ref 31.5–35.7)
MCV RBC AUTO: 89.4 FL (ref 79–97)
MONOCYTES # BLD AUTO: 0.33 10*3/MM3 (ref 0.1–0.9)
MONOCYTES NFR BLD AUTO: 5 % (ref 5–12)
NEUTROPHILS NFR BLD AUTO: 5.85 10*3/MM3 (ref 1.7–7)
NEUTROPHILS NFR BLD AUTO: 89.2 % (ref 42.7–76)
NITRITE UR QL STRIP: POSITIVE
NRBC BLD AUTO-RTO: 0 /100 WBC (ref 0–0.2)
PH UR STRIP.AUTO: <=5 [PH] (ref 4.5–8)
PLATELET # BLD AUTO: 232 10*3/MM3 (ref 140–450)
PMV BLD AUTO: 8.8 FL (ref 6–12)
POTASSIUM SERPL-SCNC: 4.3 MMOL/L (ref 3.5–5.2)
PROCALCITONIN SERPL-MCNC: 1.8 NG/ML (ref 0–0.25)
PROT SERPL-MCNC: 7 G/DL (ref 6–8.5)
PROT UR QL STRIP: NEGATIVE
RBC # BLD AUTO: 3.59 10*6/MM3 (ref 3.77–5.28)
RBC # UR: ABNORMAL /HPF
REF LAB TEST METHOD: ABNORMAL
SARS-COV-2 RNA PNL SPEC NAA+PROBE: NOT DETECTED
SODIUM SERPL-SCNC: 136 MMOL/L (ref 136–145)
SP GR UR STRIP: 1.01 (ref 1–1.03)
SQUAMOUS #/AREA URNS HPF: ABNORMAL /HPF
UROBILINOGEN UR QL STRIP: ABNORMAL
WBC # BLD AUTO: 6.55 10*3/MM3 (ref 3.4–10.8)
WBC UR QL AUTO: ABNORMAL /HPF
WHOLE BLOOD HOLD SPECIMEN: NORMAL

## 2021-07-11 PROCEDURE — 74177 CT ABD & PELVIS W/CONTRAST: CPT

## 2021-07-11 PROCEDURE — 85025 COMPLETE CBC W/AUTO DIFF WBC: CPT

## 2021-07-11 PROCEDURE — 87040 BLOOD CULTURE FOR BACTERIA: CPT

## 2021-07-11 PROCEDURE — 99285 EMERGENCY DEPT VISIT HI MDM: CPT | Performed by: PHYSICIAN ASSISTANT

## 2021-07-11 PROCEDURE — P9612 CATHETERIZE FOR URINE SPEC: HCPCS

## 2021-07-11 PROCEDURE — 83605 ASSAY OF LACTIC ACID: CPT

## 2021-07-11 PROCEDURE — 0T778DZ DILATION OF LEFT URETER WITH INTRALUMINAL DEVICE, VIA NATURAL OR ARTIFICIAL OPENING ENDOSCOPIC: ICD-10-PCS | Performed by: UROLOGY

## 2021-07-11 PROCEDURE — 81001 URINALYSIS AUTO W/SCOPE: CPT | Performed by: PHYSICIAN ASSISTANT

## 2021-07-11 PROCEDURE — 87077 CULTURE AEROBIC IDENTIFY: CPT

## 2021-07-11 PROCEDURE — 99285 EMERGENCY DEPT VISIT HI MDM: CPT

## 2021-07-11 PROCEDURE — 87150 DNA/RNA AMPLIFIED PROBE: CPT

## 2021-07-11 PROCEDURE — 87186 SC STD MICRODIL/AGAR DIL: CPT

## 2021-07-11 PROCEDURE — 71045 X-RAY EXAM CHEST 1 VIEW: CPT

## 2021-07-11 PROCEDURE — 93005 ELECTROCARDIOGRAM TRACING: CPT | Performed by: PHYSICIAN ASSISTANT

## 2021-07-11 PROCEDURE — 80053 COMPREHEN METABOLIC PANEL: CPT

## 2021-07-11 PROCEDURE — 93010 ELECTROCARDIOGRAM REPORT: CPT | Performed by: INTERNAL MEDICINE

## 2021-07-11 PROCEDURE — 84145 PROCALCITONIN (PCT): CPT | Performed by: PHYSICIAN ASSISTANT

## 2021-07-11 PROCEDURE — 87086 URINE CULTURE/COLONY COUNT: CPT | Performed by: PHYSICIAN ASSISTANT

## 2021-07-11 PROCEDURE — 87635 SARS-COV-2 COVID-19 AMP PRB: CPT | Performed by: PHYSICIAN ASSISTANT

## 2021-07-11 PROCEDURE — 99223 1ST HOSP IP/OBS HIGH 75: CPT | Performed by: INTERNAL MEDICINE

## 2021-07-11 PROCEDURE — 85045 AUTOMATED RETICULOCYTE COUNT: CPT | Performed by: INTERNAL MEDICINE

## 2021-07-11 PROCEDURE — 87186 SC STD MICRODIL/AGAR DIL: CPT | Performed by: PHYSICIAN ASSISTANT

## 2021-07-11 PROCEDURE — 0 IOPAMIDOL PER 1 ML: Performed by: EMERGENCY MEDICINE

## 2021-07-11 PROCEDURE — 25010000002 PIPERACILLIN SOD-TAZOBACTAM PER 1 G: Performed by: PHYSICIAN ASSISTANT

## 2021-07-11 PROCEDURE — BT1F1ZZ FLUOROSCOPY OF LEFT KIDNEY, URETER AND BLADDER USING LOW OSMOLAR CONTRAST: ICD-10-PCS | Performed by: UROLOGY

## 2021-07-11 RX ORDER — SODIUM CHLORIDE 0.9 % (FLUSH) 0.9 %
10 SYRINGE (ML) INJECTION AS NEEDED
Status: DISCONTINUED | OUTPATIENT
Start: 2021-07-11 | End: 2021-07-14 | Stop reason: HOSPADM

## 2021-07-11 RX ORDER — ACETAMINOPHEN 500 MG
1000 TABLET ORAL ONCE
Status: COMPLETED | OUTPATIENT
Start: 2021-07-11 | End: 2021-07-11

## 2021-07-11 RX ADMIN — PIPERACILLIN AND TAZOBACTAM 4.5 G: 4; .5 INJECTION, POWDER, LYOPHILIZED, FOR SOLUTION INTRAVENOUS; PARENTERAL at 21:38

## 2021-07-11 RX ADMIN — SODIUM CHLORIDE 1779 ML: 9 INJECTION, SOLUTION INTRAVENOUS at 21:20

## 2021-07-11 RX ADMIN — ACETAMINOPHEN 1000 MG: 500 TABLET, FILM COATED ORAL at 21:30

## 2021-07-11 RX ADMIN — IOPAMIDOL 100 ML: 755 INJECTION, SOLUTION INTRAVENOUS at 22:38

## 2021-07-12 ENCOUNTER — READMISSION MANAGEMENT (OUTPATIENT)
Dept: CALL CENTER | Facility: HOSPITAL | Age: 81
End: 2021-07-12

## 2021-07-12 ENCOUNTER — APPOINTMENT (OUTPATIENT)
Dept: GENERAL RADIOLOGY | Facility: HOSPITAL | Age: 81
End: 2021-07-12

## 2021-07-12 LAB
ALBUMIN SERPL-MCNC: 2.6 G/DL (ref 3.5–5.2)
ALBUMIN SERPL-MCNC: 2.9 G/DL (ref 3.5–5.2)
ALBUMIN/GLOB SERPL: 0.8 G/DL
ALP SERPL-CCNC: 156 U/L (ref 39–117)
ALT SERPL W P-5'-P-CCNC: 25 U/L (ref 1–33)
AMMONIA BLD-SCNC: 80 UMOL/L (ref 11–51)
ANION GAP SERPL CALCULATED.3IONS-SCNC: 10.8 MMOL/L (ref 5–15)
ANION GAP SERPL CALCULATED.3IONS-SCNC: 12.6 MMOL/L (ref 5–15)
AST SERPL-CCNC: 31 U/L (ref 1–32)
BACTERIA BLD CULT: ABNORMAL
BASOPHILS # BLD AUTO: 0.01 10*3/MM3 (ref 0–0.2)
BASOPHILS NFR BLD AUTO: 0.1 % (ref 0–1.5)
BILIRUB SERPL-MCNC: 0.5 MG/DL (ref 0–1.2)
BOTTLE TYPE: ABNORMAL
BUN SERPL-MCNC: 19 MG/DL (ref 8–23)
BUN SERPL-MCNC: 25 MG/DL (ref 8–23)
BUN/CREAT SERPL: 15.8 (ref 7–25)
BUN/CREAT SERPL: 23.8 (ref 7–25)
CALCIUM SPEC-SCNC: 8.3 MG/DL (ref 8.6–10.5)
CALCIUM SPEC-SCNC: 8.8 MG/DL (ref 8.6–10.5)
CHLORIDE SERPL-SCNC: 107 MMOL/L (ref 98–107)
CHLORIDE SERPL-SCNC: 109 MMOL/L (ref 98–107)
CO2 SERPL-SCNC: 17.4 MMOL/L (ref 22–29)
CO2 SERPL-SCNC: 18.2 MMOL/L (ref 22–29)
CREAT SERPL-MCNC: 1.05 MG/DL (ref 0.57–1)
CREAT SERPL-MCNC: 1.2 MG/DL (ref 0.57–1)
D-LACTATE SERPL-SCNC: 2.4 MMOL/L (ref 0.5–2)
D-LACTATE SERPL-SCNC: 2.5 MMOL/L (ref 0.5–2)
DEPRECATED RDW RBC AUTO: 49.5 FL (ref 37–54)
DEPRECATED RDW RBC AUTO: 49.6 FL (ref 37–54)
EOSINOPHIL # BLD AUTO: 0 10*3/MM3 (ref 0–0.4)
EOSINOPHIL NFR BLD AUTO: 0 % (ref 0.3–6.2)
ERYTHROCYTE [DISTWIDTH] IN BLOOD BY AUTOMATED COUNT: 14.6 % (ref 12.3–15.4)
ERYTHROCYTE [DISTWIDTH] IN BLOOD BY AUTOMATED COUNT: 14.7 % (ref 12.3–15.4)
FERRITIN SERPL-MCNC: 163 NG/ML (ref 13–150)
FOLATE SERPL-MCNC: 10.3 NG/ML (ref 4.78–24.2)
GFR SERPL CREATININE-BSD FRML MDRD: 43 ML/MIN/1.73
GFR SERPL CREATININE-BSD FRML MDRD: 51 ML/MIN/1.73
GLOBULIN UR ELPH-MCNC: 3.2 GM/DL
GLUCOSE SERPL-MCNC: 123 MG/DL (ref 65–99)
GLUCOSE SERPL-MCNC: 139 MG/DL (ref 65–99)
HBA1C MFR BLD: 6.4 % (ref 4.8–5.6)
HCT VFR BLD AUTO: 28.5 % (ref 34–46.6)
HCT VFR BLD AUTO: 29.7 % (ref 34–46.6)
HGB BLD-MCNC: 8.8 G/DL (ref 12–15.9)
HGB BLD-MCNC: 8.9 G/DL (ref 12–15.9)
IMM GRANULOCYTES # BLD AUTO: 0.09 10*3/MM3 (ref 0–0.05)
IMM GRANULOCYTES NFR BLD AUTO: 0.7 % (ref 0–0.5)
IRON 24H UR-MRATE: 12 MCG/DL (ref 37–145)
IRON SATN MFR SERPL: 5 % (ref 20–50)
LYMPHOCYTES # BLD AUTO: 0.36 10*3/MM3 (ref 0.7–3.1)
LYMPHOCYTES NFR BLD AUTO: 3 % (ref 19.6–45.3)
MCH RBC QN AUTO: 27.6 PG (ref 26.6–33)
MCH RBC QN AUTO: 28.1 PG (ref 26.6–33)
MCHC RBC AUTO-ENTMCNC: 30 G/DL (ref 31.5–35.7)
MCHC RBC AUTO-ENTMCNC: 30.9 G/DL (ref 31.5–35.7)
MCV RBC AUTO: 91.1 FL (ref 79–97)
MCV RBC AUTO: 92.2 FL (ref 79–97)
MONOCYTES # BLD AUTO: 0.75 10*3/MM3 (ref 0.1–0.9)
MONOCYTES NFR BLD AUTO: 6.2 % (ref 5–12)
NEUTROPHILS NFR BLD AUTO: 10.98 10*3/MM3 (ref 1.7–7)
NEUTROPHILS NFR BLD AUTO: 90 % (ref 42.7–76)
NRBC BLD AUTO-RTO: 0 /100 WBC (ref 0–0.2)
PHOSPHATE SERPL-MCNC: 3.3 MG/DL (ref 2.5–4.5)
PLAT MORPH BLD: NORMAL
PLATELET # BLD AUTO: 214 10*3/MM3 (ref 140–450)
PLATELET # BLD AUTO: 233 10*3/MM3 (ref 140–450)
PMV BLD AUTO: 9.3 FL (ref 6–12)
PMV BLD AUTO: 9.4 FL (ref 6–12)
POTASSIUM SERPL-SCNC: 4.4 MMOL/L (ref 3.5–5.2)
POTASSIUM SERPL-SCNC: 4.9 MMOL/L (ref 3.5–5.2)
PROT SERPL-MCNC: 5.8 G/DL (ref 6–8.5)
QT INTERVAL: 362 MS
RBC # BLD AUTO: 3.13 10*6/MM3 (ref 3.77–5.28)
RBC # BLD AUTO: 3.22 10*6/MM3 (ref 3.77–5.28)
RBC MORPH BLD: NORMAL
RETICS # AUTO: 0.07 10*6/MM3 (ref 0.02–0.13)
RETICS/RBC NFR AUTO: 1.87 % (ref 0.7–1.9)
SODIUM SERPL-SCNC: 137 MMOL/L (ref 136–145)
SODIUM SERPL-SCNC: 138 MMOL/L (ref 136–145)
TIBC SERPL-MCNC: 227 MCG/DL (ref 298–536)
TROPONIN T SERPL-MCNC: <0.01 NG/ML (ref 0–0.03)
UIBC SERPL-MCNC: 215 MCG/DL (ref 112–346)
VIT B12 BLD-MCNC: 378 PG/ML (ref 211–946)
WBC # BLD AUTO: 11.18 10*3/MM3 (ref 3.4–10.8)
WBC # BLD AUTO: 12.19 10*3/MM3 (ref 3.4–10.8)
WBC MORPH BLD: NORMAL

## 2021-07-12 PROCEDURE — 82746 ASSAY OF FOLIC ACID SERUM: CPT | Performed by: INTERNAL MEDICINE

## 2021-07-12 PROCEDURE — 83605 ASSAY OF LACTIC ACID: CPT | Performed by: UROLOGY

## 2021-07-12 PROCEDURE — C1758 CATHETER, URETERAL: HCPCS | Performed by: UROLOGY

## 2021-07-12 PROCEDURE — 99233 SBSQ HOSP IP/OBS HIGH 50: CPT | Performed by: INTERNAL MEDICINE

## 2021-07-12 PROCEDURE — 25010000002 ERTAPENEM PER 500 MG: Performed by: INTERNAL MEDICINE

## 2021-07-12 PROCEDURE — 85027 COMPLETE CBC AUTOMATED: CPT | Performed by: INTERNAL MEDICINE

## 2021-07-12 PROCEDURE — 83540 ASSAY OF IRON: CPT | Performed by: INTERNAL MEDICINE

## 2021-07-12 PROCEDURE — 80069 RENAL FUNCTION PANEL: CPT | Performed by: INTERNAL MEDICINE

## 2021-07-12 PROCEDURE — 74420 UROGRAPHY RTRGR +-KUB: CPT

## 2021-07-12 PROCEDURE — 25010000002 FENTANYL CITRATE (PF) 50 MCG/ML SOLUTION: Performed by: REGISTERED NURSE

## 2021-07-12 PROCEDURE — 83550 IRON BINDING TEST: CPT | Performed by: INTERNAL MEDICINE

## 2021-07-12 PROCEDURE — 82607 VITAMIN B-12: CPT | Performed by: INTERNAL MEDICINE

## 2021-07-12 PROCEDURE — 25010000002 IOPAMIDOL 61 % SOLUTION: Performed by: UROLOGY

## 2021-07-12 PROCEDURE — 85007 BL SMEAR W/DIFF WBC COUNT: CPT | Performed by: INTERNAL MEDICINE

## 2021-07-12 PROCEDURE — 85025 COMPLETE CBC W/AUTO DIFF WBC: CPT | Performed by: INTERNAL MEDICINE

## 2021-07-12 PROCEDURE — 83605 ASSAY OF LACTIC ACID: CPT | Performed by: INTERNAL MEDICINE

## 2021-07-12 PROCEDURE — 97165 OT EVAL LOW COMPLEX 30 MIN: CPT

## 2021-07-12 PROCEDURE — 80053 COMPREHEN METABOLIC PANEL: CPT | Performed by: INTERNAL MEDICINE

## 2021-07-12 PROCEDURE — 82728 ASSAY OF FERRITIN: CPT | Performed by: INTERNAL MEDICINE

## 2021-07-12 PROCEDURE — C1769 GUIDE WIRE: HCPCS | Performed by: UROLOGY

## 2021-07-12 PROCEDURE — 94799 UNLISTED PULMONARY SVC/PX: CPT

## 2021-07-12 PROCEDURE — 25010000002 CEFEPIME-DEXTROSE 2-5 GM-%(50ML) RECONSTITUTED SOLUTION: Performed by: INTERNAL MEDICINE

## 2021-07-12 PROCEDURE — 82140 ASSAY OF AMMONIA: CPT | Performed by: INTERNAL MEDICINE

## 2021-07-12 PROCEDURE — 87040 BLOOD CULTURE FOR BACTERIA: CPT | Performed by: INTERNAL MEDICINE

## 2021-07-12 PROCEDURE — 97161 PT EVAL LOW COMPLEX 20 MIN: CPT

## 2021-07-12 PROCEDURE — 84484 ASSAY OF TROPONIN QUANT: CPT | Performed by: INTERNAL MEDICINE

## 2021-07-12 PROCEDURE — 25010000002 ONDANSETRON PER 1 MG: Performed by: REGISTERED NURSE

## 2021-07-12 PROCEDURE — 25010000002 PROPOFOL 10 MG/ML EMULSION: Performed by: REGISTERED NURSE

## 2021-07-12 PROCEDURE — 83036 HEMOGLOBIN GLYCOSYLATED A1C: CPT | Performed by: INTERNAL MEDICINE

## 2021-07-12 PROCEDURE — 25010000002 DEXAMETHASONE PER 1 MG: Performed by: REGISTERED NURSE

## 2021-07-12 PROCEDURE — 25010000002 HYDROMORPHONE PER 4 MG: Performed by: REGISTERED NURSE

## 2021-07-12 PROCEDURE — C2617 STENT, NON-COR, TEM W/O DEL: HCPCS | Performed by: UROLOGY

## 2021-07-12 DEVICE — URETERAL STENT
Type: IMPLANTABLE DEVICE | Site: KIDNEY | Status: FUNCTIONAL
Brand: POLARIS™ ULTRA

## 2021-07-12 RX ORDER — ONDANSETRON 2 MG/ML
4 INJECTION INTRAMUSCULAR; INTRAVENOUS EVERY 6 HOURS PRN
Status: DISCONTINUED | OUTPATIENT
Start: 2021-07-12 | End: 2021-07-14 | Stop reason: HOSPADM

## 2021-07-12 RX ORDER — LISINOPRIL 20 MG/1
40 TABLET ORAL DAILY
Status: DISCONTINUED | OUTPATIENT
Start: 2021-07-12 | End: 2021-07-12

## 2021-07-12 RX ORDER — DEXMEDETOMIDINE HYDROCHLORIDE 100 UG/ML
INJECTION, SOLUTION INTRAVENOUS AS NEEDED
Status: DISCONTINUED | OUTPATIENT
Start: 2021-07-12 | End: 2021-07-12 | Stop reason: SURG

## 2021-07-12 RX ORDER — CHOLECALCIFEROL (VITAMIN D3) 125 MCG
5 CAPSULE ORAL NIGHTLY PRN
Status: DISCONTINUED | OUTPATIENT
Start: 2021-07-12 | End: 2021-07-14 | Stop reason: HOSPADM

## 2021-07-12 RX ORDER — AMOXICILLIN 250 MG
2 CAPSULE ORAL 2 TIMES DAILY
Status: DISCONTINUED | OUTPATIENT
Start: 2021-07-12 | End: 2021-07-14 | Stop reason: HOSPADM

## 2021-07-12 RX ORDER — MORPHINE SULFATE 2 MG/ML
1 INJECTION, SOLUTION INTRAMUSCULAR; INTRAVENOUS EVERY 4 HOURS PRN
Status: DISCONTINUED | OUTPATIENT
Start: 2021-07-12 | End: 2021-07-14 | Stop reason: HOSPADM

## 2021-07-12 RX ORDER — ACETAMINOPHEN 160 MG/5ML
650 SOLUTION ORAL EVERY 4 HOURS PRN
Status: DISCONTINUED | OUTPATIENT
Start: 2021-07-12 | End: 2021-07-14 | Stop reason: HOSPADM

## 2021-07-12 RX ORDER — SCOLOPAMINE TRANSDERMAL SYSTEM 1 MG/1
PATCH, EXTENDED RELEASE TRANSDERMAL AS NEEDED
Status: DISCONTINUED | OUTPATIENT
Start: 2021-07-12 | End: 2021-07-12 | Stop reason: SURG

## 2021-07-12 RX ORDER — POLYETHYLENE GLYCOL 3350 17 G/17G
17 POWDER, FOR SOLUTION ORAL DAILY PRN
Status: DISCONTINUED | OUTPATIENT
Start: 2021-07-12 | End: 2021-07-14 | Stop reason: HOSPADM

## 2021-07-12 RX ORDER — SODIUM CHLORIDE 0.9 % (FLUSH) 0.9 %
10 SYRINGE (ML) INJECTION AS NEEDED
Status: DISCONTINUED | OUTPATIENT
Start: 2021-07-12 | End: 2021-07-14 | Stop reason: HOSPADM

## 2021-07-12 RX ORDER — LIDOCAINE HYDROCHLORIDE 20 MG/ML
INJECTION, SOLUTION INFILTRATION; PERINEURAL AS NEEDED
Status: DISCONTINUED | OUTPATIENT
Start: 2021-07-12 | End: 2021-07-12 | Stop reason: SURG

## 2021-07-12 RX ORDER — HYDROMORPHONE HYDROCHLORIDE 1 MG/ML
0.5 INJECTION, SOLUTION INTRAMUSCULAR; INTRAVENOUS; SUBCUTANEOUS
Status: DISCONTINUED | OUTPATIENT
Start: 2021-07-12 | End: 2021-07-12

## 2021-07-12 RX ORDER — NIFEDIPINE 30 MG/1
30 TABLET, EXTENDED RELEASE ORAL DAILY
Status: DISCONTINUED | OUTPATIENT
Start: 2021-07-12 | End: 2021-07-12

## 2021-07-12 RX ORDER — FENTANYL CITRATE 50 UG/ML
25 INJECTION, SOLUTION INTRAMUSCULAR; INTRAVENOUS
Status: DISCONTINUED | OUTPATIENT
Start: 2021-07-12 | End: 2021-07-12 | Stop reason: HOSPADM

## 2021-07-12 RX ORDER — OXYCODONE AND ACETAMINOPHEN 7.5; 325 MG/1; MG/1
1 TABLET ORAL ONCE AS NEEDED
Status: DISCONTINUED | OUTPATIENT
Start: 2021-07-12 | End: 2021-07-12 | Stop reason: HOSPADM

## 2021-07-12 RX ORDER — ATORVASTATIN CALCIUM 20 MG/1
20 TABLET, FILM COATED ORAL DAILY
Status: DISCONTINUED | OUTPATIENT
Start: 2021-07-13 | End: 2021-07-14 | Stop reason: HOSPADM

## 2021-07-12 RX ORDER — SODIUM CHLORIDE 9 MG/ML
INJECTION, SOLUTION INTRAVENOUS CONTINUOUS PRN
Status: DISCONTINUED | OUTPATIENT
Start: 2021-07-12 | End: 2021-07-12 | Stop reason: SURG

## 2021-07-12 RX ORDER — HYDROMORPHONE HYDROCHLORIDE 1 MG/ML
0.5 INJECTION, SOLUTION INTRAMUSCULAR; INTRAVENOUS; SUBCUTANEOUS
Status: DISCONTINUED | OUTPATIENT
Start: 2021-07-12 | End: 2021-07-12 | Stop reason: HOSPADM

## 2021-07-12 RX ORDER — ROCURONIUM BROMIDE 10 MG/ML
INJECTION, SOLUTION INTRAVENOUS AS NEEDED
Status: DISCONTINUED | OUTPATIENT
Start: 2021-07-12 | End: 2021-07-12 | Stop reason: SURG

## 2021-07-12 RX ORDER — IMIPRAMINE HCL 25 MG
100 TABLET ORAL NIGHTLY
Status: DISCONTINUED | OUTPATIENT
Start: 2021-07-12 | End: 2021-07-12

## 2021-07-12 RX ORDER — ATORVASTATIN CALCIUM 20 MG/1
20 TABLET, FILM COATED ORAL DAILY
Status: DISCONTINUED | OUTPATIENT
Start: 2021-07-12 | End: 2021-07-12

## 2021-07-12 RX ORDER — PROPOFOL 10 MG/ML
VIAL (ML) INTRAVENOUS AS NEEDED
Status: DISCONTINUED | OUTPATIENT
Start: 2021-07-12 | End: 2021-07-12 | Stop reason: SURG

## 2021-07-12 RX ORDER — LISINOPRIL 10 MG/1
10 TABLET ORAL DAILY
Status: DISCONTINUED | OUTPATIENT
Start: 2021-07-13 | End: 2021-07-14 | Stop reason: HOSPADM

## 2021-07-12 RX ORDER — ESTRADIOL 0.1 MG/G
1 CREAM VAGINAL NIGHTLY
Status: DISCONTINUED | OUTPATIENT
Start: 2021-07-12 | End: 2021-07-14 | Stop reason: HOSPADM

## 2021-07-12 RX ORDER — BISACODYL 5 MG/1
5 TABLET, DELAYED RELEASE ORAL DAILY PRN
Status: DISCONTINUED | OUTPATIENT
Start: 2021-07-12 | End: 2021-07-14 | Stop reason: HOSPADM

## 2021-07-12 RX ORDER — MAGNESIUM HYDROXIDE 1200 MG/15ML
LIQUID ORAL AS NEEDED
Status: DISCONTINUED | OUTPATIENT
Start: 2021-07-12 | End: 2021-07-12 | Stop reason: HOSPADM

## 2021-07-12 RX ORDER — IRON POLYSACCHARIDE COMPLEX 150 MG
150 CAPSULE ORAL DAILY
Status: DISCONTINUED | OUTPATIENT
Start: 2021-07-12 | End: 2021-07-14 | Stop reason: HOSPADM

## 2021-07-12 RX ORDER — NALOXONE HCL 0.4 MG/ML
0.4 VIAL (ML) INJECTION
Status: DISCONTINUED | OUTPATIENT
Start: 2021-07-12 | End: 2021-07-14 | Stop reason: HOSPADM

## 2021-07-12 RX ORDER — SODIUM CHLORIDE, SODIUM LACTATE, POTASSIUM CHLORIDE, CALCIUM CHLORIDE 600; 310; 30; 20 MG/100ML; MG/100ML; MG/100ML; MG/100ML
75 INJECTION, SOLUTION INTRAVENOUS CONTINUOUS
Status: DISCONTINUED | OUTPATIENT
Start: 2021-07-12 | End: 2021-07-12

## 2021-07-12 RX ORDER — SODIUM CHLORIDE 9 MG/ML
125 INJECTION, SOLUTION INTRAVENOUS CONTINUOUS
Status: DISCONTINUED | OUTPATIENT
Start: 2021-07-12 | End: 2021-07-12

## 2021-07-12 RX ORDER — ACETAMINOPHEN 650 MG/1
650 SUPPOSITORY RECTAL EVERY 4 HOURS PRN
Status: DISCONTINUED | OUTPATIENT
Start: 2021-07-12 | End: 2021-07-14 | Stop reason: HOSPADM

## 2021-07-12 RX ORDER — DEXAMETHASONE SODIUM PHOSPHATE 4 MG/ML
INJECTION, SOLUTION INTRA-ARTICULAR; INTRALESIONAL; INTRAMUSCULAR; INTRAVENOUS; SOFT TISSUE AS NEEDED
Status: DISCONTINUED | OUTPATIENT
Start: 2021-07-12 | End: 2021-07-12 | Stop reason: SURG

## 2021-07-12 RX ORDER — SODIUM CHLORIDE 0.9 % (FLUSH) 0.9 %
10 SYRINGE (ML) INJECTION EVERY 12 HOURS SCHEDULED
Status: DISCONTINUED | OUTPATIENT
Start: 2021-07-12 | End: 2021-07-14 | Stop reason: HOSPADM

## 2021-07-12 RX ORDER — PANTOPRAZOLE SODIUM 40 MG/10ML
40 INJECTION, POWDER, LYOPHILIZED, FOR SOLUTION INTRAVENOUS
Status: DISCONTINUED | OUTPATIENT
Start: 2021-07-12 | End: 2021-07-14 | Stop reason: HOSPADM

## 2021-07-12 RX ORDER — ONDANSETRON 2 MG/ML
4 INJECTION INTRAMUSCULAR; INTRAVENOUS ONCE AS NEEDED
Status: DISCONTINUED | OUTPATIENT
Start: 2021-07-12 | End: 2021-07-12 | Stop reason: HOSPADM

## 2021-07-12 RX ORDER — ASPIRIN 81 MG/1
81 TABLET ORAL DAILY
Status: DISCONTINUED | OUTPATIENT
Start: 2021-07-12 | End: 2021-07-14 | Stop reason: HOSPADM

## 2021-07-12 RX ORDER — ACETAMINOPHEN 325 MG/1
650 TABLET ORAL EVERY 4 HOURS PRN
Status: DISCONTINUED | OUTPATIENT
Start: 2021-07-12 | End: 2021-07-14 | Stop reason: HOSPADM

## 2021-07-12 RX ORDER — NIFEDIPINE 30 MG/1
30 TABLET, EXTENDED RELEASE ORAL
Status: DISCONTINUED | OUTPATIENT
Start: 2021-07-12 | End: 2021-07-12

## 2021-07-12 RX ORDER — SODIUM CHLORIDE 9 MG/ML
40 INJECTION, SOLUTION INTRAVENOUS AS NEEDED
Status: DISCONTINUED | OUTPATIENT
Start: 2021-07-12 | End: 2021-07-14 | Stop reason: HOSPADM

## 2021-07-12 RX ORDER — NITROGLYCERIN 0.4 MG/1
0.4 TABLET SUBLINGUAL
Status: DISCONTINUED | OUTPATIENT
Start: 2021-07-12 | End: 2021-07-12 | Stop reason: SDUPTHER

## 2021-07-12 RX ORDER — IMIPRAMINE HCL 25 MG
100 TABLET ORAL NIGHTLY
Status: DISCONTINUED | OUTPATIENT
Start: 2021-07-12 | End: 2021-07-14 | Stop reason: HOSPADM

## 2021-07-12 RX ORDER — ONDANSETRON 2 MG/ML
INJECTION INTRAMUSCULAR; INTRAVENOUS AS NEEDED
Status: DISCONTINUED | OUTPATIENT
Start: 2021-07-12 | End: 2021-07-12 | Stop reason: SURG

## 2021-07-12 RX ORDER — LISINOPRIL 10 MG/1
10 TABLET ORAL DAILY
Status: DISCONTINUED | OUTPATIENT
Start: 2021-07-12 | End: 2021-07-12

## 2021-07-12 RX ORDER — FENTANYL CITRATE 50 UG/ML
INJECTION, SOLUTION INTRAMUSCULAR; INTRAVENOUS AS NEEDED
Status: DISCONTINUED | OUTPATIENT
Start: 2021-07-12 | End: 2021-07-12 | Stop reason: SURG

## 2021-07-12 RX ORDER — LORAZEPAM 0.5 MG/1
0.5 TABLET ORAL EVERY 8 HOURS PRN
Status: DISCONTINUED | OUTPATIENT
Start: 2021-07-12 | End: 2021-07-14 | Stop reason: HOSPADM

## 2021-07-12 RX ORDER — DIPHENHYDRAMINE HYDROCHLORIDE 50 MG/ML
12.5 INJECTION INTRAMUSCULAR; INTRAVENOUS ONCE
Status: DISCONTINUED | OUTPATIENT
Start: 2021-07-12 | End: 2021-07-12 | Stop reason: HOSPADM

## 2021-07-12 RX ORDER — HYDROCODONE BITARTRATE AND ACETAMINOPHEN 5; 325 MG/1; MG/1
1 TABLET ORAL EVERY 4 HOURS PRN
Status: DISCONTINUED | OUTPATIENT
Start: 2021-07-12 | End: 2021-07-14 | Stop reason: HOSPADM

## 2021-07-12 RX ORDER — NIFEDIPINE 30 MG/1
30 TABLET, EXTENDED RELEASE ORAL DAILY
Status: DISCONTINUED | OUTPATIENT
Start: 2021-07-13 | End: 2021-07-13

## 2021-07-12 RX ORDER — CEFEPIME HYDROCHLORIDE 2 G/50ML
2 INJECTION, SOLUTION INTRAVENOUS EVERY 12 HOURS SCHEDULED
Status: DISCONTINUED | OUTPATIENT
Start: 2021-07-12 | End: 2021-07-12

## 2021-07-12 RX ORDER — ASCORBIC ACID 500 MG
500 TABLET ORAL DAILY
Status: DISCONTINUED | OUTPATIENT
Start: 2021-07-12 | End: 2021-07-14 | Stop reason: HOSPADM

## 2021-07-12 RX ORDER — KETAMINE HYDROCHLORIDE 100 MG/ML
INJECTION INTRAMUSCULAR; INTRAVENOUS AS NEEDED
Status: DISCONTINUED | OUTPATIENT
Start: 2021-07-12 | End: 2021-07-12 | Stop reason: SURG

## 2021-07-12 RX ORDER — SODIUM CHLORIDE 9 MG/ML
100 INJECTION, SOLUTION INTRAVENOUS CONTINUOUS
Status: DISCONTINUED | OUTPATIENT
Start: 2021-07-12 | End: 2021-07-13

## 2021-07-12 RX ORDER — BISACODYL 10 MG
10 SUPPOSITORY, RECTAL RECTAL DAILY PRN
Status: DISCONTINUED | OUTPATIENT
Start: 2021-07-12 | End: 2021-07-14 | Stop reason: HOSPADM

## 2021-07-12 RX ORDER — NITROGLYCERIN 0.4 MG/1
0.4 TABLET SUBLINGUAL
Status: DISCONTINUED | OUTPATIENT
Start: 2021-07-12 | End: 2021-07-14 | Stop reason: HOSPADM

## 2021-07-12 RX ORDER — HYDROMORPHONE HCL 110MG/55ML
0.5 PATIENT CONTROLLED ANALGESIA SYRINGE INTRAVENOUS
Status: DISCONTINUED | OUTPATIENT
Start: 2021-07-12 | End: 2021-07-14 | Stop reason: HOSPADM

## 2021-07-12 RX ADMIN — DEXMEDETOMIDINE 4 MCG: 100 INJECTION, SOLUTION, CONCENTRATE INTRAVENOUS at 00:25

## 2021-07-12 RX ADMIN — ONDANSETRON 4 MG: 2 INJECTION INTRAMUSCULAR; INTRAVENOUS at 00:20

## 2021-07-12 RX ADMIN — SODIUM CHLORIDE, PRESERVATIVE FREE 10 ML: 5 INJECTION INTRAVENOUS at 11:06

## 2021-07-12 RX ADMIN — DOCUSATE SODIUM 50MG AND SENNOSIDES 8.6MG 2 TABLET: 8.6; 5 TABLET, FILM COATED ORAL at 21:34

## 2021-07-12 RX ADMIN — SODIUM CHLORIDE, POTASSIUM CHLORIDE, SODIUM LACTATE AND CALCIUM CHLORIDE 75 ML/HR: 600; 310; 30; 20 INJECTION, SOLUTION INTRAVENOUS at 13:29

## 2021-07-12 RX ADMIN — Medication 150 MG: at 12:00

## 2021-07-12 RX ADMIN — SODIUM CHLORIDE: 0.9 INJECTION, SOLUTION INTRAVENOUS at 00:18

## 2021-07-12 RX ADMIN — SODIUM CHLORIDE 100 ML/HR: 9 INJECTION, SOLUTION INTRAVENOUS at 16:32

## 2021-07-12 RX ADMIN — ROCURONIUM BROMIDE 30 MG: 10 INJECTION INTRAVENOUS at 00:25

## 2021-07-12 RX ADMIN — ERTAPENEM SODIUM 1 G: 1 INJECTION, POWDER, LYOPHILIZED, FOR SOLUTION INTRAMUSCULAR; INTRAVENOUS at 11:05

## 2021-07-12 RX ADMIN — HYDROCODONE BITARTRATE AND ACETAMINOPHEN 1 TABLET: 5; 325 TABLET ORAL at 21:35

## 2021-07-12 RX ADMIN — CEFEPIME HYDROCHLORIDE 2 G: 2 INJECTION, SOLUTION INTRAVENOUS at 03:37

## 2021-07-12 RX ADMIN — PROPOFOL 200 MG: 10 INJECTION, EMULSION INTRAVENOUS at 00:20

## 2021-07-12 RX ADMIN — LIDOCAINE HYDROCHLORIDE 50 MG: 20 INJECTION, SOLUTION INFILTRATION; PERINEURAL at 00:20

## 2021-07-12 RX ADMIN — DEXAMETHASONE SODIUM PHOSPHATE 8 MG: 4 INJECTION, SOLUTION INTRAMUSCULAR; INTRAVENOUS at 00:20

## 2021-07-12 RX ADMIN — KETAMINE HYDROCHLORIDE 20 MG: 100 INJECTION INTRAMUSCULAR; INTRAVENOUS at 00:19

## 2021-07-12 RX ADMIN — SCOPALAMINE 1 PATCH: 1 PATCH, EXTENDED RELEASE TRANSDERMAL at 00:20

## 2021-07-12 RX ADMIN — SUGAMMADEX 200 MG: 100 INJECTION, SOLUTION INTRAVENOUS at 00:45

## 2021-07-12 RX ADMIN — PANTOPRAZOLE SODIUM 40 MG: 40 INJECTION, POWDER, FOR SOLUTION INTRAVENOUS at 06:16

## 2021-07-12 RX ADMIN — IMIPRAMINE HYDROCHLORIDE 100 MG: 25 TABLET ORAL at 21:35

## 2021-07-12 RX ADMIN — HYDROMORPHONE HYDROCHLORIDE 0.5 MG: 1 INJECTION, SOLUTION INTRAMUSCULAR; INTRAVENOUS; SUBCUTANEOUS at 01:24

## 2021-07-12 RX ADMIN — SODIUM CHLORIDE, PRESERVATIVE FREE 10 ML: 5 INJECTION INTRAVENOUS at 21:35

## 2021-07-12 RX ADMIN — DOCUSATE SODIUM 50MG AND SENNOSIDES 8.6MG 2 TABLET: 8.6; 5 TABLET, FILM COATED ORAL at 09:46

## 2021-07-12 RX ADMIN — HYDROCODONE BITARTRATE AND ACETAMINOPHEN 1 TABLET: 5; 325 TABLET ORAL at 06:33

## 2021-07-12 RX ADMIN — DEXMEDETOMIDINE 4 MCG: 100 INJECTION, SOLUTION, CONCENTRATE INTRAVENOUS at 00:35

## 2021-07-12 RX ADMIN — ASPIRIN 81 MG: 81 TABLET, COATED ORAL at 09:46

## 2021-07-12 RX ADMIN — SODIUM CHLORIDE 125 ML/HR: 9 INJECTION, SOLUTION INTRAVENOUS at 03:27

## 2021-07-12 RX ADMIN — FENTANYL CITRATE 100 MCG: 50 INJECTION INTRAMUSCULAR; INTRAVENOUS at 00:20

## 2021-07-12 NOTE — PLAN OF CARE
Goal Outcome Evaluation:  Plan of Care Reviewed With: patient        Progress: improving  Outcome Summary: Pt continues with IV abx - tolerating. [place] leaking - discontinued per Dr. Ndiaye. Pt to be wearing sling for left shoulder due to preadmission fracture; however, pt refuses.  Pt has been up to chair during shift. Voices no pain. Urine strained - nothing noted.

## 2021-07-12 NOTE — ED PROVIDER NOTES
EMERGENCY DEPARTMENT ENCOUNTER      Room Number: 03/03    History is provided by the patient and her , no translation services needed    HPI:    Chief complaint: Chills    Location: Patient is complaining of pain in her left arm which has a known nondisplaced fracture of the left humerus.    Quality/Severity: Fever of 103.3 here, temp not checked at home.     Timing/Duration: Fever and chills, started yesterday, today patient was too weak to get off of the toilet so EMS was called.    Modifying Factors:  No meds prior to arrival.    Associated Symptoms: Positive for fever, chills and left arm.  He has also been having constipation since taking narcotics for her fractured arm.  Patient denies any cough, shortness of breath, abdominal pain or diarrhea.    Narrative: Pt is a 80 y.o. female who presents complaining of chills since yesterday.  Patient states she has a history of UTIs and sepsis in the past.  She was admitted back in March for UTI with sepsis, since then she has continued to have UTIs.  She was admitted to [facility] 6[facility]2021 for intractable pain from a closed fracture of the surgical neck of her left humerus that occurred on 06/08/21, during that course of admission she was found to have a UTI.  She was treated with Rocephin and Omnicef.  Her  states yesterday she started getting chills, on arrival patient could not tell me when her symptoms started.  Her only complaint right now is that she feels terrible and that her left arm hurts.  She denies any symptoms of dysuria, urgency or frequency to urinate.  She does have known renal stones and follows with Dr. Clark.  She denies any flank pain or abdominal pain at this time.      PMD: Margot Cahvez MD    REVIEW OF SYSTEMS  Review of Systems   Constitutional: Positive for chills and fever.   Respiratory: Negative for cough and shortness of breath.    Cardiovascular: Negative for chest pain and palpitations.    Gastrointestinal: Negative for abdominal pain, nausea and vomiting.   Genitourinary: Negative for difficulty urinating, dysuria and flank pain.   Musculoskeletal: Positive for arthralgias. Negative for myalgias.   Skin: Negative for rash and wound.   Neurological: Negative for dizziness and syncope.   Psychiatric/Behavioral: Negative for confusion. The patient is not nervous/anxious.          PAST MEDICAL HISTORY  Active Ambulatory Problems     Diagnosis Date Noted   • Urinary tract infection due to ESBL Klebsiella 09/10/2016   • Simple renal cyst 09/16/2016   • Former smoker 09/16/2016   • Gastroesophageal reflux disease 09/16/2016   • Essential hypertension 09/16/2016   • Mixed hyperlipidemia 09/16/2016   • Hypertriglyceridemia 09/16/2016   • Osteoporosis 09/16/2016   • Panic disorder 09/16/2016   • Psoriasis 09/16/2016   • Urge incontinence of urine 09/16/2016   • Vitamin D deficiency 09/16/2016   • Post-menopause 10/25/2016   • Urinary tract infection 04/24/2017   • Insulin resistance 09/08/2017   • Chronic bilateral low back pain without sciatica 03/12/2018   • Spinal stenosis of lumbar region with neurogenic claudication 03/12/2018   • Burning sensation of foot 05/22/2018   • Other chronic pain 07/11/2018   • Tachycardia 07/31/2019   • Ventricular tachycardia (CMS/[facility]) 09/09/2019   • Mixed incontinence    • GERD (gastroesophageal reflux disease)    • Anxiety    • Hyperglycemia 02/19/2021   • Sepsis without acute organ dysfunction (CMS/[facility]) 03/25/2021   • Acute cystitis 03/25/2021   • Metabolic encephalopathy 03/25/2021   • Hypokalemia 03/25/2021   • Positive D dimer 03/25/2021   • e.coli bacteremia 03/25/2021   • Ureteral stone with hydronephrosis 03/25/2021   • Closed fracture of right distal radius 06/15/2021   • Closed fracture of left proximal humerus 06/15/2021   • Closed displaced fracture of surgical neck of left humerus 06/25/2021     Resolved Ambulatory Problems     Diagnosis Date Noted   • Colon  polyp 09/16/2016   • Prediabetes 09/16/2016   • HTN (hypertension)    • DDD (degenerative disc disease), lumbar      Past Medical History:   Diagnosis Date   • Abdominal pain 12/2018   • Ankle sprain    • Arthritis of back    • Cervical disc disorder    • Chronic pain disorder    • Constipation    • Fracture of wrist    • Fracture, foot    • Fracture, humerus    • Frequency of micturition    • H/O bone density study 01/2019   • H/O CT scan    • H/O CT scan    • H/O mammogram 09/2019   • History of Holter monitoring    • History of MRI    • History of nuclear stress test    • Hyperlipidemia    • Kidney stones    • Knee swelling    • Low back pain    • Low back strain    • Lumbosacral disc disease    • Osteoarthritis    • Osteopenia    • Papanicolaou smear 02/2019   • Personal history of urinary calculi    • PONV (postoperative nausea and vomiting)    • Scoliosis    • Tachycardia, unspecified    • Tear of meniscus of knee knee replacement right   • Thoracic disc disorder    • Wrist sprain        PAST SURGICAL HISTORY  Past Surgical History:   Procedure Laterality Date   • BREAST BIOPSY Left     benign   • BREAST LUMPECTOMY Left     benign   • BUNIONECTOMY Right    • COLONOSCOPY N/A 11/30/2016    Procedure: COLONOSCOPY polypectomy;  Surgeon: Adali Willard MD;  Location: [facility] OR;  Service:    • CYSTOSCOPY BOTOX INJECTION OF BLADDER N/A 7/21/2017    Procedure: CYSTOSCOPY COAPTITE INJECTION;  Surgeon: Kevon Clark MD;  Location: [facility] OR;  Service:    • CYSTOSCOPY W/ LITHOLAPAXY / EHL     • CYSTOSCOPY W/ URETERAL STENT PLACEMENT Left 9/12/2016    Procedure: CYSTOSCOPY URETERAL STENT INSERTION;  Surgeon: Kevon Clark MD;  Location: [facility] OR;  Service:    • CYSTOSCOPY W/ URETERAL STENT PLACEMENT Left 8/1/2019    Procedure: CYSTOSCOPY URETERAL CATHETER/STENT INSERTION;  Surgeon: Kevon Clark MD;  Location: [facility] OR;  Service: Urology   • CYSTOSCOPY W/ URETERAL STENT PLACEMENT Left 3/25/2021     Procedure: CYSTOSCOPY URETERAL CATHETER/STENT INSERTION;  Surgeon: Kevon Clark MD;  Location: [facility];  Service: Urology;  Laterality: Left;   • JOINT REPLACEMENT     • KNEE SURGERY Right     tka   • TONSILLECTOMY AND ADENOIDECTOMY     • TRIGGER POINT INJECTION     • URETEROSCOPY LASER LITHOTRIPSY WITH STENT INSERTION Left 10/5/2016    Procedure: LT URETEROSCOPY LASER LITHOTRIPSY STONE BASKET EXTRACTION AND STENT ;  Surgeon: Kevon Clark MD;  Location: [facility];  Service:        FAMILY HISTORY  Family History   Problem Relation Age of Onset   • Heart defect Mother    • Heart attack Mother 70   • Sudden death Mother    • Heart defect Father    • Heart attack Father 49   • Hypertension Father    • Sudden death Father    • Valvular heart disease Brother    • Malig Hyperthermia Neg Hx    • Breast cancer Neg Hx        SOCIAL HISTORY  Social History     Socioeconomic History   • Marital status:      Spouse name: Not on file   • Number of children: Not on file   • Years of education: Not on file   • Highest education level: Not on file   Tobacco Use   • Smoking status: Former Smoker     Packs/day: 1.00     Years: 15.00     Pack years: 15.00     Types: Cigarettes     Start date: 1956     Quit date: 1980     Years since quittin.5   • Smokeless tobacco: Never Used   • Tobacco comment: quit    Vaping Use   • Vaping Use: Never used   Substance and Sexual Activity   • Alcohol use: No   • Drug use: No   • Sexual activity: Not Currently     Partners: Male     Comment: Frequent UTIs       ALLERGIES  Bactrim [sulfamethoxazole-trimethoprim], Ciprofloxacin, Levaquin [levofloxacin], Macrobid [nitrofurantoin], Nitrofurantoin macrocrystal, and Cortisone      Current Facility-Administered Medications:   •  sodium chloride 0.9 % flush 10 mL, 10 mL, Intravenous, PRN, Emergency, Triage Protocol, MD  •  [COMPLETED] Insert peripheral IV, , , Once **AND** sodium chloride 0.9 % flush 10 mL, 10  mL, Intravenous, PRN, Rosibel Mathew PA-C    Current Outpatient Medications:   •  acetaminophen (TYLENOL) 650 MG 8 hr tablet, Take 650 mg by mouth Every 8 (Eight) Hours As Needed for Mild Pain ., Disp: , Rfl:   •  aspirin (aspirin) 81 MG EC tablet, Take 1 tablet by mouth Daily for 30 days. PT TO STOP PER MD INSTRUCTION, Disp: 30 tablet, Rfl: 0  •  estradiol (ESTRACE) 0.1 MG/GM vaginal cream, Insert 1 application into the vagina., Disp: , Rfl:   •  imipramine (TOFRANIL) 50 MG tablet, Take 2 tablets by mouth every night at bedtime., Disp: 180 tablet, Rfl: 1  •  lisinopril (PRINIVIL,ZESTRIL) 40 MG tablet, Take 1 tablet by mouth Daily., Disp: 90 tablet, Rfl: 1  •  Multiple Vitamins-Minerals (PRESERVISION AREDS 2 PO), Take 1 tablet by mouth Daily., Disp: , Rfl:   •  NIFEdipine XL (PROCARDIA XL) 30 MG 24 hr tablet, Take 1 tablet by mouth Daily. (Patient taking differently: Take 30 mg by mouth Every Night.), Disp: 30 tablet, Rfl: 1  •  omeprazole (priLOSEC) 20 MG capsule, Take 1 capsule by mouth Daily., Disp: 90 capsule, Rfl: 1  •  simvastatin (ZOCOR) 40 MG tablet, Take 1 tablet by mouth Every Night. for cholesterol, Disp: 90 tablet, Rfl: 1  •  vitamin C (ASCORBIC ACID) 500 MG tablet, Take 500 mg by mouth Daily., Disp: , Rfl:     PHYSICAL EXAM  ED Triage Vitals   Temp Heart Rate Resp BP SpO2   07/11/21 2109 07/11/21 2109 07/11/21 2109 07/11/21 2111 07/11/21 2109   (!) 103.3 °F (39.6 °C) (!) 139 20 173/70 95 %      Temp src Heart Rate Source Patient Position BP Location FiO2 (%)   07/11/21 2109 07/11/21 2109 -- -- --   Oral Monitor          Physical Exam  Constitutional:       Appearance: She is toxic-appearing.      Comments: BMI 39.54   HENT:      Head: Normocephalic and atraumatic.   Eyes:      Extraocular Movements: Extraocular movements intact.      Pupils: Pupils are equal, round, and reactive to light.   Cardiovascular:      Rate and Rhythm: Regular rhythm. Tachycardia present.      Pulses: Normal pulses.    Pulmonary:      Effort: Tachypnea present.      Breath sounds: Normal breath sounds.   Abdominal:      General: Bowel sounds are normal.      Palpations: Abdomen is soft.      Tenderness: There is abdominal tenderness in the left lower quadrant. There is guarding.   Musculoskeletal:      Left shoulder: Decreased range of motion.      Left upper arm: Tenderness present.      Comments: Left arm is in a sling.   Skin:     General: Skin is warm and moist.      Capillary Refill: Capillary refill takes less than 2 seconds.   Neurological:      General: No focal deficit present.      Mental Status: She is alert.      Comments: Patient is alert and oriented to person, place, and time, however it is difficult for her to explain her symptoms and time of onset.   Psychiatric:         Mood and Affect: Mood is anxious.           LAB RESULTS  Lab Results (last 24 hours)     Procedure Component Value Units Date/Time    Lactic Acid, Plasma [[id number]]  (Abnormal) Collected: 07/11/21 2118    Specimen: Blood Updated: 07/11/21 2156     Lactate 2.5 mmol/L     CBC & Differential [[id number]]  (Abnormal) Collected: 07/11/21 2119    Specimen: Blood Updated: 07/11/21 2128    Narrative:      The following orders were created for panel order CBC & Differential.  Procedure                               Abnormality         Status                     ---------                               -----------         ------                     CBC Auto Differential[[id number]]        Abnormal            Final result                 Please view results for these tests on the individual orders.    Comprehensive Metabolic Panel [[id number]]  (Abnormal) Collected: 07/11/21 2119    Specimen: Blood Updated: 07/11/21 2155     Glucose 187 mg/dL      BUN 27 mg/dL      Creatinine 1.30 mg/dL      Sodium 136 mmol/L      Potassium 4.3 mmol/L      Chloride 104 mmol/L      CO2 16.9 mmol/L      Calcium 8.8 mg/dL      Total Protein 7.0 g/dL      Albumin 3.30 g/dL       ALT (SGPT) 25 U/L      AST (SGOT) 25 U/L      Alkaline Phosphatase 184 U/L      Total Bilirubin 0.6 mg/dL      eGFR Non African Amer 40 mL/min/1.73      Globulin 3.7 gm/dL      A/G Ratio 0.9 g/dL      BUN/Creatinine Ratio 20.8     Anion Gap 15.1 mmol/L     Narrative:      GFR Normal >60  Chronic Kidney Disease <60  Kidney Failure <15      CBC Auto Differential [[id number]]  (Abnormal) Collected: 07/11/21 2119    Specimen: Blood Updated: 07/11/21 2128     WBC 6.55 10*3/mm3      RBC 3.59 10*6/mm3      Hemoglobin 10.0 g/dL      Hematocrit 32.1 %      MCV 89.4 fL      MCH 27.9 pg      MCHC 31.2 g/dL      RDW 14.6 %      RDW-SD 47.6 fl      MPV 8.8 fL      Platelets 232 10*3/mm3      Neutrophil % 89.2 %      Lymphocyte % 4.9 %      Monocyte % 5.0 %      Eosinophil % 0.2 %      Basophil % 0.2 %      Immature Grans % 0.5 %      Neutrophils, Absolute 5.85 10*3/mm3      Lymphocytes, Absolute 0.32 10*3/mm3      Monocytes, Absolute 0.33 10*3/mm3      Eosinophils, Absolute 0.01 10*3/mm3      Basophils, Absolute 0.01 10*3/mm3      Immature Grans, Absolute 0.03 10*3/mm3      nRBC 0.0 /100 WBC     Urinalysis With Culture If Indicated - Urine, Catheter [[id number]]  (Abnormal) Collected: 07/11/21 2119    Specimen: Urine, Catheter Updated: 07/11/21 2132     Color, UA Yellow     Appearance, UA Slightly Cloudy     pH, UA <=5.0     Specific Gravity, UA 1.015     Glucose, UA Negative     Ketones, UA Negative     Bilirubin, UA Negative     Blood, UA Small (1+)     Protein, UA Negative     Leuk Esterase, UA Large (3+)     Nitrite, UA Positive     Urobilinogen, UA 0.2 E.U./dL    Procalcitonin [[id number]]  (Abnormal) Collected: 07/11/21 2119    Specimen: Blood Updated: 07/11/21 2157     Procalcitonin 1.80 ng/mL     Narrative:      Results may be falsely decreased if patient taking Biotin.     COVID PRE-OP / PRE-PROCEDURE SCREENING ORDER (NO ISOLATION) - Swab, Nasal Cavity [[id number]]  (Normal) Collected: 07/11/21 2185    Specimen: Swab from  Nasal Cavity Updated: 07/11/21 2157    Narrative:      The following orders were created for panel order COVID PRE-OP / PRE-PROCEDURE SCREENING ORDER (NO ISOLATION) - Swab, Nasal Cavity.  Procedure                               Abnormality         Status                     ---------                               -----------         ------                     COVID-19,[employer] IN-[facility] JOSE...[[id number]]  Normal              Final result                 Please view results for these tests on the individual orders.    COVID-19,[employer] IN-HOUSE,Nasal Swab No Transport Media 3-4 HR TAT - Swab, Nasal Cavity [[id number]]  (Normal) Collected: 07/11/21 2119    Specimen: Swab from Nasal Cavity Updated: 07/11/21 2157     COVID19 Not Detected    Narrative:      Fact sheet for providers: https://www.fda.gov/media/072490/download     Fact sheet for patients: https://www.fda.gov/media/091936/download    Test performed by PCR.    Consider negative results in combination with clinical observations, patient history, and epidemiological information.    Urinalysis, Microscopic Only - Urine, Catheter [[id number]]  (Abnormal) Collected: 07/11/21 2119    Specimen: Urine, Catheter Updated: 07/11/21 2144     RBC, UA 6-12 /HPF      WBC, UA Too Numerous to Count /HPF      Bacteria, UA 4+ /HPF      Squamous Epithelial Cells, UA None Seen /HPF      Hyaline Casts, UA None Seen /LPF      Methodology Manual Light Microscopy    Urine Culture - Urine, Urine, Catheter [[id number]] Collected: 07/11/21 2119    Specimen: Urine, Catheter Updated: 07/11/21 2129    Blood Culture - Blood, Hand, Right [[id number]] Collected: 07/11/21 2129    Specimen: Blood from Hand, Right Updated: 07/11/21 2135    Blood Culture - Blood, Arm, Right [[id number]] Collected: 07/11/21 2129    Specimen: Blood from Arm, Right Updated: 07/11/21 2134            I ordered the above labs and reviewed the results    RADIOLOGY  CT Abdomen Pelvis With Contrast    Result Date: 7/11/2021  CT  Abdomen Pelvis W INDICATION: Urinary tract infection. Sepsis. Fever and chills. Left side abdominal pain. TECHNIQUE: CT of the abdomen and pelvis with IV contrast. Coronal and sagittal reconstructions were obtained.  Radiation dose reduction techniques included automated exposure control or exposure modulation based on body size. Count of known CT and cardiac nuc med studies performed in previous 12 months: 1. COMPARISON: 7/31/2019 FINDINGS: There is streak artifact from the patient's arms. There is mild atelectasis in the lung bases. Small hiatal hernia is stable. Gallbladder is mildly distended but otherwise normal. There is no biliary obstruction. Findings are similar to prior. There is atherosclerotic disease with no aortic aneurysm. There is mild left hydronephrosis secondary to a 4 mm stone in the distal ureter just above the level of the acetabulum. There are multiple small nonobstructing left-sided kidney stones. There are also bilateral renal cysts. Remaining solid organs are normal. Urinary bladder is normal. Solid pelvic organs are normal. The appendix is normal. There is no evidence of acute colitis. There is no small bowel obstruction. No free fluid or adenopathy is identified. There is diffuse lumbar degenerative disease with levoscoliosis. There is grade 1 anterolisthesis of L4 on L5.     1. Mild left hydronephrosis due to a 4 mm stone in the lower left ureter just above the level of the acetabulum. There are also multiple small nonobstructing left kidney stones. 2. No acute findings in the GI tract. Normal appendix. 3. Additional findings as above. Signer Name: Jenaro Reyna MD  Signed: 7/11/2021 10:49 PM  Workstation Name: [email or web address]  [facility]    XR Chest 1 View    Result Date: 7/11/2021  CR Chest 1 Vw INDICATION: Fever with sepsis COMPARISON:  6/21/2021 FINDINGS: Single portable AP view(s) of the chest. Support lines and tubes are unchanged. The heart and mediastinal  contours are normal. The lungs are clear. No pneumothorax or pleural effusion. The inspiratory effort is shallower than on the previous examination. Taking this into account, no changes are seen.     No acute cardiopulmonary findings. Signer Name: Jenaro Montenegro MD  Signed: 7/11/2021 10:36 PM  Workstation Name: [email or web address]  [facility] [place]      I ordered the above radiologic testing and reviewed the results    PROCEDURES  Procedures      PROGRESS AND CONSULTS  ED Course as of Jul 11 2322   Sun Jul 11, 2021 2125 EKG         EKG time / Interpretation time: 2119/2122  Rhythm/Rate: Sinus rhythm, 134   WV: 134  QRS, axis: 53  QTc 541  ST and T waves: Right bundle branch block.  Patient does look to have some subtle ST depression in lateral leads, likely secondary to demand ischemia.  EKG Tracing Interpreted Contemporaneously by me, independently viewed by me and MD.  Appears similar compared with EKG from  3/24/2021.      [KS]   2222 Discussed patient with Dr. Esquivel, hospitalist on-call this evening.  Discussed diagnosis of urosepsis.  Patient also is noted to have acute kidney injury with this.  Patient was started empirically on Zosyn, along with fluid bolus for sepsis.  Her CT and x-rays are pending however Dr. Esquivel has accepted her for admission to telemetry for urosepsis.  He will follow up on imaging results.    [KS]   2306 After review of patient's CT, it looks like patient may have an obstructing stone in the left ureter, this was confirmed by the radiologist and I discussed patient's case with Dr. Guzman, [facility].  He is going to take patient to the OR [facility] and place a stent for the obstructing ureteral stone in her left ureter.  I have also discussed this with Dr. Esquivel, as well as the patient and her .    [KS]      ED Course User Index  [KS] Rosibel Mathew PA-C           MEDICAL DECISION MAKING    MDM        By differential diagnosis for fever includes but  is not limited:  To viral infections, bacterial infections, fungal infections, fever of unknown origin, auto regulatory dysfunction, hyperthermia, heat exhaustion, heat stroke      DIAGNOSIS  Final diagnoses:   Sepsis with acute renal failure without septic shock, due to unspecified organism, unspecified acute renal failure type (CMS/HCC)   Recurrent urinary tract infection   Left ureteral stone       Latest Documented Vital Signs:  As of 23:22 EDT  BP- 143/65 HR- 102 Temp- 98.8 °F (37.1 °C) (Oral) O2 sat- 93%    DISPOSITION  Patient admitted to [facility].       Medication List      No changes were made to your prescriptions during this visit.                   Dictated utilizing Dragon dictation     Rosibel Mathew PA-C  07/11/21 [id number]       Rosibel Mathew PA-C  07/11/21 3505

## 2021-07-12 NOTE — OP NOTE
Operative note.  80-year-old white female [facility] with infected [facility]    Patient was brought to the operating room given a general anesthetic placed in lithotomy prepped and draped.  Cystoscopy was performed.  Urethra was patent, bladder was inflamed urine was cloudy with strong odor.  Bladder was irrigated sensor wire was advanced into the left renal pelvis under fluoroscopic guidance.  A Pollick catheter was advanced over the wire and a left retrograde pyelogram obtained.  A 5 x 30 double-J stent was placed with adequate curls left and urinary bladder and renal pelvis.  A retrieval string was not left.  The left ureteral stone was easily visualized fluoroscopically alongside the stent.  A Oakley catheter was placed to maximize urinary drainage and the patient was extubated brought to recovery in stable condition.

## 2021-07-12 NOTE — CASE MANAGEMENT/SOCIAL WORK
"Discharge Planning Assessment  [facility]     Patient Name: Anitra Orta  MRN: [id number]  Today's Date: 7/12/2021    Admit Date: 7/11/2021    Discharge Needs Assessment     Row Name 07/12/21 1049       Living Environment    Lives With  spouse    Name(s) of Who Lives With Patient   Tommy    Current Living Arrangements  home/apartment/condo    Primary Care Provided by  self    Provides Primary Care For  no one    Family Caregiver if Needed  spouse    Family Caregiver Names  Tommy    Quality of Family Relationships  helpful;involved;supportive    Able to Return to Prior Arrangements  yes       Resource/Environmental Concerns    Resource/Environmental Concerns  none       Transition Planning    Patient/Family Anticipates Transition to  home with family    Transportation Anticipated  family or friend will provide       Discharge Needs Assessment    Readmission Within the Last 30 Days  no previous admission in last 30 days    Equipment Currently Used at Home  cane, straight Blood pressure machine    Concerns to be Addressed  discharge planning    Anticipated Changes Related to Illness  none    Equipment Needed After Discharge  none    Provided Post Acute Provider List?  N/A    N/A Provider List Comment  No needs at this time        Discharge Plan     Row Name 07/12/21 1055       Plan    Plan  Home when stable    Patient/Family in Agreement with Plan  yes    Plan Comments  Spoke with Mrs Orta and her  (with permission) at bedside.  They live oumar home in [place].  Faceshseet verified.  She has a blood pressure machine and a cane for ambulation.  She does not have any oxygen at home.  She is independent with her ADL's but her  does all of the driving.  Her pharmacy is [facility] in [place] and there are no issues with paying for her prescriptions - \"I have a D plan for that\".  She does not have an advanced directive and has no interest in such.  Plan is home when stable.  Will continue to " follow        Continued Care and Services - Admitted Since 7/11/2021    Coordination has not been started for this encounter.         Demographic Summary     Row Name 07/12/21 1048       General Information    Admission Type  inpatient    Arrived From  home    Referral Source  admission list    Reason for Consult  discharge planning    Preferred Language  English     Used During This Interaction  no       Contact Information    Permission Granted to Share Info With          Functional Status    No documentation.       Psychosocial    No documentation.       Abuse/Neglect    No documentation.       Legal    No documentation.       Substance Abuse    No documentation.       Patient Forms    No documentation.           Nanette Ignacio RN

## 2021-07-12 NOTE — PLAN OF CARE
Goal Outcome Evaluation:  Plan of Care Reviewed With: patient           Outcome Summary: OT evaluation completed. Pt was independent with adl's prior to her fall in early June which resulted in right wrist and left humerus fx. Both are being tx conservatively. Spouse has assisted with adl's and mobility since her injuries. Pt reports no concerns for return to home and management of daily tasks with family support. Per medical record review, pt followed up with Dr. Noyola one week after her injury (6-15-21) and was to follow up with Ortho in 3 weeks.  Pt may need to re-consult with ortho regarding her right wrist/left shoulder. No OT services recommded at this time. Pt may need OT or PT outpatient therapy after consult with ortho.

## 2021-07-12 NOTE — H&P
Patient Name:  Anitra Orta  YOB: 1940  MRN:  [id number]  Admit Date:  7/11/2021  Patient Care Team:  Margot Chavez MD as PCP - General (Internal Medicine & Pediatrics)  Dr. Phillips as Consulting Physician (Ophthalmology)  Justo Leahy MD as Consulting Physician (Infectious Diseases)        Chief Complaint   Patient presents with   • Tremors   • Fever   2 days duration.  History Present Illness     A very pleasant 80 years old white female who has a past history of nephrolithiasis/recurrent UTIs with previous history of urosepsis and ESBL Klebsiella/spastic and neurogenic bladder/hypertension/history of V. tach's/dyslipidemia/anxiety/degenerative disc disease and chronic pain syndrome/GERD/left proximal femur was fractured about a month ago for conservative treatment per Ortho/glucose intolerance/generalized osteoarthritis.  Patient follows up with a urology.  She has been recently treated for UTI with Rocephin and Keflex cultures are pending.  The patient.  She started to have fever and chills and rigors associated with fatigability and weakness over the last 2 days.  She reports old frequency and urgency and urge incontinence.  There was no dysuria or hematuria.  She has described a positive lower abdominal pain.  No nausea/no vomiting/no heartburn/no dysphagia/odynophagia/positive constipation/no diarrhea/no bleeding per rectum or melena.  In the emergency department UA was positive for UTI she was febrile and tachycardic blood cultures were obtained and they were pending lactic acid and pro calcitonin were elevated.  Covid was negative  CBC was normal except a hemoglobin of 10.  CMP normal except a blood sugar of 187 BUN 27 creatinine 1.3 GFR of 40 CO2 of 16.9.  Chest x-ray is negative.  EKG with right bundle branch block and sinus tachycardia.  Blood cultures were obtained x2 in the emergency department.  CT scan of the abdomen revealed a 4mm left ureteric stone leading to mild  left hydronephrosis.  Patient was treated with IV Zosyn and IV fluid in the emergency department subsequently admitted.  Urology was consulted in the emergency department and they are on their way to place a stent in this lady.          Review of Systems   Cardiovascular/respiratory.  There is no chest pain/no shortness of breath/no cough/no wheeze/no hemoptysis/no PND/no palpable/no ankle edema.  .  As above.  GI.  As above.  CNS.  No loss of consciousness no electro numbness or weakness no double vision/no loss of the vision/no slurring of the speech/no seizures.    Personal History     Past Medical History:   Diagnosis Date   • Anxiety    • Arthritis of back    • At risk for sleep apnea    • Cataract     BILAT-SCHEDULED FOR THIS AND HAD TO CANCEL D/T SEPSIS   • Chronic pain disorder     ALL OVER PAIN   • Chronic UTI    • COVID-19 vaccine series completed     2ND DOSE 3/23/21   • DDD (degenerative disc disease), lumbar    • Elevated cholesterol    • Fracture of wrist    • Fracture, humerus     LEFT-USING IMMOBILIZER/SLING   • GERD (gastroesophageal reflux disease)    • History of recent fall     JUNE 6-9-21   • History of sepsis     MOST RECENT JULY 2021-R/T KIDNEY STONE, UTI.  STATES THIS IS HER 3RD SEPSIS DIAGNOSIS   • History of UTI    • HTN (hypertension)    • Hyperlipidemia    • Kidney stones     LEFT   • Macular degeneration, bilateral     WORSE ON RIGHT-ONLY PERIPHERAL VISION   • Mixed hyperlipidemia 9/16/2016   • Mixed incontinence    • Osteoarthritis    • Osteoporosis    • Panic disorder    • Personal history of urinary calculi    • PONV (postoperative nausea and vomiting)    • Scoliosis    • Tachycardia, unspecified    • Wrist fracture     RIGHT-CURRENTLY NOT WEARING BRACE FOR THIS     Past Surgical History:   Procedure Laterality Date   • BREAST BIOPSY Left     benign   • BREAST LUMPECTOMY Left     benign   • BUNIONECTOMY Right    • COLONOSCOPY N/A 11/30/2016    Procedure: COLONOSCOPY polypectomy;   Surgeon: Adali Willard MD;  Location: [facility] OR;  Service:    • CYSTOSCOPY BOTOX INJECTION OF BLADDER N/A 7/21/2017    Procedure: CYSTOSCOPY COAPTITE INJECTION;  Surgeon: Kevon Clark MD;  Location: [facility] OR;  Service:    • CYSTOSCOPY W/ LITHOLAPAXY / EHL     • CYSTOSCOPY W/ URETERAL STENT PLACEMENT Left 9/12/2016    Procedure: CYSTOSCOPY URETERAL STENT INSERTION;  Surgeon: Kevon Clark MD;  Location: [facility] OR;  Service:    • CYSTOSCOPY W/ URETERAL STENT PLACEMENT Left 8/1/2019    Procedure: CYSTOSCOPY URETERAL CATHETER/STENT INSERTION;  Surgeon: Kevon Clark MD;  Location: [facility] OR;  Service: Urology   • CYSTOSCOPY W/ URETERAL STENT PLACEMENT Left 3/25/2021    Procedure: CYSTOSCOPY URETERAL CATHETER/STENT INSERTION;  Surgeon: Kevon Clark MD;  Location: [facility] OR;  Service: Urology;  Laterality: Left;   • CYSTOSCOPY W/ URETERAL STENT PLACEMENT Left 7/11/2021    Procedure: CYSTOSCOPY URETERAL CATHETER/STENT INSERTION;  Surgeon: Lul Guzman MD;  Location: [facility] OR;  Service: Urology;  Laterality: Left;  CYSTOSCOPY LEFT URETERAL CATHETER/ STENT PLACEMENT   • JOINT REPLACEMENT     • KNEE ARTHROSCOPY Right    • LUMBAR EPIDURAL INJECTION     • TONSILLECTOMY AND ADENOIDECTOMY     • URETEROSCOPY LASER LITHOTRIPSY WITH STENT INSERTION Left 10/5/2016    Procedure: LT URETEROSCOPY LASER LITHOTRIPSY STONE BASKET EXTRACTION AND STENT ;  Surgeon: Kevon Clark MD;  Location: [facility] OR;  Service:    • URETEROSCOPY LASER LITHOTRIPSY WITH STENT INSERTION Left 7/23/2021    Procedure: LEFT URETEROSCOPY STONE MANIPULATION STENT EXCHANGE, LASER LITHOTRIPSY, CYSTOSCOPY, STONE BASKET EXTRACTION;  Surgeon: Kevon Clark MD;  Location: [facility];  Service: Urology;  Laterality: Left;     Family History   Problem Relation Age of Onset   • Heart defect Mother    • Heart attack Mother 70   • Sudden death Mother    • Heart defect Father    • Heart attack Father 49   •  Hypertension Father    • Sudden death Father    • Valvular heart disease Brother    • Malig Hyperthermia Neg Hx    • Breast cancer Neg Hx      Social History     Tobacco Use   • Smoking status: Former Smoker     Packs/day: 1.00     Years: 15.00     Pack years: 15.00     Types: Cigarettes     Start date: 1956     Quit date: 1980     Years since quittin.6   • Smokeless tobacco: Never Used   • Tobacco comment: quit    Vaping Use   • Vaping Use: Never used   Substance Use Topics   • Alcohol use: No   • Drug use: Yes     Types: Hydrocodone     No current facility-administered medications on file prior to encounter.     Current Outpatient Medications on File Prior to Encounter   Medication Sig Dispense Refill   • aspirin (aspirin) 81 MG EC tablet Take 1 tablet by mouth Daily for 30 days. PT TO STOP PER MD INSTRUCTION (Patient taking differently: Take 81 mg by mouth Daily. HOLDING) 30 tablet 0   • estradiol (ESTRACE) 0.1 MG/GM vaginal cream Insert 1 application into the vagina Every Other Day.     • imipramine (TOFRANIL) 50 MG tablet Take 2 tablets by mouth every night at bedtime. (Patient taking differently: Take 100 mg by mouth Every Night.) 180 tablet 1   • omeprazole (priLOSEC) 20 MG capsule Take 1 capsule by mouth Daily. 90 capsule 1   • simvastatin (ZOCOR) 40 MG tablet Take 1 tablet by mouth Every Night. for cholesterol 90 tablet 1     Allergies   Allergen Reactions   • Bactrim [Sulfamethoxazole-Trimethoprim] Nausea Only and Other (See Comments)     UNKNOWN   • Ciprofloxacin Other (See Comments)     UNKNOWN   • Levaquin [Levofloxacin] Other (See Comments)     SEVERE HEADACHE AND N/V   • Macrobid [Nitrofurantoin] Other (See Comments)     UNKNOWN; PT CAN TAKE IN SMALL DOSES- FLU LIKE SYMPTOMS   • Nitrofurantoin Macrocrystal Other (See Comments)     Can take in small doses   • Cortisone      Pt states had headache with IM injection states has been ok with epidural steroid injections       Objective     Objective     Vital Signs        on   ;      Body mass index is 39.45 kg/m².    Physical Exam  General.  Middle-aged to elderly female.  She is alert and oriented x3.  No apparent pain distress or diaphoresis.  Normal mood and affect.  Patient is obese.  Eyes.  Pupils equal round and reactive.  Intact extraocular musculature.  No pallor or jaundice.  Normal conjunctivae and lids.  ENT.  Dry mucous membrane.  Neck.  Supple.  No JVD.  No carotid bruit.  No lymphadenopathy or thyromegaly.  Cardiovascular.  Regular rate and rhythm.  Tachycardia.  Grade 2 systolic murmur.  Chest.  Bilateral air entry with scattered left rhonchi.  No wheeze.  Abdomen.  Obese.  Soft lax.  No tenderness.  No organomegaly.  No guarding or rebound.  .  No CVA tenderness.  Extremities.  No clubbing cyanosis or edema.  CNS.  No acute focal neurological deficits.    Results Review:  I reviewed the patient's new clinical results.  I reviewed the patient's new imaging results and agree with the interpretation.  I reviewed the patient's other test results and agree with the interpretation  I personally viewed and interpreted the patient's EKG/Telemetry data  Discussed with ED provider.    Lab Results (last 24 hours)     ** No results found for the last 24 hours. **          Imaging Results (Last 24 Hours)     ** No results found for the last 24 hours. **          Results for orders placed during the hospital encounter of 10/10/19    Adult Transthoracic Echo Complete W/ Cont if Necessary Per Protocol    Interpretation Summary  · Estimated EF = 62%.  · Left ventricular systolic function is normal.      ECG 12 Lead   Final Result   HEART RATE= 134  bpm   RR Interval= 448  ms   NE Interval= 134  ms   P Horizontal Axis= -60  deg   P Front Axis= -89  deg   QRSD Interval= 137  ms   QT Interval= 362  ms   QRS Axis= 53  deg   T Wave Axis= 3  deg   - ABNORMAL ECG -   Sinus or ectopic atrial tachycardia   Right bundle branch block   Minimal ST elevation,  lateral leads   When compared with ECG of 24-Mar-2021 22:50:43,   No significant change    Electronically Signed By: [facility] 12-Jul-2021 12:13:10   Date and Time of Study: 2021-07-11 21:19:44               Assessment/Plan     Active Hospital Problems    Diagnosis  POA   • **Sepsis, unspecified organism (CMS/HCC) [A41.9]  Yes   • Acute kidney failure (CMS/HCC) [N17.9]  Yes   • Obstructive uropathy [N13.9]  Yes   • Nephrolithiasis [N20.0]  Yes   • Anemia [D64.9]  Yes   • Hyperglycemia [R73.9]  Yes   • Metabolic acidosis [E87.2]  Yes   • Essential hypertension [I10]  Yes   • History of ventricular tachycardia [Z86.79]  Not Applicable   • Left humeral fracture [S42.302A]  Yes   • GERD without esophagitis [K21.9]  Yes   • Acute UTI (urinary tract infection) [N39.0]  Yes   • Hyperlipidemia [E78.5]  Yes      Resolved Hospital Problems   No resolved problems to display.           · Urosepsis with obstructing left ureteric stone leading to left mild hydronephrosis in a patient with a history of recurrent UT (history of ESBL Klebsiella and urosepsis)/neurogenic bladder/nephrolithiasis.  Plan IV fluids/IV cefepime/check blood and urine cultures/n.p.o. in anticipation for urological procedures/repeat CBC and lactic acid.  Urology has been consulted and they are on their way to take the patient to the OR to place a stent.  · Anemia.  Hemoglobin worse than the baseline 11-12.  Will work-up the anemia.  · Hyperglycemia in a patient with a history of glucose intolerance.  Will check A1c.  · Metabolic acidosis.  Most likely secondary to sepsis and acute kidney failure.  Plan IV fluid/treat sepsis/monitor  · Acute kidney failure.  This is mostly secondary to prerenal azotemia because of dehydration and sepsis.  Plan IV fluid and monitor input and output and renal function.  Will hold lisinopril for now.  · History of hypertension/history of V. tach.  Patient currently in sinus tachycardia.  No evidence of angina or  congestive heart failure.  Will hold lisinopril secondary to acute renal failure.  We will continue Procardia and aspirin and monitor.  · GERD.  Asymptomatic.  Plan IV Protonix.  · Status post recent left humerus neck fracture.  Continue arm sling.  Seen by orthopedic recently and conservative treatment was recommended  · Dyslipidemia.  We will continue aspirin and statin.  · VTE prophylaxis with sequential compression devices.    · I discussed the patient's findings and my recommendations with ER nurse practitioner/patient/      Code Status -full code       Juliette Esquivel MD    07/28/21  14:59 EDT

## 2021-07-12 NOTE — OUTREACH NOTE
Medical Week 3 Survey      Responses   [facility] patient discharged from?  [place]   Does the patient have one of the following disease processes/diagnoses(primary or secondary)?  Other   Week 3 attempt successful?  No   Unsuccessful attempts  Attempt 1   Revoke  Readmitted          Danielle Baron RN

## 2021-07-12 NOTE — ED NOTES
Admitting RN not available for report at this time. States they will call back.      Pina Castorena RN  07/11/21 3133

## 2021-07-12 NOTE — ED NOTES
PER PT'S  AT BEDSIDE, PT STARTED C/O CHILLS AND FATIGUE ONSET YESTERDAY. HE STATES HE WAS UNABLE TO PERSUADE PT TO COME TO THE HOSPITAL. STATES TODAY PT WENT TO USE THE RESTROOM AND BECAME SO FATIGUED SHE WAS UNABLE TO GET OFF THE TOILET.  STATES THAT IS WHEN HE CALLED EMS. PT STATES SHE HAS HX OF FREQUENT UTIS AND THAT THIS IS THE THIRD TIME THIS HAS HAPPENED. PT ALSO C/O CHRONIC CONSTIPATION DUE TO TAKING PAIN MEDS FOR RECENT BROKEN L SHOULDER. PT'S LUE IS IN SLING AT THIS TIME. PT C/O CHILLS, FEVER, AND TREMORS. STATES SHE DID NOT CHECK HER TEMP AT HOME.      Pina Castorena RN  07/11/21 2147       Pina Castorena, RN  07/11/21 2148

## 2021-07-12 NOTE — THERAPY DISCHARGE NOTE
Acute Care - Occupational Therapy Discharge   Amy Dimas    Patient Name: Anitra Orta  : 1940    MRN: [id number]                              Today's Date: 2021       Admit Date: 2021    Visit Dx:     ICD-10-CM ICD-9-CM   1. Sepsis with acute renal failure without septic shock, due to unspecified organism, unspecified acute renal failure type (CMS/HCC)  A41.9 038.9    R65.20 995.92    N17.9 584.9   2. Recurrent urinary tract infection  N39.0 599.0   3. Left ureteral stone  N20.1 592.1     Patient Active Problem List   Diagnosis   • Urinary tract infection due to ESBL Klebsiella   • Simple renal cyst   • Former smoker   • Gastroesophageal reflux disease   • Essential hypertension   • Hyperlipidemia   • Hypertriglyceridemia   • Osteoporosis   • Panic disorder   • Psoriasis   • Urge incontinence of urine   • Vitamin D deficiency   • Post-menopause   • Acute UTI (urinary tract infection)   • Insulin resistance   • Chronic bilateral low back pain without sciatica   • Spinal stenosis of lumbar region with neurogenic claudication   • Burning sensation of foot   • Other chronic pain   • Tachycardia   • Ventricular tachycardia (CMS/formerly [facility])   • Mixed incontinence   • GERD without esophagitis   • Anxiety   • Hyperglycemia   • Sepsis, unspecified organism (CMS/HCC)   • Acute cystitis   • Metabolic encephalopathy   • Hypokalemia   • Positive D dimer   • e.coli bacteremia   • Ureteral stone with hydronephrosis   • Closed fracture of right distal radius   • Closed fracture of left proximal humerus   • Closed displaced fracture of surgical neck of left humerus   • Acute kidney failure (CMS/HCC)   • Obstructive uropathy   • Nephrolithiasis   • Anemia   • Hyperglycemia   • Metabolic acidosis   • Essential hypertension   • History of ventricular tachycardia   • Left humeral fracture     Past Medical History:   Diagnosis Date   • Abdominal pain 2018    ER VISIT FOR ABD PAIN AND /O RENAL STONES  NAKUL NORMAL  CT  NEG FOR OBSTRUCTIVE KIDNEY STONE   • Ankle sprain    • Anxiety    • Arthritis of back    • Cervical disc disorder    • Chronic pain disorder    • Constipation    • DDD (degenerative disc disease), lumbar    • Fracture of wrist    • Fracture, foot    • Fracture, humerus    • Frequency of micturition    • GERD (gastroesophageal reflux disease)    • H/O bone density study 01/2019    WITH L SPINE 1.5 L HIP 2.4 FRAX 10% MAJOR FRACTURE AND 2% HIP FRACTURE (NOT CHANGED MUCH FROM 2016 PT PREVIOUSLY ON FOSAMAX X5 YRS BACK IN 1970s   • H/O CT scan     ABD L UTEROPELVIC JUNCTION NEPHROLITHIASIS   • H/O CT scan     PE PROTOCOL NEG   • H/O mammogram 09/2019    NORMAL +H/O OF L BREAST LUMPECTOMY WITH BENIGN PATHOLOGY MANAGED BY GYN   • History of Holter monitoring     NSVT BUT LASTING UP TO 33 BEATS AT A TIME   • History of MRI     L SPINE HOSP 8/19 EPIGASTRIC PAIN AND ABD PAIN   • History of nuclear stress test     NLM MYOCARDIAL PERFUSION NO SIGNS OF ISCHEMIA   • HTN (hypertension)    • Hyperglycemia 2/19/2021   • Hyperlipidemia    • Kidney stones    • Knee swelling    • Low back pain    • Low back strain    • Lumbosacral disc disease    • Mixed hyperlipidemia 9/16/2016   • Mixed incontinence    • Osteoarthritis    • Osteopenia    • Osteoporosis    • Papanicolaou smear 02/2019    DONE AND NORMAL NO LONGER INDICATED MANAGED BY GYN   • Personal history of urinary calculi    • PONV (postoperative nausea and vomiting)    • Psoriasis    • Scoliosis    • Tachycardia, unspecified    • Tear of meniscus of knee knee replacement right   • Thoracic disc disorder    • Urinary tract infection     HOSP FOR RECURRENT UTI WITH ID CONSULT   • Wrist sprain      Past Surgical History:   Procedure Laterality Date   • BREAST BIOPSY Left     benign   • BREAST LUMPECTOMY Left     benign   • BUNIONECTOMY Right    • COLONOSCOPY N/A 11/30/2016    Procedure: COLONOSCOPY polypectomy;  Surgeon: Adali Willard MD;  Location: [facility];  Service:    •  CYSTOSCOPY BOTOX INJECTION OF BLADDER N/A 7/21/2017    Procedure: CYSTOSCOPY COAPTITE INJECTION;  Surgeon: Kevon Clark MD;  Location: [facility];  Service:    • CYSTOSCOPY W/ LITHOLAPAXY / EHL     • CYSTOSCOPY W/ URETERAL STENT PLACEMENT Left 9/12/2016    Procedure: CYSTOSCOPY URETERAL STENT INSERTION;  Surgeon: Kevon Clark MD;  Location: [facility];  Service:    • CYSTOSCOPY W/ URETERAL STENT PLACEMENT Left 8/1/2019    Procedure: CYSTOSCOPY URETERAL CATHETER/STENT INSERTION;  Surgeon: Kevon Clark MD;  Location: [facility] OR;  Service: Urology   • CYSTOSCOPY W/ URETERAL STENT PLACEMENT Left 3/25/2021    Procedure: CYSTOSCOPY URETERAL CATHETER/STENT INSERTION;  Surgeon: Kevon Clark MD;  Location: [facility];  Service: Urology;  Laterality: Left;   • CYSTOSCOPY W/ URETERAL STENT PLACEMENT Left 7/11/2021    Procedure: CYSTOSCOPY URETERAL CATHETER/STENT INSERTION;  Surgeon: Lul Guzman MD;  Location: [facility];  Service: Urology;  Laterality: Left;  CYSTOSCOPY LEFT URETERAL CATHETER/ STENT PLACEMENT   • JOINT REPLACEMENT     • KNEE SURGERY Right 2015    tka   • TONSILLECTOMY AND ADENOIDECTOMY     • TRIGGER POINT INJECTION     • URETEROSCOPY LASER LITHOTRIPSY WITH STENT INSERTION Left 10/5/2016    Procedure: LT URETEROSCOPY LASER LITHOTRIPSY STONE BASKET EXTRACTION AND STENT ;  Surgeon: Kevon Clark MD;  Location: [facility];  Service:      General Information     Row Name 07/12/21 0926          OT Time and Intention    Document Type  discharge evaluation/summary  -SD     Mode of Treatment  occupational therapy  -SD     Row Name 07/12/21 0926          General Information    Patient Profile Reviewed  yes  -SD     Prior Level of Function  min assist:;ADL's;all household mobility spouse has assisted pt with adl's/mobility for past 4 weeks due to R wrist/L shoulder fx's  -SD     Existing Precautions/Restrictions  brace worn when out of bed left shoulder sling and right wrist  cock up for conservative mangement of fractures (6-8-21)  -SD     Barriers to Rehab  -- conservative treatment for BUE's which limits activity/use of assistive devices for mobility  -SD     Row Name 07/12/21 0926          Occupational Profile    Reason for Services/Referral (Occupational Profile)  decreased adl function  -SD     Successful Occupations (Occupational Profile)  pt was independent with adl's prior to her fall in early June which resulted in right wrist and left humerus fx. Both are being tx conservatively. Spouse has assisted with adl's and mobility since her injuries. Pt reports no concerns for return to home and management of daily tasks with family support.  -SD     Occupational History/Life Experiences (Occupational Profile)    -SD     Environmental Supports and Barriers (Occupational Profile)  spouse  -SD     Row Name 07/12/21 0926          Stairs Within Home, Primary    Stairs, Within Home, Primary  pt states her spouse assists her with mobility and stairs. Spouse uses a gait belt for safety.  -SD     Row Name 07/12/21 0926          Cognition    Orientation Status (Cognition)  oriented x 3  -SD       User Key  (r) = Recorded By, (t) = Taken By, (c) = Cosigned By    Initials Name Provider Type    SD Renan Castellanos, OTR Occupational Therapist        Mobility/ADL's     Row Name 07/12/21 1000          Bed Mobility    Bed Mobility  supine-sit  -SD     Supine-Sit Shannon (Bed Mobility)  modified independence HOB elevated  -SD     Bed Mobility, Safety Issues  decreased use of arms for pushing/pulling  -SD     Assistive Device (Bed Mobility)  head of bed elevated  -SD     Comment (Bed Mobility)  Pt states she sleeps in a lift chair at times at home.  -SD     Row Name 07/12/21 1000          Transfers    Transfers  sit-stand transfer  -SD     Sit-Stand Shannon (Transfers)  contact guard  -SD     Row Name 07/12/21 1000          Sit-Stand Transfer    Assistive Device (Sit-Stand Transfers)   -- pt unable to use assistive devices at this time due to healing UE fractures.  -SD     Row Name 07/12/21 1000          Functional Mobility    Functional Mobility- Ind. Level  contact guard assist  -SD     Functional Mobility-Distance (Feet)  15  -SD     Row Name 07/12/21 1000          Activities of Daily Living    BADL Assessment/Intervention  bathing  -SD     Row Name 07/12/21 1000          Mobility    Extremity Weight-bearing Status  other (see comments) pt with healing fractures of BUE's  -SD     Row Name 07/12/21 1000          Bathing Assessment/Intervention    Comment (Bathing)  Pt states her spouse has been assisting her with adl's for past month due to her BUE fractures. Pt reports no concerns for management of adl's at home with family support.  -SD       User Key  (r) = Recorded By, (t) = Taken By, (c) = Cosigned By    Initials Name Provider Type    Renan Child, OTR Occupational Therapist        Obj/Interventions     Row Name 07/12/21 1003          Vision Assessment/Intervention    Visual Impairment/Limitations  WFL  -[facility] Name 07/12/21 1003          Range of Motion Comprehensive    Comment, General Range of Motion  pt demonstrated functional rom of bilateral hands and functional rom of proximal RUE and distal LUE. No formal rom or MMT assessment due to conservative treatment of wrist wrist fracture/left humerus fracture.  -SD       User Key  (r) = Recorded By, (t) = Taken By, (c) = Cosigned By    Initials Name Provider Type    Renan Child, OTR Occupational Therapist        Goals/Plan    No documentation.       Clinical Impression     Row Name 07/12/21 1007          Plan of Care Review    Plan of Care Reviewed With  patient  -SD     Outcome Summary  OT evaluation completed. Pt was independent with adl's prior to her fall in early June which resulted in right wrist and left humerus fx. Both are being tx conservatively. Spouse has assisted with adl's and mobility since her injuries.  Pt reports no concerns for return to home and management of daily tasks with family support. Per medical record review, pt followed up with Dr. Noyola one week after her injury (6-15-21) and was to follow up with Ortho in 3 weeks.  Pt may need to re-consult with ortho regarding her right wrist/left shoulder. No OT services recommded at this time. Pt may need OT or PT outpatient therapy after consult with ortho.  -SD     Row Name 07/12/21 1007          Therapy Assessment/Plan (OT)    Patient/Family Therapy Goal Statement (OT)  go home  -SD     Criteria for Skilled Therapeutic Interventions Met (OT)  no;other (see comments) pt at baseline status with current situation (recent UE fx's)  -SD     Therapy Frequency (OT)  evaluation only  -SD     Row Name 07/12/21 1007          Therapy Plan Review/Discharge Plan (OT)    Anticipated Discharge Disposition (OT)  home with assist  -SD     Row Name 07/12/21 1007          Positioning and Restraints    Pre-Treatment Position  in bed  -SD     Post Treatment Position  chair  -SD     In Chair  reclined;call light within reach;encouraged to call for assist  -SD       User Key  (r) = Recorded By, (t) = Taken By, (c) = Cosigned By    Initials Name Provider Type    Renan Child, OTR Occupational Therapist        Outcome Measures     Row Name 07/12/21 1019          How much help from another is currently needed...    Putting on and taking off regular lower body clothing?  3  -SD     Bathing (including washing, rinsing, and drying)  3  -SD     Toileting (which includes using toilet bed pan or urinal)  3  -SD     Putting on and taking off regular upper body clothing  3  -SD     Taking care of personal grooming (such as brushing teeth)  3  -SD     Eating meals  3  -SD     AM-PAC 6 Clicks Score (OT)  18  -SD     Row Name 07/12/21 0836          How much help from another person do you currently need...    Turning from your back to your side while in flat bed without using bedrails?  3   -JW     Moving from lying on back to sitting on the side of a flat bed without bedrails?  3  -JW     Moving to and from a bed to a chair (including a wheelchair)?  3  -JW     Standing up from a chair using your arms (e.g., wheelchair, bedside chair)?  3  -JW     Climbing 3-5 steps with a railing?  3  -JW     To walk in hospital room?  3  -JW     AM-PAC 6 Clicks Score (PT)  18  -JW     Row Name 07/12/21 1019 07/12/21 0836       Functional Assessment    Outcome Measure Options  AM-PAC 6 Clicks Daily Activity (OT)  -SD  AM-PAC 6 Clicks Basic Mobility (PT)  -      User Key  (r) = Recorded By, (t) = Taken By, (c) = Cosigned By    Initials Name Provider Type    SD Renan Castellanos OTR Occupational Therapist    Radha Olivera, ARON Physical Therapist        Occupational Therapy Education                 Title: PT OT SLP Therapies (Resolved)     Topic: Occupational Therapy (Resolved)     Point: ADL training (Resolved)     Description:   Instruct learner(s) on proper safety adaptation and remediation techniques during self care or transfers.   Instruct in proper use of assistive devices.              Learning Progress Summary           Patient Acceptance, E, VU by SD at 7/12/2021 1021    Comment: Education regarding OT services and safety with transfers, mobility and adl tasks. Pt states her spouse has been assisting her since her right wrist/left humerus fx's. She reports no concerns for discharge to home.                               User Key     Initials Effective Dates Name Provider Type Discipline    SD 06/16/21 -  Renan Castellanos OTR Occupational Therapist OT              OT Recommendation and Plan  Retired Outcome Summary/Treatment Plan (OT)  Anticipated Discharge Disposition (OT): home with assist  Therapy Frequency (OT): evaluation only  Plan of Care Review  Plan of Care Reviewed With: patient  Outcome Summary: OT evaluation completed. Pt was independent with adl's prior to her fall in early June which  resulted in right wrist and left humerus fx. Both are being tx conservatively. Spouse has assisted with adl's and mobility since her injuries. Pt reports no concerns for return to home and management of daily tasks with family support. Per medical record review, pt followed up with Dr. Noyola one week after her injury (6-15-21) and was to follow up with Ortho in 3 weeks.  Pt may need to re-consult with ortho regarding her right wrist/left shoulder. No OT services recommded at this time. Pt may need OT or PT outpatient therapy after consult with ortho.     Time Calculation:   Time Calculation- OT     Row Name 07/12/21 1117             Time Calculation- OT    OT Start Time  0836  -SD        User Key  (r) = Recorded By, (t) = Taken By, (c) = Cosigned By    Initials Name Provider Type    SD Renan Castellanos OTR Occupational Therapist        Therapy Charges for Today     Code Description Service Date Service Provider Modifiers Qty    [id number] HC OT EVAL LOW COMPLEXITY 2 7/12/2021 Renan Castellanos OTR GO 1               VIDAL Louis  7/12/2021

## 2021-07-12 NOTE — PROGRESS NOTES
Called the patient's bedside as she had a systolic in the 60s.  She was arousable but would not stay awake.  Of note she received Norco earlier in the day as well as her blood pressure medications lisinopril and Cardizem.  She was given a dose of Narcan with some response.  Systolic blood pressure came up into the 100s.  Blood gas was done which did not show acidosis or hypercapnia.  Blood sugar was done which showed a blood sugar in the 300s.  EKG was done which showed sinus rhythm with chronic left bundle branch block which was similar to previous.  Patient is noted to have anemia, severe iron deficiency, history of V. tach, chronic pain syndrome.  She may have taken extra narcotics that she had.  O2 saturation was in the high 80s, she was placed on 3 L which brought it up to 100.    General: Alert and oriented x1, arousable but very somnolent.  Heart: Regular rate and rhythm without murmur rub or thrill  Lungs: Decreased breath sounds bilaterally without use of accessory muscles of respiration.  Abdomen: Soft/nontender/nondistended.  No HSM noted.  MSK: Moves all extremities.  Wound noted on left foot with ulceration on the heel with maggots in place.    A/P:  1.  Hypotension, with reduced responsiveness, suspect due to combination of pain medication as well as antihypertensives.  2.  Sepsis due to infection in the foot.  3.  UTI with history of ESBL.  4.  Iron deficiency anemia.  5.  Hyperglycemia with diabetes type 2  6.  Renal insufficiency  7.  Chronic pain on chronic narcotics    We will give 1 L of IV fluid.  Narcan given with response.  Continue antibiotics.  Transfer to the ICU.  Placed back on normal saline 100 cc an hour.  Check lab work including troponin, lactic acid, renal panel, CBC.  MRI pending, can do when stable.  Further recommendations will depend on the clinical course.    Critical care time spent 40 minutes.

## 2021-07-12 NOTE — ED NOTES
PT SEEMS FRUSTRATED AND ANXIOUS ON ARRIVAL. PT MOANING IN PAIN BUT CAN ONLY TELL STAFF HER PAIN IS 10/10 AND THAT IT IS IN HER L SHOULDER. PT REPEATEDLY SAYING SHE DOES NOT WANT TO BE HERE.      Pina Castorena, CONNIE  07/11/21 4250

## 2021-07-12 NOTE — ANESTHESIA POSTPROCEDURE EVALUATION
Patient: Anitra Orta    Procedure Summary     Date: 07/12/21 Room / Location:  [facility] /  [facility]    Anesthesia Start: 0018 Anesthesia Stop: 0100    Procedure: CYSTOSCOPY URETERAL CATHETER/STENT INSERTION (Left ) Diagnosis:       Kidney stone      (KIDNEY STONES)    Surgeons: Lul Guzman MD Provider: Jose Antonio York CRNA    Anesthesia Type: general ASA Status: 3 - Emergent          Anesthesia Type: No value filed.    Vitals  Vitals Value Taken Time   BP 97/52 07/12/21 0150   Temp 99.5 °F (37.5 °C) 07/12/21 0056   Pulse 84 07/12/21 0153   Resp 18 07/12/21 0140   SpO2 95 % 07/12/21 0153   Vitals shown include unvalidated device data.        Post Anesthesia Care and Evaluation    Patient location during evaluation: bedside  Patient participation: complete - patient participated  Level of consciousness: awake and alert  Pain score: 7  Pain management: adequate  Airway patency: patent  Anesthetic complications: No anesthetic complications  PONV Status: none  Cardiovascular status: acceptable  Respiratory status: acceptable  Hydration status: acceptable

## 2021-07-12 NOTE — PROGRESS NOTES
[facility] HOSPITALIST    PROGRESS NOTE    Name:  Anitra Orta   Age:  80 y.o.  Sex:  female  :  1940  MRN:  [id number]   Visit Number:  [id number]  Admission Date:  2021  Date Of Service:  21  Primary Care Physician:  Margot Chavez MD     LOS: 1 day :    Chief Complaint:      Left-sided abdominal pain    Subjective:    Patient feels better after stent last night.  Currently denies any chest pressure, shortness of breath, nausea, vomiting, or pain.  Her plan is to go home when she is stable.    Hospital Course:    Patient 80-year-old female with history of nephrolithiasis, recurrent UTI with ESBL, neurogenic bladder, essential hypertension, V. tach, chronic pain syndrome, and recent left humerus fracture on conservative treatment.  She was recently treated for UTI with Rocephin and Keflex.  She came in with fever, chills, rigors.  Urine suggested UTI.  CT abdomen pelvis showed 4 mm left ureteral stone leading to mild left hydronephrosis.  She was placed on Zosyn and admitted.  Urology was consulted they took the patient for cystoscopy.  Stent was placed on the left.    Patient has history of recurrent ESBL, blood and urine cultures are pending.  Will place on ertapenem until the cultures come back.    Review of Systems:     All systems were reviewed and negative except as mentioned in subjective, assessment and plan.    Vital Signs:    Temp:  [98 °F (36.7 °C)-103.3 °F (39.6 °C)] 98 °F (36.7 °C)  Heart Rate:  [] 82  Resp:  [16-27] 18  BP: ()/(50-74) 131/74    Intake and output:    I/O last 3 completed shifts:  In: 1050 [I.V.:250; IV Piggyback:800]  Out: 500 [Urine:500]  I/O this shift:  In: 240 [P.O.:240]  Out: -     Physical Examination:    General Appearance:  Alert and cooperative.  Mild distress   Head:  Atraumatic and normocephalic.   Eyes: Conjunctivae and sclerae normal, no icterus. No pallor.   Throat: No oral lesions, no thrush, oral mucosa moist.    Neck: Supple, trachea midline, no thyromegaly.   Lungs:   Breath sounds heard bilaterally equally.  No crackles or wheezing. No Pleural rub or bronchial breathing.   Heart:  Normal S1 and S2, no murmur, no gallop, no rub. No JVD.   Abdomen:   Normal bowel sounds, no masses, no organomegaly. Soft, nontender, nondistended, no rebound tenderness.   Extremities: Supple, no edema, no cyanosis, no clubbing.   Skin: No bleeding or rash.   Neurologic: Alert and oriented x 3. No facial asymmetry. Moves all four limbs. No tremors.      Laboratory results:    Results from last 7 days   Lab Units 07/12/21  0427 07/11/21  2119   SODIUM mmol/L 137 136   POTASSIUM mmol/L 4.9 4.3   CHLORIDE mmol/L 107 104   CO2 mmol/L 17.4* 16.9*   BUN mg/dL 19 27*   CREATININE mg/dL 1.20* 1.30*   CALCIUM mg/dL 8.3* 8.8   BILIRUBIN mg/dL 0.5 0.6   ALK PHOS U/L 156* 184*   ALT (SGPT) U/L 25 25   AST (SGOT) U/L 31 25   GLUCOSE mg/dL 123* 187*     Results from last 7 days   Lab Units 07/12/21  0427 07/11/21  2119   WBC 10*3/mm3 12.19* 6.55   HEMOGLOBIN g/dL 8.8* 10.0*   HEMATOCRIT % 28.5* 32.1*   PLATELETS 10*3/mm3 233 232                     I have reviewed the patient's laboratory results.    Radiology results:    CT Abdomen Pelvis With Contrast    Result Date: 7/11/2021  CT Abdomen Pelvis W INDICATION: Urinary tract infection. Sepsis. Fever and chills. Left side abdominal pain. TECHNIQUE: CT of the abdomen and pelvis with IV contrast. Coronal and sagittal reconstructions were obtained.  Radiation dose reduction techniques included automated exposure control or exposure modulation based on body size. Count of known CT and cardiac nuc med studies performed in previous 12 months: 1. COMPARISON: 7/31/2019 FINDINGS: There is streak artifact from the patient's arms. There is mild atelectasis in the lung bases. Small hiatal hernia is stable. Gallbladder is mildly distended but otherwise normal. There is no biliary obstruction. Findings are similar to  prior. There is atherosclerotic disease with no aortic aneurysm. There is mild left hydronephrosis secondary to a 4 mm stone in the distal ureter just above the level of the acetabulum. There are multiple small nonobstructing left-sided kidney stones. There are also bilateral renal cysts. Remaining solid organs are normal. Urinary bladder is normal. Solid pelvic organs are normal. The appendix is normal. There is no evidence of acute colitis. There is no small bowel obstruction. No free fluid or adenopathy is identified. There is diffuse lumbar degenerative disease with levoscoliosis. There is grade 1 anterolisthesis of L4 on L5.     Impression: 1. Mild left hydronephrosis due to a 4 mm stone in the lower left ureter just above the level of the acetabulum. There are also multiple small nonobstructing left kidney stones. 2. No acute findings in the GI tract. Normal appendix. 3. Additional findings as above. Signer Name: Jenaro Reyna MD  Signed: 7/11/2021 10:49 PM  Workstation Name: [facility]  [facility]    XR Chest 1 View    Result Date: 7/11/2021  CR Chest 1 Vw INDICATION: Fever with sepsis COMPARISON:  6/21/2021 FINDINGS: Single portable AP view(s) of the chest. Support lines and tubes are unchanged. The heart and mediastinal contours are normal. The lungs are clear. No pneumothorax or pleural effusion. The inspiratory effort is shallower than on the previous examination. Taking this into account, no changes are seen.     Impression: No acute cardiopulmonary findings. Signer Name: Jenaro Montenegro MD  Signed: 7/11/2021 10:36 PM  Workstation Name: [id number]  [facility]    FL Retrograde Pyelogram In OR    Result Date: 7/12/2021  CR FL RETROGRADE PYELOGRAM IN OR INDICATION:  Sepsis. Stent placement. Ureteral stone. TECHNIQUE: 3 C-arm images from a left sided retrograde pyelogram. Fluoroscopy time is 1 minute 28 seconds. COMPARISON:  CT abdomen and pelvis 7/11/2021  FINDINGS: Images show a dilated left renal collecting system. A ureteral stent was subsequently placed. The distal ureteral stone is seen adjacent to the stent. Please see the operative report. Signer Name: Jenaro Reyna MD  Signed: 7/12/2021 1:25 AM  Workstation Name: [email or web address]  [facility] [place]    I have reviewed the patient's radiology reports.    Medication Review:     I have reviewed the patient's active and prn medications.     Problem List:      Sepsis, unspecified organism (CMS/HCC)    Hyperlipidemia    Acute UTI (urinary tract infection)    GERD without esophagitis    Acute kidney failure (CMS/HCC)    Obstructive uropathy    Nephrolithiasis    Anemia    Hyperglycemia    Metabolic acidosis    Essential hypertension    History of ventricular tachycardia    Left humeral fracture      Assessment:    1.  Sepsis due to left radial stone with infection.  2.  Left-sided ureteral stone 4 mm with hydronephrosis, status post stent placement.  3.  UTI, with history of Klebsiella ESBL.  4.  Mild anemia, severe iron deficiency noted.  5.  Hyperglycemia with borderline diabetes type 2  6.  Renal insufficiency  7.  Recent left humerus fracture, being managed conservatively.    Plan:    We will change antibiotic from cefepime over to ertapenem.  Await urine and blood cultures.  Adjust antibiotics accordingly.  Urology following, catheter in place.  PT and OT to work with the patient.  Check lab work in the a.m.  Patient's plan is to go home when stable.  Check stool for occult blood.  Placed on oral iron.  Would recommend colonoscopy and EGD as an outpatient.  Reduce IV fluids.  Further recommendations will depend on clinical course.    DVT Prophylaxis: SCD  Code Status: Full  Diet: Regular diet  Discharge Plan: Home with outpatient physical therapy    Porfirio Correa DO  07/12/21  11:46 EDT    Dictated utilizing Dragon dictation.

## 2021-07-12 NOTE — THERAPY DISCHARGE NOTE
Patient Name: Anitra Orta  : 1940    MRN: [id number]                              Today's Date: 2021       Admit Date: 2021    Visit Dx:     ICD-10-CM ICD-9-CM   1. Sepsis with acute renal failure without septic shock, due to unspecified organism, unspecified acute renal failure type (CMS/[facility])  A41.9 038.9    R65.20 995.92    N17.9 584.9   2. Recurrent urinary tract infection  N39.0 599.0   3. Left ureteral stone  N20.1 592.1     Patient Active Problem List   Diagnosis   • Urinary tract infection due to ESBL Klebsiella   • Simple renal cyst   • Former smoker   • Gastroesophageal reflux disease   • Essential hypertension   • Hyperlipidemia   • Hypertriglyceridemia   • Osteoporosis   • Panic disorder   • Psoriasis   • Urge incontinence of urine   • Vitamin D deficiency   • Post-menopause   • Acute UTI (urinary tract infection)   • Insulin resistance   • Chronic bilateral low back pain without sciatica   • Spinal stenosis of lumbar region with neurogenic claudication   • Burning sensation of foot   • Other chronic pain   • Tachycardia   • Ventricular tachycardia (CMS/[facility])   • Mixed incontinence   • GERD without esophagitis   • Anxiety   • Hyperglycemia   • Sepsis, unspecified organism (CMS/[facility])   • Acute cystitis   • Metabolic encephalopathy   • Hypokalemia   • Positive D dimer   • e.coli bacteremia   • Ureteral stone with hydronephrosis   • Closed fracture of right distal radius   • Closed fracture of left proximal humerus   • Closed displaced fracture of surgical neck of left humerus   • Acute kidney failure (CMS/HCC)   • Obstructive uropathy   • Nephrolithiasis   • Anemia   • Hyperglycemia   • Metabolic acidosis   • Essential hypertension   • History of ventricular tachycardia   • Left humeral fracture     Past Medical History:   Diagnosis Date   • Abdominal pain 2018    ER VISIT FOR ABD PAIN AND /O RENAL STONES  NAKUL NORMAL  CT NEG FOR OBSTRUCTIVE KIDNEY STONE   • Ankle sprain    •  Anxiety    • Arthritis of back    • Cervical disc disorder    • Chronic pain disorder    • Constipation    • DDD (degenerative disc disease), lumbar    • Fracture of wrist    • Fracture, foot    • Fracture, humerus    • Frequency of micturition    • GERD (gastroesophageal reflux disease)    • H/O bone density study 01/2019    WITH L SPINE 1.5 L HIP 2.4 FRAX 10% MAJOR FRACTURE AND 2% HIP FRACTURE (NOT CHANGED MUCH FROM 2016 PT PREVIOUSLY ON FOSAMAX X5 YRS BACK IN 1970s   • H/O CT scan     ABD L UTEROPELVIC JUNCTION NEPHROLITHIASIS   • H/O CT scan     PE PROTOCOL NEG   • H/O mammogram 09/2019    NORMAL +H/O OF L BREAST LUMPECTOMY WITH BENIGN PATHOLOGY MANAGED BY GYN   • History of Holter monitoring     NSVT BUT LASTING UP TO 33 BEATS AT A TIME   • History of MRI     L SPINE HOSP 8/19 EPIGASTRIC PAIN AND ABD PAIN   • History of nuclear stress test     NLM MYOCARDIAL PERFUSION NO SIGNS OF ISCHEMIA   • HTN (hypertension)    • Hyperglycemia 2/19/2021   • Hyperlipidemia    • Kidney stones    • Knee swelling    • Low back pain    • Low back strain    • Lumbosacral disc disease    • Mixed hyperlipidemia 9/16/2016   • Mixed incontinence    • Osteoarthritis    • Osteopenia    • Osteoporosis    • Papanicolaou smear 02/2019    DONE AND NORMAL NO LONGER INDICATED MANAGED BY GYN   • Personal history of urinary calculi    • PONV (postoperative nausea and vomiting)    • Psoriasis    • Scoliosis    • Tachycardia, unspecified    • Tear of meniscus of knee knee replacement right   • Thoracic disc disorder    • Urinary tract infection     HOSP FOR RECURRENT UTI WITH ID CONSULT   • Wrist sprain      Past Surgical History:   Procedure Laterality Date   • BREAST BIOPSY Left     benign   • BREAST LUMPECTOMY Left     benign   • BUNIONECTOMY Right    • COLONOSCOPY N/A 11/30/2016    Procedure: COLONOSCOPY polypectomy;  Surgeon: Adali Willard MD;  [facility];  Service:    • CYSTOSCOPY BOTOX INJECTION OF BLADDER N/A 7/21/2017     Procedure: CYSTOSCOPY COAPTITE INJECTION;  Surgeon: Kevon Clark MD;  Location: [facility] OR;  Service:    • CYSTOSCOPY W/ LITHOLAPAXY / EHL     • CYSTOSCOPY W/ URETERAL STENT PLACEMENT Left 9/12/2016    Procedure: CYSTOSCOPY URETERAL STENT INSERTION;  Surgeon: Kevon Clark MD;  Location: [facility] OR;  Service:    • CYSTOSCOPY W/ URETERAL STENT PLACEMENT Left 8/1/2019    Procedure: CYSTOSCOPY URETERAL CATHETER/STENT INSERTION;  Surgeon: Kevon Clark MD;  Location: [facility] OR;  Service: Urology   • CYSTOSCOPY W/ URETERAL STENT PLACEMENT Left 3/25/2021    Procedure: CYSTOSCOPY URETERAL CATHETER/STENT INSERTION;  Surgeon: Kevon Clark MD;  Location: [facility] OR;  Service: Urology;  Laterality: Left;   • CYSTOSCOPY W/ URETERAL STENT PLACEMENT Left 7/11/2021    Procedure: CYSTOSCOPY URETERAL CATHETER/STENT INSERTION;  Surgeon: Lul Guzman MD;  Location: [facility] OR;  Service: Urology;  Laterality: Left;  CYSTOSCOPY LEFT URETERAL CATHETER/ STENT PLACEMENT   • JOINT REPLACEMENT     • KNEE SURGERY Right 2015    tka   • TONSILLECTOMY AND ADENOIDECTOMY     • TRIGGER POINT INJECTION     • URETEROSCOPY LASER LITHOTRIPSY WITH STENT INSERTION Left 10/5/2016    Procedure: LT URETEROSCOPY LASER LITHOTRIPSY STONE BASKET EXTRACTION AND STENT ;  Surgeon: Kevon Clark MD;  Location: [facility] OR;  Service:      General Information     Row Name 07/12/21 0836          Physical Therapy Time and Intention    Document Type  evaluation  -     Mode of Treatment  physical therapy  -     Row Name 07/12/21 0836          General Information    Patient Profile Reviewed  yes  -     Prior Level of Function  -- spouse assists with ADLs and mobility prior to admission  -JW     Existing Precautions/Restrictions  brace worn when out of bed left shoulder sling, right wrist brace due to non surgical fx management  -JW     Barriers to Rehab  none identified  -     Row Name 07/12/21 0836          Living  Environment    Lives With  spouse  -     Row Name 07/12/21 0836          Home Main Entrance    Number of Stairs, Main Entrance  nine  -     Row Name 07/12/21 0836          Cognition    Orientation Status (Cognition)  oriented x 3  -       User Key  (r) = Recorded By, (t) = Taken By, (c) = Cosigned By    Initials Name Provider Type     Radha Velásquez PT Physical Therapist        Mobility     Row Name 07/12/21 0836          Bed Mobility    Bed Mobility  supine-sit  -     Supine-Sit Orange (Bed Mobility)  contact guard  -     Assistive Device (Bed Mobility)  head of bed elevated  -     Comment (Bed Mobility)  -- pt reports sleeping in lift chair  -     Row Name 07/12/21 0836          Sit-Stand Transfer    Sit-Stand Orange (Transfers)  contact guard;verbal cues  -     Assistive Device (Sit-Stand Transfers)  other (see comments) no device used due to weight bearing restrictions  -     Row Name 07/12/21 0836          Gait/Stairs (Locomotion)    Orange Level (Gait)  contact guard;verbal cues  -     Assistive Device (Gait)  -- no DME used due to bilateral UE NWB  -     Distance in Feet (Gait)  15 cues for safety during direction changes  -     Bilateral Gait Deviations  forward flexed posture  -     Comment (Gait/Stairs)  Patient able to perform gait with CGA with verbal cues for safety and sequencing.  Patient reports ambulating short distances only at home with assistance from spouse  -     Row Name 07/12/21 0836          Mobility    Extremity Weight-bearing Status  left upper extremity UEs NWB due to previous L humerus and R wrist fracture  -     Left Upper Extremity (Weight-bearing Status)  non weight-bearing (NWB)  -     Right Upper Extremity (Weight-bearing Status)  non weight-bearing (NWB)  -       User Key  (r) = Recorded By, (t) = Taken By, (c) = Cosigned By    Initials Name Provider Type    Radha Olivera PT Physical Therapist        Obj/Interventions   "   [facility] Name 07/12/21 0836          Range of Motion Comprehensive    Comment, General Range of Motion  LE ROM WFL bilaterally  -[facility] Name 07/12/21 08          Strength Comprehensive (MMT)    Comment, General Manual Muscle Testing (MMT) Assessment  LE strength functional within task  -       User Key  (r) = Recorded By, (t) = Taken By, (c) = Cosigned By    Initials Name Provider Type    Radha Olivera, PT Physical Therapist        Goals/Plan    No documentation.       Clinical Impression     [facility] Name 07/12/21 08          Pain    Additional Documentation  -- no c/o pain  -[facility] Name 07/12/21 08          Plan of Care Review    Plan of Care Reviewed With  patient  -     Outcome Summary  Physical therapy evaluation complete.  Patient with non operative left humerus fx and right distal radius fracture from injury in June 2021, NWB bilateral UEs.  Patient performs supine to sit with CGA and sit to/from stand with CGA.  Patient performs gait with CGA x15 feet with verbal cues for safety and sequencing.  Patient reports her  assists her with all mobility using gait belt and she ambulates only short distances from bed to bathroom and back.  Patient reports she is currently at baseline and reports no concerns regarding return home with spouse.  Patient may benefit from use of rolling walker for balance once allowed to weightbear through UEs.  No further PT needs at this time.  -[facility] Name 07/12/21 0836          Therapy Assessment/Plan (PT)    Patient/Family Therapy Goals Statement (PT)  \"go home\"  -     Criteria for Skilled Interventions Met (PT)  no problems identified which require skilled intervention  -[facility] Name 07/12/21 0836          Positioning and Restraints    Pre-Treatment Position  in bed  -     Post Treatment Position  chair  -JW     In Chair  notified nsg;reclined;call light within reach;encouraged to call for assist  -       User Key  (r) = Recorded By, (t) = Taken " By, (c) = Cosigned By    Initials Name Provider Type    Radha Olivera, ARON Physical Therapist        Outcome Measures     Row Name 07/12/21 0836          How much help from another person do you currently need...    Turning from your back to your side while in flat bed without using bedrails?  3  -JW     Moving from lying on back to sitting on the side of a flat bed without bedrails?  3  -JW     Moving to and from a bed to a chair (including a wheelchair)?  3  -JW     Standing up from a chair using your arms (e.g., wheelchair, bedside chair)?  3  -JW     Climbing 3-5 steps with a railing?  3  -JW     To walk in hospital room?  3  -JW     AM-PAC 6 Clicks Score (PT)  18  -     Row Name 07/12/21 0836          Functional Assessment    Outcome Measure Options  AM-PAC 6 Clicks Basic Mobility (PT)  -       User Key  (r) = Recorded By, (t) = Taken By, (c) = Cosigned By    Initials Name Provider Type    Radha Olivera PT Physical Therapist        Physical Therapy Education                 Title: PT OT SLP Therapies (Resolved)     Topic: Physical Therapy (Resolved)     Point: Mobility training (Resolved)     Learning Progress Summary           Patient Acceptance, E,TB, VU by  at 7/12/2021 1016                               User Key     Initials Effective Dates Name Provider Type [facility] 06/16/21 -  Radha Velásquez PT Physical Therapist PT              PT Recommendation and Plan     Plan of Care Reviewed With: patient  Outcome Summary: Physical therapy evaluation complete.  Patient with non operative left humerus fx and right distal radius fracture from injury in June 2021, NWB bilateral UEs.  Patient performs supine to sit with CGA and sit to/from stand with CGA.  Patient performs gait with CGA x15 feet with verbal cues for safety and sequencing.  Patient reports her  assists her with all mobility using gait belt and she ambulates only short distances from bed to bathroom and back.  Patient  reports she is currently at baseline and reports no concerns regarding return home with spouse.  Patient may benefit from use of rolling walker for balance once allowed to weightbear through UEs.  No further PT needs at this time.     Time Calculation:   PT Charges     Row Name 07/12/21 0836             Time Calculation    Start Time  0836  -JW      PT Received On  07/12/21  -MIGUEL        User Key  (r) = Recorded By, (t) = Taken By, (c) = Cosigned By    Initials Name Provider Type    Radha Olivera, PT Physical Therapist        Therapy Charges for Today     Code Description Service Date Service Provider Modifiers Qty    [id number]  PT EVAL LOW COMPLEXITY 2 7/12/2021 Radha Velásquez, PT GP 1          PT G-Codes  Outcome Measure Options: AM-PAC 6 Clicks Basic Mobility (PT)  AM-PAC 6 Clicks Score (PT): 18         Radha Velásquez PT  7/12/2021

## 2021-07-12 NOTE — PLAN OF CARE
Goal Outcome Evaluation:  Plan of Care Reviewed With: patient           Outcome Summary: Physical therapy evaluation complete.  Patient with non operative left humerus fx and right distal radius fracture from injury in June 2021, NWB bilateral UEs.  Patient performs supine to sit with CGA and sit to/from stand with CGA.  Patient performs gait with CGA x15 feet with verbal cues for safety and sequencing.  Patient reports her  assists her with all mobility using gait belt and she ambulates only short distances from bed to bathroom and back.  Patient reports she is currently at baseline and reports no concerns regarding return home with spouse.  Patient may benefit from use of rolling walker for balance once allowed to weightbear through UEs.  No further PT needs at this time.

## 2021-07-12 NOTE — CONSULTS
Urology Consult  Patient Identification:  Name: Anitra Orta  Age: 80 y.o.  Sex: female  :  1940  MRN: [id number]                       Chief Complaint:  Infected L ureteral stone    History of Present Illness: 81 yo wf h/o stones presents tonight w fever chills, tacycardia, came to ER, tripathi showed + urine, L hydro from 4 mm L distal stone. Abx started, fluids started.      Problem List:  Active Hospital Problems    Diagnosis  POA   • **Sepsis, unspecified organism (CMS/HCC) [A41.9]  Yes   • Acute kidney failure (CMS/HCC) [N17.9]  Yes   • Obstructive uropathy [N13.9]  Yes   • Nephrolithiasis [N20.0]  Yes   • Anemia [D64.9]  Yes   • Hyperglycemia [R73.9]  Yes   • Metabolic acidosis [E87.2]  Yes   • Essential hypertension [I10]  Yes   • History of ventricular tachycardia [Z86.79]  Not Applicable   • Left humeral fracture [S42.302A]  Yes   • GERD without esophagitis [K21.9]  Yes   • Acute UTI (urinary tract infection) [N39.0]  Yes   • Hyperlipidemia [E78.5]  Yes     Past Medical History:  Past Medical History:   Diagnosis Date   • Abdominal pain 2018    ER VISIT FOR ABD PAIN AND /O RENAL STONES  NAKUL NORMAL  CT NEG FOR OBSTRUCTIVE KIDNEY STONE   • Ankle sprain    • Anxiety    • Arthritis of back    • Cervical disc disorder    • Chronic pain disorder    • Constipation    • DDD (degenerative disc disease), lumbar    • Fracture of wrist    • Fracture, foot    • Fracture, humerus    • Frequency of micturition    • GERD (gastroesophageal reflux disease)    • H/O bone density study 2019    WITH L SPINE 1.5 L HIP 2.4 FRAX 10% MAJOR FRACTURE AND 2% HIP FRACTURE (NOT CHANGED MUCH FROM 2016 PT PREVIOUSLY ON FOSAMAX X5 YRS BACK IN 1970s   • H/O CT scan     ABD L UTEROPELVIC JUNCTION NEPHROLITHIASIS   • H/O CT scan     PE PROTOCOL NEG   • H/O mammogram 2019    NORMAL +H/O OF L BREAST LUMPECTOMY WITH BENIGN PATHOLOGY MANAGED BY GYN   • History of Holter monitoring     NSVT BUT LASTING UP TO 33 BEATS AT A  TIME   • History of MRI     L SPINE HOSP 8/19 EPIGASTRIC PAIN AND ABD PAIN   • History of nuclear stress test     NLM MYOCARDIAL PERFUSION NO SIGNS OF ISCHEMIA   • HTN (hypertension)    • Hyperglycemia 2/19/2021   • Hyperlipidemia    • Kidney stones    • Knee swelling    • Low back pain    • Low back strain    • Lumbosacral disc disease    • Mixed hyperlipidemia 9/16/2016   • Mixed incontinence    • Osteoarthritis    • Osteopenia    • Osteoporosis    • Papanicolaou smear 02/2019    DONE AND NORMAL NO LONGER INDICATED MANAGED BY GYN   • Personal history of urinary calculi    • PONV (postoperative nausea and vomiting)    • Psoriasis    • Scoliosis    • Tachycardia, unspecified    • Tear of meniscus of knee knee replacement right   • Thoracic disc disorder    • Urinary tract infection     HOSP FOR RECURRENT UTI WITH ID CONSULT   • Wrist sprain      Past Surgical History:  Past Surgical History:   Procedure Laterality Date   • BREAST BIOPSY Left     benign   • BREAST LUMPECTOMY Left     benign   • BUNIONECTOMY Right    • COLONOSCOPY N/A 11/30/2016    Procedure: COLONOSCOPY polypectomy;  Surgeon: Adali Willard MD;  Location: [facility];  Service:    • CYSTOSCOPY BOTOX INJECTION OF BLADDER N/A 7/21/2017    Procedure: CYSTOSCOPY COAPTITE INJECTION;  Surgeon: Kevon Clark MD;  Location: [facility];  Service:    • CYSTOSCOPY W/ LITHOLAPAXY / EHL     • CYSTOSCOPY W/ URETERAL STENT PLACEMENT Left 9/12/2016    Procedure: CYSTOSCOPY URETERAL STENT INSERTION;  Surgeon: Kevon Clark MD;  Location: [facility] OR;  Service:    • CYSTOSCOPY W/ URETERAL STENT PLACEMENT Left 8/1/2019    Procedure: CYSTOSCOPY URETERAL CATHETER/STENT INSERTION;  Surgeon: Kevon Clark MD;  Location: [facility] OR;  Service: Urology   • CYSTOSCOPY W/ URETERAL STENT PLACEMENT Left 3/25/2021    Procedure: CYSTOSCOPY URETERAL CATHETER/STENT INSERTION;  Surgeon: Kevon Clark MD;  Location: [facility];  Service: Urology;  Laterality: Left;    • JOINT REPLACEMENT     • KNEE SURGERY Right 2015    tka   • TONSILLECTOMY AND ADENOIDECTOMY     • TRIGGER POINT INJECTION     • URETEROSCOPY LASER LITHOTRIPSY WITH STENT INSERTION Left 10/5/2016    Procedure: LT URETEROSCOPY LASER LITHOTRIPSY STONE BASKET EXTRACTION AND STENT ;  Surgeon: Kevon Clark MD;  Location: [facility] MAIN OR;  Service:       Home Meds:  (Not in a hospital admission)    Current Meds:   Current Facility-Administered Medications   Medication Dose Route Frequency Provider Last Rate Last Admin   • sodium chloride 0.9 % flush 10 mL  10 mL Intravenous PRN Emergency, Triage Protocol, MD       • sodium chloride 0.9 % flush 10 mL  10 mL Intravenous PRN Rosibel Mathew PA-C         Current Outpatient Medications   Medication Sig Dispense Refill   • acetaminophen (TYLENOL) 650 MG 8 hr tablet Take 650 mg by mouth Every 8 (Eight) Hours As Needed for Mild Pain .     • aspirin (aspirin) 81 MG EC tablet Take 1 tablet by mouth Daily for 30 days. PT TO STOP PER MD INSTRUCTION 30 tablet 0   • estradiol (ESTRACE) 0.1 MG/GM vaginal cream Insert 1 application into the vagina.     • imipramine (TOFRANIL) 50 MG tablet Take 2 tablets by mouth every night at bedtime. 180 tablet 1   • lisinopril (PRINIVIL,ZESTRIL) 40 MG tablet Take 1 tablet by mouth Daily. 90 tablet 1   • Multiple Vitamins-Minerals (PRESERVISION AREDS 2 PO) Take 1 tablet by mouth Daily.     • NIFEdipine XL (PROCARDIA XL) 30 MG 24 hr tablet Take 1 tablet by mouth Daily. (Patient taking differently: Take 30 mg by mouth Every Night.) 30 tablet 1   • omeprazole (priLOSEC) 20 MG capsule Take 1 capsule by mouth Daily. 90 capsule 1   • simvastatin (ZOCOR) 40 MG tablet Take 1 tablet by mouth Every Night. for cholesterol 90 tablet 1   • vitamin C (ASCORBIC ACID) 500 MG tablet Take 500 mg by mouth Daily.       Allergies:  Allergies   Allergen Reactions   • Bactrim [Sulfamethoxazole-Trimethoprim] Nausea Only and Other (See Comments)     UNKNOWN   •  Ciprofloxacin Other (See Comments)     UNKNOWN   • Levaquin [Levofloxacin] Other (See Comments)     SEVERE HEADACHE AND N/V   • Macrobid [Nitrofurantoin] Other (See Comments)     UNKNOWN; PT CAN TAKE IN SMALL DOSES- FLU LIKE SYMPTOMS   • Nitrofurantoin Macrocrystal Other (See Comments)     Can take in small doses   • Cortisone      Pt states had headache with IM injection states has been ok with epidural steroid injections     Immunizations:  Immunization History   Administered Date(s) Administered   • Influenza TIV (IM) 2013, 2014   • Influenza, Unspecified 2019   • Pneumococcal Conjugate 13-Valent (PCV13) 2015   • Pneumococcal Polysaccharide (PPSV23) 10/10/2008, 2019   • Td, Not Adsorbed 1997   • Tdap 2019   • Zostavax 2009     Social History:   Social History     Tobacco Use   • Smoking status: Former Smoker     Packs/day: 1.00     Years: 15.00     Pack years: 15.00     Types: Cigarettes     Start date: 1956     Quit date: 1980     Years since quittin.5   • Smokeless tobacco: Never Used   • Tobacco comment: quit    Substance Use Topics   • Alcohol use: No      Family History:  Family History   Problem Relation Age of Onset   • Heart defect Mother    • Heart attack Mother 70   • Sudden death Mother    • Heart defect Father    • Heart attack Father 49   • Hypertension Father    • Sudden death Father    • Valvular heart disease Brother    • Malig Hyperthermia Neg Hx    • Breast cancer Neg Hx         Review of Systems  negative 12 point system review except:no abd pain. Recent L humerus fx and R wrist fx    Objective:  tMax 24 hrs: Temp (24hrs), Av.1 °F (38.4 °C), Min:98.8 °F (37.1 °C), Max:103.3 °F (39.6 °C)    Vitals Ranges:   Temp:  [98.8 °F (37.1 °C)-103.3 °F (39.6 °C)] 98.8 °F (37.1 °C)  Heart Rate:  [102-139] 102  Resp:  [20] 20  BP: (143-173)/(65-70) 143/65  Intake and Output Last 3 Shifts:   No intake/output data recorded.    Exam:  BP  "143/65   Pulse 102   Temp 98.8 °F (37.1 °C) (Oral)   Resp 20   Ht 167.6 cm (66\")   Wt 111 kg (245 lb)   SpO2 93%   BMI 39.54 kg/m²      GENERAL:    Alert, cooperative, no distress, appears stated age   Head:    Normocephalic, without obvious abnormality, atraumatic   Ears:    Normal external inspection, Nl. hearing   Throat:   Lips, mucosa, and tongue normal   Back:     No CVA tenderness   Lungs:     Respirations unlabored;Normal palpation   CV;   Regular rate and rhythm, No edema   Abdomen:     Soft, nontender,  no masses   :       Musculoskeletal:   Extremities normal,Gait Normal   Neurologic/Psych:   Orientation Normal; Mood/Affect Normal       Data Review:   Admission on 07/11/2021   Component Date Value Ref Range Status   • Glucose 07/11/2021 187* 65 - 99 mg/dL Final   • BUN 07/11/2021 27* 8 - 23 mg/dL Final   • Creatinine 07/11/2021 1.30* 0.57 - 1.00 mg/dL Final   • Sodium 07/11/2021 136  136 - 145 mmol/L Final   • Potassium 07/11/2021 4.3  3.5 - 5.2 mmol/L Final   • Chloride 07/11/2021 104  98 - 107 mmol/L Final   • CO2 07/11/2021 16.9* 22.0 - 29.0 mmol/L Final   • Calcium 07/11/2021 8.8  8.6 - 10.5 mg/dL Final   • Total Protein 07/11/2021 7.0  6.0 - 8.5 g/dL Final   • Albumin 07/11/2021 3.30* 3.50 - 5.20 g/dL Final   • ALT (SGPT) 07/11/2021 25  1 - 33 U/L Final   • AST (SGOT) 07/11/2021 25  1 - 32 U/L Final   • Alkaline Phosphatase 07/11/2021 184* 39 - 117 U/L Final   • Total Bilirubin 07/11/2021 0.6  0.0 - 1.2 mg/dL Final   • eGFR Non African Amer 07/11/2021 40* >60 mL/min/1.73 Final   • Globulin 07/11/2021 3.7  gm/dL Final   • A/G Ratio 07/11/2021 0.9  g/dL Final   • BUN/Creatinine Ratio 07/11/2021 20.8  7.0 - 25.0 Final   • Anion Gap 07/11/2021 15.1* 5.0 - 15.0 mmol/L Final   • Lactate 07/11/2021 2.5* 0.5 - 2.0 mmol/L Final   • WBC 07/11/2021 6.55  3.40 - 10.80 10*3/mm3 Final   • RBC 07/11/2021 3.59* 3.77 - 5.28 10*6/mm3 Final   • Hemoglobin 07/11/2021 10.0* 12.0 - 15.9 g/dL Final   • Hematocrit " 07/11/2021 32.1* 34.0 - 46.6 % Final   • MCV 07/11/2021 89.4  79.0 - 97.0 fL Final   • MCH 07/11/2021 27.9  26.6 - 33.0 pg Final   • MCHC 07/11/2021 31.2* 31.5 - 35.7 g/dL Final   • RDW 07/11/2021 14.6  12.3 - 15.4 % Final   • RDW-SD 07/11/2021 47.6  37.0 - 54.0 fl Final   • MPV 07/11/2021 8.8  6.0 - 12.0 fL Final   • Platelets 07/11/2021 232  140 - 450 10*3/mm3 Final   • Neutrophil % 07/11/2021 89.2* 42.7 - 76.0 % Final   • Lymphocyte % 07/11/2021 4.9* 19.6 - 45.3 % Final   • Monocyte % 07/11/2021 5.0  5.0 - 12.0 % Final   • Eosinophil % 07/11/2021 0.2* 0.3 - 6.2 % Final   • Basophil % 07/11/2021 0.2  0.0 - 1.5 % Final   • Immature Grans % 07/11/2021 0.5  0.0 - 0.5 % Final   • Neutrophils, Absolute 07/11/2021 5.85  1.70 - 7.00 10*3/mm3 Final   • Lymphocytes, Absolute 07/11/2021 0.32* 0.70 - 3.10 10*3/mm3 Final   • Monocytes, Absolute 07/11/2021 0.33  0.10 - 0.90 10*3/mm3 Final   • Eosinophils, Absolute 07/11/2021 0.01  0.00 - 0.40 10*3/mm3 Final   • Basophils, Absolute 07/11/2021 0.01  0.00 - 0.20 10*3/mm3 Final   • Immature Grans, Absolute 07/11/2021 0.03  0.00 - 0.05 10*3/mm3 Final   • nRBC 07/11/2021 0.0  0.0 - 0.2 /100 WBC Final   • Extra Tube 07/11/2021 Hold for add-ons.   Final    Auto resulted.   • Extra Tube 07/11/2021 hold for add-on   Final    Auto resulted   • Extra Tube 07/11/2021 Hold for add-ons.   Final    Auto resulted.   • Color, UA 07/11/2021 Yellow  Yellow, Straw Final   • Appearance, UA 07/11/2021 Slightly Cloudy* Clear Final   • pH, UA 07/11/2021 <=5.0  4.5 - 8.0 Final   • Specific Gravity, UA 07/11/2021 1.015  1.003 - 1.030 Final   • Glucose, UA 07/11/2021 Negative  Negative Final   • Ketones, UA 07/11/2021 Negative  Negative Final   • Bilirubin, UA 07/11/2021 Negative  Negative Final   • Blood, UA 07/11/2021 Small (1+)* Negative Final   • Protein, UA 07/11/2021 Negative  Negative Final   • Leuk Esterase, UA 07/11/2021 Large (3+)* Negative Final   • Nitrite, UA 07/11/2021 Positive* Negative  Final   • Urobilinogen, UA 07/11/2021 0.2 E.U./dL  0.2 - 1.0 E.U./dL Final   • Procalcitonin 07/11/2021 1.80* 0.00 - 0.25 ng/mL Final   • QT Interval 07/11/2021 362  ms Preliminary   • COVID19 07/11/2021 Not Detected  Not Detected - Ref. Range Final   • RBC, UA 07/11/2021 6-12* None Seen /HPF Final   • WBC, UA 07/11/2021 Too Numerous to Count* None Seen /HPF Final   • Bacteria, UA 07/11/2021 4+* None Seen /HPF Final   • Squamous Epithelial Cells, UA 07/11/2021 None Seen  None Seen, 0-2 /HPF Final   • Hyaline Casts, UA 07/11/2021 None Seen  None Seen /LPF Final   • Methodology 07/11/2021 Manual Light Microscopy   Final       No results found.      Assessment:   Infected L ureteral stone      Plan:   Cysto L jj stent, possible ureteroscopy,laser if needed to get stent, rbos reviewed.      Lul Guzman MD  7/11/2021

## 2021-07-12 NOTE — PLAN OF CARE
Goal Outcome Evaluation:      Right rao fracture and left humerus fracture sling intact cont to monitor

## 2021-07-12 NOTE — ANESTHESIA PREPROCEDURE EVALUATION
Anesthesia Evaluation     Patient summary reviewed and Nursing notes reviewed   history of anesthetic complications: PONV  NPO Solid Status: > 8 hours  NPO Liquid Status: > 8 hours           Airway   Mallampati: II  TM distance: >3 FB  Neck ROM: full  No difficulty expected  Dental    (+) poor dentition    Pulmonary - negative pulmonary ROS and normal exam   Cardiovascular   Exercise tolerance: poor (<4 METS)    ECG reviewed  Rhythm: regular  Rate: abnormal    (+) hypertension 2 medications or greater, dysrhythmias Tachycardia, hyperlipidemia,     ROS comment: ABNORMAL ECG -  Sinus or ectopic atrial tachycardia  Right bundle branch block  Minimal ST elevation, lateral leads  Electronically Signed By:   Date and Time of Study: 2021-07-11 21:19:44      Echo 10/2019:  Left ventricular systolic function is normal. Calculated EF = 62.0%. Estimated EF = 62%. Normal left ventricular cavity size and wall thickness noted. All left ventricular wall segments contract normally. Left ventricular diastolic function is normal.    Neuro/Psych  (+) psychiatric history Anxiety,     GI/Hepatic/Renal/Endo    (+)  GERD well controlled,  renal disease stones,     Musculoskeletal     (+) back pain, radiculopathy Right lower extremity and Right upper extremity  Abdominal   (+) obese,    Substance History - negative use     OB/GYN          Other   arthritis,                    Anesthesia Plan    ASA 3 - emergent     general     intravenous induction     Anesthetic plan, all risks, benefits, and alternatives have been provided, discussed and informed consent has been obtained with: patient.  Use of blood products discussed with patient  Consented to blood products.

## 2021-07-12 NOTE — ADDENDUM NOTE
Addendum  created 07/12/21 0157 by Jose Antonio York CRNA    Review and Sign - Ready for Procedure, Review and Sign - Signed

## 2021-07-12 NOTE — ED NOTES
Call place to [facility] Urology.  Dr. Alicia to return call.      Gali Westfall  07/11/21 [id number]

## 2021-07-13 ENCOUNTER — APPOINTMENT (OUTPATIENT)
Dept: CARDIOLOGY | Facility: HOSPITAL | Age: 81
End: 2021-07-13

## 2021-07-13 LAB
AMMONIA BLD-SCNC: 13 UMOL/L (ref 11–51)
BASOPHILS # BLD AUTO: 0.01 10*3/MM3 (ref 0–0.2)
BASOPHILS NFR BLD AUTO: 0.1 % (ref 0–1.5)
CRP SERPL-MCNC: 14.7 MG/DL (ref 0–0.5)
DEPRECATED RDW RBC AUTO: 49.7 FL (ref 37–54)
EOSINOPHIL # BLD AUTO: 0.03 10*3/MM3 (ref 0–0.4)
EOSINOPHIL NFR BLD AUTO: 0.4 % (ref 0.3–6.2)
ERYTHROCYTE [DISTWIDTH] IN BLOOD BY AUTOMATED COUNT: 14.6 % (ref 12.3–15.4)
HCT VFR BLD AUTO: 29.5 % (ref 34–46.6)
HGB BLD-MCNC: 9 G/DL (ref 12–15.9)
IMM GRANULOCYTES # BLD AUTO: 0.05 10*3/MM3 (ref 0–0.05)
IMM GRANULOCYTES NFR BLD AUTO: 0.6 % (ref 0–0.5)
LYMPHOCYTES # BLD AUTO: 1.25 10*3/MM3 (ref 0.7–3.1)
LYMPHOCYTES NFR BLD AUTO: 15.6 % (ref 19.6–45.3)
MAGNESIUM SERPL-MCNC: 2.3 MG/DL (ref 1.6–2.4)
MCH RBC QN AUTO: 28 PG (ref 26.6–33)
MCHC RBC AUTO-ENTMCNC: 30.5 G/DL (ref 31.5–35.7)
MCV RBC AUTO: 91.9 FL (ref 79–97)
MONOCYTES # BLD AUTO: 0.78 10*3/MM3 (ref 0.1–0.9)
MONOCYTES NFR BLD AUTO: 9.7 % (ref 5–12)
NEUTROPHILS NFR BLD AUTO: 5.9 10*3/MM3 (ref 1.7–7)
NEUTROPHILS NFR BLD AUTO: 73.6 % (ref 42.7–76)
PLATELET # BLD AUTO: 210 10*3/MM3 (ref 140–450)
PMV BLD AUTO: 9.3 FL (ref 6–12)
RBC # BLD AUTO: 3.21 10*6/MM3 (ref 3.77–5.28)
WBC # BLD AUTO: 8.02 10*3/MM3 (ref 3.4–10.8)

## 2021-07-13 PROCEDURE — 85025 COMPLETE CBC W/AUTO DIFF WBC: CPT | Performed by: INTERNAL MEDICINE

## 2021-07-13 PROCEDURE — 25010000002 ERTAPENEM PER 500 MG: Performed by: INTERNAL MEDICINE

## 2021-07-13 PROCEDURE — 83735 ASSAY OF MAGNESIUM: CPT | Performed by: INTERNAL MEDICINE

## 2021-07-13 PROCEDURE — 99233 SBSQ HOSP IP/OBS HIGH 50: CPT | Performed by: INTERNAL MEDICINE

## 2021-07-13 PROCEDURE — 86140 C-REACTIVE PROTEIN: CPT | Performed by: INTERNAL MEDICINE

## 2021-07-13 PROCEDURE — 82140 ASSAY OF AMMONIA: CPT | Performed by: INTERNAL MEDICINE

## 2021-07-13 RX ORDER — NIFEDIPINE 30 MG/1
30 TABLET, EXTENDED RELEASE ORAL NIGHTLY
Status: DISCONTINUED | OUTPATIENT
Start: 2021-07-13 | End: 2021-07-14 | Stop reason: HOSPADM

## 2021-07-13 RX ADMIN — SODIUM CHLORIDE, PRESERVATIVE FREE 10 ML: 5 INJECTION INTRAVENOUS at 08:38

## 2021-07-13 RX ADMIN — ERTAPENEM SODIUM 1 G: 1 INJECTION, POWDER, LYOPHILIZED, FOR SOLUTION INTRAMUSCULAR; INTRAVENOUS at 10:51

## 2021-07-13 RX ADMIN — LISINOPRIL 10 MG: 10 TABLET ORAL at 08:38

## 2021-07-13 RX ADMIN — DOCUSATE SODIUM 50MG AND SENNOSIDES 8.6MG 2 TABLET: 8.6; 5 TABLET, FILM COATED ORAL at 20:12

## 2021-07-13 RX ADMIN — ATORVASTATIN CALCIUM 20 MG: 20 TABLET, FILM COATED ORAL at 20:12

## 2021-07-13 RX ADMIN — OXYCODONE HYDROCHLORIDE AND ACETAMINOPHEN 500 MG: 500 TABLET ORAL at 08:37

## 2021-07-13 RX ADMIN — ASPIRIN 81 MG: 81 TABLET, COATED ORAL at 08:38

## 2021-07-13 RX ADMIN — IMIPRAMINE HYDROCHLORIDE 100 MG: 25 TABLET ORAL at 20:12

## 2021-07-13 RX ADMIN — SODIUM CHLORIDE 100 ML/HR: 9 INJECTION, SOLUTION INTRAVENOUS at 02:52

## 2021-07-13 RX ADMIN — NIFEDIPINE 30 MG: 30 TABLET, FILM COATED, EXTENDED RELEASE ORAL at 20:12

## 2021-07-13 RX ADMIN — PANTOPRAZOLE SODIUM 40 MG: 40 INJECTION, POWDER, FOR SOLUTION INTRAVENOUS at 06:40

## 2021-07-13 RX ADMIN — Medication 150 MG: at 08:38

## 2021-07-13 RX ADMIN — DOCUSATE SODIUM 50MG AND SENNOSIDES 8.6MG 2 TABLET: 8.6; 5 TABLET, FILM COATED ORAL at 08:38

## 2021-07-13 NOTE — PROGRESS NOTES
Patient: Anitra Orta  Procedure(s) with comments:  CYSTOSCOPY URETERAL CATHETER/STENT INSERTION - CYSTOSCOPY LEFT URETERAL CATHETER/ STENT PLACEMENT  Anesthesia type: general    Patient location: [facility] Surgical Floor  Last vitals:   Vitals:    07/13/21 1130   BP: 165/82   Pulse: 81   Resp: 18   Temp: 97.1 °F (36.2 °C)   SpO2: 100%     Level of consciousness: awake, alert and oriented    Post-anesthesia pain: adequate analgesia  Airway patency: patent  Respiratory: unassisted  Cardiovascular: stable and blood pressure at baseline  Hydration: euvolemic    Anesthetic complications: no

## 2021-07-13 NOTE — PROGRESS NOTES
"  [facility] Progress Note    Chief Complaint:  none    Feeling better, tolerating stent, cx noted    Review of Systems:    The following systems were reviewed and negative;  respiratory and cardiovascular          Vital Signs  /77 (BP Location: Left arm, Patient Position: Lying)   Pulse 73   Temp 96.8 °F (36 °C) (Oral)   Resp 18   Ht 167.6 cm (66\")   Wt 111 kg (244 lb 6.4 oz)   SpO2 97%   Breastfeeding No   BMI 39.45 kg/m²     Physical Exam:     General Appearance:    Alert, cooperative, NAD   HEENT:    No trauma, pupils reactive, hearing intact   Back:     No CVA tenderness   Lungs:     Respirations unlabored, no wheezing    Heart:    RRR, intact peripheral pulses   Abdomen:     Soft, NDNT, no masses, no guarding   :    def   Extremities:   No edema, no deformity   Lymphatic:   No neck or groin LAD   Skin:   No bleeding, bruising or rashes   Neuro/Psych:   Orientation intact, mood/affect pleasant, no focal findings        Results Review:     I reviewed the patient's new clinical results.  Lab Results (last 24 hours)     Procedure Component Value Units Date/Time    Ammonia [[id number]]  (Normal) Collected: 07/13/21 0624    Specimen: Blood Updated: 07/13/21 0720     Ammonia 13 umol/L     Magnesium [[id number]]  (Normal) Collected: 07/13/21 0624    Specimen: Blood Updated: 07/13/21 0713     Magnesium 2.3 mg/dL     CBC & Differential [[id number]]  (Abnormal) Collected: 07/13/21 0624    Specimen: Blood Updated: 07/13/21 0702    Narrative:      The following orders were created for panel order CBC & Differential.  Procedure                               Abnormality         Status                     ---------                               -----------         ------                     CBC Auto Differential[[id number]]        Abnormal            Final result                 Please view results for these tests on the individual orders.    CBC Auto Differential [[id number]]  (Abnormal) Collected: 07/13/21 " 0624    Specimen: Blood Updated: 07/13/21 0702     WBC 8.02 10*3/mm3      RBC 3.21 10*6/mm3      Hemoglobin 9.0 g/dL      Hematocrit 29.5 %      MCV 91.9 fL      MCH 28.0 pg      MCHC 30.5 g/dL      RDW 14.6 %      RDW-SD 49.7 fl      MPV 9.3 fL      Platelets 210 10*3/mm3      Neutrophil % 73.6 %      Lymphocyte % 15.6 %      Monocyte % 9.7 %      Eosinophil % 0.4 %      Basophil % 0.1 %      Immature Grans % 0.6 %      Neutrophils, Absolute 5.90 10*3/mm3      Lymphocytes, Absolute 1.25 10*3/mm3      Monocytes, Absolute 0.78 10*3/mm3      Eosinophils, Absolute 0.03 10*3/mm3      Basophils, Absolute 0.01 10*3/mm3      Immature Grans, Absolute 0.05 10*3/mm3     C-reactive Protein [[id number]] Collected: 07/13/21 0624    Specimen: Blood Updated: 07/13/21 0655    Blood Culture - Blood, Arm, Left [[id number]] Collected: 07/12/21 2237    Specimen: Blood from Arm, Left Updated: 07/12/21 2240    Blood Culture - Blood, Hand, Left [[id number]] Collected: 07/12/21 2237    Specimen: Blood from Hand, Left Updated: 07/12/21 2240    Blood Culture ID, PCR - Blood, Arm, Right [[id number]]  (Abnormal) Collected: 07/11/21 2129    Specimen: Blood from Arm, Right Updated: 07/12/21 2034     BCID, PCR Escherichia coli. Identification by BCID PCR.     BOTTLE TYPE Anaerobic Bottle    Blood Culture - Blood, Arm, Right [[id number]]  (Abnormal) Collected: 07/11/21 2129    Specimen: Blood from Arm, Right Updated: 07/12/21 2030     Blood Culture Abnormal Stain     Gram Stain Anaerobic Bottle Gram negative bacilli      Aerobic Bottle Gram negative bacilli    Blood Culture - Blood, Hand, Right [[id number]]  (Abnormal) Collected: 07/11/21 2129    Specimen: Blood from Hand, Right Updated: 07/12/21 2029     Blood Culture Abnormal Stain     Gram Stain Anaerobic Bottle Gram negative bacilli      Aerobic Bottle Gram negative bacilli    Renal Function Panel [[id number]]  (Abnormal) Collected: 07/12/21 1541    Specimen: Blood Updated: 07/12/21 1604     Glucose  139 mg/dL      BUN 25 mg/dL      Creatinine 1.05 mg/dL      Sodium 138 mmol/L      Potassium 4.4 mmol/L      Chloride 109 mmol/L      CO2 18.2 mmol/L      Calcium 8.8 mg/dL      Albumin 2.90 g/dL      Phosphorus 3.3 mg/dL      Anion Gap 10.8 mmol/L      BUN/Creatinine Ratio 23.8     eGFR Non African Amer 51 mL/min/1.73     Narrative:      GFR Normal >60  Chronic Kidney Disease <60  Kidney Failure <15      Troponin [[id number]]  (Normal) Collected: 07/12/21 1541    Specimen: Blood Updated: 07/12/21 1604     Troponin T <0.010 ng/mL     Narrative:      Troponin T Reference Range:  <= 0.03 ng/mL-   Negative for AMI  >0.03 ng/mL-     Abnormal for myocardial necrosis.  Clinicians would have to utilize clinical acumen, EKG, Troponin and serial changes to determine if it is an Acute Myocardial Infarction or myocardial injury due to an underlying chronic condition.       Results may be falsely decreased if patient taking Biotin.      Ammonia [[id number]]  (Abnormal) Collected: 07/12/21 1541    Specimen: Blood Updated: 07/12/21 1601     Ammonia 80 umol/L     Lactic Acid, Plasma [[id number]]  (Abnormal) Collected: 07/12/21 1541    Specimen: Blood Updated: 07/12/21 1601     Lactate 2.5 mmol/L     CBC (No Diff) [[id number]]  (Abnormal) Collected: 07/12/21 1541    Specimen: Blood Updated: 07/12/21 1546     WBC 11.18 10*3/mm3      RBC 3.22 10*6/mm3      Hemoglobin 8.9 g/dL      Hematocrit 29.7 %      MCV 92.2 fL      MCH 27.6 pg      MCHC 30.0 g/dL      RDW 14.6 %      RDW-SD 49.5 fl      MPV 9.3 fL      Platelets 214 10*3/mm3     Urine Culture - Urine, Urine, Catheter [[id number]]  (Abnormal) Collected: 07/11/21 2119    Specimen: Urine, Catheter Updated: 07/12/21 1220     Urine Culture >100,000 CFU/mL Gram Negative Bacilli        Imaging Results (Last 24 Hours)     ** No results found for the last 24 hours. **          Medication Review:   I have personally reviewed    Current Facility-Administered Medications:   •  acetaminophen  (TYLENOL) tablet 650 mg, 650 mg, Oral, Q4H PRN **OR** acetaminophen (TYLENOL) 160 MG/5ML solution 650 mg, 650 mg, Oral, Q4H PRN **OR** acetaminophen (TYLENOL) suppository 650 mg, 650 mg, Rectal, Q4H PRN, Juliette Esquivel MD  •  ascorbic acid (VITAMIN C) tablet 500 mg, 500 mg, Oral, Daily, Porfirio Correa, DO  •  aspirin EC tablet 81 mg, 81 mg, Oral, Daily, Juliette Esquivel MD, 81 mg at 07/12/21 0946  •  atorvastatin (LIPITOR) tablet 20 mg, 20 mg, Oral, Daily, William Villatoro MD  •  sennosides-docusate (PERICOLACE) 8.6-50 MG per tablet 2 tablet, 2 tablet, Oral, BID, 2 tablet at 07/12/21 2134 **AND** polyethylene glycol (MIRALAX) packet 17 g, 17 g, Oral, Daily PRN **AND** bisacodyl (DULCOLAX) EC tablet 5 mg, 5 mg, Oral, Daily PRN **AND** bisacodyl (DULCOLAX) suppository 10 mg, 10 mg, Rectal, Daily PRN, Juliette Esquivel MD  •  ertapenem (INVanz) 1 g/100 mL 0.9% NS VTB (mbp), 1 g, Intravenous, Q24H, Porfirio Correa, DO, 1 g at 07/12/21 1105  •  estradiol (ESTRACE) vaginal cream 1 applicator, 1 applicator, Vaginal, Nightly, Porfirio Correa, DO  •  HYDROcodone-acetaminophen (NORCO) 5-325 MG per tablet 1 tablet, 1 tablet, Oral, Q4H PRN, Juliette Esquivel MD, 1 tablet at 07/12/21 2135  •  HYDROmorphone (DILAUDID) injection 0.5 mg, 0.5 mg, Intravenous, Q2H PRN **AND** naloxone (NARCAN) injection 0.4 mg, 0.4 mg, Intravenous, Q5 Min PRN, Juliette Esquivel MD  •  imipramine (TOFRANIL) tablet 100 mg, 100 mg, Oral, Nightly, Porfirio Correa, , 100 mg at 07/12/21 2135  •  iron polysaccharides (NIFEREX) capsule 150 mg, 150 mg, Oral, Daily, Porfirio Correa, , 150 mg at 07/12/21 1200  •  lisinopril (PRINIVIL,ZESTRIL) tablet 10 mg, 10 mg, Oral, Daily, Porfirio Correa DO  •  LORazepam (ATIVAN) tablet 0.5 mg, 0.5 mg, Oral, Q8H PRN, Juliette Esquivel MD  •  melatonin tablet 5 mg, 5 mg, Oral, Nightly PRN, Juliette Esquivel MD  •  morphine injection 1 mg, 1 mg, Intravenous, Q4H PRN **AND** naloxone (NARCAN)  injection 0.4 mg, 0.4 mg, Intravenous, Q5 Min PRN, Juliette Esquivel MD  •  NIFEdipine XL (PROCARDIA XL) 24 hr tablet 30 mg, 30 mg, Oral, Daily, Porfirio Correa DO  •  nitroglycerin (NITROSTAT) SL tablet 0.4 mg, 0.4 mg, Sublingual, Q5 Min PRN, Juliette Esquivel MD  •  ondansetron (ZOFRAN) injection 4 mg, 4 mg, Intravenous, Q6H PRN, Juliette Esquivel MD  •  pantoprazole (PROTONIX) injection 40 mg, 40 mg, Intravenous, Q AM, Juliette Esquivel MD, 40 mg at 07/13/21 0640  •  sodium chloride 0.9 % flush 10 mL, 10 mL, Intravenous, PRN, Juliette Esquivel MD  •  [COMPLETED] Insert peripheral IV, , , Once **AND** sodium chloride 0.9 % flush 10 mL, 10 mL, Intravenous, PRN, Juliette Esquivel MD  •  sodium chloride 0.9 % flush 10 mL, 10 mL, Intravenous, PRN, Juliette Esquivel MD  •  sodium chloride 0.9 % flush 10 mL, 10 mL, Intravenous, Q12H, Juliette Esquivel MD, 10 mL at 07/12/21 2135  •  sodium chloride 0.9 % infusion 40 mL, 40 mL, Intravenous, PRTIMOTHY, Juliette Esquivel MD  •  sodium chloride 0.9 % infusion, 100 mL/hr, Intravenous, Continuous, Porfirio Correa, , Last Rate: 100 mL/hr at 07/13/21 0252, 100 mL/hr at 07/13/21 0252    Allergies:    Bactrim [sulfamethoxazole-trimethoprim], Ciprofloxacin, Levaquin [levofloxacin], Macrobid [nitrofurantoin], Nitrofurantoin macrocrystal, and Cortisone    Assessment:    Active Problems:  Patient Active Problem List   Diagnosis   • Urinary tract infection due to ESBL Klebsiella   • Simple renal cyst   • Former smoker   • Gastroesophageal reflux disease   • Essential hypertension   • Hyperlipidemia   • Hypertriglyceridemia   • Osteoporosis   • Panic disorder   • Psoriasis   • Urge incontinence of urine   • Vitamin D deficiency   • Post-menopause   • Acute UTI (urinary tract infection)   • Insulin resistance   • Chronic bilateral low back pain without sciatica   • Spinal stenosis of lumbar region with neurogenic claudication   • Burning sensation of foot   • Other chronic pain   •  Tachycardia   • Ventricular tachycardia (CMS/HCC)   • Mixed incontinence   • GERD without esophagitis   • Anxiety   • Hyperglycemia   • Sepsis, unspecified organism (CMS/HCC)   • Acute cystitis   • Metabolic encephalopathy   • Hypokalemia   • Positive D dimer   • e.coli bacteremia   • Ureteral stone with hydronephrosis   • Closed fracture of right distal radius   • Closed fracture of left proximal humerus   • Closed displaced fracture of surgical neck of left humerus   • Acute kidney failure (CMS/HCC)   • Obstructive uropathy   • Nephrolithiasis   • Anemia   • Hyperglycemia   • Metabolic acidosis   • Essential hypertension   • History of ventricular tachycardia   • Left humeral fracture       Obstructing Pyelo s/p stent    Plan:    Await sensitivities  uscope in 1-2 weeks for stent and stone removal       Matthew Ndiaye MD    7/13/2021  07:47 EDT

## 2021-07-13 NOTE — CASE MANAGEMENT/SOCIAL WORK
Continued Stay Note  GORDO Loco     Patient Name: Anitra Orta  MRN: [id number]  Today's Date: 7/13/2021    Admit Date: 7/11/2021    Discharge Plan     Row Name 07/13/21 1157       Plan    Plan Comments  Awaiting blood culture results to determine plan of care for treatment - oral antibiotics versus IV antibiotics. Will continue to follow        Discharge Codes    No documentation.             Nanette Ignacio RN

## 2021-07-13 NOTE — PLAN OF CARE
Goal Outcome Evaluation:  Plan of Care Reviewed With: patient           Outcome Summary: patient resting at this time,patient alert and orient x3, patient up to bedside comodo to void urine strain no stone  has been found, pain controlled by oral pain med, cont on antibiotics and I/v fluid , cont to monitor

## 2021-07-13 NOTE — PROGRESS NOTES
Called by nurse to inform me of +ve blood cultures .  Blood cultures with E.Coli   Urine cultures with G -ve bacilli   Pt. Was switched from cefepime to Invanz today   Plan . repeat blood cultures / Cont. Invanz / chech echo and obtain ID consult

## 2021-07-13 NOTE — PROGRESS NOTES
Patient: Anitra Orta  Procedure(s) with comments:  CYSTOSCOPY URETERAL CATHETER/STENT INSERTION - CYSTOSCOPY LEFT URETERAL CATHETER/ STENT PLACEMENT  Anesthesia type: general    Patient location: [facility] Surgical Floor  Last vitals:   Vitals:    07/13/21 1130   BP: 165/82   Pulse: 81   Resp: 18   Temp: 97.1 °F (36.2 °C)   SpO2: 100%     Level of consciousness: awake, alert and oriented    Post-anesthesia pain: adequate analgesia  Airway patency: patent  Respiratory: unassisted  Cardiovascular: stable and blood pressure at baseline  Hydration: euvolemic    Anesthetic complications: no

## 2021-07-13 NOTE — PLAN OF CARE
Goal Outcome Evaluation:  Plan of Care Reviewed With: patient        Progress: improving  Outcome Summary: patient resting at this time,patient alert and orient x3, patient up to bedside commode to void. urine strain no stone  has been found, cont on antibiotics. Saline locked.  No complaint of pain at this time. No other complaints at this time.

## 2021-07-13 NOTE — SIGNIFICANT NOTE
Lab called with positive blood culture, Ecoli in blood also  Anaerobic and Aerobic gram negative bacilli, Dr Esquivel notified

## 2021-07-13 NOTE — PROGRESS NOTES
[facility] HOSPITALIST    PROGRESS NOTE    Name:  Anitra Orta   Age:  80 y.o.  Sex:  female  :  1940  MRN:  [id number]   Visit Number:  [id number]  Admission Date:  2021  Date Of Service:  21  Primary Care Physician:  Margot Chavez MD     LOS: 2 days :    Chief Complaint:      Left-sided abdominal pain    Subjective:    Patient seen again today.  Denies any chest pressure, shortness of breath, nausea, vomiting or pain.  She is eager to go home, wants to go by tomorrow at least.  Await cultures.  Discussed with her and her  at length.  Echo and telemetry ordered by night physician, patient refuses these.  Patient complains of mild tremor, suspect this is due to her underlying infection.    Hospital Course:    Patient 80-year-old female with history of nephrolithiasis, recurrent UTI with ESBL, neurogenic bladder, essential hypertension, V. tach, chronic pain syndrome, and recent left humerus fracture on conservative treatment.  She was recently treated for UTI with Rocephin and Keflex.  She came in with fever, chills, rigors.  Urine suggested UTI.  CT abdomen pelvis showed 4 mm left ureteral stone leading to mild left hydronephrosis.  She was placed on Zosyn and admitted.  Urology was consulted they took the patient for cystoscopy.  Stent was placed on the left.    Patient has history of recurrent ESBL, blood and urine cultures are growing out gram-negative bacilli, continue ertapenem.  May need PICC line depending on results.    Review of Systems:     All systems were reviewed and negative except as mentioned in subjective, assessment and plan.    Vital Signs:    Temp:  [96.8 °F (36 °C)-97.7 °F (36.5 °C)] 97.1 °F (36.2 °C)  Heart Rate:  [73-86] 81  Resp:  [16-18] 18  BP: (121-178)/(70-86) 165/82    Intake and output:    I/O last 3 completed shifts:  In: 2645 [P.O.:840; I.V.:1005; IV Piggyback:800]  Out: 2200 [Urine:2200]  I/O this shift:  In: -   Out: 350  [Urine:350]    Physical Examination:    General Appearance:  Alert and cooperative.  Mild distress   Head:  Atraumatic and normocephalic.   Eyes: Conjunctivae and sclerae normal, no icterus. No pallor.   Throat: No oral lesions, no thrush, oral mucosa moist.   Neck: Supple, trachea midline, no thyromegaly.   Lungs:   Breath sounds heard bilaterally equally.  No crackles or wheezing. No Pleural rub or bronchial breathing.   Heart:  Normal S1 and S2, no murmur, no gallop, no rub. No JVD.   Abdomen:   Normal bowel sounds, no masses, no organomegaly. Soft, nontender, nondistended, no rebound tenderness.   Extremities: Supple, no edema, no cyanosis, no clubbing.   Skin: No bleeding or rash.   Neurologic: Alert and oriented x 3. No facial asymmetry. Moves all four limbs. No tremors.      Laboratory results:    Results from last 7 days   Lab Units 07/12/21  1541 07/12/21  0427 07/11/21  2119   SODIUM mmol/L 138 137 136   POTASSIUM mmol/L 4.4 4.9 4.3   CHLORIDE mmol/L 109* 107 104   CO2 mmol/L 18.2* 17.4* 16.9*   BUN mg/dL 25* 19 27*   CREATININE mg/dL 1.05* 1.20* 1.30*   CALCIUM mg/dL 8.8 8.3* 8.8   BILIRUBIN mg/dL  --  0.5 0.6   ALK PHOS U/L  --  156* 184*   ALT (SGPT) U/L  --  25 25   AST (SGOT) U/L  --  31 25   GLUCOSE mg/dL 139* 123* 187*     Results from last 7 days   Lab Units 07/13/21  0624 07/12/21  1541 07/12/21  0427   WBC 10*3/mm3 8.02 11.18* 12.19*   HEMOGLOBIN g/dL 9.0* 8.9* 8.8*   HEMATOCRIT % 29.5* 29.7* 28.5*   PLATELETS 10*3/mm3 210 214 233         Results from last 7 days   Lab Units 07/12/21  1541   TROPONIN T ng/mL <0.010     Results from last 7 days   Lab Units 07/11/21 2129 07/11/21 2119   BLOODCX  Gram Negative Bacilli*  Gram Negative Bacilli*  --    URINECX   --  >100,000 CFU/mL Gram Negative Bacilli*         I have reviewed the patient's laboratory results.    Radiology results:    CT Abdomen Pelvis With Contrast    Result Date: 7/11/2021  CT Abdomen Pelvis W INDICATION: Urinary tract infection.  Sepsis. Fever and chills. Left side abdominal pain. TECHNIQUE: CT of the abdomen and pelvis with IV contrast. Coronal and sagittal reconstructions were obtained.  Radiation dose reduction techniques included automated exposure control or exposure modulation based on body size. Count of known CT and cardiac nuc med studies performed in previous 12 months: 1. COMPARISON: 7/31/2019 FINDINGS: There is streak artifact from the patient's arms. There is mild atelectasis in the lung bases. Small hiatal hernia is stable. Gallbladder is mildly distended but otherwise normal. There is no biliary obstruction. Findings are similar to prior. There is atherosclerotic disease with no aortic aneurysm. There is mild left hydronephrosis secondary to a 4 mm stone in the distal ureter just above the level of the acetabulum. There are multiple small nonobstructing left-sided kidney stones. There are also bilateral renal cysts. Remaining solid organs are normal. Urinary bladder is normal. Solid pelvic organs are normal. The appendix is normal. There is no evidence of acute colitis. There is no small bowel obstruction. No free fluid or adenopathy is identified. There is diffuse lumbar degenerative disease with levoscoliosis. There is grade 1 anterolisthesis of L4 on L5.     Impression: 1. Mild left hydronephrosis due to a 4 mm stone in the lower left ureter just above the level of the acetabulum. There are also multiple small nonobstructing left kidney stones. 2. No acute findings in the GI tract. Normal appendix. 3. Additional findings as above. Signer Name: Jenaro Reyna MD  Signed: 7/11/2021 10:49 PM  Workstation Name: [facility]  [facility]    XR Chest 1 View    Result Date: 7/11/2021  CR Chest 1 Vw INDICATION: Fever with sepsis COMPARISON:  6/21/2021 FINDINGS: Single portable AP view(s) of the chest. Support lines and tubes are unchanged. The heart and mediastinal contours are normal. The lungs are clear. No  pneumothorax or pleural effusion. The inspiratory effort is shallower than on the previous examination. Taking this into account, no changes are seen.     Impression: No acute cardiopulmonary findings. Signer Name: Jenaro Montenegro MD  Signed: 7/11/2021 10:36 PM  Workstation Name: [id number]  [facility] Retrograde Pyelogram In OR    Result Date: 7/12/2021  CR FL RETROGRADE PYELOGRAM IN OR INDICATION:  Sepsis. Stent placement. Ureteral stone. TECHNIQUE: 3 C-arm images from a left sided retrograde pyelogram. Fluoroscopy time is 1 minute 28 seconds. COMPARISON:  CT abdomen and pelvis 7/11/2021 FINDINGS: Images show a dilated left renal collecting system. A ureteral stent was subsequently placed. The distal ureteral stone is seen adjacent to the stent. Please see the operative report. Signer Name: Jenaro Reyna MD  Signed: 7/12/2021 1:25 AM  Workstation Name: [facility]  [facility]    I have reviewed the patient's radiology reports.    Medication Review:     I have reviewed the patient's active and prn medications.     Problem List:      Sepsis, unspecified organism (CMS/HCC)    Hyperlipidemia    Acute UTI (urinary tract infection)    GERD without esophagitis    Acute kidney failure (CMS/HCC)    Obstructive uropathy    Nephrolithiasis    Anemia    Hyperglycemia    Metabolic acidosis    Essential hypertension    History of ventricular tachycardia    Left humeral fracture      Assessment:    1.  Sepsis due to left radial stone with infection.  2.  Left-sided ureteral stone 4 mm with hydronephrosis, status post stent placement.  3.  UTI, with history of Klebsiella ESBL.  Now gram-negative bacilli at present.  4.  Mild anemia, severe iron deficiency noted.  5.  Hyperglycemia with borderline diabetes type 2  6.  Renal insufficiency  7.  Recent left humerus fracture, being managed conservatively.  8.  Bacteremia with gram-negative bacilli.    Plan:    Continue ertapenem  for now.  Await activities urine and blood cultures.  Adjust antibiotics accordingly.  Urology following, catheter in place.  PT and OT to work with the patient.  Check lab work in the a.m.  Patient's plan is to go home when stable.  She may need a PICC line, she is willing to have this.  Check stool for occult blood.  Placed on oral iron.  Ammonia was noted to be elevated, repeat normal, feel this is lab error.  Would recommend colonoscopy and EGD as an outpatient.  DC IV fluids.  Reviewed urology note.  Further recommendations will depend on clinical course.    DVT Prophylaxis: SCD  Code Status: Full  Diet: Regular diet  Discharge Plan: Home with outpatient physical therapy    Porfirio Correa DO  07/13/21  11:36 EDT    Dictated utilizing Dragon dictation.

## 2021-07-14 ENCOUNTER — READMISSION MANAGEMENT (OUTPATIENT)
Dept: CALL CENTER | Facility: HOSPITAL | Age: 81
End: 2021-07-14

## 2021-07-14 VITALS
OXYGEN SATURATION: 97 % | SYSTOLIC BLOOD PRESSURE: 148 MMHG | TEMPERATURE: 98 F | HEART RATE: 85 BPM | DIASTOLIC BLOOD PRESSURE: 84 MMHG | BODY MASS INDEX: 39.28 KG/M2 | HEIGHT: 66 IN | WEIGHT: 244.4 LBS | RESPIRATION RATE: 18 BRPM

## 2021-07-14 LAB
ALBUMIN SERPL-MCNC: 2.9 G/DL (ref 3.5–5.2)
ANION GAP SERPL CALCULATED.3IONS-SCNC: 8.4 MMOL/L (ref 5–15)
BACTERIA SPEC AEROBE CULT: ABNORMAL
BASOPHILS # BLD AUTO: 0.03 10*3/MM3 (ref 0–0.2)
BASOPHILS NFR BLD AUTO: 0.6 % (ref 0–1.5)
BUN SERPL-MCNC: 21 MG/DL (ref 8–23)
BUN/CREAT SERPL: 21.9 (ref 7–25)
CALCIUM SPEC-SCNC: 8.9 MG/DL (ref 8.6–10.5)
CHLORIDE SERPL-SCNC: 111 MMOL/L (ref 98–107)
CO2 SERPL-SCNC: 22.6 MMOL/L (ref 22–29)
CREAT SERPL-MCNC: 0.96 MG/DL (ref 0.57–1)
CRP SERPL-MCNC: 6.83 MG/DL (ref 0–0.5)
DEPRECATED RDW RBC AUTO: 49.1 FL (ref 37–54)
EOSINOPHIL # BLD AUTO: 0.08 10*3/MM3 (ref 0–0.4)
EOSINOPHIL NFR BLD AUTO: 1.5 % (ref 0.3–6.2)
ERYTHROCYTE [DISTWIDTH] IN BLOOD BY AUTOMATED COUNT: 14.7 % (ref 12.3–15.4)
GFR SERPL CREATININE-BSD FRML MDRD: 56 ML/MIN/1.73
GLUCOSE SERPL-MCNC: 103 MG/DL (ref 65–99)
GRAM STN SPEC: ABNORMAL
HCT VFR BLD AUTO: 30 % (ref 34–46.6)
HEMOCCULT STL QL: NEGATIVE
HGB BLD-MCNC: 9.1 G/DL (ref 12–15.9)
IMM GRANULOCYTES # BLD AUTO: 0.06 10*3/MM3 (ref 0–0.05)
IMM GRANULOCYTES NFR BLD AUTO: 1.1 % (ref 0–0.5)
ISOLATED FROM: ABNORMAL
LYMPHOCYTES # BLD AUTO: 1.1 10*3/MM3 (ref 0.7–3.1)
LYMPHOCYTES NFR BLD AUTO: 21 % (ref 19.6–45.3)
MCH RBC QN AUTO: 27.3 PG (ref 26.6–33)
MCHC RBC AUTO-ENTMCNC: 30.3 G/DL (ref 31.5–35.7)
MCV RBC AUTO: 90.1 FL (ref 79–97)
MONOCYTES # BLD AUTO: 0.46 10*3/MM3 (ref 0.1–0.9)
MONOCYTES NFR BLD AUTO: 8.8 % (ref 5–12)
NEUTROPHILS NFR BLD AUTO: 3.52 10*3/MM3 (ref 1.7–7)
NEUTROPHILS NFR BLD AUTO: 67 % (ref 42.7–76)
NRBC BLD AUTO-RTO: 0 /100 WBC (ref 0–0.2)
PHOSPHATE SERPL-MCNC: 2.9 MG/DL (ref 2.5–4.5)
PLATELET # BLD AUTO: 256 10*3/MM3 (ref 140–450)
PMV BLD AUTO: 9 FL (ref 6–12)
POTASSIUM SERPL-SCNC: 4 MMOL/L (ref 3.5–5.2)
RBC # BLD AUTO: 3.33 10*6/MM3 (ref 3.77–5.28)
SODIUM SERPL-SCNC: 142 MMOL/L (ref 136–145)
WBC # BLD AUTO: 5.25 10*3/MM3 (ref 3.4–10.8)

## 2021-07-14 PROCEDURE — 85025 COMPLETE CBC W/AUTO DIFF WBC: CPT | Performed by: INTERNAL MEDICINE

## 2021-07-14 PROCEDURE — 86140 C-REACTIVE PROTEIN: CPT | Performed by: INTERNAL MEDICINE

## 2021-07-14 PROCEDURE — 80069 RENAL FUNCTION PANEL: CPT | Performed by: INTERNAL MEDICINE

## 2021-07-14 PROCEDURE — 99238 HOSP IP/OBS DSCHRG MGMT 30/<: CPT | Performed by: INTERNAL MEDICINE

## 2021-07-14 PROCEDURE — 82272 OCCULT BLD FECES 1-3 TESTS: CPT | Performed by: INTERNAL MEDICINE

## 2021-07-14 RX ORDER — CEFDINIR 300 MG/1
300 CAPSULE ORAL 2 TIMES DAILY
Qty: 20 CAPSULE | Refills: 0 | Status: SHIPPED | OUTPATIENT
Start: 2021-07-14 | End: 2021-07-26 | Stop reason: HOSPADM

## 2021-07-14 RX ORDER — IRON POLYSACCHARIDE COMPLEX 150 MG
150 CAPSULE ORAL DAILY
Qty: 30 CAPSULE | Refills: 0 | Status: SHIPPED | OUTPATIENT
Start: 2021-07-14 | End: 2022-01-11

## 2021-07-14 RX ORDER — LANOLIN ALCOHOL/MO/W.PET/CERES
1000 CREAM (GRAM) TOPICAL DAILY
Qty: 30 TABLET | Refills: 0 | Status: SHIPPED | OUTPATIENT
Start: 2021-07-14

## 2021-07-14 RX ORDER — HYDROCODONE BITARTRATE AND ACETAMINOPHEN 5; 325 MG/1; MG/1
1 TABLET ORAL EVERY 4 HOURS PRN
Qty: 12 TABLET | Refills: 0 | Status: SHIPPED | OUTPATIENT
Start: 2021-07-14 | End: 2021-07-19

## 2021-07-14 RX ADMIN — ASPIRIN 81 MG: 81 TABLET, COATED ORAL at 08:36

## 2021-07-14 RX ADMIN — LISINOPRIL 10 MG: 10 TABLET ORAL at 08:36

## 2021-07-14 RX ADMIN — DOCUSATE SODIUM 50MG AND SENNOSIDES 8.6MG 2 TABLET: 8.6; 5 TABLET, FILM COATED ORAL at 08:36

## 2021-07-14 RX ADMIN — SODIUM CHLORIDE, PRESERVATIVE FREE 10 ML: 5 INJECTION INTRAVENOUS at 08:36

## 2021-07-14 RX ADMIN — OXYCODONE HYDROCHLORIDE AND ACETAMINOPHEN 500 MG: 500 TABLET ORAL at 08:36

## 2021-07-14 RX ADMIN — Medication 150 MG: at 08:36

## 2021-07-14 RX ADMIN — PANTOPRAZOLE SODIUM 40 MG: 40 INJECTION, POWDER, FOR SOLUTION INTRAVENOUS at 07:03

## 2021-07-14 NOTE — DISCHARGE SUMMARY
[facility] HOSPITALIST   DISCHARGE SUMMARY      Name:  Anitra Orta   Age:  80 y.o.  Sex:  female  :  1940  MRN:  [id number]   Visit Number:  [id number]    Admission Date:  2021  Date of Discharge:  2021  Primary Care Physician:  Margot Chavez MD    Important issues to note:    Follow-up with urology in 1 week.  Follow-up with PCP for work-up of anemia, recommend EGD and colonoscopy.    Discharge Diagnoses:     1.  Sepsis due to left nephrolithiasis with infection.  2.  Left-sided ureteral stone 4 mm with hydronephrosis, status post stent placement.  3.  UTI, with E. coli, pansensitive.  4.  Mild anemia, severe iron deficiency noted.  5.  Hyperglycemia with borderline diabetes type 2  6.  Renal insufficiency, resolved.  7.  Recent left humerus fracture, being managed conservatively.  8.  Bacteremia with E. coli, pansensitive.  9.  B12 deficiency    Problem List:     Active Hospital Problems    Diagnosis  POA   • **Sepsis, unspecified organism (CMS/HCC) [A41.9]  Yes   • Acute kidney failure (CMS/HCC) [N17.9]  Yes   • Obstructive uropathy [N13.9]  Yes   • Nephrolithiasis [N20.0]  Yes   • Anemia [D64.9]  Yes   • Hyperglycemia [R73.9]  Yes   • Metabolic acidosis [E87.2]  Yes   • Essential hypertension [I10]  Yes   • History of ventricular tachycardia [Z86.79]  Not Applicable   • Left humeral fracture [S42.302A]  Yes   • GERD without esophagitis [K21.9]  Yes   • Acute UTI (urinary tract infection) [N39.0]  Yes   • Hyperlipidemia [E78.5]  Yes      Resolved Hospital Problems   No resolved problems to display.     Presenting Problem:    Chief Complaint   Patient presents with   • Tremors   • Fever      Consults:     Consulting Physician(s)  Chat With All Active Members    Provider Relationship Specialty    Keya Bates MD  Consulting Physician Infectious Diseases    Matthew Ndiaye MD  Consulting Physician Urology        Procedures  Performed:    Procedure(s):  CYSTOSCOPY URETERAL CATHETER/STENT INSERTION    History of presenting illness/Hospital Course:    Patient 80-year-old female with history of nephrolithiasis, recurrent UTI with ESBL, neurogenic bladder, essential hypertension, V. tach, chronic pain syndrome, and recent left humerus fracture on conservative treatment.  She was recently treated for UTI with Rocephin and Keflex.  She came in with fever, chills, rigors.  Urine suggested UTI.  CT abdomen pelvis showed 4 mm left ureteral stone leading to mild left hydronephrosis.  She was placed on Zosyn and admitted.  Urology was consulted they took the patient for cystoscopy.  Stent was placed on the left.    Patient seen today, denies any chest pressure, shortness of breath, nausea, vomiting, pain.     Urine cultures and blood cultures grew out E. coli which was pansensitive.  Patient was on ertapenem.  We will switch to Omnicef for 10 days at discharge.  Follow-up with PCP regarding work-up of anemia.  Added oral iron and low B12 for relatively low B12.  Would recommend EGD and colonoscopy.  Follow-up with urology in 1 week for further intervention.  Return to the hospital if any worsening symptoms.    Vital Signs:    Temp:  [97.1 °F (36.2 °C)-98 °F (36.7 °C)] 98 °F (36.7 °C)  Heart Rate:  [81-94] 85  Resp:  [16-18] 18  BP: (148-173)/(74-87) 148/84    Physical Exam:    General Appearance:  Alert and cooperative.  Mild distress   Head:  Atraumatic and normocephalic.   Eyes: Conjunctivae and sclerae normal, no icterus. No pallor.   Ears:  Ears with no abnormalities noted.   Throat: No oral lesions, no thrush, oral mucosa moist.   Neck: Supple, trachea midline, no thyromegaly.   Back:   No kyphoscoliosis present. No tenderness to palpation.   Lungs:   Breath sounds heard bilaterally equally.  No crackles or wheezing. No Pleural rub or bronchial breathing.   Heart:  Normal S1 and S2, no murmur, no gallop, no rub. No JVD.   Abdomen:   Normal  bowel sounds, no masses, no organomegaly. Soft, nontender, nondistended, no rebound tenderness.   Extremities: Supple, no edema, no cyanosis, no clubbing.   Pulses: Pulses palpable bilaterally.   Skin: No bleeding or rash.   Neurologic: Alert and oriented x 3. No facial asymmetry. Moves all four limbs. No tremors.     Pertinent Lab Results:     Results from last 7 days   Lab Units 07/14/21  0422 07/12/21  1541 07/12/21  0427 07/11/21  2119   SODIUM mmol/L 142 138 137 136   POTASSIUM mmol/L 4.0 4.4 4.9 4.3   CHLORIDE mmol/L 111* 109* 107 104   CO2 mmol/L 22.6 18.2* 17.4* 16.9*   BUN mg/dL 21 25* 19 27*   CREATININE mg/dL 0.96 1.05* 1.20* 1.30*   CALCIUM mg/dL 8.9 8.8 8.3* 8.8   BILIRUBIN mg/dL  --   --  0.5 0.6   ALK PHOS U/L  --   --  156* 184*   ALT (SGPT) U/L  --   --  25 25   AST (SGOT) U/L  --   --  31 25   GLUCOSE mg/dL 103* 139* 123* 187*     Results from last 7 days   Lab Units 07/14/21  0422 07/13/21  0624 07/12/21  1541   WBC 10*3/mm3 5.25 8.02 11.18*   HEMOGLOBIN g/dL 9.1* 9.0* 8.9*   HEMATOCRIT % 30.0* 29.5* 29.7*   PLATELETS 10*3/mm3 256 210 214         Results from last 7 days   Lab Units 07/12/21  1541   TROPONIN T ng/mL <0.010                     Results from last 7 days   Lab Units 07/12/21  2237 07/11/21  2129 07/11/21  2119   BLOODCX  No growth at 24 hours  No growth at 24 hours Escherichia coli*  Escherichia coli*  --    URINECX   --   --  >100,000 CFU/mL Escherichia coli*       Pertinent Radiology Results:    Imaging Results (All)     Procedure Component Value Units Date/Time    FL Retrograde Pyelogram In OR [[id number]] Collected: 07/12/21 0125     Updated: 07/12/21 0127    Narrative:      CR FL RETROGRADE PYELOGRAM IN OR     INDICATION:    Sepsis. Stent placement. Ureteral stone.    TECHNIQUE:   3 C-arm images from a left sided retrograde pyelogram. Fluoroscopy time is 1 minute 28 seconds.    COMPARISON:    CT abdomen and pelvis 7/11/2021    FINDINGS:  Images show a dilated left renal  collecting system. A ureteral stent was subsequently placed. The distal ureteral stone is seen adjacent to the stent. Please see the operative report.    Signer Name: Jenaro Reyna MD   Signed: 7/12/2021 1:25 AM   Workstation Name: [email or web address]    [facility]    CT Abdomen Pelvis With Contrast [[id number]] Collected: 07/11/21 2249     Updated: 07/11/21 2251    Narrative:      CT Abdomen Pelvis W    INDICATION:   Urinary tract infection. Sepsis. Fever and chills. Left side abdominal pain.    TECHNIQUE:   CT of the abdomen and pelvis with IV contrast. Coronal and sagittal reconstructions were obtained.  Radiation dose reduction techniques included automated exposure control or exposure modulation based on body size. Count of known CT and cardiac nuc med  studies performed in previous 12 months: 1.     COMPARISON:   7/31/2019    FINDINGS:  There is streak artifact from the patient's arms. There is mild atelectasis in the lung bases. Small hiatal hernia is stable. Gallbladder is mildly distended but otherwise normal. There is no biliary obstruction. Findings are similar to prior. There is  atherosclerotic disease with no aortic aneurysm. There is mild left hydronephrosis secondary to a 4 mm stone in the distal ureter just above the level of the acetabulum. There are multiple small nonobstructing left-sided kidney stones. There are also  bilateral renal cysts. Remaining solid organs are normal.    Urinary bladder is normal. Solid pelvic organs are normal. The appendix is normal. There is no evidence of acute colitis. There is no small bowel obstruction. No free fluid or adenopathy is identified. There is diffuse lumbar degenerative disease with  levoscoliosis. There is grade 1 anterolisthesis of L4 on L5.      Impression:        1. Mild left hydronephrosis due to a 4 mm stone in the lower left ureter just above the level of the acetabulum. There are also multiple small nonobstructing left kidney  stones.  2. No acute findings in the GI tract. Normal appendix.  3. Additional findings as above.          Signer Name: Jenaro Reyna MD   Signed: 7/11/2021 10:49 PM   Workstation Name: [facility]    [facility]    XR Chest 1 View [[id number]] Collected: 07/11/21 2236     Updated: 07/11/21 2238    Narrative:      CR Chest 1 Vw    INDICATION:   Fever with sepsis     COMPARISON:    6/21/2021    FINDINGS:  Single portable AP view(s) of the chest.    Support lines and tubes are unchanged.    The heart and mediastinal contours are normal. The lungs are clear. No pneumothorax or pleural effusion. The inspiratory effort is shallower than on the previous examination. Taking this into account, no changes are seen.      Impression:      No acute cardiopulmonary findings.    Signer Name: Jenaro Montenegro MD   Signed: 7/11/2021 10:36 PM   Workstation Name: [id number]    [facility]          Echo:    Results for orders placed during the hospital encounter of 10/10/19    Adult Transthoracic Echo Complete W/ Cont if Necessary Per Protocol    Interpretation Summary  · Estimated EF = 62%.  · Left ventricular systolic function is normal.    Condition on Discharge:      Stable.    Code status during the hospital stay:    Code Status and Medical Interventions:   Ordered at: 07/11/21 5352     Level Of Support Discussed With:    Patient     Code Status:    CPR     Medical Interventions (Level of Support Prior to Arrest):    Full     Discharge Disposition:    Home or Self CareHome    Discharge Medications:       Discharge Medications      New Medications      Instructions Start Date   cefdinir 300 MG capsule  Commonly known as: OMNICEF   300 mg, Oral, 2 Times Daily      HYDROcodone-acetaminophen 5-325 MG per tablet  Commonly known as: NORCO   1 tablet, Oral, Every 4 Hours PRN      iron polysaccharides 150 MG capsule  Commonly known as: NIFEREX   150 mg, Oral, Daily      vitamin B-12 1000 MCG  tablet  Commonly known as: CYANOCOBALAMIN   1,000 mcg, Oral, Daily         Changes to Medications      Instructions Start Date   NIFEdipine XL 30 MG 24 hr tablet  Commonly known as: PROCARDIA XL  What changed: when to take this   30 mg, Oral, Daily         Continue These Medications      Instructions Start Date   acetaminophen 650 MG 8 hr tablet  Commonly known as: TYLENOL   650 mg, Oral, Every 8 Hours PRN      aspirin 81 MG EC tablet   81 mg, Oral, Daily, PT TO STOP PER MD INSTRUCTION      estradiol 0.1 MG/GM vaginal cream  Commonly known as: ESTRACE   1 application, Vaginal      imipramine 50 MG tablet  Commonly known as: TOFRANIL   100 mg, Oral, Every Night at Bedtime      lisinopril 40 MG tablet  Commonly known as: PRINIVIL,ZESTRIL   40 mg, Oral, Daily      omeprazole 20 MG capsule  Commonly known as: priLOSEC   20 mg, Oral, Daily      simvastatin 40 MG tablet  Commonly known as: ZOCOR   40 mg, Oral, Nightly, for cholesterol      vitamin C 500 MG tablet  Commonly known as: ASCORBIC ACID   500 mg, Oral, Daily           Discharge Diet:     Diet Instructions     Diet: Regular      Discharge Diet: Regular        Activity at Discharge:     Activity Instructions     Activity as Tolerated          Follow-up Appointments:    Additional Instructions for the Follow-ups that You Need to Schedule     Discharge Follow-up with PCP   As directed       Currently Documented PCP:    Margot Chavez MD    PCP Phone Number:    [phone]     Follow Up Details: 1 week         Discharge Follow-up with Specified Provider: dr durand urology; 1 Week   As directed      To: dr durand urology    Follow Up: 1 Week           Follow-up Information     Margot Chavez MD .    Specialty: Internal Medicine & Pediatrics  Why: 1 week  Contact information:  [address]  [facility] [address]  [phone]                 Future Appointments   Date Time Provider Department Center   8/20/2021  2:00 PM Margot Chavez MD [facility]     Test Results Pending at Discharge:    Pending Labs     Order Current Status    Blood Culture - Blood, Arm, Left Preliminary result    Blood Culture - Blood, Hand, Left Preliminary result             Porfirio Correa DO  07/14/21  11:12 EDT    Time: I spent 25 minutes on this discharge activity which included: face-to-face encounter with the patient, reviewing the data in the system, coordination of the care with the nursing staff as well as consultants, documentation, and entering orders.     Dictated utilizing Dragon dictation.

## 2021-07-14 NOTE — PLAN OF CARE
Goal Outcome Evaluation:           Progress: improving  Outcome Summary: VSS.  alert and oriented.  Rested between care.  Up to BSC during night with urination.  Urine strained.  Sling intact to left arm.

## 2021-07-14 NOTE — CASE MANAGEMENT/SOCIAL WORK
Case Management Discharge Note      Final Note: dc home    Provided Post Acute Provider List?: N/A  N/A Provider List Comment: No needs at this time    Selected Continued Care - Discharged on 7/14/2021 Admission date: 7/11/2021 - Discharge disposition: Home or Self Care    Destination    No services have been selected for the patient.              Durable Medical Equipment    No services have been selected for the patient.              Dialysis/Infusion    No services have been selected for the patient.              Home Medical Care    No services have been selected for the patient.              Therapy    No services have been selected for the patient.              Community Resources    No services have been selected for the patient.              Community & DME    No services have been selected for the patient.                       Final Discharge Disposition Code: 01 - home or self-care

## 2021-07-14 NOTE — OUTREACH NOTE
Prep Survey      Responses   [facility] patient discharged from?  [place]   Is LACE score < 7 ?  No   Emergency Room discharge w/ pulse ox?  No   Eligibility  [facility]   Date of Admission  07/11/21   Date of Discharge  07/14/21   Discharge Disposition  Home or Self Care   Does the patient have one of the following disease processes/diagnoses(primary or secondary)?  Sepsis   Does the patient have Home health ordered?  No   Is there a DME ordered?  No   Prep survey completed?  Yes          Hilda Burris RN

## 2021-07-14 NOTE — PROGRESS NOTES
"  [facility] Progress Note    Chief Complaint: No complaints    Anxious to go home and getting ready to be discharged.  Lots of questions regarding stone and UTI management.  Lots of clarifications of Dr. Clark's plans were discussed.    Review of Systems:    The following systems were reviewed and negative;  respiratory, cardiovascular and gastrointestinal          Vital Signs  /84   Pulse 85   Temp 98 °F (36.7 °C) (Oral)   Resp 18   Ht 167.6 cm (66\")   Wt 111 kg (244 lb 6.4 oz)   SpO2 97%   Breastfeeding No   BMI 39.45 kg/m²     Physical Exam:     General Appearance:    Alert, cooperative, NAD   HEENT:    No trauma, pupils reactive, hearing intact   Back:     No CVA tenderness   Lungs:     Respirations unlabored, no wheezing    Heart:    RRR, intact peripheral pulses   Abdomen:     Soft, NDNT, no masses, no guarding   :   Deferred   Extremities:   No edema, no deformity   Lymphatic:   No neck or groin LAD   Skin:   No bleeding, bruising or rashes   Neuro/Psych:   Orientation intact, mood/affect pleasant, no focal findings        Results Review:     I reviewed the patient's new clinical results.  Lab Results (last 24 hours)     Procedure Component Value Units Date/Time    Urine Culture - Urine, Urine, Catheter [[id number]]  (Abnormal)  (Susceptibility) Collected: 07/11/21 2119    Specimen: Urine, Catheter Updated: 07/14/21 0818     Urine Culture >100,000 CFU/mL Escherichia coli    Susceptibility      Escherichia coli      YOLY      Ampicillin Susceptible      Ampicillin + Sulbactam Susceptible      Cefazolin Susceptible      Cefepime Susceptible      Ceftazidime Susceptible      Ceftriaxone Susceptible      Gentamicin Susceptible      Levofloxacin Susceptible      Nitrofurantoin Susceptible      Piperacillin + Tazobactam Susceptible      Tetracycline Susceptible      Trimethoprim + Sulfamethoxazole Susceptible               Linear View                   Occult Blood X 1, Stool - Stool, Per Rectum " [[id number]]  (Normal) Collected: 07/14/21 0804    Specimen: Stool from Per Rectum Updated: 07/14/21 0811     Fecal Occult Blood Negative    Blood Culture - Blood, Hand, Right [[id number]]  (Abnormal) Collected: 07/11/21 2129    Specimen: Blood from Hand, Right Updated: 07/14/21 0654     Blood Culture Escherichia coli     Gram Stain Anaerobic Bottle Gram negative bacilli      Aerobic Bottle Gram negative bacilli    Narrative:      Refer to previous blood culture collected on 7/11/2021 at 2129 for MICs.      Blood Culture - Blood, Arm, Right [[id number]]  (Abnormal)  (Susceptibility) Collected: 07/11/21 2129    Specimen: Blood from Arm, Right Updated: 07/14/21 0653     Blood Culture Escherichia coli     Isolated from Aerobic and Anaerobic Bottles     Gram Stain Anaerobic Bottle Gram negative bacilli      Aerobic Bottle Gram negative bacilli    Susceptibility      Escherichia coli      YOLY      Ampicillin Susceptible      Ampicillin + Sulbactam Susceptible      Cefepime Susceptible      Ceftazidime Susceptible      Ceftriaxone Susceptible      Gentamicin Susceptible      Levofloxacin Susceptible      Piperacillin + Tazobactam Susceptible      Trimethoprim + Sulfamethoxazole Susceptible               Linear View               Susceptibility Comments     Escherichia coli    Cefazolin sensitivity will not be reported for Enterobacteriaceae in non-urine isolates. If cefazolin is preferred, please call the microbiology lab to request an E-test.  With the exception of urinary-sourced infections, aminoglycosides should not be used as monotherapy.             Renal Function Panel [[id number]]  (Abnormal) Collected: 07/14/21 0422    Specimen: Blood Updated: 07/14/21 0517     Glucose 103 mg/dL      BUN 21 mg/dL      Creatinine 0.96 mg/dL      Sodium 142 mmol/L      Potassium 4.0 mmol/L      Chloride 111 mmol/L      CO2 22.6 mmol/L      Calcium 8.9 mg/dL      Albumin 2.90 g/dL      Phosphorus 2.9 mg/dL      Anion Gap 8.4 mmol/L       BUN/Creatinine Ratio 21.9     eGFR Non African Amer 56 mL/min/1.73     Narrative:      GFR Normal >60  Chronic Kidney Disease <60  Kidney Failure <15      CBC & Differential [[id number]]  (Abnormal) Collected: 07/14/21 0422    Specimen: Blood Updated: 07/14/21 0452    Narrative:      The following orders were created for panel order CBC & Differential.  Procedure                               Abnormality         Status                     ---------                               -----------         ------                     CBC Auto Differential[[id number]]        Abnormal            Final result                 Please view results for these tests on the individual orders.    CBC Auto Differential [[id number]]  (Abnormal) Collected: 07/14/21 0422    Specimen: Blood Updated: 07/14/21 0452     WBC 5.25 10*3/mm3      RBC 3.33 10*6/mm3      Hemoglobin 9.1 g/dL      Hematocrit 30.0 %      MCV 90.1 fL      MCH 27.3 pg      MCHC 30.3 g/dL      RDW 14.7 %      RDW-SD 49.1 fl      MPV 9.0 fL      Platelets 256 10*3/mm3      Neutrophil % 67.0 %      Lymphocyte % 21.0 %      Monocyte % 8.8 %      Eosinophil % 1.5 %      Basophil % 0.6 %      Immature Grans % 1.1 %      Neutrophils, Absolute 3.52 10*3/mm3      Lymphocytes, Absolute 1.10 10*3/mm3      Monocytes, Absolute 0.46 10*3/mm3      Eosinophils, Absolute 0.08 10*3/mm3      Basophils, Absolute 0.03 10*3/mm3      Immature Grans, Absolute 0.06 10*3/mm3      nRBC 0.0 /100 WBC     C-reactive Protein [[id number]] Collected: 07/14/21 0422    Specimen: Blood Updated: 07/14/21 0446    Blood Culture - Blood, Arm, Left [[id number]] Collected: 07/12/21 2237    Specimen: Blood from Arm, Left Updated: 07/13/21 2245     Blood Culture No growth at 24 hours    Blood Culture - Blood, Hand, Left [[id number]] Collected: 07/12/21 2237    Specimen: Blood from Hand, Left Updated: 07/13/21 2245     Blood Culture No growth at 24 hours        Imaging Results (Last 24 Hours)     ** No results found for  the last 24 hours. **          Medication Review:   I have personally reviewed    Current Facility-Administered Medications:   •  acetaminophen (TYLENOL) tablet 650 mg, 650 mg, Oral, Q4H PRN **OR** acetaminophen (TYLENOL) 160 MG/5ML solution 650 mg, 650 mg, Oral, Q4H PRN **OR** acetaminophen (TYLENOL) suppository 650 mg, 650 mg, Rectal, Q4H PRN, Juliette Esquivel MD  •  ascorbic acid (VITAMIN C) tablet 500 mg, 500 mg, Oral, Daily, Porfirio Correa DO, 500 mg at 07/14/21 0836  •  aspirin EC tablet 81 mg, 81 mg, Oral, Daily, Juliette Esquivel MD, 81 mg at 07/14/21 0836  •  atorvastatin (LIPITOR) tablet 20 mg, 20 mg, Oral, Daily, William Villatoro MD, 20 mg at 07/13/21 2012  •  sennosides-docusate (PERICOLACE) 8.6-50 MG per tablet 2 tablet, 2 tablet, Oral, BID, 2 tablet at 07/14/21 0836 **AND** polyethylene glycol (MIRALAX) packet 17 g, 17 g, Oral, Daily PRN **AND** bisacodyl (DULCOLAX) EC tablet 5 mg, 5 mg, Oral, Daily PRN **AND** bisacodyl (DULCOLAX) suppository 10 mg, 10 mg, Rectal, Daily PRN, Juliette Esquivel MD  •  ertapenem (INVanz) 1 g/100 mL 0.9% NS VTB (mbp), 1 g, Intravenous, Q24H, Porfirio Correa DO, 1 g at 07/13/21 1051  •  estradiol (ESTRACE) vaginal cream 1 applicator, 1 applicator, Vaginal, Nightly, Porfirio Correa DO  •  HYDROcodone-acetaminophen (NORCO) 5-325 MG per tablet 1 tablet, 1 tablet, Oral, Q4H PRN, Juliette Esquivel MD, 1 tablet at 07/12/21 2135  •  HYDROmorphone (DILAUDID) injection 0.5 mg, 0.5 mg, Intravenous, Q2H PRN **AND** naloxone (NARCAN) injection 0.4 mg, 0.4 mg, Intravenous, Q5 Min PRN, Juliette Esquivel MD  •  imipramine (TOFRANIL) tablet 100 mg, 100 mg, Oral, Nightly, Porfirio Correa, , 100 mg at 07/13/21 2012  •  iron polysaccharides (NIFEREX) capsule 150 mg, 150 mg, Oral, Daily, Porfirio Correa DO, 150 mg at 07/14/21 0836  •  lisinopril (PRINIVIL,ZESTRIL) tablet 10 mg, 10 mg, Oral, Daily, Porfirio Correa, , 10 mg at 07/14/21 0836  •  LORazepam  (ATIVAN) tablet 0.5 mg, 0.5 mg, Oral, Q8H PRN, Juliette Esquivel MD  •  melatonin tablet 5 mg, 5 mg, Oral, Nightly PRN, Juliette Esquivel MD  •  morphine injection 1 mg, 1 mg, Intravenous, Q4H PRN **AND** naloxone (NARCAN) injection 0.4 mg, 0.4 mg, Intravenous, Q5 Min PRN, Juliette Esquivel MD  •  NIFEdipine XL (PROCARDIA XL) 24 hr tablet 30 mg, 30 mg, Oral, Nightly, Porfirio Correa, DO, 30 mg at 07/13/21 2012  •  nitroglycerin (NITROSTAT) SL tablet 0.4 mg, 0.4 mg, Sublingual, Q5 Min PRN, Juliette Esquivel MD  •  ondansetron (ZOFRAN) injection 4 mg, 4 mg, Intravenous, Q6H PRN, Juliette Esquivel MD  •  pantoprazole (PROTONIX) injection 40 mg, 40 mg, Intravenous, Q AM, Juliette Esquivel MD, 40 mg at 07/14/21 0703  •  sodium chloride 0.9 % flush 10 mL, 10 mL, Intravenous, PRN, Juliette Esquivel MD  •  [COMPLETED] Insert peripheral IV, , , Once **AND** sodium chloride 0.9 % flush 10 mL, 10 mL, Intravenous, PRN, Juliette Esquivel MD  •  sodium chloride 0.9 % flush 10 mL, 10 mL, Intravenous, PRN, Juliette Esquivel MD  •  sodium chloride 0.9 % flush 10 mL, 10 mL, Intravenous, Q12H, Juliette Esquivel MD, 10 mL at 07/14/21 0836  •  sodium chloride 0.9 % infusion 40 mL, 40 mL, Intravenous, PRN, Juliette Esquivel MD    Allergies:    Bactrim [sulfamethoxazole-trimethoprim], Ciprofloxacin, Levaquin [levofloxacin], Macrobid [nitrofurantoin], Nitrofurantoin macrocrystal, and Cortisone    Assessment:    Active Problems:  Patient Active Problem List   Diagnosis   • Urinary tract infection due to ESBL Klebsiella   • Simple renal cyst   • Former smoker   • Gastroesophageal reflux disease   • Essential hypertension   • Hyperlipidemia   • Hypertriglyceridemia   • Osteoporosis   • Panic disorder   • Psoriasis   • Urge incontinence of urine   • Vitamin D deficiency   • Post-menopause   • Acute UTI (urinary tract infection)   • Insulin resistance   • Chronic bilateral low back pain without sciatica   • Spinal stenosis of lumbar region  with neurogenic claudication   • Burning sensation of foot   • Other chronic pain   • Tachycardia   • Ventricular tachycardia (CMS/HCC)   • Mixed incontinence   • GERD without esophagitis   • Anxiety   • Hyperglycemia   • Sepsis, unspecified organism (CMS/HCC)   • Acute cystitis   • Metabolic encephalopathy   • Hypokalemia   • Positive D dimer   • e.coli bacteremia   • Ureteral stone with hydronephrosis   • Closed fracture of right distal radius   • Closed fracture of left proximal humerus   • Closed displaced fracture of surgical neck of left humerus   • Acute kidney failure (CMS/HCC)   • Obstructive uropathy   • Nephrolithiasis   • Anemia   • Hyperglycemia   • Metabolic acidosis   • Essential hypertension   • History of ventricular tachycardia   • Left humeral fracture       Obstructing pyelonephritis status post stent    Plan:    Okay to discharge from our standpoint.  Will arrange for follow-up ureteroscopy stone extraction and stent removal in approximately 1 to 2 weeks.      Matthew Ndiaye MD    7/14/2021  10:52 EDT

## 2021-07-15 ENCOUNTER — TRANSITIONAL CARE MANAGEMENT TELEPHONE ENCOUNTER (OUTPATIENT)
Dept: CALL CENTER | Facility: HOSPITAL | Age: 81
End: 2021-07-15

## 2021-07-15 NOTE — OUTREACH NOTE
Call Center TCM Note      Responses   [facility] patient discharged from?  [place]   Does the patient have one of the following disease processes/diagnoses(primary or secondary)?  Sepsis   TCM attempt successful?  Yes   Discharge diagnosis  Acute UTI, left humerus surgical neck fracture   Meds reviewed with patient/caregiver?  Yes   Is the patient having any side effects they believe may be caused by any medication additions or changes?  No   Does the patient have all medications related to this admission filled (includes all antibiotics, inhalers, nebulizers,steroids,etc.)  Yes   Is the patient taking all medications as directed (includes completed medication regime)?  Yes   Does the patient have a primary care provider?   Yes   Comments regarding PCP  PCP Dr Chavez has no appts avail within TCM time frame. I am asking ofc in routing msg to review sched and call pt for TCM FWP to be completed by 07/28/2021.   Does the patient have an appointment with their PCP within 7 days of discharge?  No   Has the patient kept scheduled appointments due by today?  N/A   Has home health visited the patient within 72 hours of discharge?  N/A   Psychosocial issues?  No   Did the patient receive a copy of their discharge instructions?  Yes   Nursing interventions  Reviewed instructions with patient   What is the patient's perception of their health status since discharge?  Improving   Nursing interventions  Nurse provided patient education   Is the patient/caregiver able to teach back Sepsis?  E - Extreme pain or generalized discomfort (worst ever,especially abdomen), P - Pale or discolored skin, S - Sleepy, difficult to arouse,confused, S - Short of breath, I -   I feel like I might die-a feeling of hopelessness, S - Shivering,fever or very cold   Nursing interventions  Nurse provided reassurance to patient   Is patient/caregiver able to teach back steps to recovery at home?  Set small, achievable goals for return to  baseline health, Record milestones and struggles in a journal, Exercise as tolerated, Make a list of questions for PCP appoinment, Learn about sepsis(sepsis.org), Rest and regain strength, Talk about feelings with family/friends, Eat a balanced diet   Is the patient/caregiver able to teach back signs and symptoms of worsening condition:  Fever, Altered mental status(confusion/coma), Edema, Hyperthermia, Rapid heart rate (>90), High blood glucose without diabetes, Shortness of breath/rapid respiratory rate   If the patient is a current smoker, are they able to teach back resources for cessation?  Not a smoker   Is the patient/caregiver able to teach back the hierarchy of who to call/visit for symptoms/problems? PCP, Specialist, Home health nurse, Urgent Care, ED, 911  Yes   TCM call completed?  Yes   Wrap up additional comments  Pt feeling much better, all meds in place. Appetite fine. No fever, chills. No discomfort from ureteral stent. Pt is waiting on call back from urology ofc to sched fwp. PCP Dr Chavez has no appts avail within TCM time frame. I am asking ofc in routing msg to review sched and call pt for TCM FWP to be completed by 07/28/2021.          Nanette Stock MA    7/15/2021, 13:12 EDT

## 2021-07-17 LAB
BACTERIA SPEC AEROBE CULT: NORMAL
BACTERIA SPEC AEROBE CULT: NORMAL

## 2021-07-21 ENCOUNTER — PRE-ADMISSION TESTING (OUTPATIENT)
Dept: PREADMISSION TESTING | Facility: HOSPITAL | Age: 81
End: 2021-07-21

## 2021-07-21 VITALS
BODY MASS INDEX: 39.13 KG/M2 | TEMPERATURE: 98.2 F | RESPIRATION RATE: 20 BRPM | HEIGHT: 66 IN | OXYGEN SATURATION: 96 % | DIASTOLIC BLOOD PRESSURE: 79 MMHG | HEART RATE: 99 BPM | WEIGHT: 243.5 LBS | SYSTOLIC BLOOD PRESSURE: 134 MMHG

## 2021-07-21 LAB — SARS-COV-2 ORF1AB RESP QL NAA+PROBE: NOT DETECTED

## 2021-07-21 PROCEDURE — U0005 INFEC AGEN DETEC AMPLI PROBE: HCPCS | Performed by: UROLOGY

## 2021-07-21 PROCEDURE — U0004 COV-19 TEST NON-CDC HGH THRU: HCPCS | Performed by: UROLOGY

## 2021-07-21 PROCEDURE — C9803 HOPD COVID-19 SPEC COLLECT: HCPCS | Performed by: UROLOGY

## 2021-07-21 RX ORDER — HYDROCODONE BITARTRATE AND ACETAMINOPHEN 7.5; 325 MG/1; MG/1
1 TABLET ORAL EVERY 6 HOURS PRN
COMMUNITY
End: 2021-10-04

## 2021-07-21 RX ORDER — MULTIPLE VITAMINS W/ MINERALS TAB 9MG-400MCG
1 TAB ORAL 2 TIMES DAILY
COMMUNITY

## 2021-07-21 NOTE — DISCHARGE INSTRUCTIONS
Take the following medications the morning of surgery with a small sip of water:  OMEPRAZOLE    ARRIVE TO MAIN OR DESK 7/23/21 AT 5:30AM    If you are on prescription narcotic pain medication to control your pain you may also take that medication the morning of surgery.    General Instructions:  • Do not eat or drink anything after midnight the night before surgery.  • Infants may have breast milk up to four hours before surgery.  • Infants drinking formula may drink formula up to six hours before surgery.   • Patients who avoid smoking, chewing tobacco and alcohol for 4 weeks prior to surgery have a reduced risk of post-operative complications.  Quit smoking as many days before surgery as you can.  • Do not smoke, use chewing tobacco or drink alcohol the day of surgery.   • If applicable bring your C-PAP/ BI-PAP machine.  • Bring any papers given to you in the doctor’s office.  • Wear clean comfortable clothes.  • Do not wear contact lenses, false eyelashes or make-up.  Bring a case for your glasses.   • Bring crutches or walker if applicable.  • Remove all piercings.  Leave jewelry and any other valuables at home.  • Hair extensions with metal clips must be removed prior to surgery.  • The Pre-Admission Testing nurse will instruct you to bring medications if unable to obtain an accurate list in Pre-Admission Testing.          Preventing a Surgical Site Infection:  • For 2 to 3 days before surgery, avoid shaving with a razor because the razor can irritate skin and make it easier to develop an infection.    • Any areas of open skin can increase the risk of a post-operative wound infection by allowing bacteria to enter and travel throughout the body.  Notify your surgeon if you have any skin wounds / rashes even if it is not near the expected surgical site.  The area will need assessed to determine if surgery should be delayed until it is healed.  • The night prior to surgery shower using a fresh bar of  anti-bacterial soap (such as Dial) and clean washcloth.  Sleep in a clean bed with clean clothing.  Do not allow pets to sleep with you.  • Shower on the morning of surgery using a fresh bar of anti-bacterial soap (such as Dial) and clean washcloth.  Dry with a clean towel and dress in clean clothing.  • Ask your surgeon if you will be receiving antibiotics prior to surgery.  • Make sure you, your family, and all healthcare providers clean their hands with soap and water or an alcohol based hand  before caring for you or your wound.    Day of surgery:  Your arrival time is approximately two hours before your scheduled surgery time.  Upon arrival, a Pre-op nurse and Anesthesiologist will review your health history, obtain vital signs, and answer questions you may have.  The only belongings needed at this time will be your home medications and if applicable your C-PAP/BI-PAP machine.  A Pre-op nurse will start an IV and you may receive medication in preparation for surgery, including something to help you relax.      Please be aware that surgery does come with discomfort.  We want to make every effort to control your discomfort so please discuss any uncontrolled symptoms with your nurse.   Your doctor will most likely have prescribed pain medications.      If you are going home after surgery you will receive individualized written care instructions before being discharged.  A responsible adult must drive you to and from the hospital on the day of your surgery and stay with you for 24 hours.  Discharge prescriptions can be filled by the hospital pharmacy during regular pharmacy hours.  If you are having surgery late in the day/evening your prescription may be e-prescribed to your pharmacy.  Please verify your pharmacy hours or chose a 24 hour pharmacy to avoid not having access to your prescription because your pharmacy has closed for the day.    If you are staying overnight following surgery, you will be  transported to your hospital room following the recovery period.  [facility] has all private rooms.    If you have any questions please call Pre-Admission Testing at [phone].  Deductibles and co-payments are collected on the day of service. Please be prepared to pay the required co-pay, deductible or deposit on the day of service as defined by your plan.    Patient Education for Self-Quarantine Process    • Following your COVID testing, we strongly recommend that you wear a mask when you are with other people and practice social distancing.   • Limit your activities to only required outings.  • Wash your hands with soap and water frequently for at least 20 seconds.   • Avoid touching your eyes, nose and mouth with unwashed hands.  • Do not share anything - utensils, drinking glasses, food from the same bowl.   • Sanitize household surfaces daily. Include all high touch areas (door handles, light switches, phones, countertops, etc.)    Call your surgeon immediately if you experience any of the following symptoms:  • Sore Throat  • Shortness of Breath or difficulty breathing  • Cough  • Chills  • Body soreness or muscle pain  • Headache  • Fever  • New loss of taste or smell  • Do not arrive for your surgery ill.  Your procedure will need to be rescheduled to another time.  You will need to call your physician before the day of surgery to avoid any unnecessary exposure to hospital staff as well as other patients.

## 2021-07-23 ENCOUNTER — APPOINTMENT (OUTPATIENT)
Dept: CT IMAGING | Facility: HOSPITAL | Age: 81
End: 2021-07-23

## 2021-07-23 ENCOUNTER — APPOINTMENT (OUTPATIENT)
Dept: GENERAL RADIOLOGY | Facility: HOSPITAL | Age: 81
End: 2021-07-23

## 2021-07-23 ENCOUNTER — ANESTHESIA EVENT (OUTPATIENT)
Dept: PERIOP | Facility: HOSPITAL | Age: 81
End: 2021-07-23

## 2021-07-23 ENCOUNTER — HOSPITAL ENCOUNTER (INPATIENT)
Facility: HOSPITAL | Age: 81
LOS: 3 days | Discharge: HOME | End: 2021-07-26
Attending: EMERGENCY MEDICINE | Admitting: HOSPITALIST

## 2021-07-23 ENCOUNTER — HOSPITAL ENCOUNTER (OUTPATIENT)
Facility: HOSPITAL | Age: 81
Setting detail: HOSPITAL OUTPATIENT SURGERY
Discharge: HOME | End: 2021-07-23
Attending: UROLOGY | Admitting: UROLOGY

## 2021-07-23 ENCOUNTER — ANESTHESIA (OUTPATIENT)
Dept: PERIOP | Facility: HOSPITAL | Age: 81
End: 2021-07-23

## 2021-07-23 VITALS
BODY MASS INDEX: 38.86 KG/M2 | DIASTOLIC BLOOD PRESSURE: 78 MMHG | OXYGEN SATURATION: 97 % | SYSTOLIC BLOOD PRESSURE: 160 MMHG | WEIGHT: 241.8 LBS | RESPIRATION RATE: 18 BRPM | TEMPERATURE: 98 F | HEIGHT: 66 IN | HEART RATE: 78 BPM

## 2021-07-23 DIAGNOSIS — N20.0 NEPHROLITHIASIS: ICD-10-CM

## 2021-07-23 DIAGNOSIS — N20.1 LEFT URETERAL STONE: ICD-10-CM

## 2021-07-23 DIAGNOSIS — R65.20 SEPSIS WITH ENCEPHALOPATHY WITHOUT SEPTIC SHOCK, DUE TO UNSPECIFIED ORGANISM (HCC): Primary | ICD-10-CM

## 2021-07-23 DIAGNOSIS — N13.2 URETERAL STONE WITH HYDRONEPHROSIS: ICD-10-CM

## 2021-07-23 DIAGNOSIS — N20.1 LEFT URETERAL STONE: Primary | ICD-10-CM

## 2021-07-23 DIAGNOSIS — N39.0 URINARY TRACT INFECTION IN FEMALE: ICD-10-CM

## 2021-07-23 DIAGNOSIS — A41.9 SEPSIS WITH ENCEPHALOPATHY WITHOUT SEPTIC SHOCK, DUE TO UNSPECIFIED ORGANISM (HCC): Primary | ICD-10-CM

## 2021-07-23 DIAGNOSIS — G93.40 SEPSIS WITH ENCEPHALOPATHY WITHOUT SEPTIC SHOCK, DUE TO UNSPECIFIED ORGANISM (HCC): Primary | ICD-10-CM

## 2021-07-23 DIAGNOSIS — N13.9 OBSTRUCTIVE UROPATHY: ICD-10-CM

## 2021-07-23 LAB
ALBUMIN SERPL-MCNC: 3.9 G/DL (ref 3.5–5.2)
ALBUMIN/GLOB SERPL: 1.1 G/DL
ALP SERPL-CCNC: 171 U/L (ref 39–117)
ALT SERPL W P-5'-P-CCNC: 18 U/L (ref 1–33)
AMORPH URATE CRY URNS QL MICRO: ABNORMAL /HPF
ANION GAP SERPL CALCULATED.3IONS-SCNC: 13.2 MMOL/L (ref 5–15)
APTT PPP: 21.1 SECONDS (ref 24.3–38.1)
AST SERPL-CCNC: 19 U/L (ref 1–32)
BACTERIA UR QL AUTO: ABNORMAL /HPF
BASOPHILS # BLD AUTO: 0.05 10*3/MM3 (ref 0–0.2)
BASOPHILS NFR BLD AUTO: 0.2 % (ref 0–1.5)
BILIRUB SERPL-MCNC: 0.4 MG/DL (ref 0–1.2)
BILIRUB UR QL STRIP: NEGATIVE
BUN SERPL-MCNC: 16 MG/DL (ref 8–23)
BUN/CREAT SERPL: 14.5 (ref 7–25)
CALCIUM SPEC-SCNC: 9.6 MG/DL (ref 8.6–10.5)
CHLORIDE SERPL-SCNC: 103 MMOL/L (ref 98–107)
CLARITY UR: ABNORMAL
CO2 SERPL-SCNC: 21.8 MMOL/L (ref 22–29)
COLOR UR: YELLOW
CREAT SERPL-MCNC: 1.1 MG/DL (ref 0.57–1)
D-LACTATE SERPL-SCNC: 3 MMOL/L (ref 0.5–2)
DEPRECATED RDW RBC AUTO: 52.3 FL (ref 37–54)
EOSINOPHIL # BLD AUTO: 0 10*3/MM3 (ref 0–0.4)
EOSINOPHIL NFR BLD AUTO: 0 % (ref 0.3–6.2)
ERYTHROCYTE [DISTWIDTH] IN BLOOD BY AUTOMATED COUNT: 15.8 % (ref 12.3–15.4)
GFR SERPL CREATININE-BSD FRML MDRD: 48 ML/MIN/1.73
GLOBULIN UR ELPH-MCNC: 3.6 GM/DL
GLUCOSE BLDC GLUCOMTR-MCNC: 151 MG/DL (ref 70–130)
GLUCOSE SERPL-MCNC: 156 MG/DL (ref 65–99)
GLUCOSE UR STRIP-MCNC: NEGATIVE MG/DL
HCT VFR BLD AUTO: 36.9 % (ref 34–46.6)
HGB BLD-MCNC: 11.4 G/DL (ref 12–15.9)
HGB UR QL STRIP.AUTO: ABNORMAL
HOLD SPECIMEN: NORMAL
HOLD SPECIMEN: NORMAL
HYALINE CASTS UR QL AUTO: ABNORMAL /LPF
IMM GRANULOCYTES # BLD AUTO: 0.28 10*3/MM3 (ref 0–0.05)
IMM GRANULOCYTES NFR BLD AUTO: 1 % (ref 0–0.5)
INR PPP: 1.03 (ref 0.9–1.1)
KETONES UR QL STRIP: NEGATIVE
LEUKOCYTE ESTERASE UR QL STRIP.AUTO: ABNORMAL
LYMPHOCYTES # BLD AUTO: 1.16 10*3/MM3 (ref 0.7–3.1)
LYMPHOCYTES NFR BLD AUTO: 4.1 % (ref 19.6–45.3)
MCH RBC QN AUTO: 28.1 PG (ref 26.6–33)
MCHC RBC AUTO-ENTMCNC: 30.9 G/DL (ref 31.5–35.7)
MCV RBC AUTO: 91.1 FL (ref 79–97)
MONOCYTES # BLD AUTO: 0.89 10*3/MM3 (ref 0.1–0.9)
MONOCYTES NFR BLD AUTO: 3.2 % (ref 5–12)
NEUTROPHILS NFR BLD AUTO: 25.81 10*3/MM3 (ref 1.7–7)
NEUTROPHILS NFR BLD AUTO: 91.5 % (ref 42.7–76)
NITRITE UR QL STRIP: POSITIVE
NRBC BLD AUTO-RTO: 0 /100 WBC (ref 0–0.2)
PH UR STRIP.AUTO: 5.5 [PH] (ref 4.5–8)
PLATELET # BLD AUTO: 288 10*3/MM3 (ref 140–450)
PMV BLD AUTO: 9.1 FL (ref 6–12)
POTASSIUM SERPL-SCNC: 4.4 MMOL/L (ref 3.5–5.2)
PROCALCITONIN SERPL-MCNC: 0.27 NG/ML (ref 0–0.25)
PROT SERPL-MCNC: 7.5 G/DL (ref 6–8.5)
PROT UR QL STRIP: ABNORMAL
PROTHROMBIN TIME: 13.5 SECONDS (ref 12.1–15)
RBC # BLD AUTO: 4.05 10*6/MM3 (ref 3.77–5.28)
RBC # UR: ABNORMAL /HPF
REF LAB TEST METHOD: ABNORMAL
SODIUM SERPL-SCNC: 138 MMOL/L (ref 136–145)
SP GR UR STRIP: 1.02 (ref 1–1.03)
SQUAMOUS #/AREA URNS HPF: ABNORMAL /HPF
TROPONIN T SERPL-MCNC: <0.01 NG/ML (ref 0–0.03)
UROBILINOGEN UR QL STRIP: ABNORMAL
WBC # BLD AUTO: 28.19 10*3/MM3 (ref 3.4–10.8)
WBC UR QL AUTO: ABNORMAL /HPF
WHOLE BLOOD HOLD SPECIMEN: NORMAL

## 2021-07-23 PROCEDURE — 25010000002 DEXAMETHASONE PER 1 MG: Performed by: NURSE ANESTHETIST, CERTIFIED REGISTERED

## 2021-07-23 PROCEDURE — 85610 PROTHROMBIN TIME: CPT | Performed by: PHYSICIAN ASSISTANT

## 2021-07-23 PROCEDURE — 70498 CT ANGIOGRAPHY NECK: CPT

## 2021-07-23 PROCEDURE — 84145 PROCALCITONIN (PCT): CPT | Performed by: PHYSICIAN ASSISTANT

## 2021-07-23 PROCEDURE — 87077 CULTURE AEROBIC IDENTIFY: CPT | Performed by: PHYSICIAN ASSISTANT

## 2021-07-23 PROCEDURE — 76000 FLUOROSCOPY <1 HR PHYS/QHP: CPT

## 2021-07-23 PROCEDURE — 74018 RADEX ABDOMEN 1 VIEW: CPT

## 2021-07-23 PROCEDURE — 0 IOPAMIDOL PER 1 ML: Performed by: EMERGENCY MEDICINE

## 2021-07-23 PROCEDURE — 25010000002 CEFTRIAXONE PER 250 MG: Performed by: UROLOGY

## 2021-07-23 PROCEDURE — 25010000002 PHENYLEPHRINE PER 1 ML: Performed by: NURSE ANESTHETIST, CERTIFIED REGISTERED

## 2021-07-23 PROCEDURE — 25010000002 ONDANSETRON PER 1 MG: Performed by: NURSE ANESTHETIST, CERTIFIED REGISTERED

## 2021-07-23 PROCEDURE — 93005 ELECTROCARDIOGRAM TRACING: CPT | Performed by: PHYSICIAN ASSISTANT

## 2021-07-23 PROCEDURE — 85025 COMPLETE CBC W/AUTO DIFF WBC: CPT | Performed by: PHYSICIAN ASSISTANT

## 2021-07-23 PROCEDURE — 70450 CT HEAD/BRAIN W/O DYE: CPT

## 2021-07-23 PROCEDURE — 25010000002 SUCCINYLCHOLINE PER 20 MG: Performed by: NURSE ANESTHETIST, CERTIFIED REGISTERED

## 2021-07-23 PROCEDURE — 71045 X-RAY EXAM CHEST 1 VIEW: CPT

## 2021-07-23 PROCEDURE — 87150 DNA/RNA AMPLIFIED PROBE: CPT | Performed by: PHYSICIAN ASSISTANT

## 2021-07-23 PROCEDURE — 84484 ASSAY OF TROPONIN QUANT: CPT | Performed by: PHYSICIAN ASSISTANT

## 2021-07-23 PROCEDURE — 81001 URINALYSIS AUTO W/SCOPE: CPT | Performed by: PHYSICIAN ASSISTANT

## 2021-07-23 PROCEDURE — 25010000002 FENTANYL CITRATE (PF) 50 MCG/ML SOLUTION: Performed by: NURSE ANESTHETIST, CERTIFIED REGISTERED

## 2021-07-23 PROCEDURE — 83605 ASSAY OF LACTIC ACID: CPT | Performed by: PHYSICIAN ASSISTANT

## 2021-07-23 PROCEDURE — 87040 BLOOD CULTURE FOR BACTERIA: CPT | Performed by: PHYSICIAN ASSISTANT

## 2021-07-23 PROCEDURE — 93010 ELECTROCARDIOGRAM REPORT: CPT | Performed by: INTERNAL MEDICINE

## 2021-07-23 PROCEDURE — 74177 CT ABD & PELVIS W/CONTRAST: CPT

## 2021-07-23 PROCEDURE — 70496 CT ANGIOGRAPHY HEAD: CPT

## 2021-07-23 PROCEDURE — 99285 EMERGENCY DEPT VISIT HI MDM: CPT

## 2021-07-23 PROCEDURE — C1769 GUIDE WIRE: HCPCS | Performed by: UROLOGY

## 2021-07-23 PROCEDURE — 87186 SC STD MICRODIL/AGAR DIL: CPT | Performed by: PHYSICIAN ASSISTANT

## 2021-07-23 PROCEDURE — 25010000002 PROPOFOL 10 MG/ML EMULSION: Performed by: NURSE ANESTHETIST, CERTIFIED REGISTERED

## 2021-07-23 PROCEDURE — 36415 COLL VENOUS BLD VENIPUNCTURE: CPT

## 2021-07-23 PROCEDURE — 25010000002 CEFTRIAXONE SODIUM-DEXTROSE 2-2.22 GM-%(50ML) RECONSTITUTED SOLUTION: Performed by: PHYSICIAN ASSISTANT

## 2021-07-23 PROCEDURE — 99285 EMERGENCY DEPT VISIT HI MDM: CPT | Performed by: EMERGENCY MEDICINE

## 2021-07-23 PROCEDURE — 99223 1ST HOSP IP/OBS HIGH 75: CPT | Performed by: FAMILY MEDICINE

## 2021-07-23 PROCEDURE — P9612 CATHETERIZE FOR URINE SPEC: HCPCS

## 2021-07-23 PROCEDURE — 80053 COMPREHEN METABOLIC PANEL: CPT | Performed by: PHYSICIAN ASSISTANT

## 2021-07-23 PROCEDURE — 82365 CALCULUS SPECTROSCOPY: CPT | Performed by: UROLOGY

## 2021-07-23 PROCEDURE — 85730 THROMBOPLASTIN TIME PARTIAL: CPT | Performed by: PHYSICIAN ASSISTANT

## 2021-07-23 PROCEDURE — 82962 GLUCOSE BLOOD TEST: CPT

## 2021-07-23 PROCEDURE — C2617 STENT, NON-COR, TEM W/O DEL: HCPCS | Performed by: UROLOGY

## 2021-07-23 PROCEDURE — 87086 URINE CULTURE/COLONY COUNT: CPT | Performed by: PHYSICIAN ASSISTANT

## 2021-07-23 DEVICE — URETERAL STENT
Type: IMPLANTABLE DEVICE | Site: URETER | Status: FUNCTIONAL
Brand: CONTOUR™

## 2021-07-23 RX ORDER — ONDANSETRON 2 MG/ML
4 INJECTION INTRAMUSCULAR; INTRAVENOUS EVERY 6 HOURS PRN
Status: DISCONTINUED | OUTPATIENT
Start: 2021-07-23 | End: 2021-07-26 | Stop reason: HOSPADM

## 2021-07-23 RX ORDER — ACETAMINOPHEN 650 MG/1
650 SUPPOSITORY RECTAL EVERY 4 HOURS PRN
Status: DISCONTINUED | OUTPATIENT
Start: 2021-07-23 | End: 2021-07-26 | Stop reason: HOSPADM

## 2021-07-23 RX ORDER — PHENAZOPYRIDINE HYDROCHLORIDE 100 MG/1
100 TABLET, FILM COATED ORAL 3 TIMES DAILY PRN
Qty: 21 TABLET | Refills: 1 | Status: SHIPPED | OUTPATIENT
Start: 2021-07-23 | End: 2021-07-27

## 2021-07-23 RX ORDER — ACETAMINOPHEN 325 MG/1
650 TABLET ORAL EVERY 4 HOURS PRN
Status: DISCONTINUED | OUTPATIENT
Start: 2021-07-23 | End: 2021-07-26 | Stop reason: HOSPADM

## 2021-07-23 RX ORDER — LABETALOL HYDROCHLORIDE 5 MG/ML
5 INJECTION, SOLUTION INTRAVENOUS
Status: DISCONTINUED | OUTPATIENT
Start: 2021-07-23 | End: 2021-07-23 | Stop reason: HOSPADM

## 2021-07-23 RX ORDER — IMIPRAMINE HCL 25 MG
100 TABLET ORAL NIGHTLY
Status: DISCONTINUED | OUTPATIENT
Start: 2021-07-23 | End: 2021-07-26 | Stop reason: HOSPADM

## 2021-07-23 RX ORDER — IRON POLYSACCHARIDE COMPLEX 150 MG
150 CAPSULE ORAL DAILY
Status: DISCONTINUED | OUTPATIENT
Start: 2021-07-24 | End: 2021-07-26 | Stop reason: HOSPADM

## 2021-07-23 RX ORDER — HYDROCODONE BITARTRATE AND ACETAMINOPHEN 7.5; 325 MG/1; MG/1
1 TABLET ORAL EVERY 6 HOURS PRN
Status: DISCONTINUED | OUTPATIENT
Start: 2021-07-23 | End: 2021-07-26 | Stop reason: HOSPADM

## 2021-07-23 RX ORDER — NALOXONE HCL 0.4 MG/ML
0.2 VIAL (ML) INJECTION AS NEEDED
Status: DISCONTINUED | OUTPATIENT
Start: 2021-07-23 | End: 2021-07-23 | Stop reason: HOSPADM

## 2021-07-23 RX ORDER — LIDOCAINE HYDROCHLORIDE 10 MG/ML
0.5 INJECTION, SOLUTION EPIDURAL; INFILTRATION; INTRACAUDAL; PERINEURAL ONCE AS NEEDED
Status: DISCONTINUED | OUTPATIENT
Start: 2021-07-23 | End: 2021-07-23 | Stop reason: HOSPADM

## 2021-07-23 RX ORDER — ASPIRIN 81 MG/1
81 TABLET ORAL DAILY
Status: DISCONTINUED | OUTPATIENT
Start: 2021-07-24 | End: 2021-07-26 | Stop reason: HOSPADM

## 2021-07-23 RX ORDER — SODIUM CHLORIDE 0.9 % (FLUSH) 0.9 %
1-10 SYRINGE (ML) INJECTION AS NEEDED
Status: DISCONTINUED | OUTPATIENT
Start: 2021-07-23 | End: 2021-07-26 | Stop reason: HOSPADM

## 2021-07-23 RX ORDER — LANOLIN ALCOHOL/MO/W.PET/CERES
1000 CREAM (GRAM) TOPICAL DAILY
Status: DISCONTINUED | OUTPATIENT
Start: 2021-07-24 | End: 2021-07-26 | Stop reason: HOSPADM

## 2021-07-23 RX ORDER — ALUMINA, MAGNESIA, AND SIMETHICONE 2400; 2400; 240 MG/30ML; MG/30ML; MG/30ML
15 SUSPENSION ORAL EVERY 6 HOURS PRN
Status: DISCONTINUED | OUTPATIENT
Start: 2021-07-23 | End: 2021-07-26 | Stop reason: HOSPADM

## 2021-07-23 RX ORDER — CEFTRIAXONE 2 G/50ML
2 INJECTION, SOLUTION INTRAVENOUS ONCE
Status: COMPLETED | OUTPATIENT
Start: 2021-07-23 | End: 2021-07-23

## 2021-07-23 RX ORDER — CEFTRIAXONE 2 G/50ML
2 INJECTION, SOLUTION INTRAVENOUS EVERY 24 HOURS
Status: DISCONTINUED | OUTPATIENT
Start: 2021-07-24 | End: 2021-07-25

## 2021-07-23 RX ORDER — HYDROCODONE BITARTRATE AND ACETAMINOPHEN 7.5; 325 MG/1; MG/1
1-2 TABLET ORAL EVERY 4 HOURS PRN
Qty: 20 TABLET | Refills: 0 | Status: SHIPPED | OUTPATIENT
Start: 2021-07-23 | End: 2021-11-29

## 2021-07-23 RX ORDER — SODIUM CHLORIDE 9 MG/ML
INJECTION, SOLUTION INTRAVENOUS CONTINUOUS PRN
Status: DISCONTINUED | OUTPATIENT
Start: 2021-07-23 | End: 2021-07-23 | Stop reason: SURG

## 2021-07-23 RX ORDER — NITROGLYCERIN 0.4 MG/1
0.4 TABLET SUBLINGUAL
Status: DISCONTINUED | OUTPATIENT
Start: 2021-07-23 | End: 2021-07-26 | Stop reason: HOSPADM

## 2021-07-23 RX ORDER — DIPHENHYDRAMINE HYDROCHLORIDE 50 MG/ML
12.5 INJECTION INTRAMUSCULAR; INTRAVENOUS
Status: DISCONTINUED | OUTPATIENT
Start: 2021-07-23 | End: 2021-07-23 | Stop reason: HOSPADM

## 2021-07-23 RX ORDER — ACETAMINOPHEN 500 MG
1000 TABLET ORAL ONCE
Status: COMPLETED | OUTPATIENT
Start: 2021-07-23 | End: 2021-07-23

## 2021-07-23 RX ORDER — SODIUM CHLORIDE 0.9 % (FLUSH) 0.9 %
3 SYRINGE (ML) INJECTION EVERY 12 HOURS SCHEDULED
Status: DISCONTINUED | OUTPATIENT
Start: 2021-07-23 | End: 2021-07-23 | Stop reason: HOSPADM

## 2021-07-23 RX ORDER — PROPOFOL 10 MG/ML
VIAL (ML) INTRAVENOUS AS NEEDED
Status: DISCONTINUED | OUTPATIENT
Start: 2021-07-23 | End: 2021-07-23 | Stop reason: SURG

## 2021-07-23 RX ORDER — MULTIPLE VITAMINS W/ MINERALS TAB 9MG-400MCG
1 TAB ORAL 2 TIMES DAILY
Status: DISCONTINUED | OUTPATIENT
Start: 2021-07-23 | End: 2021-07-26 | Stop reason: HOSPADM

## 2021-07-23 RX ORDER — ONDANSETRON 2 MG/ML
INJECTION INTRAMUSCULAR; INTRAVENOUS AS NEEDED
Status: DISCONTINUED | OUTPATIENT
Start: 2021-07-23 | End: 2021-07-23 | Stop reason: SURG

## 2021-07-23 RX ORDER — CEFTRIAXONE SODIUM 1 G/50ML
1 INJECTION, SOLUTION INTRAVENOUS ONCE
Status: COMPLETED | OUTPATIENT
Start: 2021-07-23 | End: 2021-07-23

## 2021-07-23 RX ORDER — FENTANYL CITRATE 50 UG/ML
50 INJECTION, SOLUTION INTRAMUSCULAR; INTRAVENOUS
Status: DISCONTINUED | OUTPATIENT
Start: 2021-07-23 | End: 2021-07-23 | Stop reason: HOSPADM

## 2021-07-23 RX ORDER — SODIUM CHLORIDE, SODIUM LACTATE, POTASSIUM CHLORIDE, CALCIUM CHLORIDE 600; 310; 30; 20 MG/100ML; MG/100ML; MG/100ML; MG/100ML
9 INJECTION, SOLUTION INTRAVENOUS CONTINUOUS
Status: DISCONTINUED | OUTPATIENT
Start: 2021-07-23 | End: 2021-07-23 | Stop reason: HOSPADM

## 2021-07-23 RX ORDER — ACETAMINOPHEN 160 MG/5ML
650 SOLUTION ORAL EVERY 4 HOURS PRN
Status: DISCONTINUED | OUTPATIENT
Start: 2021-07-23 | End: 2021-07-26 | Stop reason: HOSPADM

## 2021-07-23 RX ORDER — PHENAZOPYRIDINE HYDROCHLORIDE 100 MG/1
100 TABLET, FILM COATED ORAL ONCE AS NEEDED
Status: COMPLETED | OUTPATIENT
Start: 2021-07-23 | End: 2021-07-23

## 2021-07-23 RX ORDER — SODIUM CHLORIDE, SODIUM LACTATE, POTASSIUM CHLORIDE, CALCIUM CHLORIDE 600; 310; 30; 20 MG/100ML; MG/100ML; MG/100ML; MG/100ML
125 INJECTION, SOLUTION INTRAVENOUS CONTINUOUS
Status: DISCONTINUED | OUTPATIENT
Start: 2021-07-23 | End: 2021-07-24

## 2021-07-23 RX ORDER — HYDROMORPHONE HYDROCHLORIDE 1 MG/ML
0.5 INJECTION, SOLUTION INTRAMUSCULAR; INTRAVENOUS; SUBCUTANEOUS
Status: DISCONTINUED | OUTPATIENT
Start: 2021-07-23 | End: 2021-07-23 | Stop reason: HOSPADM

## 2021-07-23 RX ORDER — FLUMAZENIL 0.1 MG/ML
0.2 INJECTION INTRAVENOUS AS NEEDED
Status: DISCONTINUED | OUTPATIENT
Start: 2021-07-23 | End: 2021-07-23 | Stop reason: HOSPADM

## 2021-07-23 RX ORDER — SODIUM CHLORIDE 0.9 % (FLUSH) 0.9 %
10 SYRINGE (ML) INJECTION AS NEEDED
Status: DISCONTINUED | OUTPATIENT
Start: 2021-07-23 | End: 2021-07-26 | Stop reason: HOSPADM

## 2021-07-23 RX ORDER — FENTANYL CITRATE 50 UG/ML
INJECTION, SOLUTION INTRAMUSCULAR; INTRAVENOUS AS NEEDED
Status: DISCONTINUED | OUTPATIENT
Start: 2021-07-23 | End: 2021-07-23 | Stop reason: SURG

## 2021-07-23 RX ORDER — ONDANSETRON 4 MG/1
4 TABLET, FILM COATED ORAL DAILY PRN
Qty: 10 TABLET | Refills: 1 | Status: SHIPPED | OUTPATIENT
Start: 2021-07-23 | End: 2021-07-26 | Stop reason: HOSPADM

## 2021-07-23 RX ORDER — PHENAZOPYRIDINE HYDROCHLORIDE 100 MG/1
100 TABLET, FILM COATED ORAL 3 TIMES DAILY PRN
Status: DISCONTINUED | OUTPATIENT
Start: 2021-07-23 | End: 2021-07-26 | Stop reason: HOSPADM

## 2021-07-23 RX ORDER — LIDOCAINE HYDROCHLORIDE 20 MG/ML
INJECTION, SOLUTION INFILTRATION; PERINEURAL AS NEEDED
Status: DISCONTINUED | OUTPATIENT
Start: 2021-07-23 | End: 2021-07-23 | Stop reason: SURG

## 2021-07-23 RX ORDER — ONDANSETRON 4 MG/1
4 TABLET, FILM COATED ORAL EVERY 6 HOURS PRN
Status: DISCONTINUED | OUTPATIENT
Start: 2021-07-23 | End: 2021-07-26 | Stop reason: HOSPADM

## 2021-07-23 RX ORDER — DEXAMETHASONE SODIUM PHOSPHATE 4 MG/ML
INJECTION, SOLUTION INTRA-ARTICULAR; INTRALESIONAL; INTRAMUSCULAR; INTRAVENOUS; SOFT TISSUE AS NEEDED
Status: DISCONTINUED | OUTPATIENT
Start: 2021-07-23 | End: 2021-07-23 | Stop reason: SURG

## 2021-07-23 RX ORDER — SODIUM CHLORIDE 0.9 % (FLUSH) 0.9 %
3-10 SYRINGE (ML) INJECTION AS NEEDED
Status: DISCONTINUED | OUTPATIENT
Start: 2021-07-23 | End: 2021-07-23 | Stop reason: HOSPADM

## 2021-07-23 RX ORDER — HYDRALAZINE HYDROCHLORIDE 20 MG/ML
5 INJECTION INTRAMUSCULAR; INTRAVENOUS
Status: DISCONTINUED | OUTPATIENT
Start: 2021-07-23 | End: 2021-07-23 | Stop reason: HOSPADM

## 2021-07-23 RX ORDER — IBUPROFEN 400 MG/1
800 TABLET ORAL ONCE
Status: COMPLETED | OUTPATIENT
Start: 2021-07-23 | End: 2021-07-23

## 2021-07-23 RX ORDER — ONDANSETRON 2 MG/ML
4 INJECTION INTRAMUSCULAR; INTRAVENOUS ONCE AS NEEDED
Status: DISCONTINUED | OUTPATIENT
Start: 2021-07-23 | End: 2021-07-23 | Stop reason: HOSPADM

## 2021-07-23 RX ORDER — CALCIUM CARBONATE 200(500)MG
1 TABLET,CHEWABLE ORAL 2 TIMES DAILY PRN
Status: DISCONTINUED | OUTPATIENT
Start: 2021-07-23 | End: 2021-07-26 | Stop reason: HOSPADM

## 2021-07-23 RX ORDER — EPHEDRINE SULFATE 50 MG/ML
5 INJECTION, SOLUTION INTRAVENOUS ONCE AS NEEDED
Status: DISCONTINUED | OUTPATIENT
Start: 2021-07-23 | End: 2021-07-23 | Stop reason: HOSPADM

## 2021-07-23 RX ORDER — SODIUM CHLORIDE 0.9 % (FLUSH) 0.9 %
10 SYRINGE (ML) INJECTION EVERY 12 HOURS SCHEDULED
Status: DISCONTINUED | OUTPATIENT
Start: 2021-07-23 | End: 2021-07-26 | Stop reason: HOSPADM

## 2021-07-23 RX ORDER — PANTOPRAZOLE SODIUM 40 MG/1
40 TABLET, DELAYED RELEASE ORAL EVERY MORNING
Refills: 1 | Status: DISCONTINUED | OUTPATIENT
Start: 2021-07-24 | End: 2021-07-26 | Stop reason: HOSPADM

## 2021-07-23 RX ORDER — PROMETHAZINE HYDROCHLORIDE 25 MG/1
25 SUPPOSITORY RECTAL ONCE AS NEEDED
Status: DISCONTINUED | OUTPATIENT
Start: 2021-07-23 | End: 2021-07-23 | Stop reason: HOSPADM

## 2021-07-23 RX ORDER — HYDROCODONE BITARTRATE AND ACETAMINOPHEN 7.5; 325 MG/1; MG/1
1 TABLET ORAL ONCE AS NEEDED
Status: COMPLETED | OUTPATIENT
Start: 2021-07-23 | End: 2021-07-23

## 2021-07-23 RX ORDER — CEFDINIR 300 MG/1
300 CAPSULE ORAL 2 TIMES DAILY
Qty: 14 CAPSULE | Refills: 0 | Status: SHIPPED | OUTPATIENT
Start: 2021-07-23 | End: 2021-07-26 | Stop reason: HOSPADM

## 2021-07-23 RX ORDER — PROMETHAZINE HYDROCHLORIDE 25 MG/1
25 TABLET ORAL ONCE AS NEEDED
Status: DISCONTINUED | OUTPATIENT
Start: 2021-07-23 | End: 2021-07-23 | Stop reason: HOSPADM

## 2021-07-23 RX ORDER — NIFEDIPINE 30 MG/1
30 TABLET, EXTENDED RELEASE ORAL DAILY
Status: DISCONTINUED | OUTPATIENT
Start: 2021-07-24 | End: 2021-07-24

## 2021-07-23 RX ORDER — FAMOTIDINE 10 MG/ML
20 INJECTION, SOLUTION INTRAVENOUS ONCE
Status: COMPLETED | OUTPATIENT
Start: 2021-07-23 | End: 2021-07-23

## 2021-07-23 RX ORDER — MIDAZOLAM HYDROCHLORIDE 1 MG/ML
0.5 INJECTION INTRAMUSCULAR; INTRAVENOUS
Status: DISCONTINUED | OUTPATIENT
Start: 2021-07-23 | End: 2021-07-23 | Stop reason: HOSPADM

## 2021-07-23 RX ORDER — ONDANSETRON 4 MG/1
4 TABLET, FILM COATED ORAL DAILY PRN
Qty: 10 TABLET | Refills: 1 | Status: CANCELLED | OUTPATIENT
Start: 2021-07-23

## 2021-07-23 RX ORDER — SUCCINYLCHOLINE CHLORIDE 20 MG/ML
INJECTION INTRAMUSCULAR; INTRAVENOUS AS NEEDED
Status: DISCONTINUED | OUTPATIENT
Start: 2021-07-23 | End: 2021-07-23 | Stop reason: SURG

## 2021-07-23 RX ORDER — SODIUM CHLORIDE 9 MG/ML
40 INJECTION, SOLUTION INTRAVENOUS AS NEEDED
Status: DISCONTINUED | OUTPATIENT
Start: 2021-07-23 | End: 2021-07-26 | Stop reason: HOSPADM

## 2021-07-23 RX ORDER — DIPHENHYDRAMINE HCL 25 MG
25 CAPSULE ORAL
Status: DISCONTINUED | OUTPATIENT
Start: 2021-07-23 | End: 2021-07-23 | Stop reason: HOSPADM

## 2021-07-23 RX ADMIN — IBUPROFEN 800 MG: 400 TABLET ORAL at 21:03

## 2021-07-23 RX ADMIN — FAMOTIDINE 20 MG: 10 INJECTION INTRAVENOUS at 07:12

## 2021-07-23 RX ADMIN — ONDANSETRON 4 MG: 2 INJECTION INTRAMUSCULAR; INTRAVENOUS at 07:33

## 2021-07-23 RX ADMIN — PHENYLEPHRINE HYDROCHLORIDE 100 MCG: 10 INJECTION INTRAVENOUS at 07:52

## 2021-07-23 RX ADMIN — LIDOCAINE HYDROCHLORIDE 100 MG: 20 INJECTION, SOLUTION INFILTRATION; PERINEURAL at 07:26

## 2021-07-23 RX ADMIN — PROPOFOL 170 MG: 10 INJECTION, EMULSION INTRAVENOUS at 07:26

## 2021-07-23 RX ADMIN — SUCCINYLCHOLINE CHLORIDE 160 MG: 20 INJECTION, SOLUTION INTRAMUSCULAR; INTRAVENOUS; PARENTERAL at 07:27

## 2021-07-23 RX ADMIN — CEFTRIAXONE SODIUM 1 G: 1 INJECTION, SOLUTION INTRAVENOUS at 07:12

## 2021-07-23 RX ADMIN — CEFTRIAXONE 2 G: 2 INJECTION, SOLUTION INTRAVENOUS at 22:19

## 2021-07-23 RX ADMIN — SODIUM CHLORIDE 1000 ML: 9 INJECTION, SOLUTION INTRAVENOUS at 20:56

## 2021-07-23 RX ADMIN — HYDROCODONE BITARTRATE AND ACETAMINOPHEN 1 TABLET: 7.5; 325 TABLET ORAL at 08:59

## 2021-07-23 RX ADMIN — IOPAMIDOL 50 ML: 755 INJECTION, SOLUTION INTRAVENOUS at 23:26

## 2021-07-23 RX ADMIN — IOPAMIDOL 50 ML: 755 INJECTION, SOLUTION INTRAVENOUS at 23:28

## 2021-07-23 RX ADMIN — SODIUM CHLORIDE: 9 INJECTION, SOLUTION INTRAVENOUS at 07:17

## 2021-07-23 RX ADMIN — ACETAMINOPHEN 1000 MG: 500 TABLET, FILM COATED ORAL at 21:03

## 2021-07-23 RX ADMIN — DEXAMETHASONE SODIUM PHOSPHATE 8 MG: 4 INJECTION, SOLUTION INTRAMUSCULAR; INTRAVENOUS at 07:33

## 2021-07-23 RX ADMIN — FENTANYL CITRATE 50 MCG: 50 INJECTION INTRAMUSCULAR; INTRAVENOUS at 07:25

## 2021-07-23 RX ADMIN — IOPAMIDOL 100 ML: 755 INJECTION, SOLUTION INTRAVENOUS at 22:20

## 2021-07-23 RX ADMIN — PHENAZOPYRIDINE 100 MG: 100 TABLET ORAL at 08:58

## 2021-07-23 RX ADMIN — FENTANYL CITRATE 50 MCG: 50 INJECTION INTRAMUSCULAR; INTRAVENOUS at 07:39

## 2021-07-23 NOTE — BRIEF OP NOTE
URETEROSCOPY LASER LITHOTRIPSY WITH STENT INSERTION  Progress Note    Anitra Orta  7/23/2021    Pre-op Diagnosis:   Ureteral stone post stent placement for E. coli urosepsis now for definitive therapy of stone       Post-Op Diagnosis Codes:  Same    Procedure/CPT® Codes:        Procedure(s):  LEFT URETEROSCOPY LASER STONE MANIPULATION BASKET EXTRACTION STENT EXCHANGE 6 x 26    Surgeon(s):  Kevon Clark MD    Anesthesia: General    Staff:   * No surgical staff found *         Estimated Blood Loss: minimal    Urine Voided: * No values recorded between 7/23/2021 12:00 AM and 7/23/2021  7:13 AM *    Specimens:                Stone fragments ureter          Drains:   [REMOVED] NG/OG Tube (Removed)       [REMOVED] Urethral Catheter Double-lumen 16 Fr. (Removed)   Daily Indications Selected surgeries ( tract, abdomen) 07/12/21 1636   Site Assessment Clean;Skin intact;Other (Comment) 07/12/21 1636   Collection Container Standard drainage bag 07/12/21 1636   Securement Method Securing device 07/12/21 1636   Catheter care complete Yes 07/12/21 1553   Output (mL) 450 mL 07/12/21 1700       [REMOVED] Urethral Catheter 5 Fr. (Removed)   Daily Indications Selected surgeries ( tract, abdomen) 07/12/21 0240   Site Assessment Clean 07/12/21 1553   Collection Container Standard drainage bag 07/12/21 1553   Securement Method Securing device 07/12/21 1553   Catheter care complete Yes 07/12/21 1553       [REMOVED] Ureteral Drain/Stent Left ureter  (Removed)       [REMOVED] Ureteral Drain/Stent Left ureter 6 Fr. (Removed)   Site Assessment [place] 07/12/21 1636       [REMOVED] External Urinary Catheter (Removed)   Site Assessment Clean;Skin intact 06/28/21 0813   Application/Removal external catheter changed 06/28/21 0813   Collection Container Wall suction 06/28/21 0813   Wall suction (mmHG) 120 mmHG 06/28/21 0813   Securement Method Securing device 06/28/21 0813   Catheter care complete Yes 06/27/21 2354   Output (mL) 350   06/28/21 0356       Findings:     Complications:          Kevon Clark MD     Date: 7/23/2021  Time: 07:13 EDT

## 2021-07-23 NOTE — INTERVAL H&P NOTE
"H&P reviewed. The patient was examined and there are no changes to the H&P.      /62 (BP Location: Right arm, Patient Position: Lying)   Pulse 92   Temp 98.7 °F (37.1 °C) (Oral)   Resp 18   Ht 167.6 cm (66\")   Wt 110 kg (241 lb 12.8 oz)   SpO2 97%   BMI 39.03 kg/m²   "

## 2021-07-23 NOTE — ANESTHESIA PREPROCEDURE EVALUATION
Anesthesia Evaluation     Patient summary reviewed and Nursing notes reviewed   history of anesthetic complications:  NPO Solid Status: > 8 hours  NPO Liquid Status: > 8 hours           Airway   Mallampati: III  Neck ROM: full  Possible difficult intubation  Dental    (+) partials    Pulmonary     breath sounds clear to auscultation  (+) a smoker Former,   Cardiovascular     Rhythm: regular    (+) hypertension, hyperlipidemia,     ROS comment: L BBB    Neuro/Psych  (+) psychiatric history Anxiety,     GI/Hepatic/Renal/Endo    (+) obesity, morbid obesity, GERD,  renal disease,     Musculoskeletal     Abdominal   (+) obese,    Substance History      OB/GYN          Other   arthritis,        Other Comment: Fracture  Right wrist left humerus this is a current condition                  Anesthesia Plan    ASA 3     general     intravenous induction     Anesthetic plan, all risks, benefits, and alternatives have been provided, discussed and informed consent has been obtained with: patient.

## 2021-07-23 NOTE — ANESTHESIA POSTPROCEDURE EVALUATION
"Patient: Anitra Orta    Procedure Summary     Date: 07/23/21 Room / Location: [facility] OR  / [facility] MAIN OR    Anesthesia Start: 0717 Anesthesia Stop: 0811    Procedure: LEFT URETEROSCOPY STONE MANIPULATION STENT EXCHANGE, LASER LITHOTRIPSY, CYSTOSCOPY, STONE BASKET EXTRACTION (Left ) Diagnosis:     Surgeons: Kevon Clark MD Provider: Matthew Renner MD    Anesthesia Type: general ASA Status: 3          Anesthesia Type: general    Vitals  Vitals Value Taken Time   /80 07/23/21 0902   Temp 36.7 °C (98 °F) 07/23/21 0810   Pulse 86 07/23/21 0904   Resp 16 07/23/21 0900   SpO2 100 % 07/23/21 0905   Vitals shown include unvalidated device data.        Post Anesthesia Care and Evaluation    Patient location during evaluation: bedside  Patient participation: complete - patient participated  Level of consciousness: sleepy but conscious  Pain score: 0  Pain management: adequate  Airway patency: patent  Anesthetic complications: No anesthetic complications    Cardiovascular status: acceptable  Respiratory status: acceptable  Hydration status: acceptable    Comments: /93 (BP Location: Right arm, Patient Position: Lying)   Pulse 87   Temp 36.7 °C (98 °F) (Oral)   Resp 16   Ht 167.6 cm (66\")   Wt 110 kg (241 lb 12.8 oz)   SpO2 99%   BMI 39.03 kg/m²         "

## 2021-07-23 NOTE — OP NOTE
Prep diagnosis left distal ureteral stone hydronephrosis and colic post stent placement for E. coli obstructive uropathy and urosepsis now for definitive therapy    Postop diagnosis same stone fragment extracted stent replaced    Operative procedure cystoscopy left ureteral stent removal left ureteroscopy with holmium laser fragmentation of stone pass extraction stone fragments number chemical analysis and placement of a 6 [place] by 26 cm Percuflex double-J stent without tether under fluoroscopic guidance    Surgeon Eduardo    Anesthesia General    Procedure note this 80-year-old recurrent stone former presented self above-mentioned history and findings he has been on cefdinir sterilize her urine now for definitive therapy site was marked antibiotics were given timeout was taken Covid precautions after adequate general anesthesia was placed very carefully modified dorsolithotomy position all pressure points lead prepped and draped sterile fashion of genitalia 2% intraurethral lidocaine jelly is administered scope advanced the bladder urethra was normal bladder neck was closed the stent was grasped without difficulty intubated with a guidewire loop glide dilation was done approximately 10 [place] rigid ureteroscope was advanced up to the stone the stone was engaged and fragmented and after fragments were then removed sent for chemical analysis reinspection of the ureter up to the ureteropelvic junction revealed no other stones or stone fragments the guidewire stent placement    Ureteroscope was removed scope was placed back over the guidewire and a 6 [place] by 26 cm stent was placed with good curling the upper collecting system and bladder the bladder is a partially filled the patient's procedure well the remainder Xylocaine jelly was instilled per urethra and she was sent to the recovery in satisfactory condition.  Findings were discussed with her  Tommy at [phone]    Disposition she will be discharged today  continuation of her premedications diet and activities with outpatient follow-up my office will call her for stent removal in 1 week she will continue on her cefdinir additional prescriptions written for include Norco Zofran and Pyridium instruction sheet with phone numbers given concerns or questions to contact us accordingly

## 2021-07-23 NOTE — ANESTHESIA PROCEDURE NOTES
Airway  Urgency: elective    Date/Time: 7/23/2021 7:28 AM  Airway not difficult    General Information and Staff    Patient location during procedure: OR  Anesthesiologist: Matthew Renner MD  CRNA: Rosibel Patricia CRNA    Indications and Patient Condition  Indications for airway management: airway protection    Preoxygenated: yes  Mask difficulty assessment: 1 - vent by mask    Final Airway Details  Final airway type: endotracheal airway      Successful airway: ETT  Cuffed: yes   Successful intubation technique: direct laryngoscopy  Facilitating devices/methods: intubating stylet and cricoid pressure  Endotracheal tube insertion site: oral  Blade: Carvalho  Blade size: 2  ETT size (mm): 7.0  Cormack-Lehane Classification: grade IIa - partial view of glottis  Placement verified by: chest auscultation and capnometry   Cuff volume (mL): 6  Measured from: lips  ETT/EBT  to lips (cm): 21  Number of attempts at approach: 1  Assessment: lips, teeth, and gum same as pre-op and atraumatic intubation    Additional Comments  Airway exam prior to DL, teeth/lips inspected. Preoxygenated with 100% O2; sniffing position, easy mask ventilation. Eyes taped. Atraumatic intubation. Lips and teeth intact, no damage. ETT connected to vent. Confirmed EBBS, +EtCO2.

## 2021-07-24 LAB
ALBUMIN SERPL-MCNC: 2.9 G/DL (ref 3.5–5.2)
ALBUMIN/GLOB SERPL: 0.9 G/DL
ALP SERPL-CCNC: 133 U/L (ref 39–117)
ALT SERPL W P-5'-P-CCNC: 15 U/L (ref 1–33)
ANION GAP SERPL CALCULATED.3IONS-SCNC: 9.3 MMOL/L (ref 5–15)
AST SERPL-CCNC: 15 U/L (ref 1–32)
BACTERIA BLD CULT: ABNORMAL
BILIRUB SERPL-MCNC: 0.3 MG/DL (ref 0–1.2)
BOTTLE TYPE: ABNORMAL
BUN SERPL-MCNC: 17 MG/DL (ref 8–23)
BUN/CREAT SERPL: 15.5 (ref 7–25)
CALCIUM SPEC-SCNC: 8.7 MG/DL (ref 8.6–10.5)
CHLORIDE SERPL-SCNC: 106 MMOL/L (ref 98–107)
CO2 SERPL-SCNC: 20.7 MMOL/L (ref 22–29)
CREAT SERPL-MCNC: 1.1 MG/DL (ref 0.57–1)
D-LACTATE SERPL-SCNC: 1.3 MMOL/L (ref 0.5–2)
DEPRECATED RDW RBC AUTO: 50.1 FL (ref 37–54)
ERYTHROCYTE [DISTWIDTH] IN BLOOD BY AUTOMATED COUNT: 15.9 % (ref 12.3–15.4)
GFR SERPL CREATININE-BSD FRML MDRD: 48 ML/MIN/1.73
GLOBULIN UR ELPH-MCNC: 3.1 GM/DL
GLUCOSE SERPL-MCNC: 166 MG/DL (ref 65–99)
HCT VFR BLD AUTO: 31.4 % (ref 34–46.6)
HGB BLD-MCNC: 9.9 G/DL (ref 12–15.9)
MCH RBC QN AUTO: 27.9 PG (ref 26.6–33)
MCHC RBC AUTO-ENTMCNC: 31.5 G/DL (ref 31.5–35.7)
MCV RBC AUTO: 88.5 FL (ref 79–97)
PLATELET # BLD AUTO: 180 10*3/MM3 (ref 140–450)
PMV BLD AUTO: 9.1 FL (ref 6–12)
POTASSIUM SERPL-SCNC: 4.2 MMOL/L (ref 3.5–5.2)
PROCALCITONIN SERPL-MCNC: 1.5 NG/ML (ref 0–0.25)
PROT SERPL-MCNC: 6 G/DL (ref 6–8.5)
QT INTERVAL: 335 MS
RBC # BLD AUTO: 3.55 10*6/MM3 (ref 3.77–5.28)
SODIUM SERPL-SCNC: 136 MMOL/L (ref 136–145)
WBC # BLD AUTO: 25.02 10*3/MM3 (ref 3.4–10.8)

## 2021-07-24 PROCEDURE — 84145 PROCALCITONIN (PCT): CPT | Performed by: FAMILY MEDICINE

## 2021-07-24 PROCEDURE — G0426 INPT/ED TELECONSULT50: HCPCS | Performed by: PSYCHIATRY & NEUROLOGY

## 2021-07-24 PROCEDURE — 85027 COMPLETE CBC AUTOMATED: CPT | Performed by: FAMILY MEDICINE

## 2021-07-24 PROCEDURE — 99233 SBSQ HOSP IP/OBS HIGH 50: CPT | Performed by: HOSPITALIST

## 2021-07-24 PROCEDURE — 80053 COMPREHEN METABOLIC PANEL: CPT | Performed by: FAMILY MEDICINE

## 2021-07-24 PROCEDURE — 25010000002 ENOXAPARIN PER 10 MG: Performed by: FAMILY MEDICINE

## 2021-07-24 PROCEDURE — 94799 UNLISTED PULMONARY SVC/PX: CPT

## 2021-07-24 PROCEDURE — 25010000002 CEFTRIAXONE SODIUM-DEXTROSE 2-2.22 GM-%(50ML) RECONSTITUTED SOLUTION: Performed by: FAMILY MEDICINE

## 2021-07-24 RX ORDER — SODIUM CHLORIDE, SODIUM LACTATE, POTASSIUM CHLORIDE, CALCIUM CHLORIDE 600; 310; 30; 20 MG/100ML; MG/100ML; MG/100ML; MG/100ML
75 INJECTION, SOLUTION INTRAVENOUS CONTINUOUS
Status: DISCONTINUED | OUTPATIENT
Start: 2021-07-24 | End: 2021-07-26

## 2021-07-24 RX ORDER — NIFEDIPINE 30 MG/1
30 TABLET, EXTENDED RELEASE ORAL NIGHTLY
Status: DISCONTINUED | OUTPATIENT
Start: 2021-07-24 | End: 2021-07-26 | Stop reason: HOSPADM

## 2021-07-24 RX ADMIN — SODIUM CHLORIDE, PRESERVATIVE FREE 10 ML: 5 INJECTION INTRAVENOUS at 22:24

## 2021-07-24 RX ADMIN — CEFTRIAXONE 2 G: 2 INJECTION, SOLUTION INTRAVENOUS at 06:10

## 2021-07-24 RX ADMIN — HYDROCODONE BITARTRATE AND ACETAMINOPHEN 1 TABLET: 7.5; 325 TABLET ORAL at 09:04

## 2021-07-24 RX ADMIN — SODIUM CHLORIDE, POTASSIUM CHLORIDE, SODIUM LACTATE AND CALCIUM CHLORIDE 125 ML/HR: 600; 310; 30; 20 INJECTION, SOLUTION INTRAVENOUS at 10:13

## 2021-07-24 RX ADMIN — SODIUM CHLORIDE, PRESERVATIVE FREE 10 ML: 5 INJECTION INTRAVENOUS at 02:00

## 2021-07-24 RX ADMIN — Medication 1 TABLET: at 22:23

## 2021-07-24 RX ADMIN — PANTOPRAZOLE SODIUM 40 MG: 40 TABLET, DELAYED RELEASE ORAL at 06:09

## 2021-07-24 RX ADMIN — CYANOCOBALAMIN TAB 1000 MCG 1000 MCG: 1000 TAB at 08:51

## 2021-07-24 RX ADMIN — Medication 1 TABLET: at 01:59

## 2021-07-24 RX ADMIN — IMIPRAMINE HYDROCHLORIDE 100 MG: 25 TABLET ORAL at 02:11

## 2021-07-24 RX ADMIN — ASPIRIN 81 MG: 81 TABLET, COATED ORAL at 08:50

## 2021-07-24 RX ADMIN — Medication 1 TABLET: at 08:50

## 2021-07-24 RX ADMIN — ACETAMINOPHEN 650 MG: 325 TABLET, FILM COATED ORAL at 04:41

## 2021-07-24 RX ADMIN — Medication 150 MG: at 08:50

## 2021-07-24 RX ADMIN — NIFEDIPINE 30 MG: 30 TABLET, FILM COATED, EXTENDED RELEASE ORAL at 22:23

## 2021-07-24 RX ADMIN — SODIUM CHLORIDE, POTASSIUM CHLORIDE, SODIUM LACTATE AND CALCIUM CHLORIDE 75 ML/HR: 600; 310; 30; 20 INJECTION, SOLUTION INTRAVENOUS at 19:48

## 2021-07-24 RX ADMIN — SODIUM CHLORIDE, POTASSIUM CHLORIDE, SODIUM LACTATE AND CALCIUM CHLORIDE 125 ML/HR: 600; 310; 30; 20 INJECTION, SOLUTION INTRAVENOUS at 02:00

## 2021-07-24 RX ADMIN — ACETAMINOPHEN 650 MG: 325 TABLET, FILM COATED ORAL at 14:35

## 2021-07-24 RX ADMIN — ACETAMINOPHEN 650 MG: 325 TABLET, FILM COATED ORAL at 22:23

## 2021-07-24 RX ADMIN — ENOXAPARIN SODIUM 40 MG: 40 INJECTION SUBCUTANEOUS at 12:46

## 2021-07-25 LAB
ANION GAP SERPL CALCULATED.3IONS-SCNC: 7.6 MMOL/L (ref 5–15)
BACTERIA SPEC AEROBE CULT: ABNORMAL
BASOPHILS # BLD AUTO: 0.02 10*3/MM3 (ref 0–0.2)
BASOPHILS NFR BLD AUTO: 0.2 % (ref 0–1.5)
BUN SERPL-MCNC: 12 MG/DL (ref 8–23)
BUN/CREAT SERPL: 14.5 (ref 7–25)
CALCIUM SPEC-SCNC: 8.7 MG/DL (ref 8.6–10.5)
CHLORIDE SERPL-SCNC: 105 MMOL/L (ref 98–107)
CO2 SERPL-SCNC: 23.4 MMOL/L (ref 22–29)
CREAT SERPL-MCNC: 0.83 MG/DL (ref 0.57–1)
DEPRECATED RDW RBC AUTO: 53.8 FL (ref 37–54)
EOSINOPHIL # BLD AUTO: 0 10*3/MM3 (ref 0–0.4)
EOSINOPHIL NFR BLD AUTO: 0 % (ref 0.3–6.2)
ERYTHROCYTE [DISTWIDTH] IN BLOOD BY AUTOMATED COUNT: 16.1 % (ref 12.3–15.4)
GFR SERPL CREATININE-BSD FRML MDRD: 66 ML/MIN/1.73
GLUCOSE SERPL-MCNC: 171 MG/DL (ref 65–99)
HCT VFR BLD AUTO: 28.7 % (ref 34–46.6)
HGB BLD-MCNC: 8.7 G/DL (ref 12–15.9)
IMM GRANULOCYTES # BLD AUTO: 0.1 10*3/MM3 (ref 0–0.05)
IMM GRANULOCYTES NFR BLD AUTO: 0.9 % (ref 0–0.5)
LYMPHOCYTES # BLD AUTO: 0.66 10*3/MM3 (ref 0.7–3.1)
LYMPHOCYTES NFR BLD AUTO: 6 % (ref 19.6–45.3)
MCH RBC QN AUTO: 27.6 PG (ref 26.6–33)
MCHC RBC AUTO-ENTMCNC: 30.3 G/DL (ref 31.5–35.7)
MCV RBC AUTO: 91.1 FL (ref 79–97)
MONOCYTES # BLD AUTO: 0.57 10*3/MM3 (ref 0.1–0.9)
MONOCYTES NFR BLD AUTO: 5.2 % (ref 5–12)
NEUTROPHILS NFR BLD AUTO: 87.7 % (ref 42.7–76)
NEUTROPHILS NFR BLD AUTO: 9.56 10*3/MM3 (ref 1.7–7)
NRBC BLD AUTO-RTO: 0 /100 WBC (ref 0–0.2)
PLATELET # BLD AUTO: 168 10*3/MM3 (ref 140–450)
PMV BLD AUTO: 9.1 FL (ref 6–12)
POTASSIUM SERPL-SCNC: 3.8 MMOL/L (ref 3.5–5.2)
RBC # BLD AUTO: 3.15 10*6/MM3 (ref 3.77–5.28)
SODIUM SERPL-SCNC: 136 MMOL/L (ref 136–145)
WBC # BLD AUTO: 10.91 10*3/MM3 (ref 3.4–10.8)

## 2021-07-25 PROCEDURE — 99232 SBSQ HOSP IP/OBS MODERATE 35: CPT | Performed by: HOSPITALIST

## 2021-07-25 PROCEDURE — 85025 COMPLETE CBC W/AUTO DIFF WBC: CPT | Performed by: HOSPITALIST

## 2021-07-25 PROCEDURE — 80048 BASIC METABOLIC PNL TOTAL CA: CPT | Performed by: HOSPITALIST

## 2021-07-25 PROCEDURE — 25010000002 CEFTRIAXONE SODIUM-DEXTROSE 2-2.22 GM-%(50ML) RECONSTITUTED SOLUTION: Performed by: FAMILY MEDICINE

## 2021-07-25 PROCEDURE — 25010000002 ENOXAPARIN PER 10 MG: Performed by: FAMILY MEDICINE

## 2021-07-25 PROCEDURE — 25010000002 CEFEPIME-DEXTROSE 2-5 GM-%(50ML) RECONSTITUTED SOLUTION: Performed by: HOSPITALIST

## 2021-07-25 RX ORDER — CEFEPIME HYDROCHLORIDE 2 G/50ML
2 INJECTION, SOLUTION INTRAVENOUS ONCE
Status: COMPLETED | OUTPATIENT
Start: 2021-07-25 | End: 2021-07-25

## 2021-07-25 RX ORDER — CEFEPIME HYDROCHLORIDE 2 G/50ML
2 INJECTION, SOLUTION INTRAVENOUS EVERY 8 HOURS
Status: DISCONTINUED | OUTPATIENT
Start: 2021-07-25 | End: 2021-07-26

## 2021-07-25 RX ADMIN — SODIUM CHLORIDE, POTASSIUM CHLORIDE, SODIUM LACTATE AND CALCIUM CHLORIDE 75 ML/HR: 600; 310; 30; 20 INJECTION, SOLUTION INTRAVENOUS at 09:25

## 2021-07-25 RX ADMIN — Medication 1 TABLET: at 21:18

## 2021-07-25 RX ADMIN — Medication 1 TABLET: at 09:18

## 2021-07-25 RX ADMIN — CEFEPIME HYDROCHLORIDE 2 G: 2 INJECTION, SOLUTION INTRAVENOUS at 12:00

## 2021-07-25 RX ADMIN — ENOXAPARIN SODIUM 40 MG: 40 INJECTION SUBCUTANEOUS at 09:18

## 2021-07-25 RX ADMIN — Medication 150 MG: at 09:18

## 2021-07-25 RX ADMIN — CEFEPIME HYDROCHLORIDE 2 G: 2 INJECTION, SOLUTION INTRAVENOUS at 21:19

## 2021-07-25 RX ADMIN — SODIUM CHLORIDE, PRESERVATIVE FREE 10 ML: 5 INJECTION INTRAVENOUS at 09:19

## 2021-07-25 RX ADMIN — HYDROCODONE BITARTRATE AND ACETAMINOPHEN 1 TABLET: 7.5; 325 TABLET ORAL at 21:19

## 2021-07-25 RX ADMIN — NIFEDIPINE 30 MG: 30 TABLET, FILM COATED, EXTENDED RELEASE ORAL at 21:18

## 2021-07-25 RX ADMIN — PANTOPRAZOLE SODIUM 40 MG: 40 TABLET, DELAYED RELEASE ORAL at 05:07

## 2021-07-25 RX ADMIN — SODIUM CHLORIDE, PRESERVATIVE FREE 10 ML: 5 INJECTION INTRAVENOUS at 21:19

## 2021-07-25 RX ADMIN — CYANOCOBALAMIN TAB 1000 MCG 1000 MCG: 1000 TAB at 09:18

## 2021-07-25 RX ADMIN — CEFTRIAXONE 2 G: 2 INJECTION, SOLUTION INTRAVENOUS at 05:08

## 2021-07-25 RX ADMIN — IMIPRAMINE HYDROCHLORIDE 100 MG: 25 TABLET ORAL at 21:18

## 2021-07-25 RX ADMIN — ASPIRIN 81 MG: 81 TABLET, COATED ORAL at 09:18

## 2021-07-25 RX ADMIN — HYDROCODONE BITARTRATE AND ACETAMINOPHEN 1 TABLET: 7.5; 325 TABLET ORAL at 05:07

## 2021-07-26 ENCOUNTER — READMISSION MANAGEMENT (OUTPATIENT)
Dept: CALL CENTER | Facility: HOSPITAL | Age: 81
End: 2021-07-26

## 2021-07-26 VITALS
DIASTOLIC BLOOD PRESSURE: 80 MMHG | RESPIRATION RATE: 16 BRPM | WEIGHT: 241 LBS | TEMPERATURE: 98.5 F | HEART RATE: 93 BPM | HEIGHT: 66 IN | BODY MASS INDEX: 38.73 KG/M2 | OXYGEN SATURATION: 95 % | SYSTOLIC BLOOD PRESSURE: 144 MMHG

## 2021-07-26 LAB
BACTERIA SPEC AEROBE CULT: ABNORMAL
GRAM STN SPEC: ABNORMAL
GRAM STN SPEC: ABNORMAL

## 2021-07-26 PROCEDURE — 25010000002 CEFEPIME-DEXTROSE 2-5 GM-%(50ML) RECONSTITUTED SOLUTION: Performed by: HOSPITALIST

## 2021-07-26 PROCEDURE — 94799 UNLISTED PULMONARY SVC/PX: CPT

## 2021-07-26 PROCEDURE — 99239 HOSP IP/OBS DSCHRG MGMT >30: CPT | Performed by: HOSPITALIST

## 2021-07-26 PROCEDURE — 25010000002 ENOXAPARIN PER 10 MG: Performed by: FAMILY MEDICINE

## 2021-07-26 PROCEDURE — C1751 CATH, INF, PER/CENT/MIDLINE: HCPCS

## 2021-07-26 PROCEDURE — 36410 VNPNXR 3YR/> PHY/QHP DX/THER: CPT

## 2021-07-26 RX ORDER — CEFEPIME HYDROCHLORIDE 2 G/50ML
2 INJECTION, SOLUTION INTRAVENOUS EVERY 12 HOURS SCHEDULED
Status: DISCONTINUED | OUTPATIENT
Start: 2021-07-26 | End: 2021-07-26 | Stop reason: HOSPADM

## 2021-07-26 RX ORDER — L.ACID,PARA/B.BIFIDUM/S.THERM 8B CELL
1 CAPSULE ORAL DAILY
Status: DISCONTINUED | OUTPATIENT
Start: 2021-07-26 | End: 2021-07-26 | Stop reason: HOSPADM

## 2021-07-26 RX ORDER — SODIUM CHLORIDE 0.9 % (FLUSH) 0.9 %
20 SYRINGE (ML) INJECTION AS NEEDED
Status: DISCONTINUED | OUTPATIENT
Start: 2021-07-26 | End: 2021-07-26 | Stop reason: HOSPADM

## 2021-07-26 RX ORDER — SODIUM CHLORIDE 0.9 % (FLUSH) 0.9 %
10 SYRINGE (ML) INJECTION EVERY 12 HOURS SCHEDULED
Status: DISCONTINUED | OUTPATIENT
Start: 2021-07-26 | End: 2021-07-26 | Stop reason: HOSPADM

## 2021-07-26 RX ORDER — CEFEPIME HYDROCHLORIDE 2 G/50ML
2 INJECTION, SOLUTION INTRAVENOUS EVERY 12 HOURS SCHEDULED
Qty: 600 ML | Refills: 0
Start: 2021-07-26 | End: 2021-08-01

## 2021-07-26 RX ORDER — L.ACID,PARA/B.BIFIDUM/S.THERM 8B CELL
1 CAPSULE ORAL DAILY
Qty: 29 CAPSULE | Refills: 0 | Status: SHIPPED | OUTPATIENT
Start: 2021-07-27 | End: 2021-08-25

## 2021-07-26 RX ORDER — SODIUM CHLORIDE 0.9 % (FLUSH) 0.9 %
10 SYRINGE (ML) INJECTION AS NEEDED
Status: DISCONTINUED | OUTPATIENT
Start: 2021-07-26 | End: 2021-07-26 | Stop reason: HOSPADM

## 2021-07-26 RX ADMIN — ASPIRIN 81 MG: 81 TABLET, COATED ORAL at 08:20

## 2021-07-26 RX ADMIN — Medication 1 CAPSULE: at 11:20

## 2021-07-26 RX ADMIN — CEFEPIME HYDROCHLORIDE 2 G: 2 INJECTION, SOLUTION INTRAVENOUS at 19:16

## 2021-07-26 RX ADMIN — Medication 150 MG: at 08:20

## 2021-07-26 RX ADMIN — CYANOCOBALAMIN TAB 1000 MCG 1000 MCG: 1000 TAB at 08:20

## 2021-07-26 RX ADMIN — ENOXAPARIN SODIUM 40 MG: 40 INJECTION SUBCUTANEOUS at 08:20

## 2021-07-26 RX ADMIN — CEFEPIME HYDROCHLORIDE 2 G: 2 INJECTION, SOLUTION INTRAVENOUS at 06:39

## 2021-07-26 RX ADMIN — Medication 1 TABLET: at 08:20

## 2021-07-26 RX ADMIN — PANTOPRAZOLE SODIUM 40 MG: 40 TABLET, DELAYED RELEASE ORAL at 06:39

## 2021-07-27 ENCOUNTER — HOSPITAL ENCOUNTER (OUTPATIENT)
Dept: INFUSION THERAPY | Facility: HOSPITAL | Age: 81
Discharge: HOME | End: 2021-07-27

## 2021-07-27 ENCOUNTER — HOSPITAL ENCOUNTER (OUTPATIENT)
Dept: INFUSION THERAPY | Facility: HOSPITAL | Age: 81
Discharge: HOME | End: 2021-07-27
Admitting: HOSPITALIST

## 2021-07-27 ENCOUNTER — TRANSITIONAL CARE MANAGEMENT TELEPHONE ENCOUNTER (OUTPATIENT)
Dept: CALL CENTER | Facility: HOSPITAL | Age: 81
End: 2021-07-27

## 2021-07-27 VITALS
TEMPERATURE: 98.4 F | OXYGEN SATURATION: 96 % | HEART RATE: 100 BPM | RESPIRATION RATE: 20 BRPM | SYSTOLIC BLOOD PRESSURE: 154 MMHG | DIASTOLIC BLOOD PRESSURE: 59 MMHG

## 2021-07-27 VITALS
RESPIRATION RATE: 16 BRPM | DIASTOLIC BLOOD PRESSURE: 76 MMHG | WEIGHT: 240.3 LBS | HEART RATE: 80 BPM | SYSTOLIC BLOOD PRESSURE: 143 MMHG | BODY MASS INDEX: 38.79 KG/M2 | TEMPERATURE: 97.3 F | OXYGEN SATURATION: 96 %

## 2021-07-27 DIAGNOSIS — B96.89 URINARY TRACT INFECTION DUE TO ESBL KLEBSIELLA: ICD-10-CM

## 2021-07-27 DIAGNOSIS — N39.0 URINARY TRACT INFECTION DUE TO ESBL KLEBSIELLA: ICD-10-CM

## 2021-07-27 DIAGNOSIS — N39.0 ACUTE UTI (URINARY TRACT INFECTION): ICD-10-CM

## 2021-07-27 DIAGNOSIS — I10 ESSENTIAL HYPERTENSION: ICD-10-CM

## 2021-07-27 DIAGNOSIS — A41.50 SEPSIS DUE TO GRAM NEGATIVE BACTERIA (HCC): Primary | ICD-10-CM

## 2021-07-27 PROCEDURE — 96365 THER/PROPH/DIAG IV INF INIT: CPT

## 2021-07-27 PROCEDURE — 25010000002 CEFEPIME-DEXTROSE 2-5 GM-%(50ML) RECONSTITUTED SOLUTION: Performed by: HOSPITALIST

## 2021-07-27 RX ORDER — CEFEPIME HYDROCHLORIDE 2 G/50ML
2 INJECTION, SOLUTION INTRAVENOUS ONCE
Status: COMPLETED | OUTPATIENT
Start: 2021-07-27 | End: 2021-07-27

## 2021-07-27 RX ORDER — NIFEDIPINE 30 MG/1
30 TABLET, EXTENDED RELEASE ORAL DAILY
Qty: 30 TABLET | Refills: 1 | Status: SHIPPED | OUTPATIENT
Start: 2021-07-27 | End: 2021-08-09

## 2021-07-27 RX ORDER — CEFEPIME HYDROCHLORIDE 2 G/50ML
2 INJECTION, SOLUTION INTRAVENOUS ONCE
Status: CANCELLED | OUTPATIENT
Start: 2021-07-27

## 2021-07-27 RX ADMIN — CEFEPIME HYDROCHLORIDE 2 G: 2 INJECTION, SOLUTION INTRAVENOUS at 20:17

## 2021-07-27 RX ADMIN — CEFEPIME HYDROCHLORIDE 2 G: 2 INJECTION, SOLUTION INTRAVENOUS at 08:39

## 2021-07-27 NOTE — PATIENT INSTRUCTIONS
Call  Dr. Kevon Clark, [facility] at [phone] Cefepime Injection  What is this medicine?  CEFEPIME (SEF e pim) is a cephalosporin antibiotic. It treats some infections caused by bacteria. It will not work for colds, the flu, or other viruses.  This medicine may be used for other purposes; ask your health care provider or pharmacist if you have questions.  COMMON BRAND NAME(S): Maxipime  What should I tell my health care provider before I take this medicine?  They need to know if you have any of these conditions:  · bleeding problems  · kidney disease  · stomach, intestinal problems like colitis  · an unusual or allergic reaction to cefepime, other cephalosporin or penicillin antibiotics, imipenem, other foods, dyes or preservatives  · pregnant or trying to get pregnant  · breast-feeding  How should I use this medicine?  This drug is injected into a muscle or a vein. It is usually given by a health care provider in a hospital or clinic setting.  If you get this drug at home, you will be taught how to prepare and give it. Use exactly as directed. Take it as directed on the prescription label at the same time every day. Keep taking it unless your health care provider tells you to stop.  It is important that you put your used needles and syringes in a special sharps container. Do not put them in a trash can. If you do not have a sharps container, call your pharmacist or health care provider to get one.  Talk to your health care provider about the use of this drug in children. While it may be prescribed for children as young as 2 months for selected conditions, precautions do apply.  Overdosage: If you think you have taken too much of this medicine contact a poison control center or emergency room at once.  NOTE: This medicine is only for you. Do not share this medicine with others.  What if I miss a dose?  It is important not to miss your dose. Call your health care provider if you are unable to  keep an appointment. If you give yourself this drug at home and you miss a dose, take it as soon as you can. If it is almost time for your next dose, take only that dose. Do not take double or extra doses.  What may interact with this medicine?  This medicine may interact with the following medications:  · birth control pills  · certain medicines for infection like amikacin, gentamicin, tobramycin  · diuretics  This list may not describe all possible interactions. Give your health care provider a list of all the medicines, herbs, non-prescription drugs, or dietary supplements you use. Also tell them if you smoke, drink alcohol, or use illegal drugs. Some items may interact with your medicine.  What should I watch for while using this medicine?  Tell your doctor or health care provider if your symptoms do not begin to improve or if you get new symptoms. Your doctor will monitor your condition and blood work as needed.  This medicine may cause serious skin reactions. They can happen weeks to months after starting the medicine. Contact your health care provider right away if you notice fevers or flu-like symptoms with a rash. The rash may be red or purple and then turn into blisters or peeling of the skin. Or, you might notice a red rash with swelling of the face, lips or lymph nodes in your neck or under your arms.  Do not treat diarrhea with over-the-counter products. Contact your doctor if you have diarrhea that lasts more than 2 days or if the diarrhea is severe and watery.  This medicine can interfere with some urine glucose tests. If you use such tests, talk with your health care provider.  What side effects may I notice from receiving this medicine?  Side effects that you should report to your doctor or health care professional as soon as possible:  · allergic reactions like skin rash, itching or hives, swelling of the face, lips, or tongue  · confusion, hallucinations  · difficulty breathing,  "wheezing  · fever  · pain or difficulty passing urine  · redness, blistering, peeling or loosening of the skin, including inside the mouth  · seizures  · stiff, rigid muscles  · unusual bleeding, bruising  · unusually weak or tired  Side effects that usually do not require medical attention (report to your doctor or health care professional if they continue or are bothersome):  · diarrhea  · headache  · mouth sores, irritation  · nausea, vomiting  · pain, swelling and irritation at the injection site  This list may not describe all possible side effects. Call your doctor for medical advice about side effects. You may report side effects to FDA at 7-154-MWN-4411.  Where should I keep my medicine?  Keep out of the reach of children and pets.  You will be instructed on how to store this drug. Protect from light. Throw away any unused drug after the expiration date.  NOTE: This sheet is a summary. It may not cover all possible information. If you have questions about this medicine, talk to your doctor, pharmacist, or health care provider.  © 2021 [employer]vier/Gold Standard (2020-07-23 12:17:50)   if you have any problems or concerns.    We know you have a Choice in healthcare and appreciate you using [facility].  Our purpose is to provide you \"Excellent Care\".  We hope that you will always choose us in the future and continue to recommend us to your family and friends.              "

## 2021-07-27 NOTE — NURSING NOTE
NURSING PROGRESS NOTE:    PATIENT ARRIVE TO ACC PER W/C FOR SCHEDULED INFUSION AT 0825 . RIGHT UPPER ARM 3FR SINGLE MIDLINE CATHETER IN PLACE.  BIOPATCH IS BLOODY, WILL CHANGE DRESSING AFTER INFSION COMPLETE.   PROCEDURE WAS PERFORMED WITHOUT INCIDENT. AVS REVIEWED PRIOR TO DISCHARGE.  PATIENT DISCHARGED HOME AT 0920 WITH FAMILY . CONNIE DE LEÓN

## 2021-07-27 NOTE — OUTREACH NOTE
Prep Survey      Responses   [facility] facility patient discharged from?  [place]   Is LACE score < 7 ?  No   Emergency Room discharge w/ pulse ox?  No   Eligibility  [facility]   Hospital  [place]   Date of Admission  07/23/21   Date of Discharge  07/26/21   Discharge Disposition  Home or Self Care   Discharge diagnosis  sepsis d/t UTI, dehydration, uteroscopy & stent replacement prior to admit   Does the patient have one of the following disease processes/diagnoses(primary or secondary)?  Sepsis   Does the patient have Home health ordered?  No   Is there a DME ordered?  No   Prep survey completed?  Yes          Paty Ndiaye RN

## 2021-07-27 NOTE — OUTREACH NOTE
Call Center TCM Note      Responses   [facility] patient discharged from?  [place]   Does the patient have one of the following disease processes/diagnoses(primary or secondary)?  Sepsis   TCM attempt successful?  Yes   Call start time  1429   Call end time  1441   Discharge diagnosis  sepsis d/t UTI, dehydration, uteroscopy & stent replacement prior to admit   Medication alerts for this patient  [facility] for IV antibiotics   Meds reviewed with patient/caregiver?  Yes   Is the patient having any side effects they believe may be caused by any medication additions or changes?  No   Does the patient have all medications related to this admission filled (includes all antibiotics, inhalers, nebulizers,steroids,etc.)  Yes   Is the patient taking all medications as directed (includes completed medication regime)?  Yes   Does the patient have an appointment with their PCP within 7 days of discharge?  Greater than 7 days   Nursing Interventions  Verified appointment date/time/provider [[facility] D/C follow-up scheduled for 8/9/21@1030am    ]   Has home health visited the patient within 72 hours of discharge?  N/A   Psychosocial issues?  No   Did the patient receive a copy of their discharge instructions?  No   Nursing interventions  Reviewed instructions with patient   What is the patient's perception of their health status since discharge?  Improving [Pt communicates she is still urinating often w/o hematuria nor discomfort.  She will have stent removed on 7/29/21.  Rev'd worsening s/s of infection and enc'd to notify MD. ]   Nursing interventions  Nurse provided patient education   Is the patient/caregiver able to teach back Sepsis?  S - Shivering,fever or very cold, E - Extreme pain or generalized discomfort (worst ever,especially abdomen), S - Sleepy, difficult to arouse,confused, S - Short of breath   Nursing interventions  Nurse provided reassurance to  patient, Nurse provided patient education   Is patient/caregiver able to teach back steps to recovery at home?  Rest and regain strength, Set small, achievable goals for return to baseline health   Is the patient/caregiver able to teach back signs and symptoms of worsening condition:  Fever, Shortness of breath/rapid respiratory rate, Altered mental status(confusion/coma)   If the patient is a current smoker, are they able to teach back resources for cessation?  Not a smoker   Is the patient/caregiver able to teach back the hierarchy of who to call/visit for symptoms/problems? PCP, Specialist, Home health nurse, Urgent Care, ED, 911  Yes   Additional teach back comments  Pt very optimistic regarding effectiveness of IV antibiotics and plans on compliance with regimen. [Pt tolerating po intake, no fever or chills or n/v. ]   TCM call completed?  Yes          Macrina Fam RN    7/27/2021, 14:42 EDT

## 2021-07-28 ENCOUNTER — HOSPITAL ENCOUNTER (OUTPATIENT)
Dept: INFUSION THERAPY | Facility: HOSPITAL | Age: 81
Discharge: HOME | End: 2021-07-28

## 2021-07-28 VITALS
HEART RATE: 82 BPM | TEMPERATURE: 97.4 F | RESPIRATION RATE: 16 BRPM | DIASTOLIC BLOOD PRESSURE: 77 MMHG | OXYGEN SATURATION: 95 % | SYSTOLIC BLOOD PRESSURE: 145 MMHG

## 2021-07-28 VITALS
WEIGHT: 241.5 LBS | HEIGHT: 66 IN | TEMPERATURE: 98.8 F | RESPIRATION RATE: 16 BRPM | DIASTOLIC BLOOD PRESSURE: 77 MMHG | BODY MASS INDEX: 38.81 KG/M2 | HEART RATE: 92 BPM | SYSTOLIC BLOOD PRESSURE: 146 MMHG | OXYGEN SATURATION: 96 %

## 2021-07-28 DIAGNOSIS — N39.0 URINARY TRACT INFECTION DUE TO ESBL KLEBSIELLA: ICD-10-CM

## 2021-07-28 DIAGNOSIS — A41.50 SEPSIS DUE TO GRAM NEGATIVE BACTERIA (HCC): ICD-10-CM

## 2021-07-28 DIAGNOSIS — N39.0 ACUTE UTI (URINARY TRACT INFECTION): Primary | ICD-10-CM

## 2021-07-28 DIAGNOSIS — N39.0 ACUTE UTI (URINARY TRACT INFECTION): ICD-10-CM

## 2021-07-28 DIAGNOSIS — B96.89 URINARY TRACT INFECTION DUE TO ESBL KLEBSIELLA: Primary | ICD-10-CM

## 2021-07-28 DIAGNOSIS — B96.89 URINARY TRACT INFECTION DUE TO ESBL KLEBSIELLA: ICD-10-CM

## 2021-07-28 DIAGNOSIS — N39.0 URINARY TRACT INFECTION DUE TO ESBL KLEBSIELLA: Primary | ICD-10-CM

## 2021-07-28 LAB — BACTERIA SPEC AEROBE CULT: NORMAL

## 2021-07-28 PROCEDURE — 25010000002 CEFEPIME-DEXTROSE 2-5 GM-%(50ML) RECONSTITUTED SOLUTION: Performed by: HOSPITALIST

## 2021-07-28 PROCEDURE — 96365 THER/PROPH/DIAG IV INF INIT: CPT

## 2021-07-28 RX ORDER — CEFEPIME HYDROCHLORIDE 2 G/50ML
2 INJECTION, SOLUTION INTRAVENOUS ONCE
Status: CANCELLED | OUTPATIENT
Start: 2021-07-28

## 2021-07-28 RX ORDER — CEFEPIME HYDROCHLORIDE 2 G/50ML
2 INJECTION, SOLUTION INTRAVENOUS ONCE
Status: COMPLETED | OUTPATIENT
Start: 2021-07-28 | End: 2021-07-28

## 2021-07-28 RX ADMIN — CEFEPIME HYDROCHLORIDE 2 G: 2 INJECTION, SOLUTION INTRAVENOUS at 08:30

## 2021-07-28 RX ADMIN — CEFEPIME HYDROCHLORIDE 2 G: 2 INJECTION, SOLUTION INTRAVENOUS at 19:56

## 2021-07-28 NOTE — NURSING NOTE
Patient arrived to med surg for [facility] infusion @ 1945, placed in room 1402. Vitals obtained, was not able to release orders, called registration & informed that she needs new encounter with new armband, able to release orders after new encounter & armband. Infusion completed without issues, vitals taken & better than earlier but patient was anxious when she arrived. Offered but did not want AVS. Discharged home with spouse in wheelchair via private auto @ 2035.

## 2021-07-29 ENCOUNTER — HOSPITAL ENCOUNTER (OUTPATIENT)
Dept: INFUSION THERAPY | Facility: HOSPITAL | Age: 81
Discharge: HOME | End: 2021-07-29

## 2021-07-29 VITALS
DIASTOLIC BLOOD PRESSURE: 73 MMHG | BODY MASS INDEX: 38.73 KG/M2 | OXYGEN SATURATION: 98 % | HEART RATE: 89 BPM | WEIGHT: 241 LBS | SYSTOLIC BLOOD PRESSURE: 153 MMHG | HEIGHT: 66 IN | RESPIRATION RATE: 16 BRPM | TEMPERATURE: 98.3 F

## 2021-07-29 VITALS
SYSTOLIC BLOOD PRESSURE: 143 MMHG | RESPIRATION RATE: 16 BRPM | HEART RATE: 96 BPM | WEIGHT: 242.51 LBS | BODY MASS INDEX: 39.14 KG/M2 | DIASTOLIC BLOOD PRESSURE: 84 MMHG | TEMPERATURE: 98.4 F | OXYGEN SATURATION: 97 %

## 2021-07-29 DIAGNOSIS — N39.0 URINARY TRACT INFECTION DUE TO ESBL KLEBSIELLA: Primary | ICD-10-CM

## 2021-07-29 DIAGNOSIS — N39.0 ACUTE UTI (URINARY TRACT INFECTION): ICD-10-CM

## 2021-07-29 DIAGNOSIS — A41.50 SEPSIS DUE TO GRAM NEGATIVE BACTERIA (HCC): ICD-10-CM

## 2021-07-29 DIAGNOSIS — B96.89 URINARY TRACT INFECTION DUE TO ESBL KLEBSIELLA: Primary | ICD-10-CM

## 2021-07-29 PROCEDURE — 96365 THER/PROPH/DIAG IV INF INIT: CPT

## 2021-07-29 PROCEDURE — 96374 THER/PROPH/DIAG INJ IV PUSH: CPT

## 2021-07-29 PROCEDURE — 25010000002 CEFEPIME-DEXTROSE 2-5 GM-%(50ML) RECONSTITUTED SOLUTION: Performed by: HOSPITALIST

## 2021-07-29 RX ORDER — CEFEPIME HYDROCHLORIDE 2 G/50ML
2 INJECTION, SOLUTION INTRAVENOUS ONCE
Status: CANCELLED | OUTPATIENT
Start: 2021-07-29

## 2021-07-29 RX ORDER — CEFEPIME HYDROCHLORIDE 2 G/50ML
2 INJECTION, SOLUTION INTRAVENOUS ONCE
Status: COMPLETED | OUTPATIENT
Start: 2021-07-29 | End: 2021-07-29

## 2021-07-29 RX ORDER — ASPIRIN 81 MG/1
81 TABLET ORAL DAILY
COMMUNITY
End: 2021-11-29

## 2021-07-29 RX ADMIN — CEFEPIME HYDROCHLORIDE 2 G: 2 INJECTION, SOLUTION INTRAVENOUS at 08:29

## 2021-07-29 RX ADMIN — CEFEPIME HYDROCHLORIDE 2 G: 2 INJECTION, SOLUTION INTRAVENOUS at 19:59

## 2021-07-30 ENCOUNTER — HOSPITAL ENCOUNTER (OUTPATIENT)
Dept: INFUSION THERAPY | Facility: HOSPITAL | Age: 81
Discharge: HOME | End: 2021-07-30

## 2021-07-30 VITALS
SYSTOLIC BLOOD PRESSURE: 153 MMHG | OXYGEN SATURATION: 95 % | RESPIRATION RATE: 16 BRPM | TEMPERATURE: 97.9 F | HEART RATE: 82 BPM | DIASTOLIC BLOOD PRESSURE: 84 MMHG

## 2021-07-30 DIAGNOSIS — A41.50 SEPSIS DUE TO GRAM NEGATIVE BACTERIA (HCC): ICD-10-CM

## 2021-07-30 DIAGNOSIS — N39.0 ACUTE UTI (URINARY TRACT INFECTION): ICD-10-CM

## 2021-07-30 DIAGNOSIS — N39.0 URINARY TRACT INFECTION DUE TO ESBL KLEBSIELLA: Primary | ICD-10-CM

## 2021-07-30 DIAGNOSIS — B96.89 URINARY TRACT INFECTION DUE TO ESBL KLEBSIELLA: Primary | ICD-10-CM

## 2021-07-30 PROCEDURE — 96365 THER/PROPH/DIAG IV INF INIT: CPT

## 2021-07-30 PROCEDURE — 25010000002 CEFEPIME-DEXTROSE 2-5 GM-%(50ML) RECONSTITUTED SOLUTION: Performed by: HOSPITALIST

## 2021-07-30 PROCEDURE — 25010000002 CEFEPIME-DEXTROSE 2-5 GM-%(50ML) RECONSTITUTED SOLUTION

## 2021-07-30 RX ORDER — CEFEPIME HYDROCHLORIDE 2 G/50ML
2 INJECTION, SOLUTION INTRAVENOUS ONCE
Status: CANCELLED | OUTPATIENT
Start: 2021-07-30

## 2021-07-30 RX ORDER — CEFEPIME HYDROCHLORIDE 2 G/50ML
INJECTION, SOLUTION INTRAVENOUS
Status: COMPLETED
Start: 2021-07-30 | End: 2021-07-30

## 2021-07-30 RX ORDER — CEFEPIME HYDROCHLORIDE 2 G/50ML
2 INJECTION, SOLUTION INTRAVENOUS ONCE
Status: COMPLETED | OUTPATIENT
Start: 2021-07-30 | End: 2021-07-30

## 2021-07-30 RX ADMIN — CEFEPIME HYDROCHLORIDE 2 G: 2 INJECTION, SOLUTION INTRAVENOUS at 20:15

## 2021-07-30 RX ADMIN — CEFEPIME HYDROCHLORIDE 2 G: 2 INJECTION, SOLUTION INTRAVENOUS at 08:41

## 2021-07-31 ENCOUNTER — HOSPITAL ENCOUNTER (OUTPATIENT)
Dept: INFUSION THERAPY | Facility: HOSPITAL | Age: 81
Discharge: HOME | End: 2021-07-31

## 2021-07-31 VITALS
HEART RATE: 84 BPM | SYSTOLIC BLOOD PRESSURE: 150 MMHG | RESPIRATION RATE: 18 BRPM | DIASTOLIC BLOOD PRESSURE: 76 MMHG | OXYGEN SATURATION: 96 % | TEMPERATURE: 99.6 F

## 2021-07-31 VITALS — TEMPERATURE: 99.4 F | DIASTOLIC BLOOD PRESSURE: 78 MMHG | HEART RATE: 84 BPM | SYSTOLIC BLOOD PRESSURE: 132 MMHG

## 2021-07-31 VITALS
SYSTOLIC BLOOD PRESSURE: 137 MMHG | RESPIRATION RATE: 16 BRPM | DIASTOLIC BLOOD PRESSURE: 84 MMHG | TEMPERATURE: 98.3 F | OXYGEN SATURATION: 94 % | HEART RATE: 82 BPM

## 2021-07-31 DIAGNOSIS — A41.50 SEPSIS DUE TO GRAM NEGATIVE BACTERIA (HCC): ICD-10-CM

## 2021-07-31 DIAGNOSIS — B96.89 URINARY TRACT INFECTION DUE TO ESBL KLEBSIELLA: Primary | ICD-10-CM

## 2021-07-31 DIAGNOSIS — N39.0 URINARY TRACT INFECTION DUE TO ESBL KLEBSIELLA: Primary | ICD-10-CM

## 2021-07-31 DIAGNOSIS — N39.0 ACUTE UTI (URINARY TRACT INFECTION): ICD-10-CM

## 2021-07-31 PROCEDURE — 96365 THER/PROPH/DIAG IV INF INIT: CPT

## 2021-07-31 PROCEDURE — 25010000002 CEFEPIME-DEXTROSE 2-5 GM-%(50ML) RECONSTITUTED SOLUTION

## 2021-07-31 PROCEDURE — 25010000002 CEFEPIME-DEXTROSE 2-5 GM-%(50ML) RECONSTITUTED SOLUTION: Performed by: HOSPITALIST

## 2021-07-31 RX ORDER — CEFEPIME HYDROCHLORIDE 2 G/50ML
2 INJECTION, SOLUTION INTRAVENOUS ONCE
Status: CANCELLED | OUTPATIENT
Start: 2021-07-31

## 2021-07-31 RX ORDER — CEFEPIME HYDROCHLORIDE 2 G/50ML
2 INJECTION, SOLUTION INTRAVENOUS ONCE
Status: COMPLETED | OUTPATIENT
Start: 2021-07-31 | End: 2021-07-31

## 2021-07-31 RX ORDER — CEFEPIME HYDROCHLORIDE 2 G/50ML
INJECTION, SOLUTION INTRAVENOUS
Status: COMPLETED
Start: 2021-07-31 | End: 2021-07-31

## 2021-07-31 RX ORDER — CEFEPIME HYDROCHLORIDE 2 G/50ML
2 INJECTION, SOLUTION INTRAVENOUS ONCE
Status: DISCONTINUED | OUTPATIENT
Start: 2021-07-31 | End: 2021-08-02 | Stop reason: HOSPADM

## 2021-07-31 RX ADMIN — CEFEPIME HYDROCHLORIDE 2 G: 2 INJECTION, SOLUTION INTRAVENOUS at 08:24

## 2021-07-31 RX ADMIN — CEFEPIME HYDROCHLORIDE 2 G: 2 INJECTION, SOLUTION INTRAVENOUS at 19:59

## 2021-08-01 ENCOUNTER — HOSPITAL ENCOUNTER (OUTPATIENT)
Dept: INFUSION THERAPY | Facility: HOSPITAL | Age: 81
Discharge: HOME | End: 2021-08-01
Admitting: HOSPITALIST

## 2021-08-01 VITALS
RESPIRATION RATE: 18 BRPM | SYSTOLIC BLOOD PRESSURE: 158 MMHG | DIASTOLIC BLOOD PRESSURE: 81 MMHG | TEMPERATURE: 98.5 F | HEART RATE: 86 BPM | OXYGEN SATURATION: 95 %

## 2021-08-01 DIAGNOSIS — N39.0 URINARY TRACT INFECTION DUE TO ESBL KLEBSIELLA: Primary | ICD-10-CM

## 2021-08-01 DIAGNOSIS — A41.50 SEPSIS DUE TO GRAM NEGATIVE BACTERIA (HCC): ICD-10-CM

## 2021-08-01 DIAGNOSIS — B96.89 URINARY TRACT INFECTION DUE TO ESBL KLEBSIELLA: Primary | ICD-10-CM

## 2021-08-01 DIAGNOSIS — N39.0 ACUTE UTI (URINARY TRACT INFECTION): ICD-10-CM

## 2021-08-01 PROCEDURE — 25010000002 CEFEPIME-DEXTROSE 2-5 GM-%(50ML) RECONSTITUTED SOLUTION: Performed by: HOSPITALIST

## 2021-08-01 PROCEDURE — 96365 THER/PROPH/DIAG IV INF INIT: CPT

## 2021-08-01 RX ORDER — CEFEPIME HYDROCHLORIDE 2 G/50ML
2 INJECTION, SOLUTION INTRAVENOUS ONCE
Status: COMPLETED | OUTPATIENT
Start: 2021-08-01 | End: 2021-08-01

## 2021-08-01 RX ORDER — CEFEPIME HYDROCHLORIDE 2 G/50ML
2 INJECTION, SOLUTION INTRAVENOUS ONCE
Status: CANCELLED | OUTPATIENT
Start: 2021-08-01

## 2021-08-01 RX ADMIN — CEFEPIME HYDROCHLORIDE 2 G: 2 INJECTION, SOLUTION INTRAVENOUS at 08:36

## 2021-08-01 NOTE — NURSING NOTE
Pt arrives per wheelchair accompanied by  for final Cefepime infusion.  Given without any issues.  After infusion the Right cephalic midline was removed. Dry sterile dressing was applied. Discharged home.  Accompanied by  to lobby.

## 2021-08-01 NOTE — NURSING NOTE
Pt arrives for outpatient infusion.  Right AC PICC flushes easily. Infusion Cefepime 2GM/50ml started as ordered.  with her.

## 2021-08-02 LAB
CALCIUM OXALATE DIHYDRATE MFR STONE IR: 100 %
COLOR STONE: NORMAL
COMPN STONE: NORMAL
LABORATORY COMMENT REPORT: NORMAL
Lab: NORMAL
Lab: NORMAL
PHOTO: NORMAL
SIZE STONE: NORMAL MM
SPEC SOURCE SUBJ: NORMAL
WT STONE: 6 MG

## 2021-08-04 ENCOUNTER — TRANSCRIBE ORDERS (OUTPATIENT)
Dept: ADMINISTRATIVE | Facility: HOSPITAL | Age: 81
End: 2021-08-04

## 2021-08-04 DIAGNOSIS — N20.0 KIDNEY STONE: Primary | ICD-10-CM

## 2021-08-06 ENCOUNTER — READMISSION MANAGEMENT (OUTPATIENT)
Dept: CALL CENTER | Facility: HOSPITAL | Age: 81
End: 2021-08-06

## 2021-08-06 NOTE — OUTREACH NOTE
Sepsis Week 2 Survey      Responses   [facility] [place] patient discharged from?  [place]   Does the patient have one of the following disease processes/diagnoses(primary or secondary)?  Sepsis   Week 2 attempt successful?  No   Unsuccessful attempts  Attempt 1          Teressa Carlisle RN

## 2021-08-09 ENCOUNTER — PATIENT MESSAGE (OUTPATIENT)
Dept: INTERNAL MEDICINE | Facility: CLINIC | Age: 81
End: 2021-08-09

## 2021-08-09 ENCOUNTER — OFFICE VISIT (OUTPATIENT)
Dept: INTERNAL MEDICINE | Facility: CLINIC | Age: 81
End: 2021-08-09

## 2021-08-09 VITALS
SYSTOLIC BLOOD PRESSURE: 138 MMHG | TEMPERATURE: 96.6 F | HEART RATE: 87 BPM | OXYGEN SATURATION: 98 % | DIASTOLIC BLOOD PRESSURE: 76 MMHG | BODY MASS INDEX: 38.25 KG/M2 | HEIGHT: 66 IN | RESPIRATION RATE: 16 BRPM | WEIGHT: 238 LBS

## 2021-08-09 DIAGNOSIS — R65.20 SEPSIS WITH ACUTE RENAL FAILURE WITHOUT SEPTIC SHOCK, DUE TO UNSPECIFIED ORGANISM, UNSPECIFIED ACUTE RENAL FAILURE TYPE (HCC): Primary | ICD-10-CM

## 2021-08-09 DIAGNOSIS — N39.0 RECURRENT UTI: ICD-10-CM

## 2021-08-09 DIAGNOSIS — A41.9 SEPSIS WITH ACUTE RENAL FAILURE WITHOUT SEPTIC SHOCK, DUE TO UNSPECIFIED ORGANISM, UNSPECIFIED ACUTE RENAL FAILURE TYPE (HCC): Primary | ICD-10-CM

## 2021-08-09 DIAGNOSIS — F41.9 ANXIETY: ICD-10-CM

## 2021-08-09 DIAGNOSIS — E55.9 VITAMIN D DEFICIENCY: ICD-10-CM

## 2021-08-09 DIAGNOSIS — N17.9 SEPSIS WITH ACUTE RENAL FAILURE WITHOUT SEPTIC SHOCK, DUE TO UNSPECIFIED ORGANISM, UNSPECIFIED ACUTE RENAL FAILURE TYPE (HCC): Primary | ICD-10-CM

## 2021-08-09 DIAGNOSIS — I10 ESSENTIAL HYPERTENSION: ICD-10-CM

## 2021-08-09 DIAGNOSIS — D64.9 ANEMIA, UNSPECIFIED TYPE: ICD-10-CM

## 2021-08-09 LAB
BILIRUB BLD-MCNC: NEGATIVE MG/DL
CLARITY, POC: CLEAR
COLOR UR: YELLOW
GLUCOSE UR STRIP-MCNC: NEGATIVE MG/DL
KETONES UR QL: NEGATIVE
LEUKOCYTE EST, POC: ABNORMAL
NITRITE UR-MCNC: NEGATIVE MG/ML
PH UR: 6.5 [PH] (ref 5–8)
PROT UR STRIP-MCNC: NEGATIVE MG/DL
RBC # UR STRIP: NEGATIVE /UL
SP GR UR: 1.02 (ref 1–1.03)
UROBILINOGEN UR QL: NORMAL

## 2021-08-09 PROCEDURE — 99495 TRANSJ CARE MGMT MOD F2F 14D: CPT | Performed by: INTERNAL MEDICINE

## 2021-08-09 PROCEDURE — 1111F DSCHRG MED/CURRENT MED MERGE: CPT | Performed by: INTERNAL MEDICINE

## 2021-08-09 PROCEDURE — 81003 URINALYSIS AUTO W/O SCOPE: CPT | Performed by: INTERNAL MEDICINE

## 2021-08-09 RX ORDER — CITALOPRAM 10 MG/1
10 TABLET ORAL DAILY
Qty: 30 TABLET | Refills: 1 | Status: SHIPPED | OUTPATIENT
Start: 2021-08-09 | End: 2021-08-27 | Stop reason: SDUPTHER

## 2021-08-09 RX ORDER — NIFEDIPINE 60 MG/1
60 TABLET, FILM COATED, EXTENDED RELEASE ORAL DAILY
Qty: 90 TABLET | Refills: 1 | Status: SHIPPED | OUTPATIENT
Start: 2021-08-09 | End: 2022-01-11

## 2021-08-09 RX ORDER — IMIPRAMINE PAMOATE 100 MG/1
100 CAPSULE ORAL NIGHTLY
COMMUNITY
End: 2021-09-10 | Stop reason: DRUGHIGH

## 2021-08-09 NOTE — ASSESSMENT & PLAN NOTE
UNCONTROLLED  - BP in office today at upper limit of acceptable range, but all of pt's home values are >160/80 and pt is having symptoms intermittently  - ACEi stopped in hosp related to renal concerns  - will increase nifedipine from 30 mg to 60 mg for improved control  - Cont checking BP at home daily, call if values trend outside of goal range

## 2021-08-09 NOTE — ASSESSMENT & PLAN NOTE
UNCONTROLLED  - will start pt on low dose celexa for anxiety management--> Rx sent today  - Discussed potential side effects of this medication with patient including insomnia, changes in sexual performance, weight and appetite changes, fatigue, nausea, dizziness. Reviewed that medication will likely not be fully beneficial for 3-4 weeks, so encouraged pt to give  medication a full month before making decision to change or stop med.

## 2021-08-09 NOTE — ASSESSMENT & PLAN NOTE
RESOLVED  - pt has completed outpatient cefepime infusions and states she feels back to her baseline from an infection standpoint  - has follow up with urology planned in coming weeks, but notes she is not certain there is a good plan moving forward to prevent re-admission since she has been admitted so many times in past couple months related to stones and urosepsis  - will refer on to  urology for second opinion regarding her stone disease and any other preventative options that might be available to her

## 2021-08-09 NOTE — PROGRESS NOTES
"Transitional Care Follow Up Visit    .  Chief Complaint   Patient presents with   • Hospital Follow Up Visit   • Blood Infection   • Urinary Tract Infection   • Flank Pain       Subjective     Anitra Orta is a 81 y.o. female who presents for a transitional care management visit.    DSC Date: 7/23/2021  DSC Diagnosis: 7/26/2021    Within 48 business hours after discharge our office contacted her via telephone to coordinate her care and needs.      I reviewed and discussed the details of that call along with the discharge summary, hospital problems, inpatient lab results, inpatient diagnostic studies, and consultation reports with Anitra.     Current outpatient and discharge medications have been reconciled for the patient.  Reviewed by: Margot Chavez MD      Date of TCM Phone Call 7/26/2021   Hospital [place]   Date of Admission 7/23/2021   Date of Discharge 7/26/2021   Discharge Disposition Home or Self Care     Risk for Readmission (LACE) Score: 10 (7/26/2021  6:01 AM)    History of Present Illness   Course During Hospital Stay:  Pt has had numerous recent admissions for Urosepsis secondary to ureteral stones. Cultures had been showing E.Coli with stent removal and replacement at her admission at the beginning of July, then had similar admission recently at the end of the month for similar complaints. Initially it was felt her urine culture was actually a sign of resolving previous infection, but blood cultures actually showed 1/2 growth for Enterobacter, so she was started on cefepime and has completed outpatient IV infusion therapy. Concern by patient and  at this time is that they have been told the stones she has are \"not dissolvable\" so they don't know what happens next and how to prevent recurrent hospitalizations with stones and resultant UTI and sepsis.   She is back to her baseline from a mental standpoint as well as physical health at this time, other than her BP.   Since being " discharged, her BP values have been very elevated to 160s/80s. Hospital values were reported to be 150s/80s and she was told they were planning to address her BP if it consistently stayed >160. Her ACEi was stopped related to renal function but no other meds were added to her regimen.    also notes her anxiety is now very elevated, more irritable with him and those around her because she is constantly worried about her health. Now scared of CT scans.      The following portions of the patient's history were reviewed and updated as appropriate: allergies, current medications, past family history, past medical history, past social history, past surgical history and problem list.    Review of Systems   Constitutional: Negative for appetite change, chills and fever.   HENT: Negative for hearing loss and sore throat.    Eyes: Negative for visual disturbance.   Respiratory: Negative for cough and shortness of breath.    Cardiovascular: Negative for chest pain, palpitations and leg swelling.   Gastrointestinal: Negative for constipation, diarrhea and vomiting.   Genitourinary: Positive for difficulty urinating, frequency and urgency. Negative for flank pain and hematuria.   Musculoskeletal: Negative for arthralgias and myalgias.   Skin: Negative for rash.   Neurological: Negative for weakness and headaches.   Hematological: Negative for adenopathy.   Psychiatric/Behavioral: Negative for confusion and sleep disturbance.       Objective   Physical Exam  Vitals and nursing note reviewed.   Constitutional:       General: She is not in acute distress.     Appearance: Normal appearance.   Cardiovascular:      Rate and Rhythm: Normal rate and regular rhythm.      Pulses: Normal pulses.      Heart sounds: Normal heart sounds. No murmur heard.     Pulmonary:      Effort: Pulmonary effort is normal. No respiratory distress.      Breath sounds: Normal breath sounds.   Abdominal:      General: Abdomen is flat. Bowel sounds are  normal.      Palpations: Abdomen is soft.      Tenderness: There is no abdominal tenderness.   Musculoskeletal:      Right lower leg: No edema.      Left lower leg: No edema.   Neurological:      Mental Status: She is alert and oriented to person, place, and time. Mental status is at baseline.   Psychiatric:         Mood and Affect: Mood normal.         Behavior: Behavior normal.         Assessment/Plan   Diagnoses and all orders for this visit:    1. Sepsis with acute renal failure without septic shock, due to unspecified organism, unspecified acute renal failure type (CMS/[facility]) (Primary)  Assessment & Plan:  RESOLVED  - pt has completed outpatient cefepime infusions and states she feels back to her baseline from an infection standpoint  - has follow up with urology planned in coming weeks, but notes she is not certain there is a good plan moving forward to prevent re-admission since she has been admitted so many times in past couple months related to stones and urosepsis  - will refer on to  urology for second opinion regarding her stone disease and any other preventative options that might be available to her    Orders:  -     Ambulatory Referral to Urology  -     CBC & Differential  -     Comprehensive Metabolic Panel    2. Essential hypertension  Assessment & Plan:  UNCONTROLLED  - BP in office today at upper limit of acceptable range, but all of pt's home values are >160/80 and pt is having symptoms intermittently  - ACEi stopped in hosp related to renal concerns  - will increase nifedipine from 30 mg to 60 mg for improved control  - Cont checking BP at home daily, call if values trend outside of goal range      Orders:  -     NIFEdipine XL (ADALAT CC) 60 MG 24 hr tablet; Take 1 tablet by mouth Daily.  Dispense: 90 tablet; Refill: 1    3. Anxiety  Assessment & Plan:  UNCONTROLLED  - will start pt on low dose celexa for anxiety management--> Rx sent today  - Discussed potential side effects of this medication  with patient including insomnia, changes in sexual performance, weight and appetite changes, fatigue, nausea, dizziness. Reviewed that medication will likely not be fully beneficial for 3-4 weeks, so encouraged pt to give  medication a full month before making decision to change or stop med.       Orders:  -     citalopram (CeleXA) 10 MG tablet; Take 1 tablet by mouth Daily.  Dispense: 30 tablet; Refill: 1    4. Anemia, unspecified type  -     CBC & Differential  -     Ferritin  -     Iron Profile  -     Vitamin B12    5. Vitamin D deficiency  -     Vitamin D 25 Hydroxy      .Return in about 3 weeks (around 8/30/2021) for follow up HTN and anxiety.    Hawa Chavez MD  [facility]  Phone: [phone]  Fax: [phone]

## 2021-08-10 ENCOUNTER — READMISSION MANAGEMENT (OUTPATIENT)
Dept: CALL CENTER | Facility: HOSPITAL | Age: 81
End: 2021-08-10

## 2021-08-10 LAB
25(OH)D3+25(OH)D2 SERPL-MCNC: 29.2 NG/ML (ref 30–100)
ALBUMIN SERPL-MCNC: 4.1 G/DL (ref 3.5–5.2)
ALBUMIN/GLOB SERPL: 1.3 G/DL
ALP SERPL-CCNC: 179 U/L (ref 39–117)
ALT SERPL-CCNC: 16 U/L (ref 1–33)
AST SERPL-CCNC: 19 U/L (ref 1–32)
BASOPHILS # BLD AUTO: 0.04 10*3/MM3 (ref 0–0.2)
BASOPHILS NFR BLD AUTO: 0.6 % (ref 0–1.5)
BILIRUB SERPL-MCNC: 0.3 MG/DL (ref 0–1.2)
BUN SERPL-MCNC: 17 MG/DL (ref 8–23)
BUN/CREAT SERPL: 15.3 (ref 7–25)
CALCIUM SERPL-MCNC: 9.8 MG/DL (ref 8.6–10.5)
CHLORIDE SERPL-SCNC: 103 MMOL/L (ref 98–107)
CO2 SERPL-SCNC: 25.2 MMOL/L (ref 22–29)
CREAT SERPL-MCNC: 1.11 MG/DL (ref 0.57–1)
EOSINOPHIL # BLD AUTO: 0.13 10*3/MM3 (ref 0–0.4)
EOSINOPHIL NFR BLD AUTO: 2.1 % (ref 0.3–6.2)
ERYTHROCYTE [DISTWIDTH] IN BLOOD BY AUTOMATED COUNT: 14.6 % (ref 12.3–15.4)
FERRITIN SERPL-MCNC: 68.2 NG/ML (ref 13–150)
GLOBULIN SER CALC-MCNC: 3.1 GM/DL
GLUCOSE SERPL-MCNC: 122 MG/DL (ref 65–99)
HCT VFR BLD AUTO: 38.2 % (ref 34–46.6)
HGB BLD-MCNC: 11.7 G/DL (ref 12–15.9)
IMM GRANULOCYTES # BLD AUTO: 0.04 10*3/MM3 (ref 0–0.05)
IMM GRANULOCYTES NFR BLD AUTO: 0.6 % (ref 0–0.5)
IRON SATN MFR SERPL: 10 % (ref 20–50)
IRON SERPL-MCNC: 47 MCG/DL (ref 37–145)
LYMPHOCYTES # BLD AUTO: 1.89 10*3/MM3 (ref 0.7–3.1)
LYMPHOCYTES NFR BLD AUTO: 30.7 % (ref 19.6–45.3)
MCH RBC QN AUTO: 27.1 PG (ref 26.6–33)
MCHC RBC AUTO-ENTMCNC: 30.6 G/DL (ref 31.5–35.7)
MCV RBC AUTO: 88.4 FL (ref 79–97)
MONOCYTES # BLD AUTO: 0.53 10*3/MM3 (ref 0.1–0.9)
MONOCYTES NFR BLD AUTO: 8.6 % (ref 5–12)
NEUTROPHILS # BLD AUTO: 3.53 10*3/MM3 (ref 1.7–7)
NEUTROPHILS NFR BLD AUTO: 57.4 % (ref 42.7–76)
NRBC BLD AUTO-RTO: 0 /100 WBC (ref 0–0.2)
PLATELET # BLD AUTO: 270 10*3/MM3 (ref 140–450)
POTASSIUM SERPL-SCNC: 4.5 MMOL/L (ref 3.5–5.2)
PROT SERPL-MCNC: 7.2 G/DL (ref 6–8.5)
RBC # BLD AUTO: 4.32 10*6/MM3 (ref 3.77–5.28)
SODIUM SERPL-SCNC: 141 MMOL/L (ref 136–145)
TIBC SERPL-MCNC: 489 MCG/DL
UIBC SERPL-MCNC: 442 MCG/DL (ref 112–346)
VIT B12 SERPL-MCNC: 849 PG/ML (ref 211–946)
WBC # BLD AUTO: 6.16 10*3/MM3 (ref 3.4–10.8)

## 2021-08-10 NOTE — TELEPHONE ENCOUNTER
Can we let her know I am aware of the note that I promised them  but was going to do it tomorrow on my day off when I catch up on my paperwork and documentation. If I am able to get to it sooner it will be available in her MyChart, but that my plan was to do it tomorrow at the latest.

## 2021-08-10 NOTE — OUTREACH NOTE
Sepsis Week 2 Survey      Responses   [facility] patient discharged from?  [place]   Does the patient have one of the following disease processes/diagnoses(primary or secondary)?  Sepsis   Week 2 attempt successful?  Yes   Call start time  1100   Call end time  1102   Discharge diagnosis  sepsis d/t UTI, dehydration, uteroscopy & stent replacement prior to admit   Is patient permission given to speak with other caregiver?  Yes   Meds reviewed with patient/caregiver?  Yes   Is the patient having any side effects they believe may be caused by any medication additions or changes?  No   Does the patient have all medications related to this admission filled (includes all antibiotics, inhalers, nebulizers,steroids,etc.)  Yes   Is the patient taking all medications as directed (includes completed medication regime)?  Yes   Does the patient have a primary care provider?   Yes   Does the patient have an appointment with their PCP within 7 days of discharge?  Greater than 7 days   What is preventing the patient from scheduling follow up appointments within 7 days of discharge?  Earlier appointment not available   Nursing Interventions  Verified appointment date/time/provider   Has the patient kept scheduled appointments due by today?  Yes   Did the patient receive a copy of their discharge instructions?  No   Nursing interventions  Reviewed instructions with patient   What is the patient's perception of their health status since discharge?  Improving   Nursing interventions  Nurse provided patient education   Is the patient/caregiver able to teach back Sepsis?  S - Shivering,fever or very cold, E - Extreme pain or generalized discomfort (worst ever,especially abdomen), S - Sleepy, difficult to arouse,confused, S - Short of breath   Nursing interventions  Nurse provided reassurance to patient, Nurse provided patient education   Is patient/caregiver able to teach back steps to recovery at home?  Rest and regain strength,  Set small, achievable goals for return to baseline health   Is the patient/caregiver able to teach back signs and symptoms of worsening condition:  Fever, Shortness of breath/rapid respiratory rate, Altered mental status(confusion/coma)   Is the patient/caregiver able to teach back the hierarchy of who to call/visit for symptoms/problems? PCP, Specialist, Home health nurse, Urgent Care, ED, 911  Yes   Week 2 call completed?  Yes   Wrap up additional comments  She states she is doing well.           Faye Salamanca RN

## 2021-08-12 LAB
BACTERIA UR CULT: ABNORMAL
OTHER ANTIBIOTIC SUSC ISLT: ABNORMAL

## 2021-08-13 RX ORDER — AMOXICILLIN AND CLAVULANATE POTASSIUM 875; 125 MG/1; MG/1
1 TABLET, FILM COATED ORAL 2 TIMES DAILY
Qty: 20 TABLET | Refills: 0 | Status: SHIPPED | OUTPATIENT
Start: 2021-08-13 | End: 2021-10-04 | Stop reason: ALTCHOICE

## 2021-08-24 DIAGNOSIS — I10 ESSENTIAL HYPERTENSION: ICD-10-CM

## 2021-08-24 RX ORDER — LISINOPRIL 40 MG/1
40 TABLET ORAL DAILY
Qty: 90 TABLET | Refills: 1 | OUTPATIENT
Start: 2021-08-24

## 2021-08-26 ENCOUNTER — HOSPITAL ENCOUNTER (OUTPATIENT)
Dept: ULTRASOUND IMAGING | Facility: HOSPITAL | Age: 81
Discharge: HOME | End: 2021-08-26
Admitting: UROLOGY

## 2021-08-26 DIAGNOSIS — N20.0 KIDNEY STONE: ICD-10-CM

## 2021-08-26 PROCEDURE — 76775 US EXAM ABDO BACK WALL LIM: CPT

## 2021-08-27 ENCOUNTER — OFFICE VISIT (OUTPATIENT)
Dept: INTERNAL MEDICINE | Facility: CLINIC | Age: 81
End: 2021-08-27

## 2021-08-27 VITALS
SYSTOLIC BLOOD PRESSURE: 136 MMHG | RESPIRATION RATE: 16 BRPM | HEART RATE: 89 BPM | BODY MASS INDEX: 38.89 KG/M2 | DIASTOLIC BLOOD PRESSURE: 82 MMHG | HEIGHT: 66 IN | TEMPERATURE: 95.1 F | OXYGEN SATURATION: 98 % | WEIGHT: 242 LBS

## 2021-08-27 DIAGNOSIS — M81.0 AGE-RELATED OSTEOPOROSIS WITHOUT CURRENT PATHOLOGICAL FRACTURE: ICD-10-CM

## 2021-08-27 DIAGNOSIS — R73.9 HYPERGLYCEMIA: Chronic | ICD-10-CM

## 2021-08-27 DIAGNOSIS — E78.2 MIXED HYPERLIPIDEMIA: ICD-10-CM

## 2021-08-27 DIAGNOSIS — Z00.00 MEDICARE ANNUAL WELLNESS VISIT, SUBSEQUENT: Primary | ICD-10-CM

## 2021-08-27 DIAGNOSIS — Z12.31 BREAST CANCER SCREENING BY MAMMOGRAM: ICD-10-CM

## 2021-08-27 DIAGNOSIS — F41.9 ANXIETY: ICD-10-CM

## 2021-08-27 DIAGNOSIS — I10 ESSENTIAL HYPERTENSION: ICD-10-CM

## 2021-08-27 PROBLEM — S42.202A CLOSED FRACTURE OF LEFT PROXIMAL HUMERUS: Status: RESOLVED | Noted: 2021-06-15 | Resolved: 2021-08-27

## 2021-08-27 PROBLEM — S52.501A CLOSED FRACTURE OF RIGHT DISTAL RADIUS: Status: RESOLVED | Noted: 2021-06-15 | Resolved: 2021-08-27

## 2021-08-27 PROBLEM — E88.819 INSULIN RESISTANCE: Status: RESOLVED | Noted: 2017-09-08 | Resolved: 2021-08-27

## 2021-08-27 PROBLEM — R79.89 POSITIVE D DIMER: Status: RESOLVED | Noted: 2021-03-25 | Resolved: 2021-08-27

## 2021-08-27 PROBLEM — N30.00 ACUTE CYSTITIS: Status: RESOLVED | Noted: 2021-03-25 | Resolved: 2021-08-27

## 2021-08-27 PROBLEM — S42.212A CLOSED DISPLACED FRACTURE OF SURGICAL NECK OF LEFT HUMERUS: Status: RESOLVED | Noted: 2021-06-25 | Resolved: 2021-08-27

## 2021-08-27 PROBLEM — R20.8 BURNING SENSATION OF FOOT: Status: RESOLVED | Noted: 2018-05-22 | Resolved: 2021-08-27

## 2021-08-27 PROBLEM — E88.81 INSULIN RESISTANCE: Status: RESOLVED | Noted: 2017-09-08 | Resolved: 2021-08-27

## 2021-08-27 PROCEDURE — G0439 PPPS, SUBSEQ VISIT: HCPCS | Performed by: INTERNAL MEDICINE

## 2021-08-27 RX ORDER — CITALOPRAM 20 MG/1
20 TABLET ORAL DAILY
Qty: 90 TABLET | Refills: 1 | Status: SHIPPED | OUTPATIENT
Start: 2021-08-27 | End: 2021-09-02

## 2021-08-27 NOTE — ASSESSMENT & PLAN NOTE
IMPROVED  - nifedipine increased from 30 mg to 60 mg after last appt and BP in office today much improved  - home values still elevated above goal range, pt brought home monitor to office today and values fairly similar, will cont on same dose for now and cont to monitor  - Cont checking BP at home daily, call if values trend outside of goal range

## 2021-08-27 NOTE — PROGRESS NOTES
Subsequent Medicare Wellness Visit   The ABC's of the Annual Wellness Visit    Chief Complaint   Patient presents with   • Annual Exam       HPI:  Anitra Orta, -1940, is a 81 y.o. female who presents for a Subsequent Medicare Wellness Visit.  Her BP meds were increased with nifedipine 30 mg to 60 mg for improved control. BP is better today but home values have still been slightly elevated above goal, mostly 140s/70-80s. Does not feel like she is feeling any better related to her blood pressure  Was also started on low dose celexa 10 mg at her last apt for anxiety, does nto notice any improvements from this medication yet, no side effects.   She recently completed another round of abx for UTI, last dose in past couple days. No symptoms of active infection at the time of diagnosis and does not feel any different now.     Recent Hospitalizations:  Recently treated at the following:  [facility]. Numerous admissions this year related to urosepsis and recurrent nephrolithiasis with obstructive uropathy.     Current Medical Providers:  Patient Care Team:  Margot Chavez MD as PCP - General (Internal Medicine & Pediatrics)  Dr. Phillips as Consulting Physician (Ophthalmology)  Justo Leahy MD as Consulting Physician (Infectious Diseases)  Kevon Clark MD as Consulting Physician (Urology)  Jian Ndiaye MD as Consulting Physician (Ophthalmology)    Health Habits and Functional and Cognitive Screening and Depression Screening:  Functional & Cognitive Status 2021   Do you have difficulty preparing food and eating? No   Do you have difficulty bathing yourself, getting dressed or grooming yourself? No   Do you have difficulty using the toilet? No   Do you have difficulty moving around from place to place? No   Do you have trouble with steps or getting out of a bed or a chair? No   Current Diet Well Balanced Diet   Dental Exam Up to date   Eye Exam Up to date   Exercise (times per  week) 0 times per week   Current Exercises Include No Regular Exercise   Current Exercise Activities Include -   Do you need help using the phone?  No   Are you deaf or do you have serious difficulty hearing?  No   Do you need help with transportation? No   Do you need help shopping? No   Do you need help preparing meals?  No   Do you need help with housework?  No   Do you need help with laundry? No   Do you need help taking your medications? No   Do you need help managing money? No   Do you ever drive or ride in a car without wearing a seat belt? No   Have you felt unusual stress, anger or loneliness in the last month? No   Who do you live with? Spouse   If you need help, do you have trouble finding someone available to you? No   Have you been bothered in the last four weeks by sexual problems? No   Do you have difficulty concentrating, remembering or making decisions? No       Compared to one year ago, the patient feels her physical health is worse and her mental health is worse.    Depression Screen:  PHQ-2/PHQ-9 Depression Screening 2/19/2021   Little interest or pleasure in doing things 0   Feeling down, depressed, or hopeless 0   Total Score 0         Past Medical/Family/Social History:  The following portions of the patient's history were reviewed and updated as appropriate: allergies, current medications, past family history, past medical history, past social history, past surgical history and problem list.    Allergies   Allergen Reactions   • Bactrim [Sulfamethoxazole-Trimethoprim] Nausea Only and Other (See Comments)     UNKNOWN   • Ciprofloxacin Other (See Comments)     UNKNOWN   • Levaquin [Levofloxacin] Other (See Comments)     SEVERE HEADACHE AND N/V   • Macrobid [Nitrofurantoin] Other (See Comments)     UNKNOWN; PT CAN TAKE IN SMALL DOSES- FLU LIKE SYMPTOMS   • Nitrofurantoin Macrocrystal Other (See Comments)     Can take in small doses   • Cortisone      Pt states had headache with IM injection  states has been ok with epidural steroid injections         Current Outpatient Medications:   •  aspirin 81 MG EC tablet, Take 81 mg by mouth Daily., Disp: , Rfl:   •  citalopram (CeleXA) 20 MG tablet, Take 1 tablet by mouth Daily., Disp: 90 tablet, Rfl: 1  •  estradiol (ESTRACE) 0.1 MG/GM vaginal cream, Insert 1 application into the vagina Every Other Day., Disp: , Rfl:   •  HYDROcodone-acetaminophen (NORCO) 7.5-325 MG per tablet, Take 1-2 tablets by mouth Every 4 (Four) Hours As Needed for Moderate Pain, Disp: 20 tablet, Rfl: 0  •  imipramine (TOFRANIL-PM) 100 MG capsule, Take 100 mg by mouth Every Night., Disp: , Rfl:   •  iron polysaccharides (NIFEREX) 150 MG capsule, Take 1 capsule by mouth Daily., Disp: 30 capsule, Rfl: 0  •  multivitamin with minerals (PRESERVISION AREDS PO), Take 1 tablet by mouth 2 (Two) Times a Day., Disp: , Rfl:   •  NIFEdipine XL (ADALAT CC) 60 MG 24 hr tablet, Take 1 tablet by mouth Daily., Disp: 90 tablet, Rfl: 1  •  omeprazole (priLOSEC) 20 MG capsule, Take 1 capsule by mouth Daily., Disp: 90 capsule, Rfl: 1  •  simvastatin (ZOCOR) 40 MG tablet, Take 1 tablet by mouth Every Night. for cholesterol, Disp: 90 tablet, Rfl: 1  •  vitamin B-12 (CYANOCOBALAMIN) 1000 MCG tablet, Take 1 tablet by mouth Daily., Disp: 30 tablet, Rfl: 0  •  amoxicillin-clavulanate (Augmentin) 875-125 MG per tablet, Take 1 tablet by mouth 2 (Two) Times a Day., Disp: 20 tablet, Rfl: 0  •  HYDROcodone-acetaminophen (NORCO) 7.5-325 MG per tablet, Take 1 tablet by mouth Every 6 (Six) Hours As Needed for Moderate Pain ., Disp: , Rfl:     Aspirin use counseling: Taking ASA appropriately as indicated    Current medication list contains no high risk medications.  No harmful drug interactions have been identified.     Family History   Problem Relation Age of Onset   • Heart defect Mother    • Heart attack Mother 70   • Sudden death Mother    • Heart defect Father    • Heart attack Father 49   • Hypertension Father    •  Sudden death Father    • Valvular heart disease Brother    • Malig Hyperthermia Neg Hx    • Breast cancer Neg Hx        Social History     Tobacco Use   • Smoking status: Former Smoker     Packs/day: 1.00     Years: 15.00     Pack years: 15.00     Types: Cigarettes     Start date: 1956     Quit date: 1980     Years since quittin.6   • Smokeless tobacco: Never Used   • Tobacco comment: quit    Substance Use Topics   • Alcohol use: No       Past Surgical History:   Procedure Laterality Date   • BREAST BIOPSY Left     benign   • BREAST LUMPECTOMY Left     benign   • BUNIONECTOMY Right    • COLONOSCOPY N/A 2016    Procedure: COLONOSCOPY polypectomy;  Surgeon: Adali Willard MD;  Location: [facility];  Service:    • CYSTOSCOPY BOTOX INJECTION OF BLADDER N/A 2017    Procedure: CYSTOSCOPY COAPTITE INJECTION;  Surgeon: Kevon Clark MD;  Location: [facility];  Service:    • CYSTOSCOPY W/ LITHOLAPAXY / EHL     • CYSTOSCOPY W/ URETERAL STENT PLACEMENT Left 2016    Procedure: CYSTOSCOPY URETERAL STENT INSERTION;  Surgeon: Kevon Clark MD;  Location: [facility];  Service:    • CYSTOSCOPY W/ URETERAL STENT PLACEMENT Left 2019    Procedure: CYSTOSCOPY URETERAL CATHETER/STENT INSERTION;  Surgeon: Kevon Clark MD;  Location: [facility];  Service: Urology   • CYSTOSCOPY W/ URETERAL STENT PLACEMENT Left 3/25/2021    Procedure: CYSTOSCOPY URETERAL CATHETER/STENT INSERTION;  Surgeon: Kevon Clark MD;  Location: [facility];  Service: Urology;  Laterality: Left;   • CYSTOSCOPY W/ URETERAL STENT PLACEMENT Left 2021    Procedure: CYSTOSCOPY URETERAL CATHETER/STENT INSERTION;  Surgeon: Lul Guzman MD;  Location: [facility];  Service: Urology;  Laterality: Left;  CYSTOSCOPY LEFT URETERAL CATHETER/ STENT PLACEMENT   • JOINT REPLACEMENT     • KNEE ARTHROSCOPY Right    • LUMBAR EPIDURAL INJECTION     • TONSILLECTOMY AND ADENOIDECTOMY     • URETEROSCOPY LASER LITHOTRIPSY  WITH STENT INSERTION Left 10/5/2016    Procedure: LT URETEROSCOPY LASER LITHOTRIPSY STONE BASKET EXTRACTION AND STENT ;  Surgeon: Kevon Clark MD;  Location: [facility] OR;  Service:    • URETEROSCOPY LASER LITHOTRIPSY WITH STENT INSERTION Left 7/23/2021    Procedure: LEFT URETEROSCOPY STONE MANIPULATION STENT EXCHANGE, LASER LITHOTRIPSY, CYSTOSCOPY, STONE BASKET EXTRACTION;  Surgeon: Kevon Clark MD;  Location: [facility] OR;  Service: Urology;  Laterality: Left;       Patient Active Problem List   Diagnosis   • Urinary tract infection due to ESBL Klebsiella   • Simple renal cyst   • Former smoker   • Hyperlipidemia   • Hypertriglyceridemia   • Osteoporosis   • Panic disorder   • Psoriasis   • Urge incontinence of urine   • Vitamin D deficiency   • Post-menopause   • Acute UTI (urinary tract infection)   • Chronic bilateral low back pain without sciatica   • Spinal stenosis of lumbar region with neurogenic claudication   • Other chronic pain   • Tachycardia   • Ventricular tachycardia (CMS/HCC)   • Mixed incontinence   • GERD without esophagitis   • Anxiety   • Hyperglycemia   • Sepsis, unspecified organism (CMS/HCC)   • Metabolic encephalopathy   • Hypokalemia   • e.coli bacteremia   • Ureteral stone with hydronephrosis   • Acute kidney failure (CMS/HCC)   • Obstructive uropathy   • Nephrolithiasis   • Anemia   • Metabolic acidosis   • Essential hypertension   • History of ventricular tachycardia   • Left humeral fracture   • Left ureteral stone       Review of Systems   Constitutional: Negative for appetite change, chills and fever.   HENT: Negative for hearing loss and sore throat.    Eyes: Negative for visual disturbance.   Respiratory: Negative for cough and shortness of breath.    Cardiovascular: Negative for chest pain, palpitations and leg swelling.   Gastrointestinal: Negative for constipation, diarrhea and vomiting.   Genitourinary: Negative for difficulty urinating, flank pain, frequency and  "hematuria.   Musculoskeletal: Negative for arthralgias and myalgias.   Skin: Negative for rash.   Neurological: Negative for weakness and headaches.   Hematological: Negative for adenopathy.   Psychiatric/Behavioral: Positive for sleep disturbance. Negative for confusion. The patient is nervous/anxious.        Objective     Vitals:    08/27/21 1120   BP: 136/82   Pulse: 89   Resp: 16   Temp: 95.1 °F (35.1 °C)   SpO2: 98%   Weight: 110 kg (242 lb)   Height: 167.6 cm (66\")       Patient's Body mass index is 39.06 kg/m². indicating that she is obese (BMI >30). Obesity-related health conditions include the following: hypertension, dyslipidemias, GERD and urinary stress incontinence. Obesity is worsening. BMI is is above average; BMI management plan is completed. We discussed portion control and increasing exercise..      The patient has no evidence of cognitve impairment.     Physical Exam  Vitals and nursing note reviewed.   Constitutional:       General: She is not in acute distress.     Appearance: Normal appearance. She is obese.   HENT:      Head: Normocephalic and atraumatic.      Right Ear: Tympanic membrane and ear canal normal.      Left Ear: Tympanic membrane and ear canal normal.      Nose: Nose normal.      Mouth/Throat:      Mouth: Mucous membranes are moist.      Pharynx: Oropharynx is clear.   Eyes:      Extraocular Movements: Extraocular movements intact.      Conjunctiva/sclera: Conjunctivae normal.      Pupils: Pupils are equal, round, and reactive to light.   Cardiovascular:      Rate and Rhythm: Normal rate and regular rhythm.      Heart sounds: Normal heart sounds. No murmur heard.     Pulmonary:      Effort: Pulmonary effort is normal. No respiratory distress.      Breath sounds: Normal breath sounds. No wheezing or rhonchi.   Abdominal:      General: Bowel sounds are normal. There is no distension.      Palpations: Abdomen is soft. There is no mass.      Tenderness: There is no abdominal " tenderness.      Comments: no hepatosplenomegaly   Musculoskeletal:         General: No deformity. Normal range of motion.      Cervical back: Normal range of motion and neck supple.      Right lower leg: Edema (1+ pitting and non-pitting edema) present.      Left lower leg: Edema (1+ pitting and non-pitting edema present) present.   Skin:     General: Skin is warm and dry.      Capillary Refill: Capillary refill takes less than 2 seconds.      Findings: No lesion or rash.   Neurological:      General: No focal deficit present.      Mental Status: She is alert and oriented to person, place, and time.      Cranial Nerves: No cranial nerve deficit.   Psychiatric:         Mood and Affect: Mood normal.         Behavior: Behavior normal.         Judgment: Judgment normal.         Recent Lab Results:  Lab Results   Component Value Date     (H) 08/09/2021     Lab Results   Component Value Date    CHOL 108 06/26/2021    TRIG 155 (H) 06/26/2021    HDL 29 (L) 06/26/2021    VLDL 27 06/26/2021    LDLHDL 1.66 06/26/2021       Assessment/Plan   Age-appropriate Screening Schedule:  Refer to the list below for future screening recommendations based on patient's age, sex and/or medical conditions.      Health Maintenance   Topic Date Due   • ZOSTER VACCINE (2 of 3) 11/04/2009   • DXA SCAN  01/28/2021   • INFLUENZA VACCINE  10/01/2021   • LIPID PANEL  06/26/2022   • MAMMOGRAM  11/19/2022   • TDAP/TD VACCINES (2 - Td or Tdap) 01/21/2029       Immunization History   Administered Date(s) Administered   • COVID-19 (MODERNA) 02/23/2021, 03/23/2021   • Fluzone High-Dose 65+yrs 08/17/2020   • Influenza TIV (IM) 11/09/2013, 11/11/2014   • Influenza, Unspecified 09/12/2019   • Pneumococcal Conjugate 13-Valent (PCV13) 03/30/2015   • Pneumococcal Polysaccharide (PPSV23) 10/10/2008, 01/21/2019   • Td, Not Adsorbed 07/07/1997   • Tdap 01/21/2019   • Zostavax 09/09/2009       Medicare Risks and Personalized Health Plan:  Advance Directive  Discussion  Alcohol Misuse  Breast Cancer/Mammogram Screening  Cardiovascular risk  Chronic Pain   Colon Cancer Screening  Dementia/Memory   Depression/Dysphoria  Diabetic Lab Screening   Fall Risk  Glaucoma Risk  Hearing Problem  Immunizations Discussed/Encouraged (specific immunizations; Tdap, Hepatitis A Vaccine/Series, Influenza, Pneumococcal 23, Shingrix and COVID19 )  Inactivity/Sedentary  Obesity/Overweight   Osteoporosis Risk  Polypharmacy  Urinary Incontinence    CMS-Preventive Services Quick Reference  Medicare Preventive Services Addressed:  Annual Wellness Visit (AWV)  Bone Density Measurements  Depression Screening (15 minutes face to face, Code )  Glaucoma screening (for individuals with diabetes mellitus, family history of glaucoma, -Americans (> or =) age 50, -Americans (> or =) age 65)  Influenza Vaccine and Administration  Screening Mammography   urinary incontinence therapy    Advance Care Planning:  ACP discussion was held with the patient during this visit. Patient does not have an advance directive, declines further assistance.    1. Annual Wellness  - Labs ordered today including CBC, CMP, Fasting lipid panel, HgbA1C and TSH. Will call with results once available and make follow up plan and med changes as appropriate.   - Cont current medications for chronic medical issues, refills sent as needed today.   - EKG done previously, reviewed and in chart  - PAP smear not indicated  - Mammo up to date and normal. Last done 11/2020 and negative  - Cscope up to date and normal. Last done 11/2016, no polyps, repeat 10 years, due 2026.  - DEXA Last done 01/2019 and abnormal with osteopenia, worst in L fem neck at -2.4. L spine normal.   - Lung CA screening not indicated  - Immunizations: Flu shot due Fall 2021. COVID series UTD. Prevnar and Pneumovax UTD. TDaP UTD, Shingrix vaccine discussed, pt would like to hold on this for now with COVID booster, Flu shot coming up due this Fall.     - Depression screen reviewed with patient and positive. Recently started on celexa, will adjust dose today up to 20 mg once daily for improved control.   - Advised behavioral modifications including dietary changes and increased exercise with goal of healthy weight and lifestyle.       2. Essential hypertension  Assessment & Plan:  IMPROVED  - nifedipine increased from 30 mg to 60 mg after last appt and BP in office today much improved  - home values still elevated above goal range, pt brought home monitor to office today and values fairly similar, will cont on same dose for now and cont to monitor  - Cont checking BP at home daily, call if values trend outside of goal range      Orders:  -     Comprehensive Metabolic Panel    3. Mixed hyperlipidemia  Assessment & Plan:  CONTROLLED  - FLP today for ongoing monitoring  - cont on current moderate intensity statin, will adjust dose as indicated based on labs  - cont with good diet and lifestyle changes including increased exercise, low chol and low fat diet, and increased fiber      Orders:  -     Comprehensive Metabolic Panel  -     Lipid Panel    4. Hyperglycemia  -     Hemoglobin A1c    5. Age-related osteoporosis without current pathological fracture  Assessment & Plan:  STABLE  - last DEXA 1/2019 with osteopenia if L hip with T score -2.4, no meds started at that time  - due for repeat, will plan to get in nov with Mammo, order placed today    Orders:  -     DEXA Bone Density Axial; Future    6. Anxiety  Assessment & Plan:  UNCONTROLLED  - recently started on celexa 2-3 weeks ago for worsening symptoms, pt states she does not note a huge difference in her symptoms yet but denies any side effects  - will increase dose to 20 mg for improved effects and monitor over coming months, if worsening, should follow up sooner  - personally, I do feel like she seems better today in conversation, less irritable and more herself, not sure if this is just further away from  most recent hosp stay and good news with normal NAKUL yesterday or related to meds, but do believe we are headed in a better direction  - Reviewed potential side effects of medication including sexual performance changes, insomnia, fatigue, weight changes. Pt tolerating well without any issues.       Orders:  -     citalopram (CeleXA) 20 MG tablet; Take 1 tablet by mouth Daily.  Dispense: 90 tablet; Refill: 1    7. Breast cancer screening by mammogram  -     Mammo Screening Bilateral With CAD; Future      An After Visit Summary and PPPS with all of these plans were given to the patient.      Follow Up:  Return in about 4 months (around 12/27/2021) for follow up.        Hawa Chavez MD  [facility]  Phone: [phone]  Fax: [phone]

## 2021-08-27 NOTE — ASSESSMENT & PLAN NOTE
UNCONTROLLED  - recently started on celexa 2-3 weeks ago for worsening symptoms, pt states she does not note a huge difference in her symptoms yet but denies any side effects  - will increase dose to 20 mg for improved effects and monitor over coming months, if worsening, should follow up sooner  - personally, I do feel like she seems better today in conversation, less irritable and more herself, not sure if this is just further away from most recent hosp stay and good news with normal NAKUL yesterday or related to meds, but do believe we are headed in a better direction  - Reviewed potential side effects of medication including sexual performance changes, insomnia, fatigue, weight changes. Pt tolerating well without any issues.

## 2021-08-27 NOTE — ASSESSMENT & PLAN NOTE
STABLE  - last DEXA 1/2019 with osteopenia if L hip with T score -2.4, no meds started at that time  - due for repeat, will plan to get in nov with Mammo, order placed today

## 2021-09-01 ENCOUNTER — TELEPHONE (OUTPATIENT)
Dept: INTERNAL MEDICINE | Facility: CLINIC | Age: 81
End: 2021-09-01

## 2021-09-01 NOTE — TELEPHONE ENCOUNTER
Caller: Anitra Orta    Relationship: Self    Best call back number: [phone]    What medications are you currently taking: citalopram (CeleXA) 20 MG tablet     When did you start taking these medications: 08/09    Which medication are you concerned about:     Who prescribed you this medication:     What are your concerns: PATIENT CALLING STATING THAT SHE IS HAVING HALLUCINATIONS SHE STATED SHE COULD CARTOON CHARACTER ON THE TV THAT WASN'T THERE THIS HAPPENED TWICE.    How long have you had these concerns: SHE STATED IT JUST STARTED LAST NIGHT

## 2021-09-02 ENCOUNTER — TELEPHONE (OUTPATIENT)
Dept: INTERNAL MEDICINE | Facility: CLINIC | Age: 81
End: 2021-09-02

## 2021-09-02 ENCOUNTER — TRANSCRIBE ORDERS (OUTPATIENT)
Dept: ADMINISTRATIVE | Facility: HOSPITAL | Age: 81
End: 2021-09-02

## 2021-09-02 DIAGNOSIS — F41.9 ANXIETY: ICD-10-CM

## 2021-09-02 DIAGNOSIS — R35.0 URINARY FREQUENCY: Primary | ICD-10-CM

## 2021-09-02 RX ORDER — CITALOPRAM 20 MG/1
10 TABLET ORAL DAILY
Qty: 90 TABLET | Refills: 1 | Status: SHIPPED
Start: 2021-09-02 | End: 2021-11-29

## 2021-09-02 RX ORDER — BUSPIRONE HYDROCHLORIDE 7.5 MG/1
7.5 TABLET ORAL 2 TIMES DAILY
Qty: 60 TABLET | Refills: 1 | Status: SHIPPED | OUTPATIENT
Start: 2021-09-02 | End: 2021-12-15 | Stop reason: SDUPTHER

## 2021-09-02 NOTE — TELEPHONE ENCOUNTER
Could be a couple things, certainly could be the higher dose of the celexa since that was a new med change, but hallucinations can also be a sign of UTI, so would actually want her to come by today and leave a urine sample so we can see if there is any infection brewing there that may need to be treated. If her urine looks normal today, then we will talk about how to wean down off the celexa and try something different for her anxiety that may be tolerated better

## 2021-09-02 NOTE — TELEPHONE ENCOUNTER
Pt called stating that she is having a reaction to the most recent medication that you put her on; states that she is having hallucinations; asked what you would like her to do?

## 2021-09-02 NOTE — TELEPHONE ENCOUNTER
Ok, would have her back down to 10 mg dose again since she at least toelrated that ok without side effects  Will call in a different medication that she can take with the 10 mg dose for a couple weeks, then she can stop the citalopram 10 mg altogether after we have eased off of it  If she wants to wait a week or so after going back down to 10 mg to make sure her symptos go away before adding the new medication to it, that is also fine

## 2021-09-10 DIAGNOSIS — N39.46 MIXED INCONTINENCE: ICD-10-CM

## 2021-09-10 RX ORDER — IMIPRAMINE HCL 50 MG
50 TABLET ORAL 2 TIMES DAILY
Qty: 180 TABLET | Refills: 1 | Status: SHIPPED | OUTPATIENT
Start: 2021-09-10 | End: 2022-03-14 | Stop reason: SDUPTHER

## 2021-09-10 RX ORDER — IMIPRAMINE HCL 50 MG
TABLET ORAL
Qty: 180 TABLET | Refills: 1 | OUTPATIENT
Start: 2021-09-10

## 2021-09-10 NOTE — TELEPHONE ENCOUNTER
Pt requesting 90 day supply    Rx Refill Note  Requested Prescriptions     Pending Prescriptions Disp Refills   • imipramine (Tofranil) 50 MG tablet       Sig: Take 1 tablet by mouth 2 (Two) Times a Day.      Last office visit with prescribing clinician: 8/27/2021      Next office visit with prescribing clinician: 12/27/2021            Olena Dixon MA  09/10/21, 12:36 EDT

## 2021-09-10 NOTE — TELEPHONE ENCOUNTER
Pt called stating that she was still having hallucinations on citalopram and buspar; told pt okay for her to stop citalopram and just continue buspar per Dr. Chavez's last message

## 2021-09-22 ENCOUNTER — HOSPITAL ENCOUNTER (EMERGENCY)
Facility: HOSPITAL | Age: 81
Discharge: HOME | End: 2021-09-22
Attending: EMERGENCY MEDICINE | Admitting: EMERGENCY MEDICINE

## 2021-09-22 VITALS
SYSTOLIC BLOOD PRESSURE: 139 MMHG | BODY MASS INDEX: 39.05 KG/M2 | DIASTOLIC BLOOD PRESSURE: 85 MMHG | RESPIRATION RATE: 18 BRPM | TEMPERATURE: 97.7 F | HEIGHT: 66 IN | OXYGEN SATURATION: 98 % | HEART RATE: 98 BPM | WEIGHT: 243 LBS

## 2021-09-22 DIAGNOSIS — R60.0 BILATERAL LOWER EXTREMITY EDEMA: Primary | ICD-10-CM

## 2021-09-22 PROCEDURE — 99282 EMERGENCY DEPT VISIT SF MDM: CPT | Performed by: EMERGENCY MEDICINE

## 2021-09-22 PROCEDURE — 99282 EMERGENCY DEPT VISIT SF MDM: CPT

## 2021-09-24 ENCOUNTER — TELEPHONE (OUTPATIENT)
Dept: INTERNAL MEDICINE | Facility: CLINIC | Age: 81
End: 2021-09-24

## 2021-09-24 NOTE — TELEPHONE ENCOUNTER
Pt scheduled for 10/04; advised pt if pain gets worse to callback but has already been evaluated by ER

## 2021-09-24 NOTE — TELEPHONE ENCOUNTER
Caller: Anitra Orta    Relationship: Self    Best call back number: [phone] OK TO LEAVE MESSAGE      Who are you requesting to speak with (clinical staff, provider,  specific staff member): NURSE    What was the call regarding: PATIENT WOULD LIKE TO COME SEE ONE OF THE DOCTORS ON 09/30 FOR LEG PAIN. SHE ALREADY HAS A LAB APPT @ 1PM. PLEASE CALL AND ADVISE.     Do you require a callback: YES

## 2021-09-30 ENCOUNTER — LAB (OUTPATIENT)
Dept: INTERNAL MEDICINE | Facility: CLINIC | Age: 81
End: 2021-09-30

## 2021-09-30 DIAGNOSIS — R35.0 FREQUENT URINATION: Primary | ICD-10-CM

## 2021-10-01 LAB
ALBUMIN SERPL-MCNC: 4.6 G/DL (ref 3.5–5.2)
ALBUMIN/GLOB SERPL: 1.9 G/DL
ALP SERPL-CCNC: 169 U/L (ref 39–117)
ALT SERPL-CCNC: 30 U/L (ref 1–33)
AST SERPL-CCNC: 28 U/L (ref 1–32)
BASOPHILS # BLD AUTO: 0.02 10*3/MM3 (ref 0–0.2)
BASOPHILS NFR BLD AUTO: 0.4 % (ref 0–1.5)
BILIRUB SERPL-MCNC: 0.3 MG/DL (ref 0–1.2)
BUN SERPL-MCNC: 24 MG/DL (ref 8–23)
BUN/CREAT SERPL: 23.3 (ref 7–25)
CALCIUM SERPL-MCNC: 9.6 MG/DL (ref 8.6–10.5)
CHLORIDE SERPL-SCNC: 104 MMOL/L (ref 98–107)
CHOLEST SERPL-MCNC: 167 MG/DL (ref 0–200)
CO2 SERPL-SCNC: 23.8 MMOL/L (ref 22–29)
CREAT SERPL-MCNC: 1.03 MG/DL (ref 0.57–1)
EOSINOPHIL # BLD AUTO: 0.08 10*3/MM3 (ref 0–0.4)
EOSINOPHIL NFR BLD AUTO: 1.6 % (ref 0.3–6.2)
ERYTHROCYTE [DISTWIDTH] IN BLOOD BY AUTOMATED COUNT: 14.3 % (ref 12.3–15.4)
GLOBULIN SER CALC-MCNC: 2.4 GM/DL
GLUCOSE SERPL-MCNC: 127 MG/DL (ref 65–99)
HBA1C MFR BLD: 6.3 % (ref 4.8–5.6)
HCT VFR BLD AUTO: 40.5 % (ref 34–46.6)
HDLC SERPL-MCNC: 50 MG/DL (ref 40–60)
HGB BLD-MCNC: 13 G/DL (ref 12–15.9)
IMM GRANULOCYTES # BLD AUTO: 0.01 10*3/MM3 (ref 0–0.05)
IMM GRANULOCYTES NFR BLD AUTO: 0.2 % (ref 0–0.5)
LDLC SERPL CALC-MCNC: 91 MG/DL (ref 0–100)
LYMPHOCYTES # BLD AUTO: 1.47 10*3/MM3 (ref 0.7–3.1)
LYMPHOCYTES NFR BLD AUTO: 29.5 % (ref 19.6–45.3)
MCH RBC QN AUTO: 27.5 PG (ref 26.6–33)
MCHC RBC AUTO-ENTMCNC: 32.1 G/DL (ref 31.5–35.7)
MCV RBC AUTO: 85.6 FL (ref 79–97)
MONOCYTES # BLD AUTO: 0.41 10*3/MM3 (ref 0.1–0.9)
MONOCYTES NFR BLD AUTO: 8.2 % (ref 5–12)
NEUTROPHILS # BLD AUTO: 3 10*3/MM3 (ref 1.7–7)
NEUTROPHILS NFR BLD AUTO: 60.1 % (ref 42.7–76)
NRBC BLD AUTO-RTO: 0 /100 WBC (ref 0–0.2)
PLATELET # BLD AUTO: 238 10*3/MM3 (ref 140–450)
POTASSIUM SERPL-SCNC: 4.2 MMOL/L (ref 3.5–5.2)
PROT SERPL-MCNC: 7 G/DL (ref 6–8.5)
RBC # BLD AUTO: 4.73 10*6/MM3 (ref 3.77–5.28)
SODIUM SERPL-SCNC: 143 MMOL/L (ref 136–145)
TRIGL SERPL-MCNC: 148 MG/DL (ref 0–150)
TSH SERPL DL<=0.005 MIU/L-ACNC: 2.27 UIU/ML (ref 0.27–4.2)
VLDLC SERPL CALC-MCNC: 26 MG/DL (ref 5–40)
WBC # BLD AUTO: 4.99 10*3/MM3 (ref 3.4–10.8)

## 2021-10-04 ENCOUNTER — OFFICE VISIT (OUTPATIENT)
Dept: INTERNAL MEDICINE | Facility: CLINIC | Age: 81
End: 2021-10-04

## 2021-10-04 VITALS
RESPIRATION RATE: 16 BRPM | BODY MASS INDEX: 38.57 KG/M2 | SYSTOLIC BLOOD PRESSURE: 134 MMHG | DIASTOLIC BLOOD PRESSURE: 86 MMHG | WEIGHT: 240 LBS | HEART RATE: 91 BPM | OXYGEN SATURATION: 96 % | HEIGHT: 66 IN | TEMPERATURE: 96.6 F

## 2021-10-04 DIAGNOSIS — M25.559 HIP PAIN: ICD-10-CM

## 2021-10-04 DIAGNOSIS — M54.16 LUMBAR RADICULOPATHY: Primary | ICD-10-CM

## 2021-10-04 DIAGNOSIS — R60.9 EDEMA, UNSPECIFIED TYPE: ICD-10-CM

## 2021-10-04 DIAGNOSIS — I10 ESSENTIAL HYPERTENSION: ICD-10-CM

## 2021-10-04 DIAGNOSIS — G60.9 IDIOPATHIC PERIPHERAL NEUROPATHY: ICD-10-CM

## 2021-10-04 PROCEDURE — 99214 OFFICE O/P EST MOD 30 MIN: CPT | Performed by: INTERNAL MEDICINE

## 2021-10-04 RX ORDER — KETOROLAC TROMETHAMINE 5 MG/ML
SOLUTION OPHTHALMIC
COMMUNITY
Start: 2021-09-10 | End: 2021-11-29

## 2021-10-04 RX ORDER — GABAPENTIN 300 MG/1
300 CAPSULE ORAL 2 TIMES DAILY
Qty: 60 CAPSULE | Refills: 1 | Status: SHIPPED | OUTPATIENT
Start: 2021-10-04 | End: 2021-11-03 | Stop reason: SDUPTHER

## 2021-10-04 RX ORDER — OFLOXACIN 3 MG/ML
SOLUTION/ DROPS OPHTHALMIC
COMMUNITY
Start: 2021-09-10 | End: 2021-11-29

## 2021-10-04 RX ORDER — PREDNISONE 20 MG/1
TABLET ORAL
Qty: 15 TABLET | Refills: 1 | Status: SHIPPED | OUTPATIENT
Start: 2021-10-04 | End: 2021-10-18 | Stop reason: ALTCHOICE

## 2021-10-04 NOTE — PROGRESS NOTES
"Chief Complaint  Leg Swelling, Back Pain, and Leg Pain    Subjective          Anitra Orta presents to [facility]  History of Present Illness  Lumbar back pain.  The pain has been persistent.  No significant trauma prior.  OTC medications have not been terribly helpful.  Imaging has not been performed. radiation into the leg.  She is taking some as needed pain medicine early still last night.  Been going on warts for the past 3 weeks.  She has had some edema in her legs bilaterally but seem to start with the addition of nifedipine.  No blood clot history in the past and no pain per se.  She does have some burning numbness in the peripheral feet bilaterally.  Objective   Vital Signs:   /86   Pulse 91   Temp 96.6 °F (35.9 °C)   Resp 16   Ht 167.6 cm (66\")   Wt 109 kg (240 lb)   SpO2 96%   BMI 38.74 kg/m²     Physical Exam  Vitals and nursing note reviewed.   Constitutional:       Appearance: Normal appearance.   HENT:      Head: Normocephalic and atraumatic.   Eyes:      Pupils: Pupils are equal, round, and reactive to light.   Cardiovascular:      Rate and Rhythm: Normal rate and regular rhythm.      Pulses: Normal pulses.   Pulmonary:      Effort: Pulmonary effort is normal.      Breath sounds: Normal breath sounds.   Abdominal:      Palpations: Abdomen is soft.   Musculoskeletal:      Cervical back: Normal range of motion.      Right lower leg: No edema.      Left lower leg: No edema.      Comments: Stiffness in the low back with flexion and extension.  Negative straight leg raises   Skin:     Capillary Refill: Capillary refill takes less than 2 seconds.   Neurological:      General: No focal deficit present.      Mental Status: She is alert.   Psychiatric:         Mood and Affect: Mood normal.         Behavior: Behavior normal.        Result Review : Previous imaging                Assessment and Plan lumbar back pain is radicular symptoms.  She " also has hip pain and certainly could have severe osteoarthritis of the hips.  She had a CT of the abdomen pelvis for kidney stones with noted degenerative disc disease and arthritis of the lumbar spine course.  Low-dose prednisone and gabapentin.  Cautioned on side effects.  Will probably need MRI imaging.  We talked about physical therapy she was definitely not interested in that.  Maybe we can convince her on some exercises at home.  Get the imaging first  Lower extremity edema probably related to nifedipine.  I will see any significant evidence of DVT but if it continues we can do a venous Doppler.  Or certainly if he gets any worse  Peripheral neuropathy symptoms.  Hopefully the gabapentin will help that as well and will reassess in 7 to 10 days  Diagnoses and all orders for this visit:    1. Lumbar radiculopathy (Primary)  -     gabapentin (NEURONTIN) 300 MG capsule; Take 1 capsule by mouth 2 (Two) Times a Day.  Dispense: 60 capsule; Refill: 1    2. Idiopathic peripheral neuropathy    3. Essential hypertension    4. Edema, unspecified type    Other orders  -     predniSONE (DELTASONE) 20 MG tablet; 2 tablets daily for 5 days and 1 tablet daily for 5 days  Dispense: 15 tablet; Refill: 1        Follow Up   Return in about 1 week (around 10/11/2021).  Patient was given instructions and counseling regarding her condition or for health maintenance advice. Please see specific information pulled into the AVS if appropriate.

## 2021-10-05 ENCOUNTER — HOSPITAL ENCOUNTER (OUTPATIENT)
Dept: GENERAL RADIOLOGY | Facility: HOSPITAL | Age: 81
Discharge: HOME | End: 2021-10-05

## 2021-10-05 DIAGNOSIS — M25.559 HIP PAIN: ICD-10-CM

## 2021-10-05 DIAGNOSIS — M54.16 LUMBAR RADICULOPATHY: ICD-10-CM

## 2021-10-05 PROCEDURE — 72100 X-RAY EXAM L-S SPINE 2/3 VWS: CPT

## 2021-10-05 PROCEDURE — 73502 X-RAY EXAM HIP UNI 2-3 VIEWS: CPT

## 2021-10-18 ENCOUNTER — OFFICE VISIT (OUTPATIENT)
Dept: INTERNAL MEDICINE | Facility: CLINIC | Age: 81
End: 2021-10-18

## 2021-10-18 VITALS
SYSTOLIC BLOOD PRESSURE: 140 MMHG | OXYGEN SATURATION: 97 % | RESPIRATION RATE: 16 BRPM | HEART RATE: 93 BPM | DIASTOLIC BLOOD PRESSURE: 86 MMHG | HEIGHT: 66 IN | BODY MASS INDEX: 38.89 KG/M2 | WEIGHT: 242 LBS | TEMPERATURE: 97.3 F

## 2021-10-18 DIAGNOSIS — M54.16 LUMBAR RADICULOPATHY: ICD-10-CM

## 2021-10-18 DIAGNOSIS — M54.41 BILATERAL LOW BACK PAIN WITH RIGHT-SIDED SCIATICA, UNSPECIFIED CHRONICITY: Primary | ICD-10-CM

## 2021-10-18 PROCEDURE — 99214 OFFICE O/P EST MOD 30 MIN: CPT | Performed by: INTERNAL MEDICINE

## 2021-10-18 RX ORDER — DEXAMETHASONE 4 MG/1
TABLET ORAL
Qty: 10 TABLET | Refills: 0 | Status: SHIPPED | OUTPATIENT
Start: 2021-10-18 | End: 2021-11-18 | Stop reason: HOSPADM

## 2021-10-18 RX ORDER — DIAZEPAM 2 MG/1
TABLET ORAL
Qty: 2 TABLET | Refills: 1 | Status: SHIPPED | OUTPATIENT
Start: 2021-10-18 | End: 2021-11-18 | Stop reason: HOSPADM

## 2021-10-18 NOTE — PROGRESS NOTES
"Chief Complaint  Follow-up and Back Pain    Subjective          Anitra Orta presents to [facility]  History of Present Illness  Her back pain did not improve on the prednisone and gabapentin.  We reviewed her x-rays and she does have significant facet arthritis and degenerative disc disease.  She is seeing a back surgeon years ago with last had an epidural block she thinks 3 or 4 years ago without any improvement.  She does not want narcotics.  Objective   Vital Signs:   /86   Pulse 93   Temp 97.3 °F (36.3 °C)   Resp 16   Ht 167.6 cm (66\")   Wt 110 kg (242 lb)   SpO2 97%   BMI 39.06 kg/m²     Physical Exam exam she is very uncomfortable it appears with any movement.  Stiffness in the back and weakness in the legs generally  Result Review :                 Assessment and Plan lumbar back pain, degenerative disc disease and significant osteoarthritis in the back.  Possible compression fracture as well although not seen on CT her pain was somewhat acute in onset.  MRI of the lumbar spine.  Dexamethasone prescription sent in her gabapentin dose grams twice a day and recheck in 2 weeks or sooner if problems.  She was adamant she is not interested in seeing the surgeon at this point.  Valium prescription sent in for imaging  Diagnoses and all orders for this visit:    1. Bilateral low back pain with right-sided sciatica, unspecified chronicity (Primary)    2. Lumbar radiculopathy  -     MRI Lumbar Spine Without Contrast; Future  -     diazePAM (Valium) 2 MG tablet; 1 tablet 2 hours before procedure and if needed can repeat a tablet just prior to procedure  Dispense: 2 tablet; Refill: 1    Other orders  -     dexamethasone (DECADRON) 4 MG tablet; 2 PO QDAY FOR 5 DAYS THEN 1 PO QDAY FOR 5 DAYS  Dispense: 10 tablet; Refill: 0        Follow Up   Return in about 2 weeks (around 11/1/2021).  Patient was given instructions and counseling regarding her " condition or for health maintenance advice. Please see specific information pulled into the AVS if appropriate.

## 2021-11-03 ENCOUNTER — OFFICE VISIT (OUTPATIENT)
Dept: INTERNAL MEDICINE | Facility: CLINIC | Age: 81
End: 2021-11-03

## 2021-11-03 VITALS
SYSTOLIC BLOOD PRESSURE: 132 MMHG | DIASTOLIC BLOOD PRESSURE: 70 MMHG | OXYGEN SATURATION: 99 % | BODY MASS INDEX: 39.7 KG/M2 | HEIGHT: 66 IN | HEART RATE: 96 BPM | TEMPERATURE: 97.5 F | RESPIRATION RATE: 16 BRPM | WEIGHT: 247 LBS

## 2021-11-03 DIAGNOSIS — E78.2 MIXED HYPERLIPIDEMIA: Chronic | ICD-10-CM

## 2021-11-03 DIAGNOSIS — M54.16 LUMBAR RADICULOPATHY: Primary | ICD-10-CM

## 2021-11-03 PROCEDURE — 99213 OFFICE O/P EST LOW 20 MIN: CPT | Performed by: INTERNAL MEDICINE

## 2021-11-03 RX ORDER — SIMVASTATIN 40 MG
40 TABLET ORAL NIGHTLY
Qty: 90 TABLET | Refills: 1 | Status: SHIPPED | OUTPATIENT
Start: 2021-11-03 | End: 2022-01-31 | Stop reason: SDUPTHER

## 2021-11-03 RX ORDER — GABAPENTIN 300 MG/1
300 CAPSULE ORAL 2 TIMES DAILY
Qty: 60 CAPSULE | Refills: 1 | Status: SHIPPED | OUTPATIENT
Start: 2021-11-03 | End: 2021-11-04 | Stop reason: SDUPTHER

## 2021-11-03 NOTE — PROGRESS NOTES
"Chief Complaint  Follow-up (mri) and Hyperlipidemia (rf meds)    Subjective          Anitra Orta presents to [facility]  History of Present Illness  Back pain on Gabapentin and reviewed MRI LS spine.  She has severe degenerative disc disease and facet arthritis mainly L4 and L5 L5 and S1 with foraminal narrowing and nerve impingement.  She has had epidural blocks over the years with minimal results but she never had a radicular pain at that time.  The dexamethasone and gabapentin have not really helped any.  She does not want to take narcotics.  Adamant not to see the surgeon    Objective   Vital Signs:   /70 (BP Location: Left arm, Patient Position: Sitting, Cuff Size: Large Adult)   Pulse 96   Temp 97.5 °F (36.4 °C) (Temporal)   Resp 16   Ht 167.6 cm (66\")   Wt 112 kg (247 lb)   SpO2 99%   BMI 39.87 kg/m²     Physical Exam  Vitals and nursing note reviewed.   Constitutional:       Appearance: Normal appearance.   HENT:      Head: Normocephalic and atraumatic.   Eyes:      Pupils: Pupils are equal, round, and reactive to light.   Cardiovascular:      Rate and Rhythm: Normal rate and regular rhythm.      Pulses: Normal pulses.   Pulmonary:      Effort: Pulmonary effort is normal.      Breath sounds: Normal breath sounds.   Abdominal:      Palpations: Abdomen is soft.   Musculoskeletal:      Cervical back: Normal range of motion.      Right lower leg: No edema.      Left lower leg: No edema.      Comments: Stiffness in the low back with flexion and extension.  Negative straight leg raises   Skin:     Capillary Refill: Capillary refill takes less than 2 seconds.   Neurological:      General: No focal deficit present.      Mental Status: She is alert.   Psychiatric:         Mood and Affect: Mood normal.         Behavior: Behavior normal.        Result Review : Previous notes and MRI                Assessment and Plan L4-L5 and L5 and S1 radiculopathy " and spinal stenosis.  She is going to taper off the gabapentin as it does not seem to be helping at all.  Schedule epidural block with Dr. Martinez in [place].  She has follow-up with Dr. Chavez scheduled at the end of this month.  Refilled her medications.  Follow-up sooner if any problems  Diagnoses and all orders for this visit:    1. Lumbar radiculopathy (Primary)  -     Epidural Block  -     gabapentin (NEURONTIN) 300 MG capsule; Take 1 capsule by mouth 2 (Two) Times a Day.  Dispense: 60 capsule; Refill: 1    2. Mixed hyperlipidemia  -     simvastatin (ZOCOR) 40 MG tablet; Take 1 tablet by mouth Every Night. for cholesterol  Dispense: 90 tablet; Refill: 1        Follow Up   No follow-ups on file.  Patient was given instructions and counseling regarding her condition or for health maintenance advice. Please see specific information pulled into the AVS if appropriate.

## 2021-11-04 DIAGNOSIS — M54.16 LUMBAR RADICULOPATHY: ICD-10-CM

## 2021-11-04 RX ORDER — GABAPENTIN 300 MG/1
600 CAPSULE ORAL 2 TIMES DAILY
Qty: 120 CAPSULE | Refills: 1 | Status: SHIPPED | OUTPATIENT
Start: 2021-11-04 | End: 2022-01-11

## 2021-11-09 ENCOUNTER — TELEPHONE (OUTPATIENT)
Dept: PAIN MEDICINE | Facility: HOSPITAL | Age: 81
End: 2021-11-09

## 2021-11-18 ENCOUNTER — ANESTHESIA (OUTPATIENT)
Dept: PAIN MEDICINE | Facility: HOSPITAL | Age: 81
End: 2021-11-18

## 2021-11-18 ENCOUNTER — HOSPITAL ENCOUNTER (OUTPATIENT)
Dept: GENERAL RADIOLOGY | Facility: HOSPITAL | Age: 81
Discharge: HOME | End: 2021-11-18

## 2021-11-18 ENCOUNTER — HOSPITAL ENCOUNTER (OUTPATIENT)
Dept: PAIN MEDICINE | Facility: HOSPITAL | Age: 81
Discharge: HOME | End: 2021-11-18

## 2021-11-18 ENCOUNTER — ANESTHESIA EVENT (OUTPATIENT)
Dept: PAIN MEDICINE | Facility: HOSPITAL | Age: 81
End: 2021-11-18

## 2021-11-18 VITALS
SYSTOLIC BLOOD PRESSURE: 180 MMHG | DIASTOLIC BLOOD PRESSURE: 84 MMHG | RESPIRATION RATE: 16 BRPM | OXYGEN SATURATION: 93 % | HEART RATE: 88 BPM

## 2021-11-18 DIAGNOSIS — M54.50 LOW BACK PAIN: ICD-10-CM

## 2021-11-18 DIAGNOSIS — M54.16 LUMBAR RADICULOPATHY: ICD-10-CM

## 2021-11-18 PROCEDURE — 25010000002 METHYLPREDNISOLONE PER 80 MG: Performed by: ANESTHESIOLOGY

## 2021-11-18 PROCEDURE — 77003 FLUOROGUIDE FOR SPINE INJECT: CPT

## 2021-11-18 PROCEDURE — C1755 CATHETER, INTRASPINAL: HCPCS

## 2021-11-18 PROCEDURE — 0 IOPAMIDOL 41 % SOLUTION: Performed by: ANESTHESIOLOGY

## 2021-11-18 RX ORDER — METHYLPREDNISOLONE ACETATE 80 MG/ML
80 INJECTION, SUSPENSION INTRA-ARTICULAR; INTRALESIONAL; INTRAMUSCULAR; SOFT TISSUE ONCE
Status: COMPLETED | OUTPATIENT
Start: 2021-11-18 | End: 2021-11-18

## 2021-11-18 RX ORDER — LIDOCAINE HYDROCHLORIDE 10 MG/ML
10 INJECTION, SOLUTION INFILTRATION; PERINEURAL ONCE
Status: COMPLETED | OUTPATIENT
Start: 2021-11-18 | End: 2021-11-18

## 2021-11-18 RX ADMIN — METHYLPREDNISOLONE ACETATE 80 MG: 80 INJECTION, SUSPENSION INTRA-ARTICULAR; INTRALESIONAL; INTRAMUSCULAR; SOFT TISSUE at 13:34

## 2021-11-18 RX ADMIN — IOPAMIDOL 3 ML: 408 INJECTION, SOLUTION INTRATHECAL at 13:33

## 2021-11-18 RX ADMIN — LIDOCAINE HYDROCHLORIDE 3 ML: 10 INJECTION, SOLUTION EPIDURAL; INFILTRATION; INTRACAUDAL; PERINEURAL at 13:27

## 2021-11-18 NOTE — ANESTHESIA PROCEDURE NOTES
PAIN Epidural block    Pre-sedation assessment completed: 11/18/2021 1:22 PM    Patient reassessed immediately prior to procedure    Patient location during procedure: pain clinic  Start Time: 11/18/2021 1:26 PM  Stop Time: 11/18/2021 1:35 PM  Indication:procedure for pain  Performed By  Anesthesiologist: Macrina Martinez MD  Preanesthetic Checklist  Completed: patient identified, site marked, risks and benefits discussed, surgical consent, monitors and equipment checked, pre-op evaluation and timeout performed  Prep:  Pt Position:prone  Sterile Tech:cap, gloves, mask and sterile barrier  Prep:chlorhexidine gluconate and isopropyl alcohol  Monitoring:blood pressure monitoring and continuous pulse oximetry  Procedure:Sedation: no     Approach:midline  Guidance: fluoroscopy  Location:lumbar  Level:4-5  Needle Type:Tuohy  Needle Gauge:20 G  Aspiration:negative  Medications:  Depomedrol:80  Preservative Free Saline:4mL  Isovue:1mL  Comments:Skin infiltration with 1% lidocaine using 27 G needle.  Post Assessment:  Post-procedure: band aid applied.  Pt Tolerance:patient tolerated the procedure well with no apparent complications  Complications:no

## 2021-11-18 NOTE — H&P
[facility]    History and Physical    Patient Name: Anitra Orta  :  1940  MRN:  [id number]  Date of Admission: 2021    Subjective     Patient is a 81 y.o. female presents with chief complaint of chronic, intermitent, severe low back and bilateral leg pain.  Onset of symptoms was gradual starting several years ago.  Symptoms are associated/aggravated by activity, exercise, standing for more than a few minutes or walking for more than a few minutes. Symptoms improve with relaxation, lying down and rest.  Mrs. Orta presents to the anesthesia pain clinic with a complaint of chronic low back pain and pain radiating down to her legs bilaterally left worse than the right.  The radiating pain to her legs is new, she has been given gabapentin that helps the pain in the right leg but does not help the pain in her left leg.  She is also been given p.o. steroids that did not help her pain at all.  She did have an MRI performed in 10/20/2021 that revealed severe stenosis at L4-5 multilevel severe degenerative disc disease.  She reports her pain is worse with standing, walking, any activity at all.  She reports she is pain-free when she is sitting, she is also pain-free when she is lying down.  She reports that when she gets to sleep she is able to sleep.  Her pain does not wake her up from sleep and having to urinate does.  She has undergone multiple lumbar epidural steroid injections in the past, the last here was 5 years ago.  She had mixed results from her lumbar epidural steroid injections and trigger point injections.  She is here to tried lumbar epidural steroid injections in light of her new radiculopathy.  I discussed performing lumbar epidural steroid injection with the risks including, not limited to, bleeding and infection, post dural puncture headache, and the fact that I could not guarantee her pain would improve, in fact may worsen, or undergo no change, and she does wish to proceed at  this time.  She is awake and alert at the time of the exam with normal mood and affect.  Debility/disabilities: As related to her back pain.    The following portions of the patients history were reviewed and updated as appropriate: current medications, allergies, past medical history, past surgical history, past family history, past social history and problem list                Objective     Past Medical History:   Past Medical History:   Diagnosis Date   • Anxiety    • Arthritis of back    • At risk for sleep apnea    • Cataract     BILAT-SCHEDULED FOR THIS AND HAD TO CANCEL D/T SEPSIS   • Chronic pain disorder     ALL OVER PAIN   • Chronic UTI    • Closed displaced fracture of surgical neck of left humerus 6/25/2021   • Closed fracture of left proximal humerus 6/15/2021   • Closed fracture of right distal radius 6/15/2021   • COVID-19 vaccine series completed     2ND DOSE 3/23/21   • DDD (degenerative disc disease), lumbar    • Elevated cholesterol    • Fracture of wrist    • Fracture, humerus     LEFT-USING IMMOBILIZER/SLING   • GERD (gastroesophageal reflux disease)    • History of recent fall     JUNE 6-9-21   • History of sepsis     MOST RECENT JULY 2021-R/T KIDNEY STONE, UTI.  STATES THIS IS HER 3RD SEPSIS DIAGNOSIS   • History of UTI    • HTN (hypertension)    • Hyperlipidemia    • Kidney stones     LEFT   • Macular degeneration, bilateral     WORSE ON RIGHT-ONLY PERIPHERAL VISION   • Mixed hyperlipidemia 9/16/2016   • Mixed incontinence    • Osteoarthritis    • Osteoporosis    • Panic disorder    • Personal history of urinary calculi    • PONV (postoperative nausea and vomiting)    • Scoliosis    • Tachycardia, unspecified    • Wrist fracture     RIGHT-CURRENTLY NOT WEARING BRACE FOR THIS     Past Surgical History:   Past Surgical History:   Procedure Laterality Date   • BREAST BIOPSY Left     benign   • BREAST LUMPECTOMY Left     benign   • BUNIONECTOMY Right    • COLONOSCOPY N/A 11/30/2016    Procedure:  COLONOSCOPY polypectomy;  Surgeon: Adali Willard MD;  Location: [facility] OR;  Service:    • CYSTOSCOPY BOTOX INJECTION OF BLADDER N/A 7/21/2017    Procedure: CYSTOSCOPY COAPTITE INJECTION;  Surgeon: Kevon Clark MD;  Location: [facility];  Service:    • CYSTOSCOPY W/ LITHOLAPAXY / EHL     • CYSTOSCOPY W/ URETERAL STENT PLACEMENT Left 9/12/2016    Procedure: CYSTOSCOPY URETERAL STENT INSERTION;  Surgeon: Kevon Clark MD;  Location: [facility] OR;  Service:    • CYSTOSCOPY W/ URETERAL STENT PLACEMENT Left 8/1/2019    Procedure: CYSTOSCOPY URETERAL CATHETER/STENT INSERTION;  Surgeon: Kevon Clark MD;  Location: [facility] OR;  Service: Urology   • CYSTOSCOPY W/ URETERAL STENT PLACEMENT Left 3/25/2021    Procedure: CYSTOSCOPY URETERAL CATHETER/STENT INSERTION;  Surgeon: Kevon Clark MD;  Location: [facility] OR;  Service: Urology;  Laterality: Left;   • CYSTOSCOPY W/ URETERAL STENT PLACEMENT Left 7/11/2021    Procedure: CYSTOSCOPY URETERAL CATHETER/STENT INSERTION;  Surgeon: Lul Guzman MD;  Location: [facility] OR;  Service: Urology;  Laterality: Left;  CYSTOSCOPY LEFT URETERAL CATHETER/ STENT PLACEMENT   • JOINT REPLACEMENT     • KNEE ARTHROSCOPY Right    • LUMBAR EPIDURAL INJECTION     • TONSILLECTOMY AND ADENOIDECTOMY     • URETEROSCOPY LASER LITHOTRIPSY WITH STENT INSERTION Left 10/5/2016    Procedure: LT URETEROSCOPY LASER LITHOTRIPSY STONE BASKET EXTRACTION AND STENT ;  Surgeon: Kevon Clark MD;  Location: [facility];  Service:    • URETEROSCOPY LASER LITHOTRIPSY WITH STENT INSERTION Left 7/23/2021    Procedure: LEFT URETEROSCOPY STONE MANIPULATION STENT EXCHANGE, LASER LITHOTRIPSY, CYSTOSCOPY, STONE BASKET EXTRACTION;  Surgeon: Kevon Clark MD;  Location: [facility];  Service: Urology;  Laterality: Left;     Family History:   Family History   Problem Relation Age of Onset   • Heart defect Mother    • Heart attack Mother 70   • Sudden death Mother    • Heart defect Father     • Heart attack Father 49   • Hypertension Father    • Sudden death Father    • Valvular heart disease Brother    • Malig Hyperthermia Neg Hx    • Breast cancer Neg Hx      Social History:   Social History     Socioeconomic History   • Marital status:    Tobacco Use   • Smoking status: Former Smoker     Packs/day: 1.00     Years: 15.00     Pack years: 15.00     Types: Cigarettes     Start date: 1956     Quit date: 1980     Years since quittin.9   • Smokeless tobacco: Never Used   • Tobacco comment: quit    Vaping Use   • Vaping Use: Never used   Substance and Sexual Activity   • Alcohol use: No   • Drug use: Yes     Types: Hydrocodone   • Sexual activity: Not Currently     Partners: Male     Comment: Frequent UTIs       Vital Signs Range for the last 24 hours  Temperature:     Temp Source:     BP: BP: (156)/(76) 156/76   Pulse: Heart Rate:  [86] 86   Respirations: Resp:  [16] 16   SPO2: SpO2:  [96 %] 96 %   O2 Amount (l/min):     O2 Devices Device (Oxygen Therapy): room air   Weight:           --------------------------------------------------------------------------------    Current Outpatient Medications   Medication Sig Dispense Refill   • estradiol (ESTRACE) 0.1 MG/GM vaginal cream Insert 1 application into the vagina Every Other Day.     • gabapentin (NEURONTIN) 300 MG capsule Take 2 capsules by mouth 2 (Two) Times a Day. 120 capsule 1   • imipramine (Tofranil) 50 MG tablet Take 1 tablet by mouth 2 (Two) Times a Day. 180 tablet 1   • multivitamin with minerals (PRESERVISION AREDS PO) Take 1 tablet by mouth 2 (Two) Times a Day.     • NIFEdipine XL (ADALAT CC) 60 MG 24 hr tablet Take 1 tablet by mouth Daily. 90 tablet 1   • omeprazole (priLOSEC) 20 MG capsule Take 1 capsule by mouth Daily. 90 capsule 1   • simvastatin (ZOCOR) 40 MG tablet Take 1 tablet by mouth Every Night. for cholesterol 90 tablet 1   • vitamin B-12 (CYANOCOBALAMIN) 1000 MCG tablet Take 1 tablet by mouth Daily. 30  tablet 0   • aspirin 81 MG EC tablet Take 81 mg by mouth Daily.     • busPIRone (BUSPAR) 7.5 MG tablet Take 1 tablet by mouth 2 (two) times a day. 60 tablet 1   • citalopram (CeleXA) 20 MG tablet Take 0.5 tablets by mouth Daily. 90 tablet 1   • dexamethasone (DECADRON) 4 MG tablet 2 PO QDAY FOR 5 DAYS THEN 1 PO QDAY FOR 5 DAYS 10 tablet 0   • diazePAM (Valium) 2 MG tablet 1 tablet 2 hours before procedure and if needed can repeat a tablet just prior to procedure 2 tablet 1   • HYDROcodone-acetaminophen (NORCO) 7.5-325 MG per tablet Take 1-2 tablets by mouth Every 4 (Four) Hours As Needed for Moderate Pain 20 tablet 0   • iron polysaccharides (NIFEREX) 150 MG capsule Take 1 capsule by mouth Daily. 30 capsule 0   • ketorolac (ACULAR) 0.5 % ophthalmic solution      • ofloxacin (OCUFLOX) 0.3 % ophthalmic solution INSTILL 1 DROP INTO BOTH EYES FOUR TIMES DAILY FOR 1 WEEK       Current Facility-Administered Medications   Medication Dose Route Frequency Provider Last Rate Last Admin   • iopamidol (ISOVUE-M 200) injection 41%  12 mL Epidural Once PRN Macrina Martinez MD       • lidocaine (XYLOCAINE) 1 % injection 10 mL  10 mL Infiltration Once Macrina Martinez MD       • methylPREDNISolone acetate (DEPO-medrol) injection 80 mg  80 mg Intra-articular Once Macrina Martinez MD           --------------------------------------------------------------------------------  Assessment/Plan      Anesthesia Evaluation     Patient summary reviewed and Nursing notes reviewed   history of anesthetic complications: PONV  NPO Solid Status: N/A  NPO Liquid Status: N/A    Pain impairs ability to perform ADLs: Meal prep/Eating, Ambulation, Working, Housekeeping and Exercise/Activity  Modalities previously tried to control pain with limited effectiveness within the last 4-6 weeks: Rest, OTC medications, Prescription medications, Physical therapy and Other     Airway   Mallampati: II  TM distance: >3 FB  Neck ROM: full  No difficulty expected  Dental -  normal exam     Pulmonary - negative pulmonary ROS and normal exam    breath sounds clear to auscultation  Cardiovascular - normal exam  Exercise tolerance: poor (<4 METS)    Rhythm: regular  Rate: normal    (+) hypertension, dysrhythmias (history of v tach on monitor-normal echo and stress test), hyperlipidemia,       Neuro/Psych  (+) psychiatric history Anxiety,     GI/Hepatic/Renal/Endo    (+) morbid obesity,  renal disease (recurrent UTI and urosepsis) stones,     Musculoskeletal     (+) back pain, chronic pain, gait problem, radiculopathy Left lower extremity and Right lower extremity  Abdominal   (+) obese,    Substance History - negative use     OB/GYN negative ob/gyn ROS         Other   arthritis (history of humerus fracture and wrist fracture),                 Diagnosis and Plan    Treatment Plan  ASA 3   Patient has had previous injection/procedure with 10-25% improvement.   Procedures: Lumbar Epidural Steroid Injection(LESI), With fluoroscopy, Without ultrasound,      Anesthetic plan and risks discussed with patient.          Diagnosis     * Spinal stenosis of lumbar region with neurogenic claudication [M48.062]     * DDD (degenerative disc disease), lumbar [M51.36]     * Scoliosis due to degenerative disease of spine in adult patient [M41.80]

## 2021-11-18 NOTE — PLAN OF CARE
Goal Outcome Evaluation:               lesi   [facility]  Transplant Infectious Disease Progress Note     Patient:  Camacho Bhagat, Date of birth 1963, Medical record number [id number]  Date of Visit:  07/12/2017         Assessment and Recommendations:   Recommendations:  - continue ertapenem for now  - would image abdomen to rule out any abscess formation as he continues to be febrile - prefer CT scan  - follow weekly CMV PCR while off valcyte  - would not treat VRE in sputum - with his low oxygen requirements and unchanged CXR would hold on antibiotics. No clear evidence of aspiration pneumonia.   - agree with decrease in immunosuppression    Transplant Infectious Disease will continue to follow with you.    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 He is now s/p ex-lap and washout with wound closure. We are asked to comment on antibiotics and ongoing workup.     Colitis - he developed acute onset of pain. He is s/p ex-lap on 7/4 showing inflamed cecum and ascending colon. He had wound closure on 7/5. Cultures from intra-op showed staph capitis  in anaerobic culture  In terms of antibiotic selection, zosyn alone should be sufficient in covering anaerobes and enteric gram negatives. His pancytopenia pre-existed zosyn administration and pancytopenia is a very uncommon occurrence with zosyn. He has been maintained on ertapenem which is adequate coverage but he continues to have fevers. Would image abdomen to rule out any fluid collections.     Pancytopenia - ongoing since early June. Likely related to his immunosuppression. Heme/onc now following. Agree with decrease in immunosuppressive regimen. His valcyte has been stopped. His counts are improving      CMV+ PCR  - likely due to level of immunosuppression and cessation of valcyte. 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 He otherwise has had minimal Oxygen requirements and unchanged imaging. He is colonized with VRE (rectal swab).  If inclined to treat, would favor tigecycline over synercid.      Other ID issues:  Bacterial PPx: on antibiotics for colitis  PJP ppx: off bactrim 2/2 leukopenia  Fungal Ppx: none  SeroStatus: CMV D+/R-, EBV D+/R-  Gamma Globulin Status: last checked in 2011  Immunization Status: not yet done     Discussed with Dr. Francis Dotson D.O.  Infectious Diseases Fellow  [phone]    Physician Attestation   I personally examined and evaluated this patient.  I discussed the patient with Dr. Dotson and agree with the assessment and plan of care as documented in this edited note. I personally reviewed vital signs, medications, labs and imaging.  YADY AMIN MD  Infectious Diseases Attending  Pager: [phone]      Interval History:   Continues to be intubated and sedated. Febrile again overnight. Tmax yesterday was 101.3 and this AM at 100.3. Oxygen requirements remain uncharged. Opens eyes and following minimal commands this AM. Able to shake his head no.         Transplants:  3/4/2017 (Liver), Postoperative day:  130.  Coordinator Abril Doty    Review of Systems:  unable to obtain 2/2 clinical condition          Current Medications & Allergies:       tacrolimus  4.5 mg Oral BID IS     senna-docusate  2 tablet Oral BID     [START ON 7/13/2017] ertapenem (INVanz) IV  1 g Intravenous Q24H     multivitamins with minerals  15 mL Per Feeding Tube Daily     heparin  5,000 Units Subcutaneous Q8H     polyethylene glycol  17 g Oral Daily     aspirin  81 mg Oral Daily     lidocaine (viscous)  5 mL Topical Once     pantoprazole  40 mg Oral or Feeding Tube Daily     fludrocortisone  0.1 mg Oral Daily     sodium chloride (PF)  3 mL Intracatheter Q8H       Infusions/Drips:    parenteral nutrition - ADULT compounded formula 45 mL/hr at 07/11/17 2057     D5W 150 mL/hr at 07/12/17 5426      IV fluid REPLACEMENT ONLY       insulin (regular) 8 Units/hr (07/12/17 0910)       Allergies   Allergen Reactions     Erythromycin GI Disturbance     Vioxx      Nausea, vomiting            Physical Exam:   Vitals were reviewed.  All vitals stable.  Patient Vitals for the past 24 hrs:   Temp Temp src Resp SpO2 Weight   07/12/17 0942 - - - 97 % -   07/12/17 0800 98.4  F (36.9  C) Axillary - - -   07/12/17 0752 - - - 96 % -   07/12/17 0700 - - - 97 % -   07/12/17 0600 - - 27 97 % -   07/12/17 0500 - - - 98 % -   07/12/17 0400 100.3  F (37.9  C) Axillary 25 100 % -   07/12/17 0300 - - - 98 % -   07/12/17 0200 - - 26 99 % -   07/12/17 0100 - - - 98 % -   07/12/17 0000 100.1  F (37.8  C) Axillary 26 100 % 93 kg (205 lb 0.4 oz)   07/11/17 2300 - - - 98 % -   07/11/17 2200 - - 29 98 % -   07/11/17 2100 - - - 97 % -   07/11/17 2000 99.6  F (37.6  C) Axillary 28 95 % -   07/11/17 1900 - - - 97 % -   07/11/17 1800 - - - 97 % -   07/11/17 1600 99.5  F (37.5  C) Axillary 30 95 % -   07/11/17 1500 - - - 96 % -   07/11/17 1400 - - - 98 % -   07/11/17 1300 - - - 96 % -   07/11/17 1200 101.3  F (38.5  C) Axillary 28 98 % -   07/11/17 1100 - - - 98 % -     Ranges for vital signs:  Temp:  [98.4  F (36.9  C)-101.3  F (38.5  C)] 98.4  F (36.9  C)  Heart Rate:  [85-99] 95  Resp:  [25-30] 27  MAP:  [91 mmHg-121 mmHg] 117 mmHg  Arterial Line BP: (139-178)/(63-93) 178/79  FiO2 (%):  [30 %] 30 %  SpO2:  [95 %-100 %] 97 %  Vitals:    07/10/17 0600 07/11/17 0400 07/12/17 0000   Weight: 96.3 kg (212 lb 4.9 oz) 93.4 kg (205 lb 14.6 oz) 93 kg (205 lb 0.4 oz)       Physical Examination:  GENERAL:  well-developed, well-nourished,in ICU bed, intubated  HEAD:  Head is normocephalic, atraumatic   ENT:  ETT in place  LUNGS: coarse throughout  CARDIOVASCULAR:  Regular rate and rhythm with no murmurs,   ABDOMEN:  Quiet bowel sounds, surgical incision dressed.   SKIN:  No acute rashes.   NEUROLOGIC:  Awake, following some commands, shaking head yes  and no         Laboratory Data:     No results found for: ACD4    Inflammatory Markers    Recent Labs   Lab Test  07/05/17   1810  03/05/17   0358  11/23/16   0813  11/16/16   0706  11/15/16   1039  11/07/16   0759   08/15/11   1151  04/11/11   1209  01/03/11   1246  02/15/10   1232   SED   --    --   45*   --    --    --    --   11  14  17*  25*   CRP  354.0  20.0*  58.0*  59.1*  49.8*  66.0*   < >  11.1*  9.0*  11.7*  17.5*    < > = values in this interval not displayed.       Immune Globulin Studies     Recent Labs   Lab Test  08/15/11   1243   IGG  1160   IGG1  734   IGG2  294   IGG3  7*   IGG4  59       Metabolic Studies       Recent Labs   Lab Test  07/12/17   0342  07/11/17   1624  07/11/17   0328   07/10/17   0340   07/06/17   0316   02/22/17   0643   NA  150*  150*  150*   < >  149*   < >  143   < >  133   POTASSIUM  4.0  3.9  3.8   < >  3.3*   < >  5.0   < >  4.8   CHLORIDE  120*  121*  120*   < >  116*   < >  116*   < >  100   CO2  21  20  20   < >  21   < >  18*   < >  22   ANIONGAP  8  9  11   < >  12   < >  9   < >  12   BUN  121*  130*  140*   < >  125*   < >  62*   < >  46*   CR  2.15*  2.25*  2.57*   < >  2.62*   < >  2.75*   < >  1.40*   GFRESTIMATED  32*  31*  26*   < >  26*   < >  24*   < >  53*   GLC  188*  167*  137*   < >  171*   < >  139*   < >  147*   A1C   --    --    --    --    --    --   Canceled, Test credited   Below Assay Range  NOTIFIED LEONARD ONEILL AT 0538 ON 7/6/17 BY CHRISSY     --    --    JACOB  8.0*  8.3*  8.2*   < >  8.8   < >  6.9*   < >  9.0   PHOS  4.1  4.4  4.4   < >  3.5   < >  5.5*   < >   --    MAG  2.3  2.3  2.3   < >  2.4*   < >  2.1   < >   --    LACT   --    --   0.9   --   0.9   < >  1.5   < >  1.9   PCAL   --    --    --    --    --    --    --    --   1.76    < > = values in this interval not displayed.       Hepatic Studies    Recent Labs   Lab Test  07/12/17   0342  07/11/17   0328  07/10/17   0340   07/05/17   1810   06/28/12   1234   BILITOTAL  0.4  0.5  0.7   <  >   --    < >  1.7*   BILIDELTA   --    --    --    --    --    --   0.5*   BILICONJ   --    --    --    --    --    --   0.0   DBIL  0.2  0.2   --    < >   --    < >   --    ALKPHOS  168*  158*  116   < >   --    < >  146   PROTTOTAL  5.0*  5.0*  5.0*   < >   --    < >  6.3*   ALBUMIN  1.8*  1.8*  1.8*   < >   --    < >  3.5*   AST  29  34  67*   < >   --    < >  41   ALT  22  27  34   < >   --    < >  41   LDH   --    --    --    --   289*   --    --     < > = values in this interval not displayed.       Pancreatitis testing    Recent Labs   Lab Test  07/12/17   0342   03/05/17   0358  03/03/17   1458  02/21/17   1520  12/09/16   1159   09/30/10   1734   AMYLASE   --    --   53  70   --    --    --   82   LIPASE   --    --   216   --   326  161   --   138   TRIG  114   < >   --    --    --    --    < >   --     < > = values in this interval not displayed.       Gout Labs    No lab results found.    Hematology Studies      Recent Labs   Lab Test  07/12/17   0342  07/11/17   1624  07/11/17   0328  07/10/17   1537  07/10/17   0340  07/09/17   1614   WBC  9.0  12.3*  11.7*  11.2*  10.5  9.9   ANEU  8.1  10.8*  10.4*  9.9*  9.4*  8.5*   ALYM  0.2*  0.3*  0.2*  0.3*  0.3*  0.5*   ZINA  0.0  0.6  0.1  0.0  0.2  0.2   AEOS  0.1  0.3  0.1  0.1  0.0  0.2   HGB  8.0*  9.3*  8.5*  8.8*  8.5*  8.2*   HCT  25.5*  28.9*  27.0*  27.4*  26.5*  25.3*   PLT  84*  93*  80*  75*  71*  60*       Clotting Studies    Recent Labs   Lab Test  07/10/17   0340  07/08/17   0902  07/06/17   0316  07/06/17   0011   INR  1.17*  1.22*  1.80*  1.91*   PTT   --   38*  47*  48*       Iron Testing    Recent Labs   Lab Test  07/12/17   0342   07/06/17   1228   07/05/17   1810   02/08/16   1144   IRON   --    --    --    --    --    --   93   FEB   --    --    --    --    --    --   230*   IRONSAT   --    --    --    --    --    --   41   MCV  87   < >  87   < >  86   < >  92   HAPT   --    --    --    --   162   < >   --    RETP   --    --   1.6   < >    --    < >   --    RETICABSCT   --    --   39.4   < >   --    < >   --     < > = values in this interval not displayed.       Markers    Alpha Fetoprotein   Date Value Ref Range Status   02/03/2017  0 - 8 ug/L Final    <1.5  Assay Method:  Chemiluminescence using Siemens Centaur XP         Autoimmune Testing    Recent Labs   Lab Test  01/03/11   1246  02/15/10   1232   JESUS MANUEL  <1.0   --    RHF   --   10   ENASSA   --   0   ENASSB   --   0       Arterial Blood Gas Testing    Recent Labs   Lab Test  07/08/17   0902  07/06/17   2334  07/06/17   0015  07/05/17   2026  07/05/17   1810   PH  7.32*  7.26*  7.27*  7.27*  7.28*   PCO2  35  37  35  35  37   PO2  96  85  109*  155*  188*   HCO3  18*  17*  16*  16*  17*   O2PER  30  30.0  30.0  40  50.0        Thyroid Studies     Recent Labs   Lab Test  02/08/16   1144  04/11/11   1209  02/15/10   1232  07/31/09   1254   TSH  3.35  2.77  2.53  1.95   T4   --   1.23  0.90   --        Urine Studies     Recent Labs   Lab Test  07/11/17   1105  07/06/17   1441  06/06/17   1605  03/24/17   1315  03/16/17   1010   URINEPH  5.0  5.0  5.0  5.5  5.0   NITRITE  Negative  Negative  Negative  Negative  Negative   LEUKEST  Negative  Trace*  Negative  Negative  Negative   WBCU  <1  9*  1  1  5*       CSF testing     Recent Labs   Lab Test  06/11/09   0225   CWBC  1   CRBC  2   CGLU  104*   CTP  54       Medication levels    Recent Labs   Lab Test  07/11/17   0328   07/06/17   0316   VANCOMYCIN   --    --   22.1   TACROL  6.0   < >  6.3    < > = values in this interval not displayed.       Microbiology:  Beta D Glucan levels (Fungitell assay)    No lab results found.    Last Culture results with specimen source  Culture Micro   Date Value Ref Range Status   07/11/2017 Pending  Incomplete   07/11/2017 No growth after 19 hours  Final   07/11/2017 No growth after 19 hours  Final   07/05/2017 No growth  Final   07/05/2017 No growth  Final   07/05/2017 (A)  Final    Heavy growth Enterococcus  faecium (VRE)  Susceptibility testing requested by Tip Anna on VRE for   Tigecycline.7/7/17.TV.     07/05/2017 Culture negative after 5 days  Final   07/05/2017   Final    Canceled, Test credited  Incorrectly ordered by PCU/Clinic  See accession [id number] for result.     07/05/2017 (A)  Final    On day 3, isolated in broth only: Staphylococcus capitis  Critical Value/Significant Value, preliminary result only, called to and read   back by CLARA WRIGHT RN 4B  7.8.17 @ 1050   No anaerobes isolated     07/05/2017 No growth  Final    Specimen Description   Date Value Ref Range Status   07/11/2017 Sputum Endotracheal  Final   07/11/2017 Sputum Endotracheal  Final   07/11/2017 Blood Right Arm  Final   07/11/2017 Blood Arterial blood  Final   07/05/2017 Blood Brown port  Final   07/05/2017 Blood Left Brachial Arterial blood  Final   07/05/2017 Bronchoalveolar Lavage Lower Lobes bilateral  Final   07/05/2017 Bronchoalveolar Lavage Lower lobes bilateral  Final   07/05/2017 Bronchoalveolar Lavage Lower lobes bilateral  Final   07/05/2017 Bronchoalveolar Lavage Lower lobes bilateral  Final   07/05/2017 Other  Final        Last check of C difficile  C Diff Toxin B PCR   Date Value Ref Range Status   10/27/2016  NEG Final    Negative  Negative: Clostridium difficile target DNA sequences NOT detected, presumed   negative for Clostridium difficile toxin B or the number of bacteria present   may be below the limit of detection for the test.   FDA approved assay performed using [employer] GeneXpert real-time PCR.   A negative result does not exclude actual disease due to Clostridium difficile   and may be due to improper collection, handling and storage of the specimen or   the number of organisms in the specimen is below the detection limit of the   assay.         Virology:  Log IU/mL of CMVQNT   Date Value Ref Range Status   07/10/2017 2.2 (H) <2.1 [Log_IU]/mL Final   07/03/2017 <2.1 <2.1 [Log_IU]/mL Final   06/26/2017  Not Calculated <2.1 [Log_IU]/mL Final       Imaging:  Recent Results (from the past 48 hour(s))   XR Abdomen Port 1 View    Narrative    XR ABDOMEN PORT F1 VW 7/10/2017 3:28 PM    History: Verify small bowel feeding tube bedside placement.    Comparison: Abdominal x-ray on 7/8/2017.    Findings: Single view of the abdomen was obtained. Interval placement  of feeding tube with the distal tip projects over the proximal  jejunum. Enteric tube projects over the stomach. Prominent small bowel  gas pattern. Partial visualization of surgical clips and drain.      Impression    Impression: Feeding tube distal tip projects over proximal jejunum.   There is a mildly prominent loop of small bowel, limited view.  If  concerned for possible obstruction or ileus, recommend additional  complete abdominal radiographs.  Additionally, recommend short term  follow up radiograph.     I have personally reviewed the examination and initial interpretation  and I agree with the findings.    DALLIN RENAE MD   XR Chest Port 1 View    Narrative    EXAM: XR CHEST PORT 1 VW  7/11/2017 1:45 AM     HISTORY:  intubated       COMPARISON: 7/10/2017    FINDINGS: Stable interval tube, right central line, enteric tubes and  right basilar chest tube.     Unchanged myocardial likely. Stable perihilar and bibasilar pulmonary  opacities. Small right greater than left pleural effusions. No  pneumothorax.      Impression    IMPRESSION: Stable perihilar and bibasilar opacities and right greater  than left pleural effusion. Findings may represent edema or infection.    I have personally reviewed the examination and initial interpretation  and I agree with the findings.    DALLIN RENAE MD   US Upper Extremity Arterial Duplex Right   Result Value    Radiologist flags Occlusion of the distal radial artery (Urgent)    Narrative    Duplex Doppler ultrasound assessment of the upper extremities arteries  bilaterally 7/11/2017 1:15 PM    Clinical  information: Assess for arterial occlusive disease    Ordering provider: Dr. Malone    Technique: B-mode (grayscale) and duplex Doppler ultrasound of the  upper extremity arteries. Velocity measurements obtained with angle  correction at or less than 60 degrees.    FINDINGS:     Peak systolic velocities:    Right Upper Extremity Arteries:     Subclavian: 157 cm/sec  Axillary artery: 119 cm/sec    Brachial proximal: 164 cm/sec   Brachial mid: 187 cm/sec  Brachial antecubital: 157 cm/sec    Radial artery origin: 102 cm/sec  Radial artery mid: 47 cm/sec  Radial artery distal (proximal to branch): 34 cm/sec  Radial artery distal (distal to branch): Occluded  Radial artery branch and distal forearm: 86 cm/s    Ulnar artery proximal: 146 cm/sec    Waveforms are triphasic throughout.      Impression    IMPRESSION:  The radial artery is occluded from the distal forearm to the wrist,  and terminates in distal forearm branches. All other examined arteries  of the right upper extremity are patent without stenosis, including  the ulnar artery.      [Urgent Result: Occlusion of the distal radial artery]    Finding was identified on 7/11/2017 1:18 PM.     Nitish Malone was contacted by Dr. Saeed at 7/11/2017 1:28 PM and  verbalized understanding of the urgent finding.       I have personally reviewed the examination and initial interpretation  and I agree with the findings.    RENEE DOBBS MD   XR Chest Port 1 View    Narrative    Examination: XR CHEST PORT 1 VW, 7/11/2017 5:02 PM    Comparison: 7/11/2017 at 1:27 AM    History: chest exam 6 hours post water seal    Findings: Endotracheal tube tip at the level of the mid thoracic  trachea. Enteric tubes extend into the left upper quadrant, tips not  included in the field-of-view. Right IJ central venous catheter tip at  the level of the mid SVC. Right basilar chest tube is stable in  position. The cardiomediastinal silhouette is stable, partially  obscured. Bilateral pleural  effusions and associated bibasilar  opacities are not significantly changed. No appreciable pneumothorax.      Impression    Impression:   1. No appreciable pneumothorax.  2. Bilateral pleural effusions and associated perihilar/bibasilar  opacities are not significantly changed.    I have personally reviewed the examination and initial interpretation  and I agree with the findings.    DIAZ NORMAN MD   XR Chest Port 1 View    Narrative    Examination: XR CHEST PORT 1 VW, 7/12/2017 2:06 AM    Comparison: 7/11/2017 at 16:57 PM.    History: intubated    Findings: Endotracheal tube tip in the mid thoracic trachea. Unchanged  partially visualized enteric tubes. Right IJ central line tip over  distal SVC. Unchanged right basilar chest tube.    Stable cardiac silhouette. Persistent perihilar and bibasilar streaky  opacities. Stable pleural effusions and associated basilar opacities.  No appreciable pneumothorax.      Impression    Impression:   1.  Bilateral pleural effusions and associated perihilar/bibasilar  opacities are not significantly changed.  2.  Stable support devices.    I have personally reviewed the examination and initial interpretation  and I agree with the findings.    DIAZ NORMAN MD

## 2021-11-22 ENCOUNTER — HOSPITAL ENCOUNTER (OUTPATIENT)
Dept: MAMMOGRAPHY | Facility: HOSPITAL | Age: 81
Discharge: HOME | End: 2021-11-22

## 2021-11-22 ENCOUNTER — APPOINTMENT (OUTPATIENT)
Dept: BONE DENSITY | Facility: HOSPITAL | Age: 81
End: 2021-11-22

## 2021-11-22 DIAGNOSIS — M81.0 AGE-RELATED OSTEOPOROSIS WITHOUT CURRENT PATHOLOGICAL FRACTURE: ICD-10-CM

## 2021-11-22 DIAGNOSIS — Z12.31 BREAST CANCER SCREENING BY MAMMOGRAM: ICD-10-CM

## 2021-11-22 PROCEDURE — 77080 DXA BONE DENSITY AXIAL: CPT

## 2021-11-22 PROCEDURE — 77063 BREAST TOMOSYNTHESIS BI: CPT

## 2021-11-22 PROCEDURE — 77067 SCR MAMMO BI INCL CAD: CPT

## 2021-11-23 ENCOUNTER — TELEPHONE (OUTPATIENT)
Dept: PAIN MEDICINE | Facility: HOSPITAL | Age: 81
End: 2021-11-23

## 2021-12-14 ENCOUNTER — OFFICE VISIT (OUTPATIENT)
Dept: INTERNAL MEDICINE | Facility: CLINIC | Age: 81
End: 2021-12-14

## 2021-12-14 VITALS
OXYGEN SATURATION: 97 % | SYSTOLIC BLOOD PRESSURE: 152 MMHG | DIASTOLIC BLOOD PRESSURE: 96 MMHG | BODY MASS INDEX: 39.37 KG/M2 | RESPIRATION RATE: 16 BRPM | HEIGHT: 66 IN | TEMPERATURE: 97.3 F | HEART RATE: 99 BPM | WEIGHT: 245 LBS

## 2021-12-14 DIAGNOSIS — R22.1 NECK MASS: Primary | ICD-10-CM

## 2021-12-14 PROCEDURE — 99213 OFFICE O/P EST LOW 20 MIN: CPT | Performed by: INTERNAL MEDICINE

## 2021-12-14 NOTE — PROGRESS NOTES
"Chief Complaint  Mass (IN THROAT X 3 WEEKS )    Subjective          Anitra Orta presents to [facility]  History of Present Illness  Soreness in the anterior throat over the past few weeks.  She said she has had some fullness for years.  She is on Prilosec for reflux.  We talked about silent reflux but she did not feel like that was an issue for her.  She is not had any difficulty swallowing or significant pain swelling.  The cephalexin did not do anything to change symptoms.  Objective   Vital Signs:   /96 (BP Location: Left arm, Patient Position: Sitting, Cuff Size: Large Adult)   Pulse 99   Temp 97.3 °F (36.3 °C) (Temporal)   Resp 16   Ht 167.6 cm (66\")   Wt 111 kg (245 lb)   SpO2 97%   BMI 39.54 kg/m²     Physical Exam  Vitals and nursing note reviewed.   Constitutional:       Appearance: Normal appearance.   HENT:      Head: Normocephalic and atraumatic.      Mouth/Throat:      Mouth: Mucous membranes are moist.      Pharynx: Oropharynx is clear. No oropharyngeal exudate or posterior oropharyngeal erythema.   Neck:      Comments: No obvious thyroidomegaly.  There is a fullness in the anterior soft tissue of the neck.  Not tender and not hard  Cardiovascular:      Rate and Rhythm: Normal rate and regular rhythm.   Pulmonary:      Effort: Pulmonary effort is normal.      Breath sounds: Normal breath sounds.   Abdominal:      General: Abdomen is flat.      Palpations: Abdomen is soft.   Musculoskeletal:         General: No swelling or deformity.      Cervical back: Neck supple.      Right lower leg: No edema.      Left lower leg: No edema.   Skin:     General: Skin is warm.      Capillary Refill: Capillary refill takes less than 2 seconds.      Findings: No rash.   Neurological:      General: No focal deficit present.      Mental Status: She is alert and oriented to person, place, and time.        Result Review : Previous labs              "   Assessment and Plan anterior neck pain.  I do not think this is an infectious salivary gland.  No think is associated with the thyroid.  Feels more like a soft tissue lipomatous lesion.  We talked about atypical reflux she is on omeprazole although a low dose but does not feel like that is a concern for now.  She does have an ENT appointment in about 2 weeks and probably wise to do endoscopy just to make sure that looks normal.  Follow-up with Dr. Chavez as scheduled or sooner if problems.  Ultrasound of the neck ordered  Diagnoses and all orders for this visit:    1. Neck mass (Primary)  -     US Head Neck Soft Tissue; Future        Follow Up   No follow-ups on file.  Patient was given instructions and counseling regarding her condition or for health maintenance advice. Please see specific information pulled into the AVS if appropriate.

## 2021-12-15 RX ORDER — BUSPIRONE HYDROCHLORIDE 7.5 MG/1
7.5 TABLET ORAL 2 TIMES DAILY
Qty: 60 TABLET | Refills: 1 | Status: SHIPPED | OUTPATIENT
Start: 2021-12-15 | End: 2022-02-15 | Stop reason: SDUPTHER

## 2021-12-15 NOTE — TELEPHONE ENCOUNTER
Rx Refill Note  Requested Prescriptions     Pending Prescriptions Disp Refills   • busPIRone (BUSPAR) 7.5 MG tablet 60 tablet 1     Sig: Take 1 tablet by mouth.      Last office visit with prescribing clinician: 8/27/2021      Next office visit with prescribing clinician: 12/27/2021            Jessica Hamilton MA  12/15/21, 13:59 EST

## 2021-12-15 NOTE — TELEPHONE ENCOUNTER
Caller: Anitra Orta    Relationship: Self    Best call back number: [phone]    Requested Prescriptions:   Requested Prescriptions     Pending Prescriptions Disp Refills   • busPIRone (BUSPAR) 7.5 MG tablet 60 tablet 1     Sig: Take 1 tablet by mouth.        Pharmacy where request should be sent: [facility] [id number] - [place] [address] AT [facility] & [address] - [phone]  - [phone] FX     Additional details provided by patient: PATIENT STATES SHE HAS LESS THAN 3 DAYS REMAINING    Does the patient have less than a 3 day supply:  [x] Yes  [] No    Alexia Carreno Rep   12/15/21 12:53 EST

## 2021-12-21 NOTE — NURSING NOTE
NURSING PROGRESS NOTE:    PATIENT ARRIVE TO [facility] FOR SCHEDULED INFUSION, PER W/C  AT 1355 .  PROCEDURE WAS PERFORMED WITHOUT INCIDENT.   PATIENT ESCORTED TO RADIOLOGY AFTER INFUSION AT 1445.  TO BE DISCHARGED HOME FROM [facility] DEPT. . CONNIE DE LE[place]N   [FreeTextEntry1] : CPX with Dr Kenney when due

## 2021-12-22 ENCOUNTER — HOSPITAL ENCOUNTER (OUTPATIENT)
Dept: ULTRASOUND IMAGING | Facility: HOSPITAL | Age: 81
Discharge: HOME | End: 2021-12-22
Admitting: INTERNAL MEDICINE

## 2021-12-22 DIAGNOSIS — R22.1 NECK MASS: ICD-10-CM

## 2021-12-22 PROCEDURE — 76536 US EXAM OF HEAD AND NECK: CPT

## 2021-12-27 ENCOUNTER — OFFICE VISIT (OUTPATIENT)
Dept: INTERNAL MEDICINE | Facility: CLINIC | Age: 81
End: 2021-12-27

## 2021-12-27 VITALS
SYSTOLIC BLOOD PRESSURE: 160 MMHG | RESPIRATION RATE: 16 BRPM | DIASTOLIC BLOOD PRESSURE: 86 MMHG | HEIGHT: 66 IN | WEIGHT: 248 LBS | BODY MASS INDEX: 39.86 KG/M2 | HEART RATE: 108 BPM | OXYGEN SATURATION: 93 % | TEMPERATURE: 96.8 F

## 2021-12-27 DIAGNOSIS — N39.0 RECURRENT UTI: ICD-10-CM

## 2021-12-27 DIAGNOSIS — E78.2 MIXED HYPERLIPIDEMIA: ICD-10-CM

## 2021-12-27 DIAGNOSIS — R73.9 HYPERGLYCEMIA: Chronic | ICD-10-CM

## 2021-12-27 DIAGNOSIS — M48.062 SPINAL STENOSIS OF LUMBAR REGION WITH NEUROGENIC CLAUDICATION: ICD-10-CM

## 2021-12-27 DIAGNOSIS — R22.1 MASS IN NECK: ICD-10-CM

## 2021-12-27 DIAGNOSIS — I10 ESSENTIAL HYPERTENSION: Primary | ICD-10-CM

## 2021-12-27 DIAGNOSIS — F41.9 ANXIETY: ICD-10-CM

## 2021-12-27 LAB
BILIRUB BLD-MCNC: NEGATIVE MG/DL
CLARITY, POC: CLEAR
COLOR UR: YELLOW
EXPIRATION DATE: NORMAL
GLUCOSE UR STRIP-MCNC: NEGATIVE MG/DL
KETONES UR QL: NEGATIVE
LEUKOCYTE EST, POC: NEGATIVE
Lab: NORMAL
NITRITE UR-MCNC: NEGATIVE MG/ML
PH UR: 7 [PH] (ref 5–8)
PROT UR STRIP-MCNC: NEGATIVE MG/DL
RBC # UR STRIP: NEGATIVE /UL
SP GR UR: 1.02 (ref 1–1.03)
UROBILINOGEN UR QL: NORMAL

## 2021-12-27 PROCEDURE — 99215 OFFICE O/P EST HI 40 MIN: CPT | Performed by: INTERNAL MEDICINE

## 2021-12-27 PROCEDURE — 81003 URINALYSIS AUTO W/O SCOPE: CPT | Performed by: INTERNAL MEDICINE

## 2021-12-27 RX ORDER — METOPROLOL SUCCINATE 50 MG/1
50 TABLET, EXTENDED RELEASE ORAL DAILY
Qty: 90 TABLET | Refills: 1 | Status: SHIPPED | OUTPATIENT
Start: 2021-12-27 | End: 2022-04-22 | Stop reason: SDUPTHER

## 2021-12-27 RX ORDER — PREGABALIN 75 MG/1
75 CAPSULE ORAL 2 TIMES DAILY
Qty: 180 CAPSULE | Refills: 1 | Status: SHIPPED | OUTPATIENT
Start: 2021-12-27 | End: 2021-12-28 | Stop reason: SDUPTHER

## 2021-12-27 RX ORDER — DIAZEPAM 2 MG/1
2 TABLET ORAL 2 TIMES DAILY PRN
Qty: 2 TABLET | Refills: 0 | Status: SHIPPED | OUTPATIENT
Start: 2021-12-27 | End: 2022-01-11

## 2021-12-27 NOTE — ASSESSMENT & PLAN NOTE
IMPROVED  - will cont on buspar 7.5 mg BID, no refills needed today  - Reviewed potential side effects of medication including sexual performance changes, insomnia, fatigue, weight changes. Pt tolerating well without any issues.   - Rx for valium x 2 sent to pharmacy for use around CT scan

## 2021-12-27 NOTE — ASSESSMENT & PLAN NOTE
UNCONTROLLED  - pt's BP not well controlled currently on nifedipine and with lower extremity edema causing pain  - will change pt to BB therapy with metoprolol XL 50 mg, pt to check BP once daily and record values, will follow up in 1 mos to titrate as needed  - unable to take ACEi, ARB, thiazides related to renal function and recurrent nephrolithiasis, now unable to tolerate CCB related to LE edema

## 2021-12-27 NOTE — PROGRESS NOTES
"Chief Complaint  Follow-up, Anxiety, Hyperlipidemia, and Hypertension    Subjective          Anitra Orta presents to [facility] for follow up and med refills. Pt taking all medications daily as prescribed with good reported compliance. No issues or side effects with meds.   She continues to work with Dr. Clark. She is currently taking an OTC supplement to help eliminate recurrent UTIs and has had success with this, no recurrent UTIs since summer since starting this supplement.   She has been to the ER and seen pain mgmt related to pain in her legs, MRI was done which does show spinal stenosis. Tried gabapentin but did not tolerate well during the day due to grogginess, currently just taking 1 in the am and 2 at night. Has gone back for epidural inj, has completed 1 in the series and  felt like things improved.   Also presented for sore throat and mass in her neck, was sent for US that showed cyst. Has follow up scheduled with ENT. Feels like it is impacting her ability to swallow.   Feels like her legs are swelling and is getting worse after starting the nifedipine. BP still not well controlled even on her current med combination.       Objective   Vital Signs:     /86   Pulse 108   Temp 96.8 °F (36 °C)   Resp 16   Ht 167.6 cm (66\")   Wt 112 kg (248 lb)   SpO2 93%   BMI 40.03 kg/m²     Physical Exam  Vitals and nursing note reviewed.   Constitutional:       General: She is not in acute distress.     Appearance: Normal appearance.   Neck:      Comments: No palpable neck mass on exam today, no tenderness or overlying skin changes  Cardiovascular:      Rate and Rhythm: Normal rate and regular rhythm.      Pulses: Normal pulses.      Heart sounds: Normal heart sounds. No murmur heard.      Pulmonary:      Effort: Pulmonary effort is normal. No respiratory distress.      Breath sounds: Normal breath sounds.   Abdominal:      General: Abdomen " is flat. Bowel sounds are normal.      Palpations: Abdomen is soft.      Tenderness: There is no abdominal tenderness.   Musculoskeletal:      Cervical back: Normal range of motion.      Right lower leg: Edema present.      Left lower leg: Edema present.      Comments: 1-2+ pitting and non-pitting edema in lower legs to knee B/L   Neurological:      Mental Status: She is alert and oriented to person, place, and time. Mental status is at baseline.   Psychiatric:         Mood and Affect: Mood normal.         Behavior: Behavior normal.          Result Review :   The following data was reviewed by: Margot Chavez MD on 12/27/2021:  CMP    CMP 7/25/21 8/9/21 9/30/21   Glucose 171 (A) 122 (A) 127 (A)   BUN 12 17 24 (A)   Creatinine 0.83 1.11 (A) 1.03 (A)   eGFR Non  Am 66 47 (A) 52 (A)   eGFR African Am  57 (A) 62   Sodium 136 141 143   Potassium 3.8 4.5 4.2   Chloride 105 103 104   Calcium 8.7 9.8 9.6   Total Protein  7.2 7.0   Albumin  4.10 4.60   Globulin  3.1 2.4   Total Bilirubin  0.3 0.3   Alkaline Phosphatase  179 (A) 169 (A)   AST (SGOT)  19 28   ALT (SGPT)  16 30   (A) Abnormal value       Comments are available for some flowsheets but are not being displayed.           CBC w/diff    CBC w/Diff 7/25/21 8/9/21 9/30/21   WBC 10.91 (A) 6.16 4.99   RBC 3.15 (A) 4.32 4.73   Hemoglobin 8.7 (A) 11.7 (A) 13.0   Hematocrit 28.7 (A) 38.2 40.5   MCV 91.1 88.4 85.6   MCH 27.6 27.1 27.5   MCHC 30.3 (A) 30.6 (A) 32.1   RDW 16.1 (A) 14.6 14.3   Platelets 168 270 238   Neutrophil Rel % 87.7 (A) 57.4 60.1   Immature Granulocyte Rel % 0.9 (A)     Lymphocyte Rel % 6.0 (A) 30.7 29.5   Monocyte Rel % 5.2 8.6 8.2   Eosinophil Rel % 0.0 (A) 2.1 1.6   Basophil Rel % 0.2 0.6 0.4   (A) Abnormal value            Lipid Panel    Lipid Panel 2/19/21 6/26/21 9/30/21   Total Cholesterol  108    Total Cholesterol 163  167   Triglycerides 164 (A) 155 (A) 148   HDL Cholesterol 46 29 (A) 50   VLDL Cholesterol 28 27 26   LDL Cholesterol   89 52 91   LDL/HDL Ratio  1.66    (A) Abnormal value       Comments are available for some flowsheets but are not being displayed.           TSH    TSH 3/25/21 6/26/21 9/30/21   TSH 1.380 1.410 2.270           Most Recent A1C    HGBA1C Most Recent 9/30/21   Hemoglobin A1C 6.30 (A)   (A) Abnormal value       Comments are available for some flowsheets but are not being displayed.           Data reviewed: Radiologic studies MRI L spine, US soft tissue neck            Assessment and Plan      Diagnoses and all orders for this visit:    1. Essential hypertension (Primary)  Assessment & Plan:  UNCONTROLLED  - pt's BP not well controlled currently on nifedipine and with lower extremity edema causing pain  - will change pt to BB therapy with metoprolol XL 50 mg, pt to check BP once daily and record values, will follow up in 1 mos to titrate as needed  - unable to take ACEi, ARB, thiazides related to renal function and recurrent nephrolithiasis, now unable to tolerate CCB related to LE edema    Orders:  -     metoprolol succinate XL (Toprol XL) 50 MG 24 hr tablet; Take 1 tablet by mouth Daily.  Dispense: 90 tablet; Refill: 1  -     Comprehensive Metabolic Panel    2. Mixed hyperlipidemia  Assessment & Plan:  CONTROLLED  - FLP today for ongoing monitoring  - cont on current moderate intensity statin, will adjust dose as indicated based on labs  - cont with good diet and lifestyle changes including increased exercise, low chol and low fat diet, and increased fiber      Orders:  -     Comprehensive Metabolic Panel  -     Lipid Panel    3. Hyperglycemia  -     Comprehensive Metabolic Panel  -     Hemoglobin A1c    4. Anxiety  Assessment & Plan:  IMPROVED  - will cont on buspar 7.5 mg BID, no refills needed today  - Reviewed potential side effects of medication including sexual performance changes, insomnia, fatigue, weight changes. Pt tolerating well without any issues.   - Rx for valium x 2 sent to pharmacy for use around CT  scan    Orders:  -     diazePAM (Valium) 2 MG tablet; Take 1 tablet by mouth 2 (Two) Times a Day As Needed for Anxiety or Sedation. Prior to CT imaging  Dispense: 2 tablet; Refill: 0    5. Spinal stenosis of lumbar region with neurogenic claudication  Assessment & Plan:  UNCONTROLLED  - MRI reviewed  - currently on gabapentin but has significant daytime side effects that preclude proper dosage during the day so not sure how helpful the medications are given limited dosage range--> will change to lyrica to see if we are able to improve her quality of life  - encouraged pt to schedule next inj series with pain mgmt physician  - follow up if not continuing to improve and feeling better    Orders:  -     pregabalin (Lyrica) 75 MG capsule; Take 1 capsule by mouth 2 (Two) Times a Day.  Dispense: 180 capsule; Refill: 1    6. Mass in neck   - reviewed US soft tissue neck which found multi-loculated cyst and recommended CT follow up   - will hold on ENT for now and determine need for their evaluation following CT imaging     Orders:  -     CT Soft Tissue Neck With & Without Contrast; Future    7. Recurrent UTI  -     POCT urinalysis dipstick, automated      I spent 42 minutes caring for Anitra on this date of service. This time includes time spent by me in the following activities:preparing for the visit, reviewing tests, obtaining and/or reviewing a separately obtained history, performing a medically appropriate examination and/or evaluation , counseling and educating the patient/family/caregiver, ordering medications, tests, or procedures, documenting information in the medical record and care coordination    Follow Up   Return in about 1 month (around 1/27/2022) for follow up.    Patient was given instructions and counseling regarding her condition or for health maintenance advice. Please see specific information pulled into the AVS if appropriate.     Hawa Chavez MD  [facility]  Phone: [phone]  Fax: [phone]

## 2021-12-27 NOTE — ASSESSMENT & PLAN NOTE
UNCONTROLLED  - MRI reviewed  - currently on gabapentin but has significant daytime side effects that preclude proper dosage during the day so not sure how helpful the medications are given limited dosage range--> will change to lyrica to see if we are able to improve her quality of life  - encouraged pt to schedule next inj series with pain mgmt physician  - follow up if not continuing to improve and feeling better

## 2021-12-28 ENCOUNTER — TELEPHONE (OUTPATIENT)
Dept: INTERNAL MEDICINE | Facility: CLINIC | Age: 81
End: 2021-12-28

## 2021-12-28 DIAGNOSIS — M48.062 SPINAL STENOSIS OF LUMBAR REGION WITH NEUROGENIC CLAUDICATION: ICD-10-CM

## 2021-12-28 LAB
ALBUMIN SERPL-MCNC: 4.1 G/DL (ref 3.7–4.7)
ALBUMIN/GLOB SERPL: 1.7 {RATIO} (ref 1.2–2.2)
ALP SERPL-CCNC: 137 IU/L (ref 44–121)
ALT SERPL-CCNC: 22 IU/L (ref 0–32)
AST SERPL-CCNC: 22 IU/L (ref 0–40)
BILIRUB SERPL-MCNC: 0.3 MG/DL (ref 0–1.2)
BUN SERPL-MCNC: 18 MG/DL (ref 8–27)
BUN/CREAT SERPL: 17 (ref 12–28)
CALCIUM SERPL-MCNC: 9.2 MG/DL (ref 8.7–10.3)
CHLORIDE SERPL-SCNC: 104 MMOL/L (ref 96–106)
CHOLEST SERPL-MCNC: 171 MG/DL (ref 100–199)
CO2 SERPL-SCNC: 24 MMOL/L (ref 20–29)
CREAT SERPL-MCNC: 1.06 MG/DL (ref 0.57–1)
GLOBULIN SER CALC-MCNC: 2.4 G/DL (ref 1.5–4.5)
GLUCOSE SERPL-MCNC: 115 MG/DL (ref 65–99)
HBA1C MFR BLD: 6.4 % (ref 4.8–5.6)
HDLC SERPL-MCNC: 54 MG/DL
LDLC SERPL CALC-MCNC: 90 MG/DL (ref 0–99)
POTASSIUM SERPL-SCNC: 4.5 MMOL/L (ref 3.5–5.2)
PROT SERPL-MCNC: 6.5 G/DL (ref 6–8.5)
SODIUM SERPL-SCNC: 145 MMOL/L (ref 134–144)
TRIGL SERPL-MCNC: 159 MG/DL (ref 0–149)
VLDLC SERPL CALC-MCNC: 27 MG/DL (ref 5–40)

## 2021-12-28 RX ORDER — PREGABALIN 75 MG/1
75 CAPSULE ORAL 2 TIMES DAILY
Qty: 180 CAPSULE | Refills: 1 | Status: SHIPPED | OUTPATIENT
Start: 2021-12-28 | End: 2022-02-15 | Stop reason: SINTOL

## 2021-12-28 NOTE — TELEPHONE ENCOUNTER
PATIENT IS CALLING IN SHE STATES THAT SHE CAN GET THE PREGABALIN CHEAPER IF IT IS SENT TO THE [facility] AND SHE IS NEEDING TO GET DOCTOR TO  RESEND IT TO THE FOLLOWING PHARMAY:      CONFIRMED PHARMACY  [address] - [address] [facility] [address] - [phone]  - [phone] FX

## 2022-01-03 ENCOUNTER — HOSPITAL ENCOUNTER (OUTPATIENT)
Dept: CT IMAGING | Facility: HOSPITAL | Age: 82
Discharge: HOME | End: 2022-01-03
Admitting: INTERNAL MEDICINE

## 2022-01-03 DIAGNOSIS — R22.1 MASS IN NECK: ICD-10-CM

## 2022-01-03 PROCEDURE — 0 IOPAMIDOL PER 1 ML: Performed by: INTERNAL MEDICINE

## 2022-01-03 PROCEDURE — 70491 CT SOFT TISSUE NECK W/DYE: CPT

## 2022-01-03 RX ADMIN — IOPAMIDOL 100 ML: 755 INJECTION, SOLUTION INTRAVENOUS at 15:41

## 2022-01-04 ENCOUNTER — TELEPHONE (OUTPATIENT)
Dept: INTERNAL MEDICINE | Facility: CLINIC | Age: 82
End: 2022-01-04

## 2022-01-04 NOTE — TELEPHONE ENCOUNTER
Ok, can try to double up and take 150 mg twice daily, will run out of pills sooner but if the higher dose works better then we can send in a new Rx at that time

## 2022-01-04 NOTE — TELEPHONE ENCOUNTER
Which pain exactly is she referring to and can we clarify which medication and how much/how often she is taking it

## 2022-01-04 NOTE — TELEPHONE ENCOUNTER
So we chnaged to the lyrica related to her inability to tolerate higher doses of gabapentin during the day, is she having any side effects from the lyrica? Does it make her sleepy after she takes it in the morning?

## 2022-01-04 NOTE — TELEPHONE ENCOUNTER
Pt called stating that the medication that she is on for pain isn't helping; asked what she needs to do?    Also states that she is going to call the ENT to set up appt

## 2022-01-05 DIAGNOSIS — M48.062 SPINAL STENOSIS OF LUMBAR REGION WITH NEUROGENIC CLAUDICATION: Primary | ICD-10-CM

## 2022-01-11 ENCOUNTER — APPOINTMENT (OUTPATIENT)
Dept: PAIN MEDICINE | Facility: HOSPITAL | Age: 82
End: 2022-01-11

## 2022-01-11 ENCOUNTER — ANESTHESIA (OUTPATIENT)
Dept: PAIN MEDICINE | Facility: HOSPITAL | Age: 82
End: 2022-01-11

## 2022-01-11 ENCOUNTER — HOSPITAL ENCOUNTER (OUTPATIENT)
Dept: PAIN MEDICINE | Facility: HOSPITAL | Age: 82
Discharge: HOME | End: 2022-01-11

## 2022-01-11 ENCOUNTER — ANESTHESIA EVENT (OUTPATIENT)
Dept: PAIN MEDICINE | Facility: HOSPITAL | Age: 82
End: 2022-01-11

## 2022-01-11 ENCOUNTER — HOSPITAL ENCOUNTER (OUTPATIENT)
Dept: GENERAL RADIOLOGY | Facility: HOSPITAL | Age: 82
Discharge: HOME | End: 2022-01-11

## 2022-01-11 VITALS
SYSTOLIC BLOOD PRESSURE: 158 MMHG | DIASTOLIC BLOOD PRESSURE: 64 MMHG | HEART RATE: 57 BPM | RESPIRATION RATE: 16 BRPM | OXYGEN SATURATION: 96 % | TEMPERATURE: 97.7 F

## 2022-01-11 DIAGNOSIS — M54.50 LOW BACK PAIN: ICD-10-CM

## 2022-01-11 DIAGNOSIS — M48.062 SPINAL STENOSIS OF LUMBAR REGION WITH NEUROGENIC CLAUDICATION: ICD-10-CM

## 2022-01-11 PROCEDURE — 25010000002 METHYLPREDNISOLONE PER 80 MG: Performed by: ANESTHESIOLOGY

## 2022-01-11 PROCEDURE — 77003 FLUOROGUIDE FOR SPINE INJECT: CPT

## 2022-01-11 PROCEDURE — 0 IOPAMIDOL 41 % SOLUTION: Performed by: ANESTHESIOLOGY

## 2022-01-11 PROCEDURE — C1755 CATHETER, INTRASPINAL: HCPCS

## 2022-01-11 RX ORDER — LIDOCAINE HYDROCHLORIDE 10 MG/ML
10 INJECTION, SOLUTION INFILTRATION; PERINEURAL ONCE
Status: COMPLETED | OUTPATIENT
Start: 2022-01-11 | End: 2022-01-11

## 2022-01-11 RX ORDER — METHYLPREDNISOLONE ACETATE 80 MG/ML
80 INJECTION, SUSPENSION INTRA-ARTICULAR; INTRALESIONAL; INTRAMUSCULAR; SOFT TISSUE ONCE
Status: COMPLETED | OUTPATIENT
Start: 2022-01-11 | End: 2022-01-11

## 2022-01-11 RX ADMIN — METHYLPREDNISOLONE ACETATE 80 MG: 80 INJECTION, SUSPENSION INTRA-ARTICULAR; INTRALESIONAL; INTRAMUSCULAR; SOFT TISSUE at 15:48

## 2022-01-11 RX ADMIN — LIDOCAINE HYDROCHLORIDE 3 ML: 10 INJECTION, SOLUTION EPIDURAL; INFILTRATION; INTRACAUDAL; PERINEURAL at 15:41

## 2022-01-11 RX ADMIN — IOPAMIDOL 3 ML: 408 INJECTION, SOLUTION INTRATHECAL at 15:47

## 2022-01-11 NOTE — H&P
"GORDO Loco    History and Physical    Patient Name: Anitra Orta  :  1940  MRN:  [id number]  Date of Admission: 2022    Subjective     Patient is a 81 y.o. female presents with chief complaint of chronic, intermitent, severe low back and leg: bilateral pain.  Onset of symptoms was gradual starting several years ago.  Symptoms are associated/aggravated by activity, standing for more than a few minutes or walking for more than a few minutes. Symptoms improve with lying down and rest.    .  Mrs. Orta presents to the anesthesia pain clinic with a complaint of chronic low back pain and pain radiating down to her legs bilaterally left worse than the right.  The radiating pain to her legs is new, she has been given gabapentin that helps the pain in the right leg but does not help the pain in her left leg.  She is also been given p.o. steroids that did not help her pain at all.  She did have an MRI performed in 10/20/2021 that revealed severe stenosis at L4-5 multilevel severe degenerative disc disease.  She reports her pain is worse with standing, walking, any activity at all.  She reports she is pain-free when she is sitting, she is also pain-free when she is lying down.  She had been taking Lyrica for her pain, this dose has been increased again with no improvement of her discomfort.  She is really worried about the discomfort in her legs that she reports is a burning or \"bubbling \"type of feeling in her legs.  She has very limited mobility. She reports that when she gets to sleep she is able to sleep.  Her pain does not wake her up from sleep and having to urinate does.  She has undergone multiple lumbar epidural steroid injections in the past, the last here was 2022.  Her  feels like she had some improvement of her pain and she was able to get around a little easier for a while.  She is unsure that she had any pain relief.  She had mixed results from her previous lumbar epidural " steroid injections and trigger point injections.  She is here to try lumbar epidural steroid injections in light of her new radiculopathy.  I discussed performing lumbar epidural steroid injection with the risks including, not limited to, bleeding and infection, post dural puncture headache, and the fact that I could not guarantee her pain would improve, in fact may worsen, or undergo no change, and she does wish to proceed at this time.  She also expressed questions about other pain management options.  We did discuss that I am limited as to the interventionalists that I am able to perform, and I did give her the names of several pain specialist, including Dr. Tima Chahal.  I asked if she would like to not do this today and follow-up with another pain clinic, but she does wish to proceed with this today.  The following portions of the patients history were reviewed and updated as appropriate: current medications, allergies, past medical history, past surgical history, past family history, past social history and problem list                Objective     Past Medical History:   Past Medical History:   Diagnosis Date   • Anxiety    • Arthritis of back    • At risk for sleep apnea    • Cataract     BILAT-SCHEDULED FOR THIS AND HAD TO CANCEL D/T SEPSIS   • Chronic pain disorder     ALL OVER PAIN   • Chronic UTI    • Closed displaced fracture of surgical neck of left humerus 6/25/2021   • Closed fracture of left proximal humerus 6/15/2021   • Closed fracture of right distal radius 6/15/2021   • COVID-19 vaccine series completed     2ND DOSE 3/23/21   • DDD (degenerative disc disease), lumbar    • Elevated cholesterol    • Fracture of wrist    • Fracture, humerus     LEFT-USING IMMOBILIZER/SLING   • GERD (gastroesophageal reflux disease)    • History of recent fall     JUNE 6-9-21   • History of sepsis     MOST RECENT JULY 2021-R/T KIDNEY STONE, UTI.  STATES THIS IS HER 3RD SEPSIS DIAGNOSIS   • History of UTI    • HTN  (hypertension)    • Hyperlipidemia    • Kidney stones     LEFT   • Macular degeneration, bilateral     WORSE ON RIGHT-ONLY PERIPHERAL VISION   • Mixed hyperlipidemia 9/16/2016   • Mixed incontinence    • Osteoarthritis    • Osteoporosis    • Panic disorder    • Personal history of urinary calculi    • PONV (postoperative nausea and vomiting)    • Scoliosis    • Tachycardia, unspecified    • Wrist fracture     RIGHT-CURRENTLY NOT WEARING BRACE FOR THIS     Past Surgical History:   Past Surgical History:   Procedure Laterality Date   • BREAST BIOPSY Left     benign   • BREAST LUMPECTOMY Left     benign   • BUNIONECTOMY Right    • COLONOSCOPY N/A 11/30/2016    Procedure: COLONOSCOPY polypectomy;  Surgeon: Adali Willard MD;  Location: [facility];  Service:    • CYSTOSCOPY BOTOX INJECTION OF BLADDER N/A 7/21/2017    Procedure: CYSTOSCOPY COAPTITE INJECTION;  Surgeon: Kevon Clark MD;  Location: [facility];  Service:    • CYSTOSCOPY W/ LITHOLAPAXY / EHL     • CYSTOSCOPY W/ URETERAL STENT PLACEMENT Left 9/12/2016    Procedure: CYSTOSCOPY URETERAL STENT INSERTION;  Surgeon: Kevon Clark MD;  Location: [facility];  Service:    • CYSTOSCOPY W/ URETERAL STENT PLACEMENT Left 8/1/2019    Procedure: CYSTOSCOPY URETERAL CATHETER/STENT INSERTION;  Surgeon: Kevon Clark MD;  Location: [facility];  Service: Urology   • CYSTOSCOPY W/ URETERAL STENT PLACEMENT Left 3/25/2021    Procedure: CYSTOSCOPY URETERAL CATHETER/STENT INSERTION;  Surgeon: Kevon Clark MD;  Location: [facility];  Service: Urology;  Laterality: Left;   • CYSTOSCOPY W/ URETERAL STENT PLACEMENT Left 7/11/2021    Procedure: CYSTOSCOPY URETERAL CATHETER/STENT INSERTION;  Surgeon: Lul Guzman MD;  Location: [facility];  Service: Urology;  Laterality: Left;  CYSTOSCOPY LEFT URETERAL CATHETER/ STENT PLACEMENT   • JOINT REPLACEMENT     • KNEE ARTHROSCOPY Right    • LUMBAR EPIDURAL INJECTION     • TONSILLECTOMY AND ADENOIDECTOMY     •  URETEROSCOPY LASER LITHOTRIPSY WITH STENT INSERTION Left 10/5/2016    Procedure: LT URETEROSCOPY LASER LITHOTRIPSY STONE BASKET EXTRACTION AND STENT ;  Surgeon: Kevon Clark MD;  Location: [facility]  Service:    • URETEROSCOPY LASER LITHOTRIPSY WITH STENT INSERTION Left 2021    Procedure: LEFT URETEROSCOPY STONE MANIPULATION STENT EXCHANGE, LASER LITHOTRIPSY, CYSTOSCOPY, STONE BASKET EXTRACTION;  Surgeon: Kevon Clark MD;  Location: [facility]  Service: Urology;  Laterality: Left;     Family History:   Family History   Problem Relation Age of Onset   • Heart defect Mother    • Heart attack Mother 70   • Sudden death Mother    • Heart defect Father    • Heart attack Father 49   • Hypertension Father    • Sudden death Father    • Valvular heart disease Brother    • Malig Hyperthermia Neg Hx    • Breast cancer Neg Hx      Social History:   Social History     Socioeconomic History   • Marital status:    Tobacco Use   • Smoking status: Former Smoker     Packs/day: 1.00     Years: 15.00     Pack years: 15.00     Types: Cigarettes     Start date: 1956     Quit date: 1980     Years since quittin.0   • Smokeless tobacco: Never Used   • Tobacco comment: quit    Vaping Use   • Vaping Use: Never used   Substance and Sexual Activity   • Alcohol use: No   • Drug use: Not Currently   • Sexual activity: Not Currently     Partners: Male     Comment: Frequent UTIs       Vital Signs Range for the last 24 hours  Temperature: Temp:  [97.7 °F (36.5 °C)] 97.7 °F (36.5 °C)   Temp Source: Temp src: Oral   BP: BP: (143)/(86) 143/86   Pulse: Heart Rate:  [56] 56   Respirations: Resp:  [16] 16   SPO2: SpO2:  [94 %] 94 %   O2 Amount (l/min):     O2 Devices Device (Oxygen Therapy): room air   Weight:           --------------------------------------------------------------------------------    Current Outpatient Medications   Medication Sig Dispense Refill   • busPIRone (BUSPAR) 7.5 MG tablet  Take 1 tablet by mouth 2 (Two) Times a Day. 60 tablet 1   • imipramine (Tofranil) 50 MG tablet Take 1 tablet by mouth 2 (Two) Times a Day. 180 tablet 1   • metoprolol succinate XL (Toprol XL) 50 MG 24 hr tablet Take 1 tablet by mouth Daily. 90 tablet 1   • multivitamin with minerals (PRESERVISION AREDS PO) Take 1 tablet by mouth 2 (Two) Times a Day.     • omeprazole (priLOSEC) 20 MG capsule Take 1 capsule by mouth Daily. 90 capsule 1   • pregabalin (Lyrica) 75 MG capsule Take 1 capsule by mouth 2 (Two) Times a Day. 180 capsule 1   • simvastatin (ZOCOR) 40 MG tablet Take 1 tablet by mouth Every Night. for cholesterol 90 tablet 1   • vitamin B-12 (CYANOCOBALAMIN) 1000 MCG tablet Take 1 tablet by mouth Daily. 30 tablet 0     Current Facility-Administered Medications   Medication Dose Route Frequency Provider Last Rate Last Admin   • iopamidol (ISOVUE-M 200) injection 41%  12 mL Epidural Once PRN Macrina Martinez MD       • lidocaine (XYLOCAINE) 1 % injection 10 mL  10 mL Infiltration Once Macrina Martinez MD       • methylPREDNISolone acetate (DEPO-medrol) injection 80 mg  80 mg Intra-articular Once Macrina Martinez MD           --------------------------------------------------------------------------------  Assessment/Plan      Anesthesia Evaluation     Patient summary reviewed and Nursing notes reviewed   history of anesthetic complications: PONV  NPO Solid Status: N/A  NPO Liquid Status: N/A    Pain impairs ability to perform ADLs: Ambulation, Exercise/Activity, Housekeeping and Meal prep/Eating  Modalities previously tried to control pain with limited effectiveness within the last 4-6 weeks: Rest and Prescription medications     Airway   Mallampati: II  TM distance: >3 FB  Neck ROM: full  No difficulty expected  Dental - normal exam     Pulmonary - negative pulmonary ROS and normal exam    breath sounds clear to auscultation  Cardiovascular - normal exam  Exercise tolerance: poor (<4 METS)    Rhythm: regular  Rate:  normal    (+) hypertension well controlled less than 2 medications, hyperlipidemia,       Neuro/Psych  (+) weakness (legs bilaterally), numbness,     GI/Hepatic/Renal/Endo    (+)  GERD well controlled,  renal disease stones,     Musculoskeletal     (+) back pain, radiculopathy Left lower extremity and Right lower extremity  Abdominal  - normal exam   Substance History - negative use     OB/GYN negative ob/gyn ROS         Other   arthritis,                 Diagnosis and Plan    Treatment Plan  ASA 3   Patient has had previous injection/procedure with 25-50% improvement.   Procedures: Lumbar Epidural Steroid Injection(LESI), With fluoroscopy, Without ultrasound,      Anesthetic plan and risks discussed with patient and spouse.          Diagnosis     * Spinal stenosis of lumbar region with neurogenic claudication [M48.062]

## 2022-01-13 ENCOUNTER — TELEPHONE (OUTPATIENT)
Dept: PAIN MEDICINE | Facility: HOSPITAL | Age: 82
End: 2022-01-13

## 2022-01-18 ENCOUNTER — PATIENT MESSAGE (OUTPATIENT)
Dept: INTERNAL MEDICINE | Facility: CLINIC | Age: 82
End: 2022-01-18

## 2022-01-18 DIAGNOSIS — M48.062 SPINAL STENOSIS OF LUMBAR REGION WITH NEUROGENIC CLAUDICATION: Primary | ICD-10-CM

## 2022-01-18 NOTE — TELEPHONE ENCOUNTER
Referral has been placed to specific physician she has requested. I will say that there are significant staffing shortages right now everywhere, so while I have placed the order, that does not guarantee that his office will get it and begin the processing today, but hopefully this will get the ball rolling.

## 2022-01-18 NOTE — TELEPHONE ENCOUNTER
From: Anitra Orta  To: Margot Chavez MD  Sent: 1/18/2022 2:15 PM EST  Subject: Referral for Pair Management\ Anitra Orta 1940     I am trying to make an appointment at [facility] with  , I need a referral, stating why I need pain management. I have my latest copy of MRI Scan. There fax number is [phone].  Please include my name and Phone # [phone]. I would appreciate it if you could respond as soon as possible because it takes 5-10 days before the Doctor decides whether or not he will take your case,  Thank you.  Susana Orta

## 2022-01-30 ENCOUNTER — PATIENT MESSAGE (OUTPATIENT)
Dept: INTERNAL MEDICINE | Facility: CLINIC | Age: 82
End: 2022-01-30

## 2022-01-30 DIAGNOSIS — E78.2 MIXED HYPERLIPIDEMIA: Chronic | ICD-10-CM

## 2022-01-31 RX ORDER — SIMVASTATIN 40 MG
40 TABLET ORAL NIGHTLY
Qty: 90 TABLET | Refills: 1 | Status: SHIPPED | OUTPATIENT
Start: 2022-01-31

## 2022-01-31 NOTE — TELEPHONE ENCOUNTER
From: Anitra Orta  To: Margot Chavez MD  Sent: 1/30/2022 8:34 PM EST  Subject: Prescription    I had asked [facility] to renew my prescription on Wednesday. They say they are waiting for approval,   Is there a reason the prescription cannot be refilled or is it just an oversight    Thank you  Susana Coppola 1940

## 2022-02-15 ENCOUNTER — OFFICE VISIT (OUTPATIENT)
Dept: INTERNAL MEDICINE | Facility: CLINIC | Age: 82
End: 2022-02-15

## 2022-02-15 VITALS
WEIGHT: 243 LBS | DIASTOLIC BLOOD PRESSURE: 84 MMHG | HEIGHT: 66 IN | HEART RATE: 85 BPM | RESPIRATION RATE: 16 BRPM | TEMPERATURE: 96.8 F | OXYGEN SATURATION: 96 % | SYSTOLIC BLOOD PRESSURE: 136 MMHG | BODY MASS INDEX: 39.05 KG/M2

## 2022-02-15 DIAGNOSIS — F41.9 ANXIETY: ICD-10-CM

## 2022-02-15 DIAGNOSIS — M48.062 SPINAL STENOSIS OF LUMBAR REGION WITH NEUROGENIC CLAUDICATION: Primary | ICD-10-CM

## 2022-02-15 DIAGNOSIS — I10 ESSENTIAL HYPERTENSION: ICD-10-CM

## 2022-02-15 PROCEDURE — 99214 OFFICE O/P EST MOD 30 MIN: CPT | Performed by: INTERNAL MEDICINE

## 2022-02-15 RX ORDER — BUSPIRONE HYDROCHLORIDE 10 MG/1
10 TABLET ORAL 2 TIMES DAILY
Qty: 60 TABLET | Refills: 3 | Status: SHIPPED | OUTPATIENT
Start: 2022-02-15 | End: 2023-02-10

## 2022-02-15 NOTE — ASSESSMENT & PLAN NOTE
UNCONTROLLED  - MRI reviewed  - was on gabapentin but not able to tolerate am dose related to sedation, tried to change to lyrica but has not seen any improvement in her pain and has developed some side effects--> pt would like to try off all medications right now as she does nto feel the gabapentin was helping at all, but suspect she will find it was doing more than she realized, has old Rx at home and advised she can restart whenever she likes  - pt has received several epidural inj with an interventional pain specialist without much benefit  - will place new referral for pain management physician that does medical pain mgmt  - follow up if not continuing to improve and feeling better

## 2022-02-15 NOTE — PROGRESS NOTES
"Chief Complaint  Follow-up and Hypertension    Subjective          Anitra Orta presents to [facility] for follow up on HTN.   She is also having horrible pain starting in her buttocks and radiating down into both legs. She is losing balance and feels like she is going to fall all the time. She has seen Dr. Martinez with pain group with [facility] but she is only interventional pain, she is seeking help with pain medications at this time. We attempted to change her from gabapentin to lyrica for improved neuropathic pain control, but pt not tolerating this medication well at this time, not helping symptoms but also causing more dizziness and potentially contributing to hallucinations as below.   She is having visual hallucinations, always first thing in the morning upon awakening. Has been ongoing x 2 mos. Never during the day or when trying to fall asleep.   BP in better control today, Metoprolol dose was increased at her last appt with good success.   Anxiety was addressed with addition of buspar that had been working well.     Objective   Vital Signs:     /84   Pulse 85   Temp 96.8 °F (36 °C)   Resp 16   Ht 167.6 cm (66\")   Wt 110 kg (243 lb)   SpO2 96%   BMI 39.22 kg/m²     Physical Exam  Vitals and nursing note reviewed.   Constitutional:       General: She is not in acute distress.     Appearance: Normal appearance.   Cardiovascular:      Rate and Rhythm: Normal rate and regular rhythm.      Pulses: Normal pulses.      Heart sounds: Normal heart sounds. No murmur heard.      Pulmonary:      Effort: Pulmonary effort is normal. No respiratory distress.      Breath sounds: Normal breath sounds.   Abdominal:      General: Abdomen is flat. Bowel sounds are normal.      Palpations: Abdomen is soft.      Tenderness: There is no abdominal tenderness.   Musculoskeletal:      Right lower leg: No edema.      Left lower leg: No edema.   Neurological:      " Mental Status: She is alert and oriented to person, place, and time. Mental status is at baseline.   Psychiatric:         Mood and Affect: Mood normal.         Behavior: Behavior normal.          Result Review :   The following data was reviewed by: Margot Chavez MD on 02/15/2022:  CMP    CMP 8/9/21 9/30/21 12/27/21   Glucose 122 (A) 127 (A) 115 (A)   BUN 17 24 (A) 18   Creatinine 1.11 (A) 1.03 (A) 1.06 (A)   eGFR Non  Am 47 (A) 52 (A) 50 (A)   eGFR  Am 57 (A) 62 57 (A)   Sodium 141 143 145 (A)   Potassium 4.5 4.2 4.5   Chloride 103 104 104   Calcium 9.8 9.6 9.2   Total Protein 7.2 7.0 6.5   Albumin 4.10 4.60 4.1   Globulin 3.1 2.4 2.4   Total Bilirubin 0.3 0.3 0.3   Alkaline Phosphatase 179 (A) 169 (A) 137 (A)   AST (SGOT) 19 28 22   ALT (SGPT) 16 30 22   (A) Abnormal value       Comments are available for some flowsheets but are not being displayed.           CBC w/diff    CBC w/Diff 7/25/21 8/9/21 9/30/21   WBC 10.91 (A) 6.16 4.99   RBC 3.15 (A) 4.32 4.73   Hemoglobin 8.7 (A) 11.7 (A) 13.0   Hematocrit 28.7 (A) 38.2 40.5   MCV 91.1 88.4 85.6   MCH 27.6 27.1 27.5   MCHC 30.3 (A) 30.6 (A) 32.1   RDW 16.1 (A) 14.6 14.3   Platelets 168 270 238   Neutrophil Rel % 87.7 (A) 57.4 60.1   Immature Granulocyte Rel % 0.9 (A)     Lymphocyte Rel % 6.0 (A) 30.7 29.5   Monocyte Rel % 5.2 8.6 8.2   Eosinophil Rel % 0.0 (A) 2.1 1.6   Basophil Rel % 0.2 0.6 0.4   (A) Abnormal value            Lipid Panel    Lipid Panel 6/26/21 9/30/21 12/27/21   Total Cholesterol 108     Total Cholesterol  167 171   Triglycerides 155 (A) 148 159 (A)   HDL Cholesterol 29 (A) 50 54   VLDL Cholesterol 27 26 27   LDL Cholesterol  52 91 90   LDL/HDL Ratio 1.66     (A) Abnormal value       Comments are available for some flowsheets but are not being displayed.           TSH    TSH 3/25/21 6/26/21 9/30/21   TSH 1.380 1.410 2.270           Most Recent A1C    HGBA1C Most Recent 12/27/21   Hemoglobin A1C 6.4 (A)   (A) Abnormal value        Comments are available for some flowsheets but are not being displayed.           Data reviewed: Consultant notes pain management with Dr. Martinez            Assessment and Plan      Diagnoses and all orders for this visit:    1. Spinal stenosis of lumbar region with neurogenic claudication (Primary)  Assessment & Plan:  UNCONTROLLED  - MRI reviewed  - was on gabapentin but not able to tolerate am dose related to sedation, tried to change to lyrica but has not seen any improvement in her pain and has developed some side effects--> pt would like to try off all medications right now as she does nto feel the gabapentin was helping at all, but suspect she will find it was doing more than she realized, has old Rx at home and advised she can restart whenever she likes  - pt has received several epidural inj with an interventional pain specialist without much benefit  - will place new referral for pain management physician that does medical pain mgmt  - follow up if not continuing to improve and feeling better    Orders:  -     Ambulatory Referral to Pain Management    2. Anxiety  Assessment & Plan:  UNCONTROLLED  - had improved initially with starting buspar, but now rising again and with anticipated stressors in coming months with placing house on the market and moving  - will increase buspar dose to 10 mg BID--> new Rx sent  - Reviewed potential side effects of medication including sexual performance changes, insomnia, fatigue, weight changes. Pt tolerating well without any issues.       Orders:  -     busPIRone (BUSPAR) 10 MG tablet; Take 1 tablet by mouth 2 (Two) Times a Day.  Dispense: 60 tablet; Refill: 3    3. Essential hypertension  Assessment & Plan:  CONTROLLED  - cont on Metporolol 50 mg once daily, tolerating well and BP now in goal range  - pt to check BP once daily and record values  - unable to take ACEi, ARB, thiazides related to renal function and recurrent nephrolithiasis, now unable to tolerate CCB  related to LE edema          Follow Up   Return in about 2 months (around 4/15/2022) for follow up.    Patient was given instructions and counseling regarding her condition or for health maintenance advice. Please see specific information pulled into the AVS if appropriate.     Hawa Chavez MD  [facility] [place] [facility]  Phone: [phone]  Fax: [phone]

## 2022-02-15 NOTE — ASSESSMENT & PLAN NOTE
CONTROLLED  - cont on Metporolol 50 mg once daily, tolerating well and BP now in goal range  - pt to check BP once daily and record values  - unable to take ACEi, ARB, thiazides related to renal function and recurrent nephrolithiasis, now unable to tolerate CCB related to LE edema

## 2022-02-15 NOTE — ASSESSMENT & PLAN NOTE
UNCONTROLLED  - had improved initially with starting buspar, but now rising again and with anticipated stressors in coming months with placing house on the market and moving  - will increase buspar dose to 10 mg BID--> new Rx sent  - Reviewed potential side effects of medication including sexual performance changes, insomnia, fatigue, weight changes. Pt tolerating well without any issues.

## 2022-02-16 ENCOUNTER — PATIENT MESSAGE (OUTPATIENT)
Dept: INTERNAL MEDICINE | Facility: CLINIC | Age: 82
End: 2022-02-16

## 2022-02-16 DIAGNOSIS — M48.062 SPINAL STENOSIS OF LUMBAR REGION WITH NEUROGENIC CLAUDICATION: Primary | ICD-10-CM

## 2022-02-16 RX ORDER — HYDROCODONE BITARTRATE AND ACETAMINOPHEN 5; 325 MG/1; MG/1
1 TABLET ORAL 2 TIMES DAILY PRN
Qty: 60 TABLET | Refills: 0 | Status: SHIPPED | OUTPATIENT
Start: 2022-02-16 | End: 2022-03-14 | Stop reason: SDUPTHER

## 2022-02-16 RX ORDER — GABAPENTIN 300 MG/1
600 CAPSULE ORAL 2 TIMES DAILY
Qty: 360 CAPSULE | Refills: 1 | Status: SHIPPED | OUTPATIENT
Start: 2022-02-16 | End: 2022-04-22 | Stop reason: SDUPTHER

## 2022-02-16 NOTE — TELEPHONE ENCOUNTER
From: Anitra Orta  To: Margot Chavez MD  Sent: 2/16/2022 8:04 AM EST  Subject: Visit Yesterday    Dr Cardoso Please order me a RX for pain at [facility] I will not make it to [place] this week-end for a week if I don/t have something as I am in a lot of pain. Regarding the Gabapentin Dr Fernandez put me on 2 tablets in AM and 2 tablets in PM, I have enough for 13.5 fays please put in new order for the 180 days as seen on previous order.  Thank You,   Anitra Orta 1940

## 2022-02-16 NOTE — TELEPHONE ENCOUNTER
I have sent a refill for the gabapentin to her pharmacy. I have also sent a one time Rx for pain medication for as needed use related to her back pain. Of note, she needs to be careful when taking these because she was already feeling shaky and these medications certainly have the potential to make that worse. She has taken them before with kidney stones, so she should be familiar with how she typically responds to these medications, but again, just be very cautious.

## 2022-02-25 NOTE — ANESTHESIA PROCEDURE NOTES
PAIN Epidural block    Pre-sedation assessment completed: 1/11/2022 3:37 PM    Patient reassessed immediately prior to procedure    Patient location during procedure: pain clinic  Start Time: 1/11/2022 3:40 PM  Stop Time: 1/11/2022 3:49 PM  Indication:procedure for pain  Performed By  Anesthesiologist: Macrina Martinez MD  Preanesthetic Checklist  Completed: patient identified, site marked, risks and benefits discussed, surgical consent, monitors and equipment checked, pre-op evaluation and timeout performed  Prep:  Pt Position:prone  Sterile Tech:cap, gloves, mask and sterile barrier  Prep:chlorhexidine gluconate and isopropyl alcohol  Monitoring:blood pressure monitoring and continuous pulse oximetry  Procedure:Sedation: no     Approach:midline  Guidance: fluoroscopy  Location:lumbar  Level:4-5  Needle Type:Tuohy  Needle Gauge:20 G  Aspiration:negative  Test Dose:negative  Medications:  Preservative Free Saline:6mL  Isovue:1mL  Comments:Skin infiltration with 1% lidocaine using 27 G needle.Depomedrol:80  Post Assessment:  Post-procedure: band aid applied.  Pt Tolerance:patient tolerated the procedure well with no apparent complications  Complications:no            
Implemented All Universal Safety Interventions:  [place] to call system. Call bell, personal items and telephone within reach. Instruct patient to call for assistance. Room bathroom lighting operational. Non-slip footwear when patient is off stretcher. Physically safe environment: no spills, clutter or unnecessary equipment. Stretcher in lowest position, wheels locked, appropriate side rails in place.

## 2022-03-14 DIAGNOSIS — M48.062 SPINAL STENOSIS OF LUMBAR REGION WITH NEUROGENIC CLAUDICATION: ICD-10-CM

## 2022-03-14 RX ORDER — HYDROCODONE BITARTRATE AND ACETAMINOPHEN 5; 325 MG/1; MG/1
1 TABLET ORAL 2 TIMES DAILY PRN
Qty: 60 TABLET | Refills: 0 | Status: SHIPPED | OUTPATIENT
Start: 2022-03-16 | End: 2022-03-24 | Stop reason: SDUPTHER

## 2022-03-14 RX ORDER — IMIPRAMINE HCL 50 MG
50 TABLET ORAL 2 TIMES DAILY
Qty: 180 TABLET | Refills: 1 | Status: SHIPPED | OUTPATIENT
Start: 2022-03-14

## 2022-03-14 NOTE — TELEPHONE ENCOUNTER
Pt called stating that she has a bottle of hydrocodone that you prescribed on 02/16/2022; asked for a refill on that and the imipramine.

## 2022-03-14 NOTE — TELEPHONE ENCOUNTER
Oh my goodness she is correct, when I scrolled through her Torito earlier I must have missed that up top, my mistake completely, have sent in a refill on the imipramine and the hydrocodone. The hydrocodone was filled on 2/16 and can only be filled every 30 days, so it cannot be picked up until Wed, but hte Rx has been sent so it should be ready for her in a couple days when she is eligible

## 2022-03-24 ENCOUNTER — OFFICE VISIT (OUTPATIENT)
Dept: INTERNAL MEDICINE | Facility: CLINIC | Age: 82
End: 2022-03-24

## 2022-03-24 VITALS
SYSTOLIC BLOOD PRESSURE: 128 MMHG | BODY MASS INDEX: 38.6 KG/M2 | DIASTOLIC BLOOD PRESSURE: 72 MMHG | TEMPERATURE: 97.1 F | OXYGEN SATURATION: 94 % | RESPIRATION RATE: 16 BRPM | HEIGHT: 66 IN | WEIGHT: 240.2 LBS | HEART RATE: 90 BPM

## 2022-03-24 DIAGNOSIS — N39.0 RECURRENT UTI: Primary | ICD-10-CM

## 2022-03-24 DIAGNOSIS — I10 ESSENTIAL HYPERTENSION: ICD-10-CM

## 2022-03-24 DIAGNOSIS — M48.062 SPINAL STENOSIS OF LUMBAR REGION WITH NEUROGENIC CLAUDICATION: ICD-10-CM

## 2022-03-24 LAB
BILIRUB BLD-MCNC: NEGATIVE MG/DL
CLARITY, POC: CLEAR
COLOR UR: YELLOW
EXPIRATION DATE: NORMAL
GLUCOSE UR STRIP-MCNC: NEGATIVE MG/DL
KETONES UR QL: NEGATIVE
LEUKOCYTE EST, POC: NEGATIVE
Lab: NORMAL
NITRITE UR-MCNC: NEGATIVE MG/ML
PH UR: 5.5 [PH] (ref 5–8)
PROT UR STRIP-MCNC: NEGATIVE MG/DL
RBC # UR STRIP: NEGATIVE /UL
SP GR UR: 1.02 (ref 1–1.03)
UROBILINOGEN UR QL: NORMAL

## 2022-03-24 PROCEDURE — 99214 OFFICE O/P EST MOD 30 MIN: CPT | Performed by: INTERNAL MEDICINE

## 2022-03-24 PROCEDURE — 81003 URINALYSIS AUTO W/O SCOPE: CPT | Performed by: INTERNAL MEDICINE

## 2022-03-24 RX ORDER — HYDROCODONE BITARTRATE AND ACETAMINOPHEN 5; 325 MG/1; MG/1
TABLET ORAL
Qty: 120 TABLET | Refills: 0 | Status: SHIPPED | OUTPATIENT
Start: 2022-03-31 | End: 2022-04-22 | Stop reason: SDUPTHER

## 2022-03-24 NOTE — ASSESSMENT & PLAN NOTE
UNCONTROLLED  - MRI reviewed  - pt has received several epidural inj with an interventional pain specialist without much benefit  - attempted new referral for pain mgmt but appt was after date pt was moving to [place], has follow up scheduled with new PCP and pain specialist there within a month of her move  - currently on gabapentin 600 mg BID and does not feel this is very effective for her  - was started on pain medications for pain mgmt, will increase to allow Norco5-10 mg BID based on pain level, no red flags for abuse or overuse, do believe pt is really in pain but do want her to establish with pain physician once she gets to  [place]   - Reviewed controlled nature of substance, potential for abuse/addiction, and possible adverse effects of medication. No red flags for abuse at this time.   - Torito checked and appropriate.

## 2022-03-24 NOTE — PROGRESS NOTES
"Chief Complaint  Hypertension (3 MO F/U//PATIENT IS MOVING TO [place] SOON)    Subjective          Anitra Orta presents to [facility] for FOLLOW UP AND MED REFILLS. Pt is moving to [place] soon and wanting to make sure she has all of her medication in place.   She is still in excruciating pain, has been prescribed narcotics but notes they do not seem to be effective. Has tried to take 2 at a time with some improvement but does not have enough pills to take this amount routinely. Gabapentin does not seem to be effective at her current dose. Feels like her L left is \"effervescent\" and bubbling all the time. Pain is in  Her L lower back and radiates down her L leg constantly.   BP stable today on current meds.   No hematuria currently, no symptoms of UTI or recurrent nephrolithiasis.   Still pretty stressed and anxious related to the move and selling the house. Taking her meds with the buspar but not sure how effective they are at this time.       Objective   Vital Signs:     /72   Pulse 90   Temp 97.1 °F (36.2 °C)   Resp 16   Ht 167.6 cm (66\")   Wt 109 kg (240 lb 3.2 oz)   SpO2 94%   BMI 38.77 kg/m²     Physical Exam  Vitals and nursing note reviewed.   Constitutional:       General: She is not in acute distress.     Appearance: Normal appearance.   Cardiovascular:      Rate and Rhythm: Normal rate and regular rhythm.      Pulses: Normal pulses.      Heart sounds: Normal heart sounds. No murmur heard.  Pulmonary:      Effort: Pulmonary effort is normal. No respiratory distress.      Breath sounds: Normal breath sounds.   Abdominal:      General: Abdomen is flat. Bowel sounds are normal.      Palpations: Abdomen is soft.      Tenderness: There is no abdominal tenderness.   Musculoskeletal:      Right lower leg: No edema.      Left lower leg: No edema.   Neurological:      Mental Status: She is alert and oriented to person, place, and time. Mental status is " at baseline.      Motor: Weakness (in LLE due to pain) present.      Gait: Gait abnormal (uses walker for ambulatory aid).   Psychiatric:         Mood and Affect: Mood normal.         Behavior: Behavior normal.          Brief Urine Lab Results  (Last result in the past 365 days)      Color   Clarity   Blood   Leuk Est   Nitrite   Protein   CREAT   Urine HCG        03/24/22 1253 Yellow   Clear   Negative   Negative   Negative   Negative                   Result Review :   The following data was reviewed by: Margot Chavez MD on 03/24/2022:  CMP    CMP 8/9/21 9/30/21 12/27/21   Glucose 122 (A) 127 (A) 115 (A)   BUN 17 24 (A) 18   Creatinine 1.11 (A) 1.03 (A) 1.06 (A)   eGFR Non  Am 47 (A) 52 (A) 50 (A)   eGFR  Am 57 (A) 62 57 (A)   Sodium 141 143 145 (A)   Potassium 4.5 4.2 4.5   Chloride 103 104 104   Calcium 9.8 9.6 9.2   Total Protein 7.2 7.0 6.5   Albumin 4.10 4.60 4.1   Globulin 3.1 2.4 2.4   Total Bilirubin 0.3 0.3 0.3   Alkaline Phosphatase 179 (A) 169 (A) 137 (A)   AST (SGOT) 19 28 22   ALT (SGPT) 16 30 22   (A) Abnormal value       Comments are available for some flowsheets but are not being displayed.           CBC w/diff    CBC w/Diff 7/25/21 8/9/21 9/30/21   WBC 10.91 (A) 6.16 4.99   RBC 3.15 (A) 4.32 4.73   Hemoglobin 8.7 (A) 11.7 (A) 13.0   Hematocrit 28.7 (A) 38.2 40.5   MCV 91.1 88.4 85.6   MCH 27.6 27.1 27.5   MCHC 30.3 (A) 30.6 (A) 32.1   RDW 16.1 (A) 14.6 14.3   Platelets 168 270 238   Neutrophil Rel % 87.7 (A) 57.4 60.1   Immature Granulocyte Rel % 0.9 (A)     Lymphocyte Rel % 6.0 (A) 30.7 29.5   Monocyte Rel % 5.2 8.6 8.2   Eosinophil Rel % 0.0 (A) 2.1 1.6   Basophil Rel % 0.2 0.6 0.4   (A) Abnormal value            Lipid Panel    Lipid Panel 6/26/21 9/30/21 12/27/21   Total Cholesterol 108     Total Cholesterol  167 171   Triglycerides 155 (A) 148 159 (A)   HDL Cholesterol 29 (A) 50 54   VLDL Cholesterol 27 26 27   LDL Cholesterol  52 91 90   LDL/HDL Ratio 1.66     (A) Abnormal  value       Comments are available for some flowsheets but are not being displayed.           TSH    TSH 6/26/21 9/30/21   TSH 1.410 2.270           Most Recent A1C    HGBA1C Most Recent 12/27/21   Hemoglobin A1C 6.4 (A)   (A) Abnormal value       Comments are available for some flowsheets but are not being displayed.              Assessment and Plan      Diagnoses and all orders for this visit:    1. Recurrent UTI (Primary)   - UA today clear and pt is asymptomatic   - has started OTC urinary supplement and has not had any recurrent urinary stones or UTI type symptoms since     Orders:  -     POCT urinalysis dipstick, automated    2. Spinal stenosis of lumbar region with neurogenic claudication  Assessment & Plan:  UNCONTROLLED  - MRI reviewed  - pt has received several epidural inj with an interventional pain specialist without much benefit  - attempted new referral for pain mgmt but appt was after date pt was moving to [place], has follow up scheduled with new PCP and pain specialist there within a month of her move  - currently on gabapentin 600 mg BID and does not feel this is very effective for her  - was started on pain medications for pain mgmt, will increase to allow Norco5-10 mg BID based on pain level, no red flags for abuse or overuse, do believe pt is really in pain but do want her to establish with pain physician once she gets to  [place]   - Reviewed controlled nature of substance, potential for abuse/addiction, and possible adverse effects of medication. No red flags for abuse at this time.   - [place] checked and appropriate.         Orders:  -     HYDROcodone-acetaminophen (NORCO) 5-325 MG per tablet; Take 1-2 tabs twice daily as needed for pain.  Dispense: 120 tablet; Refill: 0    3. Essential hypertension  Assessment & Plan:  CONTROLLED  - cont on Metporolol 50 mg once daily, tolerating well and BP now in goal range  - pt to check BP once daily and record values  - unable to take ACEi, ARB, thiazides  related to renal function and recurrent nephrolithiasis, now unable to tolerate CCB related to LE edema    Follow Up   Return if symptoms worsen or fail to improve.    Patient was given instructions and counseling regarding her condition or for health maintenance advice. Please see specific information pulled into the AVS if appropriate.     Hawa Chavez MD  [facility] [place] [facility]  Phone: [phone]  Fax: [phone]

## 2022-04-22 DIAGNOSIS — M48.062 SPINAL STENOSIS OF LUMBAR REGION WITH NEUROGENIC CLAUDICATION: ICD-10-CM

## 2022-04-22 DIAGNOSIS — I10 ESSENTIAL HYPERTENSION: ICD-10-CM

## 2022-04-22 RX ORDER — GABAPENTIN 300 MG/1
CAPSULE ORAL
Qty: 450 CAPSULE | Refills: 1 | Status: SHIPPED | OUTPATIENT
Start: 2022-04-22 | End: 2023-01-20 | Stop reason: SDUPTHER

## 2022-04-22 RX ORDER — HYDROCODONE BITARTRATE AND ACETAMINOPHEN 5; 325 MG/1; MG/1
TABLET ORAL
Qty: 180 TABLET | Refills: 0 | Status: SHIPPED | OUTPATIENT
Start: 2022-04-22 | End: 2023-02-10

## 2022-04-22 RX ORDER — METOPROLOL SUCCINATE 50 MG/1
50 TABLET, EXTENDED RELEASE ORAL DAILY
Qty: 90 TABLET | Refills: 1 | Status: SHIPPED | OUTPATIENT
Start: 2022-04-22

## 2022-04-22 NOTE — TELEPHONE ENCOUNTER
----- Message from Anitra Orta sent at 2022  1:01 PM EDT -----  Regarding: Prescription Refills Anitra Orta  1940  Dear Dr. Chavez;  As you are aware I will be leaving for [place] on .  I would appreciate if you would refill the following medications to [facility],  as I have made an appointment with primary care in [place] for (first available).    Gabapentin  Hydrocodone  Metoprolol ER    I would appreciate if you could do this asap as we are in the midst of packing and I would like to have this squared away.  Thanks so much for your attention to this matter and for the excellent care you have provided for me.

## 2022-04-22 NOTE — TELEPHONE ENCOUNTER
Rx Refill Note  Requested Prescriptions     Pending Prescriptions Disp Refills   • gabapentin (NEURONTIN) 300 MG capsule 360 capsule 1     Sig: Take 2 capsules by mouth 2 (Two) Times a Day.   • HYDROcodone-acetaminophen (NORCO) 5-325 MG per tablet 120 tablet 0     Sig: Take 1-2 tabs twice daily as needed for pain.   • metoprolol succinate XL (Toprol XL) 50 MG 24 hr tablet 90 tablet 1     Sig: Take 1 tablet by mouth Daily.      Last office visit with prescribing clinician: 3/24/2022      Next office visit with prescribing clinician: Visit date not found            Olena Dixon MA  04/22/22, 13:11 EDT

## 2022-09-01 ENCOUNTER — APPOINTMENT (OUTPATIENT)
Dept: ULTRASOUND IMAGING | Facility: HOSPITAL | Age: 82
End: 2022-09-01

## 2023-01-20 ENCOUNTER — TELEPHONE (OUTPATIENT)
Dept: INTERNAL MEDICINE | Facility: CLINIC | Age: 83
End: 2023-01-20
Payer: MEDICARE

## 2023-01-20 DIAGNOSIS — M48.062 SPINAL STENOSIS OF LUMBAR REGION WITH NEUROGENIC CLAUDICATION: ICD-10-CM

## 2023-01-20 RX ORDER — GABAPENTIN 300 MG/1
300 CAPSULE ORAL 4 TIMES DAILY
Qty: 120 CAPSULE | Refills: 1 | Status: SHIPPED | OUTPATIENT
Start: 2023-01-20 | End: 2023-03-21 | Stop reason: SDUPTHER

## 2023-01-20 NOTE — TELEPHONE ENCOUNTER
Caller: Anitra Orta    Relationship: Self    Best call back number: [phone]    What medication are you requesting: GABAPENTIN 300 MG TAKEN 4 TIMES A DAY    What are your current symptoms: NERVE PAIN IN HER BACK  - DR. HUITRON    How long have you been experiencing symptoms: SEVERAL YEARS    Have you had these symptoms before:    [x] Yes  [] No    Have you been treated for these symptoms before:   [x] Yes  [] No    If a prescription is needed, what is your preferred pharmacy and phone number:  [id number] - [place] AT [facility] [address] - [phone]  - [phone] FX    Additional notes:  PATIENT STATES THAT SHE HAS A 7 DAY SUPPLY OF THIS MEDICATION.  SHE SAYS THAT IT WAS LAST PRESCRIBED BY DR. HUITRON IN [place] BUT SHE IS MOVING BACK TO [place].  PATIENT HAS AN APPOINTMENT WITH ALAYNA FABIAN ON 3/24/23.  PLEASE CONTACT PATIENT TO LET HER KNOW WHEN PRESCRIPTION IS SENT TO PHARMACY.    PLEASE ADVISE.

## 2023-02-01 ENCOUNTER — TELEPHONE (OUTPATIENT)
Dept: INTERNAL MEDICINE | Facility: CLINIC | Age: 83
End: 2023-02-01
Payer: MEDICARE

## 2023-02-01 NOTE — TELEPHONE ENCOUNTER
Caller: Anitra Orta    Relationship: Self    Best call back number:[phone]    What form or medical record are you requesting: HANDICAP STICKER FORM     How would you like to receive the form or medical records (pick-up, mail, fax):    Timeframe paperwork needed: AS SOON AS POSSIBLE     Additional notes: PATIENT STATES THAT HER CURRENTLY HANDICAP STICKER IS . PATIENT IS REQUESTING A NEW FORM TO COMPLETED. PATIENT CAN DROP OFF FORM.

## 2023-02-03 ENCOUNTER — TELEPHONE (OUTPATIENT)
Dept: INTERNAL MEDICINE | Facility: CLINIC | Age: 83
End: 2023-02-03
Payer: MEDICARE

## 2023-02-03 NOTE — TELEPHONE ENCOUNTER
Patient requesting referral to Dr. Fermin with [facility] for leg pain; had epidural a couple of weeks ago and isn't helping

## 2023-02-05 NOTE — TELEPHONE ENCOUNTER
Ok I am confused, is she back in [place] yet or just planning her move? If she is back she needs to come in to discuss this referral and catch me up to speed on her medical changes in past year before March when she is scheduled

## 2023-02-10 ENCOUNTER — OFFICE VISIT (OUTPATIENT)
Dept: INTERNAL MEDICINE | Facility: CLINIC | Age: 83
End: 2023-02-10
Payer: MEDICARE

## 2023-02-10 VITALS
WEIGHT: 226.2 LBS | OXYGEN SATURATION: 97 % | TEMPERATURE: 97 F | DIASTOLIC BLOOD PRESSURE: 84 MMHG | SYSTOLIC BLOOD PRESSURE: 128 MMHG | HEIGHT: 66 IN | BODY MASS INDEX: 36.35 KG/M2 | HEART RATE: 89 BPM

## 2023-02-10 DIAGNOSIS — M48.062 SPINAL STENOSIS OF LUMBAR REGION WITH NEUROGENIC CLAUDICATION: Primary | ICD-10-CM

## 2023-02-10 PROCEDURE — 99214 OFFICE O/P EST MOD 30 MIN: CPT | Performed by: INTERNAL MEDICINE

## 2023-02-10 RX ORDER — IMIPRAMINE HCL 25 MG
25 TABLET ORAL EVERY EVENING
COMMUNITY
Start: 2022-12-05 | End: 2023-02-10

## 2023-02-10 RX ORDER — HYDROCODONE BITARTRATE AND ACETAMINOPHEN 7.5; 325 MG/1; MG/1
1 TABLET ORAL 3 TIMES DAILY
COMMUNITY
Start: 2023-01-19 | End: 2023-02-10 | Stop reason: SDUPTHER

## 2023-02-10 RX ORDER — LIDOCAINE 50 MG/G
PATCH TOPICAL
COMMUNITY
Start: 2022-12-26 | End: 2023-02-10

## 2023-02-10 RX ORDER — HYDROCODONE BITARTRATE AND ACETAMINOPHEN 7.5; 325 MG/1; MG/1
1 TABLET ORAL 3 TIMES DAILY
Qty: 90 TABLET | Refills: 0 | Status: SHIPPED | OUTPATIENT
Start: 2023-02-17

## 2023-02-10 NOTE — PROGRESS NOTES
"Chief Complaint  spinal Stenosis (Requesting referral to Dr. Fermin at [facility])    Subjective          Anitra Orta presents to [facility] for spinal stenosis.   Pt recently returned to [place] from [place]. Pt has laminectomy in [place] for ongoing back pain but now needs follow up for pain mgmt and further evaluation of her ongoing back and hip pain. When she arrived back in [place] she went to see pain mgmt and had an injection in her R trochanteric bursa but this was not fully successful and they are now thinking it is related to her back. She has tried and failed epidural injections in the past that have never been effective for her in the past, they tried one more in the past month but this also did not work.     Objective   Vital Signs:     /84 (BP Location: Right arm, Patient Position: Sitting)   Pulse 89   Temp 97 °F (36.1 °C) (Infrared)   Ht 167.6 cm (65.98\")   Wt 103 kg (226 lb 3.2 oz)   SpO2 97%   BMI 36.53 kg/m²     Physical Exam  Vitals and nursing note reviewed.   Constitutional:       General: She is not in acute distress.     Appearance: Normal appearance.   Pulmonary:      Effort: Pulmonary effort is normal. No respiratory distress.   Neurological:      Mental Status: She is alert and oriented to person, place, and time. Mental status is at baseline.      Gait: Gait abnormal (uses rollator walker).   Psychiatric:         Mood and Affect: Mood normal.         Thought Content: Thought content normal.         Judgment: Judgment normal.          Result Review :   The following data was reviewed by: Margot Chavez MD on 02/10/2023:    Data reviewed: Radiologic studies MRI L spine , XR L spine, XR Hip    Assessment and Plan      Diagnoses and all orders for this visit:    1. Spinal stenosis of lumbar region with neurogenic claudication (Primary)  Assessment & Plan:  UNCONTROLLED  - s/p recent surgical intervention in [place] 2022  - will " place referral to requested [facility]/ortho spine surgeon for eval of possible next step interventions  - at this time, do not believe she is actively following with her pain mgmt MD any longer, had a poor interaction at her last appt and do not think she intends to go back  - will agree to take over Rx for her Norco 7.5 mg tabs TID--> Rx sent today  - Discussed controlled nature of substance and potential for abuse/addiction. Reviewed potential adverse effects. Reviewed office policy for q3m visits for controlled substance refills.   - If she intends to continue her Rx through our office, will need to get controlled substances agreement on file at next appt  - UDS visible in chart from pain mgmt  - Torito checked and appropriate.     Orders:  -     Ambulatory Referral to Neurosurgery  -     HYDROcodone-acetaminophen (NORCO) 7.5-325 MG per tablet; Take 1 tablet by mouth 3 (Three) Times a Day.  Dispense: 90 tablet; Refill: 0        Follow Up   Return for Next scheduled follow up.    Patient was given instructions and counseling regarding her condition or for health maintenance advice. Please see specific information pulled into the AVS if appropriate.     Hawa Chavez MD  [facility]  Phone: [phone]  Fax: [phone]

## 2023-02-11 NOTE — ASSESSMENT & PLAN NOTE
UNCONTROLLED  - s/p recent surgical intervention in [place] 2022  - will place referral to requested [facility]/ortho spine surgeon for eval of possible next step interventions  - at this time, do not believe she is actively following with her pain mgmt MD any longer, had a poor interaction at her last appt and do not think she intends to go back  - will agree to take over Rx for her Norco 7.5 mg tabs TID--> Rx sent today  - Discussed controlled nature of substance and potential for abuse/addiction. Reviewed potential adverse effects. Reviewed office policy for q3m visits for controlled substance refills.   - If she intends to continue her Rx through our office, will need to get controlled substances agreement on file at next appt  - UDS visible in chart from pain mgmt  - Torito checked and appropriate.

## 2023-03-21 DIAGNOSIS — M48.062 SPINAL STENOSIS OF LUMBAR REGION WITH NEUROGENIC CLAUDICATION: ICD-10-CM

## 2023-03-21 RX ORDER — GABAPENTIN 300 MG/1
300 CAPSULE ORAL 4 TIMES DAILY
Qty: 120 CAPSULE | Refills: 2 | Status: SHIPPED | OUTPATIENT
Start: 2023-03-21

## 2023-03-21 NOTE — TELEPHONE ENCOUNTER
Rx Refill Note  Requested Prescriptions     Pending Prescriptions Disp Refills   • gabapentin (NEURONTIN) 300 MG capsule 120 capsule 1     Sig: Take 1 capsule by mouth 4 (Four) Times a Day.      Last office visit with prescribing clinician: 2/10/2023   Last telemedicine visit with prescribing clinician: 3/24/2023   Next office visit with prescribing clinician: 3/24/2023                         Would you like a call back once the refill request has been completed: [] Yes [] No    If the office needs to give you a call back, can they leave a voicemail: [] Yes [] No    Olena Dixon MA  03/21/23, 07:56 EDT

## 2023-03-24 ENCOUNTER — OFFICE VISIT (OUTPATIENT)
Dept: INTERNAL MEDICINE | Facility: CLINIC | Age: 83
End: 2023-03-24
Payer: MEDICARE

## 2023-03-24 VITALS
HEIGHT: 66 IN | TEMPERATURE: 96.7 F | OXYGEN SATURATION: 97 % | BODY MASS INDEX: 36.96 KG/M2 | SYSTOLIC BLOOD PRESSURE: 128 MMHG | WEIGHT: 230 LBS | RESPIRATION RATE: 16 BRPM | HEART RATE: 102 BPM | DIASTOLIC BLOOD PRESSURE: 76 MMHG

## 2023-03-24 DIAGNOSIS — I10 ESSENTIAL HYPERTENSION: Primary | ICD-10-CM

## 2023-03-24 DIAGNOSIS — R73.9 HYPERGLYCEMIA: Chronic | ICD-10-CM

## 2023-03-24 DIAGNOSIS — D64.9 ANEMIA, UNSPECIFIED TYPE: ICD-10-CM

## 2023-03-24 DIAGNOSIS — M81.0 AGE-RELATED OSTEOPOROSIS WITHOUT CURRENT PATHOLOGICAL FRACTURE: ICD-10-CM

## 2023-03-24 DIAGNOSIS — E78.2 MIXED HYPERLIPIDEMIA: ICD-10-CM

## 2023-03-24 DIAGNOSIS — F41.9 ANXIETY: ICD-10-CM

## 2023-03-24 PROBLEM — S42.302A LEFT HUMERAL FRACTURE: Status: RESOLVED | Noted: 2021-07-11 | Resolved: 2023-03-24

## 2023-03-24 PROBLEM — N17.9 ACUTE KIDNEY FAILURE: Status: RESOLVED | Noted: 2021-07-11 | Resolved: 2023-03-24

## 2023-03-24 PROBLEM — A41.9 SEPSIS, UNSPECIFIED ORGANISM: Status: RESOLVED | Noted: 2021-03-25 | Resolved: 2023-03-24

## 2023-03-24 PROBLEM — G93.41 METABOLIC ENCEPHALOPATHY: Status: RESOLVED | Noted: 2021-03-25 | Resolved: 2023-03-24

## 2023-03-24 PROBLEM — E87.6 HYPOKALEMIA: Status: RESOLVED | Noted: 2021-03-25 | Resolved: 2023-03-24

## 2023-03-24 PROBLEM — R00.0 TACHYCARDIA: Status: RESOLVED | Noted: 2019-07-31 | Resolved: 2023-03-24

## 2023-03-24 PROBLEM — I47.20 VENTRICULAR TACHYCARDIA: Status: RESOLVED | Noted: 2019-09-09 | Resolved: 2023-03-24

## 2023-03-24 RX ORDER — DIAZEPAM 10 MG/1
TABLET ORAL
COMMUNITY
Start: 2023-02-28

## 2023-03-24 NOTE — ASSESSMENT & PLAN NOTE
STABLE  - currently on imipramine at night that was started in [place] to help with this  - does feel it is effective, will cont on this medication for now, refills not yet indicated

## 2023-03-24 NOTE — PROGRESS NOTES
Subsequent Medicare Wellness Visit   The ABC's of the Annual Wellness Visit    Chief Complaint   Patient presents with   • Annual Exam       HPI:  Anitra Orta, -1940, is a 82 y.o. female who presents for a Subsequent Medicare Wellness Visit.    Recent Hospitalizations:  No hospitalization(s) within the last year..    Current Medical Providers:  Patient Care Team:  Margot Chavez MD as PCP - General (Internal Medicine & Pediatrics)  Dr. Phillips as Consulting Physician (Ophthalmology)  Justo Leahy MD as Consulting Physician (Infectious Diseases)  Kevon Clark MD as Consulting Physician (Urology)  Jian Ndiaye MD as Consulting Physician (Ophthalmology)    Health Habits and Functional and Cognitive Screening and Depression Screening:  Functional & Cognitive Status 3/24/2023   Do you have difficulty preparing food and eating? No   Do you have difficulty bathing yourself, getting dressed or grooming yourself? No   Do you have difficulty using the toilet? No   Do you have difficulty moving around from place to place? Yes   Do you have trouble with steps or getting out of a bed or a chair? Yes   Current Diet Well Balanced Diet   Dental Exam Up to date   Eye Exam Up to date   Exercise (times per week) 0 times per week   Current Exercises Include No Regular Exercise   Current Exercise Activities Include -   Do you need help using the phone?  No   Are you deaf or do you have serious difficulty hearing?  No   Do you need help with transportation? No   Do you need help shopping? No   Do you need help preparing meals?  No   Do you need help with housework?  No   Do you need help with laundry? No   Do you need help taking your medications? No   Do you need help managing money? No   Do you ever drive or ride in a car without wearing a seat belt? No   Have you felt unusual stress, anger or loneliness in the last month? Yes   Who do you live with? Spouse   If you need help, do you have trouble finding  someone available to you? No   Have you been bothered in the last four weeks by sexual problems? No   Do you have difficulty concentrating, remembering or making decisions? Yes       Compared to one year ago, the patient feels her physical health is worse and her mental health is worse.    Depression Screen:  PHQ-2/PHQ-9 Depression Screening 3/24/2023   Retired Total Score -   Little Interest or Pleasure in Doing Things 0-->not at all   Feeling Down, Depressed or Hopeless 0-->not at all   PHQ-9: Brief Depression Severity Measure Score 0         Past Medical/Family/Social History:  The following portions of the patient's history were reviewed and updated as appropriate: allergies, current medications, past family history, past medical history, past social history, past surgical history and problem list.    Allergies   Allergen Reactions   • Bactrim [Sulfamethoxazole-Trimethoprim] Nausea Only and Other (See Comments)     UNKNOWN   • Ciprofloxacin Other (See Comments)     UNKNOWN   • Levaquin [Levofloxacin] Other (See Comments)     SEVERE HEADACHE AND N/V   • Macrobid [Nitrofurantoin] Other (See Comments)     UNKNOWN; PT CAN TAKE IN SMALL DOSES- FLU LIKE SYMPTOMS   • Nitrofurantoin Macrocrystal Other (See Comments)     Can take in small doses   • Cortisone      Pt states had headache with IM injection states has been ok with epidural steroid injections         Current Outpatient Medications:   •  diazePAM (VALIUM) 10 MG tablet, TAKE 1 TABLET BY MOUTH PRIOR TO MRI, Disp: , Rfl:   •  gabapentin (NEURONTIN) 300 MG capsule, Take 1 capsule by mouth 4 (Four) Times a Day., Disp: 120 capsule, Rfl: 2  •  HYDROcodone-acetaminophen (NORCO) 7.5-325 MG per tablet, Take 1 tablet by mouth 3 (Three) Times a Day., Disp: 90 tablet, Rfl: 0  •  imipramine (Tofranil) 50 MG tablet, Take 1 tablet by mouth 2 (Two) Times a Day., Disp: 180 tablet, Rfl: 1  •  metoprolol succinate XL (Toprol XL) 50 MG 24 hr tablet, Take 1 tablet by mouth Daily.,  Disp: 90 tablet, Rfl: 1  •  multivitamin with minerals tablet tablet, Take 1 tablet by mouth 2 (Two) Times a Day., Disp: , Rfl:   •  omeprazole (priLOSEC) 20 MG capsule, Take 1 capsule by mouth Daily., Disp: 90 capsule, Rfl: 1  •  simvastatin (ZOCOR) 40 MG tablet, Take 1 tablet by mouth Every Night. for cholesterol, Disp: 90 tablet, Rfl: 1  •  vitamin B-12 (CYANOCOBALAMIN) 1000 MCG tablet, Take 1 tablet by mouth Daily., Disp: 30 tablet, Rfl: 0    Aspirin use counseling: Does not need ASA (and currently is not on it)    Current medication list contains high risk medications. No harmful drug interactions have been identified.  Plan of action cont close monitoring with appts every 3 months for ongoign controlled substance Rx    Family History   Problem Relation Age of Onset   • Heart defect Mother    • Heart attack Mother 70   • Sudden death Mother    • Heart defect Father    • Heart attack Father 49   • Hypertension Father    • Sudden death Father    • Valvular heart disease Brother    • Malig Hyperthermia Neg Hx    • Breast cancer Neg Hx        Social History     Tobacco Use   • Smoking status: Former     Packs/day: 1.00     Years: 15.00     Pack years: 15.00     Types: Cigarettes     Start date: 1956     Quit date: 1980     Years since quittin.2   • Smokeless tobacco: Never   • Tobacco comments:     quit    Substance Use Topics   • Alcohol use: No       Past Surgical History:   Procedure Laterality Date   • BREAST BIOPSY Left     benign   • BREAST LUMPECTOMY Left     benign   • BUNIONECTOMY Right    • COLONOSCOPY N/A 2016    Procedure: COLONOSCOPY polypectomy;  Surgeon: Adali Willard MD;  Location: [facility] OR;  Service:    • CYSTOSCOPY BOTOX INJECTION OF BLADDER N/A 2017    Procedure: CYSTOSCOPY COAPTITE INJECTION;  Surgeon: Kevon Clark MD;  Location: [facility] OR;  Service:    • CYSTOSCOPY W/ LITHOLAPAXY / EHL     • CYSTOSCOPY W/ URETERAL STENT PLACEMENT Left 2016     Procedure: CYSTOSCOPY URETERAL STENT INSERTION;  Surgeon: Kevon Clark MD;  Location: [facility];  Service:    • CYSTOSCOPY W/ URETERAL STENT PLACEMENT Left 8/1/2019    Procedure: CYSTOSCOPY URETERAL CATHETER/STENT INSERTION;  Surgeon: Kevon Clark MD;  Location: [facility] OR;  Service: Urology   • CYSTOSCOPY W/ URETERAL STENT PLACEMENT Left 3/25/2021    Procedure: CYSTOSCOPY URETERAL CATHETER/STENT INSERTION;  Surgeon: Kevon Clark MD;  Location: [facility];  Service: Urology;  Laterality: Left;   • CYSTOSCOPY W/ URETERAL STENT PLACEMENT Left 7/11/2021    Procedure: CYSTOSCOPY URETERAL CATHETER/STENT INSERTION;  Surgeon: Lul Guzman MD;  Location: [facility];  Service: Urology;  Laterality: Left;  CYSTOSCOPY LEFT URETERAL CATHETER/ STENT PLACEMENT   • JOINT REPLACEMENT     • KNEE ARTHROSCOPY Right    • LUMBAR EPIDURAL INJECTION     • TONSILLECTOMY AND ADENOIDECTOMY     • URETEROSCOPY LASER LITHOTRIPSY WITH STENT INSERTION Left 10/5/2016    Procedure: LT URETEROSCOPY LASER LITHOTRIPSY STONE BASKET EXTRACTION AND STENT ;  Surgeon: Kevon Clark MD;  Location: [facility];  Service:    • URETEROSCOPY LASER LITHOTRIPSY WITH STENT INSERTION Left 7/23/2021    Procedure: LEFT URETEROSCOPY STONE MANIPULATION STENT EXCHANGE, LASER LITHOTRIPSY, CYSTOSCOPY, STONE BASKET EXTRACTION;  Surgeon: Kevon Clark MD;  Location: [facility];  Service: Urology;  Laterality: Left;       Patient Active Problem List   Diagnosis   • Urinary tract infection due to ESBL Klebsiella   • Simple renal cyst   • Former smoker   • Hyperlipidemia   • Osteoporosis   • Panic disorder   • Psoriasis   • Urge incontinence of urine   • Vitamin D deficiency   • Post-menopause   • Acute UTI (urinary tract infection)   • Chronic bilateral low back pain without sciatica   • Spinal stenosis of lumbar region with neurogenic claudication   • Other chronic pain   • Mixed incontinence   • GERD without esophagitis   • Anxiety  "  • Hyperglycemia   • e.coli bacteremia   • Ureteral stone with hydronephrosis   • Obstructive uropathy   • Nephrolithiasis   • Anemia   • Metabolic acidosis   • Essential hypertension   • History of ventricular tachycardia   • Left ureteral stone       Review of Systems   Constitutional: Negative for appetite change, chills and fever.   HENT: Negative for hearing loss and sore throat.    Eyes: Positive for visual disturbance.   Respiratory: Negative for cough and shortness of breath.    Cardiovascular: Negative for chest pain, palpitations and leg swelling.   Gastrointestinal: Negative for constipation, diarrhea and vomiting.   Genitourinary: Negative for difficulty urinating and frequency.   Musculoskeletal: Positive for back pain. Negative for arthralgias and myalgias.   Skin: Negative for rash.   Neurological: Negative for weakness and headaches.   Hematological: Negative for adenopathy.   Psychiatric/Behavioral: Negative for confusion and sleep disturbance.       Objective     Vitals:    03/24/23 1020   BP: 128/76   Pulse: 102   Resp: 16   Temp: 96.7 °F (35.9 °C)   SpO2: 97%   Weight: 104 kg (230 lb)   Height: 167.6 cm (66\")       Class 2 Severe Obesity (BMI >=35 and <=39.9). Obesity-related health conditions include the following: hypertension, dyslipidemias, GERD, osteoarthritis and urinary stress incontinence. Obesity is unchanged. BMI is is above average; BMI management plan is completed. We discussed portion control and increasing exercise.      The patient has no evidence of cognitve impairment.     Physical Exam  Vitals and nursing note reviewed.   Constitutional:       General: She is not in acute distress.     Appearance: Normal appearance.   HENT:      Head: Normocephalic and atraumatic.      Right Ear: Tympanic membrane and ear canal normal.      Left Ear: Tympanic membrane and ear canal normal.      Nose: Nose normal.      Mouth/Throat:      Mouth: Mucous membranes are moist.      Pharynx: " Oropharynx is clear.   Eyes:      Extraocular Movements: Extraocular movements intact.      Conjunctiva/sclera: Conjunctivae normal.      Pupils: Pupils are equal, round, and reactive to light.   Cardiovascular:      Rate and Rhythm: Normal rate and regular rhythm.      Heart sounds: Normal heart sounds. No murmur heard.  Pulmonary:      Effort: Pulmonary effort is normal. No respiratory distress.      Breath sounds: Normal breath sounds. No wheezing or rhonchi.   Abdominal:      General: Bowel sounds are normal. There is no distension.      Palpations: Abdomen is soft. There is no mass.      Tenderness: There is no abdominal tenderness.      Comments: no hepatosplenomegaly   Musculoskeletal:      Cervical back: Normal range of motion and neck supple.   Skin:     General: Skin is warm and dry.      Capillary Refill: Capillary refill takes less than 2 seconds.      Findings: No lesion or rash.   Neurological:      General: No focal deficit present.      Mental Status: She is alert and oriented to person, place, and time.      Cranial Nerves: No cranial nerve deficit.      Gait: Gait abnormal (antalgic gait related to back pain, utilizes push walker).   Psychiatric:         Mood and Affect: Mood normal.         Behavior: Behavior normal.         Judgment: Judgment normal.         Brief Urine Lab Results  (Last result in the past 365 days)      Color   Clarity   Blood   Leuk Est   Nitrite   Protein   CREAT   Urine HCG        12/04/22 1910 Lindsey   Cloudy   Negative  Comment:  Name: Kassi Blanco  Device ID: [id number]   Negative   Negative                   Recent Lab Results:  Lab Results   Component Value Date     (H) 06/20/2022     Lab Results   Component Value Date    CHOL 108 06/26/2021    TRIG 236 (H) 09/27/2022    HDL 49 (L) 09/27/2022    VLDL 27 12/27/2021    LDLHDL 1.66 06/26/2021       Assessment & Plan   Age-appropriate Screening Schedule:  Refer to the list below for future screening  recommendations based on patient's age, sex and/or medical conditions.      Health Maintenance   Topic Date Due   • ZOSTER VACCINE (2 of 3) 11/04/2009   • COVID-19 Vaccine (4 - Booster for Moderna series) 01/21/2022   • ANNUAL WELLNESS VISIT  09/27/2023   • LIPID PANEL  09/27/2023   • MAMMOGRAM  11/22/2023   • DXA SCAN  11/22/2023   • TDAP/TD VACCINES (2 - Td or Tdap) 01/21/2029   • INFLUENZA VACCINE  Completed   • Pneumococcal Vaccine 65+  Completed   • COLORECTAL CANCER SCREENING  Discontinued       Immunization History   Administered Date(s) Administered   • COVID-19 (MODERNA) 1st, 2nd, 3rd Dose Only 02/23/2021, 03/23/2021, 11/26/2021   • Fluad Quad 65+ 09/28/2021   • Fluzone High-Dose 65+yrs 08/17/2020, 09/27/2022   • Influenza TIV (IM) 11/09/2013, 11/11/2014   • Influenza, Unspecified 09/12/2019   • Pneumococcal Conjugate 13-Valent (PCV13) 03/30/2015   • Pneumococcal Polysaccharide (PPSV23) 10/10/2008, 01/01/2019, 01/21/2019   • Td, Not Adsorbed 07/07/1997   • Tdap 01/21/2019   • Zostavax 09/09/2009         Medicare Risks and Personalized Health Plan:  Advance Directive Discussion  Breast Cancer/Mammogram Screening  Cardiovascular risk  Chronic Pain   Colon Cancer Screening  Dementia/Memory   Depression/Dysphoria  Diabetic Lab Screening   Fall Risk  Glaucoma Risk  Hearing Problem  Obesity/Overweight   Osteoporosis Risk  Polypharmacy  Urinary Incontinence      CMS-Preventive Services Quick Reference  Medicare Preventive Services Addressed:  Bone Density Measurements  Colorectal Cancer Screening, Colonoscopy  Screening Mammography     Advance Care Planning:  ACP discussion was held with the patient during this visit. Patient has an advance directive in EMR which is still valid.     Annual Wellness  - Labs ordered today including CBC, CMP, Fasting lipid panel, HgbA1C, TSH and Vit D. Will call with results once available and make follow up plan and med changes as appropriate.   - Cont current medications for  chronic medical issues, refills sent as needed today.   - EKG done previously, reviewed and in chart  - PAP smear not indicated  - Mammo up to date and normal. Last done 11/21, negative, will proceed with q2y screening and plan for repeat 11/23  - Cscope due based on recommended follow up from last scope in 2016, pt declines ongoing screening at this time.   - DEXA Last done 11/21 and abnormal with osteopenia in L hip at -2.4%, repeat due Nov 2023 with mammogram  - Lung CA screening not indicated  - Immunizations: Flu shot UTD. COVID series and booster x 1, bivalent booster eligible. TDaP done 2019, UTD. Zostavax complete, shingrix vaccine recommended, pt to get at pharmacy. Prevnar and PPSV23 UTD.    - Depression screen reviewed with patient and negative. Pt is currently taking something for anxiety/depressoin currently that was started in [place] but it uncertain was it is, it is effective for her, will find out what this is to have in our system.   - Advised behavioral modifications including dietary changes and increased exercise with goal of healthy weight and lifestyle.       Diagnoses and all orders for this visit:    1. Essential hypertension (Primary)  Assessment & Plan:  CONTROLLED  - cont on Metporolol 50 mg once daily, tolerating well and BP now in goal range  - pt to check BP once daily and record values  - unable to take ACEi, ARB, thiazides related to renal function and recurrent nephrolithiasis, now unable to tolerate CCB related to LE edema    Orders:  -     Comprehensive Metabolic Panel  -     Cancel: POC Urinalysis Dipstick, Automated    2. Mixed hyperlipidemia  Assessment & Plan:  CONTROLLED  - FLP today for ongoing monitoring  - cont on current moderate intensity statin, will adjust dose as indicated based on labs  - cont with good diet and lifestyle changes including increased exercise, low chol and low fat diet, and increased fiber      Orders:  -     Comprehensive Metabolic Panel  -     Lipid  Panel    3. Hyperglycemia  -     Comprehensive Metabolic Panel  -     Hemoglobin A1c    4. Age-related osteoporosis without current pathological fracture  Assessment & Plan:  STABLE  - last DEXA 1/2021 with osteopenia if L hip with T score -2.4, no meds started at that time  - will plan to repeat in 11/23 with mammogram  - cont Vit D and Ca supplementation, unable to do weight bearing exercise related to back pain    Orders:  -     Vitamin D,25-Hydroxy    5. Anemia, unspecified type  -     CBC & Differential    6. Anxiety  Assessment & Plan:  STABLE  - currently on imipramine at night that was started in [place] to help with this  - does feel it is effective, will cont on this medication for now, refills not yet indicated        An After Visit Summary and PPPS with all of these plans were given to the patient.      Follow Up:  Return in about 6 months (around 9/24/2023) for Medicare Wellness, to be scheduled after 9/27/23.        Hawa Chavez MD  [facility]  Phone: [phone]  Fax: [phone]

## 2023-03-25 LAB
25(OH)D3+25(OH)D2 SERPL-MCNC: 30.6 NG/ML (ref 30–100)
ALBUMIN SERPL-MCNC: 4.1 G/DL (ref 3.5–5.2)
ALBUMIN/GLOB SERPL: 1.5 G/DL
ALP SERPL-CCNC: 143 U/L (ref 39–117)
ALT SERPL-CCNC: 19 U/L (ref 1–33)
AST SERPL-CCNC: 22 U/L (ref 1–32)
BASOPHILS # BLD AUTO: 0.03 10*3/MM3 (ref 0–0.2)
BASOPHILS NFR BLD AUTO: 0.5 % (ref 0–1.5)
BILIRUB SERPL-MCNC: 0.4 MG/DL (ref 0–1.2)
BUN SERPL-MCNC: 21 MG/DL (ref 8–23)
BUN/CREAT SERPL: 17.6 (ref 7–25)
CALCIUM SERPL-MCNC: 9.7 MG/DL (ref 8.6–10.5)
CHLORIDE SERPL-SCNC: 104 MMOL/L (ref 98–107)
CHOLEST SERPL-MCNC: 182 MG/DL (ref 0–200)
CO2 SERPL-SCNC: 27.5 MMOL/L (ref 22–29)
CREAT SERPL-MCNC: 1.19 MG/DL (ref 0.57–1)
EGFRCR SERPLBLD CKD-EPI 2021: 46 ML/MIN/1.73
EOSINOPHIL # BLD AUTO: 0.21 10*3/MM3 (ref 0–0.4)
EOSINOPHIL NFR BLD AUTO: 3.2 % (ref 0.3–6.2)
ERYTHROCYTE [DISTWIDTH] IN BLOOD BY AUTOMATED COUNT: 13.7 % (ref 12.3–15.4)
GLOBULIN SER CALC-MCNC: 2.8 GM/DL
GLUCOSE SERPL-MCNC: 109 MG/DL (ref 65–99)
HBA1C MFR BLD: 6.1 % (ref 4.8–5.6)
HCT VFR BLD AUTO: 38.4 % (ref 34–46.6)
HDLC SERPL-MCNC: 51 MG/DL (ref 40–60)
HGB BLD-MCNC: 12.6 G/DL (ref 12–15.9)
IMM GRANULOCYTES # BLD AUTO: 0.02 10*3/MM3 (ref 0–0.05)
IMM GRANULOCYTES NFR BLD AUTO: 0.3 % (ref 0–0.5)
LDLC SERPL CALC-MCNC: 106 MG/DL (ref 0–100)
LYMPHOCYTES # BLD AUTO: 1.57 10*3/MM3 (ref 0.7–3.1)
LYMPHOCYTES NFR BLD AUTO: 24 % (ref 19.6–45.3)
MCH RBC QN AUTO: 27.9 PG (ref 26.6–33)
MCHC RBC AUTO-ENTMCNC: 32.8 G/DL (ref 31.5–35.7)
MCV RBC AUTO: 85 FL (ref 79–97)
MONOCYTES # BLD AUTO: 0.53 10*3/MM3 (ref 0.1–0.9)
MONOCYTES NFR BLD AUTO: 8.1 % (ref 5–12)
NEUTROPHILS # BLD AUTO: 4.17 10*3/MM3 (ref 1.7–7)
NEUTROPHILS NFR BLD AUTO: 63.9 % (ref 42.7–76)
NRBC BLD AUTO-RTO: 0 /100 WBC (ref 0–0.2)
PLATELET # BLD AUTO: 230 10*3/MM3 (ref 140–450)
POTASSIUM SERPL-SCNC: 4.2 MMOL/L (ref 3.5–5.2)
PROT SERPL-MCNC: 6.9 G/DL (ref 6–8.5)
RBC # BLD AUTO: 4.52 10*6/MM3 (ref 3.77–5.28)
SODIUM SERPL-SCNC: 142 MMOL/L (ref 136–145)
TRIGL SERPL-MCNC: 143 MG/DL (ref 0–150)
VLDLC SERPL CALC-MCNC: 25 MG/DL (ref 5–40)
WBC # BLD AUTO: 6.53 10*3/MM3 (ref 3.4–10.8)

## 2023-04-08 NOTE — ASSESSMENT & PLAN NOTE
STABLE  - last DEXA 1/2021 with osteopenia if L hip with T score -2.4, no meds started at that time  - will plan to repeat in 11/23 with mammogram  - cont Vit D and Ca supplementation, unable to do weight bearing exercise related to back pain   follow up PMD in 48 hours   return to ER if any increase in symptoms

## 2023-04-11 ENCOUNTER — PATIENT MESSAGE (OUTPATIENT)
Dept: INTERNAL MEDICINE | Facility: CLINIC | Age: 83
End: 2023-04-11
Payer: MEDICARE

## 2023-04-11 DIAGNOSIS — M48.062 SPINAL STENOSIS OF LUMBAR REGION WITH NEUROGENIC CLAUDICATION: ICD-10-CM

## 2023-04-11 RX ORDER — HYDROCODONE BITARTRATE AND ACETAMINOPHEN 7.5; 325 MG/1; MG/1
1 TABLET ORAL 3 TIMES DAILY
Qty: 90 TABLET | Refills: 0 | Status: SHIPPED | OUTPATIENT
Start: 2023-04-11

## 2023-04-12 NOTE — TELEPHONE ENCOUNTER
From: Anitra Orta  To: Margot Chavez  Sent: 4/11/2023 12:26 PM EDT  Subject: rx refill    please refill RX hydrocodone 7.5 90 capsules at [facility] [address] [place]  Thank you Anitra Orta

## 2023-04-19 ENCOUNTER — APPOINTMENT (OUTPATIENT)
Dept: GENERAL RADIOLOGY | Facility: HOSPITAL | Age: 83
End: 2023-04-19
Payer: MEDICARE

## 2023-04-19 ENCOUNTER — APPOINTMENT (OUTPATIENT)
Dept: CT IMAGING | Facility: HOSPITAL | Age: 83
End: 2023-04-19
Payer: MEDICARE

## 2023-04-19 ENCOUNTER — HOSPITAL ENCOUNTER (OUTPATIENT)
Facility: HOSPITAL | Age: 83
Discharge: HOME | End: 2023-04-21
Attending: EMERGENCY MEDICINE | Admitting: HOSPITALIST
Payer: MEDICARE

## 2023-04-19 DIAGNOSIS — N39.0 ACUTE UTI: Primary | ICD-10-CM

## 2023-04-19 DIAGNOSIS — N20.0 KIDNEY STONE ON RIGHT SIDE: ICD-10-CM

## 2023-04-19 LAB
ALBUMIN SERPL-MCNC: 3.7 G/DL (ref 3.5–5.2)
ALBUMIN/GLOB SERPL: 1.1 G/DL
ALP SERPL-CCNC: 131 U/L (ref 39–117)
ALT SERPL W P-5'-P-CCNC: 23 U/L (ref 1–33)
ANION GAP SERPL CALCULATED.3IONS-SCNC: 13 MMOL/L (ref 5–15)
AST SERPL-CCNC: 26 U/L (ref 1–32)
BACTERIA UR QL AUTO: ABNORMAL /HPF
BASOPHILS # BLD AUTO: 0.01 10*3/MM3 (ref 0–0.2)
BASOPHILS NFR BLD AUTO: 0.1 % (ref 0–1.5)
BILIRUB SERPL-MCNC: 0.9 MG/DL (ref 0–1.2)
BILIRUB UR QL STRIP: NEGATIVE
BUN SERPL-MCNC: 22 MG/DL (ref 8–23)
BUN/CREAT SERPL: 17.6 (ref 7–25)
CALCIUM SPEC-SCNC: 9 MG/DL (ref 8.6–10.5)
CHLORIDE SERPL-SCNC: 103 MMOL/L (ref 98–107)
CLARITY UR: ABNORMAL
CO2 SERPL-SCNC: 22 MMOL/L (ref 22–29)
COLOR UR: ABNORMAL
CREAT SERPL-MCNC: 1.25 MG/DL (ref 0.57–1)
D-LACTATE SERPL-SCNC: 1.5 MMOL/L (ref 0.5–2)
DEPRECATED RDW RBC AUTO: 44.5 FL (ref 37–54)
EGFRCR SERPLBLD CKD-EPI 2021: 43.4 ML/MIN/1.73
EOSINOPHIL # BLD AUTO: 0.01 10*3/MM3 (ref 0–0.4)
EOSINOPHIL NFR BLD AUTO: 0.1 % (ref 0.3–6.2)
ERYTHROCYTE [DISTWIDTH] IN BLOOD BY AUTOMATED COUNT: 14 % (ref 12.3–15.4)
GLOBULIN UR ELPH-MCNC: 3.4 GM/DL
GLUCOSE SERPL-MCNC: 166 MG/DL (ref 65–99)
GLUCOSE UR STRIP-MCNC: NEGATIVE MG/DL
HCT VFR BLD AUTO: 38 % (ref 34–46.6)
HGB BLD-MCNC: 12.2 G/DL (ref 12–15.9)
HGB UR QL STRIP.AUTO: ABNORMAL
HYALINE CASTS UR QL AUTO: ABNORMAL /LPF
IMM GRANULOCYTES # BLD AUTO: 0.05 10*3/MM3 (ref 0–0.05)
IMM GRANULOCYTES NFR BLD AUTO: 0.4 % (ref 0–0.5)
KETONES UR QL STRIP: ABNORMAL
LEUKOCYTE ESTERASE UR QL STRIP.AUTO: ABNORMAL
LYMPHOCYTES # BLD AUTO: 0.51 10*3/MM3 (ref 0.7–3.1)
LYMPHOCYTES NFR BLD AUTO: 4.5 % (ref 19.6–45.3)
MCH RBC QN AUTO: 27.9 PG (ref 26.6–33)
MCHC RBC AUTO-ENTMCNC: 32.1 G/DL (ref 31.5–35.7)
MCV RBC AUTO: 87 FL (ref 79–97)
MONOCYTES # BLD AUTO: 0.83 10*3/MM3 (ref 0.1–0.9)
MONOCYTES NFR BLD AUTO: 7.3 % (ref 5–12)
NEUTROPHILS NFR BLD AUTO: 87.6 % (ref 42.7–76)
NEUTROPHILS NFR BLD AUTO: 9.98 10*3/MM3 (ref 1.7–7)
NITRITE UR QL STRIP: NEGATIVE
NRBC BLD AUTO-RTO: 0 /100 WBC (ref 0–0.2)
PH UR STRIP.AUTO: 5.5 [PH] (ref 4.5–8)
PLATELET # BLD AUTO: 164 10*3/MM3 (ref 140–450)
PMV BLD AUTO: 9 FL (ref 6–12)
POTASSIUM SERPL-SCNC: 3.7 MMOL/L (ref 3.5–5.2)
PROCALCITONIN SERPL-MCNC: 2.88 NG/ML (ref 0–0.25)
PROT SERPL-MCNC: 7.1 G/DL (ref 6–8.5)
PROT UR QL STRIP: ABNORMAL
RBC # BLD AUTO: 4.37 10*6/MM3 (ref 3.77–5.28)
RBC # UR STRIP: ABNORMAL /HPF
REF LAB TEST METHOD: ABNORMAL
SODIUM SERPL-SCNC: 138 MMOL/L (ref 136–145)
SP GR UR STRIP: 1.02 (ref 1–1.03)
SQUAMOUS #/AREA URNS HPF: ABNORMAL /HPF
TROPONIN T SERPL HS-MCNC: 26 NG/L
UROBILINOGEN UR QL STRIP: ABNORMAL
WBC # UR STRIP: ABNORMAL /HPF
WBC NRBC COR # BLD: 11.39 10*3/MM3 (ref 3.4–10.8)

## 2023-04-19 PROCEDURE — 85025 COMPLETE CBC W/AUTO DIFF WBC: CPT | Performed by: EMERGENCY MEDICINE

## 2023-04-19 PROCEDURE — 87150 DNA/RNA AMPLIFIED PROBE: CPT | Performed by: EMERGENCY MEDICINE

## 2023-04-19 PROCEDURE — 87077 CULTURE AEROBIC IDENTIFY: CPT | Performed by: EMERGENCY MEDICINE

## 2023-04-19 PROCEDURE — 74176 CT ABD & PELVIS W/O CONTRAST: CPT

## 2023-04-19 PROCEDURE — 70450 CT HEAD/BRAIN W/O DYE: CPT

## 2023-04-19 PROCEDURE — 81001 URINALYSIS AUTO W/SCOPE: CPT | Performed by: EMERGENCY MEDICINE

## 2023-04-19 PROCEDURE — 99285 EMERGENCY DEPT VISIT HI MDM: CPT

## 2023-04-19 PROCEDURE — 84484 ASSAY OF TROPONIN QUANT: CPT | Performed by: EMERGENCY MEDICINE

## 2023-04-19 PROCEDURE — 36415 COLL VENOUS BLD VENIPUNCTURE: CPT

## 2023-04-19 PROCEDURE — 87186 SC STD MICRODIL/AGAR DIL: CPT | Performed by: EMERGENCY MEDICINE

## 2023-04-19 PROCEDURE — 83605 ASSAY OF LACTIC ACID: CPT | Performed by: EMERGENCY MEDICINE

## 2023-04-19 PROCEDURE — P9612 CATHETERIZE FOR URINE SPEC: HCPCS

## 2023-04-19 PROCEDURE — 96361 HYDRATE IV INFUSION ADD-ON: CPT

## 2023-04-19 PROCEDURE — 87086 URINE CULTURE/COLONY COUNT: CPT | Performed by: EMERGENCY MEDICINE

## 2023-04-19 PROCEDURE — 93010 ELECTROCARDIOGRAM REPORT: CPT | Performed by: INTERNAL MEDICINE

## 2023-04-19 PROCEDURE — 87040 BLOOD CULTURE FOR BACTERIA: CPT | Performed by: EMERGENCY MEDICINE

## 2023-04-19 PROCEDURE — 80053 COMPREHEN METABOLIC PANEL: CPT | Performed by: EMERGENCY MEDICINE

## 2023-04-19 PROCEDURE — 93005 ELECTROCARDIOGRAM TRACING: CPT | Performed by: EMERGENCY MEDICINE

## 2023-04-19 PROCEDURE — 84145 PROCALCITONIN (PCT): CPT | Performed by: EMERGENCY MEDICINE

## 2023-04-19 PROCEDURE — 71045 X-RAY EXAM CHEST 1 VIEW: CPT

## 2023-04-19 RX ORDER — ACETAMINOPHEN 500 MG
1000 TABLET ORAL ONCE
Status: COMPLETED | OUTPATIENT
Start: 2023-04-19 | End: 2023-04-19

## 2023-04-19 RX ADMIN — SODIUM CHLORIDE 1000 ML: 9 INJECTION, SOLUTION INTRAVENOUS at 22:30

## 2023-04-19 RX ADMIN — ACETAMINOPHEN 1000 MG: 500 TABLET ORAL at 23:59

## 2023-04-20 ENCOUNTER — ANESTHESIA EVENT (OUTPATIENT)
Dept: PERIOP | Facility: HOSPITAL | Age: 83
End: 2023-04-20
Payer: MEDICARE

## 2023-04-20 ENCOUNTER — ANESTHESIA (OUTPATIENT)
Dept: PERIOP | Facility: HOSPITAL | Age: 83
End: 2023-04-20
Payer: MEDICARE

## 2023-04-20 ENCOUNTER — APPOINTMENT (OUTPATIENT)
Dept: GENERAL RADIOLOGY | Facility: HOSPITAL | Age: 83
End: 2023-04-20
Payer: MEDICARE

## 2023-04-20 PROBLEM — N39.0 ACUTE UTI: Status: ACTIVE | Noted: 2023-04-20

## 2023-04-20 LAB
BACTERIA BLD CULT: ABNORMAL
QT INTERVAL: 358 MS

## 2023-04-20 PROCEDURE — 25010000002 KETOROLAC TROMETHAMINE PER 15 MG: Performed by: NURSE ANESTHETIST, CERTIFIED REGISTERED

## 2023-04-20 PROCEDURE — C2617 STENT, NON-COR, TEM W/O DEL: HCPCS | Performed by: UROLOGY

## 2023-04-20 PROCEDURE — 25010000002 PROPOFOL 200 MG/20ML EMULSION: Performed by: NURSE ANESTHETIST, CERTIFIED REGISTERED

## 2023-04-20 PROCEDURE — 25010000002 HYDROMORPHONE 1 MG/ML SOLUTION: Performed by: HOSPITALIST

## 2023-04-20 PROCEDURE — 94799 UNLISTED PULMONARY SVC/PX: CPT

## 2023-04-20 PROCEDURE — 96367 TX/PROPH/DG ADDL SEQ IV INF: CPT

## 2023-04-20 PROCEDURE — 25010000002 CEFTRIAXONE SODIUM-DEXTROSE 1-3.74 GM-%(50ML) RECONSTITUTED SOLUTION: Performed by: EMERGENCY MEDICINE

## 2023-04-20 PROCEDURE — G0378 HOSPITAL OBSERVATION PER HR: HCPCS

## 2023-04-20 PROCEDURE — 96375 TX/PRO/DX INJ NEW DRUG ADDON: CPT

## 2023-04-20 PROCEDURE — 25010000002 HYDROMORPHONE 1 MG/ML SOLUTION: Performed by: UROLOGY

## 2023-04-20 PROCEDURE — 25010000002 KETOROLAC TROMETHAMINE PER 15 MG: Performed by: HOSPITALIST

## 2023-04-20 PROCEDURE — 74420 UROGRAPHY RTRGR +-KUB: CPT

## 2023-04-20 PROCEDURE — 94761 N-INVAS EAR/PLS OXIMETRY MLT: CPT

## 2023-04-20 PROCEDURE — 25010000002 ONDANSETRON PER 1 MG: Performed by: NURSE ANESTHETIST, CERTIFIED REGISTERED

## 2023-04-20 PROCEDURE — C1769 GUIDE WIRE: HCPCS | Performed by: UROLOGY

## 2023-04-20 PROCEDURE — 25010000002 HYDRALAZINE PER 20 MG: Performed by: UROLOGY

## 2023-04-20 PROCEDURE — 25010000002 VANCOMYCIN 1 G RECONSTITUTED SOLUTION: Performed by: HOSPITALIST

## 2023-04-20 PROCEDURE — 25010000002 MEROPENEM PER 100 MG: Performed by: HOSPITALIST

## 2023-04-20 PROCEDURE — 99222 1ST HOSP IP/OBS MODERATE 55: CPT | Performed by: HOSPITALIST

## 2023-04-20 PROCEDURE — 25010000002 ONDANSETRON PER 1 MG: Performed by: UROLOGY

## 2023-04-20 PROCEDURE — 25010000002 PHENYLEPHRINE 10 MG/ML SOLUTION: Performed by: NURSE ANESTHETIST, CERTIFIED REGISTERED

## 2023-04-20 PROCEDURE — 96365 THER/PROPH/DIAG IV INF INIT: CPT

## 2023-04-20 PROCEDURE — 25010000002 MIDAZOLAM PER 1MG: Performed by: NURSE ANESTHETIST, CERTIFIED REGISTERED

## 2023-04-20 DEVICE — URETERAL STENT
Type: IMPLANTABLE DEVICE | Site: URETER | Status: FUNCTIONAL
Brand: CONTOUR™

## 2023-04-20 RX ORDER — HYDRALAZINE HYDROCHLORIDE 20 MG/ML
20 INJECTION INTRAMUSCULAR; INTRAVENOUS EVERY 6 HOURS PRN
Status: DISCONTINUED | OUTPATIENT
Start: 2023-04-20 | End: 2023-04-21 | Stop reason: HOSPADM

## 2023-04-20 RX ORDER — MAGNESIUM HYDROXIDE 1200 MG/15ML
LIQUID ORAL AS NEEDED
Status: DISCONTINUED | OUTPATIENT
Start: 2023-04-20 | End: 2023-04-20 | Stop reason: HOSPADM

## 2023-04-20 RX ORDER — SODIUM CHLORIDE, SODIUM LACTATE, POTASSIUM CHLORIDE, CALCIUM CHLORIDE 600; 310; 30; 20 MG/100ML; MG/100ML; MG/100ML; MG/100ML
100 INJECTION, SOLUTION INTRAVENOUS CONTINUOUS
Status: DISCONTINUED | OUTPATIENT
Start: 2023-04-20 | End: 2023-04-20

## 2023-04-20 RX ORDER — HYDROCODONE BITARTRATE AND ACETAMINOPHEN 5; 325 MG/1; MG/1
2 TABLET ORAL ONCE
Status: COMPLETED | OUTPATIENT
Start: 2023-04-20 | End: 2023-04-20

## 2023-04-20 RX ORDER — SODIUM CHLORIDE 9 MG/ML
40 INJECTION, SOLUTION INTRAVENOUS AS NEEDED
Status: DISCONTINUED | OUTPATIENT
Start: 2023-04-20 | End: 2023-04-20 | Stop reason: HOSPADM

## 2023-04-20 RX ORDER — DEXMEDETOMIDINE HYDROCHLORIDE 100 UG/ML
INJECTION, SOLUTION INTRAVENOUS AS NEEDED
Status: DISCONTINUED | OUTPATIENT
Start: 2023-04-20 | End: 2023-04-20 | Stop reason: SURG

## 2023-04-20 RX ORDER — FAMOTIDINE 10 MG/ML
20 INJECTION, SOLUTION INTRAVENOUS
Status: COMPLETED | OUTPATIENT
Start: 2023-04-20 | End: 2023-04-20

## 2023-04-20 RX ORDER — PROPOFOL 10 MG/ML
INJECTION, EMULSION INTRAVENOUS AS NEEDED
Status: DISCONTINUED | OUTPATIENT
Start: 2023-04-20 | End: 2023-04-20 | Stop reason: SURG

## 2023-04-20 RX ORDER — LIDOCAINE HYDROCHLORIDE 20 MG/ML
INJECTION, SOLUTION INFILTRATION; PERINEURAL AS NEEDED
Status: DISCONTINUED | OUTPATIENT
Start: 2023-04-20 | End: 2023-04-20 | Stop reason: SURG

## 2023-04-20 RX ORDER — PHENYLEPHRINE HYDROCHLORIDE 10 MG/ML
INJECTION INTRAVENOUS AS NEEDED
Status: DISCONTINUED | OUTPATIENT
Start: 2023-04-20 | End: 2023-04-20 | Stop reason: SURG

## 2023-04-20 RX ORDER — MIDAZOLAM HYDROCHLORIDE 2 MG/2ML
0.5 INJECTION, SOLUTION INTRAMUSCULAR; INTRAVENOUS
Status: DISCONTINUED | OUTPATIENT
Start: 2023-04-20 | End: 2023-04-20 | Stop reason: HOSPADM

## 2023-04-20 RX ORDER — HYDROCODONE BITARTRATE AND ACETAMINOPHEN 5; 325 MG/1; MG/1
2 TABLET ORAL ONCE AS NEEDED
Status: COMPLETED | OUTPATIENT
Start: 2023-04-20 | End: 2023-04-20

## 2023-04-20 RX ORDER — KETOROLAC TROMETHAMINE 30 MG/ML
INJECTION, SOLUTION INTRAMUSCULAR; INTRAVENOUS AS NEEDED
Status: DISCONTINUED | OUTPATIENT
Start: 2023-04-20 | End: 2023-04-20 | Stop reason: SURG

## 2023-04-20 RX ORDER — KETOROLAC TROMETHAMINE 30 MG/ML
15 INJECTION, SOLUTION INTRAMUSCULAR; INTRAVENOUS ONCE
Status: COMPLETED | OUTPATIENT
Start: 2023-04-20 | End: 2023-04-20

## 2023-04-20 RX ORDER — SODIUM CHLORIDE 9 MG/ML
125 INJECTION, SOLUTION INTRAVENOUS CONTINUOUS
Status: DISCONTINUED | OUTPATIENT
Start: 2023-04-20 | End: 2023-04-21 | Stop reason: HOSPADM

## 2023-04-20 RX ORDER — CEFTRIAXONE 1 G/50ML
1 INJECTION, SOLUTION INTRAVENOUS ONCE
Status: COMPLETED | OUTPATIENT
Start: 2023-04-20 | End: 2023-04-20

## 2023-04-20 RX ORDER — SODIUM CHLORIDE, SODIUM LACTATE, POTASSIUM CHLORIDE, CALCIUM CHLORIDE 600; 310; 30; 20 MG/100ML; MG/100ML; MG/100ML; MG/100ML
9 INJECTION, SOLUTION INTRAVENOUS CONTINUOUS PRN
Status: DISCONTINUED | OUTPATIENT
Start: 2023-04-20 | End: 2023-04-20 | Stop reason: HOSPADM

## 2023-04-20 RX ORDER — ONDANSETRON 2 MG/ML
4 INJECTION INTRAMUSCULAR; INTRAVENOUS ONCE AS NEEDED
Status: DISCONTINUED | OUTPATIENT
Start: 2023-04-20 | End: 2023-04-20 | Stop reason: HOSPADM

## 2023-04-20 RX ORDER — MEROPENEM 1 G/1
INJECTION, POWDER, FOR SOLUTION INTRAVENOUS
Status: COMPLETED
Start: 2023-04-20 | End: 2023-04-20

## 2023-04-20 RX ORDER — ONDANSETRON 2 MG/ML
4 INJECTION INTRAMUSCULAR; INTRAVENOUS ONCE
Status: DISCONTINUED | OUTPATIENT
Start: 2023-04-20 | End: 2023-04-21 | Stop reason: HOSPADM

## 2023-04-20 RX ORDER — METOPROLOL SUCCINATE 25 MG/1
50 TABLET, EXTENDED RELEASE ORAL ONCE
Status: COMPLETED | OUTPATIENT
Start: 2023-04-20 | End: 2023-04-20

## 2023-04-20 RX ORDER — SODIUM CHLORIDE 0.9 % (FLUSH) 0.9 %
10 SYRINGE (ML) INJECTION AS NEEDED
Status: DISCONTINUED | OUTPATIENT
Start: 2023-04-20 | End: 2023-04-20 | Stop reason: HOSPADM

## 2023-04-20 RX ORDER — ONDANSETRON 2 MG/ML
4 INJECTION INTRAMUSCULAR; INTRAVENOUS EVERY 6 HOURS PRN
Status: DISCONTINUED | OUTPATIENT
Start: 2023-04-20 | End: 2023-04-21 | Stop reason: HOSPADM

## 2023-04-20 RX ORDER — LIDOCAINE HYDROCHLORIDE 10 MG/ML
0.5 INJECTION, SOLUTION EPIDURAL; INFILTRATION; INTRACAUDAL; PERINEURAL ONCE AS NEEDED
Status: DISCONTINUED | OUTPATIENT
Start: 2023-04-20 | End: 2023-04-20 | Stop reason: HOSPADM

## 2023-04-20 RX ORDER — CEFTRIAXONE 1 G/50ML
1 INJECTION, SOLUTION INTRAVENOUS EVERY 24 HOURS
Status: DISCONTINUED | OUTPATIENT
Start: 2023-04-20 | End: 2023-04-20

## 2023-04-20 RX ORDER — SODIUM CHLORIDE 0.9 % (FLUSH) 0.9 %
10 SYRINGE (ML) INJECTION EVERY 12 HOURS SCHEDULED
Status: DISCONTINUED | OUTPATIENT
Start: 2023-04-20 | End: 2023-04-20 | Stop reason: HOSPADM

## 2023-04-20 RX ORDER — ONDANSETRON 2 MG/ML
4 INJECTION INTRAMUSCULAR; INTRAVENOUS ONCE AS NEEDED
Status: COMPLETED | OUTPATIENT
Start: 2023-04-20 | End: 2023-04-20

## 2023-04-20 RX ORDER — SODIUM CHLORIDE 9 MG/ML
INJECTION, SOLUTION INTRAVENOUS
Status: COMPLETED
Start: 2023-04-20 | End: 2023-04-20

## 2023-04-20 RX ADMIN — HYDROCODONE BITARTRATE AND ACETAMINOPHEN 2 TABLET: 5; 325 TABLET ORAL at 08:25

## 2023-04-20 RX ADMIN — Medication 1 G: at 22:18

## 2023-04-20 RX ADMIN — FAMOTIDINE 20 MG: 10 INJECTION, SOLUTION INTRAVENOUS at 16:50

## 2023-04-20 RX ADMIN — METOPROLOL SUCCINATE 50 MG: 25 TABLET, EXTENDED RELEASE ORAL at 08:23

## 2023-04-20 RX ADMIN — MIDAZOLAM HYDROCHLORIDE 0.5 MG: 1 INJECTION, SOLUTION INTRAMUSCULAR; INTRAVENOUS at 18:22

## 2023-04-20 RX ADMIN — KETOROLAC TROMETHAMINE 15 MG: 30 INJECTION, SOLUTION INTRAMUSCULAR; INTRAVENOUS at 15:17

## 2023-04-20 RX ADMIN — SODIUM CHLORIDE 1000 MG: 900 INJECTION, SOLUTION INTRAVENOUS at 11:44

## 2023-04-20 RX ADMIN — HYDROCODONE BITARTRATE AND ACETAMINOPHEN 2 TABLET: 5; 325 TABLET ORAL at 02:35

## 2023-04-20 RX ADMIN — DEXMEDETOMIDINE 4 MCG: 100 INJECTION, SOLUTION, CONCENTRATE INTRAVENOUS at 18:50

## 2023-04-20 RX ADMIN — SODIUM CHLORIDE 125 ML/HR: 9 INJECTION, SOLUTION INTRAVENOUS at 14:26

## 2023-04-20 RX ADMIN — HYDROMORPHONE HYDROCHLORIDE 1 MG: 1 INJECTION, SOLUTION INTRAMUSCULAR; INTRAVENOUS; SUBCUTANEOUS at 11:42

## 2023-04-20 RX ADMIN — ONDANSETRON 4 MG: 2 INJECTION INTRAMUSCULAR; INTRAVENOUS at 16:50

## 2023-04-20 RX ADMIN — KETOROLAC TROMETHAMINE 15 MG: 30 INJECTION, SOLUTION INTRAMUSCULAR; INTRAVENOUS at 18:59

## 2023-04-20 RX ADMIN — LIDOCAINE HYDROCHLORIDE 100 MG: 20 INJECTION, SOLUTION INFILTRATION; PERINEURAL at 18:41

## 2023-04-20 RX ADMIN — PHENYLEPHRINE HYDROCHLORIDE 100 MCG: 10 INJECTION INTRAVENOUS at 18:50

## 2023-04-20 RX ADMIN — ONDANSETRON 4 MG: 2 INJECTION INTRAMUSCULAR; INTRAVENOUS at 22:16

## 2023-04-20 RX ADMIN — HYDRALAZINE HYDROCHLORIDE 20 MG: 20 INJECTION INTRAMUSCULAR; INTRAVENOUS at 20:31

## 2023-04-20 RX ADMIN — PROPOFOL 120 MG: 10 INJECTION, EMULSION INTRAVENOUS at 18:41

## 2023-04-20 RX ADMIN — DEXMEDETOMIDINE 4 MCG: 100 INJECTION, SOLUTION, CONCENTRATE INTRAVENOUS at 18:40

## 2023-04-20 RX ADMIN — HYDROMORPHONE HYDROCHLORIDE 1 MG: 1 INJECTION, SOLUTION INTRAMUSCULAR; INTRAVENOUS; SUBCUTANEOUS at 22:03

## 2023-04-20 RX ADMIN — CEFTRIAXONE 1 G: 1 INJECTION, SOLUTION INTRAVENOUS at 00:18

## 2023-04-20 RX ADMIN — METOPROLOL TARTRATE 5 MG: 5 INJECTION INTRAVENOUS at 09:14

## 2023-04-20 RX ADMIN — SODIUM CHLORIDE, POTASSIUM CHLORIDE, SODIUM LACTATE AND CALCIUM CHLORIDE 9 ML/HR: 600; 310; 30; 20 INJECTION, SOLUTION INTRAVENOUS at 16:30

## 2023-04-20 NOTE — ED NOTES
Pt requesting pain medicine for a generalized headache. Dr. May informed and will place order in the computer

## 2023-04-20 NOTE — ED NOTES
"Pt unhappy with receiving tylenol only stating \" it does nothing for me\". Pt took the tylenol anyways  "

## 2023-04-20 NOTE — ED NOTES
Pt given PO medications and immediately vomited approximately 500cc. MD Saleem notified, new orders received.

## 2023-04-20 NOTE — ED NOTES
[facility] called, asked about surgery schedule, representative to call surgery staff to ask about OR to OR transfer, will return call.

## 2023-04-20 NOTE — CONSULTS
FIRST UROLOGY CONSULT      Patient Identification:  NAME:  Anitra Orta  Age:  82 y.o.   Sex:  female   :  1940   MRN:  [id number]       Chief complaint: Right flank pain with fevers and chills    History of present illness: 82-year-old female with history of stone disease presents with fevers chills malaise.  Seen last night in the emergency room picture of urinary sepsis.  She was due to get transferred today which she never did she is stat in the emergency room all night and all day apparently there was some bed issues but eventually we agreed just to admit her here and I would come out in 6 address her this afternoon.  She has not been critical enough to go the ICU.  She has parameters consistent with urinary sepsis but she has been hemodynamically stable.  She has a 6 mm stone obstructing her right UPJ.      Past medical history:  Past Medical History:   Diagnosis Date   • Acute kidney failure 2021   • Anxiety    • Arthritis of back    • At risk for sleep apnea    • Cataract     BILAT-SCHEDULED FOR THIS AND HAD TO CANCEL D/T SEPSIS   • Chronic pain disorder     ALL OVER PAIN   • Chronic UTI    • Closed displaced fracture of surgical neck of left humerus 2021   • Closed fracture of left proximal humerus 6/15/2021   • Closed fracture of right distal radius 6/15/2021   • COVID-19 vaccine series completed     2ND DOSE 3/23/21   • DDD (degenerative disc disease), lumbar    • e.coli bacteremia 3/25/2021   • Elevated cholesterol    • Fracture of wrist    • Fracture, humerus     LEFT-USING IMMOBILIZER/SLING   • GERD (gastroesophageal reflux disease)    • History of recent fall     21   • History of sepsis     MOST RECENT 2021-R/T KIDNEY STONE, UTI.  STATES THIS IS HER 3RD SEPSIS DIAGNOSIS   • History of UTI    • HTN (hypertension)    • Hyperlipidemia    • Hypokalemia 3/25/2021   • Kidney stones     LEFT   • Left humeral fracture 2021   • Macular degeneration,  bilateral     WORSE ON RIGHT-ONLY PERIPHERAL VISION   • Metabolic encephalopathy 3/25/2021   • Mixed hyperlipidemia 9/16/2016   • Mixed incontinence    • Osteoarthritis    • Osteoporosis    • Panic disorder    • Personal history of urinary calculi    • PONV (postoperative nausea and vomiting)    • Scoliosis    • Sepsis, unspecified organism 3/25/2021   • Tachycardia, unspecified    • Ventricular tachycardia 9/9/2019   • Wrist fracture     RIGHT-CURRENTLY NOT WEARING BRACE FOR THIS       Past surgical history:  Past Surgical History:   Procedure Laterality Date   • BREAST BIOPSY Left     benign   • BREAST LUMPECTOMY Left     benign   • BUNIONECTOMY Right    • COLONOSCOPY N/A 11/30/2016    Procedure: COLONOSCOPY polypectomy;  Surgeon: Adali Willard MD;  Location: [facility] OR;  Service:    • CYSTOSCOPY BOTOX INJECTION OF BLADDER N/A 7/21/2017    Procedure: CYSTOSCOPY COAPTITE INJECTION;  Surgeon: Kevon Clark MD;  Location: [facility];  Service:    • CYSTOSCOPY W/ LITHOLAPAXY / EHL     • CYSTOSCOPY W/ URETERAL STENT PLACEMENT Left 9/12/2016    Procedure: CYSTOSCOPY URETERAL STENT INSERTION;  Surgeon: Kevon Clark MD;  Location: [facility];  Service:    • CYSTOSCOPY W/ URETERAL STENT PLACEMENT Left 8/1/2019    Procedure: CYSTOSCOPY URETERAL CATHETER/STENT INSERTION;  Surgeon: Kevon Clark MD;  Location: [facility] OR;  Service: Urology   • CYSTOSCOPY W/ URETERAL STENT PLACEMENT Left 3/25/2021    Procedure: CYSTOSCOPY URETERAL CATHETER/STENT INSERTION;  Surgeon: Kevon Clark MD;  Location: [facility];  Service: Urology;  Laterality: Left;   • CYSTOSCOPY W/ URETERAL STENT PLACEMENT Left 7/11/2021    Procedure: CYSTOSCOPY URETERAL CATHETER/STENT INSERTION;  Surgeon: Lul Guzman MD;  Location: [facility];  Service: Urology;  Laterality: Left;  CYSTOSCOPY LEFT URETERAL CATHETER/ STENT PLACEMENT   • JOINT REPLACEMENT     • KNEE ARTHROSCOPY Right    • LUMBAR EPIDURAL INJECTION     •  TONSILLECTOMY AND ADENOIDECTOMY     • URETEROSCOPY LASER LITHOTRIPSY WITH STENT INSERTION Left 10/5/2016    Procedure: LT URETEROSCOPY LASER LITHOTRIPSY STONE BASKET EXTRACTION AND STENT ;  Surgeon: Kevon Clark MD;  Location: [facility];  Service:    • URETEROSCOPY LASER LITHOTRIPSY WITH STENT INSERTION Left 7/23/2021    Procedure: LEFT URETEROSCOPY STONE MANIPULATION STENT EXCHANGE, LASER LITHOTRIPSY, CYSTOSCOPY, STONE BASKET EXTRACTION;  Surgeon: Kevon Clark MD;  Location: [facility];  Service: Urology;  Laterality: Left;       Allergies:  Bactrim [sulfamethoxazole-trimethoprim], Ciprofloxacin, Levaquin [levofloxacin], Macrobid [nitrofurantoin], Nitrofurantoin macrocrystal, and Cortisone    Home medications:  Medications Prior to Admission   Medication Sig Dispense Refill Last Dose   • gabapentin (NEURONTIN) 300 MG capsule Take 1 capsule by mouth 4 (Four) Times a Day. 120 capsule 2 Past Week   • HYDROcodone-acetaminophen (NORCO) 7.5-325 MG per tablet Take 1 tablet by mouth 3 (Three) Times a Day. 90 tablet 0 Past Week   • imipramine (Tofranil) 50 MG tablet Take 1 tablet by mouth 2 (Two) Times a Day. (Patient taking differently: Take 2 tablets by mouth Every Night.) 180 tablet 1 Past Week   • metoprolol succinate XL (Toprol XL) 50 MG 24 hr tablet Take 1 tablet by mouth Daily. 90 tablet 1 Past Week   • NON FORMULARY Take 1 tablet by mouth 2 (Two) Times a Day. Preservation OTC   Past Week   • NON FORMULARY Take 3 tablets by mouth Daily. Uqora OTC   Past Week   • simvastatin (ZOCOR) 40 MG tablet Take 1 tablet by mouth Every Night. for cholesterol 90 tablet 1 Past Week   • VITAMIN D PO Take 2 tablets by mouth Daily. Pt doesn't know strength   Past Week   • diazePAM (VALIUM) 10 MG tablet TAKE 1 TABLET BY MOUTH PRIOR TO MRI      • multivitamin with minerals tablet tablet Take 1 tablet by mouth 2 (Two) Times a Day.      • omeprazole (priLOSEC) 20 MG capsule Take 1 capsule by mouth Daily. 90 capsule 1     • vitamin B-12 (CYANOCOBALAMIN) 1000 MCG tablet Take 1 tablet by mouth Daily. 30 tablet 0        Hospital medications:  [MAR Hold] ondansetron, 4 mg, Intravenous, Once  sodium chloride, 10 mL, Intravenous, Q12H      lactated ringers, 9 mL/hr, Last Rate: 9 mL/hr (23 1630)  sodium chloride, 125 mL/hr, Last Rate: 125 mL/hr (23 1426)      •  hydrALAZINE  •  [MAR Hold] HYDROmorphone  •  lactated ringers  •  lidocaine PF 1%  •  midazolam  •  [MAR Hold] ondansetron  •  sodium chloride  •  sodium chloride    Family history:  Family History   Problem Relation Age of Onset   • Heart defect Mother    • Heart attack Mother 70   • Sudden death Mother    • Heart defect Father    • Heart attack Father 49   • Hypertension Father    • Sudden death Father    • Valvular heart disease Brother    • Malig Hyperthermia Neg Hx    • Breast cancer Neg Hx        Social history:  Social History     Tobacco Use   • Smoking status: Former     Packs/day: 1.00     Years: 15.00     Pack years: 15.00     Types: Cigarettes     Start date: 1956     Quit date: 1980     Years since quittin.3   • Smokeless tobacco: Never   • Tobacco comments:     quit    Vaping Use   • Vaping Use: Never used   Substance Use Topics   • Alcohol use: No   • Drug use: Not Currently       Review of systems:    Negative 12-system ROS except for the following: As above      Objective:  TMax 24 hours:   Temp (24hrs), Av.3 °F (36.8 °C), Min:97.2 °F (36.2 °C), Max:99.6 °F (37.6 °C)      Vitals Ranges:   Temp:  [97.2 °F (36.2 °C)-99.6 °F (37.6 °C)] 97.6 °F (36.4 °C)  Heart Rate:  [] 84  Resp:  [17-20] 17  BP: (156-199)/() 199/90    Intake/Output Last 3 shifts:  I/O last 3 completed shifts:  In: 1050 [IV Piggyback:1050]  Out: -      Physical Exam:       General Appearance:    Alert, cooperative, in no acute distress   Head:    Normocephalic, without obvious abnormality, atraumatic   Eyes:          PERRL, conjunctivae and corneas clear  "  Ears:    Normal external inspection   Throat:   No oral lesions, oral mucosa moist   Neck:   Supple, no LAD, trachea midline   Back:     No CVA tenderness   Lungs:     Respirations unlabored, symmetric excursion    Heart:    RRR, intact peripheral pulses   Abdomen:    Obese   :   Deferred   Extremities:   No edema, no deformity   Skin:   No bleeding, bruising or rashes   Neuro/Psych:   Orientation intact, mood/affect pleasant, no focal findings       Results review:   I reviewed the patient's new clinical results.    Data review:  Lab Results (last 24 hours)     Procedure Component Value Units Date/Time    Urine Culture - Urine, Urine, Clean Catch [[id number]]  (Abnormal) Collected: 04/19/23 2208    Specimen: Urine, Clean Catch Updated: 04/20/23 1350     Urine Culture >100,000 CFU/mL Gram Negative Bacilli    Narrative:      Colonization of the urinary tract without infection is common. Treatment is discouraged unless the patient is symptomatic, pregnant, or undergoing an invasive urologic procedure.    Blood Culture - Blood, Arm, Right [[id number]] Collected: 04/19/23 2303    Specimen: Blood from Arm, Right Updated: 04/19/23 2307    Procalcitonin [[id number]]  (Abnormal) Collected: 04/19/23 2229    Specimen: Blood Updated: 04/19/23 2303     Procalcitonin 2.88 ng/mL     Narrative:      As a Marker for Sepsis (Non-Neonates):    1. <0.5 ng/mL represents a low risk of severe sepsis and/or septic shock.  2. >2 ng/mL represents a high risk of severe sepsis and/or septic shock.    As a Marker for Lower Respiratory Tract Infections that require antibiotic therapy:    PCT on Admission    Antibiotic Therapy       6-12 Hrs later    >0.5                Strongly Recommended  >0.25 - <0.5        Recommended   0.1 - 0.25          Discouraged              Remeasure/reassess PCT  <0.1                Strongly Discouraged     Remeasure/reassess PCT    As 28 day mortality risk marker: \"Change in Procalcitonin Result\" (>80% or <=80%) " if Day 0 (or Day 1) and Day 4 values are available. Refer to http://www.[facility]-pct-calculator.com    Change in PCT <=80%  A decrease of PCT levels below or equal to 80% defines a positive change in PCT test result representing a higher risk for 28-day all-cause mortality of patients diagnosed with severe sepsis for septic shock.    Change in PCT >80%  A decrease of PCT levels of more than 80% defines a negative change in PCT result representing a lower risk for 28-day all-cause mortality of patients diagnosed with severe sepsis or septic shock.       Single High Sensitivity Troponin T [[id number]]  (Abnormal) Collected: 04/19/23 2229    Specimen: Blood Updated: 04/19/23 2300     HS Troponin T 26 ng/L     Narrative:      High Sensitive Troponin T Reference Range:  <10.0 ng/L- Negative Female for AMI  <15.0 ng/L- Negative Male for AMI  >=10 - Abnormal Female indicating possible myocardial injury.  >=15 - Abnormal Male indicating possible myocardial injury.   Clinicians would have to utilize clinical acumen, EKG, Troponin, and serial changes to determine if it is an Acute Myocardial Infarction or myocardial injury due to an underlying chronic condition.         Comprehensive Metabolic Panel [[id number]]  (Abnormal) Collected: 04/19/23 2229    Specimen: Blood Updated: 04/19/23 2258     Glucose 166 mg/dL      BUN 22 mg/dL      Creatinine 1.25 mg/dL      Sodium 138 mmol/L      Potassium 3.7 mmol/L      Chloride 103 mmol/L      CO2 22.0 mmol/L      Calcium 9.0 mg/dL      Total Protein 7.1 g/dL      Albumin 3.7 g/dL      ALT (SGPT) 23 U/L      AST (SGOT) 26 U/L      Alkaline Phosphatase 131 U/L      Total Bilirubin 0.9 mg/dL      Globulin 3.4 gm/dL      A/G Ratio 1.1 g/dL      BUN/Creatinine Ratio 17.6     Anion Gap 13.0 mmol/L      eGFR 43.4 mL/min/1.73     Narrative:      GFR Normal >60  Chronic Kidney Disease <60  Kidney Failure <15    The GFR formula is only valid for adults with stable renal function between ages 18 and  70.    Lactic Acid, Plasma [[id number]]  (Normal) Collected: 04/19/23 2229    Specimen: Blood Updated: 04/19/23 2254     Lactate 1.5 mmol/L     CBC & Differential [[id number]]  (Abnormal) Collected: 04/19/23 2229    Specimen: Blood Updated: 04/19/23 2237    Narrative:      The following orders were created for panel order CBC & Differential.  Procedure                               Abnormality         Status                     ---------                               -----------         ------                     CBC Auto Differential[[id number]]        Abnormal            Final result                 Please view results for these tests on the individual orders.    CBC Auto Differential [[id number]]  (Abnormal) Collected: 04/19/23 2229    Specimen: Blood Updated: 04/19/23 2237     WBC 11.39 10*3/mm3      RBC 4.37 10*6/mm3      Hemoglobin 12.2 g/dL      Hematocrit 38.0 %      MCV 87.0 fL      MCH 27.9 pg      MCHC 32.1 g/dL      RDW 14.0 %      RDW-SD 44.5 fl      MPV 9.0 fL      Platelets 164 10*3/mm3      Neutrophil % 87.6 %      Lymphocyte % 4.5 %      Monocyte % 7.3 %      Eosinophil % 0.1 %      Basophil % 0.1 %      Immature Grans % 0.4 %      Neutrophils, Absolute 9.98 10*3/mm3      Lymphocytes, Absolute 0.51 10*3/mm3      Monocytes, Absolute 0.83 10*3/mm3      Eosinophils, Absolute 0.01 10*3/mm3      Basophils, Absolute 0.01 10*3/mm3      Immature Grans, Absolute 0.05 10*3/mm3      nRBC 0.0 /100 WBC     Blood Culture - Blood, Arm, Left [[id number]] Collected: 04/19/23 2229    Specimen: Blood from Arm, Left Updated: 04/19/23 2235    Urinalysis, Microscopic Only - Straight Cath [[id number]]  (Abnormal) Collected: 04/19/23 2208    Specimen: Urine from Straight Cath Updated: 04/19/23 2234     RBC, UA 0-2 /HPF      WBC, UA 21-30 /HPF      Bacteria, UA 3+ /HPF      Squamous Epithelial Cells, UA 0-2 /HPF      Hyaline Casts, UA None Seen /LPF      Methodology Manual Light Microscopy    Urinalysis With Microscopic If  Indicated (No Culture) - Straight Cath [[id number]]  (Abnormal) Collected: 04/19/23 2208    Specimen: Urine from Straight Cath Updated: 04/19/23 2224     Color, UA Dark Yellow     Appearance, UA Cloudy     pH, UA 5.5     Specific Gravity, UA 1.020     Glucose, UA Negative     Ketones, UA Trace     Bilirubin, UA Negative     Blood, UA Small (1+)     Protein,  mg/dL (2+)     Leuk Esterase, UA Small (1+)     Nitrite, UA Negative     Urobilinogen, UA 0.2 E.U./dL           Imaging:  Imaging Results (Last 24 Hours)     Procedure Component Value Units Date/Time    CT Abdomen Pelvis Stone Protocol [[id number]] Collected: 04/20/23 0011     Updated: 04/20/23 0013    Narrative:      CT Abdomen Pelvis WO    INDICATION:   Fatigue and fever. Sepsis. Nephrolithiasis.    TECHNIQUE:   CT of the abdomen and pelvis without IV contrast. Coronal and sagittal reconstructions were obtained.  Radiation dose reduction techniques included automated exposure control or exposure modulation based on body size. Count of known CT and cardiac nuc  med studies performed in previous 12 months: 1.     COMPARISON:   7/23/2021    FINDINGS:  There is mild scarring/atelectasis in the lung bases. There is atherosclerotic disease with no aortic aneurysm. Gallbladder is mildly distended but otherwise normal. No biliary obstruction. There is mild right hydronephrosis due to a 5.6 mm UPJ stone.  There also appears to be a second tiny 2 mm stone in the lower right ureter below the pelvic brim. No left-sided ureteral stones or hydronephrosis. Small nonobstructing left kidney stones are present. Bilateral renal cysts are noted. No follow-up is  indicated. The unenhanced solid abdominal organs are otherwise normal.    Urinary bladder is normal. Solid pelvic organs are normal. The appendix is normal. There are a few scattered colonic diverticula, but there is no evidence of diverticulitis or colitis. No small bowel obstruction is identified. Stomach is normal  except  for small hiatal hernia. No adenopathy or dependent free fluid is seen. There is extensive lumbar degenerative disease with laminectomies noted at L4 and L5. There is lumbar levoscoliosis.      Impression:        1. Mild right hydronephrosis due to a 5.6 mm UPJ stone. There is a second smaller 2 mm stone in the lower ureter below the pelvic brim.  2. Small nonobstructing left kidney stones.  3. No acute findings in the GI tract. The appendix is normal. Small hiatal hernia is present.  4. Additional nonacute findings as above.          Signer Name: Jenaro Reyna MD   Signed: 4/20/2023 12:11 AM   Workstation Name: [id number]    [facility]    CT Head Without Contrast [[id number]] Collected: 04/20/23 0004     Updated: 04/20/23 0006    Narrative:      CT Head WO    HISTORY:   Mental status changes. Fatigue and confusion. Sepsis.    TECHNIQUE:   Axial unenhanced head CT with multiplanar reformats. Radiation dose reduction techniques included automated exposure control or exposure modulation based on body size. Count of known CT and cardiac nuc med studies performed in previous 12 months: 1.     COMPARISON:   7/23/2021    FINDINGS:   Ventricular size and configuration are normal. No acute infarct or hemorrhage is identified. There are no masses. There is no skull fracture.  There is age-appropriate atrophy. Mild chronic small vessel ischemic changes are present in the white matter.  Atherosclerotic calcifications are noted in the carotid siphons.      Impression:      Senescent changes without acute abnormality.    Signer Name: Jenaro Reyna MD   Signed: 4/20/2023 12:04 AM   Workstation Name: [id number]    [facility]    XR Chest 1 View [[id number]] Collected: 04/20/23 0001     Updated: 04/20/23 0003    Narrative:      CR Chest 1 Vw    INDICATION:   Fever and sepsis. Fatigue.     COMPARISON:    7/23/2021    FINDINGS:  Portable AP view(s) of the chest.  Heart size  is normal. There is atherosclerotic disease in the aorta. There is some mild chronic interstitial scarring in the lungs. Lungs are otherwise clear. No pneumothorax. Pulmonary vascularity is normal.      Impression:      No acute cardiopulmonary findings.    Signer Name: Jenaro Reyna MD   Signed: 4/20/2023 12:01 AM   Workstation Name: [id number]    [facility]             Assessment:       Acute UTI    Obstructing pyelonephritis    Plan:     Cystoscopy with stent placement this evening on the right side.    Matthew Ndiaye MD  04/20/23  18:24 EDT

## 2023-04-20 NOTE — PLAN OF CARE
Goal Outcome Evaluation:  Plan of Care Reviewed With: patient        Progress: no change  Outcome Evaluation: patient admitted for urology specialty and stone treatment. patient complains of headache and flank pain. fluids started, NPO since admit. CHG completed

## 2023-04-20 NOTE — OP NOTE
CYSTOSCOPY URETERAL CATHETER/STENT INSERTION  Procedure Note    Anitra Orta  4/20/2023    Pre-op Diagnosis:   Right Obstructing Pyelonephritis    Post-op Diagnosis:     Post-Op Diagnosis Codes:     * Obstructive pyelonephritis [N11.1]    Procedure(s):  CYSTOSCOPY STENT PLACEMENT    Surgeon(s):  Matthew Ndiaye MD    Anesthesia: General    Staff:   Circulator: Felicia Gonzalez RN  Scrub Person: Michelle Fontenot    Estimated Blood Loss: minimal    Specimens:                * No orders in the log *      Drains:   Ureteral Drain/Stent Left ureter 6 Fr. (Active)       External Urinary Catheter (Active)   Site Assessment Clean 04/19/23 2318   Application/Removal external catheter applied 04/19/23 2318   Collection Container Wall suction 04/19/23 2318   Output (mL) 400 mL 04/20/23 1512       Findings: Right UPJ calculus with hydronephrosis and obstructing pyelonephritis.  Purulent urine obtained upon cath stent placement    Complications: None apparent    Indications: 82-year-old female with an obstructing ureteral calculus and pyelonephritis now presents for stent placement    Procedure: Patient was taken the operative suite given general anesthesia.  Placed in comfortable supine then lithotomy position.  Prepped and draped in sterile fashion.  Panendoscopy was performed.  Diffuse cystitis was seen.  The right orifice was cannulated with a guidewire passed up the renal pelvis.  A 6 Korean by 24 cm double-J stent was then placed without a tether.  Purulent urine was seen emitting from the loop distally.  She had a good curl proximal distal.  She was awoken and taken to recovery in stable condition.  The stone was faintly visible in the proximal ureteral segment.      Matthew Ndiaye MD     Date: 4/20/2023  Time: 19:02 EDT

## 2023-04-20 NOTE — ANESTHESIA POSTPROCEDURE EVALUATION
Patient: Anitra Orta    Procedure Summary     Date: 04/20/23 Room / Location:  [facility] OR 1 /  [facility]    Anesthesia Start: 1832 Anesthesia Stop: 1912    Procedure: CYSTOSCOPY STENT PLACEMENT (Right: Ureter) Diagnosis:       Obstructive pyelonephritis      (Right Obstructing Pyelonephritis)    Surgeons: Matthew Ndiaye MD Provider: Etta Calvillo CRNA    Anesthesia Type: general ASA Status: 3 - Emergent          Anesthesia Type: general    Vitals  Vitals Value Taken Time   /89 04/20/23 1935   Temp 97.9 °F (36.6 °C) 04/20/23 1915   Pulse 79 04/20/23 1937   Resp 16 04/20/23 1935   SpO2 99 % 04/20/23 1937   Vitals shown include unvalidated device data.        Post Anesthesia Care and Evaluation    Patient location during evaluation: PACU  Patient participation: complete - patient participated  Level of consciousness: awake  Pain score: 0  Pain management: adequate    Airway patency: patent  Anesthetic complications: No anesthetic complications  PONV Status: none  Cardiovascular status: acceptable  Respiratory status: acceptable  Hydration status: acceptable

## 2023-04-20 NOTE — ED NOTES
Transfer Center called back informing us that there are no med/surg or telemetry beds available at [facility] and they are holding patients in the ER. Dr. May informed and still would like to speak with the hospitalist. They will put a page out to the hospitalist

## 2023-04-20 NOTE — H&P
[facility] GROUP HOSPITALIST     Margot Chavez MD    CHIEF COMPLAINT:  Renal Stone/UTI    HISTORY OF PRESENT ILLNESS:    Ms. Orta is a pleasant, 83 y/o  female, well-known to me from prior admissions, who presented last evening due to worsening confusion and behaviors similar to prior instances when she has had a UTI.  Ms. Orta reports starting to not feel well on Monday.  Her  encouraged her to come to the ER but she refused.  She continued to try and take Advil w/ no relief.  Although she endorses chills, no reported fever.  She denies pain.  She denies nausea and vomiting until she arrived in the ER, otherwise she reports no issues w/ PO intake.  Finally, as her  reports, she became very confused and combative so he brought her in to be evaluated.  CT scan revealed mild hydronephrosis due to a UPJ stone.  She was going to be transferred to [facility], but there were no beds.  The ER spoke w/ Urology this morning, and they will see her here.    She and her  moved to [place] to be w/ their daughter and son-in-law.  She underwent a laminectomy due to buttock and vaginal pain.  They ended-up moving back to [place] when the son-in-law transferred to [place].  Although the laminectomy helped some of her pain, she now reports pain and weakness in her right leg w/ a few falls.  She was being evaluated and treated w/ epidural injections w/ plans to place a stimulator.  However, this has been delayed as she presented here w/ the aforementioned symptoms.       Past Medical History:   Diagnosis Date   • Acute kidney failure 7/11/2021   • Anxiety    • Arthritis of back    • At risk for sleep apnea    • Cataract     BILAT-SCHEDULED FOR THIS AND HAD TO CANCEL D/T SEPSIS   • Chronic pain disorder     ALL OVER PAIN   • Chronic UTI    • Closed displaced fracture of surgical neck of left humerus 6/25/2021   • Closed fracture of left proximal humerus 6/15/2021   • Closed  fracture of right distal radius 6/15/2021   • COVID-19 vaccine series completed     2ND DOSE 3/23/21   • DDD (degenerative disc disease), lumbar    • e.coli bacteremia 3/25/2021   • Elevated cholesterol    • Fracture of wrist    • Fracture, humerus     LEFT-USING IMMOBILIZER/SLING   • GERD (gastroesophageal reflux disease)    • History of recent fall     JUNE 6-9-21   • History of sepsis     MOST RECENT JULY 2021-R/T KIDNEY STONE, UTI.  STATES THIS IS HER 3RD SEPSIS DIAGNOSIS   • History of UTI    • HTN (hypertension)    • Hyperlipidemia    • Hypokalemia 3/25/2021   • Kidney stones     LEFT   • Left humeral fracture 7/11/2021   • Macular degeneration, bilateral     WORSE ON RIGHT-ONLY PERIPHERAL VISION   • Metabolic encephalopathy 3/25/2021   • Mixed hyperlipidemia 9/16/2016   • Mixed incontinence    • Osteoarthritis    • Osteoporosis    • Panic disorder    • Personal history of urinary calculi    • PONV (postoperative nausea and vomiting)    • Scoliosis    • Sepsis, unspecified organism 3/25/2021   • Tachycardia, unspecified    • Ventricular tachycardia 9/9/2019   • Wrist fracture     RIGHT-CURRENTLY NOT WEARING BRACE FOR THIS     Past Surgical History:   Procedure Laterality Date   • BREAST BIOPSY Left     benign   • BREAST LUMPECTOMY Left     benign   • BUNIONECTOMY Right    • COLONOSCOPY N/A 11/30/2016    Procedure: COLONOSCOPY polypectomy;  Surgeon: Adali Willard MD;  Location: [facility] OR;  Service:    • CYSTOSCOPY BOTOX INJECTION OF BLADDER N/A 7/21/2017    Procedure: CYSTOSCOPY COAPTITE INJECTION;  Surgeon: Kevon Clark MD;  Location: [facility] OR;  Service:    • CYSTOSCOPY W/ LITHOLAPAXY / EHL     • CYSTOSCOPY W/ URETERAL STENT PLACEMENT Left 9/12/2016    Procedure: CYSTOSCOPY URETERAL STENT INSERTION;  Surgeon: Kevon Clark MD;  Location: [facility] OR;  Service:    • CYSTOSCOPY W/ URETERAL STENT PLACEMENT Left 8/1/2019    Procedure: CYSTOSCOPY URETERAL CATHETER/STENT INSERTION;  Surgeon: Eduardo  Kevon POND MD;  Location: [facility];  Service: Urology   • CYSTOSCOPY W/ URETERAL STENT PLACEMENT Left 3/25/2021    Procedure: CYSTOSCOPY URETERAL CATHETER/STENT INSERTION;  Surgeon: Kevon Clark MD;  Location: [facility];  Service: Urology;  Laterality: Left;   • CYSTOSCOPY W/ URETERAL STENT PLACEMENT Left 2021    Procedure: CYSTOSCOPY URETERAL CATHETER/STENT INSERTION;  Surgeon: Lul Guzman MD;  Location: [facility];  Service: Urology;  Laterality: Left;  CYSTOSCOPY LEFT URETERAL CATHETER/ STENT PLACEMENT   • JOINT REPLACEMENT     • KNEE ARTHROSCOPY Right    • LUMBAR EPIDURAL INJECTION     • TONSILLECTOMY AND ADENOIDECTOMY     • URETEROSCOPY LASER LITHOTRIPSY WITH STENT INSERTION Left 10/5/2016    Procedure: LT URETEROSCOPY LASER LITHOTRIPSY STONE BASKET EXTRACTION AND STENT ;  Surgeon: Kevon Clark MD;  Location: [facility];  Service:    • URETEROSCOPY LASER LITHOTRIPSY WITH STENT INSERTION Left 2021    Procedure: LEFT URETEROSCOPY STONE MANIPULATION STENT EXCHANGE, LASER LITHOTRIPSY, CYSTOSCOPY, STONE BASKET EXTRACTION;  Surgeon: Kevon Clark MD;  Location: [facility];  Service: Urology;  Laterality: Left;     Family History   Problem Relation Age of Onset   • Heart defect Mother    • Heart attack Mother 70   • Sudden death Mother    • Heart defect Father    • Heart attack Father 49   • Hypertension Father    • Sudden death Father    • Valvular heart disease Brother    • Malig Hyperthermia Neg Hx    • Breast cancer Neg Hx      Social History     Tobacco Use   • Smoking status: Former     Packs/day: 1.00     Years: 15.00     Pack years: 15.00     Types: Cigarettes     Start date: 1956     Quit date: 1980     Years since quittin.3   • Smokeless tobacco: Never   • Tobacco comments:     quit    Vaping Use   • Vaping Use: Never used   Substance Use Topics   • Alcohol use: No   • Drug use: Not Currently     Medications Prior to Admission   Medication Sig  Dispense Refill Last Dose   • gabapentin (NEURONTIN) 300 MG capsule Take 1 capsule by mouth 4 (Four) Times a Day. 120 capsule 2 Past Week   • HYDROcodone-acetaminophen (NORCO) 7.5-325 MG per tablet Take 1 tablet by mouth 3 (Three) Times a Day. 90 tablet 0 Past Week   • imipramine (Tofranil) 50 MG tablet Take 1 tablet by mouth 2 (Two) Times a Day. (Patient taking differently: Take 2 tablets by mouth Every Night.) 180 tablet 1 Past Week   • metoprolol succinate XL (Toprol XL) 50 MG 24 hr tablet Take 1 tablet by mouth Daily. 90 tablet 1 Past Week   • NON FORMULARY Take 1 tablet by mouth 2 (Two) Times a Day. Preservation OTC   Past Week   • NON FORMULARY Take 3 tablets by mouth Daily. Uqora OTC   Past Week   • simvastatin (ZOCOR) 40 MG tablet Take 1 tablet by mouth Every Night. for cholesterol 90 tablet 1 Past Week   • VITAMIN D PO Take 2 tablets by mouth Daily. Pt doesn't know strength   Past Week   • diazePAM (VALIUM) 10 MG tablet TAKE 1 TABLET BY MOUTH PRIOR TO MRI      • multivitamin with minerals tablet tablet Take 1 tablet by mouth 2 (Two) Times a Day.      • omeprazole (priLOSEC) 20 MG capsule Take 1 capsule by mouth Daily. 90 capsule 1    • vitamin B-12 (CYANOCOBALAMIN) 1000 MCG tablet Take 1 tablet by mouth Daily. 30 tablet 0      Allergies:  Bactrim [sulfamethoxazole-trimethoprim], Ciprofloxacin, Levaquin [levofloxacin], Macrobid [nitrofurantoin], Nitrofurantoin macrocrystal, and Cortisone    REVIEW OF SYSTEMS:  Please see the above history of present illness for pertinent positives and negatives.  The remainder of the patient's systems have been reviewed and are negative.    Vital Signs  Temp:  [97.2 °F (36.2 °C)-99.6 °F (37.6 °C)] 97.2 °F (36.2 °C)  Heart Rate:  [] 86  Resp:  [18-20] 18  BP: (156-194)/() 192/85  Oxygen Therapy  SpO2: 95 %  Pulse Oximetry Type: Continuous  Device (Oxygen Therapy): room air}  Body mass index is 37.17 kg/m².     Flowsheet Rows    Flowsheet Row First Filed Value  "  Admission Height 167.6 cm (66\") Documented at 04/19/2023 2157   Admission Weight 104 kg (230 lb 4.8 oz) Documented at 04/19/2023 2157           Physical Exam:  Physical Exam   Constitutional: Patient appears well developed and well nourished and in no acute distress.  She was in moderate distress when I evaluated her in the ER.  HEENT:   Head: Normocephalic and atraumatic.   Eyes:  Pupils are equal, round, and reactive to light. EOM are intact. Sclerae are anicteric and noninjected.  Mouth and Throat: Patient has dry mucous membranes. Oropharynx is clear of any erythema or exudate.  I cannot visualize the posterior pharynx.     Neck: Neck supple. No JVD present. No thyromegaly present. No lymphadenopathy present.  Cardiovascular: Regular rate, regular rhythm, S1 normal and S2 normal.  Exam reveals no gallop and no friction rub.  No murmur heard.  Radial pulses are 2+ and symmetric.  Pulmonary/Chest: Lungs are clear to auscultation bilaterally. No respiratory distress. No wheezes. No rhonchi. No rales.   Abdominal: Obese. Soft. Bowel sounds are normal. There is no tenderness.   Musculoskeletal: Normal muscle tone  Extremities: No edema.   Neurological: Cranial nerves II-XII are grossly intact with no focal deficits.  Skin: Skin is warm. No rash noted. Nails show no clubbing.  No cyanosis or erythema.    Emotional Behavior:    Judgment and Insight: Normal   Mental Status:  Alertness  Normal   Memory:  Normal   Mood and Affect:         Depression  None               Anxiety  None now    Debilities:   Physical Weakness  As per HPI   Handicaps  None   Disabilities  None   Agitation  None     Results Review:    I reviewed the patient's new clinical results.  Lab Results (most recent)     Procedure Component Value Units Date/Time    Urine Culture - Urine, Urine, Clean Catch [[id number]] Collected: 04/19/23 2208    Specimen: Urine, Clean Catch Updated: 04/20/23 0021    Blood Culture - Blood, Arm, Right [[id number]] " "Collected: 04/19/23 2303    Specimen: Blood from Arm, Right Updated: 04/19/23 2307    Procalcitonin [[id number]]  (Abnormal) Collected: 04/19/23 2229    Specimen: Blood Updated: 04/19/23 2303     Procalcitonin 2.88 ng/mL     Narrative:      As a Marker for Sepsis (Non-Neonates):    1. <0.5 ng/mL represents a low risk of severe sepsis and/or septic shock.  2. >2 ng/mL represents a high risk of severe sepsis and/or septic shock.    As a Marker for Lower Respiratory Tract Infections that require antibiotic therapy:    PCT on Admission    Antibiotic Therapy       6-12 Hrs later    >0.5                Strongly Recommended  >0.25 - <0.5        Recommended   0.1 - 0.25          Discouraged              Remeasure/reassess PCT  <0.1                Strongly Discouraged     Remeasure/reassess PCT    As 28 day mortality risk marker: \"Change in Procalcitonin Result\" (>80% or <=80%) if Day 0 (or Day 1) and Day 4 values are available. Refer to [email or web address]    Change in PCT <=80%  A decrease of PCT levels below or equal to 80% defines a positive change in PCT test result representing a higher risk for 28-day all-cause mortality of patients diagnosed with severe sepsis for septic shock.    Change in PCT >80%  A decrease of PCT levels of more than 80% defines a negative change in PCT result representing a lower risk for 28-day all-cause mortality of patients diagnosed with severe sepsis or septic shock.       Single High Sensitivity Troponin T [[id number]]  (Abnormal) Collected: 04/19/23 2229    Specimen: Blood Updated: 04/19/23 2300     HS Troponin T 26 ng/L     Narrative:      High Sensitive Troponin T Reference Range:  <10.0 ng/L- Negative Female for AMI  <15.0 ng/L- Negative Male for AMI  >=10 - Abnormal Female indicating possible myocardial injury.  >=15 - Abnormal Male indicating possible myocardial injury.   Clinicians would have to utilize clinical acumen, EKG, Troponin, and serial changes to determine " if it is an Acute Myocardial Infarction or myocardial injury due to an underlying chronic condition.         Comprehensive Metabolic Panel [[id number]]  (Abnormal) Collected: 04/19/23 2229    Specimen: Blood Updated: 04/19/23 2258     Glucose 166 mg/dL      BUN 22 mg/dL      Creatinine 1.25 mg/dL      Sodium 138 mmol/L      Potassium 3.7 mmol/L      Chloride 103 mmol/L      CO2 22.0 mmol/L      Calcium 9.0 mg/dL      Total Protein 7.1 g/dL      Albumin 3.7 g/dL      ALT (SGPT) 23 U/L      AST (SGOT) 26 U/L      Alkaline Phosphatase 131 U/L      Total Bilirubin 0.9 mg/dL      Globulin 3.4 gm/dL      A/G Ratio 1.1 g/dL      BUN/Creatinine Ratio 17.6     Anion Gap 13.0 mmol/L      eGFR 43.4 mL/min/1.73     Narrative:      GFR Normal >60  Chronic Kidney Disease <60  Kidney Failure <15    The GFR formula is only valid for adults with stable renal function between ages 18 and 70.    Lactic Acid, Plasma [[id number]]  (Normal) Collected: 04/19/23 2229    Specimen: Blood Updated: 04/19/23 2254     Lactate 1.5 mmol/L     CBC & Differential [[id number]]  (Abnormal) Collected: 04/19/23 2229    Specimen: Blood Updated: 04/19/23 2237    Narrative:      The following orders were created for panel order CBC & Differential.  Procedure                               Abnormality         Status                     ---------                               -----------         ------                     CBC Auto Differential[[id number]]        Abnormal            Final result                 Please view results for these tests on the individual orders.    CBC Auto Differential [[id number]]  (Abnormal) Collected: 04/19/23 2229    Specimen: Blood Updated: 04/19/23 2237     WBC 11.39 10*3/mm3      RBC 4.37 10*6/mm3      Hemoglobin 12.2 g/dL      Hematocrit 38.0 %      MCV 87.0 fL      MCH 27.9 pg      MCHC 32.1 g/dL      RDW 14.0 %      RDW-SD 44.5 fl      MPV 9.0 fL      Platelets 164 10*3/mm3      Neutrophil % 87.6 %      Lymphocyte % 4.5 %       Monocyte % 7.3 %      Eosinophil % 0.1 %      Basophil % 0.1 %      Immature Grans % 0.4 %      Neutrophils, Absolute 9.98 10*3/mm3      Lymphocytes, Absolute 0.51 10*3/mm3      Monocytes, Absolute 0.83 10*3/mm3      Eosinophils, Absolute 0.01 10*3/mm3      Basophils, Absolute 0.01 10*3/mm3      Immature Grans, Absolute 0.05 10*3/mm3      nRBC 0.0 /100 WBC     Blood Culture - Blood, Arm, Left [[id number]] Collected: 04/19/23 2229    Specimen: Blood from Arm, Left Updated: 04/19/23 2235    Urinalysis, Microscopic Only - Straight Cath [[id number]]  (Abnormal) Collected: 04/19/23 2208    Specimen: Urine from Straight Cath Updated: 04/19/23 2234     RBC, UA 0-2 /HPF      WBC, UA 21-30 /HPF      Bacteria, UA 3+ /HPF      Squamous Epithelial Cells, UA 0-2 /HPF      Hyaline Casts, UA None Seen /LPF      Methodology Manual Light Microscopy    Urinalysis With Microscopic If Indicated (No Culture) - Straight Cath [[id number]]  (Abnormal) Collected: 04/19/23 2208    Specimen: Urine from Straight Cath Updated: 04/19/23 2224     Color, UA Dark Yellow     Appearance, UA Cloudy     pH, UA 5.5     Specific Gravity, UA 1.020     Glucose, UA Negative     Ketones, UA Trace     Bilirubin, UA Negative     Blood, UA Small (1+)     Protein,  mg/dL (2+)     Leuk Esterase, UA Small (1+)     Nitrite, UA Negative     Urobilinogen, UA 0.2 E.U./dL          Imaging Results (Most Recent)     Procedure Component Value Units Date/Time    CT Abdomen Pelvis Stone Protocol [[id number]] Collected: 04/20/23 0011     Updated: 04/20/23 0013    Narrative:      CT Abdomen Pelvis WO    INDICATION:   Fatigue and fever. Sepsis. Nephrolithiasis.    TECHNIQUE:   CT of the abdomen and pelvis without IV contrast. Coronal and sagittal reconstructions were obtained.  Radiation dose reduction techniques included automated exposure control or exposure modulation based on body size. Count of known CT and cardiac nuc  med studies performed in previous 12 months: 1.      COMPARISON:   7/23/2021    FINDINGS:  There is mild scarring/atelectasis in the lung bases. There is atherosclerotic disease with no aortic aneurysm. Gallbladder is mildly distended but otherwise normal. No biliary obstruction. There is mild right hydronephrosis due to a 5.6 mm UPJ stone.  There also appears to be a second tiny 2 mm stone in the lower right ureter below the pelvic brim. No left-sided ureteral stones or hydronephrosis. Small nonobstructing left kidney stones are present. Bilateral renal cysts are noted. No follow-up is  indicated. The unenhanced solid abdominal organs are otherwise normal.    Urinary bladder is normal. Solid pelvic organs are normal. The appendix is normal. There are a few scattered colonic diverticula, but there is no evidence of diverticulitis or colitis. No small bowel obstruction is identified. Stomach is normal except  for small hiatal hernia. No adenopathy or dependent free fluid is seen. There is extensive lumbar degenerative disease with laminectomies noted at L4 and L5. There is lumbar levoscoliosis.      Impression:        1. Mild right hydronephrosis due to a 5.6 mm UPJ stone. There is a second smaller 2 mm stone in the lower ureter below the pelvic brim.  2. Small nonobstructing left kidney stones.  3. No acute findings in the GI tract. The appendix is normal. Small hiatal hernia is present.  4. Additional nonacute findings as above.          Signer Name: Jenaro Reyna MD   Signed: 4/20/2023 12:11 AM   Workstation Name: [id number]    [facility]    CT Head Without Contrast [[id number]] Collected: 04/20/23 0004     Updated: 04/20/23 0006    Narrative:      CT Head WO    HISTORY:   Mental status changes. Fatigue and confusion. Sepsis.    TECHNIQUE:   Axial unenhanced head CT with multiplanar reformats. Radiation dose reduction techniques included automated exposure control or exposure modulation based on body size. Count of known CT and cardiac  nuc med studies performed in previous 12 months: 1.     COMPARISON:   7/23/2021    FINDINGS:   Ventricular size and configuration are normal. No acute infarct or hemorrhage is identified. There are no masses. There is no skull fracture.  There is age-appropriate atrophy. Mild chronic small vessel ischemic changes are present in the white matter.  Atherosclerotic calcifications are noted in the carotid siphons.      Impression:      Senescent changes without acute abnormality.    Signer Name: Jenaro Reyna MD   Signed: 4/20/2023 12:04 AM   Workstation Name: [id number]    [facility]    XR Chest 1 View [[id number]] Collected: 04/20/23 0001     Updated: 04/20/23 0003    Narrative:      CR Chest 1 Vw    INDICATION:   Fever and sepsis. Fatigue.     COMPARISON:    7/23/2021    FINDINGS:  Portable AP view(s) of the chest.  Heart size is normal. There is atherosclerotic disease in the aorta. There is some mild chronic interstitial scarring in the lungs. Lungs are otherwise clear. No pneumothorax. Pulmonary vascularity is normal.      Impression:      No acute cardiopulmonary findings.    Signer Name: Jenaro Reyna MD   Signed: 4/20/2023 12:01 AM   Workstation Name: [id number]    [facility]        reviewed    ECG/EMG Results (most recent)     Procedure Component Value Units Date/Time    ECG 12 Lead Tachycardia [[id number]] Collected: 04/19/23 2238     Updated: 04/20/23 1219     QT Interval 358 ms     Narrative:      HEART RATE= 102  bpm  RR Interval= 588  ms  [place] Interval= 175  ms  P Horizontal Axis= -11  deg  P Front Axis= 63  deg  QRSD Interval= 135  ms  QT Interval= 358  ms  QRS Axis= 39  deg  T Wave Axis= -29  deg  - ABNORMAL ECG -  Sinus tachycardia with irregular rate  Right bundle branch block  Borderline ST elevation, lateral leads  Heart rate slighty better otherewise NO SIGNIFICANT CHANGE FROM PREVIOUS ECG  Electronically Signed By: [facility])  20-Apr-2023 12:19:36  Date and Time of Study: 2023-04-19 22:38:41    SCANNED - TELEMETRY   [[id number]] Resulted: 04/19/23     Updated: 04/20/23 1251        Reviewed and compared to July 2021 w/o dynamic change.     Assessment & Plan     1.  Right Hydronephrosis due to Obstructing Stone/Suspected Sepsis: Plan for OR and stent placement this evening.  I've covered her broadly based on previous culture data although the ESBL Klebsiella was only seen once in 2016.  Will trend procalcitonin and follow culture data.    2.  Essential Hypertension: Not at goal on exam, but she is still uncomfortable.  I'm holding her oral regimen in anticipation of surgery.  I've added prn Hydralazine but only if she is not in pain as I don't want to bottom out her BP.  Continue to monitor.    3.  Right Leg Pain and Weakness: No acute clinical findings on my exam.  Will have PT evaluate in the morning.     I discussed the patient's findings and my recommendations with patient and her  w/ permission.     William Villatoro MD  04/20/23  13:08 EDT

## 2023-04-20 NOTE — ED NOTES
This RN has called multiple times to enhance process of pt being transferred. [place]  made known of situation of possible pt deterioration along with ED Lag manager Edda.

## 2023-04-20 NOTE — ED NOTES
Call placed to  [facility],  to call Dr Hart, hospitalist, to discuss surgery options and OR transfer. Call complete, no beds available, 31 holds.

## 2023-04-20 NOTE — ED PROVIDER NOTES
Subjective   History of Present Illness  Patient presents emergency department due to 2 to 3 days of progressive fatigue and now confusion.  Patient had unwitnessed fall and was found on the ground this morning at approximately 5 AM.   reports that EMS was called to the home and assisted her into a chair.  Her mental status was reportedly improved for a few hours at that time.  Throughout the day however the patient is continued to have deteriorating mental status increasing confusion and argumentative regarding needing to seek medical attention.  Patient with history of nephrolithiasis, complicated UTIs with sepsis and metabolic encephalopathy.  No known fever vomiting chest pain or shortness of breath.  No known injury from unwitnessed fall this morning.  Patient is oriented x3 although has difficulty with order of events over the last several days.    History provided by:  Patient, medical records and spouse  History limited by:  Mental status change      Review of Systems   Constitutional: Positive for chills and fatigue.   Psychiatric/Behavioral: Positive for confusion.       Past Medical History:   Diagnosis Date   • Acute kidney failure 7/11/2021   • Anxiety    • Arthritis of back    • At risk for sleep apnea    • Cataract     BILAT-SCHEDULED FOR THIS AND HAD TO CANCEL D/T SEPSIS   • Chronic pain disorder     ALL OVER PAIN   • Chronic UTI    • Closed displaced fracture of surgical neck of left humerus 6/25/2021   • Closed fracture of left proximal humerus 6/15/2021   • Closed fracture of right distal radius 6/15/2021   • COVID-19 vaccine series completed     2ND DOSE 3/23/21   • DDD (degenerative disc disease), lumbar    • e.coli bacteremia 3/25/2021   • Elevated cholesterol    • Fracture of wrist    • Fracture, humerus     LEFT-USING IMMOBILIZER/SLING   • GERD (gastroesophageal reflux disease)    • History of recent fall     JUNE 6-9-21   • History of sepsis     MOST RECENT JULY 2021-R/T KIDNEY STONE,  UTI.  STATES THIS IS HER 3RD SEPSIS DIAGNOSIS   • History of UTI    • HTN (hypertension)    • Hyperlipidemia    • Hypokalemia 3/25/2021   • Kidney stones     LEFT   • Left humeral fracture 7/11/2021   • Macular degeneration, bilateral     WORSE ON RIGHT-ONLY PERIPHERAL VISION   • Metabolic encephalopathy 3/25/2021   • Mixed hyperlipidemia 9/16/2016   • Mixed incontinence    • Osteoarthritis    • Osteoporosis    • Panic disorder    • Personal history of urinary calculi    • PONV (postoperative nausea and vomiting)    • Scoliosis    • Sepsis, unspecified organism 3/25/2021   • Tachycardia, unspecified    • Ventricular tachycardia 9/9/2019   • Wrist fracture     RIGHT-CURRENTLY NOT WEARING BRACE FOR THIS       Allergies   Allergen Reactions   • Bactrim [Sulfamethoxazole-Trimethoprim] Nausea Only and Other (See Comments)     UNKNOWN   • Ciprofloxacin Other (See Comments)     UNKNOWN   • Levaquin [Levofloxacin] Other (See Comments)     SEVERE HEADACHE AND N/V   • Macrobid [Nitrofurantoin] Other (See Comments)     UNKNOWN; PT CAN TAKE IN SMALL DOSES- FLU LIKE SYMPTOMS   • Nitrofurantoin Macrocrystal Other (See Comments)     Can take in small doses   • Cortisone      Pt states had headache with IM injection states has been ok with epidural steroid injections       Past Surgical History:   Procedure Laterality Date   • BREAST BIOPSY Left     benign   • BREAST LUMPECTOMY Left     benign   • BUNIONECTOMY Right    • COLONOSCOPY N/A 11/30/2016    Procedure: COLONOSCOPY polypectomy;  Surgeon: Adali Willard MD;  Location: [facility];  Service:    • CYSTOSCOPY BOTOX INJECTION OF BLADDER N/A 7/21/2017    Procedure: CYSTOSCOPY COAPTITE INJECTION;  Surgeon: Kevon Clark MD;  Location: [facility];  Service:    • CYSTOSCOPY W/ LITHOLAPAXY / EHL     • CYSTOSCOPY W/ URETERAL STENT PLACEMENT Left 9/12/2016    Procedure: CYSTOSCOPY URETERAL STENT INSERTION;  Surgeon: Kevon Clark MD;  Location: [facility] OR;  Service:    •  CYSTOSCOPY W/ URETERAL STENT PLACEMENT Left 2019    Procedure: CYSTOSCOPY URETERAL CATHETER/STENT INSERTION;  Surgeon: Keovn Clark MD;  Location: [facility] OR;  Service: Urology   • CYSTOSCOPY W/ URETERAL STENT PLACEMENT Left 3/25/2021    Procedure: CYSTOSCOPY URETERAL CATHETER/STENT INSERTION;  Surgeon: Kevon Clark MD;  Location: [facility] OR;  Service: Urology;  Laterality: Left;   • CYSTOSCOPY W/ URETERAL STENT PLACEMENT Left 2021    Procedure: CYSTOSCOPY URETERAL CATHETER/STENT INSERTION;  Surgeon: Lul Guzman MD;  Location: [facility] OR;  Service: Urology;  Laterality: Left;  CYSTOSCOPY LEFT URETERAL CATHETER/ STENT PLACEMENT   • JOINT REPLACEMENT     • KNEE ARTHROSCOPY Right    • LUMBAR EPIDURAL INJECTION     • TONSILLECTOMY AND ADENOIDECTOMY     • URETEROSCOPY LASER LITHOTRIPSY WITH STENT INSERTION Left 10/5/2016    Procedure: LT URETEROSCOPY LASER LITHOTRIPSY STONE BASKET EXTRACTION AND STENT ;  Surgeon: Kevon Clark MD;  Location: [facility] OR;  Service:    • URETEROSCOPY LASER LITHOTRIPSY WITH STENT INSERTION Left 2021    Procedure: LEFT URETEROSCOPY STONE MANIPULATION STENT EXCHANGE, LASER LITHOTRIPSY, CYSTOSCOPY, STONE BASKET EXTRACTION;  Surgeon: Kevon Clark MD;  Location: [facility] OR;  Service: Urology;  Laterality: Left;       Family History   Problem Relation Age of Onset   • Heart defect Mother    • Heart attack Mother 70   • Sudden death Mother    • Heart defect Father    • Heart attack Father 49   • Hypertension Father    • Sudden death Father    • Valvular heart disease Brother    • Malig Hyperthermia Neg Hx    • Breast cancer Neg Hx        Social History     Socioeconomic History   • Marital status:    Tobacco Use   • Smoking status: Former     Packs/day: 1.00     Years: 15.00     Pack years: 15.00     Types: Cigarettes     Start date: 1956     Quit date: 1980     Years since quittin.3   • Smokeless tobacco: Never   • Tobacco  comments:     quit 1994   Vaping Use   • Vaping Use: Never used   Substance and Sexual Activity   • Alcohol use: No   • Drug use: Not Currently   • Sexual activity: Not Currently     Partners: Male     Comment: Frequent UTIs           Objective   Physical Exam  Vitals and nursing note reviewed.   Constitutional:       General: She is in acute distress.      Appearance: She is ill-appearing.   HENT:      Head: Normocephalic and atraumatic.      Mouth/Throat:      Mouth: Mucous membranes are dry.   Eyes:      Extraocular Movements: Extraocular movements intact.      Pupils: Pupils are equal, round, and reactive to light.   Cardiovascular:      Rate and Rhythm: Regular rhythm. Tachycardia present.   Pulmonary:      Effort: Pulmonary effort is normal.      Breath sounds: Normal breath sounds.   Abdominal:      General: Abdomen is flat.      Palpations: Abdomen is soft.   Musculoskeletal:         General: Normal range of motion.      Cervical back: Normal range of motion and neck supple.   Skin:     General: Skin is warm and dry.   Neurological:      General: No focal deficit present.      Mental Status: She is oriented to person, place, and time.   Psychiatric:         Speech: Speech is slurred.         Behavior: Behavior is slowed.         Cognition and Memory: Cognition is impaired.         Procedures        EKG interpretation by me  Sinus tachycardia  Normal rate.  Normal axis.  Right bundle branch block  Nonspecific ST segments.  Inversion inferiorly  Abnormal EKG  Similar EKG July 23, 2021    ED Course      Lab Results (last 24 hours)     Procedure Component Value Units Date/Time    Urinalysis With Microscopic If Indicated (No Culture) - Straight Cath [[id number]] Collected: 04/19/23 2208    Specimen: Urine from Straight Cath Updated: 04/19/23 2214    Urinalysis, Microscopic Only - Straight Cath [[id number]] Collected: 04/19/23 2208    Specimen: Urine from Straight Cath Updated: 04/19/23 2219        XR Chest 1 View     (Results Pending)   CT Head Without Contrast    (Results Pending)   CT Abdomen Pelvis Stone Protocol    (Results Pending)                                          Medical Decision Making  Amount and/or Complexity of Data Reviewed  Independent Historian: spouse     Details: Due to patient's confusion and cognitive dysfunction has been contributes to history reporting events of today.  EMS called approximately 5 AM.  Spouse has been trying to get wife here for greater than 3 hours as she has has history of aggression with infections and confusion.  External Data Reviewed: labs.     Details: Reviewed previous office notes August 2022 with patient having history of ESBL Klebsiella UTI as well as renal stones  Labs: ordered.  Radiology: ordered.  ECG/medicine tests: ordered.      Risk  Decision regarding hospitalization.      Care transferred at shift    Final diagnoses:   None       ED Disposition  ED Disposition     None          No follow-up provider specified.       Medication List      No changes were made to your prescriptions during this visit.          Lenin Woodard MD  04/19/23 2373

## 2023-04-20 NOTE — ANESTHESIA PREPROCEDURE EVALUATION
Anesthesia Evaluation     Patient summary reviewed and Nursing notes reviewed   no history of anesthetic complications:  NPO Solid Status: > 8 hours  NPO Liquid Status: > 8 hours           Airway   Mallampati: II  TM distance: >3 FB  Neck ROM: full  No difficulty expected  Dental    (+) poor dentition        Pulmonary - negative pulmonary ROS and normal exam   (-) shortness of breath  Cardiovascular   Exercise tolerance: poor (<4 METS)    ECG reviewed  Rhythm: regular  Rate: abnormal    (+) hypertension 2 medications or greater, dysrhythmias Tachycardia, hyperlipidemia,   (-) angina    ROS comment: ABNORMAL ECG -  Sinus or ectopic atrial tachycardia  Right bundle branch block  Minimal ST elevation, lateral leads  Electronically Signed By:   Date and Time of Study: 2021-07-11 21:19:44      Echo 10/2019:  Left ventricular systolic function is normal. Calculated EF = 62.0%. Estimated EF = 62%. Normal left ventricular cavity size and wall thickness noted. All left ventricular wall segments contract normally. Left ventricular diastolic function is normal.    Neuro/Psych  (+) numbness (fingers bilaterally), psychiatric history Anxiety,    GI/Hepatic/Renal/Endo    (+)  GERD well controlled,  renal disease stones,     Musculoskeletal     (+) back pain, radiculopathy Right lower extremity and Right upper extremity  Abdominal   (+) obese,    Substance History - negative use     OB/GYN          Other   arthritis,                        Anesthesia Plan    ASA 3 - emergent     general     intravenous induction     Anesthetic plan, risks, benefits, and alternatives have been provided, discussed and informed consent has been obtained with: patient.    Use of blood products discussed with patient  Consented to blood products.

## 2023-04-20 NOTE — ANESTHESIA PROCEDURE NOTES
Airway  Urgency: emergent    Date/Time: 4/20/2023 6:42 PM  Airway not difficult    General Information and Staff    Patient location during procedure: OR  CRNA/CAA: Etta Calvillo CRNA    Consent for Airway (if performed for an anesthetic, see related documentation for consents)  Patient identity confirmed: verbally with patient, arm band, provided demographic data and hospital-assigned identification number  Consent: No emergent situation. Verbal consent obtained. Written consent obtained.  Risks and benefits: risks, benefits and alternatives were discussed  Consent given by: patient      Indications and Patient Condition  Indications for airway management: airway protection    Preoxygenated: yes  MILS maintained throughout  Mask difficulty assessment: 0 - not attempted    Final Airway Details  Final airway type: supraglottic airway      Successful airway: unique  Size 4     Number of attempts at approach: 1  Assessment: lips, teeth, and gum same as pre-op and atraumatic intubation

## 2023-04-21 ENCOUNTER — READMISSION MANAGEMENT (OUTPATIENT)
Dept: CALL CENTER | Facility: HOSPITAL | Age: 83
End: 2023-04-21
Payer: MEDICARE

## 2023-04-21 ENCOUNTER — PATIENT MESSAGE (OUTPATIENT)
Dept: INTERNAL MEDICINE | Facility: CLINIC | Age: 83
End: 2023-04-21
Payer: MEDICARE

## 2023-04-21 VITALS
WEIGHT: 230.3 LBS | TEMPERATURE: 97.8 F | DIASTOLIC BLOOD PRESSURE: 73 MMHG | HEART RATE: 95 BPM | RESPIRATION RATE: 20 BRPM | HEIGHT: 66 IN | BODY MASS INDEX: 37.01 KG/M2 | OXYGEN SATURATION: 96 % | SYSTOLIC BLOOD PRESSURE: 151 MMHG

## 2023-04-21 PROBLEM — N39.0 ACUTE UTI: Status: RESOLVED | Noted: 2023-04-20 | Resolved: 2023-04-21

## 2023-04-21 LAB
ALBUMIN SERPL-MCNC: 3.2 G/DL (ref 3.5–5.2)
ALBUMIN/GLOB SERPL: 1 G/DL
ALP SERPL-CCNC: 115 U/L (ref 39–117)
ALT SERPL W P-5'-P-CCNC: 18 U/L (ref 1–33)
ANION GAP SERPL CALCULATED.3IONS-SCNC: 10.2 MMOL/L (ref 5–15)
AST SERPL-CCNC: 15 U/L (ref 1–32)
BACTERIA SPEC AEROBE CULT: ABNORMAL
BASOPHILS # BLD AUTO: 0.02 10*3/MM3 (ref 0–0.2)
BASOPHILS NFR BLD AUTO: 0.2 % (ref 0–1.5)
BILIRUB SERPL-MCNC: 0.4 MG/DL (ref 0–1.2)
BUN SERPL-MCNC: 19 MG/DL (ref 8–23)
BUN/CREAT SERPL: 20.2 (ref 7–25)
CALCIUM SPEC-SCNC: 8.7 MG/DL (ref 8.6–10.5)
CHLORIDE SERPL-SCNC: 106 MMOL/L (ref 98–107)
CO2 SERPL-SCNC: 22.8 MMOL/L (ref 22–29)
CREAT SERPL-MCNC: 0.94 MG/DL (ref 0.57–1)
DEPRECATED RDW RBC AUTO: 44.9 FL (ref 37–54)
EGFRCR SERPLBLD CKD-EPI 2021: 61.1 ML/MIN/1.73
EOSINOPHIL # BLD AUTO: 0.01 10*3/MM3 (ref 0–0.4)
EOSINOPHIL NFR BLD AUTO: 0.1 % (ref 0.3–6.2)
ERYTHROCYTE [DISTWIDTH] IN BLOOD BY AUTOMATED COUNT: 14 % (ref 12.3–15.4)
GLOBULIN UR ELPH-MCNC: 3.2 GM/DL
GLUCOSE SERPL-MCNC: 137 MG/DL (ref 65–99)
HCT VFR BLD AUTO: 35.1 % (ref 34–46.6)
HGB BLD-MCNC: 11 G/DL (ref 12–15.9)
IMM GRANULOCYTES # BLD AUTO: 0.05 10*3/MM3 (ref 0–0.05)
IMM GRANULOCYTES NFR BLD AUTO: 0.6 % (ref 0–0.5)
LYMPHOCYTES # BLD AUTO: 0.8 10*3/MM3 (ref 0.7–3.1)
LYMPHOCYTES NFR BLD AUTO: 8.8 % (ref 19.6–45.3)
MCH RBC QN AUTO: 27.7 PG (ref 26.6–33)
MCHC RBC AUTO-ENTMCNC: 31.3 G/DL (ref 31.5–35.7)
MCV RBC AUTO: 88.4 FL (ref 79–97)
MONOCYTES # BLD AUTO: 0.87 10*3/MM3 (ref 0.1–0.9)
MONOCYTES NFR BLD AUTO: 9.6 % (ref 5–12)
NEUTROPHILS NFR BLD AUTO: 7.34 10*3/MM3 (ref 1.7–7)
NEUTROPHILS NFR BLD AUTO: 80.7 % (ref 42.7–76)
NRBC BLD AUTO-RTO: 0 /100 WBC (ref 0–0.2)
PLATELET # BLD AUTO: 177 10*3/MM3 (ref 140–450)
PMV BLD AUTO: 9.3 FL (ref 6–12)
POTASSIUM SERPL-SCNC: 3.7 MMOL/L (ref 3.5–5.2)
PROCALCITONIN SERPL-MCNC: 2.43 NG/ML (ref 0–0.25)
PROT SERPL-MCNC: 6.4 G/DL (ref 6–8.5)
RBC # BLD AUTO: 3.97 10*6/MM3 (ref 3.77–5.28)
SODIUM SERPL-SCNC: 139 MMOL/L (ref 136–145)
WBC NRBC COR # BLD: 9.09 10*3/MM3 (ref 3.4–10.8)

## 2023-04-21 PROCEDURE — 25010000002 HYDROMORPHONE 1 MG/ML SOLUTION: Performed by: UROLOGY

## 2023-04-21 PROCEDURE — 25010000002 MEROPENEM PER 100 MG: Performed by: HOSPITALIST

## 2023-04-21 PROCEDURE — 97161 PT EVAL LOW COMPLEX 20 MIN: CPT

## 2023-04-21 PROCEDURE — 84145 PROCALCITONIN (PCT): CPT | Performed by: UROLOGY

## 2023-04-21 PROCEDURE — 63710000001 IMIPRAMINE 25 MG TABLET: Performed by: INTERNAL MEDICINE

## 2023-04-21 PROCEDURE — 80053 COMPREHEN METABOLIC PANEL: CPT | Performed by: UROLOGY

## 2023-04-21 PROCEDURE — 85025 COMPLETE CBC W/AUTO DIFF WBC: CPT | Performed by: UROLOGY

## 2023-04-21 PROCEDURE — 99239 HOSP IP/OBS DSCHRG MGMT >30: CPT | Performed by: INTERNAL MEDICINE

## 2023-04-21 PROCEDURE — A9270 NON-COVERED ITEM OR SERVICE: HCPCS | Performed by: INTERNAL MEDICINE

## 2023-04-21 PROCEDURE — 25010000002 CEFTRIAXONE SODIUM-DEXTROSE 2-2.22 GM-%(50ML) RECONSTITUTED SOLUTION: Performed by: INTERNAL MEDICINE

## 2023-04-21 PROCEDURE — 63710000001 GABAPENTIN 300 MG CAPSULE: Performed by: INTERNAL MEDICINE

## 2023-04-21 PROCEDURE — 63710000001 LEVOFLOXACIN 750 MG TABLET: Performed by: INTERNAL MEDICINE

## 2023-04-21 PROCEDURE — G0378 HOSPITAL OBSERVATION PER HR: HCPCS

## 2023-04-21 RX ORDER — IMIPRAMINE HCL 25 MG
50 TABLET ORAL 2 TIMES DAILY
Status: DISCONTINUED | OUTPATIENT
Start: 2023-04-21 | End: 2023-04-21 | Stop reason: HOSPADM

## 2023-04-21 RX ORDER — LEVOFLOXACIN 750 MG/1
750 TABLET ORAL EVERY 24 HOURS
Status: DISCONTINUED | OUTPATIENT
Start: 2023-04-21 | End: 2023-04-21 | Stop reason: HOSPADM

## 2023-04-21 RX ORDER — LEVOFLOXACIN 750 MG/1
750 TABLET ORAL EVERY 24 HOURS
Status: DISCONTINUED | OUTPATIENT
Start: 2023-04-22 | End: 2023-04-21

## 2023-04-21 RX ORDER — GABAPENTIN 300 MG/1
300 CAPSULE ORAL 4 TIMES DAILY
Status: DISCONTINUED | OUTPATIENT
Start: 2023-04-21 | End: 2023-04-21 | Stop reason: HOSPADM

## 2023-04-21 RX ORDER — CEFTRIAXONE 2 G/50ML
2 INJECTION, SOLUTION INTRAVENOUS EVERY 24 HOURS
Status: DISCONTINUED | OUTPATIENT
Start: 2023-04-21 | End: 2023-04-21

## 2023-04-21 RX ORDER — SODIUM CHLORIDE 9 MG/ML
INJECTION, SOLUTION INTRAVENOUS
Status: COMPLETED
Start: 2023-04-21 | End: 2023-04-21

## 2023-04-21 RX ORDER — LEVOFLOXACIN 750 MG/1
750 TABLET ORAL EVERY 24 HOURS
Qty: 9 TABLET | Refills: 0 | Status: SHIPPED | OUTPATIENT
Start: 2023-04-22 | End: 2023-05-01

## 2023-04-21 RX ORDER — IMIPRAMINE HCL 50 MG
100 TABLET ORAL NIGHTLY
Qty: 180 TABLET | Refills: 1 | Status: SHIPPED | OUTPATIENT
Start: 2023-04-21

## 2023-04-21 RX ORDER — MEROPENEM 1 G/1
INJECTION, POWDER, FOR SOLUTION INTRAVENOUS
Status: COMPLETED
Start: 2023-04-21 | End: 2023-04-21

## 2023-04-21 RX ADMIN — SODIUM CHLORIDE 125 ML/HR: 9 INJECTION, SOLUTION INTRAVENOUS at 00:07

## 2023-04-21 RX ADMIN — LEVOFLOXACIN 750 MG: 750 TABLET, FILM COATED ORAL at 13:08

## 2023-04-21 RX ADMIN — Medication 1 G: at 04:35

## 2023-04-21 RX ADMIN — HYDROMORPHONE HYDROCHLORIDE 1 MG: 1 INJECTION, SOLUTION INTRAMUSCULAR; INTRAVENOUS; SUBCUTANEOUS at 00:07

## 2023-04-21 RX ADMIN — GABAPENTIN 300 MG: 300 CAPSULE ORAL at 09:28

## 2023-04-21 RX ADMIN — IMIPRAMINE HYDROCHLORIDE 50 MG: 25 TABLET ORAL at 09:27

## 2023-04-21 RX ADMIN — CEFTRIAXONE 2 G: 2 INJECTION, SOLUTION INTRAVENOUS at 09:27

## 2023-04-21 RX ADMIN — SODIUM CHLORIDE 125 ML/HR: 9 INJECTION, SOLUTION INTRAVENOUS at 06:00

## 2023-04-21 NOTE — PROGRESS NOTES
Patient: Anitra Orta  Procedure(s):  CYSTOSCOPY STENT PLACEMENT  Anesthesia type: general    Patient location: [facility] Surgical Floor  Vitals:    04/21/23 0053 04/21/23 0218 04/21/23 0400 04/21/23 0600   BP: 152/78   151/73   BP Location: Left arm   Left arm   Patient Position: Lying   Lying   Pulse: 100 98 94 95   Resp: 20 20 20 20   Temp: 97.7 °F (36.5 °C)   97.8 °F (36.6 °C)   TempSrc: Oral   Oral   SpO2: 93% 98% 96% 96%   Weight:       Height:         Level of consciousness: awake, alert and oriented    Post-anesthesia pain: adequate analgesia  Airway patency: patent  Respiratory: unassisted  Cardiovascular: stable and blood pressure at baseline  Hydration: euvolemic    Anesthetic complications: no

## 2023-04-21 NOTE — OUTREACH NOTE
Prep Survey    Flowsheet Row Responses   Holiness facility patient discharged from? [place]   Is LACE score < 7 ? Yes   Eligibility [facility] [place]   Date of Admission 04/19/23   Date of Discharge 04/21/23   Discharge Disposition Home or Self Care   Discharge diagnosis UTI cystoscopy stent placement this visit   Does the patient have one of the following disease processes/diagnoses(primary or secondary)? Other   Does the patient have Home health ordered? No   Is there a DME ordered? No   Prep survey completed? Yes          NANCY MOSQUERA - Registered Nurse

## 2023-04-21 NOTE — PLAN OF CARE
Goal Outcome Evaluation:  Plan of Care Reviewed With: patient        Progress: improving  Outcome Evaluation: Pt VS improving, am labs pending. Pt continues HR 90's-100's,  02@1L nc. Pt s/p stent placement with urology, 4/20/23 late afternoon. Pt reports pain, improved with current regimen, see anna, flowsheets. Pt reports nausea x1 overnight relieved with iv prn med, see emar. Pt utilizing external catheter r/t reported incontinence, and frequent urination.  Pt continues IVF and IV antibiotics, see md anna notes. Pt resting at this time.

## 2023-04-21 NOTE — PROGRESS NOTES
Updated by staff that blood culture has turned positive for GNB.  Also note this in the urine.  I'm going to broaden her gram negative coverage given that one previous culture of ESBL Klebsiella.  I'm expecting quick de-escalation, but want to be sure given the sepsis picture.

## 2023-04-21 NOTE — DISCHARGE PLACEMENT REQUEST
"JenniferbelaAnitra R \"PAT\" (82 y.o. Female)     Date of Birth   1940    Social Security Number       Address   [address] [place], [place] [address]    Home Phone   [phone]    MRN   [id number]       Voodoo   Tenriism    Marital Status                               Admission Date   4/19/23    Admission Type   Emergency    Admitting Provider   William Villatoro MD    Attending Provider   William Villatoro MD    Department, Room/Bed   [facility] MED SURG, 1419/1       Discharge Date       Discharge Disposition       Discharge Destination                               Attending Provider: William Villatoro MD    Allergies: Bactrim [Sulfamethoxazole-trimethoprim], Ciprofloxacin, Levaquin [Levofloxacin], Macrobid [Nitrofurantoin], Nitrofurantoin Macrocrystal, Cortisone    Isolation: None   Infection: None   Code Status: CPR    Ht: 167.6 cm (66\")   Wt: 104 kg (230 lb 4.8 oz)    Admission Cmt: None   Principal Problem: Acute UTI [N39.0]                 Active Insurance as of 4/19/2023     Primary Coverage     Payor Plan Insurance Group Employer/Plan Group    MEDICARE MEDICARE A & B      Payor Plan Address Payor Plan Phone Number Payor Plan Fax Number Effective Dates    [address] [phone]  8/1/2006 - None Entered    [facility] [id number]       Subscriber Name Subscriber Birth Date Member ID       ANITRA OSCAR R 1940 [id number]           Secondary Coverage     Payor Plan Insurance Group Employer/Plan Group    AARP MC SUP AARP HEALTH CARE OPTIONS PLAN J     Payor Plan Address Payor Plan Phone Number Payor Plan Fax Number Effective Dates    [facility] [phone]  1/1/2016 - None Entered    [address]       [facility] [id number]       Subscriber Name Subscriber Birth Date Member ID       ANITRA OSCAR R 1940 [id number]                 Emergency Contacts      (Rel.) Home Phone Work Phone Mobile Phone    YouGuillermo (Spouse) -- -- " [phone]    Leann Wheatley (Daughter) -- -- [phone]    Lin Castellanos (Daughter) [phone] -- [phone]

## 2023-04-21 NOTE — PROGRESS NOTES
Afebrile  Wbc normal    She does have positive blood cultures with e coli.    Await sensitivities.    Continue supportive care.    We will need to see her about 2 weeks after discharge for a kub and u/a before planning definitive treatment of her stone.    Our office will call her to arrange.    Continue appropriate po antibiotics until seen in the office.    Available if needed over the weekend

## 2023-04-21 NOTE — DISCHARGE INSTR - APPOINTMENTS
Patient has follow up appointment with Dr Chavez on 4/24/2023 at 3:00                    [phone]      Patient has urology follow up with Dr. Ndiaye on 4/27/2023 at 2:45 at the [facility]              [phone]    [address] [place] [address]

## 2023-04-21 NOTE — DISCHARGE SUMMARY
"  Date of Admission: 4/19/2023    Date of Discharge:  4/21/2023    Length of stay:  LOS: 0 days     Presenting Problem:   Acute UTI [N39.0]  Kidney stone on right side [N20.0]      Active Diagnosis During Hospital Stay/Discharge Diagnoses/Course by Diagnoses:       Right Hydronephrosis due to Obstructing Stone/Sepsis due to  E. coli UTI/bacteremia:   -Status post cystoscopy stent placement 4/20/2023  -UCX 4/19/2023: Pansensitive E. Coli  -BCx 4/19/2023: E. Coli  -Given IV ABX here  -Procalcitonin downtrending, WBC normal  -Home on Levaquin 10 more days to complete treatment for bacteremia (note: In chart allergy listed to Levaquin however was only noted with nausea vomiting thus this is likely not true \"allergy\".  Further she was given Levaquin here and tolerated without issue.)  -Follow-up urology outpatient for stent removal and definitive treatment of nephrolithiasis     Essential Hypertension:   -Stable     Right Leg Pain and Weakness:   -No acute clinical findings on my exam.  Worked with PT and they noted this was a chronic finding and she ambulated well with walker which she can use at home     Acute metabolic encephalopathy due to sepsis  -Improving more near baseline at discharge  -Continue home imipramine and gabapentin  -Continuing through with treatment of above      Active Hospital Problems   No active problems to display.      Resolved Hospital Problems    Diagnosis Date Resolved POA   • **Acute UTI [N39.0] 04/21/2023 Yes         Hospital Course  Patient is a 82 y.o. female presented with some mild confusion and noted to be septic ultimately found to have obstructing ureteral stone with UTI and then E. coli bacteremia/E. coli UTI.  She had stent placement.  She did have some intermittent confusion/agitation but after getting IV antibiotics and her home imipramine she improved.  However she still was wanting to d/c home.  Fortunately E. coli in the urine was pansensitive and she was given Levaquin which " she tolerated well while here.  At this point she was felt stable to discharge home as she will need to complete antibiotic therapy outpatient.  She will follow-up with urology outpatient as well.  Patient and spouse were in agreement with plan..        Procedures Performed:as noted above    Procedure(s):  CYSTOSCOPY STENT PLACEMENT  -------------------       Consults:   Consults     Date and Time Order Name Status Description    4/20/2023  1:08 PM Inpatient Urology Consult Completed           Pertinent Test Results:     Results from last 7 days   Lab Units 04/21/23  0357 04/19/23 2229   WBC 10*3/mm3 9.09 11.39*   HEMOGLOBIN g/dL 11.0* 12.2   HEMATOCRIT % 35.1 38.0   PLATELETS 10*3/mm3 177 164     Results from last 7 days   Lab Units 04/21/23  0357 04/19/23 2229   SODIUM mmol/L 139 138   POTASSIUM mmol/L 3.7 3.7   CHLORIDE mmol/L 106 103   CO2 mmol/L 22.8 22.0   BUN mg/dL 19 22   CREATININE mg/dL 0.94 1.25*   CALCIUM mg/dL 8.7 9.0   BILIRUBIN mg/dL 0.4 0.9   ALK PHOS U/L 115 131*   ALT (SGPT) U/L 18 23   AST (SGOT) U/L 15 26   GLUCOSE mg/dL 137* 166*       Microbiology Results (last 10 days)     Procedure Component Value - Date/Time    Blood Culture - Blood, Arm, Right [[id number]]  (Abnormal) Collected: 04/19/23 2303    Lab Status: Preliminary result Specimen: Blood from Arm, Right Updated: 04/21/23 0928     Blood Culture Escherichia coli     Isolated from Aerobic and Anaerobic Bottles     Gram Stain Aerobic Bottle Gram negative bacilli      Anaerobic Bottle Gram negative bacilli    Blood Culture ID, PCR - Blood, Arm, Right [[id number]]  (Abnormal) Collected: 04/19/23 2303    Lab Status: Final result Specimen: Blood from Arm, Right Updated: 04/20/23 2152     BCID, PCR Escherichia coli. Identification by BCID2 PCR.    Narrative:      No resistance genes detected.    Blood Culture - Blood, Arm, Left [[id number]]  (Abnormal) Collected: 04/19/23 2229    Lab Status: Preliminary result Specimen: Blood from Arm, Left  Updated: 04/21/23 0928     Blood Culture Escherichia coli     Isolated from Aerobic and Anaerobic Bottles     Gram Stain Anaerobic Bottle Gram negative bacilli      Aerobic Bottle Gram negative bacilli    Urine Culture - Urine, Urine, Clean Catch [[id number]]  (Abnormal)  (Susceptibility) Collected: 04/19/23 2208    Lab Status: Final result Specimen: Urine, Clean Catch Updated: 04/21/23 0101     Urine Culture >100,000 CFU/mL Escherichia coli    Narrative:      Colonization of the urinary tract without infection is common. Treatment is discouraged unless the patient is symptomatic, pregnant, or undergoing an invasive urologic procedure.    Susceptibility      Escherichia coli      YOLY      Ampicillin Susceptible      Ampicillin + Sulbactam Susceptible      Cefazolin Susceptible      Cefepime Susceptible      Ceftazidime Susceptible      Ceftriaxone Susceptible      Gentamicin Susceptible      Levofloxacin Susceptible      Nitrofurantoin Susceptible      Piperacillin + Tazobactam Susceptible      Trimethoprim + Sulfamethoxazole Susceptible                                 Results for orders placed during the hospital encounter of 10/10/19    Adult Transthoracic Echo Complete W/ Cont if Necessary Per Protocol    Interpretation Summary  · Estimated EF = 62%.  · Left ventricular systolic function is normal.      Imaging Results (All)     Procedure Component Value Units Date/Time    FL Retrograde Pyelogram In OR [[id number]] Collected: 04/21/23 0907     Updated: 04/21/23 0911    Narrative:      FLUOROSCOPY DURING RIGHT URETERAL STENT PLACEMENT, 04/20/2023     HISTORY:  Right ureteral stent placement     REPORT:  C-arm fluoroscopy was provided by radiology personnel in the OR during  right ureteral stent placement. Fluoroscopy time was recorded as 20  seconds. 1 spot film image was recorded for documentation purposes.    Reference air kerma:  11.47 mGy.  Please see operative note for details.     This report was finalized on  4/21/2023 9:08 AM by Dr. Terence Ridley MD.       CT Abdomen Pelvis Stone Protocol [[id number]] Collected: 04/20/23 0011     Updated: 04/20/23 0013    Narrative:      CT Abdomen Pelvis WO    INDICATION:   Fatigue and fever. Sepsis. Nephrolithiasis.    TECHNIQUE:   CT of the abdomen and pelvis without IV contrast. Coronal and sagittal reconstructions were obtained.  Radiation dose reduction techniques included automated exposure control or exposure modulation based on body size. Count of known CT and cardiac nuc  med studies performed in previous 12 months: 1.     COMPARISON:   7/23/2021    FINDINGS:  There is mild scarring/atelectasis in the lung bases. There is atherosclerotic disease with no aortic aneurysm. Gallbladder is mildly distended but otherwise normal. No biliary obstruction. There is mild right hydronephrosis due to a 5.6 mm UPJ stone.  There also appears to be a second tiny 2 mm stone in the lower right ureter below the pelvic brim. No left-sided ureteral stones or hydronephrosis. Small nonobstructing left kidney stones are present. Bilateral renal cysts are noted. No follow-up is  indicated. The unenhanced solid abdominal organs are otherwise normal.    Urinary bladder is normal. Solid pelvic organs are normal. The appendix is normal. There are a few scattered colonic diverticula, but there is no evidence of diverticulitis or colitis. No small bowel obstruction is identified. Stomach is normal except  for small hiatal hernia. No adenopathy or dependent free fluid is seen. There is extensive lumbar degenerative disease with laminectomies noted at L4 and L5. There is lumbar levoscoliosis.      Impression:        1. Mild right hydronephrosis due to a 5.6 mm UPJ stone. There is a second smaller 2 mm stone in the lower ureter below the pelvic brim.  2. Small nonobstructing left kidney stones.  3. No acute findings in the GI tract. The appendix is normal. Small hiatal hernia is present.  4. Additional nonacute  findings as above.          Signer Name: Jenaro Reyna MD   Signed: 4/20/2023 12:11 AM   Workstation Name: [id number]    [facility]    CT Head Without Contrast [[id number]] Collected: 04/20/23 0004     Updated: 04/20/23 0006    Narrative:      CT Head WO    HISTORY:   Mental status changes. Fatigue and confusion. Sepsis.    TECHNIQUE:   Axial unenhanced head CT with multiplanar reformats. Radiation dose reduction techniques included automated exposure control or exposure modulation based on body size. Count of known CT and cardiac nuc med studies performed in previous 12 months: 1.     COMPARISON:   7/23/2021    FINDINGS:   Ventricular size and configuration are normal. No acute infarct or hemorrhage is identified. There are no masses. There is no skull fracture.  There is age-appropriate atrophy. Mild chronic small vessel ischemic changes are present in the white matter.  Atherosclerotic calcifications are noted in the carotid siphons.      Impression:      Senescent changes without acute abnormality.    Signer Name: Jenaro Reyna MD   Signed: 4/20/2023 12:04 AM   Workstation Name: [id number]    [facility]    XR Chest 1 View [[id number]] Collected: 04/20/23 0001     Updated: 04/20/23 0003    Narrative:      CR Chest 1 Vw    INDICATION:   Fever and sepsis. Fatigue.     COMPARISON:    7/23/2021    FINDINGS:  Portable AP view(s) of the chest.  Heart size is normal. There is atherosclerotic disease in the aorta. There is some mild chronic interstitial scarring in the lungs. Lungs are otherwise clear. No pneumothorax. Pulmonary vascularity is normal.      Impression:      No acute cardiopulmonary findings.    Signer Name: Jenaro Reyna MD   Signed: 4/20/2023 12:01 AM   Workstation Name: [id number]    [facility]            Condition on Discharge:  Stable     Vital Signs  Temp:  [97 °F (36.1 °C)-98 °F (36.7 °C)] 97.8 °F (36.6 °C)  Heart  Rate:  [] 95  Resp:  [14-20] 20  BP: (151-199)/() 151/73    Physical Exam:  Physical Exam  Vitals reviewed.   Constitutional:       General: She is not in acute distress.  Cardiovascular:      Rate and Rhythm: Normal rate.   Pulmonary:      Effort: No respiratory distress.   Abdominal:      General: There is no distension.   Neurological:      Mental Status: She is alert. Mental status is at baseline.         Emotional Behavior:       Depression  none               Anxiety  Mild     Debilities:     Agitation  None at time of d/c    Discharge Disposition  Home or Self Care    Discharge Medications     Discharge Medications      New Medications      Instructions Start Date   levoFLOXacin 750 MG tablet  Commonly known as: LEVAQUIN   750 mg, Oral, Every 24 Hours   Start Date: April 22, 2023        Continue These Medications      Instructions Start Date   gabapentin 300 MG capsule  Commonly known as: NEURONTIN   300 mg, Oral, 4 Times Daily      HYDROcodone-acetaminophen 7.5-325 MG per tablet  Commonly known as: NORCO   1 tablet, Oral, 3 Times Daily      metoprolol succinate XL 50 MG 24 hr tablet  Commonly known as: Toprol XL   50 mg, Oral, Daily      NON FORMULARY   1 tablet, Oral, 2 Times Daily, Preservation OTC      simvastatin 40 MG tablet  Commonly known as: ZOCOR   40 mg, Oral, Nightly, for cholesterol      VITAMIN D PO   2 tablets, Oral, Daily, Pt doesn't know strength         Stop These Medications    NON FORMULARY        ASK your doctor about these medications      Instructions Start Date   imipramine 50 MG tablet  Commonly known as: Tofranil   50 mg, Oral, 2 Times Daily             Discharge Diet:   Diet Instructions     Diet: Regular/House Diet; Regular Texture (IDDSI 7); Thin (IDDSI 0)      Discharge Diet: Regular/House Diet    Texture: Regular Texture (IDDSI 7)    Fluid Consistency: Thin (IDDSI 0)          Activity at Discharge:   Activity Instructions     Activity as Tolerated             Follow-up Appointments  Future Appointments   Date Time Provider Department [facility]   9/28/2023 10:30 AM [facility]     Additional Instructions for the Follow-ups that You Need to Schedule     Discharge Follow-up with PCP   As directed       Currently Documented PCP:    Margot Chavez MD    PCP Phone Number:    [phone]     Follow Up Details: one week         Discharge Follow-up with Specialty: urology; 1 Week   As directed      Specialty: urology    Follow Up: 1 Week               Test Results Pending at Discharge  Pending Labs     Order Current Status    Blood Culture - Blood, Arm, Left Preliminary result    Blood Culture - Blood, Arm, Right Preliminary result           Risk for Readmission (LACE) Score: 2 (4/21/2023  6:00 AM)      This patient has been examined wearing appropriate Personal Protective Equipment . 04/21/23      Time: Discharge 40 min with face-to-face history exam, writing of prescriptions, and documenting discharge data including care coordination with the nursing staff.      Electronically signed by Randy Cardoza DO, 04/21/23, 14:47 EDT.

## 2023-04-21 NOTE — NURSING NOTE
Patient given gabapentin, rocephin and imipramine at this time per MD request. Patient very guarded, but comforted by . Imipramine dropped on floor by patient, so second dose was pulled and given successfully.

## 2023-04-21 NOTE — TELEPHONE ENCOUNTER
From: Anitra Orta  To: Margot Chavez  Sent: 4/21/2023 1:07 PM EDT  Subject: RX    Would you please order Imipramine 50mg 90 tablets

## 2023-04-21 NOTE — PLAN OF CARE
Goal Outcome Evaluation:  Plan of Care Reviewed With: patient           Outcome Evaluation: PT Evaluation Complete: Patient performs sit to/from stand transfers with supervision and gait x 80 feet with two seated breaks with CGA with use of FWW. Patient with chronic R LE weakness and decreased ankle DF from prior spinal surgery. Patient compensates with increased hip and knee flexion. No loss of balance noted. Patient follows all commands and speaks appropriately within conversation. Confusion/agitation noted per spouse and per chart review this morning, deferred formal orientation questions. Patient and spouse state mobility is at baseline. Recommend continued mobility with nursing staff with use of FWW. Patient and spouse deny need for home health at this time. Patients mobility appears at baseline, no further inpatient skilled PT needs at this time.

## 2023-04-21 NOTE — THERAPY DISCHARGE NOTE
Patient Name: Anitra Orta  : 1940    MRN: [id number]                              Today's Date: 2023       Admit Date: 2023    Visit Dx:     ICD-10-CM ICD-9-CM   1. Acute UTI  N39.0 599.0   2. Kidney stone on right side  N20.0 592.0     Patient Active Problem List   Diagnosis   • Urinary tract infection due to ESBL Klebsiella   • Simple renal cyst   • Former smoker   • Hyperlipidemia   • Osteoporosis   • Panic disorder   • Psoriasis   • Urge incontinence of urine   • Vitamin D deficiency   • Post-menopause   • Acute UTI (urinary tract infection)   • Chronic bilateral low back pain without sciatica   • Spinal stenosis of lumbar region with neurogenic claudication   • Other chronic pain   • Mixed incontinence   • GERD without esophagitis   • Anxiety   • Hyperglycemia   • e.coli bacteremia   • Ureteral stone with hydronephrosis   • Obstructive uropathy   • Nephrolithiasis   • Anemia   • Metabolic acidosis   • Essential hypertension   • History of ventricular tachycardia   • Left ureteral stone   • Acute UTI     Past Medical History:   Diagnosis Date   • Acute kidney failure 2021   • Anxiety    • Arthritis of back    • At risk for sleep apnea    • Cataract     BILAT-SCHEDULED FOR THIS AND HAD TO CANCEL D/T SEPSIS   • Chronic pain disorder     ALL OVER PAIN   • Chronic UTI    • Closed displaced fracture of surgical neck of left humerus 2021   • Closed fracture of left proximal humerus 6/15/2021   • Closed fracture of right distal radius 6/15/2021   • COVID-19 vaccine series completed     2ND DOSE 3/23/21   • DDD (degenerative disc disease), lumbar    • e.coli bacteremia 3/25/2021   • Elevated cholesterol    • Fracture of wrist    • Fracture, humerus     LEFT-USING IMMOBILIZER/SLING   • GERD (gastroesophageal reflux disease)    • History of recent fall     21   • History of sepsis     MOST RECENT 2021-R/T KIDNEY STONE, UTI.  STATES THIS IS HER 3RD SEPSIS DIAGNOSIS   •  History of UTI    • HTN (hypertension)    • Hyperlipidemia    • Hypokalemia 3/25/2021   • Kidney stones     LEFT   • Left humeral fracture 7/11/2021   • Macular degeneration, bilateral     WORSE ON RIGHT-ONLY PERIPHERAL VISION   • Metabolic encephalopathy 3/25/2021   • Mixed hyperlipidemia 9/16/2016   • Mixed incontinence    • Osteoarthritis    • Osteoporosis    • Panic disorder    • Personal history of urinary calculi    • PONV (postoperative nausea and vomiting)    • Scoliosis    • Sepsis, unspecified organism 3/25/2021   • Tachycardia, unspecified    • Ventricular tachycardia 9/9/2019   • Wrist fracture     RIGHT-CURRENTLY NOT WEARING BRACE FOR THIS     Past Surgical History:   Procedure Laterality Date   • BREAST BIOPSY Left     benign   • BREAST LUMPECTOMY Left     benign   • BUNIONECTOMY Right    • COLONOSCOPY N/A 11/30/2016    Procedure: COLONOSCOPY polypectomy;  Surgeon: Adali Willard MD;  Location: [facility];  Service:    • CYSTOSCOPY BOTOX INJECTION OF BLADDER N/A 7/21/2017    Procedure: CYSTOSCOPY COAPTITE INJECTION;  Surgeon: Kevon Clark MD;  Location: [facility];  Service:    • CYSTOSCOPY W/ LITHOLAPAXY / EHL     • CYSTOSCOPY W/ URETERAL STENT PLACEMENT Left 9/12/2016    Procedure: CYSTOSCOPY URETERAL STENT INSERTION;  Surgeon: Kevon Clark MD;  Location: [facility];  Service:    • CYSTOSCOPY W/ URETERAL STENT PLACEMENT Left 8/1/2019    Procedure: CYSTOSCOPY URETERAL CATHETER/STENT INSERTION;  Surgeon: Kevon Clark MD;  Location: [facility];  Service: Urology   • CYSTOSCOPY W/ URETERAL STENT PLACEMENT Left 3/25/2021    Procedure: CYSTOSCOPY URETERAL CATHETER/STENT INSERTION;  Surgeon: Kevon Clark MD;  Location: [facility] OR;  Service: Urology;  Laterality: Left;   • CYSTOSCOPY W/ URETERAL STENT PLACEMENT Left 7/11/2021    Procedure: CYSTOSCOPY URETERAL CATHETER/STENT INSERTION;  Surgeon: Lul Guzman MD;  Location: [facility];  Service: Urology;  Laterality: Left;   CYSTOSCOPY LEFT URETERAL CATHETER/ STENT PLACEMENT   • CYSTOSCOPY W/ URETERAL STENT PLACEMENT Right 4/20/2023    Procedure: CYSTOSCOPY STENT PLACEMENT;  Surgeon: Matthew Ndiaye MD;  Location: [facility];  Service: Urology;  Laterality: Right;   • JOINT REPLACEMENT     • KNEE ARTHROSCOPY Right    • LUMBAR EPIDURAL INJECTION     • TONSILLECTOMY AND ADENOIDECTOMY     • URETEROSCOPY LASER LITHOTRIPSY WITH STENT INSERTION Left 10/5/2016    Procedure: LT URETEROSCOPY LASER LITHOTRIPSY STONE BASKET EXTRACTION AND STENT ;  Surgeon: Kevon Clark MD;  Location: [facility];  Service:    • URETEROSCOPY LASER LITHOTRIPSY WITH STENT INSERTION Left 7/23/2021    Procedure: LEFT URETEROSCOPY STONE MANIPULATION STENT EXCHANGE, LASER LITHOTRIPSY, CYSTOSCOPY, STONE BASKET EXTRACTION;  Surgeon: Kevon Clark MD;  Location: [facility];  Service: Urology;  Laterality: Left;      General Information     Row Name 04/21/23 1120          Physical Therapy Time and Intention    Document Type discharge evaluation/summary  -BP     Mode of Treatment physical therapy  -BP     Row Name 04/21/23 1120          General Information    Patient Profile Reviewed yes  Pt presents due to 2-3 days of progressive fatigue and confusion. Pt diagnosed with hydronephrosis due to an obstructing UPJ stone. Pt now s/p cystoscopy with stent placement. Patient with increased confusion this morning however improving during eval.  -BP     Prior Level of Function --  Pt with chronic R LE weakness from previous laminectomy with foot drop on the right. Patient and spouse report she uses a rollator at baseline for mobility.  -BP     Existing Precautions/Restrictions fall  -BP     Barriers to Rehab previous functional deficit  -BP     Row Name 04/21/23 1120          Living Environment    People in Home spouse  -BP     Row Name 04/21/23 1120          Cognition    Orientation Status (Cognition) --  Due to agitation and confusion earlier this morning,  orientation not formerly assessed. Patient follows all commands throughout evalaution, some confusion noted however speaks appropriately.  -BP     Row Name 04/21/23 1120          Safety Issues, Functional Mobility    Safety Issues Affecting Function (Mobility) positioning of assistive device  -BP     Comment, Safety Issues/Impairments (Mobility) Requires cues for positioning of device with directional changes.  -BP           User Key  (r) = Recorded By, (t) = Taken By, (c) = Cosigned By    Initials Name Provider Type    Afsaneh Herman, PT Physical Therapist               Mobility     Row Name 04/21/23 1308          Bed Mobility    Comment, (Bed Mobility) deferred, patient up in chair.  -BP     Row Name 04/21/23 1308          Transfers    Comment, (Transfers) verbal cues for hand placement with stand to sit transfers  -BP     Row Name 04/21/23 1308          Sit-Stand Transfer    Sit-Stand [place] (Transfers) supervision;verbal cues  -BP     Assistive Device (Sit-Stand Transfers) walker, front-wheeled  -BP     Row Name 04/21/23 1308          Gait/Stairs (Locomotion)    [place] Level (Gait) contact guard  -BP     Assistive Device (Gait) walker, front-wheeled  -BP     Distance in Feet (Gait) 50, 15, 15-seated break in between  -BP     Deviations/Abnormal Patterns (Gait) gait speed decreased;stride length decreased  -BP     Bilateral Gait Deviations forward flexed posture  -BP     Right Sided Gait Deviations heel strike decreased  -BP     Comment, (Gait/Stairs) Patient manages device safely, no loss of balance noted. Patient with decreased R ankle DF however able to compensate with increased hip and knee flexion.  -BP           User Key  (r) = Recorded By, (t) = Taken By, (c) = Cosigned By    Initials Name Provider Type    Afsaneh Herman PT Physical Therapist               Obj/Interventions     Row Name 04/21/23 1331          Range of Motion Comprehensive    Comment, General Range of Motion L LE  AROM WFL. R knee ROM WFL. Decreased right ankle DF-chronic from previous spinal surgery.  -BP     Row Name 04/21/23 1331          Strength Comprehensive (MMT)    Comment, General Manual Muscle Testing (MMT) Assessment Strength not formerly assessed. No knee buckling noted during gait.  -BP     Row Name 04/21/23 1331          Balance    Comment, Balance Sitting balance-supervision. Standing balance-SBA with device  -           User Key  (r) = Recorded By, (t) = Taken By, (c) = Cosigned By    Initials Name Provider Type    Afsaneh Herman, PT Physical Therapist               Goals/Plan    No documentation.                Clinical Impression     [facility] Name 04/21/23 1334          Pain    Pretreatment Pain Rating 0/10 - no pain  -BP     Posttreatment Pain Rating 0/10 - no pain  -BP     [facility] Name 04/21/23 1334          Plan of Care Review    Plan of Care Reviewed With patient  -BP     Outcome Evaluation PT Evaluation Complete: Patient performs sit to/from stand transfers with supervision and gait x 80 feet with two seated breaks with CGA with use of FWW. Patient with chronic R LE weakness and decreased ankle DF from prior spinal surgery. Patient compensates with increased hip and knee flexion. No loss of balance noted. Patient follows all commands and speaks appropriately within conversation. Confusion/agitation noted per spouse and per chart review this morning, deferred formal orientation questions. Patient and spouse state mobility is at baseline. Recommend continued mobility with nursing staff with use of FWW. Patient and spouse deny need for home health at this time. Patients mobility appears at baseline, no further inpatient skilled PT needs at this time.  -[facility] Name 04/21/23 1334          Therapy Assessment/Plan (PT)    Criteria for Skilled Interventions Met (PT) no problems identified which require skilled intervention  -     Therapy Frequency (PT) evaluation only  -[facility] Name 04/21/23 1338           Positioning and Restraints    Pre-Treatment Position sitting in chair/recliner  -BP     Post Treatment Position chair  -BP     In Chair notified nsg;sitting;call light within reach;encouraged to call for assist;with family/caregiver  -BP           User Key  (r) = Recorded By, (t) = Taken By, (c) = Cosigned By    Initials Name Provider Type    BP Afsaneh Lamb, PT Physical Therapist               Outcome Measures     Row Name 04/21/23 1345          How much help from another person do you currently need...    Turning from your back to your side while in flat bed without using bedrails? 3  -BP     Moving from lying on back to sitting on the side of a flat bed without bedrails? 3  -BP     Moving to and from a bed to a chair (including a wheelchair)? 3  -BP     Standing up from a chair using your arms (e.g., wheelchair, bedside chair)? 3  -BP     Climbing 3-5 steps with a railing? 3  -BP     To walk in hospital room? 3  -BP     AM-PAC 6 Clicks Score (PT) 18  -BP     Highest level of mobility 6 --> Walked 10 steps or more  -BP     Row Name 04/21/23 1345          Functional Assessment    Outcome Measure Options AM-PAC 6 Clicks Basic Mobility (PT)  -BP           User Key  (r) = Recorded By, (t) = Taken By, (c) = Cosigned By    Initials Name Provider Type    BP Afsaneh Lamb, ARON Physical Therapist              Physical Therapy Education     Title: PT OT SLP Therapies (Resolved)     Topic: Physical Therapy (Resolved)     Point: Mobility training (Resolved)     Learning Progress Summary           Patient Acceptance, E,TB, VU by  at 4/21/2023 1346                               User Key     Initials Effective Dates Name Provider Type Discipline     06/16/21 -  Afsaneh Lamb PT Physical Therapist PT              PT Recommendation and Plan     Plan of Care Reviewed With: patient  Outcome Evaluation: PT Evaluation Complete: Patient performs sit to/from stand transfers with supervision and gait x 80 feet with  two seated breaks with CGA with use of FWW. Patient with chronic R LE weakness and decreased ankle DF from prior spinal surgery. Patient compensates with increased hip and knee flexion. No loss of balance noted. Patient follows all commands and speaks appropriately within conversation. Confusion/agitation noted per spouse and per chart review this morning, deferred formal orientation questions. Patient and spouse state mobility is at baseline. Recommend continued mobility with nursing staff with use of FWW. Patient and spouse deny need for home health at this time. Patients mobility appears at baseline, no further inpatient skilled PT needs at this time.     Time Calculation:    PT Charges     Row Name 04/21/23 1346             Time Calculation    Start Time 1025  -BP      Stop Time 1050  -BP      Time Calculation (min) 25 min  -BP      PT Received On 04/21/23  -BP            User Key  (r) = Recorded By, (t) = Taken By, (c) = Cosigned By    Initials Name Provider Type    BP Afsaneh Lamb, PT Physical Therapist              Therapy Charges for Today     Code Description Service Date Service Provider Modifiers Qty    [id number] HC PT EVAL LOW COMPLEXITY 2 4/21/2023 Afsaneh Lamb, PT GP 1          PT G-Codes  Outcome Measure Options: AM-PAC 6 Clicks Basic Mobility (PT)  AM-PAC 6 Clicks Score (PT): 18    PT Discharge Summary  Anticipated Discharge Disposition (PT): home with assist  Reason for Discharge: At baseline function    Afsaneh Lamb, PT  4/21/2023

## 2023-04-21 NOTE — NURSING NOTE
"Called into room by PCA because patient tearful and uncooperative. Upon entering the room, patient noted to be sitting on the side of the bed, complaining that she is being \"strapped to the bed.\" Explained to her that she was connected to IVFs but she could get up. Orientation to place, time and situation. Pt demanding to speak to her . Tommy, pt's , called and phone given to patient to speak to him. Patient crying to , stating staff was keeping her \"locked up in this nut house.\" Again, orientation provided. Patient became angry and demanding to see Dr Villatoro. It was explained that Dr Villatoro is not here today and that she would be seen by another hospitalist. Pt refusing to get out of the bathroom until her  arrives. Emotional support provided, orientation provided, patient continues refuses to come out of the bathroom.  "

## 2023-04-21 NOTE — NURSING NOTE
"Able to guide patient back to chair safely at this time.  is at bedside to console patient, who was verbally aggressive toward staff. Physician also has been at bedside and patient has demanded discharge \"now\". Patient will be discharged today pending antibiotic choice. Patient urinated without difficulty and is alert and oriented.   "

## 2023-04-21 NOTE — CASE MANAGEMENT/SOCIAL WORK
Continued Stay Note  GORDO Loco     Patient Name: Anitra Orta  MRN: [id number]  Today's Date: 4/21/2023    Admit Date: 4/19/2023    Plan: DC home with spouse   Discharge Plan     Row Name 04/21/23 1241       Plan    Plan DC home with spouse    Plan Comments CCP met with pt and her spouse introduced self, role and reviewed face sheet.  Pt lives in a single floor home with no steps to enter.  She is independent with her personal care.  Her spouse supervises as needed.  He does most of the meal prep, driving and house cleaning.  Pt uses a walker and a rollator and has a shower chair.  Her pharmacy is [facility] in [place] and they have no problems with perscriptions.  Pt has not been to rehab but has had [facility] several years ago.  Pt has a living will on file here.  Pts plan is to dc home with spouse.  He will transport her home.  No needs identified.  CCP will follow. Thanks, Nesha RHODES               Discharge Codes    No documentation.                     Nesha Heller

## 2023-04-22 LAB
BACTERIA SPEC AEROBE CULT: ABNORMAL
BACTERIA SPEC AEROBE CULT: ABNORMAL
GRAM STN SPEC: ABNORMAL
ISOLATED FROM: ABNORMAL
ISOLATED FROM: ABNORMAL

## 2023-04-22 NOTE — CASE MANAGEMENT/SOCIAL WORK
Case Management Discharge Note      Final Note: Discharged home.         Selected Continued Care - Discharged on 4/21/2023 Admission date: 4/19/2023 - Discharge disposition: Home or Self Care    Destination    No services have been selected for the patient.              Durable Medical Equipment    No services have been selected for the patient.              Dialysis/Infusion    No services have been selected for the patient.              Home Medical Care    No services have been selected for the patient.              Therapy    No services have been selected for the patient.              Community Resources    No services have been selected for the patient.              Community & DME    No services have been selected for the patient.                       Final Discharge Disposition Code: 01 - home or self-care

## 2023-04-24 ENCOUNTER — OFFICE VISIT (OUTPATIENT)
Dept: INTERNAL MEDICINE | Facility: CLINIC | Age: 83
End: 2023-04-24
Payer: MEDICARE

## 2023-04-24 ENCOUNTER — TRANSITIONAL CARE MANAGEMENT TELEPHONE ENCOUNTER (OUTPATIENT)
Dept: CALL CENTER | Facility: HOSPITAL | Age: 83
End: 2023-04-24
Payer: MEDICARE

## 2023-04-24 VITALS
HEART RATE: 85 BPM | RESPIRATION RATE: 16 BRPM | HEIGHT: 66 IN | TEMPERATURE: 95.5 F | SYSTOLIC BLOOD PRESSURE: 160 MMHG | OXYGEN SATURATION: 96 % | BODY MASS INDEX: 37.45 KG/M2 | DIASTOLIC BLOOD PRESSURE: 92 MMHG | WEIGHT: 233 LBS

## 2023-04-24 DIAGNOSIS — A41.50 SEPSIS DUE TO GRAM NEGATIVE BACTERIA: Primary | ICD-10-CM

## 2023-04-24 DIAGNOSIS — I10 ESSENTIAL HYPERTENSION: ICD-10-CM

## 2023-04-24 DIAGNOSIS — N13.2 URETERAL STONE WITH HYDRONEPHROSIS: ICD-10-CM

## 2023-04-24 NOTE — OUTREACH NOTE
Call Center TCM Note    Flowsheet Row Responses   [facility] [place] patient discharged from? [place]   Does the patient have one of the following disease processes/diagnoses(primary or secondary)? Other   TCM attempt successful? No   Unsuccessful attempts Attempt 1          Nanette Stock MA    4/24/2023, 13:54 EDT

## 2023-04-24 NOTE — OUTREACH NOTE
Call Center TCM Note    Flowsheet Row Responses   [facility] patient discharged from? [place]   Does the patient have one of the following disease processes/diagnoses(primary or secondary)? Other   TCM attempt successful? Yes   Discharge diagnosis UTI cystoscopy stent placement this visit   Does the patient have an appointment with their PCP within 7 days of discharge? Yes   TCM call completed? Yes   Wrap up additional comments Pt d/c from [facility] on Friday 04/21/2023, today completed FACE TO FACE TCM APPT with PCP Dr Chavez.           Nanette Stock MA    4/24/2023, 15:22 EDT

## 2023-04-24 NOTE — PROGRESS NOTES
Transitional Care Follow Up Visit    .  Chief Complaint   Patient presents with   • Hospital Follow Up Visit   • Urinary Tract Infection       Subjective     Anitra Orta is a 82 y.o. female who presents for a transitional care management visit.    DSC Date: 4/21/23  DSC Diagnosis: Acute UTI with R nephrolithiais    Within 48 business hours after discharge our office contacted her via telephone to coordinate her care and needs.      I reviewed and discussed the details of that call along with the discharge summary, hospital problems, inpatient lab results, inpatient diagnostic studies, and consultation reports with Anitra.     Current outpatient and discharge medications have been reconciled for the patient.  Reviewed by: Margot Chavez MD          4/21/2023     5:25 PM   Date of TCM Phone Call   Hospital [place]   Date of Admission 4/19/2023   Date of Discharge 4/21/2023   Discharge Disposition Home or Self Care     Risk for Readmission (LACE) Score: 2 (4/21/2023  6:00 AM)      History of Present Illness   Course During Hospital Stay:  Pt presented to ER with confusion with long history of nephrolithiasis and recurrent UTI. She was found to be septic related to E.Coli bacteremia from UTI with R obstructing ureteral stone. She was placed on IV abx and was seen by urology who placed a stent. She was transitioned to PO abx with Levaquin and at this time has 5 days remaining. She has frequent urination at this time since stent placement but her mental status has returned to baseline.   She was supposed to have     The following portions of the patient's history were reviewed and updated as appropriate: allergies, current medications, past family history, past medical history, past social history, past surgical history and problem list.    Review of Systems   Constitutional: Negative for appetite change, chills and fever.   HENT: Negative for hearing loss and sore throat.    Eyes: Negative for visual  disturbance.   Respiratory: Negative for cough and shortness of breath.    Cardiovascular: Negative for chest pain, palpitations and leg swelling.   Gastrointestinal: Negative for constipation, diarrhea and vomiting.   Genitourinary: Positive for frequency and hematuria. Negative for difficulty urinating.   Musculoskeletal: Positive for back pain. Negative for arthralgias and myalgias.   Skin: Negative for rash.   Neurological: Negative for weakness and headaches.   Hematological: Negative for adenopathy.   Psychiatric/Behavioral: Negative for confusion and sleep disturbance.       Objective   Physical Exam  Vitals and nursing note reviewed.   Constitutional:       General: She is not in acute distress.     Appearance: Normal appearance.   Cardiovascular:      Rate and Rhythm: Normal rate and regular rhythm.      Pulses: Normal pulses.      Heart sounds: Normal heart sounds. No murmur heard.  Pulmonary:      Effort: Pulmonary effort is normal. No respiratory distress.      Breath sounds: Normal breath sounds.   Abdominal:      General: Abdomen is flat. Bowel sounds are normal.      Palpations: Abdomen is soft.      Tenderness: There is abdominal tenderness (mild suprapubic and RLQ). There is no right CVA tenderness or left CVA tenderness.   Musculoskeletal:      Right lower leg: No edema.      Left lower leg: No edema.   Neurological:      Mental Status: She is alert and oriented to person, place, and time. Mental status is at baseline.      Gait: Gait abnormal (uses ambulatory aid).   Psychiatric:         Mood and Affect: Mood normal.         Behavior: Behavior normal.         Assessment & Plan   Diagnoses and all orders for this visit:    1. e.coli bacteremia (Primary)    2. Ureteral stone with hydronephrosis    3. Essential hypertension      82-year-old female with history of recurrent nephrolithiasis that presents for hospital follow-up after admission for E. coli bacteremia from UTI secondary to obstructing  right renal stone.  Treated with IV antibiotics while inpatient and transition to p.o. Levaquin at discharge, still with 5 days remaining.  Evaluated by urology in the hospital with right ureteral stent placement.  Has been in diuresis phase since discharge with excessive urinary output.  Denies any fevers or other signs of systemic illness, still taking antibiotic daily as prescribed.  Has follow-up planned with urology later this week for stent removal and lithotripsy planning.  Blood pressure slightly elevated in office today, on review was also elevated while inpatient.  Review of blood pressure values from February and March show all values within normal range, suspect elevated values today are related to pain and recent admission.  Patient to monitor closely at home over the coming week after stent removal, hopeful to see blood pressure values trend back down into normal range at that time.   to call back to office next week if blood pressures not normalizing for medication adjustment.         .Return if symptoms worsen or fail to improve.    Hawa Chavez MD  [facility]  Phone: [phone]  Fax: [phone]

## 2023-04-28 ENCOUNTER — ANESTHESIA EVENT (OUTPATIENT)
Dept: PERIOP | Facility: HOSPITAL | Age: 83
End: 2023-04-28
Payer: MEDICARE

## 2023-04-28 NOTE — PRE-PROCEDURE INSTRUCTIONS
History updated by phone w/patient. Preop instructions reviewed and instructed clears until 10:00 am dos, voiced understanding.

## 2023-04-30 NOTE — H&P
[facility] Physical    Patient Care Team:  Margot Chavez MD as PCP - General (Internal Medicine & Pediatrics)  Dr. Phillips as Consulting Physician (Ophthalmology)  Justo Leahy MD as Consulting Physician (Infectious Diseases)  Kevon Clark MD as Consulting Physician (Urology)  Jian Ndiaye MD as Consulting Physician (Ophthalmology)    Chief complaint RIGHT KIDNEY STONES     Subjective     Patient is a 82 y.o. female presents with PAN SENSITIVE E COLI PYELO SEPSIS  RT URETERAL STONES STENT PLACED COMPLETING ABO NO F/C       Review of Systems   NO F/C     Past Medical History:   Diagnosis Date   • Acute kidney failure 07/11/2021   • Anxiety    • Arthritis of back    • At risk for sleep apnea    • Cataract     BILAT-SCHEDULED FOR THIS AND HAD TO CANCEL D/T SEPSIS   • Chronic pain disorder     ALL OVER PAIN   • Chronic UTI    • Closed displaced fracture of surgical neck of left humerus 06/25/2021   • Closed fracture of left proximal humerus 06/15/2021   • Closed fracture of right distal radius 06/15/2021   • COVID-19 vaccine series completed     2ND DOSE 3/23/21   • DDD (degenerative disc disease), lumbar    • e.coli bacteremia 03/25/2021   • Elevated cholesterol    • Fracture, humerus     LEFT. h/o   • GERD (gastroesophageal reflux disease)    • History of recent fall     JUNE 6-9-21   • History of sepsis     MOST RECENT JULY 2021-R/T KIDNEY STONE, UTI.  STATES THIS IS HER 3RD SEPSIS DIAGNOSIS   • History of UTI    • HTN (hypertension)    • Hyperlipidemia    • Hypokalemia 03/25/2021   • Kidney stones     LEFT   • Left humeral fracture 07/11/2021   • Macular degeneration, bilateral     WORSE ON RIGHT-ONLY PERIPHERAL VISION   • Metabolic encephalopathy 03/25/2021   • Mixed hyperlipidemia 09/16/2016   • Mixed incontinence    • Osteoarthritis    • Osteoporosis    • Panic disorder    • Personal history of urinary calculi    • PONV (postoperative nausea and vomiting)    • Scoliosis     • Sepsis, unspecified organism 03/25/2021   • Tachycardia, unspecified    • Ventricular tachycardia 09/09/2019   • Wrist fracture     RIGHT-CURRENTLY NOT WEARING BRACE FOR THIS     Past Surgical History:   Procedure Laterality Date   • BREAST BIOPSY Left     benign   • BREAST LUMPECTOMY Left     benign   • BUNIONECTOMY Right    • COLONOSCOPY N/A 11/30/2016    Procedure: COLONOSCOPY polypectomy;  Surgeon: Adali Willard MD;  Location: [facility] OR;  Service:    • CYSTOSCOPY BOTOX INJECTION OF BLADDER N/A 07/21/2017    Procedure: CYSTOSCOPY COAPTITE INJECTION;  Surgeon: Kevon Clark MD;  Location: [facility] OR;  Service:    • CYSTOSCOPY W/ LITHOLAPAXY / EHL     • CYSTOSCOPY W/ URETERAL STENT PLACEMENT Left 09/12/2016    Procedure: CYSTOSCOPY URETERAL STENT INSERTION;  Surgeon: Kevon Clark MD;  Location: [facility] OR;  Service:    • CYSTOSCOPY W/ URETERAL STENT PLACEMENT Left 08/01/2019    Procedure: CYSTOSCOPY URETERAL CATHETER/STENT INSERTION;  Surgeon: Kevon Clark MD;  Location: [facility] OR;  Service: Urology   • CYSTOSCOPY W/ URETERAL STENT PLACEMENT Left 03/25/2021    Procedure: CYSTOSCOPY URETERAL CATHETER/STENT INSERTION;  Surgeon: Kevon Clark MD;  Location: [facility] OR;  Service: Urology;  Laterality: Left;   • CYSTOSCOPY W/ URETERAL STENT PLACEMENT Left 07/11/2021    Procedure: CYSTOSCOPY URETERAL CATHETER/STENT INSERTION;  Surgeon: Lul Guzman MD;  Location: [facility] OR;  Service: Urology;  Laterality: Left;  CYSTOSCOPY LEFT URETERAL CATHETER/ STENT PLACEMENT   • CYSTOSCOPY W/ URETERAL STENT PLACEMENT Right 04/20/2023    Procedure: CYSTOSCOPY STENT PLACEMENT;  Surgeon: Matthew Ndiaye MD;  Location: [facility] OR;  Service: Urology;  Laterality: Right;   • JOINT REPLACEMENT Right     total knee   • LAMINECTOMY  11/2022   • LUMBAR EPIDURAL INJECTION     • TONSILLECTOMY AND ADENOIDECTOMY     • URETEROSCOPY LASER LITHOTRIPSY WITH STENT INSERTION Left 10/05/2016    Procedure: LT  URETEROSCOPY LASER LITHOTRIPSY STONE BASKET EXTRACTION AND STENT ;  Surgeon: Kevon Clark MD;  Location: [facility];  Service:    • URETEROSCOPY LASER LITHOTRIPSY WITH STENT INSERTION Left 2021    Procedure: LEFT URETEROSCOPY STONE MANIPULATION STENT EXCHANGE, LASER LITHOTRIPSY, CYSTOSCOPY, STONE BASKET EXTRACTION;  Surgeon: Kevon Clark MD;  Location: [facility];  Service: Urology;  Laterality: Left;     Family History   Problem Relation Age of Onset   • Heart defect Mother    • Heart attack Mother 70   • Sudden death Mother    • Heart defect Father    • Heart attack Father 49   • Hypertension Father    • Sudden death Father    • Valvular heart disease Brother    • Malig Hyperthermia Neg Hx    • Breast cancer Neg Hx      Social History     Tobacco Use   • Smoking status: Former     Packs/day: 1.00     Years: 15.00     Pack years: 15.00     Types: Cigarettes     Start date: 1956     Quit date: 1980     Years since quittin.3   • Smokeless tobacco: Never   • Tobacco comments:     quit    Vaping Use   • Vaping Use: Never used   Substance Use Topics   • Alcohol use: No   • Drug use: Not Currently       Meds:  No medications prior to admission.       Allergies:  Bactrim [sulfamethoxazole-trimethoprim], Ciprofloxacin, Macrobid [nitrofurantoin], and Cortisone    Debilities:  NONE    Objective     Vital Signs     No intake or output data in the 24 hours ending 23       Physical Exam:      General Appearance:    Alert, cooperative, in no acute distress   Head:    Normocephalic, without obvious abnormality, atraumatic   Eyes:            Lids and lashes normal, conjunctivae and sclerae normal, no   icterus, no pallor, corneas clear,   Ears:    Ears appear intact with no abnormalities noted   Throat:    Neck:   No adenopathy, supple, trachea midline, no thyromegaly, no JVD   Back:     No kyphosis present, no scoliosis present, no skin lesions,       erythema or scars, no  tenderness to percussion or                   palpation,   range of motion normal   Lungs:   ,respirations regular, even and                   unlabored    Heart:    Regular rhythm and normal rate   Breast Exam:    Deferred   Abdomen:    no masses, no organomegaly, soft        non-tender, non-distended, no guarding, no rebound                 tenderness   Genitalia:    Deferred   Extremities:    Pulses:    Skin:    Lymph nodes:    Neurologic:      Results Review:    I reviewed the patient's new clinical results.  Results for orders placed or performed during the hospital encounter of 04/19/23   Blood Culture - Blood, Arm, Right    Specimen: Arm, Right; Blood   Result Value Ref Range    Blood Culture Escherichia coli (C)     Isolated from Aerobic and Anaerobic Bottles     Gram Stain Aerobic Bottle Gram negative bacilli (C)     Gram Stain Anaerobic Bottle Gram negative bacilli (C)        Susceptibility    Escherichia coli - YOLY*     Ampicillin  Susceptible ug/ml     Ampicillin + Sulbactam  Susceptible ug/ml     Cefepime  Susceptible ug/ml     Ceftazidime  Susceptible ug/ml     Ceftriaxone  Susceptible ug/ml     Gentamicin  Susceptible ug/ml     Levofloxacin  Susceptible ug/ml     Piperacillin + Tazobactam  Susceptible ug/ml     Trimethoprim + Sulfamethoxazole  Susceptible ug/ml     * Cefazolin sensitivity will not be reported for Enterobacteriaceae in non-urine isolates. If cefazolin is preferred, please call the microbiology lab to request an E-test.  With the exception of urinary-sourced infections, aminoglycosides should not be used as monotherapy.   Blood Culture - Blood, Arm, Left    Specimen: Arm, Left; Blood   Result Value Ref Range    Blood Culture Escherichia coli (C)     Isolated from Aerobic and Anaerobic Bottles     Gram Stain Anaerobic Bottle Gram negative bacilli (C)     Gram Stain Aerobic Bottle Gram negative bacilli (C)    Urine Culture - Urine, Urine, Clean Catch    Specimen: Urine, Clean Catch    Result Value Ref Range    Urine Culture >100,000 CFU/mL Escherichia coli (A)        Susceptibility    Escherichia coli - YOLY     Ampicillin  Susceptible ug/ml     Ampicillin + Sulbactam  Susceptible ug/ml     Cefazolin  Susceptible ug/ml     Cefepime  Susceptible ug/ml     Ceftazidime  Susceptible ug/ml     Ceftriaxone  Susceptible ug/ml     Gentamicin  Susceptible ug/ml     Levofloxacin  Susceptible ug/ml     Nitrofurantoin  Susceptible ug/ml     Piperacillin + Tazobactam  Susceptible ug/ml     Trimethoprim + Sulfamethoxazole  Susceptible ug/ml   Blood Culture ID, PCR - Blood, Arm, Right    Specimen: Arm, Right; Blood   Result Value Ref Range    BCID, PCR Escherichia coli. Identification by BCID2 PCR. (A) Negative by BCID PCR. Culture to Follow.   Urinalysis With Microscopic If Indicated (No Culture) - Straight Cath    Specimen: Straight Cath; Urine   Result Value Ref Range    Color, UA Dark Yellow (A) Yellow, Straw    Appearance, UA Cloudy (A) Clear    pH, UA 5.5 4.5 - 8.0    Specific Gravity, UA 1.020 1.003 - 1.030    Glucose, UA Negative Negative    Ketones, UA Trace (A) Negative    Bilirubin, UA Negative Negative    Blood, UA Small (1+) (A) Negative    Protein,  mg/dL (2+) (A) Negative    Leuk Esterase, UA Small (1+) (A) Negative    Nitrite, UA Negative Negative    Urobilinogen, UA 0.2 E.U./dL 0.2 - 1.0 E.U./dL   Comprehensive Metabolic Panel    Specimen: Blood   Result Value Ref Range    Glucose 166 (H) 65 - 99 mg/dL    BUN 22 8 - 23 mg/dL    Creatinine 1.25 (H) 0.57 - 1.00 mg/dL    Sodium 138 136 - 145 mmol/L    Potassium 3.7 3.5 - 5.2 mmol/L    Chloride 103 98 - 107 mmol/L    CO2 22.0 22.0 - 29.0 mmol/L    Calcium 9.0 8.6 - 10.5 mg/dL    Total Protein 7.1 6.0 - 8.5 g/dL    Albumin 3.7 3.5 - 5.2 g/dL    ALT (SGPT) 23 1 - 33 U/L    AST (SGOT) 26 1 - 32 U/L    Alkaline Phosphatase 131 (H) 39 - 117 U/L    Total Bilirubin 0.9 0.0 - 1.2 mg/dL    Globulin 3.4 gm/dL    A/G Ratio 1.1 g/dL    BUN/Creatinine  Ratio 17.6 7.0 - 25.0    Anion Gap 13.0 5.0 - 15.0 mmol/L    eGFR 43.4 (L) >60.0 mL/min/1.73   Lactic Acid, Plasma    Specimen: Blood   Result Value Ref Range    Lactate 1.5 0.5 - 2.0 mmol/L   Procalcitonin    Specimen: Blood   Result Value Ref Range    Procalcitonin 2.88 (H) 0.00 - 0.25 ng/mL   Single High Sensitivity Troponin T    Specimen: Blood   Result Value Ref Range    HS Troponin T 26 (H) <10 ng/L   CBC Auto Differential    Specimen: Blood   Result Value Ref Range    WBC 11.39 (H) 3.40 - 10.80 10*3/mm3    RBC 4.37 3.77 - 5.28 10*6/mm3    Hemoglobin 12.2 12.0 - 15.9 g/dL    Hematocrit 38.0 34.0 - 46.6 %    MCV 87.0 79.0 - 97.0 fL    MCH 27.9 26.6 - 33.0 pg    MCHC 32.1 31.5 - 35.7 g/dL    RDW 14.0 12.3 - 15.4 %    RDW-SD 44.5 37.0 - 54.0 fl    MPV 9.0 6.0 - 12.0 fL    Platelets 164 140 - 450 10*3/mm3    Neutrophil % 87.6 (H) 42.7 - 76.0 %    Lymphocyte % 4.5 (L) 19.6 - 45.3 %    Monocyte % 7.3 5.0 - 12.0 %    Eosinophil % 0.1 (L) 0.3 - 6.2 %    Basophil % 0.1 0.0 - 1.5 %    Immature Grans % 0.4 0.0 - 0.5 %    Neutrophils, Absolute 9.98 (H) 1.70 - 7.00 10*3/mm3    Lymphocytes, Absolute 0.51 (L) 0.70 - 3.10 10*3/mm3    Monocytes, Absolute 0.83 0.10 - 0.90 10*3/mm3    Eosinophils, Absolute 0.01 0.00 - 0.40 10*3/mm3    Basophils, Absolute 0.01 0.00 - 0.20 10*3/mm3    Immature Grans, Absolute 0.05 0.00 - 0.05 10*3/mm3    nRBC 0.0 0.0 - 0.2 /100 WBC   Urinalysis, Microscopic Only - Straight Cath    Specimen: Straight Cath; Urine   Result Value Ref Range    RBC, UA 0-2 (A) None Seen /HPF    WBC, UA 21-30 (A) None Seen /HPF    Bacteria, UA 3+ (A) None Seen /HPF    Squamous Epithelial Cells, UA 0-2 None Seen, 0-2 /HPF    Hyaline Casts, UA None Seen None Seen /LPF    Methodology Manual Light Microscopy    Comprehensive Metabolic Panel    Specimen: Blood   Result Value Ref Range    Glucose 137 (H) 65 - 99 mg/dL    BUN 19 8 - 23 mg/dL    Creatinine 0.94 0.57 - 1.00 mg/dL    Sodium 139 136 - 145 mmol/L    Potassium 3.7  3.5 - 5.2 mmol/L    Chloride 106 98 - 107 mmol/L    CO2 22.8 22.0 - 29.0 mmol/L    Calcium 8.7 8.6 - 10.5 mg/dL    Total Protein 6.4 6.0 - 8.5 g/dL    Albumin 3.2 (L) 3.5 - 5.2 g/dL    ALT (SGPT) 18 1 - 33 U/L    AST (SGOT) 15 1 - 32 U/L    Alkaline Phosphatase 115 39 - 117 U/L    Total Bilirubin 0.4 0.0 - 1.2 mg/dL    Globulin 3.2 gm/dL    A/G Ratio 1.0 g/dL    BUN/Creatinine Ratio 20.2 7.0 - 25.0    Anion Gap 10.2 5.0 - 15.0 mmol/L    eGFR 61.1 >60.0 mL/min/1.73   Procalcitonin    Specimen: Blood   Result Value Ref Range    Procalcitonin 2.43 (H) 0.00 - 0.25 ng/mL   CBC Auto Differential    Specimen: Blood   Result Value Ref Range    WBC 9.09 3.40 - 10.80 10*3/mm3    RBC 3.97 3.77 - 5.28 10*6/mm3    Hemoglobin 11.0 (L) 12.0 - 15.9 g/dL    Hematocrit 35.1 34.0 - 46.6 %    MCV 88.4 79.0 - 97.0 fL    MCH 27.7 26.6 - 33.0 pg    MCHC 31.3 (L) 31.5 - 35.7 g/dL    RDW 14.0 12.3 - 15.4 %    RDW-SD 44.9 37.0 - 54.0 fl    MPV 9.3 6.0 - 12.0 fL    Platelets 177 140 - 450 10*3/mm3    Neutrophil % 80.7 (H) 42.7 - 76.0 %    Lymphocyte % 8.8 (L) 19.6 - 45.3 %    Monocyte % 9.6 5.0 - 12.0 %    Eosinophil % 0.1 (L) 0.3 - 6.2 %    Basophil % 0.2 0.0 - 1.5 %    Immature Grans % 0.6 (H) 0.0 - 0.5 %    Neutrophils, Absolute 7.34 (H) 1.70 - 7.00 10*3/mm3    Lymphocytes, Absolute 0.80 0.70 - 3.10 10*3/mm3    Monocytes, Absolute 0.87 0.10 - 0.90 10*3/mm3    Eosinophils, Absolute 0.01 0.00 - 0.40 10*3/mm3    Basophils, Absolute 0.02 0.00 - 0.20 10*3/mm3    Immature Grans, Absolute 0.05 0.00 - 0.05 10*3/mm3    nRBC 0.0 0.0 - 0.2 /100 WBC   ECG 12 Lead Tachycardia   Result Value Ref Range    QT Interval 358 ms        Assessment:  RIGHT URETERAL SONE HYDRO  UTI POST STENT   LEFT RENAL CALCULI     Plan:    RIGHT URETERSCOPY LASER  STENT REPLACEMENT R/B/O  D/W PT NOT LIMITED TO INFECTION BLEEDING PAIN STENT STRICTURE ANCILLARY PROCEDURES PT UNDERSTANDS AGREES TO PROCEED    I discussed the patient's findings and my recommendations with patient.      Kevon Clark MD  04/30/23  19:49 EDT

## 2023-05-01 ENCOUNTER — HOSPITAL ENCOUNTER (OUTPATIENT)
Facility: HOSPITAL | Age: 83
Setting detail: HOSPITAL OUTPATIENT SURGERY
Discharge: HOME | End: 2023-05-01
Attending: UROLOGY | Admitting: UROLOGY
Payer: MEDICARE

## 2023-05-01 ENCOUNTER — APPOINTMENT (OUTPATIENT)
Dept: GENERAL RADIOLOGY | Facility: HOSPITAL | Age: 83
End: 2023-05-01
Payer: MEDICARE

## 2023-05-01 ENCOUNTER — ANESTHESIA (OUTPATIENT)
Dept: PERIOP | Facility: HOSPITAL | Age: 83
End: 2023-05-01
Payer: MEDICARE

## 2023-05-01 VITALS
DIASTOLIC BLOOD PRESSURE: 83 MMHG | SYSTOLIC BLOOD PRESSURE: 185 MMHG | BODY MASS INDEX: 37.12 KG/M2 | TEMPERATURE: 97.8 F | RESPIRATION RATE: 16 BRPM | HEART RATE: 68 BPM | OXYGEN SATURATION: 100 % | WEIGHT: 230 LBS

## 2023-05-01 DIAGNOSIS — N13.9 OBSTRUCTIVE UROPATHY: Primary | ICD-10-CM

## 2023-05-01 DIAGNOSIS — N13.2 URETERAL STONE WITH HYDRONEPHROSIS: ICD-10-CM

## 2023-05-01 DIAGNOSIS — A41.50 SEPSIS DUE TO GRAM NEGATIVE BACTERIA: ICD-10-CM

## 2023-05-01 PROCEDURE — 25010000002 PHENYLEPHRINE 10 MG/ML SOLUTION: Performed by: NURSE ANESTHETIST, CERTIFIED REGISTERED

## 2023-05-01 PROCEDURE — C1769 GUIDE WIRE: HCPCS | Performed by: UROLOGY

## 2023-05-01 PROCEDURE — 25010000002 CEFTRIAXONE SODIUM-DEXTROSE 1-3.74 GM-%(50ML) RECONSTITUTED SOLUTION: Performed by: UROLOGY

## 2023-05-01 PROCEDURE — 25010000002 FENTANYL CITRATE (PF) 50 MCG/ML SOLUTION: Performed by: NURSE ANESTHETIST, CERTIFIED REGISTERED

## 2023-05-01 PROCEDURE — C2617 STENT, NON-COR, TEM W/O DEL: HCPCS | Performed by: UROLOGY

## 2023-05-01 PROCEDURE — 25010000002 PROPOFOL 200 MG/20ML EMULSION: Performed by: NURSE ANESTHETIST, CERTIFIED REGISTERED

## 2023-05-01 PROCEDURE — 74420 UROGRAPHY RTRGR +-KUB: CPT

## 2023-05-01 PROCEDURE — 25010000002 ONDANSETRON PER 1 MG: Performed by: NURSE ANESTHETIST, CERTIFIED REGISTERED

## 2023-05-01 PROCEDURE — 25010000002 MIDAZOLAM PER 1MG: Performed by: NURSE ANESTHETIST, CERTIFIED REGISTERED

## 2023-05-01 PROCEDURE — C1894 INTRO/SHEATH, NON-LASER: HCPCS | Performed by: UROLOGY

## 2023-05-01 DEVICE — URETERAL STENT
Type: IMPLANTABLE DEVICE | Site: URETER | Status: FUNCTIONAL
Brand: CONTOUR™

## 2023-05-01 RX ORDER — PHENAZOPYRIDINE HYDROCHLORIDE 100 MG/1
100 TABLET, FILM COATED ORAL 3 TIMES DAILY PRN
Qty: 21 TABLET | Refills: 1 | Status: SHIPPED | OUTPATIENT
Start: 2023-05-01

## 2023-05-01 RX ORDER — FENTANYL CITRATE 50 UG/ML
INJECTION, SOLUTION INTRAMUSCULAR; INTRAVENOUS AS NEEDED
Status: DISCONTINUED | OUTPATIENT
Start: 2023-05-01 | End: 2023-05-01 | Stop reason: SURG

## 2023-05-01 RX ORDER — PHENYLEPHRINE HYDROCHLORIDE 10 MG/ML
INJECTION INTRAVENOUS AS NEEDED
Status: DISCONTINUED | OUTPATIENT
Start: 2023-05-01 | End: 2023-05-01 | Stop reason: SURG

## 2023-05-01 RX ORDER — ONDANSETRON 2 MG/ML
4 INJECTION INTRAMUSCULAR; INTRAVENOUS ONCE AS NEEDED
Status: COMPLETED | OUTPATIENT
Start: 2023-05-01 | End: 2023-05-01

## 2023-05-01 RX ORDER — SODIUM CHLORIDE 9 MG/ML
40 INJECTION, SOLUTION INTRAVENOUS AS NEEDED
Status: DISCONTINUED | OUTPATIENT
Start: 2023-05-01 | End: 2023-05-01 | Stop reason: HOSPADM

## 2023-05-01 RX ORDER — CEPHALEXIN 250 MG/1
500 CAPSULE ORAL 3 TIMES DAILY
Qty: 30 CAPSULE | Refills: 0 | Status: SHIPPED | OUTPATIENT
Start: 2023-05-01 | End: 2023-05-11

## 2023-05-01 RX ORDER — PROPOFOL 10 MG/ML
INJECTION, EMULSION INTRAVENOUS AS NEEDED
Status: DISCONTINUED | OUTPATIENT
Start: 2023-05-01 | End: 2023-05-01 | Stop reason: SURG

## 2023-05-01 RX ORDER — EPHEDRINE SULFATE 50 MG/ML
INJECTION INTRAVENOUS AS NEEDED
Status: DISCONTINUED | OUTPATIENT
Start: 2023-05-01 | End: 2023-05-01 | Stop reason: SURG

## 2023-05-01 RX ORDER — OXYCODONE HYDROCHLORIDE AND ACETAMINOPHEN 5; 325 MG/1; MG/1
1 TABLET ORAL ONCE AS NEEDED
Status: COMPLETED | OUTPATIENT
Start: 2023-05-01 | End: 2023-05-01

## 2023-05-01 RX ORDER — SODIUM CHLORIDE, SODIUM LACTATE, POTASSIUM CHLORIDE, CALCIUM CHLORIDE 600; 310; 30; 20 MG/100ML; MG/100ML; MG/100ML; MG/100ML
100 INJECTION, SOLUTION INTRAVENOUS CONTINUOUS
Status: DISCONTINUED | OUTPATIENT
Start: 2023-05-01 | End: 2023-05-01 | Stop reason: HOSPADM

## 2023-05-01 RX ORDER — MIDAZOLAM HYDROCHLORIDE 2 MG/2ML
0.5 INJECTION, SOLUTION INTRAMUSCULAR; INTRAVENOUS
Status: DISCONTINUED | OUTPATIENT
Start: 2023-05-01 | End: 2023-05-01 | Stop reason: HOSPADM

## 2023-05-01 RX ORDER — CEFTRIAXONE 1 G/50ML
1 INJECTION, SOLUTION INTRAVENOUS ONCE
Status: COMPLETED | OUTPATIENT
Start: 2023-05-01 | End: 2023-05-01

## 2023-05-01 RX ORDER — LIDOCAINE HYDROCHLORIDE 20 MG/ML
INJECTION, SOLUTION INFILTRATION; PERINEURAL AS NEEDED
Status: DISCONTINUED | OUTPATIENT
Start: 2023-05-01 | End: 2023-05-01 | Stop reason: SURG

## 2023-05-01 RX ORDER — SODIUM CHLORIDE 0.9 % (FLUSH) 0.9 %
10 SYRINGE (ML) INJECTION AS NEEDED
Status: DISCONTINUED | OUTPATIENT
Start: 2023-05-01 | End: 2023-05-01 | Stop reason: HOSPADM

## 2023-05-01 RX ORDER — ONDANSETRON 2 MG/ML
4 INJECTION INTRAMUSCULAR; INTRAVENOUS ONCE AS NEEDED
Status: DISCONTINUED | OUTPATIENT
Start: 2023-05-01 | End: 2023-05-01 | Stop reason: HOSPADM

## 2023-05-01 RX ORDER — CEFTRIAXONE 1 G/50ML
1 INJECTION, SOLUTION INTRAVENOUS ONCE
Status: CANCELLED | OUTPATIENT
Start: 2023-05-01

## 2023-05-01 RX ORDER — HYDROCODONE BITARTRATE AND ACETAMINOPHEN 7.5; 325 MG/1; MG/1
1-2 TABLET ORAL EVERY 4 HOURS PRN
Qty: 30 TABLET | Refills: 0 | Status: SHIPPED | OUTPATIENT
Start: 2023-05-01

## 2023-05-01 RX ORDER — SODIUM CHLORIDE 0.9 % (FLUSH) 0.9 %
10 SYRINGE (ML) INJECTION EVERY 12 HOURS SCHEDULED
Status: DISCONTINUED | OUTPATIENT
Start: 2023-05-01 | End: 2023-05-01 | Stop reason: HOSPADM

## 2023-05-01 RX ORDER — LIDOCAINE HYDROCHLORIDE 10 MG/ML
0.5 INJECTION, SOLUTION EPIDURAL; INFILTRATION; INTRACAUDAL; PERINEURAL ONCE AS NEEDED
Status: DISCONTINUED | OUTPATIENT
Start: 2023-05-01 | End: 2023-05-01 | Stop reason: HOSPADM

## 2023-05-01 RX ORDER — PHENAZOPYRIDINE HYDROCHLORIDE 100 MG/1
100 TABLET, FILM COATED ORAL ONCE
Status: COMPLETED | OUTPATIENT
Start: 2023-05-01 | End: 2023-05-01

## 2023-05-01 RX ORDER — SODIUM CHLORIDE, SODIUM LACTATE, POTASSIUM CHLORIDE, CALCIUM CHLORIDE 600; 310; 30; 20 MG/100ML; MG/100ML; MG/100ML; MG/100ML
9 INJECTION, SOLUTION INTRAVENOUS CONTINUOUS
Status: DISCONTINUED | OUTPATIENT
Start: 2023-05-01 | End: 2023-05-01 | Stop reason: HOSPADM

## 2023-05-01 RX ORDER — ONDANSETRON 4 MG/1
4 TABLET, FILM COATED ORAL DAILY PRN
Qty: 10 TABLET | Refills: 1 | Status: SHIPPED | OUTPATIENT
Start: 2023-05-01

## 2023-05-01 RX ORDER — FAMOTIDINE 10 MG/ML
20 INJECTION, SOLUTION INTRAVENOUS
Status: COMPLETED | OUTPATIENT
Start: 2023-05-01 | End: 2023-05-01

## 2023-05-01 RX ADMIN — EPHEDRINE SULFATE 5 MG: 50 INJECTION INTRAVENOUS at 15:13

## 2023-05-01 RX ADMIN — OXYCODONE HYDROCHLORIDE AND ACETAMINOPHEN 1 TABLET: 5; 325 TABLET ORAL at 16:44

## 2023-05-01 RX ADMIN — FAMOTIDINE 20 MG: 10 INJECTION, SOLUTION INTRAVENOUS at 13:25

## 2023-05-01 RX ADMIN — LIDOCAINE HYDROCHLORIDE 100 MG: 20 INJECTION, SOLUTION INFILTRATION; PERINEURAL at 15:06

## 2023-05-01 RX ADMIN — SODIUM CHLORIDE, POTASSIUM CHLORIDE, SODIUM LACTATE AND CALCIUM CHLORIDE: 600; 310; 30; 20 INJECTION, SOLUTION INTRAVENOUS at 15:02

## 2023-05-01 RX ADMIN — PHENYLEPHRINE HYDROCHLORIDE 100 MCG: 10 INJECTION INTRAVENOUS at 15:19

## 2023-05-01 RX ADMIN — EPHEDRINE SULFATE 5 MG: 50 INJECTION INTRAVENOUS at 15:16

## 2023-05-01 RX ADMIN — CEFTRIAXONE 1 G: 1 INJECTION, SOLUTION INTRAVENOUS at 15:12

## 2023-05-01 RX ADMIN — PHENYLEPHRINE HYDROCHLORIDE 50 MCG: 10 INJECTION INTRAVENOUS at 15:17

## 2023-05-01 RX ADMIN — FENTANYL CITRATE 25 MCG: 50 INJECTION, SOLUTION INTRAMUSCULAR; INTRAVENOUS at 15:06

## 2023-05-01 RX ADMIN — PROPOFOL INJECTABLE EMULSION 150 MG: 10 INJECTION, EMULSION INTRAVENOUS at 15:06

## 2023-05-01 RX ADMIN — ONDANSETRON 4 MG: 2 INJECTION INTRAMUSCULAR; INTRAVENOUS at 13:25

## 2023-05-01 RX ADMIN — MIDAZOLAM HYDROCHLORIDE 0.5 MG: 1 INJECTION, SOLUTION INTRAMUSCULAR; INTRAVENOUS at 14:47

## 2023-05-01 RX ADMIN — PHENAZOPYRIDINE HYDROCHLORIDE 100 MG: 100 TABLET ORAL at 16:44

## 2023-05-01 RX ADMIN — FENTANYL CITRATE 25 MCG: 50 INJECTION, SOLUTION INTRAMUSCULAR; INTRAVENOUS at 15:25

## 2023-05-01 NOTE — ANESTHESIA PREPROCEDURE EVALUATION
Anesthesia Evaluation     Patient summary reviewed and Nursing notes reviewed   history of anesthetic complications: PONV  NPO Solid Status: > 8 hours  NPO Liquid Status: > 4 hours           Airway   Mallampati: III  TM distance: >3 FB  Neck ROM: full  Possible difficult intubation and Large neck circumference  Dental          Pulmonary - normal exam    breath sounds clear to auscultation  (+) a smoker (quit 42 y/o) Former,   Cardiovascular - normal exam  Exercise tolerance: poor (<4 METS)    ECG reviewed  Patient on routine beta blocker and Beta blocker given within 24 hours of surgery  Rhythm: regular  Rate: normal    (+) hypertension well controlled less than 2 medications, dysrhythmias (wide complex tachycardia per holter monitor 2019), hyperlipidemia,     ROS comment: HEART RATE= 102  bpm  RR Interval= 588  ms  ME Interval= 175  ms  P Horizontal Axis= -11  deg  P Front Axis= 63  deg  QRSD Interval= 135  ms  QT Interval= 358  ms  QRS Axis= 39  deg  T Wave Axis= -29  deg  - ABNORMAL ECG -  Sinus tachycardia with irregular rate  Right bundle branch block  Borderline ST elevation, lateral leads  Heart rate slighty better otherewise NO SIGNIFICANT CHANGE FROM PREVIOUS ECG  Electronically Signed By: Aston Jon ([facility]) 20-Apr-2023 12:19:36  Date and Time of Study: 2023-04-19 22:38:41    Neuro/Psych  (+) psychiatric history Anxiety and Depression,    GI/Hepatic/Renal/Endo    (+) obesity,  GERD well controlled,  renal disease stones,     Musculoskeletal     (+) chronic pain (legs and shoulders; chronic opioid use),   Abdominal    Substance History - negative use     OB/GYN negative ob/gyn ROS         Other   arthritis,      ROS/Med Hx Other: Stress test: 2019  Equivocal ECG evidence of myocardial ischemia.Indeterminate clinical evidence of myocardial ischemia.    Echo 2019:  ? Estimated EF = 62%.  ? Left ventricular systolic function is normal.                     Anesthesia Plan    ASA 3     general      intravenous induction     Anesthetic plan, risks, benefits, and alternatives have been provided, discussed and informed consent has been obtained with: patient.    Use of blood products discussed with patient  Consented to blood products.       CODE STATUS:

## 2023-05-01 NOTE — OP NOTE
Preoperative diagnosis right ureteral stones hydronephrosis colic E. coli pansensitive bacteremia and sepsis post stent placement now for definitive therapy    Postoperative diagnosis same stones fragmented stent removed and replaced under fluoroscopic guidance    Operative procedure cystoscopy right double-J stent removal right ureteroscopy holmium laser fragmentation of stone and replacement of 6 Slovenian by 26 cm double-J stent without tether under fluoroscopic guidance    Surgeon Eduardo    Anesthesia General    Procedure note Pat has a history of recurrent calcium stone disease be presenting himself with above-mentioned history a stent was placed appropriate and a bodies were given and she is to undergo therapy for the stone disease    Site was marked antibiotics given time was taken COVID precautions allergies reviewed she is giving antibiotics in form of Rocephin prior urine culture and blood culture were pansensitive E. coli    After under general anesthesia carefully modified dorsolithotomy position all pressure points relieved prepped and draped sterile fashion genitalia 2% intraurethral lidocaine jelly administered scope advanced bladder urethra was normal bladder neck was closed trigone with the stent from the right side no stones in the bladder this was grasped partially removed intubated with a guidewire dilation was done approximately 12 Slovenian rigid ureteroscope was then used up above the pelvic brim no stones were seen a secondary wire was placed along with the ureteral access sheath flexible endoscope was advanced up to the proximal ureter into the kidney itself a large stone that was seen that was causing the obstruction was dislodged back into a calyx where we underwent excellent fragmentation reinspection of the entire remaining upper collecting system revealed no other stones or stone fragments and no tumors the safety wire was in good placement the ureteroscope and the access sheath was removed  Tuan was placed back over the guidewire and a 6 Kinyarwanda by 26 cm stent was placed with good curl in the upper collecting system and bladder no tether the bladder is a partially filled the remainder of the lidocaine jelly is instilled per urethra and she was sent to the recovery in satisfactory condition.    Disposition findings discussed with her  Guillermo she will be discharged today with a follow-up in the office in 1 week for removal of the stent at that time with continuation of her antibiotics currently on Levaquin and then maintained on suppression Keflex instruction sheet with phone numbers given concerns or questions to contact us accordingly

## 2023-05-01 NOTE — ANESTHESIA PROCEDURE NOTES
Airway  Urgency: elective    Date/Time: 5/1/2023 3:06 PM  Airway not difficult    General Information and Staff    Patient location during procedure: OR  CRNA/CAA: Etta Calvillo CRNA  SRNA: Nidhi Hernández SRNA  Indications and Patient Condition  Indications for airway management: airway protection    Preoxygenated: yes  Mask difficulty assessment: 0 - not attempted    Final Airway Details  Final airway type: supraglottic airway      Successful airway: unique  Size 4     Number of attempts at approach: 1  Assessment: lips, teeth, and gum same as pre-op

## 2023-05-01 NOTE — ANESTHESIA POSTPROCEDURE EVALUATION
Patient: Anitra Orta    Procedure Summary     Date: 05/01/23 Room / Location:  [facility] 4 /  [facility]    Anesthesia Start: 1502 Anesthesia Stop: 1552    Procedure: RIGHT URETEROSCOPY LASER LITHOTRIPSY STONE BASKET EXTRACTION AND STENT (Right: Ureter) Diagnosis: (N20.1)    Surgeons: Kevon Clark MD Provider: Etta Calvillo CRNA    Anesthesia Type: general ASA Status: 3          Anesthesia Type: general    Vitals  Vitals Value Taken Time   /80 05/01/23 1625   Temp 97.8 °F (36.6 °C) 05/01/23 1635   Pulse 73 05/01/23 1630   Resp     SpO2 96 % 05/01/23 1631   Vitals shown include unvalidated device data.        Post Anesthesia Care and Evaluation    Patient location during evaluation: PHASE II  Patient participation: complete - patient participated  Level of consciousness: awake and alert  Pain score: 6  Pain management: satisfactory to patient    Airway patency: patent  Anesthetic complications: No anesthetic complications    Cardiovascular status: acceptable  Respiratory status: acceptable  Hydration status: acceptable

## 2023-05-01 NOTE — BRIEF OP NOTE
CYSTOSCOPY URETEROSCOPY LASER LITHOTRIPSY  Progress Note    Anitra Orta  5/1/2023    Pre-op Diagnosis:   N20.1       Post-Op Diagnosis Codes:  Same fragmented extracted stent placed    Procedure/CPT® Codes:        Procedure(s):  RIGHT URETEROSCOPY LASER LITHOTRIPSY AND STENT removal and replacement        Surgeon(s):  Kevon Clark MD    Anesthesia: General    Staff:   Circulator: Maribell Young RN  Scrub Person: Debora Barbosa         Estimated Blood Loss: None    Urine Voided: * No values recorded between 5/1/2023 12:00 AM and 5/1/2023  2:38 PM *    Specimens:           None               Drains:   Ureteral Drain/Stent Left ureter 6 Fr. (Active)       [REMOVED] External Urinary Catheter (Removed)   Site Assessment Clean;Skin intact 04/21/23 0053   Application/Removal external catheter removed 04/21/23 0848   Collection Container Wall suction 04/21/23 0435   Wall suction (mmHG) 80 mmHG 04/21/23 0435   Securement Method Securing device 04/21/23 0435   Catheter care complete Yes 04/20/23 2000   Output (mL) 300 mL 04/21/23 0600       Findings: Distal 2 mm stone upper proximal stone fragmented stent replaced without difficulty        Complications: None          Kevon Clark MD     Date: 5/1/2023  Time: 14:38 EDT

## 2023-05-01 NOTE — INTERVAL H&P NOTE
H&P reviewed. The patient was examined and there are no changes to the H&P.      /69 (BP Location: Right arm, Patient Position: Lying)   Pulse 70   Temp 98 °F (36.7 °C) (Oral)   Resp 16   Wt 104 kg (230 lb)   SpO2 98%   BMI 37.12 kg/m²

## 2023-05-06 ENCOUNTER — HOSPITAL ENCOUNTER (OUTPATIENT)
Dept: GENERAL RADIOLOGY | Facility: HOSPITAL | Age: 83
Discharge: HOME | End: 2023-05-06
Payer: MEDICARE

## 2023-05-06 ENCOUNTER — TRANSCRIBE ORDERS (OUTPATIENT)
Dept: ADMINISTRATIVE | Facility: HOSPITAL | Age: 83
End: 2023-05-06
Payer: MEDICARE

## 2023-05-06 DIAGNOSIS — N20.1 CALCULUS OF URETER: ICD-10-CM

## 2023-05-06 DIAGNOSIS — N20.0 URIC ACID NEPHROLITHIASIS: Primary | ICD-10-CM

## 2023-05-06 DIAGNOSIS — N20.2 CALCULUS OF KIDNEY AND URETER: ICD-10-CM

## 2023-05-06 DIAGNOSIS — N20.0 URIC ACID NEPHROLITHIASIS: ICD-10-CM

## 2023-05-06 PROCEDURE — 74018 RADEX ABDOMEN 1 VIEW: CPT

## 2023-05-09 ENCOUNTER — PATIENT MESSAGE (OUTPATIENT)
Dept: INTERNAL MEDICINE | Facility: CLINIC | Age: 83
End: 2023-05-09
Payer: MEDICARE

## 2023-05-09 DIAGNOSIS — E78.2 MIXED HYPERLIPIDEMIA: Chronic | ICD-10-CM

## 2023-05-10 RX ORDER — SIMVASTATIN 40 MG
40 TABLET ORAL NIGHTLY
Qty: 90 TABLET | Refills: 1 | Status: SHIPPED | OUTPATIENT
Start: 2023-05-10

## 2023-05-15 ENCOUNTER — TRANSCRIBE ORDERS (OUTPATIENT)
Dept: ADMINISTRATIVE | Facility: HOSPITAL | Age: 83
End: 2023-05-15
Payer: MEDICARE

## 2023-05-15 DIAGNOSIS — N20.0 CALCULUS, RENAL: Primary | ICD-10-CM

## 2023-05-24 ENCOUNTER — PATIENT MESSAGE (OUTPATIENT)
Dept: INTERNAL MEDICINE | Facility: CLINIC | Age: 83
End: 2023-05-24
Payer: MEDICARE

## 2023-05-24 DIAGNOSIS — M48.062 SPINAL STENOSIS OF LUMBAR REGION WITH NEUROGENIC CLAUDICATION: ICD-10-CM

## 2023-05-25 RX ORDER — HYDROCODONE BITARTRATE AND ACETAMINOPHEN 7.5; 325 MG/1; MG/1
1 TABLET ORAL 3 TIMES DAILY
Qty: 90 TABLET | Refills: 0 | Status: SHIPPED | OUTPATIENT
Start: 2023-05-25

## 2023-05-26 NOTE — TELEPHONE ENCOUNTER
From: Anitra Orta  To: Margot Chavez  Sent: 5/24/2023 5:34 PM EDT  Subject: refill precrition    please refill my hydrcodone 7.5  90 tablets

## 2023-05-29 ENCOUNTER — PATIENT MESSAGE (OUTPATIENT)
Dept: INTERNAL MEDICINE | Facility: CLINIC | Age: 83
End: 2023-05-29

## 2023-05-29 DIAGNOSIS — I10 ESSENTIAL HYPERTENSION: ICD-10-CM

## 2023-05-29 RX ORDER — METOPROLOL SUCCINATE 50 MG/1
50 TABLET, EXTENDED RELEASE ORAL DAILY
Qty: 90 TABLET | Refills: 1 | Status: SHIPPED | OUTPATIENT
Start: 2023-05-29

## 2023-05-30 NOTE — TELEPHONE ENCOUNTER
From: Anitra Orta  To: Margot Chavez  Sent: 5/29/2023 12:21 PM EDT  Subject: rx refill    please refill my rx for metoprolol er succinate 50mg  qty 90

## 2023-06-11 ENCOUNTER — HOSPITAL ENCOUNTER (EMERGENCY)
Facility: HOSPITAL | Age: 83
Discharge: HOME | End: 2023-06-11
Attending: EMERGENCY MEDICINE | Admitting: EMERGENCY MEDICINE
Payer: MEDICARE

## 2023-06-11 ENCOUNTER — APPOINTMENT (OUTPATIENT)
Dept: CT IMAGING | Facility: HOSPITAL | Age: 83
End: 2023-06-11
Payer: MEDICARE

## 2023-06-11 VITALS
HEIGHT: 66 IN | DIASTOLIC BLOOD PRESSURE: 71 MMHG | WEIGHT: 243 LBS | HEART RATE: 76 BPM | OXYGEN SATURATION: 94 % | SYSTOLIC BLOOD PRESSURE: 151 MMHG | BODY MASS INDEX: 39.05 KG/M2 | RESPIRATION RATE: 18 BRPM | TEMPERATURE: 98.6 F

## 2023-06-11 DIAGNOSIS — R10.30 LOWER ABDOMINAL PAIN: Primary | ICD-10-CM

## 2023-06-11 LAB
ALBUMIN SERPL-MCNC: 3.8 G/DL (ref 3.5–5.2)
ALBUMIN/GLOB SERPL: 1.3 G/DL
ALP SERPL-CCNC: 125 U/L (ref 39–117)
ALT SERPL W P-5'-P-CCNC: 19 U/L (ref 1–33)
ANION GAP SERPL CALCULATED.3IONS-SCNC: 10.4 MMOL/L (ref 5–15)
AST SERPL-CCNC: 18 U/L (ref 1–32)
BASOPHILS # BLD AUTO: 0.01 10*3/MM3 (ref 0–0.2)
BASOPHILS NFR BLD AUTO: 0.2 % (ref 0–1.5)
BILIRUB SERPL-MCNC: 0.3 MG/DL (ref 0–1.2)
BILIRUB UR QL STRIP: NEGATIVE
BUN SERPL-MCNC: 20 MG/DL (ref 8–23)
BUN/CREAT SERPL: 20.4 (ref 7–25)
CALCIUM SPEC-SCNC: 9.3 MG/DL (ref 8.6–10.5)
CHLORIDE SERPL-SCNC: 108 MMOL/L (ref 98–107)
CLARITY UR: CLEAR
CO2 SERPL-SCNC: 25.6 MMOL/L (ref 22–29)
COLOR UR: ABNORMAL
CREAT SERPL-MCNC: 0.98 MG/DL (ref 0.57–1)
D-LACTATE SERPL-SCNC: 1.3 MMOL/L (ref 0.5–2)
DEPRECATED RDW RBC AUTO: 45.2 FL (ref 37–54)
EGFRCR SERPLBLD CKD-EPI 2021: 58.1 ML/MIN/1.73
EOSINOPHIL # BLD AUTO: 0.13 10*3/MM3 (ref 0–0.4)
EOSINOPHIL NFR BLD AUTO: 2.9 % (ref 0.3–6.2)
ERYTHROCYTE [DISTWIDTH] IN BLOOD BY AUTOMATED COUNT: 14.5 % (ref 12.3–15.4)
GLOBULIN UR ELPH-MCNC: 3 GM/DL
GLUCOSE SERPL-MCNC: 112 MG/DL (ref 65–99)
GLUCOSE UR STRIP-MCNC: NEGATIVE MG/DL
HCT VFR BLD AUTO: 40.4 % (ref 34–46.6)
HGB BLD-MCNC: 12.7 G/DL (ref 12–15.9)
HGB UR QL STRIP.AUTO: NEGATIVE
HOLD SPECIMEN: NORMAL
HOLD SPECIMEN: NORMAL
IMM GRANULOCYTES # BLD AUTO: 0.01 10*3/MM3 (ref 0–0.05)
IMM GRANULOCYTES NFR BLD AUTO: 0.2 % (ref 0–0.5)
KETONES UR QL STRIP: NEGATIVE
LEUKOCYTE ESTERASE UR QL STRIP.AUTO: NEGATIVE
LIPASE SERPL-CCNC: 67 U/L (ref 13–60)
LYMPHOCYTES # BLD AUTO: 1.45 10*3/MM3 (ref 0.7–3.1)
LYMPHOCYTES NFR BLD AUTO: 31.8 % (ref 19.6–45.3)
MCH RBC QN AUTO: 27 PG (ref 26.6–33)
MCHC RBC AUTO-ENTMCNC: 31.4 G/DL (ref 31.5–35.7)
MCV RBC AUTO: 86 FL (ref 79–97)
MONOCYTES # BLD AUTO: 0.38 10*3/MM3 (ref 0.1–0.9)
MONOCYTES NFR BLD AUTO: 8.3 % (ref 5–12)
NEUTROPHILS NFR BLD AUTO: 2.58 10*3/MM3 (ref 1.7–7)
NEUTROPHILS NFR BLD AUTO: 56.6 % (ref 42.7–76)
NITRITE UR QL STRIP: NEGATIVE
NRBC BLD AUTO-RTO: 0 /100 WBC (ref 0–0.2)
PH UR STRIP.AUTO: 5.5 [PH] (ref 4.5–8)
PLATELET # BLD AUTO: 197 10*3/MM3 (ref 140–450)
PMV BLD AUTO: 9 FL (ref 6–12)
POTASSIUM SERPL-SCNC: 3.8 MMOL/L (ref 3.5–5.2)
PROT SERPL-MCNC: 6.8 G/DL (ref 6–8.5)
PROT UR QL STRIP: ABNORMAL
RBC # BLD AUTO: 4.7 10*6/MM3 (ref 3.77–5.28)
SODIUM SERPL-SCNC: 144 MMOL/L (ref 136–145)
SP GR UR STRIP: 1.02 (ref 1–1.03)
UROBILINOGEN UR QL STRIP: ABNORMAL
WBC NRBC COR # BLD: 4.56 10*3/MM3 (ref 3.4–10.8)
WHOLE BLOOD HOLD COAG: NORMAL
WHOLE BLOOD HOLD SPECIMEN: NORMAL

## 2023-06-11 PROCEDURE — 99283 EMERGENCY DEPT VISIT LOW MDM: CPT

## 2023-06-11 PROCEDURE — 85025 COMPLETE CBC W/AUTO DIFF WBC: CPT | Performed by: EMERGENCY MEDICINE

## 2023-06-11 PROCEDURE — 81003 URINALYSIS AUTO W/O SCOPE: CPT | Performed by: EMERGENCY MEDICINE

## 2023-06-11 PROCEDURE — 80053 COMPREHEN METABOLIC PANEL: CPT | Performed by: EMERGENCY MEDICINE

## 2023-06-11 PROCEDURE — 25510000001 IOPAMIDOL PER 1 ML: Performed by: EMERGENCY MEDICINE

## 2023-06-11 PROCEDURE — 96372 THER/PROPH/DIAG INJ SC/IM: CPT

## 2023-06-11 PROCEDURE — 25010000002 DICYCLOMINE PER 20 MG: Performed by: EMERGENCY MEDICINE

## 2023-06-11 PROCEDURE — 74177 CT ABD & PELVIS W/CONTRAST: CPT

## 2023-06-11 PROCEDURE — 83690 ASSAY OF LIPASE: CPT | Performed by: EMERGENCY MEDICINE

## 2023-06-11 PROCEDURE — 83605 ASSAY OF LACTIC ACID: CPT | Performed by: EMERGENCY MEDICINE

## 2023-06-11 RX ORDER — DICYCLOMINE HYDROCHLORIDE 10 MG/ML
20 INJECTION INTRAMUSCULAR ONCE
Status: COMPLETED | OUTPATIENT
Start: 2023-06-11 | End: 2023-06-11

## 2023-06-11 RX ORDER — DICYCLOMINE HCL 20 MG
20 TABLET ORAL EVERY 6 HOURS
Qty: 30 TABLET | Refills: 0 | Status: SHIPPED | OUTPATIENT
Start: 2023-06-11

## 2023-06-11 RX ORDER — SODIUM CHLORIDE 0.9 % (FLUSH) 0.9 %
10 SYRINGE (ML) INJECTION AS NEEDED
Status: DISCONTINUED | OUTPATIENT
Start: 2023-06-11 | End: 2023-06-12 | Stop reason: HOSPADM

## 2023-06-11 RX ADMIN — IOPAMIDOL 100 ML: 755 INJECTION, SOLUTION INTRAVENOUS at 19:55

## 2023-06-11 RX ADMIN — DICYCLOMINE HYDROCHLORIDE 20 MG: 10 INJECTION, SOLUTION INTRAMUSCULAR at 18:55

## 2023-06-11 NOTE — ED PROVIDER NOTES
Subjective   History of Present Illness  History of Present Illness    Chief complaint: Abdominal pain    Location: Lower    Quality/Severity: 5/10 at its worst, 5/10 currently, crampy    Timing/Onset/Duration: On and off for last 2-week    Modifying Factors: Nothing makes it better or worse    Associated Symptoms: No headache.  No fever chills or cough.  No sore throat earache or nasal congestion.  No chest pain or shortness of breath.  No diarrhea or burning when she urinates.  No black or bloody stools.  No blood in the urine.    Narrative: This 82-year-old white female with a history of kidney stones, who has had a stent in her ureter before, and is scheduled this coming week for ultrasound to evaluate her for stones, presents with lower abdominal pain.    PCP:Margot Chavez MD      Review of Systems   Constitutional: Negative for chills and fever.   HENT: Negative for congestion, ear pain and sore throat.    Respiratory: Negative for cough and shortness of breath.    Cardiovascular: Negative for chest pain.   Gastrointestinal: Positive for abdominal pain. Negative for diarrhea, nausea and vomiting.   Genitourinary: Negative for dysuria.   Musculoskeletal: Negative for back pain.   Skin: Negative for rash.   Neurological: Negative for headaches.        Medication List      ASK your doctor about these medications    gabapentin 300 MG capsule  Commonly known as: NEURONTIN  Take 1 capsule by mouth 4 (Four) Times a Day.     * HYDROcodone-acetaminophen 7.5-325 MG per tablet  Commonly known as: NORCO  Take 1-2 tablets by mouth Every 4 (Four) Hours As Needed for Moderate Pain (Pain).     * HYDROcodone-acetaminophen 7.5-325 MG per tablet  Commonly known as: NORCO  Take 1 tablet by mouth 3 (Three) Times a Day.     imipramine 50 MG tablet  Commonly known as: Tofranil  Take 2 tablets by mouth Every Night.     metoprolol succinate XL 50 MG 24 hr tablet  Commonly known as: Toprol XL  Take 1 tablet by mouth Daily.      NON FORMULARY     ondansetron 4 MG tablet  Commonly known as: Zofran  Take 1 tablet by mouth Daily As Needed for Nausea or Vomiting.     phenazopyridine 100 MG tablet  Commonly known as: PYRIDIUM  Take 1 tablet by mouth 3 (Three) Times a Day As Needed for Bladder Spasms. This medication is for bladder irritation from the procedure or the stent it will make your urine orange or red it will stain your clothing so be cautious please     simvastatin 40 MG tablet  Commonly known as: ZOCOR  Take 1 tablet by mouth Every Night. for cholesterol     VITAMIN D PO         * This list has 2 medication(s) that are the same as other medications prescribed for you. Read the directions carefully, and ask your doctor or other care provider to review them with you.                Past Medical History:   Diagnosis Date   • Acute kidney failure 07/11/2021   • Anxiety    • Arthritis of back    • At risk for sleep apnea    • Cataract     BILAT-SCHEDULED FOR THIS AND HAD TO CANCEL D/T SEPSIS   • Chronic pain disorder     ALL OVER PAIN   • Chronic UTI    • Closed displaced fracture of surgical neck of left humerus 06/25/2021   • Closed fracture of left proximal humerus 06/15/2021   • Closed fracture of right distal radius 06/15/2021   • COVID-19 vaccine series completed     2ND DOSE 3/23/21   • DDD (degenerative disc disease), lumbar    • e.coli bacteremia 03/25/2021   • Elevated cholesterol    • Fracture, humerus     LEFT. h/o   • GERD (gastroesophageal reflux disease)    • History of recent fall     JUNE 6-9-21   • History of sepsis     MOST RECENT JULY 2021-R/T KIDNEY STONE, UTI.  STATES THIS IS HER 3RD SEPSIS DIAGNOSIS   • History of UTI    • HTN (hypertension)    • Hyperlipidemia    • Hypokalemia 03/25/2021   • Kidney stones     LEFT   • Left humeral fracture 07/11/2021   • Macular degeneration, bilateral     WORSE ON RIGHT-ONLY PERIPHERAL VISION   • Metabolic encephalopathy 03/25/2021   • Mixed hyperlipidemia 09/16/2016   • Mixed  incontinence    • Osteoarthritis    • Osteoporosis    • Panic disorder    • Personal history of urinary calculi    • PONV (postoperative nausea and vomiting)    • Scoliosis    • Sepsis, unspecified organism 03/25/2021   • Tachycardia, unspecified    • Ventricular tachycardia 09/09/2019   • Wrist fracture     RIGHT-CURRENTLY NOT WEARING BRACE FOR THIS       Allergies   Allergen Reactions   • Bactrim [Sulfamethoxazole-Trimethoprim] Nausea Only   • Ciprofloxacin Other (See Comments)     UNKNOWN   • Macrobid [Nitrofurantoin] Other (See Comments)     UNKNOWN; PT CAN TAKE IN SMALL DOSES- FLU LIKE SYMPTOMS   • Cortisone Headache     Pt states had headache with IM injection states has been ok with epidural steroid injections       Past Surgical History:   Procedure Laterality Date   • BREAST BIOPSY Left     benign   • BREAST LUMPECTOMY Left     benign   • BUNIONECTOMY Right    • COLONOSCOPY N/A 11/30/2016    Procedure: COLONOSCOPY polypectomy;  Surgeon: Adali Willard MD;  Location: [facility];  Service:    • CYSTOSCOPY BOTOX INJECTION OF BLADDER N/A 07/21/2017    Procedure: CYSTOSCOPY COAPTITE INJECTION;  Surgeon: Kevon Clark MD;  Location: [facility] OR;  Service:    • CYSTOSCOPY URETEROSCOPY LASER LITHOTRIPSY Right 5/1/2023    Procedure: RIGHT URETEROSCOPY LASER LITHOTRIPSY STONE BASKET EXTRACTION AND STENT;  Surgeon: Kevon Clark MD;  Location: [facility] OR;  Service: Urology;  Laterality: Right;   • CYSTOSCOPY W/ LITHOLAPAXY / EHL     • CYSTOSCOPY W/ URETERAL STENT PLACEMENT Left 09/12/2016    Procedure: CYSTOSCOPY URETERAL STENT INSERTION;  Surgeon: Kevon Clark MD;  Location: [facility] OR;  Service:    • CYSTOSCOPY W/ URETERAL STENT PLACEMENT Left 08/01/2019    Procedure: CYSTOSCOPY URETERAL CATHETER/STENT INSERTION;  Surgeon: Kevon Clark MD;  Location: [facility] OR;  Service: Urology   • CYSTOSCOPY W/ URETERAL STENT PLACEMENT Left 03/25/2021    Procedure: CYSTOSCOPY URETERAL CATHETER/STENT INSERTION;   Surgeon: Kevon Clark MD;  Location: [facility] OR;  Service: Urology;  Laterality: Left;   • CYSTOSCOPY W/ URETERAL STENT PLACEMENT Left 2021    Procedure: CYSTOSCOPY URETERAL CATHETER/STENT INSERTION;  Surgeon: Lul Guzman MD;  Location: [facility] OR;  Service: Urology;  Laterality: Left;  CYSTOSCOPY LEFT URETERAL CATHETER/ STENT PLACEMENT   • CYSTOSCOPY W/ URETERAL STENT PLACEMENT Right 2023    Procedure: CYSTOSCOPY STENT PLACEMENT;  Surgeon: Matthew Ndiaye MD;  Location: [facility] OR;  Service: Urology;  Laterality: Right;   • JOINT REPLACEMENT Right     total knee   • LAMINECTOMY  2022   • LUMBAR EPIDURAL INJECTION     • TONSILLECTOMY AND ADENOIDECTOMY     • URETEROSCOPY LASER LITHOTRIPSY WITH STENT INSERTION Left 10/05/2016    Procedure: LT URETEROSCOPY LASER LITHOTRIPSY STONE BASKET EXTRACTION AND STENT ;  Surgeon: Kevon Clark MD;  Location: [facility];  Service:    • URETEROSCOPY LASER LITHOTRIPSY WITH STENT INSERTION Left 2021    Procedure: LEFT URETEROSCOPY STONE MANIPULATION STENT EXCHANGE, LASER LITHOTRIPSY, CYSTOSCOPY, STONE BASKET EXTRACTION;  Surgeon: Kevon Clark MD;  Location: [facility];  Service: Urology;  Laterality: Left;       Family History   Problem Relation Age of Onset   • Heart defect Mother    • Heart attack Mother 70   • Sudden death Mother    • Heart defect Father    • Heart attack Father 49   • Hypertension Father    • Sudden death Father    • Valvular heart disease Brother    • Malig Hyperthermia Neg Hx    • Breast cancer Neg Hx        Social History     Socioeconomic History   • Marital status:    Tobacco Use   • Smoking status: Former     Packs/day: 1.00     Years: 15.00     Pack years: 15.00     Types: Cigarettes     Start date: 1956     Quit date: 1980     Years since quittin.4   • Smokeless tobacco: Never   • Tobacco comments:     quit    Vaping Use   • Vaping Use: Never used   Substance and Sexual  Activity   • Alcohol use: No   • Drug use: Not Currently   • Sexual activity: Not Currently     Partners: Male     Comment: Frequent UTIs           Objective   Physical Exam  Vitals (The temperature is 98.6 °F, pulse 85, respirations 18, /96, room air pulse ox 92%.) and nursing note reviewed.   Constitutional:       Appearance: She is well-developed.   HENT:      Head: Normocephalic and atraumatic.   Cardiovascular:      Rate and Rhythm: Normal rate and regular rhythm.      Heart sounds: No murmur heard.    No friction rub. No gallop.   Pulmonary:      Effort: Pulmonary effort is normal.      Breath sounds: Normal breath sounds.   Abdominal:      General: Abdomen is flat. Bowel sounds are normal. There is no distension.      Palpations: Abdomen is soft.      Tenderness: There is abdominal tenderness (Mild lower abdominal tenderness).      Hernia: No hernia is present.   Skin:     General: Skin is warm and dry.      Capillary Refill: Capillary refill takes less than 2 seconds.   Neurological:      General: No focal deficit present.      Mental Status: She is alert and oriented to person, place, and time.         Procedures           ED Course  ED Course as of 06/11/23 2152   Sun Jun 11, 2023   1907 Urinalysis shows trace protein, otherwise unremarkable.      CBC is unremarkable. [RC]   1959 The lipase is mildly elevated at 67.    The lactic acid is normal at 1.3. [RC]   1959 The urinalysis shows trace protein.  Otherwise unremarkable. [RC]   1959 The CBC is unremarkable. [RC]   2000 The serum glucose is 112.  The chloride is 108.  The alk phos is 125.  The GFR is 58.  The CMP is otherwise unremarkable [RC]      ED Course User Index  [RC] David Solis MD      21:52 EDT, 06/11/23:  The patient was reassessed.  She has no complaints.  She has no pain.  Her vital signs were reviewed and are stable.  The Bentyl helped her.  Abdominal exam: Soft, nontender, no masses, positive bowel sounds.    21:52 EDT,  06/11/23:  The patient's laboratory values and CAT scan are unconcerning.  The patient has an appointment tomorrow for an ultrasound to evaluate her kidney stone disease.  The patient has an appointment already scheduled with Dr. Clark on the 26th of this month.  The patient has been instructed to call Dr. Chavez Monday morning for a follow-up this coming week.  The patient will be sent a prescription for Bentyl.  She should return to the emergency department there is worsening pain, vomiting, vomiting blood, bloody diarrhea, fever, worse in any way at all.  All the patient's questions were answered she will be discharged in good condition.  Her provisional diagnosis is abdominal pain.                                     MDM    Final diagnoses:   None       ED Disposition  ED Disposition     None          No follow-up provider specified.       Medication List      No changes were made to your prescriptions during this visit.          David Solis MD  06/12/23 0042

## 2023-06-11 NOTE — ED TRIAGE NOTES
Pt to ED via PV. Pt c/o bilateral lower abdominal cramping x 2 weeks. Pt denies N/V/D or urinary symptoms. Last BM 6/11/2023

## 2023-06-12 ENCOUNTER — HOSPITAL ENCOUNTER (OUTPATIENT)
Dept: ULTRASOUND IMAGING | Facility: HOSPITAL | Age: 83
Discharge: HOME | End: 2023-06-12
Admitting: UROLOGY
Payer: MEDICARE

## 2023-06-12 DIAGNOSIS — N20.0 CALCULUS, RENAL: ICD-10-CM

## 2023-06-12 PROCEDURE — 76775 US EXAM ABDO BACK WALL LIM: CPT

## 2023-06-12 NOTE — DISCHARGE INSTRUCTIONS
Call Dr. Chavez Monday for a follow-up appointment next week.  Return to the emergency department if there is worsening pain, vomiting, fever, vomiting blood, bloody diarrhea, worse in any way at all.

## 2023-07-21 ENCOUNTER — TRANSCRIBE ORDERS (OUTPATIENT)
Dept: ADMINISTRATIVE | Facility: HOSPITAL | Age: 83
End: 2023-07-21
Payer: MEDICARE

## 2023-07-21 DIAGNOSIS — N20.0 RENAL CALCULUS: Primary | ICD-10-CM

## 2023-07-31 ENCOUNTER — TELEPHONE (OUTPATIENT)
Dept: INTERNAL MEDICINE | Facility: CLINIC | Age: 83
End: 2023-07-31
Payer: MEDICARE

## 2023-08-02 ENCOUNTER — TELEPHONE (OUTPATIENT)
Dept: INTERNAL MEDICINE | Facility: CLINIC | Age: 83
End: 2023-08-02
Payer: MEDICARE

## 2023-08-02 DIAGNOSIS — Z12.31 BREAST CANCER SCREENING BY MAMMOGRAM: ICD-10-CM

## 2023-08-02 DIAGNOSIS — M81.0 AGE-RELATED OSTEOPOROSIS WITHOUT CURRENT PATHOLOGICAL FRACTURE: Primary | ICD-10-CM

## 2023-08-08 ENCOUNTER — TRANSCRIBE ORDERS (OUTPATIENT)
Dept: ADMINISTRATIVE | Facility: HOSPITAL | Age: 83
End: 2023-08-08
Payer: MEDICARE

## 2023-08-08 ENCOUNTER — TELEPHONE (OUTPATIENT)
Dept: INTERNAL MEDICINE | Facility: CLINIC | Age: 83
End: 2023-08-08
Payer: MEDICARE

## 2023-08-08 DIAGNOSIS — Z12.31 BREAST CANCER SCREENING BY MAMMOGRAM: Primary | ICD-10-CM

## 2023-08-08 DIAGNOSIS — N63.10 MASS OF RIGHT BREAST, UNSPECIFIED QUADRANT: ICD-10-CM

## 2023-08-08 DIAGNOSIS — N60.01 SOLITARY CYST OF RIGHT BREAST: ICD-10-CM

## 2023-08-08 NOTE — TELEPHONE ENCOUNTER
"Caller: Anitra Orta \"PAT\"    Relationship: Self    Best call back number: [phone]    PATIENT STATES THAT SHE HAS CYST ON THE SIDE OF HER RIGHT BREAST, THAT DR. FABIAN HAD EXAMINED BEFORE. PATIENT IS SUPPOSED TO HAVE A MAMMOGRAM, THAT IS CURRENTLY ORDERED AS PREVENTATIVE. RADIOLOGY TOLD PATIENT TO ASK IF DR. FABIAN WANTS TO CHANGE THE ORDER TO A DIAGNOSTIC MAMMOGRAM SINCE PATIENT HAS AN AREA OF CONCERN.       "

## 2023-09-05 ENCOUNTER — TRANSCRIBE ORDERS (OUTPATIENT)
Dept: ADMINISTRATIVE | Facility: HOSPITAL | Age: 83
End: 2023-09-05
Payer: MEDICARE

## 2023-09-05 ENCOUNTER — HOSPITAL ENCOUNTER (OUTPATIENT)
Dept: GENERAL RADIOLOGY | Facility: HOSPITAL | Age: 83
Discharge: HOME | End: 2023-09-05
Admitting: ANESTHESIOLOGY
Payer: MEDICARE

## 2023-09-05 DIAGNOSIS — M54.50 LOW BACK PAIN, UNSPECIFIED BACK PAIN LATERALITY, UNSPECIFIED CHRONICITY, UNSPECIFIED WHETHER SCIATICA PRESENT: ICD-10-CM

## 2023-09-05 DIAGNOSIS — M54.50 LOW BACK PAIN, UNSPECIFIED BACK PAIN LATERALITY, UNSPECIFIED CHRONICITY, UNSPECIFIED WHETHER SCIATICA PRESENT: Primary | ICD-10-CM

## 2023-09-05 PROCEDURE — 73502 X-RAY EXAM HIP UNI 2-3 VIEWS: CPT

## 2023-09-25 DIAGNOSIS — M48.062 SPINAL STENOSIS OF LUMBAR REGION WITH NEUROGENIC CLAUDICATION: ICD-10-CM

## 2023-09-25 RX ORDER — GABAPENTIN 300 MG/1
300 CAPSULE ORAL 4 TIMES DAILY
Qty: 120 CAPSULE | Refills: 2 | Status: SHIPPED | OUTPATIENT
Start: 2023-09-25

## 2023-09-25 NOTE — TELEPHONE ENCOUNTER
Rx Refill Note  Requested Prescriptions     Pending Prescriptions Disp Refills    gabapentin (NEURONTIN) 300 MG capsule 120 capsule 2     Sig: Take 1 capsule by mouth 4 (Four) Times a Day.      Last office visit with prescribing clinician: 4/24/2023   Last telemedicine visit with prescribing clinician: Visit date not found   Next office visit with prescribing clinician: 11/16/2023                         Would you like a call back once the refill request has been completed: [] Yes [] No    If the office needs to give you a call back, can they leave a voicemail: [] Yes [] No    Olena Dixon MA  09/25/23, 07:19 EDT

## 2023-10-19 ENCOUNTER — HOSPITAL ENCOUNTER (OUTPATIENT)
Dept: ULTRASOUND IMAGING | Facility: HOSPITAL | Age: 83
Discharge: HOME | End: 2023-10-19
Payer: MEDICARE

## 2023-10-19 ENCOUNTER — HOSPITAL ENCOUNTER (OUTPATIENT)
Dept: MAMMOGRAPHY | Facility: HOSPITAL | Age: 83
Discharge: HOME | End: 2023-10-19
Payer: MEDICARE

## 2023-10-19 DIAGNOSIS — N63.10 MASS OF RIGHT BREAST, UNSPECIFIED QUADRANT: ICD-10-CM

## 2023-10-19 DIAGNOSIS — Z12.31 BREAST CANCER SCREENING BY MAMMOGRAM: ICD-10-CM

## 2023-10-19 DIAGNOSIS — N60.81 SEBACEOUS CYST OF BREAST, RIGHT: Primary | ICD-10-CM

## 2023-10-19 DIAGNOSIS — N60.01 SOLITARY CYST OF RIGHT BREAST: ICD-10-CM

## 2023-10-19 PROCEDURE — 76642 ULTRASOUND BREAST LIMITED: CPT

## 2023-10-19 PROCEDURE — 77066 DX MAMMO INCL CAD BI: CPT

## 2023-10-19 PROCEDURE — G0279 TOMOSYNTHESIS, MAMMO: HCPCS

## 2023-10-20 ENCOUNTER — TELEPHONE (OUTPATIENT)
Dept: SURGERY | Facility: CLINIC | Age: 83
End: 2023-10-20
Payer: MEDICARE

## 2023-10-20 RX ORDER — IMIPRAMINE HCL 50 MG
100 TABLET ORAL NIGHTLY
Qty: 180 TABLET | Refills: 1 | Status: SHIPPED | OUTPATIENT
Start: 2023-10-20

## 2023-10-20 NOTE — TELEPHONE ENCOUNTER
New patient apt scheduled with Dr. Leni Neal, new patient referral for sebaceous cyst scheduled Wed 11/1/23 8:30 am.   I had to leave patient a message to call back. New patient packet and apt reminder mailed.

## 2023-10-20 NOTE — TELEPHONE ENCOUNTER
Rx Refill Note  Requested Prescriptions     Pending Prescriptions Disp Refills    imipramine (TOFRANIL) 50 MG tablet [Pharmacy Med Name: IMIPRAMINE 50MG TABLETS] 180 tablet 1     Sig: TAKE 2 TABLETS BY MOUTH EVERY NIGHT      Last office visit with prescribing clinician: 4/24/2023   Last telemedicine visit with prescribing clinician: Visit date not found   Next office visit with prescribing clinician: 11/16/2023                         Would you like a call back once the refill request has been completed: [] Yes [] No    If the office needs to give you a call back, can they leave a voicemail: [] Yes [] No    Olena Dixon MA  10/20/23, 10:57 EDT

## 2023-10-23 ENCOUNTER — TELEPHONE (OUTPATIENT)
Dept: SURGERY | Facility: CLINIC | Age: 83
End: 2023-10-23
Payer: MEDICARE

## 2023-10-23 NOTE — TELEPHONE ENCOUNTER
Patient called to cancel her new patient appt with Dr. Neal on 11/1/2023. She is having back surgery and would like to callback and r/s after her upcoming surgery & recovery.     She also was confused that her appointment was with Dr. Neal, she stated that her PCP referred her to Dr. Mena although the referral states our office. She is seeing her PCP and will ask her about this and will callback if she wants to presume with our office.

## 2023-11-16 ENCOUNTER — OFFICE VISIT (OUTPATIENT)
Dept: INTERNAL MEDICINE | Facility: CLINIC | Age: 83
End: 2023-11-16
Payer: MEDICARE

## 2023-11-16 VITALS
HEART RATE: 62 BPM | WEIGHT: 240 LBS | SYSTOLIC BLOOD PRESSURE: 138 MMHG | BODY MASS INDEX: 38.57 KG/M2 | OXYGEN SATURATION: 93 % | TEMPERATURE: 96.3 F | RESPIRATION RATE: 16 BRPM | DIASTOLIC BLOOD PRESSURE: 82 MMHG | HEIGHT: 66 IN

## 2023-11-16 DIAGNOSIS — I10 ESSENTIAL HYPERTENSION: ICD-10-CM

## 2023-11-16 DIAGNOSIS — R73.9 HYPERGLYCEMIA: Chronic | ICD-10-CM

## 2023-11-16 DIAGNOSIS — N13.9 OBSTRUCTIVE UROPATHY: ICD-10-CM

## 2023-11-16 DIAGNOSIS — E78.2 MIXED HYPERLIPIDEMIA: ICD-10-CM

## 2023-11-16 DIAGNOSIS — Z23 ENCOUNTER FOR IMMUNIZATION: ICD-10-CM

## 2023-11-16 DIAGNOSIS — E55.9 VITAMIN D DEFICIENCY: ICD-10-CM

## 2023-11-16 DIAGNOSIS — N39.46 MIXED INCONTINENCE: ICD-10-CM

## 2023-11-16 DIAGNOSIS — M81.0 AGE-RELATED OSTEOPOROSIS WITHOUT CURRENT PATHOLOGICAL FRACTURE: ICD-10-CM

## 2023-11-16 DIAGNOSIS — Z00.00 MEDICARE ANNUAL WELLNESS VISIT, SUBSEQUENT: Primary | ICD-10-CM

## 2023-11-16 LAB
BILIRUB BLD-MCNC: NEGATIVE MG/DL
CLARITY, POC: CLEAR
COLOR UR: YELLOW
EXPIRATION DATE: NORMAL
GLUCOSE UR STRIP-MCNC: NEGATIVE MG/DL
KETONES UR QL: NEGATIVE
LEUKOCYTE EST, POC: NEGATIVE
Lab: NORMAL
NITRITE UR-MCNC: NEGATIVE MG/ML
PH UR: 6 [PH] (ref 5–8)
PROT UR STRIP-MCNC: NEGATIVE MG/DL
RBC # UR STRIP: NEGATIVE /UL
SP GR UR: 1.02 (ref 1–1.03)
UROBILINOGEN UR QL: NORMAL

## 2023-11-16 PROCEDURE — 1170F FXNL STATUS ASSESSED: CPT | Performed by: INTERNAL MEDICINE

## 2023-11-16 PROCEDURE — G0008 ADMIN INFLUENZA VIRUS VAC: HCPCS | Performed by: INTERNAL MEDICINE

## 2023-11-16 PROCEDURE — G0439 PPPS, SUBSEQ VISIT: HCPCS | Performed by: INTERNAL MEDICINE

## 2023-11-16 PROCEDURE — 3079F DIAST BP 80-89 MM HG: CPT | Performed by: INTERNAL MEDICINE

## 2023-11-16 PROCEDURE — 90662 IIV NO PRSV INCREASED AG IM: CPT | Performed by: INTERNAL MEDICINE

## 2023-11-16 PROCEDURE — 1159F MED LIST DOCD IN RCRD: CPT | Performed by: INTERNAL MEDICINE

## 2023-11-16 PROCEDURE — 81003 URINALYSIS AUTO W/O SCOPE: CPT | Performed by: INTERNAL MEDICINE

## 2023-11-16 PROCEDURE — 3075F SYST BP GE 130 - 139MM HG: CPT | Performed by: INTERNAL MEDICINE

## 2023-11-16 RX ORDER — OXYCODONE HCL 10 MG/1
10 TABLET, FILM COATED, EXTENDED RELEASE ORAL EVERY 12 HOURS
COMMUNITY

## 2023-11-16 RX ORDER — MULTIPLE VITAMINS W/ MINERALS TAB 9MG-400MCG
TAB ORAL
COMMUNITY

## 2023-11-16 RX ORDER — TRAMADOL HYDROCHLORIDE 50 MG/1
1 TABLET ORAL 3 TIMES DAILY
COMMUNITY
End: 2023-11-16

## 2023-11-16 NOTE — PROGRESS NOTES
Subsequent Medicare Wellness Visit   The ABC's of the Annual Wellness Visit    Chief Complaint   Patient presents with    Annual Exam       HPI:  Anitra Orta, -1940, is a 83 y.o. female who presents for a Subsequent Medicare Wellness Visit.    Recent Hospitalizations:  Recently treated at the following:  [facility].    Current Medical Providers:  Patient Care Team:  Margot Chavez MD as PCP - General (Internal Medicine & Pediatrics)  Dr. Phillips as Consulting Physician (Ophthalmology)  Justo Leahy MD as Consulting Physician (Infectious Diseases)  Kevon Clark MD as Consulting Physician (Urology)  Jian Ndiaye MD as Consulting Physician (Ophthalmology)  Guillermo Fermin MD as Consulting Physician (Orthopedic Surgery)    Health Habits and Functional and Cognitive Screening and Depression Screenin/16/2023    11:00 AM   Functional & Cognitive Status   Do you have difficulty preparing food and eating? No   Do you have difficulty bathing yourself, getting dressed or grooming yourself? No   Do you have difficulty using the toilet? No   Do you have difficulty moving around from place to place? Yes   Do you have trouble with steps or getting out of a bed or a chair? Yes   Current Diet Well Balanced Diet   Dental Exam Up to date   Eye Exam Up to date   Exercise (times per week) 0 times per week   Current Exercises Include No Regular Exercise   Do you need help using the phone?  No   Are you deaf or do you have serious difficulty hearing?  No   Do you need help to go to places out of walking distance? Yes   Do you need help shopping? Yes   Do you need help preparing meals?  Yes   Do you need help with housework?  No   Do you need help with laundry? No   Do you need help taking your medications? No   Do you need help managing money? No   Do you ever drive or ride in a car without wearing a seat belt? No   Have you felt unusual stress, anger or loneliness in the last  month? Yes   Who do you live with? Spouse   If you need help, do you have trouble finding someone available to you? No   Have you been bothered in the last four weeks by sexual problems? No   Do you have difficulty concentrating, remembering or making decisions? Yes       Compared to one year ago, the patient feels her physical health is worse and her mental health is worse.    Depression Screen:      11/16/2023    11:16 AM   PHQ-2/PHQ-9 Depression Screening   Little Interest or Pleasure in Doing Things 0-->not at all   Feeling Down, Depressed or Hopeless 1-->several days   PHQ-9: Brief Depression Severity Measure Score 1         Past Medical/Family/Social History:  The following portions of the patient's history were reviewed and updated as appropriate: allergies, current medications, past family history, past medical history, past social history, past surgical history, and problem list.    Allergies   Allergen Reactions    Bactrim [Sulfamethoxazole-Trimethoprim] Nausea Only    Ciprofloxacin Other (See Comments)     UNKNOWN    Macrobid [Nitrofurantoin] Other (See Comments)     UNKNOWN; PT CAN TAKE IN SMALL DOSES- FLU LIKE SYMPTOMS    Cortisone Headache     Pt states had headache with IM injection states has been ok with epidural steroid injections         Current Outpatient Medications:     gabapentin (NEURONTIN) 300 MG capsule, Take 1 capsule by mouth 4 (Four) Times a Day., Disp: 120 capsule, Rfl: 2    imipramine (TOFRANIL) 50 MG tablet, TAKE 2 TABLETS BY MOUTH EVERY NIGHT, Disp: 180 tablet, Rfl: 1    metoprolol succinate XL (Toprol XL) 50 MG 24 hr tablet, Take 1 tablet by mouth Daily., Disp: 90 tablet, Rfl: 1    multivitamin with minerals (PRESERVISION AREDS PO), , Disp: , Rfl:     NON FORMULARY, Take 1 tablet by mouth 2 (Two) Times a Day. Preservation OTC, Disp: , Rfl:     ondansetron (Zofran) 4 MG tablet, Take 1 tablet by mouth Daily As Needed for Nausea or Vomiting., Disp: 10 tablet, Rfl: 1    oxyCODONE  (oxyCONTIN) 10 MG 12 hr tablet, Take 1 tablet by mouth Every 12 (Twelve) Hours., Disp: , Rfl:     phenazopyridine (PYRIDIUM) 100 MG tablet, Take 1 tablet by mouth 3 (Three) Times a Day As Needed for Bladder Spasms. This medication is for bladder irritation from the procedure or the stent it will make your urine orange or red it will stain your clothing so be cautious please, Disp: 21 tablet, Rfl: 1    simvastatin (ZOCOR) 40 MG tablet, Take 1 tablet by mouth Every Night. for cholesterol, Disp: 90 tablet, Rfl: 1    VITAMIN D PO, Take 2 tablets by mouth Daily. Pt doesn't know strength, Disp: , Rfl:     dicyclomine (BENTYL) 20 MG tablet, Take 1 tablet by mouth Every 6 (Six) Hours., Disp: 30 tablet, Rfl: 0    Aspirin use counseling: Does not need ASA (and currently is not on it)    Current medication list contains high risk medications. No harmful drug interactions have been identified.  Plan of action Cont to monitor pain medications closely, hopeful to wean down after surgery    Family History   Problem Relation Age of Onset    Heart defect Mother     Heart attack Mother 70    Sudden death Mother     Heart defect Father     Heart attack Father 49    Hypertension Father     Sudden death Father     Valvular heart disease Brother     Malig Hyperthermia Neg Hx     Breast cancer Neg Hx        Social History     Tobacco Use    Smoking status: Former     Packs/day: 1.00     Years: 15.00     Additional pack years: 0.00     Total pack years: 15.00     Types: Cigarettes     Start date: 1956     Quit date: 1980     Years since quittin.9    Smokeless tobacco: Never    Tobacco comments:     quit    Substance Use Topics    Alcohol use: No       Past Surgical History:   Procedure Laterality Date    BREAST BIOPSY Left     benign    BREAST LUMPECTOMY Left     benign    BUNIONECTOMY Right     COLONOSCOPY N/A 2016    Procedure: COLONOSCOPY polypectomy;  Surgeon: Adali Willard MD;  Location: [facility] OR;  Service:      CYSTOSCOPY BOTOX INJECTION OF BLADDER N/A 07/21/2017    Procedure: CYSTOSCOPY COAPTITE INJECTION;  Surgeon: Kevon Clark MD;  Location: [facility] MAIN OR;  Service:     CYSTOSCOPY URETEROSCOPY LASER LITHOTRIPSY Right 5/1/2023    Procedure: RIGHT URETEROSCOPY LASER LITHOTRIPSY STONE BASKET EXTRACTION AND STENT;  Surgeon: Kevon Clark MD;  Location: [facility] OR;  Service: Urology;  Laterality: Right;    CYSTOSCOPY W/ LITHOLAPAXY / EHL      CYSTOSCOPY W/ URETERAL STENT PLACEMENT Left 09/12/2016    Procedure: CYSTOSCOPY URETERAL STENT INSERTION;  Surgeon: Kevon Clark MD;  Location: [facility] OR;  Service:     CYSTOSCOPY W/ URETERAL STENT PLACEMENT Left 08/01/2019    Procedure: CYSTOSCOPY URETERAL CATHETER/STENT INSERTION;  Surgeon: Kevon Clark MD;  Location: [facility] OR;  Service: Urology    CYSTOSCOPY W/ URETERAL STENT PLACEMENT Left 03/25/2021    Procedure: CYSTOSCOPY URETERAL CATHETER/STENT INSERTION;  Surgeon: Kevon Clark MD;  Location: [facility] MAIN OR;  Service: Urology;  Laterality: Left;    CYSTOSCOPY W/ URETERAL STENT PLACEMENT Left 07/11/2021    Procedure: CYSTOSCOPY URETERAL CATHETER/STENT INSERTION;  Surgeon: Lul Guzman MD;  Location: [facility] OR;  Service: Urology;  Laterality: Left;  CYSTOSCOPY LEFT URETERAL CATHETER/ STENT PLACEMENT    CYSTOSCOPY W/ URETERAL STENT PLACEMENT Right 04/20/2023    Procedure: CYSTOSCOPY STENT PLACEMENT;  Surgeon: Matthew Ndiaye MD;  Location: [facility] OR;  Service: Urology;  Laterality: Right;    JOINT REPLACEMENT Right     total knee    LAMINECTOMY  11/2022    LUMBAR EPIDURAL INJECTION      TONSILLECTOMY AND ADENOIDECTOMY      URETEROSCOPY LASER LITHOTRIPSY WITH STENT INSERTION Left 10/05/2016    Procedure: LT URETEROSCOPY LASER LITHOTRIPSY STONE BASKET EXTRACTION AND STENT ;  Surgeon: Kevon Clark MD;  Location: [facility] MAIN OR;  Service:     URETEROSCOPY LASER LITHOTRIPSY WITH STENT INSERTION Left 07/23/2021    Procedure: LEFT  URETEROSCOPY STONE MANIPULATION STENT EXCHANGE, LASER LITHOTRIPSY, CYSTOSCOPY, STONE BASKET EXTRACTION;  Surgeon: Kevon Clark MD;  Location: [facility] OR;  Service: Urology;  Laterality: Left;       Patient Active Problem List   Diagnosis    Urinary tract infection due to ESBL Klebsiella    Simple renal cyst    Former smoker    Hyperlipidemia    Osteoporosis    Panic disorder    Psoriasis    Urge incontinence of urine    Vitamin D deficiency    Post-menopause    Acute UTI (urinary tract infection)    Chronic bilateral low back pain without sciatica    Spinal stenosis of lumbar region with neurogenic claudication    Other chronic pain    Mixed incontinence    GERD without esophagitis    Anxiety    Hyperglycemia    e.coli bacteremia    Ureteral stone with hydronephrosis    Obstructive uropathy    Nephrolithiasis    Anemia    Metabolic acidosis    Essential hypertension    History of ventricular tachycardia    Left ureteral stone       Review of Systems   Constitutional:  Negative for appetite change, chills and fever.   HENT:  Negative for hearing loss and sore throat.    Eyes:  Negative for visual disturbance.   Respiratory:  Negative for cough and shortness of breath.    Cardiovascular:  Negative for chest pain, palpitations and leg swelling.   Gastrointestinal:  Negative for constipation, diarrhea and vomiting.   Genitourinary:  Negative for difficulty urinating and frequency.   Musculoskeletal:  Positive for arthralgias, back pain, gait problem, myalgias, neck pain and neck stiffness.        + breast pain   Skin:  Negative for rash.   Neurological:  Negative for weakness and headaches.   Hematological:  Negative for adenopathy.   Psychiatric/Behavioral:  Positive for sleep disturbance. Negative for confusion. The patient is nervous/anxious.        Objective     Vitals:    11/16/23 1107   BP: 138/82   Pulse: 62   Resp: 16   Temp: 96.3 °F (35.7 °C)   SpO2: 93%   Weight: 109 kg (240 lb)   Height: 167.6 cm  "(66\")           The patient has no evidence of cognitve impairment.     Physical Exam  Vitals and nursing note reviewed.   Constitutional:       General: She is not in acute distress.     Appearance: Normal appearance.   HENT:      Head: Normocephalic and atraumatic.      Right Ear: Tympanic membrane and ear canal normal.      Left Ear: Tympanic membrane and ear canal normal.      Nose: Nose normal.      Mouth/Throat:      Mouth: Mucous membranes are moist.      Pharynx: Oropharynx is clear.   Eyes:      Extraocular Movements: Extraocular movements intact.      Conjunctiva/sclera: Conjunctivae normal.      Pupils: Pupils are equal, round, and reactive to light.   Cardiovascular:      Rate and Rhythm: Normal rate and regular rhythm.      Heart sounds: Normal heart sounds. No murmur heard.  Pulmonary:      Effort: Pulmonary effort is normal. No respiratory distress.      Breath sounds: Normal breath sounds. No wheezing or rhonchi.   Abdominal:      General: Bowel sounds are normal. There is no distension.      Palpations: Abdomen is soft. There is no mass.      Tenderness: There is no abdominal tenderness.      Comments: no hepatosplenomegaly   Musculoskeletal:         General: No deformity. Normal range of motion.      Cervical back: Normal range of motion and neck supple.      Right lower leg: Edema present.      Left lower leg: Edema present.      Comments: BLE with mild pitting and non-pitting edema   Skin:     General: Skin is warm and dry.      Capillary Refill: Capillary refill takes less than 2 seconds.      Findings: No lesion or rash.   Neurological:      General: No focal deficit present.      Mental Status: She is alert and oriented to person, place, and time.      Cranial Nerves: No cranial nerve deficit.      Gait: Gait abnormal (antalgic).      Comments: Get up and go test failed   Psychiatric:         Mood and Affect: Mood normal.         Behavior: Behavior normal.         Judgment: Judgment normal. "         Brief Urine Lab Results  (Last result in the past 365 days)        Color   Clarity   Blood   Leuk Est   Nitrite   Protein   CREAT   Urine HCG        11/16/23 1217 Yellow   Clear   Negative   Negative   Negative   Negative                   Recent Lab Results:  Lab Results   Component Value Date     (H) 06/20/2022     Lab Results   Component Value Date    CHOL 108 06/26/2021    TRIG 143 03/24/2023    HDL 51 03/24/2023    VLDL 25 03/24/2023    LDLHDL 1.66 06/26/2021       Assessment & Plan   Age-appropriate Screening Schedule:  Refer to the list below for future screening recommendations based on patient's age, sex and/or medical conditions.      Health Maintenance   Topic Date Due    ZOSTER VACCINE (2 of 3) 11/04/2009    COVID-19 Vaccine (4 - 2023-24 season) 09/01/2023    DXA SCAN  11/22/2023    LIPID PANEL  03/24/2024    BMI FOLLOWUP  03/24/2024    ANNUAL WELLNESS VISIT  11/16/2024    TDAP/TD VACCINES (2 - Td or Tdap) 01/21/2029    INFLUENZA VACCINE  Completed    Pneumococcal Vaccine 65+  Completed    COLORECTAL CANCER SCREENING  Discontinued       Immunization History   Administered Date(s) Administered    COVID-19 (MODERNA) 1st,2nd,3rd Dose Monovalent 02/23/2021, 03/23/2021, 11/26/2021    Fluad Quad 65+ 09/28/2021    Fluzone High-Dose 65+yrs 08/17/2020, 09/27/2022, 11/16/2023    Influenza TIV (IM) 11/09/2013, 11/11/2014    Influenza, Unspecified 09/12/2019    Pneumococcal Conjugate 13-Valent (PCV13) 03/30/2015    Pneumococcal Polysaccharide (PPSV23) 10/10/2008, 01/01/2019, 01/21/2019    Td, Not Adsorbed 07/07/1997    Tdap 01/21/2019    Zostavax 09/09/2009         Medicare Risks and Personalized Health Plan:  Advance Directive Discussion  Breast Cancer/Mammogram Screening  Cardiovascular risk  Chronic Pain   Colon Cancer Screening  Dementia/Memory   Depression/Dysphoria  Diabetic Lab Screening   Fall Risk  Hearing Problem  Immunizations Discussed/Encouraged (specific immunizations; Tdap, Hepatitis A  Vaccine/Series, Influenza, Pneumococcal 23, Prevnar 20 (Pneumococcal 20-valent conjugate), Shingrix, and COVID19 )  Inactivity/Sedentary  Obesity/Overweight   Osteoporosis Risk  Polypharmacy  Urinary Incontinence      CMS-Preventive Services Quick Reference  Medicare Preventive Services Addressed:  Annual Wellness Visit (AWV)  Bone Density Measurements  Influenza Vaccine and Administration  Pneumococcal Vaccine and Administration  Screening Mammography     Advance Care Planning:  ACP discussion was held with the patient during this visit. Patient has an advance directive in EMR which is still valid.     Annual Wellness  - Labs ordered today including CBC, CMP, Fasting lipid panel, HgbA1C, TSH, and Vit D. Will call with results once available and make follow up plan and med changes as appropriate.   - Cont current medications for chronic medical issues, refills sent as needed today.   - EKG done previously, reviewed and in chart  - PAP smear not indicated based on age  - Mammo Last done 10/2023 and abnormal with epidermal inclusion cyst on follow up imaging, is scheduled to see breast surgeon next week  - Cscope not indicated- last 2021, no longer indicated to have follow up testing  - DEXA due, scheduled for 11/24  - Lung CA screening not indicated  - Immunizations: Flu shot due, ordered today. COVID series and booster completed, seasonal booster eligible. TDaP done 2019, UTD. Zostavax completed, shingrix declined. PCV13 and PPSV23 UTD.   Orders Placed This Encounter   Procedures    Fluzone High-Dose 65+yrs ([id number])      - Depression screen reviewed with patient and positive. Physically doing so poorly which negatively impacts her mental health.   - Advised behavioral modifications including dietary changes and increased exercise with goal of healthy weight and lifestyle.       Diagnoses and all orders for this visit:    1. Medicare annual wellness visit, subsequent (Primary)  -     CBC & Differential  -      TSH  -     POC Urinalysis Dipstick, Automated    2. Encounter for immunization  -     Fluzone High-Dose 65+yrs ([id number])    3. Essential hypertension  -     Comprehensive Metabolic Panel    4. Mixed hyperlipidemia  -     Comprehensive Metabolic Panel  -     Lipid Panel    5. Hyperglycemia  -     Comprehensive Metabolic Panel  -     Hemoglobin A1c    6. Obstructive uropathy  -     POC Urinalysis Dipstick, Automated    7. Mixed incontinence  -     POC Urinalysis Dipstick, Automated    8. Vitamin D deficiency  -     Vitamin D,25-Hydroxy    9. Age-related osteoporosis without current pathological fracture  -     Vitamin D,25-Hydroxy        An After Visit Summary and PPPS with all of these plans were given to the patient.      Follow Up:  Return in about 6 months (around 5/16/2024) for follow up with labs.        Hawa Chavez MD  [facility]  Phone: [phone]  Fax: [phone]

## 2023-11-17 LAB
25(OH)D3+25(OH)D2 SERPL-MCNC: 50.9 NG/ML (ref 30–100)
ALBUMIN SERPL-MCNC: 4.5 G/DL (ref 3.5–5.2)
ALBUMIN/GLOB SERPL: 2.4 G/DL
ALP SERPL-CCNC: 113 U/L (ref 39–117)
ALT SERPL-CCNC: 20 U/L (ref 1–33)
AST SERPL-CCNC: 16 U/L (ref 1–32)
BASOPHILS # BLD AUTO: 0.02 10*3/MM3 (ref 0–0.2)
BASOPHILS NFR BLD AUTO: 0.4 % (ref 0–1.5)
BILIRUB SERPL-MCNC: 0.4 MG/DL (ref 0–1.2)
BUN SERPL-MCNC: 28 MG/DL (ref 8–23)
BUN/CREAT SERPL: 25.2 (ref 7–25)
CALCIUM SERPL-MCNC: 9.3 MG/DL (ref 8.6–10.5)
CHLORIDE SERPL-SCNC: 105 MMOL/L (ref 98–107)
CHOLEST SERPL-MCNC: 170 MG/DL (ref 0–200)
CO2 SERPL-SCNC: 29.9 MMOL/L (ref 22–29)
CREAT SERPL-MCNC: 1.11 MG/DL (ref 0.57–1)
EGFRCR SERPLBLD CKD-EPI 2021: 49.7 ML/MIN/1.73
EOSINOPHIL # BLD AUTO: 0.19 10*3/MM3 (ref 0–0.4)
EOSINOPHIL NFR BLD AUTO: 3.5 % (ref 0.3–6.2)
ERYTHROCYTE [DISTWIDTH] IN BLOOD BY AUTOMATED COUNT: 13.2 % (ref 12.3–15.4)
GLOBULIN SER CALC-MCNC: 1.9 GM/DL
GLUCOSE SERPL-MCNC: 94 MG/DL (ref 65–99)
HBA1C MFR BLD: 6.3 % (ref 4.8–5.6)
HCT VFR BLD AUTO: 41.6 % (ref 34–46.6)
HDLC SERPL-MCNC: 50 MG/DL (ref 40–60)
HGB BLD-MCNC: 13.4 G/DL (ref 12–15.9)
IMM GRANULOCYTES # BLD AUTO: 0.02 10*3/MM3 (ref 0–0.05)
IMM GRANULOCYTES NFR BLD AUTO: 0.4 % (ref 0–0.5)
LDLC SERPL CALC-MCNC: 95 MG/DL (ref 0–100)
LYMPHOCYTES # BLD AUTO: 1.32 10*3/MM3 (ref 0.7–3.1)
LYMPHOCYTES NFR BLD AUTO: 24.5 % (ref 19.6–45.3)
MCH RBC QN AUTO: 28.2 PG (ref 26.6–33)
MCHC RBC AUTO-ENTMCNC: 32.2 G/DL (ref 31.5–35.7)
MCV RBC AUTO: 87.6 FL (ref 79–97)
MONOCYTES # BLD AUTO: 0.43 10*3/MM3 (ref 0.1–0.9)
MONOCYTES NFR BLD AUTO: 8 % (ref 5–12)
NEUTROPHILS # BLD AUTO: 3.4 10*3/MM3 (ref 1.7–7)
NEUTROPHILS NFR BLD AUTO: 63.2 % (ref 42.7–76)
NRBC BLD AUTO-RTO: 0 /100 WBC (ref 0–0.2)
PLATELET # BLD AUTO: 209 10*3/MM3 (ref 140–450)
POTASSIUM SERPL-SCNC: 4.5 MMOL/L (ref 3.5–5.2)
PROT SERPL-MCNC: 6.4 G/DL (ref 6–8.5)
RBC # BLD AUTO: 4.75 10*6/MM3 (ref 3.77–5.28)
SODIUM SERPL-SCNC: 142 MMOL/L (ref 136–145)
TRIGL SERPL-MCNC: 142 MG/DL (ref 0–150)
TSH SERPL DL<=0.005 MIU/L-ACNC: 3.11 UIU/ML (ref 0.27–4.2)
VLDLC SERPL CALC-MCNC: 25 MG/DL (ref 5–40)
WBC # BLD AUTO: 5.38 10*3/MM3 (ref 3.4–10.8)

## 2023-11-17 NOTE — PROGRESS NOTES
[facility]     Referring Provider: Margot Chavez MD     Chief complaint: abnormal breast imaging     Subjective   HPI: Ms. Anitra Orta is a 83 y.o. yo woman, seen at the request of Margot Chavez MD, for a new diagnosis of right breast sebaceous cyst.      She noted a new lesion to her right breast several months ago.  This area has fluctuated in size.  When she first noticed it it was easily expressing fluid from that spot.  It has been approximately the size for the last several months.  It no longer drains.  Has not decreased in size.  She denies any episodes of redness, tenderness, fluctuance to the area.    In the 1970s she had a mass in her left breast.  For which she underwent a surgical excisional biopsy.  She states the final pathology was benign.  Since that time no abnormal mammograms or further biopsies.     She reports a pertinent PMH significant for spinal stenosis, HTN, HDL, chronic back pain, kidney stones, macular degeneration.     She denies any family history of breast or ovarian cancer.     She gave consent for her  to be present during her examination and participate in the discussion.   She was by herself in clinic today.     Review of Systems   HENT:   Positive for hearing loss.    Genitourinary:  Positive for bladder incontinence and nocturia.    Musculoskeletal:  Positive for arthralgias, back pain, flank pain, gait problem and myalgias.   Neurological:  Positive for gait problem.   Psychiatric/Behavioral:  Positive for confusion. The patient is nervous/anxious.    All other systems reviewed and are negative.      Medications:    Current Outpatient Medications:     oxyCODONE-acetaminophen (PERCOCET)  MG per tablet, Take 1 tablet by mouth 3 (Three) Times a Day., Disp: , Rfl:     gabapentin (NEURONTIN) 300 MG capsule, Take 1 capsule by mouth 4 (Four) Times a Day., Disp: 120 capsule, Rfl: 2    imipramine (TOFRANIL) 50 MG tablet, TAKE 2 TABLETS BY MOUTH  EVERY NIGHT, Disp: 180 tablet, Rfl: 1    metoprolol succinate XL (TOPROL-XL) 50 MG 24 hr tablet, TAKE 1 TABLET BY MOUTH DAILY, Disp: 90 tablet, Rfl: 1    multivitamin with minerals (PRESERVISION AREDS PO), , Disp: , Rfl:     NON FORMULARY, Take 1 tablet by mouth 2 (Two) Times a Day. Preservation OTC, Disp: , Rfl:     oxyCODONE (oxyCONTIN) 10 MG 12 hr tablet, Take 1 tablet by mouth Every 12 (Twelve) Hours., Disp: , Rfl:     simvastatin (ZOCOR) 40 MG tablet, Take 1 tablet by mouth Every Night. for cholesterol, Disp: 90 tablet, Rfl: 1    VITAMIN D PO, Take 2 tablets by mouth Daily. Pt doesn't know strength, Disp: , Rfl:     Allergies:  Allergies   Allergen Reactions    Bactrim [Sulfamethoxazole-Trimethoprim] Nausea Only    Ciprofloxacin Other (See Comments)     UNKNOWN    Macrobid [Nitrofurantoin] Other (See Comments)     UNKNOWN; PT CAN TAKE IN SMALL DOSES- FLU LIKE SYMPTOMS    Cortisone Headache     Pt states had headache with IM injection states has been ok with epidural steroid injections       Medical history:  Past Medical History:   Diagnosis Date    Acute kidney failure 07/11/2021    Anxiety     Arthritis of back     At risk for sleep apnea     Cataract     BILAT-SCHEDULED FOR THIS AND HAD TO CANCEL D/T SEPSIS    Chronic pain disorder     ALL OVER PAIN    Chronic UTI     Closed displaced fracture of surgical neck of left humerus 06/25/2021    Closed fracture of left proximal humerus 06/15/2021    Closed fracture of right distal radius 06/15/2021    COVID-19 vaccine series completed     2ND DOSE 3/23/21    DDD (degenerative disc disease), lumbar     e.coli bacteremia 03/25/2021    Elevated cholesterol     Fracture, humerus     LEFT. h/o    GERD (gastroesophageal reflux disease)     History of recent fall     JUNE 6-9-21    History of sepsis     MOST RECENT JULY 2021-R/T KIDNEY STONE, UTI.  STATES THIS IS HER 3RD SEPSIS DIAGNOSIS    History of UTI     HTN (hypertension)     Hyperlipidemia     Hypokalemia  03/25/2021    Kidney stones     LEFT    Left humeral fracture 07/11/2021    Macular degeneration, bilateral     WORSE ON RIGHT-ONLY PERIPHERAL VISION    Metabolic encephalopathy 03/25/2021    Mixed hyperlipidemia 09/16/2016    Mixed incontinence     Osteoarthritis     Osteoporosis     Panic disorder     Personal history of urinary calculi     PONV (postoperative nausea and vomiting)     Scoliosis     Sepsis, unspecified organism 03/25/2021    Tachycardia, unspecified     Ventricular tachycardia 09/09/2019    Wrist fracture     RIGHT-CURRENTLY NOT WEARING BRACE FOR THIS       Surgical History:  Past Surgical History:   Procedure Laterality Date    BREAST BIOPSY Left     benign    BREAST LUMPECTOMY Left     benign    BUNIONECTOMY Right     COLONOSCOPY N/A 11/30/2016    Procedure: COLONOSCOPY polypectomy;  Surgeon: Adali Willard MD;  Location: [facility] OR;  Service:     CYSTOSCOPY BOTOX INJECTION OF BLADDER N/A 07/21/2017    Procedure: CYSTOSCOPY COAPTITE INJECTION;  Surgeon: Kevon Clark MD;  Location: [facility];  Service:     CYSTOSCOPY URETEROSCOPY LASER LITHOTRIPSY Right 5/1/2023    Procedure: RIGHT URETEROSCOPY LASER LITHOTRIPSY STONE BASKET EXTRACTION AND STENT;  Surgeon: Kevon Clark MD;  Location: [facility] OR;  Service: Urology;  Laterality: Right;    CYSTOSCOPY W/ LITHOLAPAXY / EHL      CYSTOSCOPY W/ URETERAL STENT PLACEMENT Left 09/12/2016    Procedure: CYSTOSCOPY URETERAL STENT INSERTION;  Surgeon: Kevon Clark MD;  Location: [facility] OR;  Service:     CYSTOSCOPY W/ URETERAL STENT PLACEMENT Left 08/01/2019    Procedure: CYSTOSCOPY URETERAL CATHETER/STENT INSERTION;  Surgeon: Kevon Clark MD;  Location: [facility] OR;  Service: Urology    CYSTOSCOPY W/ URETERAL STENT PLACEMENT Left 03/25/2021    Procedure: CYSTOSCOPY URETERAL CATHETER/STENT INSERTION;  Surgeon: Kevon Clark MD;  Location: [facility] OR;  Service: Urology;  Laterality: Left;    CYSTOSCOPY W/ URETERAL STENT PLACEMENT  Left 07/11/2021    Procedure: CYSTOSCOPY URETERAL CATHETER/STENT INSERTION;  Surgeon: Lul Guzman MD;  Location: [facility] OR;  Service: Urology;  Laterality: Left;  CYSTOSCOPY LEFT URETERAL CATHETER/ STENT PLACEMENT    CYSTOSCOPY W/ URETERAL STENT PLACEMENT Right 04/20/2023    Procedure: CYSTOSCOPY STENT PLACEMENT;  Surgeon: Matthew Ndiaye MD;  Location: [facility] OR;  Service: Urology;  Laterality: Right;    JOINT REPLACEMENT Right     total knee    LAMINECTOMY  11/2022    LUMBAR EPIDURAL INJECTION      TONSILLECTOMY AND ADENOIDECTOMY      URETEROSCOPY LASER LITHOTRIPSY WITH STENT INSERTION Left 10/05/2016    Procedure: LT URETEROSCOPY LASER LITHOTRIPSY STONE BASKET EXTRACTION AND STENT ;  Surgeon: Kevon Clark MD;  Location: [facility] OR;  Service:     URETEROSCOPY LASER LITHOTRIPSY WITH STENT INSERTION Left 07/23/2021    Procedure: LEFT URETEROSCOPY STONE MANIPULATION STENT EXCHANGE, LASER LITHOTRIPSY, CYSTOSCOPY, STONE BASKET EXTRACTION;  Surgeon: Kevon Clark MD;  Location: [facility];  Service: Urology;  Laterality: Left;       Family History:  Family History   Problem Relation Age of Onset    Heart defect Mother     Heart attack Mother 70    Sudden death Mother     Heart defect Father     Heart attack Father 49    Hypertension Father     Sudden death Father     Valvular heart disease Brother     Malig Hyperthermia Neg Hx     Breast cancer Neg Hx        Cancer Family History: Age of diagnosis/Age at death OR Age as of today  Breast Cancer: Denies  Ovarian Cancer: Denies  Pancreatic Cancer: Denies  Prostate Cancer: Denies  Colon Cancer: Denies  Lung Cancer: Denies    Social History:   Social History     Socioeconomic History    Marital status:    Tobacco Use    Smoking status: Former     Packs/day: 1.00     Years: 15.00     Additional pack years: 0.00     Total pack years: 15.00     Types: Cigarettes     Start date: 1/1/1956     Quit date: 1/1/1980     Years since quitting:  "43.9    Smokeless tobacco: Never    Tobacco comments:     quit 1994   Vaping Use    Vaping Use: Never used   Substance and Sexual Activity    Alcohol use: No    Drug use: Not Currently    Sexual activity: Not Currently     Partners: Male     Comment: Frequent UTIs       She lives with her   She is retired from office work and        GYNECOLOGIC HISTORY:   G: 3. P: 2. AB: 1.  Last menstrual period: 50's  Age at menarche: 12  Age at first childbirth: 24  Lactation: n/a  Age at menopause: 50's  Total years of oral contraceptive use: n/a  Total years of hormone replacement therapy: n/a      Objective   PHYSICAL EXAMINATION  /68 (BP Location: Left arm)   Pulse 65   Ht 167.6 cm (66\")   Wt 109 kg (240 lb)   SpO2 94%   BMI 38.74 kg/m²   General: NAD, requires wheelchair for ambulation, overweight  Psych: a&o x 3, normal mood and affect  Eyes: EOMI, no scleral icterus  ENMT: neck supple without masses or thyromegaly, mucus membranes moist  Resp: normal effort  CV: RRR, no edema  GI: soft, NT, ND  MSK: normal gait, normal ROM in bilateral shoulders  Lymph nodes:  no cervical, supraclavicular or axillary lymphadenopathy  Breast: symmetric, 50B  Right: Mass to right upper inner quadrant at the angle of her breast chest wall, visible puncta noted.  No visible abnormalities on inspection while seated, with arms raised or hands on hips. No masses, skin changes, or nipple abnormalities.  Left: No visible abnormalities on inspection while seated, with arms raised or hands on hips. No masses, skin changes, or nipple abnormalities.    Imaging - documented images below have been personally reviewed:  10/19/23 Bilateral Mammogram, Right Breast Ultrasound  MAMMOGRAM FINDINGS:  Technologist reports these the best images possible given the patient's  functional status.     Breast parenchyma is composed of scattered fibroglandular densities. The  pattern is unchanged. There is no new dominant nodule " mass or suspicious  cluster of microcalcifications. Benign-appearing nodularity in both  breast does not appear appreciably changed for technical factors and  there are benign calcifications bilaterally with a left breast  predominance. The area of patient palpable concern localizes medially  and posteriorly along the cleavage margin near the chest wall and right  breast. This is partially included within the field-of-view on the right  cc projection and measures up to about 1.4 cm. It is not included within  the field-of-view on the other images due to location and patient  inability to fully cooperate with standard technique. Ultrasound was  performed for further assessment.     ULTRASOUND FINDINGS:  The patient was initially scanned independently by the technologist and  also rescanned in my presence. Limited physical exam (with patient  consent) was performed by me in the department and demonstrates a soft  palpable nodule just deep to the skin along the cleavage margin of the  right breast in the 2:00 axis about 20 cm from the nipple. There is no  associated erythema or inflammatory change on exam. Targeted ultrasound  of this area demonstrates a wider than tall hypoechoic nodule measuring  2.3 x 1.5 cm. There are internal echoes suggestive of a cyst solid  nodule or complex cyst with increased through transmission. No internal  color flow or posterior acoustical shadowing. Under real-time  observation and on submitted images, there appears to be a subtle tail  extending to the skin surface. The history and imaging features are most  supportive of a complex epidermal inclusion cyst or sebaceous cyst.  Surgical referral recommended for further care and management given that  these have a propensity to become infected over time. Findings and  recommendations were discussed with the patient in the department. She  voiced understanding and agreement.     IMPRESSION:  1. Imaging findings most supportive of a benign  epidermal inclusion  cyst/sebaceous cyst along the cleavage margin of the right breast.  Surgical referral recommended for further care and management and  excision given that these do have a propensity to become infected over  time.     BI-RADS CATEGORY 2: Benign Findings.    Pathology:  NA    Assessment & Plan   Assessment:  83 y.o. F with a new diagnosis of epidermal inclusion cyst    Plan:    We will plan to excise right breast cyst following her upcoming back surgery.  Back surgery is scheduled for January.  We will plan to do a palpation guided right cyst excision sometime in March.  - Will be due for diagnostic imaging October 2024      Leni Neal MD  Breast Surgical Oncology    I spent 45 minutes caring for Ms. Orta on this date of service. This time includes time spent by me in the following activities: preparing for the visit, reviewing tests, performing a medically appropriate examination and/or evaluation , counseling and educating the patient/family/caregiver, ordering medications, tests, or procedures, documenting information in the medical record, and independently interpreting results and communicating that information with the patient/family/caregiver.      CC:  Margot Chavez MD

## 2023-11-18 DIAGNOSIS — I10 ESSENTIAL HYPERTENSION: ICD-10-CM

## 2023-11-19 RX ORDER — METOPROLOL SUCCINATE 50 MG/1
50 TABLET, EXTENDED RELEASE ORAL DAILY
Qty: 90 TABLET | Refills: 1 | Status: SHIPPED | OUTPATIENT
Start: 2023-11-19

## 2023-11-20 ENCOUNTER — PREP FOR SURGERY (OUTPATIENT)
Dept: OTHER | Facility: HOSPITAL | Age: 83
End: 2023-11-20

## 2023-11-20 ENCOUNTER — OFFICE VISIT (OUTPATIENT)
Dept: SURGERY | Facility: CLINIC | Age: 83
End: 2023-11-20
Payer: MEDICARE

## 2023-11-20 VITALS
BODY MASS INDEX: 38.57 KG/M2 | HEART RATE: 65 BPM | SYSTOLIC BLOOD PRESSURE: 112 MMHG | OXYGEN SATURATION: 94 % | HEIGHT: 66 IN | DIASTOLIC BLOOD PRESSURE: 68 MMHG | WEIGHT: 240 LBS

## 2023-11-20 DIAGNOSIS — L72.3 SEBACEOUS CYST: ICD-10-CM

## 2023-11-20 DIAGNOSIS — N60.81 SEBACEOUS CYST OF BREAST, RIGHT: Primary | ICD-10-CM

## 2023-11-20 PROCEDURE — 3074F SYST BP LT 130 MM HG: CPT | Performed by: STUDENT IN AN ORGANIZED HEALTH CARE EDUCATION/TRAINING PROGRAM

## 2023-11-20 PROCEDURE — 1160F RVW MEDS BY RX/DR IN RCRD: CPT | Performed by: STUDENT IN AN ORGANIZED HEALTH CARE EDUCATION/TRAINING PROGRAM

## 2023-11-20 PROCEDURE — 99204 OFFICE O/P NEW MOD 45 MIN: CPT | Performed by: STUDENT IN AN ORGANIZED HEALTH CARE EDUCATION/TRAINING PROGRAM

## 2023-11-20 PROCEDURE — 3078F DIAST BP <80 MM HG: CPT | Performed by: STUDENT IN AN ORGANIZED HEALTH CARE EDUCATION/TRAINING PROGRAM

## 2023-11-20 PROCEDURE — 1159F MED LIST DOCD IN RCRD: CPT | Performed by: STUDENT IN AN ORGANIZED HEALTH CARE EDUCATION/TRAINING PROGRAM

## 2023-11-20 RX ORDER — SODIUM CHLORIDE 9 MG/ML
40 INJECTION, SOLUTION INTRAVENOUS AS NEEDED
Status: CANCELLED | OUTPATIENT
Start: 2023-11-20

## 2023-11-20 RX ORDER — CEFAZOLIN SODIUM 2 G/100ML
2000 INJECTION, SOLUTION INTRAVENOUS ONCE
Status: CANCELLED | OUTPATIENT
Start: 2023-11-20 | End: 2023-11-20

## 2023-11-20 RX ORDER — SODIUM CHLORIDE 0.9 % (FLUSH) 0.9 %
10 SYRINGE (ML) INJECTION AS NEEDED
Status: CANCELLED | OUTPATIENT
Start: 2023-11-20

## 2023-11-20 RX ORDER — SODIUM CHLORIDE 0.9 % (FLUSH) 0.9 %
10 SYRINGE (ML) INJECTION EVERY 12 HOURS SCHEDULED
Status: CANCELLED | OUTPATIENT
Start: 2023-11-20

## 2023-11-20 RX ORDER — OXYCODONE AND ACETAMINOPHEN 10; 325 MG/1; MG/1
1 TABLET ORAL 3 TIMES DAILY
COMMUNITY
Start: 2023-11-15

## 2023-11-20 NOTE — LETTER
November 20, 2023     Margot Chavez MD  [address]  [facility] [address]    Patient: Anitra Orta   YOB: 1940   Date of Visit: 11/20/2023     Dear Margot Chavez MD:       Thank you for referring Anitra Orta to me for evaluation. Below are the relevant portions of my assessment and plan of care.    If you have questions, please do not hesitate to call me. I look forward to following Anitra along with you.         Sincerely,        Leni Neal MD        CC: No Recipients    Leni Neal MD  11/20/23 1412  Sign when Signing Visit  [facility]     Referring Provider: Margot Chavez MD     Chief complaint: abnormal breast imaging     Subjective  HPI: Ms. Anitra Orta is a 83 y.o. yo woman, seen at the request of Margot Chavez MD, for a new diagnosis of right breast sebaceous cyst.      She noted a new lesion to her right breast several months ago.  This area has fluctuated in size.  When she first noticed it it was easily expressing fluid from that spot.  It has been approximately the size for the last several months.  It no longer drains.  Has not decreased in size.  She denies any episodes of redness, tenderness, fluctuance to the area.    In the 1970s she had a mass in her left breast.  For which she underwent a surgical excisional biopsy.  She states the final pathology was benign.  Since that time no abnormal mammograms or further biopsies.     She reports a pertinent PMH significant for spinal stenosis, HTN, HDL, chronic back pain, kidney stones, macular degeneration.     She denies any family history of breast or ovarian cancer.     She gave consent for her  to be present during her examination and participate in the discussion.   She was by herself in clinic today.     Review of Systems   HENT:   Positive for hearing loss.    Genitourinary:  Positive for bladder incontinence and nocturia.    Musculoskeletal:  Positive for arthralgias, back  pain, flank pain, gait problem and myalgias.   Neurological:  Positive for gait problem.   Psychiatric/Behavioral:  Positive for confusion. The patient is nervous/anxious.    All other systems reviewed and are negative.      Medications:    Current Outpatient Medications:   •  oxyCODONE-acetaminophen (PERCOCET)  MG per tablet, Take 1 tablet by mouth 3 (Three) Times a Day., Disp: , Rfl:   •  gabapentin (NEURONTIN) 300 MG capsule, Take 1 capsule by mouth 4 (Four) Times a Day., Disp: 120 capsule, Rfl: 2  •  imipramine (TOFRANIL) 50 MG tablet, TAKE 2 TABLETS BY MOUTH EVERY NIGHT, Disp: 180 tablet, Rfl: 1  •  metoprolol succinate XL (TOPROL-XL) 50 MG 24 hr tablet, TAKE 1 TABLET BY MOUTH DAILY, Disp: 90 tablet, Rfl: 1  •  multivitamin with minerals (PRESERVISION AREDS PO), , Disp: , Rfl:   •  NON FORMULARY, Take 1 tablet by mouth 2 (Two) Times a Day. Preservation OTC, Disp: , Rfl:   •  oxyCODONE (oxyCONTIN) 10 MG 12 hr tablet, Take 1 tablet by mouth Every 12 (Twelve) Hours., Disp: , Rfl:   •  simvastatin (ZOCOR) 40 MG tablet, Take 1 tablet by mouth Every Night. for cholesterol, Disp: 90 tablet, Rfl: 1  •  VITAMIN D PO, Take 2 tablets by mouth Daily. Pt doesn't know strength, Disp: , Rfl:     Allergies:  Allergies   Allergen Reactions   • Bactrim [Sulfamethoxazole-Trimethoprim] Nausea Only   • Ciprofloxacin Other (See Comments)     UNKNOWN   • Macrobid [Nitrofurantoin] Other (See Comments)     UNKNOWN; PT CAN TAKE IN SMALL DOSES- FLU LIKE SYMPTOMS   • Cortisone Headache     Pt states had headache with IM injection states has been ok with epidural steroid injections       Medical history:  Past Medical History:   Diagnosis Date   • Acute kidney failure 07/11/2021   • Anxiety    • Arthritis of back    • At risk for sleep apnea    • Cataract     BILAT-SCHEDULED FOR THIS AND HAD TO CANCEL D/T SEPSIS   • Chronic pain disorder     ALL OVER PAIN   • Chronic UTI    • Closed displaced fracture of surgical neck of left humerus  06/25/2021   • Closed fracture of left proximal humerus 06/15/2021   • Closed fracture of right distal radius 06/15/2021   • COVID-19 vaccine series completed     2ND DOSE 3/23/21   • DDD (degenerative disc disease), lumbar    • e.coli bacteremia 03/25/2021   • Elevated cholesterol    • Fracture, humerus     LEFT. h/o   • GERD (gastroesophageal reflux disease)    • History of recent fall     JUNE 6-9-21   • History of sepsis     MOST RECENT JULY 2021-R/T KIDNEY STONE, UTI.  STATES THIS IS HER 3RD SEPSIS DIAGNOSIS   • History of UTI    • HTN (hypertension)    • Hyperlipidemia    • Hypokalemia 03/25/2021   • Kidney stones     LEFT   • Left humeral fracture 07/11/2021   • Macular degeneration, bilateral     WORSE ON RIGHT-ONLY PERIPHERAL VISION   • Metabolic encephalopathy 03/25/2021   • Mixed hyperlipidemia 09/16/2016   • Mixed incontinence    • Osteoarthritis    • Osteoporosis    • Panic disorder    • Personal history of urinary calculi    • PONV (postoperative nausea and vomiting)    • Scoliosis    • Sepsis, unspecified organism 03/25/2021   • Tachycardia, unspecified    • Ventricular tachycardia 09/09/2019   • Wrist fracture     RIGHT-CURRENTLY NOT WEARING BRACE FOR THIS       Surgical History:  Past Surgical History:   Procedure Laterality Date   • BREAST BIOPSY Left     benign   • BREAST LUMPECTOMY Left     benign   • BUNIONECTOMY Right    • COLONOSCOPY N/A 11/30/2016    Procedure: COLONOSCOPY polypectomy;  Surgeon: Adali Willard MD;  Location: [facility];  Service:    • CYSTOSCOPY BOTOX INJECTION OF BLADDER N/A 07/21/2017    Procedure: CYSTOSCOPY COAPTITE INJECTION;  Surgeon: Kevon Clark MD;  Location: [facility] OR;  Service:    • CYSTOSCOPY URETEROSCOPY LASER LITHOTRIPSY Right 5/1/2023    Procedure: RIGHT URETEROSCOPY LASER LITHOTRIPSY STONE BASKET EXTRACTION AND STENT;  Surgeon: Kevon Clark MD;  Location: [facility];  Service: Urology;  Laterality: Right;   • CYSTOSCOPY W/ LITHOLAPAXY / EHL     •  CYSTOSCOPY W/ URETERAL STENT PLACEMENT Left 09/12/2016    Procedure: CYSTOSCOPY URETERAL STENT INSERTION;  Surgeon: Kevon Clark MD;  Location: [facility] OR;  Service:    • CYSTOSCOPY W/ URETERAL STENT PLACEMENT Left 08/01/2019    Procedure: CYSTOSCOPY URETERAL CATHETER/STENT INSERTION;  Surgeon: Kevon Clark MD;  Location: [facility] OR;  Service: Urology   • CYSTOSCOPY W/ URETERAL STENT PLACEMENT Left 03/25/2021    Procedure: CYSTOSCOPY URETERAL CATHETER/STENT INSERTION;  Surgeon: Kevon Clark MD;  Location: [facility];  Service: Urology;  Laterality: Left;   • CYSTOSCOPY W/ URETERAL STENT PLACEMENT Left 07/11/2021    Procedure: CYSTOSCOPY URETERAL CATHETER/STENT INSERTION;  Surgeon: Lul Guzman MD;  Location: [facility];  Service: Urology;  Laterality: Left;  CYSTOSCOPY LEFT URETERAL CATHETER/ STENT PLACEMENT   • CYSTOSCOPY W/ URETERAL STENT PLACEMENT Right 04/20/2023    Procedure: CYSTOSCOPY STENT PLACEMENT;  Surgeon: Matthew Ndiaye MD;  Location: [facility];  Service: Urology;  Laterality: Right;   • JOINT REPLACEMENT Right     total knee   • LAMINECTOMY  11/2022   • LUMBAR EPIDURAL INJECTION     • TONSILLECTOMY AND ADENOIDECTOMY     • URETEROSCOPY LASER LITHOTRIPSY WITH STENT INSERTION Left 10/05/2016    Procedure: LT URETEROSCOPY LASER LITHOTRIPSY STONE BASKET EXTRACTION AND STENT ;  Surgeon: Kevon Clark MD;  Location: [facility];  Service:    • URETEROSCOPY LASER LITHOTRIPSY WITH STENT INSERTION Left 07/23/2021    Procedure: LEFT URETEROSCOPY STONE MANIPULATION STENT EXCHANGE, LASER LITHOTRIPSY, CYSTOSCOPY, STONE BASKET EXTRACTION;  Surgeon: Kevon Clark MD;  Location: [facility];  Service: Urology;  Laterality: Left;       Family History:  Family History   Problem Relation Age of Onset   • Heart defect Mother    • Heart attack Mother 70   • Sudden death Mother    • Heart defect Father    • Heart attack Father 49   • Hypertension Father    • Sudden death Father  "   • Valvular heart disease Brother    • Shamika Hyperthermia Neg Hx    • Breast cancer Neg Hx        Cancer Family History: Age of diagnosis/Age at death OR Age as of today  Breast Cancer: Denies  Ovarian Cancer: Denies  Pancreatic Cancer: Denies  Prostate Cancer: Denies  Colon Cancer: Denies  Lung Cancer: Denies    Social History:   Social History     Socioeconomic History   • Marital status:    Tobacco Use   • Smoking status: Former     Packs/day: 1.00     Years: 15.00     Additional pack years: 0.00     Total pack years: 15.00     Types: Cigarettes     Start date: 1956     Quit date: 1980     Years since quittin.9   • Smokeless tobacco: Never   • Tobacco comments:     quit    Vaping Use   • Vaping Use: Never used   Substance and Sexual Activity   • Alcohol use: No   • Drug use: Not Currently   • Sexual activity: Not Currently     Partners: Male     Comment: Frequent UTIs       She lives with her   She is retired from office work and        GYNECOLOGIC HISTORY:   G: 3. P: 2. AB: 1.  Last menstrual period: 50's  Age at menarche: 12  Age at first childbirth: 24  Lactation: n/a  Age at menopause: 50's  Total years of oral contraceptive use: n/a  Total years of hormone replacement therapy: n/a      Objective  PHYSICAL EXAMINATION  /68 (BP Location: Left arm)   Pulse 65   Ht 167.6 cm (66\")   Wt 109 kg (240 lb)   SpO2 94%   BMI 38.74 kg/m²   General: NAD, requires wheelchair for ambulation, overweight  Psych: a&o x 3, normal mood and affect  Eyes: EOMI, no scleral icterus  ENMT: neck supple without masses or thyromegaly, mucus membranes moist  Resp: normal effort  CV: RRR, no edema  GI: soft, NT, ND  MSK: normal gait, normal ROM in bilateral shoulders  Lymph nodes:  no cervical, supraclavicular or axillary lymphadenopathy  Breast: symmetric, 50B  Right: Mass to right upper inner quadrant at the angle of her breast chest wall, visible puncta noted.  No visible " abnormalities on inspection while seated, with arms raised or hands on hips. No masses, skin changes, or nipple abnormalities.  Left: No visible abnormalities on inspection while seated, with arms raised or hands on hips. No masses, skin changes, or nipple abnormalities.    Imaging - documented images below have been personally reviewed:  10/19/23 Bilateral Mammogram, Right Breast Ultrasound  MAMMOGRAM FINDINGS:  Technologist reports these the best images possible given the patient's  functional status.     Breast parenchyma is composed of scattered fibroglandular densities. The  pattern is unchanged. There is no new dominant nodule mass or suspicious  cluster of microcalcifications. Benign-appearing nodularity in both  breast does not appear appreciably changed for technical factors and  there are benign calcifications bilaterally with a left breast  predominance. The area of patient palpable concern localizes medially  and posteriorly along the cleavage margin near the chest wall and right  breast. This is partially included within the field-of-view on the right  cc projection and measures up to about 1.4 cm. It is not included within  the field-of-view on the other images due to location and patient  inability to fully cooperate with standard technique. Ultrasound was  performed for further assessment.     ULTRASOUND FINDINGS:  The patient was initially scanned independently by the technologist and  also rescanned in my presence. Limited physical exam (with patient  consent) was performed by me in the department and demonstrates a soft  palpable nodule just deep to the skin along the cleavage margin of the  right breast in the 2:00 axis about 20 cm from the nipple. There is no  associated erythema or inflammatory change on exam. Targeted ultrasound  of this area demonstrates a wider than tall hypoechoic nodule measuring  2.3 x 1.5 cm. There are internal echoes suggestive of a cyst solid  nodule or complex cyst  with increased through transmission. No internal  color flow or posterior acoustical shadowing. Under real-time  observation and on submitted images, there appears to be a subtle tail  extending to the skin surface. The history and imaging features are most  supportive of a complex epidermal inclusion cyst or sebaceous cyst.  Surgical referral recommended for further care and management given that  these have a propensity to become infected over time. Findings and  recommendations were discussed with the patient in the department. She  voiced understanding and agreement.     IMPRESSION:  1. Imaging findings most supportive of a benign epidermal inclusion  cyst/sebaceous cyst along the cleavage margin of the right breast.  Surgical referral recommended for further care and management and  excision given that these do have a propensity to become infected over  time.     BI-RADS CATEGORY 2: Benign Findings.    Pathology:  NA    Assessment & Plan  Assessment:  83 y.o. F with a new diagnosis of epidermal inclusion cyst    Plan:    We will plan to excise right breast cyst following her upcoming back surgery.  Back surgery is scheduled for January.  We will plan to do a palpation guided right cyst excision sometime in March.  - Will be due for diagnostic imaging October 2024      Leni Neal MD  Breast Surgical Oncology    I spent 45 minutes caring for Ms. Orta on this date of service. This time includes time spent by me in the following activities: preparing for the visit, reviewing tests, performing a medically appropriate examination and/or evaluation , counseling and educating the patient/family/caregiver, ordering medications, tests, or procedures, documenting information in the medical record, and independently interpreting results and communicating that information with the patient/family/caregiver.      CC:  Margot Chavez MD

## 2023-11-24 ENCOUNTER — APPOINTMENT (OUTPATIENT)
Dept: BONE DENSITY | Facility: HOSPITAL | Age: 83
End: 2023-11-24
Payer: MEDICARE

## 2023-11-24 DIAGNOSIS — M81.0 AGE-RELATED OSTEOPOROSIS WITHOUT CURRENT PATHOLOGICAL FRACTURE: ICD-10-CM

## 2023-11-24 PROCEDURE — 77080 DXA BONE DENSITY AXIAL: CPT

## 2023-11-27 ENCOUNTER — TELEPHONE (OUTPATIENT)
Dept: INTERNAL MEDICINE | Facility: CLINIC | Age: 83
End: 2023-11-27
Payer: MEDICARE

## 2023-11-27 NOTE — TELEPHONE ENCOUNTER
"  Caller: Anitra Orta \"PAT\"    Relationship to patient: Self    Best call back number: [phone]    Patient is needing:     RETURNING PHONE CALL TO [facility]. WOULD LIKE A CALLBACK          "

## 2023-11-28 NOTE — TELEPHONE ENCOUNTER
Have called pt to review these results- she is going to consider prolia inj and discuss with her , will call back if she wants me to set this up for her at  but would like to wait and start in the Spring until after her back surgery if she decides to do thi

## 2023-11-29 ENCOUNTER — PATIENT MESSAGE (OUTPATIENT)
Dept: INTERNAL MEDICINE | Facility: CLINIC | Age: 83
End: 2023-11-29
Payer: MEDICARE

## 2023-11-29 DIAGNOSIS — M48.062 SPINAL STENOSIS OF LUMBAR REGION WITH NEUROGENIC CLAUDICATION: ICD-10-CM

## 2023-11-29 RX ORDER — GABAPENTIN 300 MG/1
300 CAPSULE ORAL 4 TIMES DAILY
Qty: 120 CAPSULE | Refills: 2 | Status: SHIPPED | OUTPATIENT
Start: 2023-11-29

## 2023-11-29 NOTE — TELEPHONE ENCOUNTER
From: Anitra Orta  To: Margot Chavez  Sent: 11/29/2023 1:56 PM EST  Subject: Treatment for bone issue    I have decided to go ahead with the injection treatment for my bone issue in the spring. Id like them to be at the [place] location.  Is it possible for you to put 3 more refills for my Gabapentin so I can get it as needed?  Thank you for your attention  Susana Orta

## 2023-12-18 ENCOUNTER — OFFICE VISIT (OUTPATIENT)
Dept: INTERNAL MEDICINE | Facility: CLINIC | Age: 83
End: 2023-12-18
Payer: MEDICARE

## 2023-12-18 VITALS
DIASTOLIC BLOOD PRESSURE: 80 MMHG | HEART RATE: 81 BPM | HEIGHT: 66 IN | RESPIRATION RATE: 20 BRPM | BODY MASS INDEX: 37.77 KG/M2 | OXYGEN SATURATION: 95 % | SYSTOLIC BLOOD PRESSURE: 132 MMHG | TEMPERATURE: 98.4 F | WEIGHT: 235 LBS

## 2023-12-18 DIAGNOSIS — K64.9 HEMORRHOIDS, UNSPECIFIED HEMORRHOID TYPE: ICD-10-CM

## 2023-12-18 DIAGNOSIS — K59.04 CHRONIC IDIOPATHIC CONSTIPATION: Primary | ICD-10-CM

## 2023-12-18 PROCEDURE — 99214 OFFICE O/P EST MOD 30 MIN: CPT | Performed by: INTERNAL MEDICINE

## 2023-12-18 PROCEDURE — 3079F DIAST BP 80-89 MM HG: CPT | Performed by: INTERNAL MEDICINE

## 2023-12-18 PROCEDURE — 3075F SYST BP GE 130 - 139MM HG: CPT | Performed by: INTERNAL MEDICINE

## 2023-12-20 NOTE — PROGRESS NOTES
"Chief Complaint  Hemorrhoids and Constipation    Subjective        Anitra Orta presents to [facility]  History of Present Illness  Here requesting GI referral, ongoing chronic constipation and hemorrhoids externally  She is taking miralax 1 capful per day  Working on fiber in the diet   She would like to go out to [place] if possible  She has upcoming back surgery as well.     Objective   Vital Signs:  /80   Pulse 81   Temp 98.4 °F (36.9 °C)   Resp 20   Ht 167.6 cm (66\")   Wt 107 kg (235 lb)   SpO2 95%   BMI 37.93 kg/m²   Estimated body mass index is 37.93 kg/m² as calculated from the following:    Height as of this encounter: 167.6 cm (66\").    Weight as of this encounter: 107 kg (235 lb).       Physical Exam  Vitals and nursing note reviewed.   Constitutional:       General: She is not in acute distress.     Appearance: Normal appearance.   HENT:      Head: Normocephalic and atraumatic.      Right Ear: External ear normal.      Left Ear: External ear normal.      Nose: Nose normal. No congestion.      Mouth/Throat:      Mouth: Mucous membranes are moist.      Pharynx: No oropharyngeal exudate or posterior oropharyngeal erythema.   Eyes:      Extraocular Movements: Extraocular movements intact.      Conjunctiva/sclera: Conjunctivae normal.      Pupils: Pupils are equal, round, and reactive to light.   Pulmonary:      Effort: Pulmonary effort is normal.   Musculoskeletal:      Cervical back: Normal range of motion.   Skin:     General: Skin is warm.      Capillary Refill: Capillary refill takes less than 2 seconds.   Neurological:      General: No focal deficit present.      Mental Status: She is alert and oriented to person, place, and time. Mental status is at baseline.      Cranial Nerves: No cranial nerve deficit.   Psychiatric:         Mood and Affect: Mood normal.         Behavior: Behavior normal.         Thought Content: Thought content " normal.        Result Review :  The following data was reviewed by: Marco Antonio Bland MD on 12/18/2023:  Common labs          4/21/2023    03:57 6/11/2023    18:46 11/16/2023    12:13   Common Labs   Glucose 137  112  94    BUN 19  20  28    Creatinine 0.94  0.98  1.11    Sodium 139  144  142    Potassium 3.7  3.8  4.5    Chloride 106  108  105    Calcium 8.7  9.3  9.3    Total Protein   6.4    Albumin 3.2  3.8  4.5    Total Bilirubin 0.4  0.3  0.4    Alkaline Phosphatase 115  125  113    AST (SGOT) 15  18  16    ALT (SGPT) 18  19  20    WBC 9.09  4.56  5.38    Hemoglobin 11.0  12.7  13.4    Hematocrit 35.1  40.4  41.6    Platelets 177  197  209    Total Cholesterol   170    Triglycerides   142    HDL Cholesterol   50    LDL Cholesterol    95    Hemoglobin A1C   6.30      Data reviewed : prior office notes reviewed             Assessment and Plan   Diagnoses and all orders for this visit:    1. Chronic idiopathic constipation (Primary)  -     Ambulatory Referral to Gastroenterology    2. Hemorrhoids, unspecified hemorrhoid type  -     Ambulatory Referral to Gastroenterology    Inc miralax to titrate to soft pudding like stool per day, add fiber, will refer to GI per her request. Return precautions discussed.        I spent 15 minutes caring for Anitra on this date of service. This time includes time spent by me in the following activities:preparing for the visit, reviewing tests, obtaining and/or reviewing a separately obtained history, performing a medically appropriate examination and/or evaluation , counseling and educating the patient/family/caregiver, ordering medications, tests, or procedures, and documenting information in the medical record  Follow Up   Return for Next scheduled follow up.  Patient was given instructions and counseling regarding her condition or for health maintenance advice. Please see specific information pulled into the AVS if appropriate.     Marco Antonio Bland MD  [facility]  [address]  Phone: [phone]

## 2024-01-17 ENCOUNTER — OFFICE VISIT (OUTPATIENT)
Dept: GASTROENTEROLOGY | Facility: CLINIC | Age: 84
End: 2024-01-17
Payer: MEDICARE

## 2024-01-17 VITALS
BODY MASS INDEX: 38.02 KG/M2 | WEIGHT: 236.6 LBS | HEIGHT: 66 IN | DIASTOLIC BLOOD PRESSURE: 86 MMHG | SYSTOLIC BLOOD PRESSURE: 130 MMHG

## 2024-01-17 DIAGNOSIS — K21.9 GERD WITHOUT ESOPHAGITIS: ICD-10-CM

## 2024-01-17 DIAGNOSIS — K59.04 CHRONIC IDIOPATHIC CONSTIPATION: Primary | ICD-10-CM

## 2024-01-17 DIAGNOSIS — Z86.010 PERSONAL HISTORY OF COLONIC POLYPS: ICD-10-CM

## 2024-01-17 PROBLEM — Z86.0100 PERSONAL HISTORY OF COLONIC POLYPS: Status: ACTIVE | Noted: 2024-01-17

## 2024-01-17 RX ORDER — PLECANATIDE 3 MG/1
3 TABLET ORAL DAILY
Qty: 30 TABLET | Refills: 11 | Status: SHIPPED | OUTPATIENT
Start: 2024-01-17

## 2024-01-17 RX ORDER — PLECANATIDE 3 MG/1
3 TABLET ORAL DAILY
Qty: 9 TABLET | Refills: 0 | COMMUNITY
Start: 2024-01-17

## 2024-01-17 NOTE — ASSESSMENT & PLAN NOTE
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 Preferred to prescribe Movantik but the prescription would be too expensive for the patient   - Start Trulance 3 mg QD   - Consider minimizing opioids as patient is able to

## 2024-01-17 NOTE — PROGRESS NOTES
Anitra Orta is a 83 y.o. female with a past medical history of Chronic Idiopathic Constipation, Osteoporosis + noted below who presents for evaluation of   Chief Complaint   Patient presents with    Constipation    Hemorrhoids       Subjective     Accompanied by her  during the encounter.     # Chronic Idiopathic Constipation   - Reports ongoing constipation for over 5 years. Reports it has worsened over the last 2 years as she has required Oxycodone and Percocet to help control her back pain. She has an upcoming surgery for her back soon.   - Reports having less than one BM per week. Associated with straining.   - Adherent to Miralax and Bisacodyl daily.   - Noted to be Oxycodone and Percocet.   - Reports noticing a fullness around her rectum.     # Personal history of colon polyps   - Last c/s in 11/2016 had a sub-cm TA and 3 sub-cm hyperplastic polyps.     # GERD   - Adherent to OTC Prilosec 20 mg QD. Denies heartburn.               Past Medical History:   Diagnosis Date    Acute kidney failure 07/11/2021    Anxiety     Arthritis of back     At risk for sleep apnea     Cataract     BILAT-SCHEDULED FOR THIS AND HAD TO CANCEL D/T SEPSIS    Chronic pain disorder     ALL OVER PAIN    Chronic UTI     Closed displaced fracture of surgical neck of left humerus 06/25/2021    Closed fracture of left proximal humerus 06/15/2021    Closed fracture of right distal radius 06/15/2021    Colon polyp     COVID-19 vaccine series completed     2ND DOSE 3/23/21    DDD (degenerative disc disease), lumbar     e.coli bacteremia 03/25/2021    Elevated cholesterol     Fracture, humerus     LEFT. h/o    GERD (gastroesophageal reflux disease)     History of recent fall     JUNE 6-9-21    History of sepsis     MOST RECENT JULY 2021-R/T KIDNEY STONE, UTI.  STATES THIS IS HER 3RD SEPSIS DIAGNOSIS    History of UTI     HTN (hypertension)     Hyperlipidemia     Hypokalemia 03/25/2021    Kidney stones     LEFT    Left humeral  fracture 07/11/2021    Macular degeneration, bilateral     WORSE ON RIGHT-ONLY PERIPHERAL VISION    Metabolic encephalopathy 03/25/2021    Mixed hyperlipidemia 09/16/2016    Mixed incontinence     Osteoarthritis     Osteoporosis     Panic disorder     Personal history of urinary calculi     PONV (postoperative nausea and vomiting)     Scoliosis     Sepsis, unspecified organism 03/25/2021    Tachycardia, unspecified     Ventricular tachycardia 09/09/2019    Wrist fracture     RIGHT-CURRENTLY NOT WEARING BRACE FOR THIS         Current Outpatient Medications:     gabapentin (NEURONTIN) 300 MG capsule, Take 1 capsule by mouth 4 (Four) Times a Day., Disp: 120 capsule, Rfl: 2    imipramine (TOFRANIL) 50 MG tablet, TAKE 2 TABLETS BY MOUTH EVERY NIGHT, Disp: 180 tablet, Rfl: 1    metoprolol succinate XL (TOPROL-XL) 50 MG 24 hr tablet, TAKE 1 TABLET BY MOUTH DAILY, Disp: 90 tablet, Rfl: 1    multivitamin with minerals (PRESERVISION AREDS PO), , Disp: , Rfl:     NON FORMULARY, Take 1 tablet by mouth 2 (Two) Times a Day. Preservation OTC, Disp: , Rfl:     oxyCODONE (oxyCONTIN) 10 MG 12 hr tablet, Take 1 tablet by mouth Every 12 (Twelve) Hours., Disp: , Rfl:     oxyCODONE-acetaminophen (PERCOCET)  MG per tablet, Take 1 tablet by mouth 3 (Three) Times a Day., Disp: , Rfl:     simvastatin (ZOCOR) 40 MG tablet, Take 1 tablet by mouth Every Night. for cholesterol, Disp: 90 tablet, Rfl: 1    VITAMIN D PO, Take 2 tablets by mouth Daily. Pt doesn't know strength, Disp: , Rfl:     Plecanatide (Trulance) 3 MG tablet, Take 1 tablet by mouth Daily., Disp: 30 tablet, Rfl: 11    Plecanatide (Trulance) 3 MG tablet, Take 1 tablet by mouth Daily., Disp: 9 tablet, Rfl: 0    Allergies   Allergen Reactions    Bactrim [Sulfamethoxazole-Trimethoprim] Nausea Only    Ciprofloxacin Other (See Comments)     UNKNOWN    Macrobid [Nitrofurantoin] Other (See Comments)     UNKNOWN; PT CAN TAKE IN SMALL DOSES- FLU LIKE SYMPTOMS    Cortisone Headache      Pt states had headache with IM injection states has been ok with epidural steroid injections       Social History     Socioeconomic History    Marital status:    Tobacco Use    Smoking status: Former     Packs/day: 1.00     Years: 15.00     Additional pack years: 0.00     Total pack years: 15.00     Types: Cigarettes     Start date: 1956     Quit date: 1980     Years since quittin.0    Smokeless tobacco: Never    Tobacco comments:     quit    Vaping Use    Vaping Use: Never used   Substance and Sexual Activity    Alcohol use: No    Drug use: Not Currently    Sexual activity: Not Currently     Partners: Male     Comment: Frequent UTIs       Family History   Problem Relation Age of Onset    Heart defect Mother     Heart attack Mother 70    Sudden death Mother     Heart defect Father     Heart attack Father 49    Hypertension Father     Sudden death Father     Valvular heart disease Brother     Malig Hyperthermia Neg Hx     Breast cancer Neg Hx        Objective     Vitals:    24 1412   BP: 130/86         24  1412   Weight: 107 kg (236 lb 9.6 oz)     Body mass index is 38.21 kg/m².    Physical Exam  Constitutional:       Appearance: Normal appearance.   HENT:      Head: Normocephalic and atraumatic.   Eyes:      Extraocular Movements: Extraocular movements intact.      Conjunctiva/sclera: Conjunctivae normal.   Cardiovascular:      Rate and Rhythm: Normal rate and regular rhythm.      Heart sounds: Normal heart sounds.   Pulmonary:      Effort: Pulmonary effort is normal.      Breath sounds: Normal breath sounds.   Abdominal:      General: Abdomen is flat. Bowel sounds are normal. There is no distension.      Palpations: Abdomen is soft.      Tenderness: There is no abdominal tenderness.      Comments: Perianal exam was notable for hemorrhoids.    Neurological:      Mental Status: She is alert.   Psychiatric:         Mood and Affect: Mood normal.         Behavior: Behavior normal.          WBC   Date Value Ref Range Status   11/16/2023 5.38 3.40 - 10.80 10*3/mm3 Final     RBC   Date Value Ref Range Status   11/16/2023 4.75 3.77 - 5.28 10*6/mm3 Final     Hemoglobin   Date Value Ref Range Status   11/16/2023 13.4 12.0 - 15.9 g/dL Final   06/11/2023 12.7 12.0 - 15.9 g/dL Final     Hematocrit   Date Value Ref Range Status   11/16/2023 41.6 34.0 - 46.6 % Final   06/11/2023 40.4 34.0 - 46.6 % Final     MCV   Date Value Ref Range Status   11/16/2023 87.6 79.0 - 97.0 fL Final   06/11/2023 86.0 79.0 - 97.0 fL Final     MCH   Date Value Ref Range Status   11/16/2023 28.2 26.6 - 33.0 pg Final   06/11/2023 27.0 26.6 - 33.0 pg Final     MCHC   Date Value Ref Range Status   11/16/2023 32.2 31.5 - 35.7 g/dL Final   06/11/2023 31.4 (L) 31.5 - 35.7 g/dL Final     RDW   Date Value Ref Range Status   11/16/2023 13.2 12.3 - 15.4 % Final   06/11/2023 14.5 12.3 - 15.4 % Final     RDW-SD   Date Value Ref Range Status   06/11/2023 45.2 37.0 - 54.0 fl Final     MPV   Date Value Ref Range Status   06/11/2023 9.0 6.0 - 12.0 fL Final     Platelets   Date Value Ref Range Status   11/16/2023 209 140 - 450 10*3/mm3 Final   06/11/2023 197 140 - 450 10*3/mm3 Final     Neutrophil Rel %   Date Value Ref Range Status   11/16/2023 63.2 42.7 - 76.0 % Final     Neutrophil %   Date Value Ref Range Status   06/11/2023 56.6 42.7 - 76.0 % Final     Lymphocyte Rel %   Date Value Ref Range Status   11/16/2023 24.5 19.6 - 45.3 % Final     Lymphocyte %   Date Value Ref Range Status   06/11/2023 31.8 19.6 - 45.3 % Final     Monocyte Rel %   Date Value Ref Range Status   11/16/2023 8.0 5.0 - 12.0 % Final     Monocyte %   Date Value Ref Range Status   06/11/2023 8.3 5.0 - 12.0 % Final     Eosinophil Rel %   Date Value Ref Range Status   11/16/2023 3.5 0.3 - 6.2 % Final     Eosinophil %   Date Value Ref Range Status   06/11/2023 2.9 0.3 - 6.2 % Final     Basophil Rel %   Date Value Ref Range Status   11/16/2023 0.4 0.0 - 1.5 % Final      Basophil %   Date Value Ref Range Status   06/11/2023 0.2 0.0 - 1.5 % Final     Immature Grans %   Date Value Ref Range Status   06/11/2023 0.2 0.0 - 0.5 % Final     Neutrophils Absolute   Date Value Ref Range Status   11/16/2023 3.40 1.70 - 7.00 10*3/mm3 Final     Neutrophils, Absolute   Date Value Ref Range Status   06/11/2023 2.58 1.70 - 7.00 10*3/mm3 Final     Lymphocytes Absolute   Date Value Ref Range Status   11/16/2023 1.32 0.70 - 3.10 10*3/mm3 Final     Lymphocytes, Absolute   Date Value Ref Range Status   06/11/2023 1.45 0.70 - 3.10 10*3/mm3 Final     Monocytes Absolute   Date Value Ref Range Status   11/16/2023 0.43 0.10 - 0.90 10*3/mm3 Final     Monocytes, Absolute   Date Value Ref Range Status   06/11/2023 0.38 0.10 - 0.90 10*3/mm3 Final     Eosinophils Absolute   Date Value Ref Range Status   11/16/2023 0.19 0.00 - 0.40 10*3/mm3 Final     Eosinophils, Absolute   Date Value Ref Range Status   06/11/2023 0.13 0.00 - 0.40 10*3/mm3 Final     Basophils Absolute   Date Value Ref Range Status   11/16/2023 0.02 0.00 - 0.20 10*3/mm3 Final     Basophils, Absolute   Date Value Ref Range Status   06/11/2023 0.01 0.00 - 0.20 10*3/mm3 Final     Immature Grans, Absolute   Date Value Ref Range Status   06/11/2023 0.01 0.00 - 0.05 10*3/mm3 Final     nRBC   Date Value Ref Range Status   11/16/2023 0.0 0.0 - 0.2 /100 WBC Final   06/11/2023 0.0 0.0 - 0.2 /100 WBC Final       Glucose   Date Value Ref Range Status   11/16/2023 94 65 - 99 mg/dL Final   06/11/2023 112 (H) 65 - 99 mg/dL Final     Sodium   Date Value Ref Range Status   11/16/2023 142 136 - 145 mmol/L Final   06/11/2023 144 136 - 145 mmol/L Final   06/20/2022 143 135 - 146 mmol/L Final     Potassium   Date Value Ref Range Status   11/16/2023 4.5 3.5 - 5.2 mmol/L Final   06/11/2023 3.8 3.5 - 5.2 mmol/L Final   06/20/2022 4.5 3.5 - 5.3 mmol/L Final     CO2   Date Value Ref Range Status   06/11/2023 25.6 22.0 - 29.0 mmol/L Final   06/20/2022 29 20 - 32 mmol/L  Final     Total CO2   Date Value Ref Range Status   11/16/2023 29.9 (H) 22.0 - 29.0 mmol/L Final     Chloride   Date Value Ref Range Status   11/16/2023 105 98 - 107 mmol/L Final   06/11/2023 108 (H) 98 - 107 mmol/L Final   06/20/2022 106 98 - 110 mmol/L Final     Anion Gap   Date Value Ref Range Status   06/11/2023 10.4 5.0 - 15.0 mmol/L Final     Creatinine   Date Value Ref Range Status   11/16/2023 1.11 (H) 0.57 - 1.00 mg/dL Final   06/11/2023 0.98 0.57 - 1.00 mg/dL Final   06/20/2022 1.04 (H) 0.60 - 0.88 mg/dL Final     Comment:     For patients >49 years of age, the reference limit  for Creatinine is approximately 13% higher for people  identified as -American.     BUN   Date Value Ref Range Status   11/16/2023 28 (H) 8 - 23 mg/dL Final   06/11/2023 20 8 - 23 mg/dL Final   06/20/2022 19 7 - 25 mg/dL Final     BUN/Creatinine Ratio   Date Value Ref Range Status   11/16/2023 25.2 (H) 7.0 - 25.0 Final   06/11/2023 20.4 7.0 - 25.0 Final   06/20/2022 18 6 - 22 (calc) Final     Calcium   Date Value Ref Range Status   11/16/2023 9.3 8.6 - 10.5 mg/dL Final   06/11/2023 9.3 8.6 - 10.5 mg/dL Final   06/20/2022 9.5 8.6 - 10.4 mg/dL Final     eGFR Non  Amer   Date Value Ref Range Status   06/20/2022 51 (L) > OR = 60 mL/min/1.73m2 Final     Alkaline Phosphatase   Date Value Ref Range Status   11/16/2023 113 39 - 117 U/L Final   06/11/2023 125 (H) 39 - 117 U/L Final   06/20/2022 103 37 - 153 U/L Final     Total Protein   Date Value Ref Range Status   06/11/2023 6.8 6.0 - 8.5 g/dL Final   06/20/2022 6.6 6.1 - 8.1 g/dL Final     ALT (SGPT)   Date Value Ref Range Status   11/16/2023 20 1 - 33 U/L Final   06/11/2023 19 1 - 33 U/L Final   06/20/2022 17 6 - 29 U/L Final     AST (SGOT)   Date Value Ref Range Status   11/16/2023 16 1 - 32 U/L Final   06/11/2023 18 1 - 32 U/L Final   06/20/2022 19 10 - 35 U/L Final     Total Bilirubin   Date Value Ref Range Status   11/16/2023 0.4 0.0 - 1.2 mg/dL Final   06/11/2023 0.3  0.0 - 1.2 mg/dL Final   06/20/2022 0.5 0.2 - 1.2 mg/dL Final     Albumin   Date Value Ref Range Status   11/16/2023 4.5 3.5 - 5.2 g/dL Final   06/11/2023 3.8 3.5 - 5.2 g/dL Final   06/20/2022 4.1 3.6 - 5.1 g/dL Final     Globulin   Date Value Ref Range Status   06/11/2023 3.0 gm/dL Final   06/20/2022 2.5 1.9 - 3.7 g/dL (calc) Final     A/G Ratio   Date Value Ref Range Status   11/16/2023 2.4 g/dL Final   06/20/2022 1.6 1.0 - 2.5 (calc) Final         Imaging Results (Last 7 Days)       ** No results found for the last 168 hours. **                   Assessment & Plan     Diagnoses and all orders for this visit:    1. Chronic idiopathic constipation (Primary)  Assessment & Plan:  - Etiology is likely Opioid induced constipation as it has worsened within the last 2 years after starting opioids (per ). Preferred to prescribe Movantik but the prescription would be too expensive for the patient   - Start Trulance 3 mg QD   - Consider minimizing opioids as patient is able to      Orders:  -     Plecanatide (Trulance) 3 MG tablet; Take 1 tablet by mouth Daily.  Dispense: 30 tablet; Refill: 11  -     Plecanatide (Trulance) 3 MG tablet; Take 1 tablet by mouth Daily.  Dispense: 9 tablet; Refill: 0    2. Personal history of colonic polyps  Assessment & Plan:  - Patient declined continuing surveillance colonoscopies given age and co-morbidities       3. GERD without esophagitis  Assessment & Plan:  - Controlled. Continue OTC Omeprazole 20 mg QD      RTC in 3 months     I have discussed the above plan with the patient.  They verbalize understanding and are in agreement with the plan.  They have been advised to contact the office for any questions, concerns, or changes related to their health.

## 2024-01-19 ENCOUNTER — TELEPHONE (OUTPATIENT)
Dept: GASTROENTEROLOGY | Facility: CLINIC | Age: 84
End: 2024-01-19
Payer: MEDICARE

## 2024-01-19 NOTE — TELEPHONE ENCOUNTER
Caller: Velvet Orta    Relationship: Emergency Contact    Best call back number: [phone]    What is the best time to reach you: ANYTIME    Who are you requesting to speak with (clinical staff, provider,  specific staff member): CLINICAL        What was the call regarding: PATIENT WAS GIVEN SAMPLES OF TRULANCE AND THEIR INSURANCE WAS STILL GOING TO COST THEM $450 AND THEY ARE NEEDING SOMETHING ELSE PRESCRIBED THAT'LL BE CHEAPER. DO THEY NEED TO RETURN THE SAMPLES? PLEASE GIVE PATIENT'S  VELVET A CALL BACK.

## 2024-01-19 NOTE — TELEPHONE ENCOUNTER
Per MD Note on 1-17-24    Chronic idiopathic constipation (Primary)  Assessment & Plan:  - Etiology is likely Opioid induced constipation as it has worsened within the last 2 years after starting opioids (per ). Preferred to prescribe Movantik but the prescription would be too expensive for the patient   - Start Trulance 3 mg QD   - Consider minimizing opioids as patient is able to         Will send to md for recommendations  Alternative vs patient assistance program

## 2024-01-22 NOTE — TELEPHONE ENCOUNTER
LVM with instructions    Mail out application per md request to complete for pt assistance for trulance.  Include Proof of Income   Pharmacy print out list of meds and cost for all live in house hold

## 2024-01-30 DIAGNOSIS — E78.2 MIXED HYPERLIPIDEMIA: Chronic | ICD-10-CM

## 2024-01-30 RX ORDER — SIMVASTATIN 40 MG
40 TABLET ORAL NIGHTLY
Qty: 90 TABLET | Refills: 1 | Status: SHIPPED | OUTPATIENT
Start: 2024-01-30

## 2024-02-05 DIAGNOSIS — M48.062 SPINAL STENOSIS OF LUMBAR REGION WITH NEUROGENIC CLAUDICATION: ICD-10-CM

## 2024-02-05 RX ORDER — GABAPENTIN 300 MG/1
300 CAPSULE ORAL 4 TIMES DAILY
Qty: 120 CAPSULE | Refills: 2 | Status: SHIPPED | OUTPATIENT
Start: 2024-02-05

## 2024-02-05 NOTE — TELEPHONE ENCOUNTER
Rx Refill Note  Requested Prescriptions     Pending Prescriptions Disp Refills    gabapentin (NEURONTIN) 300 MG capsule 120 capsule 2     Sig: Take 1 capsule by mouth 4 (Four) Times a Day.      Last office visit with prescribing clinician: 12/18/2023   Last telemedicine visit with prescribing clinician: Visit date not found   Next office visit with prescribing clinician: 5/17/2024                         Would you like a call back once the refill request has been completed: [] Yes [] No    If the office needs to give you a call back, can they leave a voicemail: [] Yes [] No    Jessica Hamilton MA  02/05/24, 13:55 EST

## 2024-02-27 ENCOUNTER — READMISSION MANAGEMENT (OUTPATIENT)
Dept: CALL CENTER | Facility: HOSPITAL | Age: 84
End: 2024-02-27
Payer: MEDICARE

## 2024-02-28 ENCOUNTER — TRANSITIONAL CARE MANAGEMENT TELEPHONE ENCOUNTER (OUTPATIENT)
Dept: CALL CENTER | Facility: HOSPITAL | Age: 84
End: 2024-02-28
Payer: MEDICARE

## 2024-02-28 NOTE — OUTREACH NOTE
Call Center TCM Note      Flowsheet Row Responses   [facility] facility patient discharged from? Non-  [[facility] [place]]   Does the patient have one of the following disease processes/diagnoses(primary or secondary)? Other   TCM attempt successful? No   Unsuccessful attempts Attempt 2   Revoked Reason Other  [transfered to another rehab]            Debora Cheng RN    2/28/2024, 15:55 EST

## 2024-02-28 NOTE — OUTREACH NOTE
Prep Survey      Flowsheet Row Responses   [facility]idism facility patient discharged from? Non-[facility]   Is LACE score < 7 ? Non-BH Discharge   Eligibility [facility]   Date of Discharge 02/27/24   Discharge Disposition Home or Self Care   Discharge diagnosis Unavailable   Does the patient have one of the following disease processes/diagnoses(primary or secondary)? Other   Does the patient have Home health ordered? No   Is there a DME ordered? No   Prep survey completed? Yes            LINDSAY RHODES - Registered Nurse

## 2024-02-28 NOTE — OUTREACH NOTE
Call Center TCM Note      Flowsheet Row Responses   [facility]thod[facility] facility patient discharged from? Non-  [[facility] [place]]   Does the patient have one of the following disease processes/diagnoses(primary or secondary)? Other   TCM attempt successful? No   Unsuccessful attempts Attempt 1            Debora Cheng RN    2/28/2024, 11:33 EST

## 2024-03-07 DIAGNOSIS — M81.0 AGE-RELATED OSTEOPOROSIS WITHOUT CURRENT PATHOLOGICAL FRACTURE: Primary | ICD-10-CM

## 2024-03-25 ENCOUNTER — TRANSCRIBE ORDERS (OUTPATIENT)
Dept: ADMINISTRATIVE | Facility: HOSPITAL | Age: 84
End: 2024-03-25
Payer: MEDICARE

## 2024-03-25 ENCOUNTER — HOSPITAL ENCOUNTER (OUTPATIENT)
Dept: GENERAL RADIOLOGY | Facility: HOSPITAL | Age: 84
Discharge: HOME | End: 2024-03-25
Admitting: ORTHOPAEDIC SURGERY
Payer: MEDICARE

## 2024-03-25 DIAGNOSIS — M41.9 KYPHOSCOLIOSIS: Primary | ICD-10-CM

## 2024-03-25 DIAGNOSIS — M41.9 KYPHOSCOLIOSIS: ICD-10-CM

## 2024-03-25 PROCEDURE — 72100 X-RAY EXAM L-S SPINE 2/3 VWS: CPT

## 2024-04-15 RX ORDER — IMIPRAMINE HCL 50 MG
100 TABLET ORAL NIGHTLY
Qty: 180 TABLET | Refills: 0 | Status: SHIPPED | OUTPATIENT
Start: 2024-04-15

## 2024-05-16 ENCOUNTER — TELEPHONE (OUTPATIENT)
Dept: INTERNAL MEDICINE | Facility: CLINIC | Age: 84
End: 2024-05-16
Payer: MEDICARE

## 2024-05-16 NOTE — TELEPHONE ENCOUNTER
Olena with [employer] called to report, after her visit, that Anitra had fallen on Saturday 05/11/2024. She is doing okay at this time and will be in to see Dr. Bland tomorrow.

## 2024-05-17 ENCOUNTER — OFFICE VISIT (OUTPATIENT)
Dept: INTERNAL MEDICINE | Facility: CLINIC | Age: 84
End: 2024-05-17
Payer: MEDICARE

## 2024-05-17 VITALS
DIASTOLIC BLOOD PRESSURE: 84 MMHG | HEIGHT: 66 IN | WEIGHT: 240 LBS | HEART RATE: 82 BPM | OXYGEN SATURATION: 100 % | BODY MASS INDEX: 38.57 KG/M2 | RESPIRATION RATE: 20 BRPM | SYSTOLIC BLOOD PRESSURE: 130 MMHG

## 2024-05-17 DIAGNOSIS — R73.9 HYPERGLYCEMIA: ICD-10-CM

## 2024-05-17 DIAGNOSIS — E78.2 MIXED HYPERLIPIDEMIA: ICD-10-CM

## 2024-05-17 DIAGNOSIS — D64.9 ANEMIA, UNSPECIFIED TYPE: ICD-10-CM

## 2024-05-17 DIAGNOSIS — M81.0 AGE-RELATED OSTEOPOROSIS WITHOUT CURRENT PATHOLOGICAL FRACTURE: Primary | ICD-10-CM

## 2024-05-17 DIAGNOSIS — I10 ESSENTIAL HYPERTENSION: ICD-10-CM

## 2024-05-17 PROCEDURE — 3079F DIAST BP 80-89 MM HG: CPT | Performed by: INTERNAL MEDICINE

## 2024-05-17 PROCEDURE — 1125F AMNT PAIN NOTED PAIN PRSNT: CPT | Performed by: INTERNAL MEDICINE

## 2024-05-17 PROCEDURE — 99214 OFFICE O/P EST MOD 30 MIN: CPT | Performed by: INTERNAL MEDICINE

## 2024-05-17 PROCEDURE — 3075F SYST BP GE 130 - 139MM HG: CPT | Performed by: INTERNAL MEDICINE

## 2024-05-18 LAB
25(OH)D3+25(OH)D2 SERPL-MCNC: 59.3 NG/ML (ref 30–100)
ALBUMIN SERPL-MCNC: 4.4 G/DL (ref 3.5–5.2)
ALBUMIN/GLOB SERPL: 1.9 G/DL
ALP SERPL-CCNC: 161 U/L (ref 39–117)
ALT SERPL-CCNC: 18 U/L (ref 1–33)
AST SERPL-CCNC: 23 U/L (ref 1–32)
BASOPHILS # BLD AUTO: 0.02 10*3/MM3 (ref 0–0.2)
BASOPHILS NFR BLD AUTO: 0.4 % (ref 0–1.5)
BILIRUB SERPL-MCNC: 0.4 MG/DL (ref 0–1.2)
BUN SERPL-MCNC: 21 MG/DL (ref 8–23)
BUN/CREAT SERPL: 18.1 (ref 7–25)
CALCIUM SERPL-MCNC: 9.4 MG/DL (ref 8.6–10.5)
CHLORIDE SERPL-SCNC: 106 MMOL/L (ref 98–107)
CHOLEST SERPL-MCNC: 180 MG/DL (ref 0–200)
CO2 SERPL-SCNC: 25.5 MMOL/L (ref 22–29)
CREAT SERPL-MCNC: 1.16 MG/DL (ref 0.57–1)
EGFRCR SERPLBLD CKD-EPI 2021: 47.2 ML/MIN/1.73
EOSINOPHIL # BLD AUTO: 0.06 10*3/MM3 (ref 0–0.4)
EOSINOPHIL NFR BLD AUTO: 1.1 % (ref 0.3–6.2)
ERYTHROCYTE [DISTWIDTH] IN BLOOD BY AUTOMATED COUNT: 13.5 % (ref 12.3–15.4)
GLOBULIN SER CALC-MCNC: 2.3 GM/DL
GLUCOSE SERPL-MCNC: 114 MG/DL (ref 65–99)
HBA1C MFR BLD: 6.3 % (ref 4.8–5.6)
HCT VFR BLD AUTO: 39.2 % (ref 34–46.6)
HDLC SERPL-MCNC: 52 MG/DL (ref 40–60)
HGB BLD-MCNC: 12 G/DL (ref 12–15.9)
IMM GRANULOCYTES # BLD AUTO: 0.03 10*3/MM3 (ref 0–0.05)
IMM GRANULOCYTES NFR BLD AUTO: 0.5 % (ref 0–0.5)
LDLC SERPL CALC-MCNC: 102 MG/DL (ref 0–100)
LYMPHOCYTES # BLD AUTO: 1.45 10*3/MM3 (ref 0.7–3.1)
LYMPHOCYTES NFR BLD AUTO: 25.4 % (ref 19.6–45.3)
MCH RBC QN AUTO: 25.6 PG (ref 26.6–33)
MCHC RBC AUTO-ENTMCNC: 30.6 G/DL (ref 31.5–35.7)
MCV RBC AUTO: 83.6 FL (ref 79–97)
MONOCYTES # BLD AUTO: 0.53 10*3/MM3 (ref 0.1–0.9)
MONOCYTES NFR BLD AUTO: 9.3 % (ref 5–12)
NEUTROPHILS # BLD AUTO: 3.62 10*3/MM3 (ref 1.7–7)
NEUTROPHILS NFR BLD AUTO: 63.3 % (ref 42.7–76)
NRBC BLD AUTO-RTO: 0 /100 WBC (ref 0–0.2)
PLATELET # BLD AUTO: 221 10*3/MM3 (ref 140–450)
POTASSIUM SERPL-SCNC: 4.7 MMOL/L (ref 3.5–5.2)
PROT SERPL-MCNC: 6.7 G/DL (ref 6–8.5)
RBC # BLD AUTO: 4.69 10*6/MM3 (ref 3.77–5.28)
SODIUM SERPL-SCNC: 142 MMOL/L (ref 136–145)
T4 FREE SERPL-MCNC: 0.92 NG/DL (ref 0.93–1.7)
TRIGL SERPL-MCNC: 148 MG/DL (ref 0–150)
TSH SERPL DL<=0.005 MIU/L-ACNC: 4.67 UIU/ML (ref 0.27–4.2)
VLDLC SERPL CALC-MCNC: 26 MG/DL (ref 5–40)
WBC # BLD AUTO: 5.71 10*3/MM3 (ref 3.4–10.8)

## 2024-05-20 NOTE — PROGRESS NOTES
"Chief Complaint  Hypertension and Hyperlipidemia    Subjective        Anitra Orta presents to [facility]  History of Present Illness  Here for f/u HTN and HLD  Taking all meds as prescribed, no side effects reported  She did undergo elective minimally invasive anteiror and posterior lumbar decompression and fusion.   She is doing well post operatively, reports lumbar radiculopathy is much better     Objective   Vital Signs:  /84 (BP Location: Left arm, Patient Position: Sitting, Cuff Size: Large Adult)   Pulse 82   Resp 20   Ht 167.6 cm (66\")   Wt 109 kg (240 lb)   SpO2 100%   BMI 38.74 kg/m²   Estimated body mass index is 38.74 kg/m² as calculated from the following:    Height as of this encounter: 167.6 cm (66\").    Weight as of this encounter: 109 kg (240 lb).       Physical Exam  Vitals and nursing note reviewed.   Constitutional:       General: She is not in acute distress.     Appearance: Normal appearance.   HENT:      Head: Normocephalic and atraumatic.      Right Ear: External ear normal.      Left Ear: External ear normal.      Nose: Nose normal. No congestion.      Mouth/Throat:      Mouth: Mucous membranes are moist.      Pharynx: No oropharyngeal exudate or posterior oropharyngeal erythema.   Eyes:      Extraocular Movements: Extraocular movements intact.      Conjunctiva/sclera: Conjunctivae normal.      Pupils: Pupils are equal, round, and reactive to light.   Cardiovascular:      Rate and Rhythm: Normal rate and regular rhythm.      Pulses: Normal pulses.      Heart sounds: Normal heart sounds. No murmur heard.  Pulmonary:      Effort: Pulmonary effort is normal. No respiratory distress.      Breath sounds: Normal breath sounds. No wheezing or rales.   Musculoskeletal:      Cervical back: Normal range of motion.   Skin:     General: Skin is warm.      Capillary Refill: Capillary refill takes less than 2 seconds.   Neurological:      " General: No focal deficit present.      Mental Status: She is alert and oriented to person, place, and time. Mental status is at baseline.      Cranial Nerves: No cranial nerve deficit.   Psychiatric:         Mood and Affect: Mood normal.         Behavior: Behavior normal.         Thought Content: Thought content normal.        Result Review :    The following data was reviewed by: Marco Antonio Bland MD on 05/17/2024:  Common labs          2/20/2024    04:00 2/21/2024    03:01 5/17/2024    13:40   Common Labs   Glucose   114    BUN   21    Creatinine   1.16    Sodium   142    Potassium   4.7    Chloride   106    Calcium   9.4    Total Protein   6.7    Albumin   4.4    Total Bilirubin   0.4    Alkaline Phosphatase   161    AST (SGOT)   23    ALT (SGPT)   18    WBC 7.98     7.05     5.71    Hemoglobin 11.7     11.7     12.0    Hematocrit 36.3     36.4     39.2    Platelets 180     176     221    Total Cholesterol   180    Triglycerides   148    HDL Cholesterol   52    LDL Cholesterol    102    Hemoglobin A1C   6.30       Details          This result is from an external source.             Data reviewed : prior office notes reviewed             Assessment and Plan     Diagnoses and all orders for this visit:    1. Age-related osteoporosis without current pathological fracture (Primary)  -     Vitamin D 25 hydroxy    2. Mixed hyperlipidemia  -     CBC w AUTO Differential  -     TSH  -     T4, free  -     Lipid panel  Check for ongoing monitoring  Continue simvastatin 40mg daily    3. Essential hypertension  -     CBC w AUTO Differential  -     Comprehensive metabolic panel  Controlled continue toprol XL 50mg daily    4. Hyperglycemia  -     CBC w AUTO Differential  -     Hemoglobin A1c    5. Anemia, unspecified type  -     CBC w AUTO Differential       I spent 20 minutes caring for Anitra on this date of service. This time includes time spent by me in the following activities:preparing for the visit, reviewing tests,  obtaining and/or reviewing a separately obtained history, performing a medically appropriate examination and/or evaluation , counseling and educating the patient/family/caregiver, ordering medications, tests, or procedures, and documenting information in the medical record  Follow Up     F/u 6 months  Patient was given instructions and counseling regarding her condition or for health maintenance advice. Please see specific information pulled into the AVS if appropriate.     Marco Antonio Bland MD  [facility]  [facility] [address]  Phone: [phone]

## 2024-05-31 ENCOUNTER — TRANSCRIBE ORDERS (OUTPATIENT)
Dept: ADMINISTRATIVE | Facility: HOSPITAL | Age: 84
End: 2024-05-31
Payer: MEDICARE

## 2024-05-31 ENCOUNTER — HOSPITAL ENCOUNTER (OUTPATIENT)
Dept: GENERAL RADIOLOGY | Facility: HOSPITAL | Age: 84
Discharge: HOME | End: 2024-05-31
Payer: MEDICARE

## 2024-05-31 DIAGNOSIS — M41.9 KYPHOSCOLIOSIS: ICD-10-CM

## 2024-05-31 DIAGNOSIS — M41.9 KYPHOSCOLIOSIS: Primary | ICD-10-CM

## 2024-05-31 PROCEDURE — 72100 X-RAY EXAM L-S SPINE 2/3 VWS: CPT

## 2024-06-10 ENCOUNTER — TRANSCRIBE ORDERS (OUTPATIENT)
Dept: CT IMAGING | Facility: HOSPITAL | Age: 84
End: 2024-06-10
Payer: MEDICARE

## 2024-06-10 DIAGNOSIS — M54.10 RADICULOPATHY, UNSPECIFIED SPINAL REGION: Primary | ICD-10-CM

## 2024-06-12 ENCOUNTER — HOSPITAL ENCOUNTER (OUTPATIENT)
Dept: PHYSICAL THERAPY | Facility: HOSPITAL | Age: 84
Setting detail: THERAPIES SERIES
Discharge: HOME | End: 2024-06-12
Payer: MEDICARE

## 2024-06-12 DIAGNOSIS — M54.16 LUMBAR RADICULOPATHY: Primary | ICD-10-CM

## 2024-06-12 DIAGNOSIS — Z98.1 S/P LUMBAR SPINAL FUSION: ICD-10-CM

## 2024-06-12 PROCEDURE — 97161 PT EVAL LOW COMPLEX 20 MIN: CPT

## 2024-06-12 PROCEDURE — 97110 THERAPEUTIC EXERCISES: CPT

## 2024-06-12 NOTE — THERAPY EVALUATION
Outpatient Physical Therapy Ortho Initial Evaluation  GORD[facility]     Patient Name: Anitra Orta  : 1940  MRN: [id number]  Today's Date: 2024      Visit Date: 2024    Patient Active Problem List   Diagnosis    Urinary tract infection due to ESBL Klebsiella    Simple renal cyst    Former smoker    Hyperlipidemia    Osteoporosis    Panic disorder    Psoriasis    Urge incontinence of urine    Vitamin D deficiency    Post-menopause    Acute UTI (urinary tract infection)    Chronic bilateral low back pain without sciatica    Spinal stenosis of lumbar region with neurogenic claudication    Other chronic pain    Mixed incontinence    GERD without esophagitis    Anxiety    Hyperglycemia    e.coli bacteremia    Ureteral stone with hydronephrosis    Obstructive uropathy    Nephrolithiasis    Anemia    Metabolic acidosis    Essential hypertension    History of ventricular tachycardia    Left ureteral stone    Chronic idiopathic constipation    Personal history of colonic polyps        Past Medical History:   Diagnosis Date    Acute kidney failure 2021    Allergic     weather allergies    Anxiety     Arthritis of back     At risk for sleep apnea     Cataract     BILAT-SCHEDULED FOR THIS AND HAD TO CANCEL D/T SEPSIS    Chronic pain disorder     ALL OVER PAIN    Chronic UTI     Closed displaced fracture of surgical neck of left humerus 2021    Closed fracture of left proximal humerus 06/15/2021    Closed fracture of right distal radius 06/15/2021    Colon polyp     COVID-19 vaccine series completed     2ND DOSE 3/23/21    DDD (degenerative disc disease), lumbar     e.coli bacteremia 2021    Elevated cholesterol     Fracture, humerus     LEFT. h/o    GERD (gastroesophageal reflux disease)     History of recent fall     21    History of sepsis     MOST RECENT 2021-R/T KIDNEY STONE, UTI.  STATES THIS IS HER 3RD SEPSIS DIAGNOSIS    History of UTI     HL (hearing loss)      HTN (hypertension)     Hyperlipidemia     Hypokalemia 03/25/2021    Kidney stones     LEFT    Left humeral fracture 07/11/2021    Low back pain     Macular degeneration, bilateral     WORSE ON RIGHT-ONLY PERIPHERAL VISION    Metabolic encephalopathy 03/25/2021    Mixed hyperlipidemia 09/16/2016    Mixed incontinence     Obesity     Osteoarthritis     Osteopenia     Osteoporosis     Panic disorder     Personal history of urinary calculi     PONV (postoperative nausea and vomiting)     Scoliosis     Sepsis, unspecified organism 03/25/2021    Tachycardia, unspecified     Ventricular tachycardia 09/09/2019    Visual impairment     Wrist fracture     RIGHT-CURRENTLY NOT WEARING BRACE FOR THIS        Past Surgical History:   Procedure Laterality Date    ADENOIDECTOMY      BREAST BIOPSY Left     benign    BREAST LUMPECTOMY Left     benign    BUNIONECTOMY Right     COLONOSCOPY N/A 11/30/2016    Procedure: COLONOSCOPY polypectomy;  Surgeon: Adali Willard MD;  Location: [facility];  Service:     CYSTOSCOPY BOTOX INJECTION OF BLADDER N/A 07/21/2017    Procedure: CYSTOSCOPY COAPTITE INJECTION;  Surgeon: Kevon Clark MD;  Location: [facility];  Service:     CYSTOSCOPY URETEROSCOPY LASER LITHOTRIPSY Right 05/01/2023    Procedure: RIGHT URETEROSCOPY LASER LITHOTRIPSY STONE BASKET EXTRACTION AND STENT;  Surgeon: Kevon Clark MD;  Location: [facility];  Service: Urology;  Laterality: Right;    CYSTOSCOPY W/ LITHOLAPAXY / EHL      CYSTOSCOPY W/ URETERAL STENT PLACEMENT Left 09/12/2016    Procedure: CYSTOSCOPY URETERAL STENT INSERTION;  Surgeon: Kevon Clark MD;  Location: [facility];  Service:     CYSTOSCOPY W/ URETERAL STENT PLACEMENT Left 08/01/2019    Procedure: CYSTOSCOPY URETERAL CATHETER/STENT INSERTION;  Surgeon: Kevon Clark MD;  Location: [facility];  Service: Urology    CYSTOSCOPY W/ URETERAL STENT PLACEMENT Left 03/25/2021    Procedure: CYSTOSCOPY URETERAL CATHETER/STENT INSERTION;  Surgeon: Eduardo  "Kevon POND MD;  Location: [facility];  Service: Urology;  Laterality: Left;    CYSTOSCOPY W/ URETERAL STENT PLACEMENT Left 07/11/2021    Procedure: CYSTOSCOPY URETERAL CATHETER/STENT INSERTION;  Surgeon: Lul Guzman MD;  Location: [facility] OR;  Service: Urology;  Laterality: Left;  CYSTOSCOPY LEFT URETERAL CATHETER/ STENT PLACEMENT    CYSTOSCOPY W/ URETERAL STENT PLACEMENT Right 04/20/2023    Procedure: CYSTOSCOPY STENT PLACEMENT;  Surgeon: Matthew Ndiaye MD;  Location: [facility] OR;  Service: Urology;  Laterality: Right;    EYE SURGERY      JOINT REPLACEMENT Right     total knee    LAMINECTOMY  11/2022    LUMBAR EPIDURAL INJECTION      TONSILLECTOMY AND ADENOIDECTOMY      TUBAL ABDOMINAL LIGATION      URETEROSCOPY LASER LITHOTRIPSY WITH STENT INSERTION Left 10/05/2016    Procedure: LT URETEROSCOPY LASER LITHOTRIPSY STONE BASKET EXTRACTION AND STENT ;  Surgeon: Kevon Clark MD;  Location: [facility];  Service:     URETEROSCOPY LASER LITHOTRIPSY WITH STENT INSERTION Left 07/23/2021    Procedure: LEFT URETEROSCOPY STONE MANIPULATION STENT EXCHANGE, LASER LITHOTRIPSY, CYSTOSCOPY, STONE BASKET EXTRACTION;  Surgeon: Kevon Clark MD;  Location: [facility];  Service: Urology;  Laterality: Left;       Visit Dx:     ICD-10-CM ICD-9-CM   1. Lumbar radiculopathy  M54.16 724.4   2. S/P lumbar spinal fusion  Z98.1 V45.4          Patient History       Row Name 06/12/24 0800             History    Chief Complaint Difficulty Walking;Difficulty with daily activities;Muscle tenderness;Muscle weakness;Pain;Tightness  -LN      Type of Pain Back pain;Hip pain  LB and R hip  -LN      Date Current Problem(s) Began 02/17/24  -LN      Brief Description of Current Complaint Pt s/p spinal fusion/CHEYENNE placed for decompression L2-L5 on 2/17/24; went thru incision on R hip per  and she has 4 small incisions on back. \"They took bone marrow out of her hip to use with fusion.\" Pt was in hospital 4 days and " "then went to rehab for 3 weeks and then went home with  PT for about 1 month- last HH visit was on 6/6/24. Pt now referred to outpatient PT. Pt is ambulating with FWW and reports that she hasn't been walking too much and feels off balance.  -LN      Previous treatment for THIS PROBLEM Surgery;Rehabilitation;Medication;Pain Management;Injections  laminectomy; epidural blocks without any benefit  -LN      Surgery Date: 02/17/24  -LN      Patient/Caregiver Goals Relieve pain;Improve mobility;Know what to do to help the symptoms;Return to prior level of function;Other (comment);Improve strength  -LN      Patient/Caregiver Goals Comment \"Regain balance\"  -LN      Occupation/sports/leisure activities retired; likes to listen to audio books  -LN      Patient seeing anyone else for problem(s)? surgeon  -LN      How has patient tried to help current problem? pain meds as needed/at night; pain meds; rest; rehab/ PT; CP PRN; uses FWW;  prior to surgery;Advil  -LN      What clinical tests have you had for this problem? X-ray  -LN      Results of Clinical Tests Surgical changes of L2-5 posterior and interbody fusion without evidence of hardware complication. Surrounding degenerative changes of the thoracolumbar spine. No acute abnormality.  -LN      Related/Recent Hospitalizations Yes  -LN      Date of Hospitalization 02/17/24  -LN      Surgery/Hospitalization s/p spinal fusion/CHEYENNE placed for decompression L2-L5  -LN      History of Previous Related Injuries no specific injury reported  -LN         Pain     Pain Location Hip;Back  occasional spasm  -LN      Pain at Present 6  -LN      Pain at Best 3;4  -LN      Pain at Worst 10  -LN      Pain Frequency Constant/continuous  -LN      Pain Description Spasm;Aching  occasional spasm  -LN      What Performance Factors Make the Current Problem(s) WORSE? walking;standing; trunk ROM; dany extension; getting in and out of bed  -LN      What Performance Factors Make the Current " Problem(s) BETTER? sitting; resting in bed; lying down  -LN      Pain Comments --  -LN      Tolerance Time- Standing very limited; 10 min max  -LN      Tolerance Time- Sitting good tolerance  -LN      Tolerance Time- Walking very limited; using FWW; reports decreased balance  -LN      Tolerance Time- Lying can get comfortable; but can't roll onto side due to pain  -LN      Is your sleep disturbed? No  -LN      What position do you sleep in? Supine  since R TKA in 2015  -LN      Difficulties at work? retired  -LN      Difficulties with ADL's? She needs help with shoes  -LN      Difficulties with recreational activities? none reported  -LN         Fall Risk Assessment    Any falls in the past year: Yes  -LN      Number of falls reported in the last 12 months 2  -LN      Factors that contributed to the fall: Other (comment);Slippery surface;Lost balance;Tripped  slid off bed  -LN         Services    Prior Rehab/Home Health Experiences Yes  -LN      When was the prior experience with Rehab/Home Health after TKA; rehab and HH PT  -LN      Where was the prior experience with Rehab/Home Health [facility] for TKA  -LN      Are you currently receiving Home Health services No  -LN      Do you plan to receive Home Health services in the near future No  -LN         Daily Activities    Primary Language English  -LN      Are you able to read Yes  -LN      Are you able to write Yes  -LN      How does patient learn best? Listening;Demonstration  -LN      Teaching needs identified Home Exercise Program;Management of Condition;Falls Prevention;Other (comment)  Risks and benefits of treatment explained to pt.  -LN      Patient is concerned about/has problems with Bed Mobility;Climbing Stairs;Difficulty with self care (i.e. bathing, dressing, toileting:;Flexibility;Grasping objects lifting;Performing home management (household chores, shopping, care of dependents);Performing job responsibilities/community activities  (work, school,;Standing;Transfers (getting out of a chair, bed);Walking  -LN      Does patient have problems with the following? Panic Attack  -LN      Barriers to learning None  -LN      Pt Participated in POC and Goals Yes  -LN         Safety    Are you being hurt, hit, or frightened by anyone at home or in your life? No  -LN      Are you being neglected by a caregiver No  -LN      Have you had any of the following issues with Panic Attacks  -LN                User Key  (r) = Recorded By, (t) = Taken By, (c) = Cosigned By      Initials Name Provider Type    LN Macrina Owens, PT Physical Therapist                     PT Ortho       Row Name 06/12/24 0800       Subjective    Subjective Comments  reports that she is using a bone stimulator at home. Pt reports that she feels off balance and hasn't been walking much. Pt c/o occasional spams in her back.  -LN       Precautions and Contraindications    Precautions/Limitations spinal precautions  -LN       Subjective Pain    Able to rate subjective pain? yes  -LN    Pre-Treatment Pain Level 6  -LN       Posture/Observations    Observations Incision healing;Muscle atrophy  -LN       Head/Neck/Trunk    Trunk Extension AROM neutral  -LN    Trunk Flexion AROM 50% in sitting  -LN    Trunk Lt Lateral Flexion AROM NT  -LN    Trunk Rt Lateral Flexion AROM NT  -LN    Trunk Lt Rotation AROM 50%  -LN    Trunk Rt Rotation AROM 50%  -LN       MMT Neck/Trunk    Trunk Flexion MMT, Gross Movement (3+/5) fair plus  grossly  -LN    Trunk Extension MMT, Gross Movement (3-/5) fair minus  grossly  -LN       MMT Right Lower Ext    Rt Hip Flexion MMT, Gross Movement (4/5) good  -LN    Rt Hip Extension MMT, Gross Movement (4/5) good  -LN    Rt Hip ABduction MMT, Gross Movement (4-/5) good minus  -LN    Rt Hip ADduction MMT, Gross Movement (4-/5) good minus  -LN    Rt Knee Extension MMT, Gross Movement (4/5) good  -LN    Rt Knee Flexion MMT, Gross Movement (4+/5) good plus  -LN     Rt Ankle Plantarflexion MMT, Gross Movement (4/5) good  -LN    Rt Ankle Dorsiflexion MMT, Gross Movement (4/5) good  -LN       MMT Left Lower Ext    Lt Hip Flexion MMT, Gross Movement (4+/5) good plus  -LN    Lt Hip Extension MMT, Gross Movement (4+/5) good plus  -LN    Lt Hip ABduction MMT, Gross Movement (4+/5) good plus  -LN    Lt Hip ADduction MMT, Gross Movement (4+/5) good plus  -LN    Lt Knee Extension MMT, Gross Movement (4+/5) good plus  -LN    Lt Knee Flexion MMT, Gross Movement (4+/5) good plus  -LN    Lt Ankle Plantarflexion MMT, Gross Movement (4/5) good  -LN    Lt Ankle Dorsiflexion MMT, Gross Movement (4+/5) good plus  -LN       Sensation    Sensation WNL? WNL  -LN    Light Touch No apparent deficits  -LN    Additional Comments no c/o N/T in legs  -LN       Lower Extremity Flexibility    Hamstrings Bilateral:;Moderately limited  -LN    Hip Flexors --  -LN    ITB Bilateral:;Moderately limited  -LN    Gastrocnemius Bilateral:;Moderately limited  -LN    Soleus Bilateral:;Mildly limited  -LN       Balance Skills Training    Balance Comments Pt with fair balance when standing without AD; she also appears very fearful (fearful of falling).  -LN       Transfers    Sit-Stand [place] (Transfers) standby assist;contact guard  -LN    Stand-Sit [place] (Transfers) standby assist;contact guard  -LN    Transfers, Sit-Stand-Sit, Assist Device rolling walker  -LN    Comment, (Transfers) She needed CGA-min A with supine to sit transfers from treatment table.  -LN       Gait/Stairs (Locomotion)    [place] Level (Gait) modified independence;supervision  -LN    Assistive Device (Gait) walker, front-wheeled  -LN    Deviations/Abnormal Patterns (Gait) bilateral deviations;antalgic;cristy decreased;gait speed decreased  -LN    Bilateral Gait Deviations forward flexed posture  -LN    Comment, (Gait/Stairs) Pt is very guarded with mobility and transitional movements.  -LN              User Key  (r) =  Recorded By, (t) = Taken By, (c) = Cosigned By      Initials Name Provider Type    LN Macrina Owens, PT Physical Therapist                                Therapy Education  Education Details: Pt to work on HEP 1-2 x day and use CP/MH PRN. Pt to continue to ambulate with FWW for safey.  Issued red TB for home.  Given: HEP, Symptoms/condition management, Pain management, Posture/body mechanics  Program: New  How Provided: Verbal, Demonstration, Written  Provided to: Patient, Caregiver ( present)  Level of Understanding: Teach back education performed, Verbalized, Demonstrated      PT OP Goals       Row Name 06/12/24 0900          PT Short Term Goals    STG Date to Achieve 06/26/24  -LN     STG 1 Pt to verbally report decreased LBP and R hip pain to <4/10 with ADLs and everyday activities.  -LN     STG 2 Pt independent with initial HEP issued by therapist.  -LN     STG 3 Trunk ROM improved by 25%.  -LN     STG 4 R LE strength improved by 1/2 mm grade.  -LN     STG 5 Pt able to ambulate with SPC/QC home distances with only minimal antalgic gait and good balance noted.  -LN     STG 6 Pt able to tolerate standing for 15-20 minutes without any c/o increased pain in LB or R hip.  -LN        Long Term Goals    LTG Date to Achieve 07/10/24  -LN     LTG 1 Pt to verbally report decreased LBP and R hip pain to <2/10 with ADLs and everyday activities.  -LN     LTG 2 Pt independent with more advanced HEP issued by therapist.  -LN     LTG 3 B LE strength improved to 4+-5/5.  -LN     LTG 4 Core strength improved to 4/5 when tested.  -LN     LTG 5 Pt able to ambulate with SPC/QC home and short community distances with only nominal antalgic gait & good balance noted.  -LN     LTG 6 Pt able to tolerate standing for 20-30 minutes without any c/o increased pain in LB or R hip.  -LN        Time Calculation    PT Goal Re-Cert Due Date 07/10/24  -LN               User Key  (r) = Recorded By, (t) = Taken By, (c) = Cosigned  By      Initials Name Provider Type    LN Macrina Owens, PT Physical Therapist                     PT Assessment/Plan       Row Name 06/12/24 0900          PT Assessment    Functional Limitations Impaired gait;Impaired locomotion;Limitation in home management;Limitations in community activities;Limitations in functional capacity and performance;Performance in leisure activities;Performance in self-care ADL  -LN     Impairments Balance;Gait;Endurance;Impaired flexibility;Locomotion;Muscle strength;Pain;Posture;Range of motion  -LN     Assessment Comments Pt presents 4 months s/p L2-L5 spinal fusion/decompression with c/o LBP and R hip pain (CHEYENNE per  was put in thru R hip and bone marrow taken out of R hip for spinal fusion). Pt with decreased trunk ROM, decreased core and R>L LE strength; decreased B LE flexibility, decreased standing and walking tolerance. Pt with decreased tolerance to home & community activities due to above. Pt with balance and gait deficit and has decreased endurance; she fatigues easily with little activity. Pt presently ambulates short distances with FWW with slow gait speed and FF posture. Pt will benefit from a good ROM/stretching/core stabilization & LE strengthening program as well as gait and balance exercises to hopefully wean pt off walker; and modalities as needed for pain relief.  -LN     Please refer to paper survey for additional self-reported information Yes  -LN     Rehab Potential Good  -LN     Patient/caregiver participated in establishment of treatment plan and goals Yes  -LN     Patient would benefit from skilled therapy intervention Yes  -LN        PT Plan    PT Frequency 2x/week  -LN     Predicted Duration of Therapy Intervention (PT) 4 weeks  -LN     Planned CPT's? PT EVAL LOW COMPLEXITY: 78318;PT RE-EVAL: 06811;PT THER PROC EA 15 MIN: 10029;PT GAIT TRAINING EA 15 MIN: 00306;PT HOT OR COLD PACK TREAT MCARE;PT ELECTRICAL STIM UNATTEND: ;PT NEUROMUSC  RE-EDUCATION EA 15 MIN: 33474  -LN     Physical Therapy Interventions (Optional Details) balance training;gait training;home exercise program;lumbar stabilization;modalities;patient/family education;postural re-education;ROM (Range of Motion);strengthening;stretching;taping;transfer training  -LN     PT Plan Comments See pt 2 x week for therapeutic exercise/core stabilization exercises with HEP; modalities PRN (IFC/MH/CP); pt and posture education; gait/balance/transfer training; kinesiotape PRN.  -LN               User Key  (r) = Recorded By, (t) = Taken By, (c) = Cosigned By      Initials Name Provider Type    LN Macrina Owens, PT Physical Therapist                       OP Exercises       Row Name 06/12/24 0800             Precautions    Existing Precautions/Restrictions spinal  -LN         Subjective    Subjective Comments  reports that she is using a bone stimulator at home. Pt reports that she feels off balance and hasn't been walking much. Pt c/o occasional spams in her back.  -LN         Subjective Pain    Able to rate subjective pain? yes  -LN      Pre-Treatment Pain Level 6  -LN         Total Minutes    67364 - PT Therapeutic Exercise Minutes 20  -LN         Exercise 1    Exercise Name 1 Seated HS/calf stretch with strap  -LN      Cueing 1 Verbal;Tactile;Demo  -LN      Reps 1 5 each leg  -LN      Time 1 10 sec  -LN         Exercise 2    Exercise Name 2 seated calf raise/ball squeeze  -LN      Cueing 2 Verbal;Tactile;Demo  -LN      Reps 2 10  -LN      Time 2 5 sec  -LN         Exercise 3    Exercise Name 3 PPT  -LN      Cueing 3 Verbal;Tactile;Demo  -LN      Reps 3 10  -LN      Time 3 5 sec  -LN         Exercise 4    Exercise Name 4 PPT with hooklying ball squeeze  -LN      Cueing 4 Verbal;Tactile;Demo  -LN      Reps 4 10  -LN      Time 4 5 sec  -LN         Exercise 5    Exercise Name 5 PPT with supine clam shells vs TB  -LN      Cueing 5 Verbal;Tactile;Demo  -LN      Reps 5 10  -LN       Time 5 5 sec  -LN      Additional Comments red TB  -LN         Exercise 6    Exercise Name 6 GS  -LN      Cueing 6 Verbal;Tactile;Demo  -LN      Reps 6 10  -LN      Time 6 5 sec  -LN         Exercise 7    Exercise Name 7 SAQ  -LN      Cueing 7 Verbal;Tactile;Demo  -LN      Reps 7 10 each leg  -LN      Time 7 5 sec  -LN                User Key  (r) = Recorded By, (t) = Taken By, (c) = Cosigned By      Initials Name Provider Type    LN Macrina Owens, PT Physical Therapist                                  Outcome Measure Options: Other Outcome Measure  Other Outcome Measure Tool Used  Other Outcome Measure Tool Comments: Back index= score of 25 today      Time Calculation:     Start Time: 0900  Stop Time: 1000  Time Calculation (min): 60 min  Timed Charges  15361 - PT Therapeutic Exercise Minutes: 20  Total Minutes  Timed Charges Total Minutes: 20   Total Minutes: 20     Therapy Charges for Today       Code Description Service Date Service Provider Modifiers Qty    [id number] HC PT THER PROC EA 15 MIN 6/12/2024 Macrina Owens, PT GP 1    [id number]  PT EVAL LOW COMPLEXITY 3 6/12/2024 Macrina Owens, PT GP 1            PT G-Codes  Outcome Measure Options: Other Outcome Measure         Macrina Owens, PT  6/12/2024

## 2024-06-13 ENCOUNTER — HOSPITAL ENCOUNTER (OUTPATIENT)
Dept: ULTRASOUND IMAGING | Facility: HOSPITAL | Age: 84
Discharge: HOME | End: 2024-06-13
Admitting: UROLOGY
Payer: MEDICARE

## 2024-06-13 DIAGNOSIS — N20.0 RENAL CALCULUS: ICD-10-CM

## 2024-06-13 PROCEDURE — 76775 US EXAM ABDO BACK WALL LIM: CPT

## 2024-06-14 ENCOUNTER — HOSPITAL ENCOUNTER (OUTPATIENT)
Dept: PHYSICAL THERAPY | Facility: HOSPITAL | Age: 84
Setting detail: THERAPIES SERIES
Discharge: HOME | End: 2024-06-14
Payer: MEDICARE

## 2024-06-14 DIAGNOSIS — Z98.1 S/P LUMBAR SPINAL FUSION: ICD-10-CM

## 2024-06-14 DIAGNOSIS — M54.16 LUMBAR RADICULOPATHY: Primary | ICD-10-CM

## 2024-06-14 PROCEDURE — 97110 THERAPEUTIC EXERCISES: CPT

## 2024-06-14 NOTE — THERAPY TREATMENT NOTE
Outpatient Physical Therapy Ortho Treatment Note  GORD[facility] L[facility]     Patient Name: Anitra Orta  : 1940  MRN: [id number]  Today's Date: 2024      Visit Date: 2024    Visit Dx:    ICD-10-CM ICD-9-CM   1. Lumbar radiculopathy  M54.16 724.4   2. S/P lumbar spinal fusion  Z98.1 V45.4       Patient Active Problem List   Diagnosis    Urinary tract infection due to ESBL Klebsiella    Simple renal cyst    Former smoker    Hyperlipidemia    Osteoporosis    Panic disorder    Psoriasis    Urge incontinence of urine    Vitamin D deficiency    Post-menopause    Acute UTI (urinary tract infection)    Chronic bilateral low back pain without sciatica    Spinal stenosis of lumbar region with neurogenic claudication    Other chronic pain    Mixed incontinence    GERD without esophagitis    Anxiety    Hyperglycemia    e.coli bacteremia    Ureteral stone with hydronephrosis    Obstructive uropathy    Nephrolithiasis    Anemia    Metabolic acidosis    Essential hypertension    History of ventricular tachycardia    Left ureteral stone    Chronic idiopathic constipation    Personal history of colonic polyps        Past Medical History:   Diagnosis Date    Acute kidney failure 2021    Allergic     weather allergies    Anxiety     Arthritis of back     At risk for sleep apnea     Cataract     BILAT-SCHEDULED FOR THIS AND HAD TO CANCEL D/T SEPSIS    Chronic pain disorder     ALL OVER PAIN    Chronic UTI     Closed displaced fracture of surgical neck of left humerus 2021    Closed fracture of left proximal humerus 06/15/2021    Closed fracture of right distal radius 06/15/2021    Colon polyp     COVID-19 vaccine series completed     2ND DOSE 3/23/21    DDD (degenerative disc disease), lumbar     e.coli bacteremia 2021    Elevated cholesterol     Fracture, humerus     LEFT. h/o    GERD (gastroesophageal reflux disease)     History of recent fall     21    History of sepsis     MOST RECENT  JULY 2021-R/T KIDNEY STONE, UTI.  STATES THIS IS HER 3RD SEPSIS DIAGNOSIS    History of UTI     HL (hearing loss)     HTN (hypertension)     Hyperlipidemia     Hypokalemia 03/25/2021    Kidney stones     LEFT    Left humeral fracture 07/11/2021    Low back pain     Macular degeneration, bilateral     WORSE ON RIGHT-ONLY PERIPHERAL VISION    Metabolic encephalopathy 03/25/2021    Mixed hyperlipidemia 09/16/2016    Mixed incontinence     Obesity     Osteoarthritis     Osteopenia     Osteoporosis     Panic disorder     Personal history of urinary calculi     PONV (postoperative nausea and vomiting)     Scoliosis     Sepsis, unspecified organism 03/25/2021    Tachycardia, unspecified     Ventricular tachycardia 09/09/2019    Visual impairment     Wrist fracture     RIGHT-CURRENTLY NOT WEARING BRACE FOR THIS        Past Surgical History:   Procedure Laterality Date    ADENOIDECTOMY      BREAST BIOPSY Left     benign    BREAST LUMPECTOMY Left     benign    BUNIONECTOMY Right     COLONOSCOPY N/A 11/30/2016    Procedure: COLONOSCOPY polypectomy;  Surgeon: Adali Willard MD;  Location: [facility] OR;  Service:     CYSTOSCOPY BOTOX INJECTION OF BLADDER N/A 07/21/2017    Procedure: CYSTOSCOPY COAPTITE INJECTION;  Surgeon: Kevon Clark MD;  Location: [facility] OR;  Service:     CYSTOSCOPY URETEROSCOPY LASER LITHOTRIPSY Right 05/01/2023    Procedure: RIGHT URETEROSCOPY LASER LITHOTRIPSY STONE BASKET EXTRACTION AND STENT;  Surgeon: Kevon Clark MD;  Location: [facility] OR;  Service: Urology;  Laterality: Right;    CYSTOSCOPY W/ LITHOLAPAXY / EHL      CYSTOSCOPY W/ URETERAL STENT PLACEMENT Left 09/12/2016    Procedure: CYSTOSCOPY URETERAL STENT INSERTION;  Surgeon: Kevon Clark MD;  Location: [facility] OR;  Service:     CYSTOSCOPY W/ URETERAL STENT PLACEMENT Left 08/01/2019    Procedure: CYSTOSCOPY URETERAL CATHETER/STENT INSERTION;  Surgeon: Kevon Clark MD;  Location: [facility] OR;  Service: Urology    CYSTOSCOPY W/  "URETERAL STENT PLACEMENT Left 03/25/2021    Procedure: CYSTOSCOPY URETERAL CATHETER/STENT INSERTION;  Surgeon: Kevon Clark MD;  Location: [facility] OR;  Service: Urology;  Laterality: Left;    CYSTOSCOPY W/ URETERAL STENT PLACEMENT Left 07/11/2021    Procedure: CYSTOSCOPY URETERAL CATHETER/STENT INSERTION;  Surgeon: Lul Guzman MD;  Location: [facility] OR;  Service: Urology;  Laterality: Left;  CYSTOSCOPY LEFT URETERAL CATHETER/ STENT PLACEMENT    CYSTOSCOPY W/ URETERAL STENT PLACEMENT Right 04/20/2023    Procedure: CYSTOSCOPY STENT PLACEMENT;  Surgeon: Matthew Ndiaye MD;  Location: [facility] OR;  Service: Urology;  Laterality: Right;    EYE SURGERY      JOINT REPLACEMENT Right     total knee    LAMINECTOMY  11/2022    LUMBAR EPIDURAL INJECTION      TONSILLECTOMY AND ADENOIDECTOMY      TUBAL ABDOMINAL LIGATION      URETEROSCOPY LASER LITHOTRIPSY WITH STENT INSERTION Left 10/05/2016    Procedure: LT URETEROSCOPY LASER LITHOTRIPSY STONE BASKET EXTRACTION AND STENT ;  Surgeon: Kevon Clark MD;  Location: [facility];  Service:     URETEROSCOPY LASER LITHOTRIPSY WITH STENT INSERTION Left 07/23/2021    Procedure: LEFT URETEROSCOPY STONE MANIPULATION STENT EXCHANGE, LASER LITHOTRIPSY, CYSTOSCOPY, STONE BASKET EXTRACTION;  Surgeon: Kevon Clark MD;  Location: [facility] OR;  Service: Urology;  Laterality: Left;        PT Ortho       Row Name 06/14/24 1400       Subjective    Subjective Comments Pt reports she walked up the hill from the parking lot pushing the wheelchair/FWW and she reported she did well. Not really reporting any LBP today, but \" my feet hurt all the time.\" 'i haven't had to use any heat or ice.\" \"We didn't do any of the exercises at home and I don't know why; I will start tomorrow.\"  -LN       Precautions and Contraindications    Precautions/Limitations spinal precautions  -LN       Subjective Pain    Able to rate subjective pain? yes  -LN    Pre-Treatment Pain Level 0  " "-LN    Subjective Pain Comment No present pain reported today in LB.  -LN              User Key  (r) = Recorded By, (t) = Taken By, (c) = Cosigned By      Initials Name Provider Type    Macrina Pinto, PT Physical Therapist                                 PT Assessment/Plan       Row Name 06/14/24 1500          PT Assessment    Assessment Comments Pt with no c/o any LBP and she tolerated exercises fairly well. She has more difficulty with standing exercises with R leg than L; R LE is weaker.  She is ambulating better with FWW with improved gait speed. She did have some trouble with sit to stand exercise and had to use arms of chair to push up from.  -LN        PT Plan    PT Frequency 2x/week  -LN     PT Plan Comments Continue per POC. Progress exercises as tolerated. Modalities PRN. Balance exercises as tolerated.  -LN               User Key  (r) = Recorded By, (t) = Taken By, (c) = Cosigned By      Initials Name Provider Type    Macrina Pinto, PT Physical Therapist                       OP Exercises       Row Name 06/14/24 1400             Precautions    Existing Precautions/Restrictions spinal  -LN         Subjective    Subjective Comments Pt reports she walked up the hill from the parking lot pushing the wheelchair/FWW and she reported she did well. Not really reporting any LBP today, but \" my feet hurt all the time.\" 'i haven't had to use any heat or ice.\" \"We didn't do any of the exercises at home and I don't know why; I will start tomorrow.\"  -LN         Subjective Pain    Able to rate subjective pain? yes  -LN      Pre-Treatment Pain Level 0  -LN      Subjective Pain Comment No present pain reported today in LB.  -LN         Total Minutes    40559 - PT Therapeutic Exercise Minutes 45  -LN         Exercise 1    Exercise Name 1 Seated HS/calf stretch with strap  -LN      Cueing 1 Verbal;Tactile;Demo  -LN      Reps 1 5 each leg  -LN      Time 1 10 sec  -LN         Exercise 2    Exercise " Name 2 seated calf raise/ball squeeze  -LN      Cueing 2 Verbal;Tactile;Demo  -LN      Reps 2 15  -LN      Time 2 5 sec  -LN         Exercise 3    Exercise Name 3 PPT  -LN      Cueing 3 Verbal;Tactile;Demo  -LN      Reps 3 10  -LN      Time 3 5 sec  -LN         Exercise 4    Exercise Name 4 PPT with hooklying ball squeeze  -LN      Cueing 4 Verbal;Tactile;Demo  -LN      Reps 4 10  -LN      Time 4 5 sec  -LN         Exercise 5    Exercise Name 5 PPT with supine clam shells vs TB  -LN      Cueing 5 Verbal;Tactile;Demo  -LN      Reps 5 10  -LN      Time 5 5 sec  -LN      Additional Comments red TB  -LN         Exercise 6    Exercise Name 6 GS  -LN      Cueing 6 Verbal;Tactile;Demo  -LN      Reps 6 10  -LN      Time 6 5 sec  -LN         Exercise 7    Exercise Name 7 SAQ  -LN      Cueing 7 Verbal;Tactile;Demo  -LN      Reps 7 20 each leg  -LN      Time 7 --  -LN         Exercise 8    Exercise Name 8 seated rows vs TB  -LN      Cueing 8 Verbal;Tactile;Demo  -LN      Reps 8 20  -LN      Additional Comments red TB  -LN         Exercise 9    Exercise Name 9 PPT with march  -LN      Cueing 9 Verbal;Tactile;Demo  -LN      Reps 9 10  -LN         Exercise 10    Exercise Name 10 standing hip abduction  -LN      Cueing 10 Verbal;Tactile;Demo  -LN      Reps 10 10 each  -LN         Exercise 11    Exercise Name 11 standing hip extension  -LN      Cueing 11 Verbal;Tactile;Demo  -LN      Reps 11 10 each  -LN         Exercise 12    Exercise Name 12 sit to stand  -LN      Cueing 12 Verbal;Tactile;Demo  -LN      Time 12 10  -LN      Additional Comments 2 hands on armrests & SBA.  -LN                User Key  (r) = Recorded By, (t) = Taken By, (c) = Cosigned By      Initials Name Provider Type    Macrina Pinto, ARON Physical Therapist                                     Therapy Education  Education Details: Pt to add PPT with march to HEP as tolerated and use CP/MH PRN. Pt encouraged to do exercises at least daily.  Given: HEP,  Symptoms/condition management, Posture/body mechanics  Program: New, Reinforced, Progressed (added PPT with march)  How Provided: Verbal, Demonstration, Written  Provided to: Patient, Caregiver ( present.)  Level of Understanding: Teach back education performed, Verbalized, Demonstrated              Time Calculation:   Start Time: 1455  Stop Time: 1600  Time Calculation (min): 65 min  Timed Charges  74090 - PT Therapeutic Exercise Minutes: 45  Total Minutes  Timed Charges Total Minutes: 45   Total Minutes: 45  Therapy Charges for Today       Code Description Service Date Service Provider Modifiers Qty    [id number] HC PT THER PROC EA 15 MIN 6/14/2024 Macrina Owens, PT GP 3                      Macrina Owens, PT  6/14/2024

## 2024-06-18 ENCOUNTER — APPOINTMENT (OUTPATIENT)
Dept: PHYSICAL THERAPY | Facility: HOSPITAL | Age: 84
End: 2024-06-18
Payer: MEDICARE

## 2024-06-18 ENCOUNTER — DOCUMENTATION (OUTPATIENT)
Dept: PHYSICAL THERAPY | Facility: HOSPITAL | Age: 84
End: 2024-06-18
Payer: MEDICARE

## 2024-06-18 NOTE — SIGNIFICANT NOTE
Pt called and canceled today's PT appointment due to having a conflict of appointments. Her next scheduled PT appointment is on Friday, 6/21/24.

## 2024-06-21 ENCOUNTER — HOSPITAL ENCOUNTER (OUTPATIENT)
Dept: PHYSICAL THERAPY | Facility: HOSPITAL | Age: 84
Setting detail: THERAPIES SERIES
Discharge: HOME | End: 2024-06-21
Payer: MEDICARE

## 2024-06-21 DIAGNOSIS — Z98.1 S/P LUMBAR SPINAL FUSION: ICD-10-CM

## 2024-06-21 DIAGNOSIS — M54.16 LUMBAR RADICULOPATHY: Primary | ICD-10-CM

## 2024-06-21 PROCEDURE — 97110 THERAPEUTIC EXERCISES: CPT

## 2024-06-21 NOTE — THERAPY TREATMENT NOTE
Outpatient Physical Therapy Ortho Treatment Note  [facility] Loco     Patient Name: Anitra Orta  : 1940  MRN: [id number]  Today's Date: 2024      Visit Date: 2024    Visit Dx:    ICD-10-CM ICD-9-CM   1. Lumbar radiculopathy  M54.16 724.4   2. S/P lumbar spinal fusion  Z98.1 V45.4       Patient Active Problem List   Diagnosis    Urinary tract infection due to ESBL Klebsiella    Simple renal cyst    Former smoker    Hyperlipidemia    Osteoporosis    Panic disorder    Psoriasis    Urge incontinence of urine    Vitamin D deficiency    Post-menopause    Acute UTI (urinary tract infection)    Chronic bilateral low back pain without sciatica    Spinal stenosis of lumbar region with neurogenic claudication    Other chronic pain    Mixed incontinence    GERD without esophagitis    Anxiety    Hyperglycemia    e.coli bacteremia    Ureteral stone with hydronephrosis    Obstructive uropathy    Nephrolithiasis    Anemia    Metabolic acidosis    Essential hypertension    History of ventricular tachycardia    Left ureteral stone    Chronic idiopathic constipation    Personal history of colonic polyps        Past Medical History:   Diagnosis Date    Acute kidney failure 2021    Allergic     weather allergies    Anxiety     Arthritis of back     At risk for sleep apnea     Cataract     BILAT-SCHEDULED FOR THIS AND HAD TO CANCEL D/T SEPSIS    Chronic pain disorder     ALL OVER PAIN    Chronic UTI     Closed displaced fracture of surgical neck of left humerus 2021    Closed fracture of left proximal humerus 06/15/2021    Closed fracture of right distal radius 06/15/2021    Colon polyp     COVID-19 vaccine series completed     2ND DOSE 3/23/21    DDD (degenerative disc disease), lumbar     e.coli bacteremia 2021    Elevated cholesterol     Fracture, humerus     LEFT. h/o    GERD (gastroesophageal reflux disease)     History of recent fall     21    History of sepsis     MOST RECENT  JULY 2021-R/T KIDNEY STONE, UTI.  STATES THIS IS HER 3RD SEPSIS DIAGNOSIS    History of UTI     HL (hearing loss)     HTN (hypertension)     Hyperlipidemia     Hypokalemia 03/25/2021    Kidney stones     LEFT    Left humeral fracture 07/11/2021    Low back pain     Macular degeneration, bilateral     WORSE ON RIGHT-ONLY PERIPHERAL VISION    Metabolic encephalopathy 03/25/2021    Mixed hyperlipidemia 09/16/2016    Mixed incontinence     Obesity     Osteoarthritis     Osteopenia     Osteoporosis     Panic disorder     Personal history of urinary calculi     PONV (postoperative nausea and vomiting)     Scoliosis     Sepsis, unspecified organism 03/25/2021    Tachycardia, unspecified     Ventricular tachycardia 09/09/2019    Visual impairment     Wrist fracture     RIGHT-CURRENTLY NOT WEARING BRACE FOR THIS        Past Surgical History:   Procedure Laterality Date    ADENOIDECTOMY      BREAST BIOPSY Left     benign    BREAST LUMPECTOMY Left     benign    BUNIONECTOMY Right     COLONOSCOPY N/A 11/30/2016    Procedure: COLONOSCOPY polypectomy;  Surgeon: Adali Willard MD;  Location: [facility] OR;  Service:     CYSTOSCOPY BOTOX INJECTION OF BLADDER N/A 07/21/2017    Procedure: CYSTOSCOPY COAPTITE INJECTION;  Surgeon: Kevon Clark MD;  Location: [facility] OR;  Service:     CYSTOSCOPY URETEROSCOPY LASER LITHOTRIPSY Right 05/01/2023    Procedure: RIGHT URETEROSCOPY LASER LITHOTRIPSY STONE BASKET EXTRACTION AND STENT;  Surgeon: Kevon Clark MD;  Location: [facility] OR;  Service: Urology;  Laterality: Right;    CYSTOSCOPY W/ LITHOLAPAXY / EHL      CYSTOSCOPY W/ URETERAL STENT PLACEMENT Left 09/12/2016    Procedure: CYSTOSCOPY URETERAL STENT INSERTION;  Surgeon: Kevon Clark MD;  Location: [facility] OR;  Service:     CYSTOSCOPY W/ URETERAL STENT PLACEMENT Left 08/01/2019    Procedure: CYSTOSCOPY URETERAL CATHETER/STENT INSERTION;  Surgeon: Kevon Clark MD;  Location: [facility] OR;  Service: Urology    CYSTOSCOPY W/  "URETERAL STENT PLACEMENT Left 03/25/2021    Procedure: CYSTOSCOPY URETERAL CATHETER/STENT INSERTION;  Surgeon: Kevon Clark MD;  Location: [facility] OR;  Service: Urology;  Laterality: Left;    CYSTOSCOPY W/ URETERAL STENT PLACEMENT Left 07/11/2021    Procedure: CYSTOSCOPY URETERAL CATHETER/STENT INSERTION;  Surgeon: Lul Guzman MD;  Location: [facility] OR;  Service: Urology;  Laterality: Left;  CYSTOSCOPY LEFT URETERAL CATHETER/ STENT PLACEMENT    CYSTOSCOPY W/ URETERAL STENT PLACEMENT Right 04/20/2023    Procedure: CYSTOSCOPY STENT PLACEMENT;  Surgeon: Matthew Ndiaye MD;  Location: [facility] OR;  Service: Urology;  Laterality: Right;    EYE SURGERY      JOINT REPLACEMENT Right     total knee    LAMINECTOMY  11/2022    LUMBAR EPIDURAL INJECTION      TONSILLECTOMY AND ADENOIDECTOMY      TUBAL ABDOMINAL LIGATION      URETEROSCOPY LASER LITHOTRIPSY WITH STENT INSERTION Left 10/05/2016    Procedure: LT URETEROSCOPY LASER LITHOTRIPSY STONE BASKET EXTRACTION AND STENT ;  Surgeon: Kevon Clark MD;  Location: [facility];  Service:     URETEROSCOPY LASER LITHOTRIPSY WITH STENT INSERTION Left 07/23/2021    Procedure: LEFT URETEROSCOPY STONE MANIPULATION STENT EXCHANGE, LASER LITHOTRIPSY, CYSTOSCOPY, STONE BASKET EXTRACTION;  Surgeon: Kevon Clark MD;  Location: [facility];  Service: Urology;  Laterality: Left;        PT Ortho       Row Name 06/21/24 1200       Subjective    Subjective Comments \"My back is killing me today.\" Not much pain in hip area today.  -LN       Precautions and Contraindications    Precautions/Limitations spinal precautions  -LN       Subjective Pain    Able to rate subjective pain? yes  -LN    Pre-Treatment Pain Level 4  -LN    Subjective Pain Comment in lower back  -LN              User Key  (r) = Recorded By, (t) = Taken By, (c) = Cosigned By      Initials Name Provider Type    Macrina Pinto, PT Physical Therapist                                 PT " "Assessment/Plan       Row Name 06/21/24 1200          PT Assessment    Assessment Comments Pt with increased c/o LBP today but less pain reported in her hip. She tolerated exercises fairly well, but she seemed to be more fatigued today and increased SOB noted with exercises. today. Pt still has difficulty with sit to stand exercise; was a little shakey with this today; seems fearful as well.  -LN        PT Plan    PT Frequency 2x/week;1x/week  -LN     PT Plan Comments Continue per POC. Progress exercises as tolerated. Modalities PRN. Balance exercises as tolerated.  -LN               User Key  (r) = Recorded By, (t) = Taken By, (c) = Cosigned By      Initials Name Provider Type    LN Macrina Owens, PT Physical Therapist                       OP Exercises       Row Name 06/21/24 1200             Precautions    Existing Precautions/Restrictions spinal  -LN         Subjective    Subjective Comments \"My back is killing me today.\" Not much pain in hip area today.  -LN         Subjective Pain    Able to rate subjective pain? yes  -LN      Pre-Treatment Pain Level 4  -LN      Subjective Pain Comment in lower back  -LN         Total Minutes    91109 - PT Therapeutic Exercise Minutes 47  -LN         Exercise 1    Exercise Name 1 Seated HS/calf stretch with strap  -LN      Cueing 1 Verbal;Tactile;Demo  -LN      Reps 1 5 each leg  -LN      Time 1 10 sec  -LN         Exercise 2    Exercise Name 2 seated calf raise/ball squeeze  -LN      Cueing 2 Verbal;Tactile;Demo  -LN      Reps 2 20  -LN      Time 2 5 sec  -LN         Exercise 3    Exercise Name 3 PPT  -LN      Cueing 3 Verbal;Tactile;Demo  -LN      Reps 3 10  -LN      Time 3 5 sec  -LN         Exercise 4    Exercise Name 4 PPT with hooklying ball squeeze  -LN      Cueing 4 Verbal;Tactile;Demo  -LN      Reps 4 10  -LN      Time 4 5 sec  -LN         Exercise 5    Exercise Name 5 PPT with supine clam shells vs TB  -LN      Cueing 5 Verbal;Tactile;Demo  -LN      Reps 5 " 10  -LN      Time 5 5 sec  -LN      Additional Comments red TB  -LN         Exercise 6    Exercise Name 6 GS  -LN      Cueing 6 Verbal;Tactile;Demo  -LN      Reps 6 10  -LN      Time 6 5 sec  -LN         Exercise 7    Exercise Name 7 SAQ  -LN      Cueing 7 Verbal;Tactile;Demo  -LN      Reps 7 20 each leg  -LN      Time 7 5 sec  -LN         Exercise 8    Exercise Name 8 seated rows vs TB  -LN      Cueing 8 Verbal;Tactile;Demo  -LN      Reps 8 20  -LN      Additional Comments red TB  -LN         Exercise 9    Exercise Name 9 PPT with march  -LN      Cueing 9 Verbal;Tactile;Demo  -LN      Reps 9 10  -LN         Exercise 10    Exercise Name 10 standing hip abduction  -LN      Cueing 10 Verbal;Tactile;Demo  -LN      Reps 10 10 each  -LN      Additional Comments at elevated tab  -LN         Exercise 11    Exercise Name 11 standing hip extension  -LN      Cueing 11 Verbal;Tactile;Demo  -LN      Reps 11 10 each  -LN         Exercise 12    Exercise Name 12 sit to stand  -LN      Cueing 12 Verbal;Tactile;Demo  -LN      Time 12 10  -LN      Additional Comments 2 hands on armrests & SBA.  -LN         Exercise 13    Exercise Name 13 seated HS curls vs TB  -LN      Cueing 13 Verbal;Tactile;Demo  -LN      Reps 13 10 each  -LN      Additional Comments red TB  -LN         Exercise 14    Exercise Name 14 --  -LN      Cueing 14 --  -LN                User Key  (r) = Recorded By, (t) = Taken By, (c) = Cosigned By      Initials Name Provider Type    Macrina Pinto, PT Physical Therapist                                     Therapy Education  Education Details: Pt to add seated HS curls vs TB as tolerated to HEP. Pt to use CP/MH PRN. Pt encouraged to do some exercises everyday and walk around house several times a day as tolerated.  Given: HEP, Symptoms/condition management, Posture/body mechanics  Program: New, Reinforced, Progressed (added seated HS curls vs TB)  How Provided: Verbal, Demonstration  Provided to: Patient,  Caregiver              Time Calculation:   Start Time: 1250  Stop Time: 1355  Time Calculation (min): 65 min  Timed Charges  90255 - PT Therapeutic Exercise Minutes: 47  Total Minutes  Timed Charges Total Minutes: 47   Total Minutes: 47  Therapy Charges for Today       Code Description Service Date Service Provider Modifiers Qty    [id number] HC PT THER PROC EA 15 MIN 6/21/2024 Macrina Owens, PT GP 3                      Macrina Owens, PT  6/21/2024

## 2024-06-24 ENCOUNTER — HOSPITAL ENCOUNTER (OUTPATIENT)
Dept: CT IMAGING | Facility: HOSPITAL | Age: 84
Discharge: HOME | End: 2024-06-24
Admitting: ORTHOPAEDIC SURGERY
Payer: MEDICARE

## 2024-06-24 DIAGNOSIS — M54.10 RADICULOPATHY, UNSPECIFIED SPINAL REGION: ICD-10-CM

## 2024-06-24 PROCEDURE — 72131 CT LUMBAR SPINE W/O DYE: CPT

## 2024-06-25 ENCOUNTER — HOSPITAL ENCOUNTER (OUTPATIENT)
Dept: PHYSICAL THERAPY | Facility: HOSPITAL | Age: 84
Setting detail: THERAPIES SERIES
Discharge: HOME | End: 2024-06-25
Payer: MEDICARE

## 2024-06-25 DIAGNOSIS — Z98.1 S/P LUMBAR SPINAL FUSION: ICD-10-CM

## 2024-06-25 DIAGNOSIS — M54.16 LUMBAR RADICULOPATHY: Primary | ICD-10-CM

## 2024-06-25 PROCEDURE — 97110 THERAPEUTIC EXERCISES: CPT

## 2024-06-25 NOTE — THERAPY TREATMENT NOTE
Outpatient Physical Therapy Ortho Treatment Note  [facility]     Patient Name: Anitra Orta  : 1940  MRN: [id number]  Today's Date: 2024      Visit Date: 2024    Visit Dx:    ICD-10-CM ICD-9-CM   1. Lumbar radiculopathy  M54.16 724.4   2. S/P lumbar spinal fusion  Z98.1 V45.4       Patient Active Problem List   Diagnosis    Urinary tract infection due to ESBL Klebsiella    Simple renal cyst    Former smoker    Hyperlipidemia    Osteoporosis    Panic disorder    Psoriasis    Urge incontinence of urine    Vitamin D deficiency    Post-menopause    Acute UTI (urinary tract infection)    Chronic bilateral low back pain without sciatica    Spinal stenosis of lumbar region with neurogenic claudication    Other chronic pain    Mixed incontinence    GERD without esophagitis    Anxiety    Hyperglycemia    e.coli bacteremia    Ureteral stone with hydronephrosis    Obstructive uropathy    Nephrolithiasis    Anemia    Metabolic acidosis    Essential hypertension    History of ventricular tachycardia    Left ureteral stone    Chronic idiopathic constipation    Personal history of colonic polyps        Past Medical History:   Diagnosis Date    Acute kidney failure 2021    Allergic     weather allergies    Anxiety     Arthritis of back     At risk for sleep apnea     Cataract     BILAT-SCHEDULED FOR THIS AND HAD TO CANCEL D/T SEPSIS    Chronic pain disorder     ALL OVER PAIN    Chronic UTI     Closed displaced fracture of surgical neck of left humerus 2021    Closed fracture of left proximal humerus 06/15/2021    Closed fracture of right distal radius 06/15/2021    Colon polyp     COVID-19 vaccine series completed     2ND DOSE 3/23/21    DDD (degenerative disc disease), lumbar     e.coli bacteremia 2021    Elevated cholesterol     Fracture, humerus     LEFT. h/o    GERD (gastroesophageal reflux disease)     History of recent fall     21    History of sepsis     MOST RECENT  JULY 2021-R/T KIDNEY STONE, UTI.  STATES THIS IS HER 3RD SEPSIS DIAGNOSIS    History of UTI     HL (hearing loss)     HTN (hypertension)     Hyperlipidemia     Hypokalemia 03/25/2021    Kidney stones     LEFT    Left humeral fracture 07/11/2021    Low back pain     Macular degeneration, bilateral     WORSE ON RIGHT-ONLY PERIPHERAL VISION    Metabolic encephalopathy 03/25/2021    Mixed hyperlipidemia 09/16/2016    Mixed incontinence     Obesity     Osteoarthritis     Osteopenia     Osteoporosis     Panic disorder     Personal history of urinary calculi     PONV (postoperative nausea and vomiting)     Scoliosis     Sepsis, unspecified organism 03/25/2021    Tachycardia, unspecified     Ventricular tachycardia 09/09/2019    Visual impairment     Wrist fracture     RIGHT-CURRENTLY NOT WEARING BRACE FOR THIS        Past Surgical History:   Procedure Laterality Date    ADENOIDECTOMY      BREAST BIOPSY Left     benign    BREAST LUMPECTOMY Left     benign    BUNIONECTOMY Right     COLONOSCOPY N/A 11/30/2016    Procedure: COLONOSCOPY polypectomy;  Surgeon: Adali Willard MD;  Location: [facility] OR;  Service:     CYSTOSCOPY BOTOX INJECTION OF BLADDER N/A 07/21/2017    Procedure: CYSTOSCOPY COAPTITE INJECTION;  Surgeon: Kevon Clark MD;  Location: [facility] OR;  Service:     CYSTOSCOPY URETEROSCOPY LASER LITHOTRIPSY Right 05/01/2023    Procedure: RIGHT URETEROSCOPY LASER LITHOTRIPSY STONE BASKET EXTRACTION AND STENT;  Surgeon: Keovn Clark MD;  Location: [facility] OR;  Service: Urology;  Laterality: Right;    CYSTOSCOPY W/ LITHOLAPAXY / EHL      CYSTOSCOPY W/ URETERAL STENT PLACEMENT Left 09/12/2016    Procedure: CYSTOSCOPY URETERAL STENT INSERTION;  Surgeon: Kevon Clark MD;  Location: [facility] OR;  Service:     CYSTOSCOPY W/ URETERAL STENT PLACEMENT Left 08/01/2019    Procedure: CYSTOSCOPY URETERAL CATHETER/STENT INSERTION;  Surgeon: Kevon Clark MD;  Location: [facility] OR;  Service: Urology    CYSTOSCOPY W/  "URETERAL STENT PLACEMENT Left 03/25/2021    Procedure: CYSTOSCOPY URETERAL CATHETER/STENT INSERTION;  Surgeon: Kevon Clark MD;  Location: [facility] MAIN OR;  Service: Urology;  Laterality: Left;    CYSTOSCOPY W/ URETERAL STENT PLACEMENT Left 07/11/2021    Procedure: CYSTOSCOPY URETERAL CATHETER/STENT INSERTION;  Surgeon: Lul Guzman MD;  Location:  [facility] OR;  Service: Urology;  Laterality: Left;  CYSTOSCOPY LEFT URETERAL CATHETER/ STENT PLACEMENT    CYSTOSCOPY W/ URETERAL STENT PLACEMENT Right 04/20/2023    Procedure: CYSTOSCOPY STENT PLACEMENT;  Surgeon: Matthew Ndiaye MD;  Location: [facility] OR;  Service: Urology;  Laterality: Right;    EYE SURGERY      JOINT REPLACEMENT Right     total knee    LAMINECTOMY  11/2022    LUMBAR EPIDURAL INJECTION      TONSILLECTOMY AND ADENOIDECTOMY      TUBAL ABDOMINAL LIGATION      URETEROSCOPY LASER LITHOTRIPSY WITH STENT INSERTION Left 10/05/2016    Procedure: LT URETEROSCOPY LASER LITHOTRIPSY STONE BASKET EXTRACTION AND STENT ;  Surgeon: Kevon Clark MD;  Location: [facility] OR;  Service:     URETEROSCOPY LASER LITHOTRIPSY WITH STENT INSERTION Left 07/23/2021    Procedure: LEFT URETEROSCOPY STONE MANIPULATION STENT EXCHANGE, LASER LITHOTRIPSY, CYSTOSCOPY, STONE BASKET EXTRACTION;  Surgeon: Kevon Clark MD;  Location: [facility] OR;  Service: Urology;  Laterality: Left;        PT Ortho       Row Name 06/25/24 1500       Subjective    Subjective Comments \"My back is feeling alright but I have been having a pain in my right lower stomach area; It seems to come and go but I am also having some pain in my R hip area.\" \"I worry it could be my appendix, but I don't think it is; she reports no fever and no nausea/vomiting. \"I feel like my balance isn't getting better; maybe getting worse.\"  -LN       Precautions and Contraindications    Precautions/Limitations spinal precautions  -LN       Subjective Pain    Able to rate subjective pain? yes  -LN    " Pre-Treatment Pain Level 4  -LN    Subjective Pain Comment in lower back and R hip area  -LN       Posture/Observations    Posture/Observations Comments Pt came to PT dept. today via wheelchair; she left her walker in the car.  -LN       Gait/Stairs (Locomotion)    Comment, (Gait/Stairs) Pt ambulated about 50 feet total with HHA from therapist on R (20 feet of it with HHA on 1 side and railing on other side). Pt needed mod A of 1, but decreased to minimal assist when she also used railing. Pt also took much better/smoother steps when she also held railing; without railing, she took small short steps and seemed very guarded and fearful.  -LN              User Key  (r) = Recorded By, (t) = Taken By, (c) = Cosigned By      Initials Name Provider Type    Macrina Pinto, PT Physical Therapist                                 PT Assessment/Plan       Row Name 06/25/24 1600          PT Assessment    Assessment Comments Pt with overall decreased c/o LBP but is reporting off and on pain in R lower abdomen with minimal tenderness and reporting some pain in R hip area; feel her abdomen pain may be referred pain from her hip/LB but spoke to her and her  about the signs of appendicitis and if she begins having more signs or increased pain; she is to go to the ER; pt has had no fever, nausea, or vomiting.  Pt tolerated exercises fairly well but does seem to fatigue easily. Pt still has difficulty with sit to stand exercise, which I feel is partly due to weakness but also due to fear. Pt also seems very fearful to walk at all without her FWW; even if holding onto therapist for support, but much more comfortable when she ambulated with HHA on 1 side and railing on the other side. I do feel fear is also holding her back from getting off the walker and not just weakness.  -LN        PT Plan    PT Frequency 2x/week;1x/week  -LN     PT Plan Comments Continue per POC. Progress exercises as tolerated. Modalities PRN.  "Balance exercises as tolerated. Pt to bring her cane next visit and will work on gait with cane; work on ambulation and balance in parallel bars. Add 1# to SAQ and standing hip exercises as tolerated. Pt advised to go to ER, if her lower R abdomen pain worsens or she begins running a fever or having nausea/vomiting. -LN               User Key  (r) = Recorded By, (t) = Taken By, (c) = Cosigned By      Initials Name Provider Type    Macrina Pinto, PT Physical Therapist                       OP Exercises       Row Name 06/25/24 1500             Precautions    Existing Precautions/Restrictions spinal  -LN         Subjective    Subjective Comments \"My back is feeling alright but I have been having a pain in my right lower stomach area; It seems to come and go but I am also having some pain in my R hip area.\" \"I worry it could be my appendix, but I don't think it is; she reports no fever and no nausea/vomiting. \"I feel like my balance isn't getting better; maybe getting worse.\"  -LN         Subjective Pain    Able to rate subjective pain? yes  -LN      Pre-Treatment Pain Level 4  -LN      Subjective Pain Comment in lower back and R hip area  -LN         Total Minutes    60726 - PT Therapeutic Exercise Minutes 45  -LN         Exercise 1    Exercise Name 1 Seated HS/calf stretch with strap  -LN      Cueing 1 Verbal;Tactile;Demo  -LN      Reps 1 5 each leg  -LN      Time 1 10 sec  -LN         Exercise 2    Exercise Name 2 seated calf raise/ball squeeze  -LN      Cueing 2 Verbal;Tactile;Demo  -LN      Reps 2 20  -LN      Time 2 5 sec  -LN         Exercise 3    Exercise Name 3 PPT  -LN      Cueing 3 Verbal;Tactile;Demo  -LN      Reps 3 10  -LN      Time 3 5 sec  -LN         Exercise 4    Exercise Name 4 PPT with hooklying ball squeeze  -LN      Cueing 4 Verbal;Tactile;Demo  -LN      Reps 4 10  -LN      Time 4 5 sec  -LN         Exercise 5    Exercise Name 5 PPT with supine clam shells vs TB  -LN      Cueing 5 " Verbal;Tactile;Demo  -LN      Reps 5 10  -LN      Time 5 5 sec  -LN      Additional Comments red TB  -LN         Exercise 6    Exercise Name 6 GS  -LN      Cueing 6 Verbal;Tactile;Demo  -LN      Reps 6 --  -LN      Time 6 --  -LN      Additional Comments HEP  -LN         Exercise 7    Exercise Name 7 SAQ  -LN      Cueing 7 Verbal;Tactile;Demo  -LN      Reps 7 20 each leg (2 x10)  -LN      Time 7 5 sec  -LN      Additional Comments --  -LN         Exercise 8    Exercise Name 8 seated rows vs TB  -LN      Cueing 8 Verbal;Tactile;Demo  -LN      Reps 8 20  -LN      Time 8 5 sec  -LN      Additional Comments red TB  -LN         Exercise 9    Exercise Name 9 PPT with march  -LN      Cueing 9 Verbal;Tactile;Demo  -LN      Reps 9 10  -LN         Exercise 10    Exercise Name 10 standing hip abduction  -LN      Cueing 10 Verbal;Tactile;Demo  -LN      Reps 10 --  -LN      Additional Comments to do at home per pt request  -LN         Exercise 11    Exercise Name 11 standing hip extension  -LN      Cueing 11 Verbal;Tactile;Demo  -LN      Reps 11 --  -LN      Additional Comments to do at home per pt request  -LN         Exercise 12    Exercise Name 12 sit to stand  -LN      Cueing 12 Verbal;Tactile;Demo  -LN      Time 12 10  -LN      Additional Comments 2 hands on armrests & SBA.  needed a lot of cueing  -LN         Exercise 13    Exercise Name 13 seated HS curls vs TB  -LN      Cueing 13 Verbal;Tactile;Demo  -LN      Reps 13 15 each  -LN      Additional Comments red TB  -LN                User Key  (r) = Recorded By, (t) = Taken By, (c) = Cosigned By      Initials Name Provider Type    Macrina Pinto, PT Physical Therapist                                     Therapy Education  Given: HEP, Symptoms/condition management, Posture/body mechanics, Mobility training  Program: Reinforced  How Provided: Verbal, Demonstration  Provided to: Patient, Caregiver ( present)  Level of Understanding: Teach back education  performed, Verbalized, Demonstrated              Time Calculation:   Start Time: 1500  Stop Time: 1605  Time Calculation (min): 65 min  Timed Charges  80257 - PT Therapeutic Exercise Minutes: 45  Total Minutes  Timed Charges Total Minutes: 45   Total Minutes: 45  Therapy Charges for Today       Code Description Service Date Service Provider Modifiers Qty    [id number] HC PT THER PROC EA 15 MIN 6/25/2024 Macrina Owens, PT GP 3                      Macrina Owens, PT  6/25/2024

## 2024-07-02 ENCOUNTER — HOSPITAL ENCOUNTER (OUTPATIENT)
Dept: PHYSICAL THERAPY | Facility: HOSPITAL | Age: 84
Setting detail: THERAPIES SERIES
Discharge: HOME | End: 2024-07-02
Payer: MEDICARE

## 2024-07-02 DIAGNOSIS — Z98.1 S/P LUMBAR SPINAL FUSION: ICD-10-CM

## 2024-07-02 DIAGNOSIS — M54.16 LUMBAR RADICULOPATHY: Primary | ICD-10-CM

## 2024-07-02 PROCEDURE — 97110 THERAPEUTIC EXERCISES: CPT

## 2024-07-02 PROCEDURE — 97116 GAIT TRAINING THERAPY: CPT

## 2024-07-02 NOTE — THERAPY TREATMENT NOTE
Outpatient Physical Therapy Ortho Treatment Note  [facility]     Patient Name: Anitra Orta  : 1940  MRN: [id number]  Today's Date: 2024        Visit Date: 2024    Visit Dx:    ICD-10-CM ICD-9-CM   1. Lumbar radiculopathy  M54.16 724.4   2. S/P lumbar spinal fusion  Z98.1 V45.4       Patient Active Problem List   Diagnosis    Urinary tract infection due to ESBL Klebsiella    Simple renal cyst    Former smoker    Hyperlipidemia    Osteoporosis    Panic disorder    Psoriasis    Urge incontinence of urine    Vitamin D deficiency    Post-menopause    Acute UTI (urinary tract infection)    Chronic bilateral low back pain without sciatica    Spinal stenosis of lumbar region with neurogenic claudication    Other chronic pain    Mixed incontinence    GERD without esophagitis    Anxiety    Hyperglycemia    e.coli bacteremia    Ureteral stone with hydronephrosis    Obstructive uropathy    Nephrolithiasis    Anemia    Metabolic acidosis    Essential hypertension    History of ventricular tachycardia    Left ureteral stone    Chronic idiopathic constipation    Personal history of colonic polyps        Past Medical History:   Diagnosis Date    Acute kidney failure 2021    Allergic     weather allergies    Anxiety     Arthritis of back     At risk for sleep apnea     Cataract     BILAT-SCHEDULED FOR THIS AND HAD TO CANCEL D/T SEPSIS    Chronic pain disorder     ALL OVER PAIN    Chronic UTI     Closed displaced fracture of surgical neck of left humerus 2021    Closed fracture of left proximal humerus 06/15/2021    Closed fracture of right distal radius 06/15/2021    Colon polyp     COVID-19 vaccine series completed     2ND DOSE 3/23/21    DDD (degenerative disc disease), lumbar     e.coli bacteremia 2021    Elevated cholesterol     Fracture, humerus     LEFT. h/o    GERD (gastroesophageal reflux disease)     History of recent fall     21    History of sepsis     MOST RECENT  JULY 2021-R/T KIDNEY STONE, UTI.  STATES THIS IS HER 3RD SEPSIS DIAGNOSIS    History of UTI     HL (hearing loss)     HTN (hypertension)     Hyperlipidemia     Hypokalemia 03/25/2021    Kidney stones     LEFT    Left humeral fracture 07/11/2021    Low back pain     Macular degeneration, bilateral     WORSE ON RIGHT-ONLY PERIPHERAL VISION    Metabolic encephalopathy 03/25/2021    Mixed hyperlipidemia 09/16/2016    Mixed incontinence     Obesity     Osteoarthritis     Osteopenia     Osteoporosis     Panic disorder     Personal history of urinary calculi     PONV (postoperative nausea and vomiting)     Scoliosis     Sepsis, unspecified organism 03/25/2021    Tachycardia, unspecified     Ventricular tachycardia 09/09/2019    Visual impairment     Wrist fracture     RIGHT-CURRENTLY NOT WEARING BRACE FOR THIS        Past Surgical History:   Procedure Laterality Date    ADENOIDECTOMY      BREAST BIOPSY Left     benign    BREAST LUMPECTOMY Left     benign    BUNIONECTOMY Right     COLONOSCOPY N/A 11/30/2016    Procedure: COLONOSCOPY polypectomy;  Surgeon: Adali Willard MD;  Location: [facility] OR;  Service:     CYSTOSCOPY BOTOX INJECTION OF BLADDER N/A 07/21/2017    Procedure: CYSTOSCOPY COAPTITE INJECTION;  Surgeon: Kevon Clark MD;  Location: [facility] OR;  Service:     CYSTOSCOPY URETEROSCOPY LASER LITHOTRIPSY Right 05/01/2023    Procedure: RIGHT URETEROSCOPY LASER LITHOTRIPSY STONE BASKET EXTRACTION AND STENT;  Surgeon: Kevon Clark MD;  Location: [facility] OR;  Service: Urology;  Laterality: Right;    CYSTOSCOPY W/ LITHOLAPAXY / EHL      CYSTOSCOPY W/ URETERAL STENT PLACEMENT Left 09/12/2016    Procedure: CYSTOSCOPY URETERAL STENT INSERTION;  Surgeon: Kevon Clark MD;  Location: [facility] OR;  Service:     CYSTOSCOPY W/ URETERAL STENT PLACEMENT Left 08/01/2019    Procedure: CYSTOSCOPY URETERAL CATHETER/STENT INSERTION;  Surgeon: Kevon Clark MD;  Location: [facility] OR;  Service: Urology    CYSTOSCOPY W/  "URETERAL STENT PLACEMENT Left 03/25/2021    Procedure: CYSTOSCOPY URETERAL CATHETER/STENT INSERTION;  Surgeon: Kevon Clark MD;  Location: [facility] OR;  Service: Urology;  Laterality: Left;    CYSTOSCOPY W/ URETERAL STENT PLACEMENT Left 07/11/2021    Procedure: CYSTOSCOPY URETERAL CATHETER/STENT INSERTION;  Surgeon: Lul Guzman MD;  Location: [facility] OR;  Service: Urology;  Laterality: Left;  CYSTOSCOPY LEFT URETERAL CATHETER/ STENT PLACEMENT    CYSTOSCOPY W/ URETERAL STENT PLACEMENT Right 04/20/2023    Procedure: CYSTOSCOPY STENT PLACEMENT;  Surgeon: Matthew Ndiaye MD;  Location: [facility] OR;  Service: Urology;  Laterality: Right;    EYE SURGERY      JOINT REPLACEMENT Right     total knee    LAMINECTOMY  11/2022    LUMBAR EPIDURAL INJECTION      TONSILLECTOMY AND ADENOIDECTOMY      TUBAL ABDOMINAL LIGATION      URETEROSCOPY LASER LITHOTRIPSY WITH STENT INSERTION Left 10/05/2016    Procedure: LT URETEROSCOPY LASER LITHOTRIPSY STONE BASKET EXTRACTION AND STENT ;  Surgeon: Kevon Clark MD;  Location: [facility];  Service:     URETEROSCOPY LASER LITHOTRIPSY WITH STENT INSERTION Left 07/23/2021    Procedure: LEFT URETEROSCOPY STONE MANIPULATION STENT EXCHANGE, LASER LITHOTRIPSY, CYSTOSCOPY, STONE BASKET EXTRACTION;  Surgeon: Kevon Clark MD;  Location: [facility];  Service: Urology;  Laterality: Left;        PT Ortho       Row Name 07/02/24 1500       Subjective    Subjective Comments \"I'm doing okay.\" Pt brought her cane (hurry cane) to PT today; \"I am just so scared to use it because I am afraid to fall.\"  -LN       Precautions and Contraindications    Precautions/Limitations spinal precautions  -LN       Subjective Pain    Able to rate subjective pain? yes  -LN    Pre-Treatment Pain Level 4  -LN    Subjective Pain Comment in R hip area & groin primarily; did have some LBP after session.  -LN       Gait/Stairs (Locomotion)    [place] Level (Gait) minimum assist (75% " patient effort);verbal cues;nonverbal cues (demo/gesture)  HHA  -LN    Assistive Device (Gait) cane, quad tip;other (see comments)  Hurry cane  -LN    Distance in Feet (Gait) 20  20 feet x 2  -LN    Deviations/Abnormal Patterns (Gait) bilateral deviations;antalgic;cristy decreased;gait speed decreased;stride length decreased;weight shifting decreased  -LN    Bilateral Gait Deviations forward flexed posture  -LN    Comment, (Gait/Stairs) Pt ambulated 20 feet x 2 with Hurry cane and HHA from therapist with minimal to moderate assist and constant cueing needed for proper sequencing. She tended to not get cane out in front of her enough and kept it too close to body. Pt very fearful when walking with cane; has a fear of falling. Slow gait speed; very guarded.  -LN              User Key  (r) = Recorded By, (t) = Taken By, (c) = Cosigned By      Initials Name Provider Type    Macrina Pinto, PT Physical Therapist                                 PT Assessment/Plan       Row Name 07/02/24 1600          PT Assessment    Assessment Comments Pt with overall decreased c/o LBP, but did report some LBP after session today. Pain seems to be more in R hip/groin area. Pt very fearful and guarded when working on ambulation with hurry cane; did better when in parallel bars but still very fearful and needed a lot of encouragement to work on ambulating with cane. She also needed a lot of cueing for proper sequencing. Pt fatigued very easily with ambulation and standing exercises. Pt seems to spend most of her time at home sitting, so spoke to her and her  about gradually increasing the time she gets up and walks in home and stands vs sitting so much, in order to increase her endurance level and activity tolerance. Will focus more on gait/balance and standing activities in PT right now and have her do her supine & seated exercises at home. Pt would like to go on a vacation but needs to be more mobile to do so.  -LN   "      PT Plan    PT Frequency 2x/week;1x/week  -LN     PT Plan Comments Continue per POC. Progress exercises as tolerated. Modalities PRN. Balance exercises as tolerated. Pt to bring her cane next visit and will continue to work on gait with cane; work on ambulation and balance in parallel bars.  Pt advised to go to ER, if her lower R abdomen pain worsens or she begins running a fever or having nausea/vomiting (she is still having this pain but reports not as bad).  -LN               User Key  (r) = Recorded By, (t) = Taken By, (c) = Cosigned By      Initials Name Provider Type    LN Macrina Owens, PT Physical Therapist                       OP Exercises       Row Name 07/02/24 1500             Precautions    Existing Precautions/Restrictions spinal  -LN         Subjective    Subjective Comments \"I'm doing okay.\" Pt brought her cane (angelita cane) to PT today; \"I am just so scared to use it because I am afraid to fall.\"  -LN         Subjective Pain    Able to rate subjective pain? yes  -LN      Pre-Treatment Pain Level 4  -LN      Subjective Pain Comment in R hip area & groin primarily; did have some LBP after session.  -LN         Total Minutes    65175 - Gait Training Minutes  20  -LN      52863 - PT Therapeutic Exercise Minutes 30  -LN         Exercise 1    Exercise Name 1 Seated HS/calf stretch with strap  -LN      Cueing 1 Verbal;Tactile;Demo  -LN      Reps 1 5 each leg  -LN      Time 1 10 sec  -LN         Exercise 2    Exercise Name 2 seated calf raise/ball squeeze  -LN      Cueing 2 Verbal;Tactile;Demo  -LN      Reps 2 20  -LN      Time 2 5 sec  -LN         Exercise 3    Exercise Name 3 PPT  -LN      Cueing 3 Verbal;Tactile;Demo  -LN      Reps 3 --  -LN      Time 3 --  -LN      Additional Comments HEP  -LN         Exercise 4    Exercise Name 4 PPT with hooklying ball squeeze  -LN      Cueing 4 Verbal;Tactile;Demo  -LN      Reps 4 --  -LN      Time 4 --  -LN      Additional Comments HEP  -LN         " Exercise 5    Exercise Name 5 PPT with supine clam shells vs TB  -LN      Cueing 5 Verbal;Tactile;Demo  -LN      Reps 5 --  -LN      Time 5 --  -LN      Additional Comments HEP  -LN         Exercise 6    Exercise Name 6 GS  -LN      Cueing 6 Verbal;Tactile;Demo  -LN      Additional Comments HEP  -LN         Exercise 7    Exercise Name 7 --  -LN      Cueing 7 --  -LN      Reps 7 --  -LN      Time 7 --  -LN         Exercise 8    Exercise Name 8 seated rows vs TB  -LN      Cueing 8 Verbal;Tactile;Demo  -LN      Reps 8 20  -LN      Time 8 5 sec  -LN      Additional Comments red TB  -LN         Exercise 9    Exercise Name 9 PPT with march  -LN      Cueing 9 Verbal;Tactile;Demo  -LN      Reps 9 --  -LN      Additional Comments HEP  -LN         Exercise 10    Exercise Name 10 standing hip abduction  -LN      Cueing 10 Verbal;Tactile;Demo  -LN      Reps 10 10 each leg  -LN      Additional Comments 1#  -LN         Exercise 11    Exercise Name 11 standing hip extension  -LN      Cueing 11 Verbal;Tactile;Demo  -LN      Reps 11 10 each leg  -LN      Additional Comments 1#  -LN         Exercise 12    Exercise Name 12 sit to stand  -LN      Cueing 12 Verbal;Tactile;Demo  -LN      Time 12 10  -LN      Additional Comments 1 hand on armrest & SBA.  needed a lot of cueing  -LN         Exercise 13    Exercise Name 13 standing HS curls vs TB  -LN      Cueing 13 Verbal;Tactile;Demo  -LN      Reps 13 10 each  -LN      Additional Comments 1# on each leg  -LN         Exercise 14    Exercise Name 14 Sidestepping along elevated treatment table  -LN      Cueing 14 Verbal;Tactile;Demo  -LN      Reps 14 4  -LN      Additional Comments 1# on each ankle and SBA  -LN         Exercise 15    Exercise Name 15 Gait training with Hurry cane in parallel bars  -LN      Time 15 20 minutes  -LN      Additional Comments worked on walking with cane in R hand and holding onto bar with L hand and then just using cane in R hand and not holding on with L hand;  with CGA-Hans from therapist (used gait belt for safety).  also worked on ambulating with Hurry cane and HHA from therapist for 15 feet x 2 with cueing for proper sequencing.  -LN                User Key  (r) = Recorded By, (t) = Taken By, (c) = Cosigned By      Initials Name Provider Type    Macrina Pinto, PT Physical Therapist                                     Therapy Education  Education Details: Pt to work on her supine and seated exercises at home. Pt to use CP/MH PRN. Spoke to her and her  about gradually increasing the time she gets up and walks in home and stands vs sitting so much, in order to increase her endurance level and activity tolerance.  Given: HEP, Symptoms/condition management, Pain management, Posture/body mechanics, Mobility training, Other (comment) (ambulation with hurry cane)  Program: Reinforced  How Provided: Verbal, Demonstration  Provided to: Patient, Caregiver ( present)  Level of Understanding: Teach back education performed, Verbalized, Demonstrated              Time Calculation:   Start Time: 1500  Stop Time: 1550  Time Calculation (min): 50 min  Timed Charges  25126 - PT Therapeutic Exercise Minutes: 30  33139 - Gait Training Minutes : 20  Total Minutes  Timed Charges Total Minutes: 50   Total Minutes: 50  Therapy Charges for Today       Code Description Service Date Service Provider Modifiers Qty    [id number]  PT THER PROC EA 15 MIN 7/2/2024 Macrina Owens, PT GP 2    [id number] HC GAIT TRAINING EA 15 MIN 7/2/2024 Macrina Owens, PT GP 1                      Macrina Owens, PT  7/2/2024

## 2024-07-05 ENCOUNTER — HOSPITAL ENCOUNTER (OUTPATIENT)
Dept: PHYSICAL THERAPY | Facility: HOSPITAL | Age: 84
Setting detail: THERAPIES SERIES
Discharge: HOME | End: 2024-07-05
Payer: MEDICARE

## 2024-07-05 DIAGNOSIS — M54.16 LUMBAR RADICULOPATHY: Primary | ICD-10-CM

## 2024-07-05 DIAGNOSIS — Z98.1 S/P LUMBAR SPINAL FUSION: ICD-10-CM

## 2024-07-05 PROCEDURE — 97110 THERAPEUTIC EXERCISES: CPT

## 2024-07-05 PROCEDURE — 97116 GAIT TRAINING THERAPY: CPT

## 2024-07-05 NOTE — THERAPY TREATMENT NOTE
Outpatient Physical Therapy Ortho Treatment Note  [facility]     Patient Name: Anitra Orta  : 1940  MRN: [id number]  Today's Date: 2024        Visit Date: 2024    Visit Dx:    ICD-10-CM ICD-9-CM   1. Lumbar radiculopathy  M54.16 724.4   2. S/P lumbar spinal fusion  Z98.1 V45.4       Patient Active Problem List   Diagnosis    Urinary tract infection due to ESBL Klebsiella    Simple renal cyst    Former smoker    Hyperlipidemia    Osteoporosis    Panic disorder    Psoriasis    Urge incontinence of urine    Vitamin D deficiency    Post-menopause    Acute UTI (urinary tract infection)    Chronic bilateral low back pain without sciatica    Spinal stenosis of lumbar region with neurogenic claudication    Other chronic pain    Mixed incontinence    GERD without esophagitis    Anxiety    Hyperglycemia    e.coli bacteremia    Ureteral stone with hydronephrosis    Obstructive uropathy    Nephrolithiasis    Anemia    Metabolic acidosis    Essential hypertension    History of ventricular tachycardia    Left ureteral stone    Chronic idiopathic constipation    Personal history of colonic polyps        Past Medical History:   Diagnosis Date    Acute kidney failure 2021    Allergic     weather allergies    Anxiety     Arthritis of back     At risk for sleep apnea     Cataract     BILAT-SCHEDULED FOR THIS AND HAD TO CANCEL D/T SEPSIS    Chronic pain disorder     ALL OVER PAIN    Chronic UTI     Closed displaced fracture of surgical neck of left humerus 2021    Closed fracture of left proximal humerus 06/15/2021    Closed fracture of right distal radius 06/15/2021    Colon polyp     COVID-19 vaccine series completed     2ND DOSE 3/23/21    DDD (degenerative disc disease), lumbar     e.coli bacteremia 2021    Elevated cholesterol     Fracture, humerus     LEFT. h/o    GERD (gastroesophageal reflux disease)     History of recent fall     21    History of sepsis     MOST RECENT  JULY 2021-R/T KIDNEY STONE, UTI.  STATES THIS IS HER 3RD SEPSIS DIAGNOSIS    History of UTI     HL (hearing loss)     HTN (hypertension)     Hyperlipidemia     Hypokalemia 03/25/2021    Kidney stones     LEFT    Left humeral fracture 07/11/2021    Low back pain     Macular degeneration, bilateral     WORSE ON RIGHT-ONLY PERIPHERAL VISION    Metabolic encephalopathy 03/25/2021    Mixed hyperlipidemia 09/16/2016    Mixed incontinence     Obesity     Osteoarthritis     Osteopenia     Osteoporosis     Panic disorder     Personal history of urinary calculi     PONV (postoperative nausea and vomiting)     Scoliosis     Sepsis, unspecified organism 03/25/2021    Tachycardia, unspecified     Ventricular tachycardia 09/09/2019    Visual impairment     Wrist fracture     RIGHT-CURRENTLY NOT WEARING BRACE FOR THIS        Past Surgical History:   Procedure Laterality Date    ADENOIDECTOMY      BREAST BIOPSY Left     benign    BREAST LUMPECTOMY Left     benign    BUNIONECTOMY Right     COLONOSCOPY N/A 11/30/2016    Procedure: COLONOSCOPY polypectomy;  Surgeon: Adali Willard MD;  Location: [facility] OR;  Service:     CYSTOSCOPY BOTOX INJECTION OF BLADDER N/A 07/21/2017    Procedure: CYSTOSCOPY COAPTITE INJECTION;  Surgeon: Kevon Clark MD;  Location: [facility] OR;  Service:     CYSTOSCOPY URETEROSCOPY LASER LITHOTRIPSY Right 05/01/2023    Procedure: RIGHT URETEROSCOPY LASER LITHOTRIPSY STONE BASKET EXTRACTION AND STENT;  Surgeon: Kevon Clark MD;  Location: [facility] OR;  Service: Urology;  Laterality: Right;    CYSTOSCOPY W/ LITHOLAPAXY / EHL      CYSTOSCOPY W/ URETERAL STENT PLACEMENT Left 09/12/2016    Procedure: CYSTOSCOPY URETERAL STENT INSERTION;  Surgeon: Kevon Clark MD;  Location: [facility] OR;  Service:     CYSTOSCOPY W/ URETERAL STENT PLACEMENT Left 08/01/2019    Procedure: CYSTOSCOPY URETERAL CATHETER/STENT INSERTION;  Surgeon: Kevon Clark MD;  Location: [facility] OR;  Service: Urology    CYSTOSCOPY W/  "URETERAL STENT PLACEMENT Left 03/25/2021    Procedure: CYSTOSCOPY URETERAL CATHETER/STENT INSERTION;  Surgeon: Kevon Clark MD;  Location: [facility] OR;  Service: Urology;  Laterality: Left;    CYSTOSCOPY W/ URETERAL STENT PLACEMENT Left 07/11/2021    Procedure: CYSTOSCOPY URETERAL CATHETER/STENT INSERTION;  Surgeon: Lul Guzman MD;  Location: [facility] OR;  Service: Urology;  Laterality: Left;  CYSTOSCOPY LEFT URETERAL CATHETER/ STENT PLACEMENT    CYSTOSCOPY W/ URETERAL STENT PLACEMENT Right 04/20/2023    Procedure: CYSTOSCOPY STENT PLACEMENT;  Surgeon: Matthew Ndiaye MD;  Location: [facility] OR;  Service: Urology;  Laterality: Right;    EYE SURGERY      JOINT REPLACEMENT Right     total knee    LAMINECTOMY  11/2022    LUMBAR EPIDURAL INJECTION      TONSILLECTOMY AND ADENOIDECTOMY      TUBAL ABDOMINAL LIGATION      URETEROSCOPY LASER LITHOTRIPSY WITH STENT INSERTION Left 10/05/2016    Procedure: LT URETEROSCOPY LASER LITHOTRIPSY STONE BASKET EXTRACTION AND STENT ;  Surgeon: Kevon Clark MD;  Location: [facility];  Service:     URETEROSCOPY LASER LITHOTRIPSY WITH STENT INSERTION Left 07/23/2021    Procedure: LEFT URETEROSCOPY STONE MANIPULATION STENT EXCHANGE, LASER LITHOTRIPSY, CYSTOSCOPY, STONE BASKET EXTRACTION;  Surgeon: Kevon Clark MD;  Location: [facility] OR;  Service: Urology;  Laterality: Left;        PT Ortho       Row Name 07/05/24 1300       Subjective    Subjective Comments Pt reports that he back was hurting earlier today; \"I think it may be the weather.\" \"I have been able to do some exercises at home in the bed.\" Pt still reports having some pain in R lower abdomen but reports it isn't worse.\" Pt not wanting to go to ER or her PCP.  -LN       Precautions and Contraindications    Precautions/Limitations spinal precautions  -LN       Subjective Pain    Able to rate subjective pain? yes  -LN    Pre-Treatment Pain Level 0  No present pain in LB reported.  -LN    " Subjective Pain Comment Pt had pain at 4-5/10 earlier today.  -LN       Posture/Observations    Posture/Observations Comments Pt came to PT dept. today via wheelchair; she left her walker in the car. She had SPC with her today.  -LN       Gait/Stairs (Locomotion)    [place] Level (Gait) minimum assist (75% patient effort);contact guard;verbal cues;nonverbal cues (demo/gesture)  -LN    Assistive Device (Gait) cane, straight  -LN    Distance in Feet (Gait) 15  15 feet x 2; first time with HHA on L and second time with her L hand to her side with use of gait belt for safety. -LN    Deviations/Abnormal Patterns (Gait) bilateral deviations;antalgic;cristy decreased;gait speed decreased;stride length decreased;weight shifting decreased  -LN    Bilateral Gait Deviations forward flexed posture  -LN    Comment, (Gait/Stairs) Pt ambulated 15 feet x 2 with SPC and HHA from therapist with CGA-minimal assist and constant cueing needed for proper sequencing. She tended to get her SPC too far out in front of her today.  Pt very fearful when walking with cane; has a fear of falling. Slow gait speed; very guarded.  -LN              User Key  (r) = Recorded By, (t) = Taken By, (c) = Cosigned By      Initials Name Provider Type    Macrina Pinto, PT Physical Therapist                                 PT Assessment/Plan       Row Name 07/05/24 1300          PT Assessment    Assessment Comments Pt continues with overall decreased LBP but did have some pain earlier today which pt feels is weather related. Pt did a little better with ambulation today in parallel bars; used SPC today vs hurry cane. Pt more comfortable with SPC because she felt like she kept kicking hurry cane. She continues to need cueing for proper sequencing but took better steps today and gait overall seemed smoother. Pt is still very fearful with ambulating with SPC which also affects her overall safety with cane. She was able to ambulate about 15 feet  "with SPC in R hand and no HHA on left, but did have CGA-Hans by therapist with use of gait belt (outside of parallel bars).  She tends to put the cane too far in front of her. Pt continues to fatigue quickly with standing and ambulation activities. She is doing a little better with sit to stand transfers and can now do with 1 hand on arm rest of chair and the other hand on her thigh.  Pt still reporting having some pain in lower R abdomen but reports it is not getting worse- recommended that she make an appt. with her PCP, but pt not wanting to at this time.  -LN        PT Plan    PT Frequency 2x/week;1x/week  -LN     PT Plan Comments Continue per POC. Progress exercises as tolerated. Modalities PRN. Balance exercises as tolerated. Pt to bring her cane next visit and will continue to work on gait with cane; work on ambulation and balance in parallel bars. Pt advised to go to ER, if her lower R abdomen pain worsens or she begins running a fever or having nausea/vomiting (she is still having this pain but reports not as bad).  -LN               User Key  (r) = Recorded By, (t) = Taken By, (c) = Cosigned By      Initials Name Provider Type    Macrina Pinto, PT Physical Therapist                       OP Exercises       Row Name 07/05/24 1300             Precautions    Existing Precautions/Restrictions spinal  -LN         Subjective    Subjective Comments Pt reports that he back was hurting earlier today; \"I think it may be the weather.\" \"I have been able to do some exercises at home in the bed.\" Pt still reports having some pain in R lower abdomen but reports it isn't worse.\" Pt not wanting to go to ER or her PCP.  -LN         Subjective Pain    Able to rate subjective pain? yes  -LN      Pre-Treatment Pain Level 0  No present pain in LB reported.  -LN      Subjective Pain Comment Pt had pain at 4-5/10 earlier today.  -LN         Total Minutes    54775 - Gait Training Minutes  20  -LN      86189 - PT " Therapeutic Exercise Minutes 30  -LN         Exercise 1    Exercise Name 1 Seated HS/calf stretch with strap  -LN      Cueing 1 Verbal;Tactile;Demo  -LN      Reps 1 5 each leg  -LN      Time 1 10 sec  -LN         Exercise 2    Exercise Name 2 seated calf raise/ball squeeze  -LN      Cueing 2 Verbal;Tactile;Demo  -LN      Reps 2 20  -LN      Time 2 5 sec  -LN         Exercise 3    Exercise Name 3 PPT  -LN      Cueing 3 Verbal;Tactile;Demo  -LN      Additional Comments HEP  -LN         Exercise 4    Exercise Name 4 PPT with hooklying ball squeeze  -LN      Cueing 4 Verbal;Tactile;Demo  -LN      Additional Comments HEP  -LN         Exercise 5    Exercise Name 5 PPT with supine clam shells vs TB  -LN      Cueing 5 Verbal;Tactile;Demo  -LN      Additional Comments HEP  -LN         Exercise 6    Exercise Name 6 GS  -LN      Cueing 6 Verbal;Tactile;Demo  -LN      Additional Comments HEP  -LN         Exercise 8    Exercise Name 8 seated rows vs TB  -LN      Cueing 8 Verbal;Tactile;Demo  -LN      Reps 8 20  -LN      Time 8 5 sec  -LN      Additional Comments green TB  -LN         Exercise 9    Exercise Name 9 PPT with march  -LN      Cueing 9 Verbal;Tactile;Demo  -LN      Additional Comments HEP  -LN         Exercise 10    Exercise Name 10 standing hip abduction  -LN      Cueing 10 Verbal;Tactile;Demo  -LN      Reps 10 10 each leg  -LN      Additional Comments 1#  -LN         Exercise 11    Exercise Name 11 standing hip extension  -LN      Cueing 11 Verbal;Tactile;Demo  -LN      Reps 11 10 each leg  -LN      Additional Comments 1#  -LN         Exercise 12    Exercise Name 12 sit to stand  -LN      Cueing 12 Verbal;Tactile;Demo  -LN      Time 12 10  -LN      Additional Comments 1 hand on armrest & SBA.  -LN         Exercise 13    Exercise Name 13 standing HS curls vs TB  -LN      Cueing 13 Verbal;Tactile;Demo  -LN      Reps 13 10 each  -LN      Additional Comments 1# on each leg on balance pad  -LN         Exercise 14     Exercise Name 14 --  -LN      Cueing 14 --  -LN      Reps 14 --  -LN      Additional Comments --  -LN         Exercise 15    Exercise Name 15 Gait training with Hurry cane in parallel bars  -LN      Time 15 20 minutes  -LN      Additional Comments worked on walking with cane in R hand and holding onto bar with L hand and then just using cane in R hand and not holding on with L hand; with CGA-Hans from therapist (used gait belt for safety).  -LN         Exercise 16    Exercise Name 16 marching on balance pad  -LN      Cueing 16 Verbal;Tactile;Demo  -LN      Reps 16 10 each  -LN      Additional Comments 1# on each leg; SBA for safety  at elevated table  -LN         Exercise 17    Exercise Name 17 forward step ups on balance pad  -LN      Cueing 17 Verbal;Tactile;Demo  -LN      Reps 17 10 each  -LN      Additional Comments with SBA for safety  at elevated table  -LN                User Key  (r) = Recorded By, (t) = Taken By, (c) = Cosigned By      Initials Name Provider Type    Macrina Pinto, PT Physical Therapist                                     Therapy Education  Education Details: Pt to continue to work on her supine and seated exercises at home and to continue to try and gradually increase the time she gets up and walks in home and stands vs sitting so much. Pt to continue to use her FWW for safety at this time.  Given: HEP, Symptoms/condition management, Pain management, Posture/body mechanics, Mobility training, Other (comment) (ambulation with SPC)  Program: Reinforced  How Provided: Verbal, Demonstration  Provided to: Patient, Caregiver ( present)  Level of Understanding: Teach back education performed, Verbalized, Demonstrated              Time Calculation:   Start Time: 1350  Stop Time: 1450  Time Calculation (min): 60 min  Timed Charges  21822 - PT Therapeutic Exercise Minutes: 30  25419 - Gait Training Minutes : 20  Total Minutes  Timed Charges Total Minutes: 50   Total Minutes:  50  Therapy Charges for Today       Code Description Service Date Service Provider Modifiers Qty    22087947400 HC PT THER PROC EA 15 MIN 7/5/2024 Macrina Owens, PT GP 2    92303222897 HC GAIT TRAINING EA 15 MIN 7/5/2024 Macrina Owens, PT GP 1                      Macrina Owens, PT  7/5/2024

## 2024-07-11 ENCOUNTER — APPOINTMENT (OUTPATIENT)
Dept: PHYSICAL THERAPY | Facility: HOSPITAL | Age: 84
End: 2024-07-11
Payer: MEDICARE

## 2024-07-11 ENCOUNTER — DOCUMENTATION (OUTPATIENT)
Dept: PHYSICAL THERAPY | Facility: HOSPITAL | Age: 84
End: 2024-07-11
Payer: MEDICARE

## 2024-07-11 ENCOUNTER — HOSPITAL ENCOUNTER (EMERGENCY)
Facility: HOSPITAL | Age: 84
Discharge: HOME | End: 2024-07-11
Attending: STUDENT IN AN ORGANIZED HEALTH CARE EDUCATION/TRAINING PROGRAM
Payer: MEDICARE

## 2024-07-11 VITALS
BODY MASS INDEX: 38.74 KG/M2 | DIASTOLIC BLOOD PRESSURE: 113 MMHG | SYSTOLIC BLOOD PRESSURE: 151 MMHG | TEMPERATURE: 97.9 F | OXYGEN SATURATION: 94 % | WEIGHT: 240 LBS | RESPIRATION RATE: 18 BRPM | HEART RATE: 79 BPM

## 2024-07-11 DIAGNOSIS — G89.28 OTHER CHRONIC POSTPROCEDURAL PAIN: Primary | ICD-10-CM

## 2024-07-11 PROCEDURE — 99282 EMERGENCY DEPT VISIT SF MDM: CPT

## 2024-07-11 NOTE — ED NOTES
"Patient reports RLQ abd pain \"for months.\" Pain is intermittent. Abd is soft and pain is not worsened with palpation.   "

## 2024-07-11 NOTE — ED PROVIDER NOTES
Subjective   History of Present Illness  This is an 83-year-old who presents to the emergency department with chronic pain to the right flank/abdomen after arthrodesis performed on February 17, 2024 secondary to lumbar radiculopathy.  The patient has some chronic back pain and had a laminectomy in [place] several years ago.  The patient states that her pain today is unchanged.  She has no nausea, vomiting, fever, upper abdominal pain, dysuria, hematuria or any other complaint but states that her  made her come to the emergency department and she does not really want to be here in the first place.      Review of Systems   Constitutional:  Negative for activity change, appetite change, diaphoresis and fatigue.   All other systems reviewed and are negative.      Past Medical History:   Diagnosis Date    Acute kidney failure 07/11/2021    Allergic     weather allergies    Anxiety     Arthritis of back     At risk for sleep apnea     Cataract     BILAT-SCHEDULED FOR THIS AND HAD TO CANCEL D/T SEPSIS    Chronic pain disorder     ALL OVER PAIN    Chronic UTI     Closed displaced fracture of surgical neck of left humerus 06/25/2021    Closed fracture of left proximal humerus 06/15/2021    Closed fracture of right distal radius 06/15/2021    Colon polyp     COVID-19 vaccine series completed     2ND DOSE 3/23/21    DDD (degenerative disc disease), lumbar     e.coli bacteremia 03/25/2021    Elevated cholesterol     Fracture, humerus     LEFT. h/o    GERD (gastroesophageal reflux disease)     History of recent fall     JUNE 6-9-21    History of sepsis     MOST RECENT JULY 2021-R/T KIDNEY STONE, UTI.  STATES THIS IS HER 3RD SEPSIS DIAGNOSIS    History of UTI     HL (hearing loss)     HTN (hypertension)     Hyperlipidemia     Hypokalemia 03/25/2021    Kidney stones     LEFT    Left humeral fracture 07/11/2021    Low back pain     Macular degeneration, bilateral     WORSE ON RIGHT-ONLY PERIPHERAL VISION    Metabolic  encephalopathy 03/25/2021    Mixed hyperlipidemia 09/16/2016    Mixed incontinence     Obesity     Osteoarthritis     Osteopenia     Osteoporosis     Panic disorder     Personal history of urinary calculi     PONV (postoperative nausea and vomiting)     Scoliosis     Sepsis, unspecified organism 03/25/2021    Tachycardia, unspecified     Ventricular tachycardia 09/09/2019    Visual impairment     Wrist fracture     RIGHT-CURRENTLY NOT WEARING BRACE FOR THIS       Allergies   Allergen Reactions    Bactrim [Sulfamethoxazole-Trimethoprim] Nausea Only    Ciprofloxacin Other (See Comments)     UNKNOWN    Macrobid [Nitrofurantoin] Other (See Comments)     UNKNOWN; PT CAN TAKE IN SMALL DOSES- FLU LIKE SYMPTOMS    Cortisone Headache     Pt states had headache with IM injection states has been ok with epidural steroid injections       Past Surgical History:   Procedure Laterality Date    ADENOIDECTOMY      BREAST BIOPSY Left     benign    BREAST LUMPECTOMY Left     benign    BUNIONECTOMY Right     COLONOSCOPY N/A 11/30/2016    Procedure: COLONOSCOPY polypectomy;  Surgeon: Adali Willard MD;  Location: [facility] OR;  Service:     CYSTOSCOPY BOTOX INJECTION OF BLADDER N/A 07/21/2017    Procedure: CYSTOSCOPY COAPTITE INJECTION;  Surgeon: Kevon Clark MD;  Location: [facility] OR;  Service:     CYSTOSCOPY URETEROSCOPY LASER LITHOTRIPSY Right 05/01/2023    Procedure: RIGHT URETEROSCOPY LASER LITHOTRIPSY STONE BASKET EXTRACTION AND STENT;  Surgeon: Kevon Clark MD;  Location: [facility] OR;  Service: Urology;  Laterality: Right;    CYSTOSCOPY W/ LITHOLAPAXY / EHL      CYSTOSCOPY W/ URETERAL STENT PLACEMENT Left 09/12/2016    Procedure: CYSTOSCOPY URETERAL STENT INSERTION;  Surgeon: Kevon Clark MD;  Location: [facility] OR;  Service:     CYSTOSCOPY W/ URETERAL STENT PLACEMENT Left 08/01/2019    Procedure: CYSTOSCOPY URETERAL CATHETER/STENT INSERTION;  Surgeon: Kevon Clark MD;  Location: [facility] OR;  Service: Urology     CYSTOSCOPY W/ URETERAL STENT PLACEMENT Left 03/25/2021    Procedure: CYSTOSCOPY URETERAL CATHETER/STENT INSERTION;  Surgeon: Kevon Clark MD;  Location: [facility] OR;  Service: Urology;  Laterality: Left;    CYSTOSCOPY W/ URETERAL STENT PLACEMENT Left 07/11/2021    Procedure: CYSTOSCOPY URETERAL CATHETER/STENT INSERTION;  Surgeon: Lul Guzman MD;  Location: [facility] OR;  Service: Urology;  Laterality: Left;  CYSTOSCOPY LEFT URETERAL CATHETER/ STENT PLACEMENT    CYSTOSCOPY W/ URETERAL STENT PLACEMENT Right 04/20/2023    Procedure: CYSTOSCOPY STENT PLACEMENT;  Surgeon: Matthew Ndiaye MD;  Location: [facility] OR;  Service: Urology;  Laterality: Right;    EYE SURGERY      JOINT REPLACEMENT Right     total knee    LAMINECTOMY  11/2022    LUMBAR EPIDURAL INJECTION      TONSILLECTOMY AND ADENOIDECTOMY      TUBAL ABDOMINAL LIGATION      URETEROSCOPY LASER LITHOTRIPSY WITH STENT INSERTION Left 10/05/2016    Procedure: LT URETEROSCOPY LASER LITHOTRIPSY STONE BASKET EXTRACTION AND STENT ;  Surgeon: Kevon Clark MD;  Location: [facility] OR;  Service:     URETEROSCOPY LASER LITHOTRIPSY WITH STENT INSERTION Left 07/23/2021    Procedure: LEFT URETEROSCOPY STONE MANIPULATION STENT EXCHANGE, LASER LITHOTRIPSY, CYSTOSCOPY, STONE BASKET EXTRACTION;  Surgeon: Kevon Clark MD;  Location: [facility];  Service: Urology;  Laterality: Left;       Family History   Problem Relation Age of Onset    Heart defect Mother     Heart attack Mother 70    Sudden death Mother     Early death Mother     Heart defect Father     Heart attack Father 49    Hypertension Father     Sudden death Father     Early death Father     Valvular heart disease Brother     Anxiety disorder Daughter     Malig Hyperthermia Neg Hx     Breast cancer Neg Hx        Social History     Socioeconomic History    Marital status:    Tobacco Use    Smoking status: Former     Current packs/day: 0.00     Average packs/day: 1 pack/day for 24.0  years (24.0 ttl pk-yrs)     Types: Cigarettes     Start date: 1956     Quit date: 1980     Years since quittin.5    Smokeless tobacco: Never    Tobacco comments:     quit    Vaping Use    Vaping status: Never Used   Substance and Sexual Activity    Alcohol use: No    Drug use: Not Currently    Sexual activity: Not Currently     Partners: Male     Comment: Frequent UTIs           Objective   Physical Exam  Vitals and nursing note reviewed.   Constitutional:       General: She is not in acute distress.     Appearance: Normal appearance. She is not ill-appearing, toxic-appearing or diaphoretic.   HENT:      Head: Normocephalic and atraumatic.      Right Ear: Tympanic membrane and external ear normal.      Left Ear: Tympanic membrane and external ear normal.      Nose: Nose normal. No congestion or rhinorrhea.      Mouth/Throat:      Mouth: Mucous membranes are moist.      Pharynx: No oropharyngeal exudate or posterior oropharyngeal erythema.   Eyes:      Conjunctiva/sclera: Conjunctivae normal.      Pupils: Pupils are equal, round, and reactive to light.   Cardiovascular:      Rate and Rhythm: Normal rate and regular rhythm.      Pulses: Normal pulses.   Pulmonary:      Effort: Pulmonary effort is normal. No respiratory distress.      Breath sounds: Normal breath sounds. No stridor. No wheezing, rhonchi or rales.   Chest:      Chest wall: No tenderness.   Abdominal:      General: There is no distension.      Palpations: Abdomen is soft. There is no mass.      Tenderness: There is no guarding or rebound.   Musculoskeletal:         General: No swelling or deformity. Normal range of motion.      Cervical back: Normal range of motion and neck supple. No rigidity or tenderness.   Skin:     General: Skin is warm.      Capillary Refill: Capillary refill takes less than 2 seconds.      Coloration: Skin is not pale.   Neurological:      General: No focal deficit present.      Mental Status: She is alert and  "oriented to person, place, and time. Mental status is at baseline.   Psychiatric:         Mood and Affect: Mood normal.         Thought Content: Thought content normal.         Procedures           ED Course                                             Medical Decision Making    MDM:    Escalation of care including admission/observation considered    - Discussions of management with other providers:  None    - Discussed/reviewed with Radiology regarding test interpretation    - Independent interpretation: None    - Additional patient history obtained from: Partner    - Review of external non-ED record (if available):  Prior Inpt record, Office record, Outpt record, Prior Outpt labs, PCP record, Outside ED record, Other    - Chronic conditions affecting care: See HPI and medical Hx.    - Social Determinants of health significantly affecting care:  None        Medical Decision Making Discussion:    This is a very pleasant 83-year-old female who presents with concerns for chronic pain to the right flank/abdomen after a surgery that was done in February 2024.  The patient apparently had a laminectomy in [place] and had some residual issues.    The patient had an arthrodesis that was done on February 17, 2024 secondary to radicular pain from the lumbar region.  Since that time she has continued to have some residual chronic pain to the right flank radiating to the right abdomen.  This is unchanged today from prior.  On physical exam the patient has no evidence of abnormality, certainly no pain at McBurney's point, negative psoas and Rovsing.  She has stable vitals.  I have offered CT, labs, medication for pain but the patient refused all of this and states \"I did not want to come in the first place but my  made me.\"  The patient understands that we have very limited evaluation today as we have not done any studies however, she maintains that this is unchanged from prior and she does not want any further workup done.  " She does have physical therapy and I do recommend that she continue to follow the physical therapist guidelines.  She will follow-up on an outpatient basis with her surgery team but otherwise the patient is well-appearing, stable, has no evidence of acute emergent process ongoing and is stable for discharge at this time.    The patient has been given very strict return precautions to return to the emergency department should there be any acute change or worsening of their condition.  I have explained my findings and the patient has expressed understanding to me.  I explained that the work-up performed in the ED has been based on the specific complaint and concern, as the nature of emergency medicine is complaint driven and they understand that new symptoms may arise.  I have told them that, should there be any new symptoms, worsening or changing symptoms, a new work-up may be indicated that they are encouraged to return to the emergency department or promptly contact their primary care physician. We have employed a shared decision-making process as the discussion of their disposition.  The patient has been educated as to the nature of the visit, the tests and work-up performed and the findings from today's visit. At this time, there does not appear to be any acute emergent process that necessitates admission to the hospital, however, the patient understands that this can change unexpectedly. At this time, the patient is stable for discharge home and agrees to follow-up with her primary care physician in the next 24 to 48 hours or earlier should they be able to obtain an appointment.    The patient was counseled regarding diagnostic results and treatment plan and patient has indicated understanding of these instructions.      Problems Addressed:  Other chronic postprocedural pain: acute illness or injury        Final diagnoses:   Other chronic postprocedural pain       ED Disposition  ED Disposition       ED  Disposition   Discharge    Condition   Good    Comment   --               No follow-up provider specified.       Medication List      No changes were made to your prescriptions during this visit.            Mayo Salamanca DO  07/11/24 0994

## 2024-07-11 NOTE — DISCHARGE INSTRUCTIONS

## 2024-07-18 ENCOUNTER — HOSPITAL ENCOUNTER (OUTPATIENT)
Dept: PHYSICAL THERAPY | Facility: HOSPITAL | Age: 84
Setting detail: THERAPIES SERIES
Discharge: HOME | End: 2024-07-18
Payer: MEDICARE

## 2024-07-18 DIAGNOSIS — M54.16 LUMBAR RADICULOPATHY: Primary | ICD-10-CM

## 2024-07-18 DIAGNOSIS — Z98.1 S/P LUMBAR SPINAL FUSION: ICD-10-CM

## 2024-07-18 PROCEDURE — 97116 GAIT TRAINING THERAPY: CPT

## 2024-07-18 PROCEDURE — 97110 THERAPEUTIC EXERCISES: CPT

## 2024-07-19 NOTE — THERAPY TREATMENT NOTE
Outpatient Physical Therapy Ortho Treatment Note  [facility]     Patient Name: Anitra Orta  : 1940  MRN: [id number]  Today's Date: 2024      Visit Date: 2024    Visit Dx:    ICD-10-CM ICD-9-CM   1. Lumbar radiculopathy  M54.16 724.4   2. S/P lumbar spinal fusion  Z98.1 V45.4       Patient Active Problem List   Diagnosis    Urinary tract infection due to ESBL Klebsiella    Simple renal cyst    Former smoker    Hyperlipidemia    Osteoporosis    Panic disorder    Psoriasis    Urge incontinence of urine    Vitamin D deficiency    Post-menopause    Acute UTI (urinary tract infection)    Chronic bilateral low back pain without sciatica    Spinal stenosis of lumbar region with neurogenic claudication    Other chronic pain    Mixed incontinence    GERD without esophagitis    Anxiety    Hyperglycemia    e.coli bacteremia    Ureteral stone with hydronephrosis    Obstructive uropathy    Nephrolithiasis    Anemia    Metabolic acidosis    Essential hypertension    History of ventricular tachycardia    Left ureteral stone    Chronic idiopathic constipation    Personal history of colonic polyps        Past Medical History:   Diagnosis Date    Acute kidney failure 2021    Allergic     weather allergies    Anxiety     Arthritis of back     At risk for sleep apnea     Cataract     BILAT-SCHEDULED FOR THIS AND HAD TO CANCEL D/T SEPSIS    Chronic pain disorder     ALL OVER PAIN    Chronic UTI     Closed displaced fracture of surgical neck of left humerus 2021    Closed fracture of left proximal humerus 06/15/2021    Closed fracture of right distal radius 06/15/2021    Colon polyp     COVID-19 vaccine series completed     2ND DOSE 3/23/21    DDD (degenerative disc disease), lumbar     e.coli bacteremia 2021    Elevated cholesterol     Fracture, humerus     LEFT. h/o    GERD (gastroesophageal reflux disease)     History of recent fall     21    History of sepsis     MOST RECENT  JULY 2021-R/T KIDNEY STONE, UTI.  STATES THIS IS HER 3RD SEPSIS DIAGNOSIS    History of UTI     HL (hearing loss)     HTN (hypertension)     Hyperlipidemia     Hypokalemia 03/25/2021    Kidney stones     LEFT    Left humeral fracture 07/11/2021    Low back pain     Macular degeneration, bilateral     WORSE ON RIGHT-ONLY PERIPHERAL VISION    Metabolic encephalopathy 03/25/2021    Mixed hyperlipidemia 09/16/2016    Mixed incontinence     Obesity     Osteoarthritis     Osteopenia     Osteoporosis     Panic disorder     Personal history of urinary calculi     PONV (postoperative nausea and vomiting)     Scoliosis     Sepsis, unspecified organism 03/25/2021    Tachycardia, unspecified     Ventricular tachycardia 09/09/2019    Visual impairment     Wrist fracture     RIGHT-CURRENTLY NOT WEARING BRACE FOR THIS        Past Surgical History:   Procedure Laterality Date    ADENOIDECTOMY      BREAST BIOPSY Left     benign    BREAST LUMPECTOMY Left     benign    BUNIONECTOMY Right     COLONOSCOPY N/A 11/30/2016    Procedure: COLONOSCOPY polypectomy;  Surgeon: Adali Willard MD;  Location: [facility] OR;  Service:     CYSTOSCOPY BOTOX INJECTION OF BLADDER N/A 07/21/2017    Procedure: CYSTOSCOPY COAPTITE INJECTION;  Surgeon: Kevon Clark MD;  Location: [facility] OR;  Service:     CYSTOSCOPY URETEROSCOPY LASER LITHOTRIPSY Right 05/01/2023    Procedure: RIGHT URETEROSCOPY LASER LITHOTRIPSY STONE BASKET EXTRACTION AND STENT;  Surgeon: Kevon Clark MD;  Location: [facility] OR;  Service: Urology;  Laterality: Right;    CYSTOSCOPY W/ LITHOLAPAXY / EHL      CYSTOSCOPY W/ URETERAL STENT PLACEMENT Left 09/12/2016    Procedure: CYSTOSCOPY URETERAL STENT INSERTION;  Surgeon: Kevon Clark MD;  Location: [facility] OR;  Service:     CYSTOSCOPY W/ URETERAL STENT PLACEMENT Left 08/01/2019    Procedure: CYSTOSCOPY URETERAL CATHETER/STENT INSERTION;  Surgeon: Kevon Clark MD;  Location: [facility] OR;  Service: Urology    CYSTOSCOPY W/  URETERAL STENT PLACEMENT Left 03/25/2021    Procedure: CYSTOSCOPY URETERAL CATHETER/STENT INSERTION;  Surgeon: Kevon Clark MD;  Location: [facility] OR;  Service: Urology;  Laterality: Left;    CYSTOSCOPY W/ URETERAL STENT PLACEMENT Left 07/11/2021    Procedure: CYSTOSCOPY URETERAL CATHETER/STENT INSERTION;  Surgeon: Lul Guzman MD;  Location: [facility] OR;  Service: Urology;  Laterality: Left;  CYSTOSCOPY LEFT URETERAL CATHETER/ STENT PLACEMENT    CYSTOSCOPY W/ URETERAL STENT PLACEMENT Right 04/20/2023    Procedure: CYSTOSCOPY STENT PLACEMENT;  Surgeon: Matthew Ndiaye MD;  Location: [facility] OR;  Service: Urology;  Laterality: Right;    EYE SURGERY      JOINT REPLACEMENT Right     total knee    LAMINECTOMY  11/2022    LUMBAR EPIDURAL INJECTION      TONSILLECTOMY AND ADENOIDECTOMY      TUBAL ABDOMINAL LIGATION      URETEROSCOPY LASER LITHOTRIPSY WITH STENT INSERTION Left 10/05/2016    Procedure: LT URETEROSCOPY LASER LITHOTRIPSY STONE BASKET EXTRACTION AND STENT ;  Surgeon: Kevon Clark MD;  Location: [facility];  Service:     URETEROSCOPY LASER LITHOTRIPSY WITH STENT INSERTION Left 07/23/2021    Procedure: LEFT URETEROSCOPY STONE MANIPULATION STENT EXCHANGE, LASER LITHOTRIPSY, CYSTOSCOPY, STONE BASKET EXTRACTION;  Surgeon: Kevon Clark MD;  Location: [facility];  Service: Urology;  Laterality: Left;        PT Ortho       Row Name 07/18/24 1400       Subjective    Subjective Comments pt with no new complaints and states she is still very nervous about walking with her cane  -       Precautions and Contraindications    Precautions/Limitations spinal precautions  -              User Key  (r) = Recorded By, (t) = Taken By, (c) = Cosigned By      Initials Name Provider Type    Sesar De La Cruz, PTA Physical Therapist Assistant                                       OP Exercises       Row Name 07/19/24 0805 07/18/24 1400          Precautions    Existing Precautions/Restrictions  -- spinal  -        Subjective    Subjective Comments -- pt with no new complaints and states she is still very nervous about walking with her cane  -        Total Minutes    26244 - Gait Training Minutes  20  -MH --     75440 - PT Therapeutic Exercise Minutes 30  -MH --        Exercise 1    Exercise Name 1 -- Seated HS/calf stretch with strap  -     Cueing 1 -- Verbal;Tactile;[facility]     Reps 1 -- 5 each leg  -     Time 1 -- 10 sec  -        Exercise 2    Exercise Name 2 -- seated calf raise/ball squeeze  -     Cueing 2 -- Verbal;Tactile;Demo  -     Reps 2 -- 20  -     Time 2 -- 5 sec  -        Exercise 3    Exercise Name 3 -- PPT  -        Exercise 4    Exercise Name 4 -- PPT with hooklying ball squeeze  -        Exercise 5    Exercise Name 5 -- PPT with supine clam shells vs   -        Exercise 6    Exercise Name 6 -- GS  -        Exercise 8    Exercise Name 8 -- seated rows vs TB  -     Cueing 8 -- Verbal;Tactile;Demo  [facility]     Reps 8 -- 20  -     Time 8 -- 5 sec  -     Additional Comments -- green  -        Exercise 9    Exercise Name 9 -- PPT with march  -        Exercise 10    Exercise Name 10 -- standing hip abduction  -     Cueing 10 -- Verbal;Tactile;Demo  [facility]     Reps 10 -- 10 each leg  -     Time 10 -- 1#  -        Exercise 11    Exercise Name 11 -- standing hip extension  -     Cueing 11 -- Verbal;Tactile;[facility]     Reps 11 -- 10 each leg  -     Time 11 -- 1#  -        Exercise 12    Exercise Name 12 -- sit to stand  -     Cueing 12 -- Verbal;Tactile;Demo  -     Time 12 -- 5  -     Additional Comments -- 1 hand on armrest & SBA.  -        Exercise 13    Exercise Name 13 -- standing HS curls vs TB  -     Cueing 13 -- Verbal;Tactile;[facility]     Reps 13 -- 10 each  -     Time 13 -- 1#  -        Exercise 15    Exercise Name 15 -- Gait training with Hurry cane in parallel bars  -     Time 15 -- 20 minutes  -        Exercise 16     Exercise Name 16 -- marching on balance pad  -     Cueing 16 -- Verbal;Tactile;Demo  -     Reps 16 -- 10 each  -     Time 16 -- 1#  -        Exercise 17    Exercise Name 17 -- forward step ups on balance pad  -     Cueing 17 -- Verbal;Tactile;Demo  -     Reps 17 -- 10 each  -MH               User Key  (r) = Recorded By, (t) = Taken By, (c) = Cosigned By      Initials Name Provider Type     Sesar Desir PTA Physical Therapist Assistant                                     Therapy Education  Given: HEP, Symptoms/condition management, Mobility training  Program: Reinforced  How Provided: Verbal, Demonstration  Provided to: Patient, Caregiver (pt's  present)  Level of Understanding: Teach back education performed, Verbalized, Demonstrated              Time Calculation:   Start Time: 1355  Stop Time: 1455  Time Calculation (min): 60 min  Timed Charges  71387 - PT Therapeutic Exercise Minutes: 30  14998 - Gait Training Minutes : 20  Total Minutes  Timed Charges Total Minutes: 50   Total Minutes: 50  Therapy Charges for Today       Code Description Service Date Service Provider Modifiers Qty    93176986090 HC PT THER PROC EA 15 MIN 7/18/2024 Sesar Desir PTA GP 2    [id number] HC GAIT TRAINING EA 15 MIN 7/18/2024 Sesar Desir PTA GP 1                      Sesar Desir PTA  7/19/2024

## 2024-07-23 RX ORDER — IMIPRAMINE HCL 50 MG
100 TABLET ORAL NIGHTLY
Qty: 180 TABLET | Refills: 0 | Status: SHIPPED | OUTPATIENT
Start: 2024-07-23

## 2024-07-26 ENCOUNTER — HOSPITAL ENCOUNTER (OUTPATIENT)
Dept: PHYSICAL THERAPY | Facility: HOSPITAL | Age: 84
Setting detail: THERAPIES SERIES
Discharge: HOME | End: 2024-07-26
Payer: MEDICARE

## 2024-07-26 DIAGNOSIS — M54.16 LUMBAR RADICULOPATHY: Primary | ICD-10-CM

## 2024-07-26 DIAGNOSIS — Z98.1 S/P LUMBAR SPINAL FUSION: ICD-10-CM

## 2024-07-26 PROCEDURE — 97116 GAIT TRAINING THERAPY: CPT

## 2024-07-26 PROCEDURE — 97110 THERAPEUTIC EXERCISES: CPT

## 2024-07-26 NOTE — THERAPY TREATMENT NOTE
Outpatient Physical Therapy Ortho Treatment Note  GORDO Loco     Patient Name: Anitra rOta  : 1940  MRN: [id number]  Today's Date: 2024      Visit Date: 2024    Visit Dx:    ICD-10-CM ICD-9-CM   1. Lumbar radiculopathy  M54.16 724.4   2. S/P lumbar spinal fusion  Z98.1 V45.4       Patient Active Problem List   Diagnosis    Urinary tract infection due to ESBL Klebsiella    Simple renal cyst    Former smoker    Hyperlipidemia    Osteoporosis    Panic disorder    Psoriasis    Urge incontinence of urine    Vitamin D deficiency    Post-menopause    Acute UTI (urinary tract infection)    Chronic bilateral low back pain without sciatica    Spinal stenosis of lumbar region with neurogenic claudication    Other chronic pain    Mixed incontinence    GERD without esophagitis    Anxiety    Hyperglycemia    e.coli bacteremia    Ureteral stone with hydronephrosis    Obstructive uropathy    Nephrolithiasis    Anemia    Metabolic acidosis    Essential hypertension    History of ventricular tachycardia    Left ureteral stone    Chronic idiopathic constipation    Personal history of colonic polyps        Past Medical History:   Diagnosis Date    Acute kidney failure 2021    Allergic     weather allergies    Anxiety     Arthritis of back     At risk for sleep apnea     Cataract     BILAT-SCHEDULED FOR THIS AND HAD TO CANCEL D/T SEPSIS    Chronic pain disorder     ALL OVER PAIN    Chronic UTI     Closed displaced fracture of surgical neck of left humerus 2021    Closed fracture of left proximal humerus 06/15/2021    Closed fracture of right distal radius 06/15/2021    Colon polyp     COVID-19 vaccine series completed     2ND DOSE 3/23/21    DDD (degenerative disc disease), lumbar     e.coli bacteremia 2021    Elevated cholesterol     Fracture, humerus     LEFT. h/o    GERD (gastroesophageal reflux disease)     History of recent fall     21    History of sepsis     MOST RECENT  JULY 2021-R/T KIDNEY STONE, UTI.  STATES THIS IS HER 3RD SEPSIS DIAGNOSIS    History of UTI     HL (hearing loss)     HTN (hypertension)     Hyperlipidemia     Hypokalemia 03/25/2021    Kidney stones     LEFT    Left humeral fracture 07/11/2021    Low back pain     Macular degeneration, bilateral     WORSE ON RIGHT-ONLY PERIPHERAL VISION    Metabolic encephalopathy 03/25/2021    Mixed hyperlipidemia 09/16/2016    Mixed incontinence     Obesity     Osteoarthritis     Osteopenia     Osteoporosis     Panic disorder     Personal history of urinary calculi     PONV (postoperative nausea and vomiting)     Scoliosis     Sepsis, unspecified organism 03/25/2021    Tachycardia, unspecified     Ventricular tachycardia 09/09/2019    Visual impairment     Wrist fracture     RIGHT-CURRENTLY NOT WEARING BRACE FOR THIS        Past Surgical History:   Procedure Laterality Date    ADENOIDECTOMY      BREAST BIOPSY Left     benign    BREAST LUMPECTOMY Left     benign    BUNIONECTOMY Right     COLONOSCOPY N/A 11/30/2016    Procedure: COLONOSCOPY polypectomy;  Surgeon: Adali Willard MD;  Location: [facility] OR;  Service:     CYSTOSCOPY BOTOX INJECTION OF BLADDER N/A 07/21/2017    Procedure: CYSTOSCOPY COAPTITE INJECTION;  Surgeon: Kevon Clark MD;  Location: [facility] OR;  Service:     CYSTOSCOPY URETEROSCOPY LASER LITHOTRIPSY Right 05/01/2023    Procedure: RIGHT URETEROSCOPY LASER LITHOTRIPSY STONE BASKET EXTRACTION AND STENT;  Surgeon: Kevon Clark MD;  Location: [facility] OR;  Service: Urology;  Laterality: Right;    CYSTOSCOPY W/ LITHOLAPAXY / EHL      CYSTOSCOPY W/ URETERAL STENT PLACEMENT Left 09/12/2016    Procedure: CYSTOSCOPY URETERAL STENT INSERTION;  Surgeon: Kevon Clark MD;  Location: [facility] OR;  Service:     CYSTOSCOPY W/ URETERAL STENT PLACEMENT Left 08/01/2019    Procedure: CYSTOSCOPY URETERAL CATHETER/STENT INSERTION;  Surgeon: Kevon Clark MD;  Location: [facility] OR;  Service: Urology    CYSTOSCOPY W/  URETERAL STENT PLACEMENT Left 03/25/2021    Procedure: CYSTOSCOPY URETERAL CATHETER/STENT INSERTION;  Surgeon: Kevon Clark MD;  Location: [facility] OR;  Service: Urology;  Laterality: Left;    CYSTOSCOPY W/ URETERAL STENT PLACEMENT Left 07/11/2021    Procedure: CYSTOSCOPY URETERAL CATHETER/STENT INSERTION;  Surgeon: Lul Guzman MD;  Location: [facility] OR;  Service: Urology;  Laterality: Left;  CYSTOSCOPY LEFT URETERAL CATHETER/ STENT PLACEMENT    CYSTOSCOPY W/ URETERAL STENT PLACEMENT Right 04/20/2023    Procedure: CYSTOSCOPY STENT PLACEMENT;  Surgeon: Matthew Ndiaye MD;  Location: [facility] OR;  Service: Urology;  Laterality: Right;    EYE SURGERY      JOINT REPLACEMENT Right     total knee    LAMINECTOMY  11/2022    LUMBAR EPIDURAL INJECTION      TONSILLECTOMY AND ADENOIDECTOMY      TUBAL ABDOMINAL LIGATION      URETEROSCOPY LASER LITHOTRIPSY WITH STENT INSERTION Left 10/05/2016    Procedure: LT URETEROSCOPY LASER LITHOTRIPSY STONE BASKET EXTRACTION AND STENT ;  Surgeon: Kevon Clark MD;  Location: [facility];  Service:     URETEROSCOPY LASER LITHOTRIPSY WITH STENT INSERTION Left 07/23/2021    Procedure: LEFT URETEROSCOPY STONE MANIPULATION STENT EXCHANGE, LASER LITHOTRIPSY, CYSTOSCOPY, STONE BASKET EXTRACTION;  Surgeon: Kevon Clark MD;  Location: [facility];  Service: Urology;  Laterality: Left;        PT Ortho       Row Name 07/26/24 1400       Subjective    Subjective Comments pt states her back and side continue to hurt; pt reports she is not walking much with her cane at home because she is still too afraid  -       Precautions and Contraindications    Precautions/Limitations spinal precautions  -       Subjective Pain    Able to rate subjective pain? yes  -    Pre-Treatment Pain Level 4  -              User Key  (r) = Recorded By, (t) = Taken By, (c) = Cosigned By      Initials Name Provider Type    Sesar De La Cruz, PTA Physical Therapist Assistant                                  PT Assessment/Plan       Row Name 07/26/24 1533          PT Assessment    Assessment Comments pt continues to report low back and side pain with minimal to no change; pt's gait with cane continues to be limited due to pt fear of falling; continues to ambulate with walker; requires cues for safety with transfers especially as pt fatigues  -        PT Plan    PT Plan Comments Cont per pOC  -               User Key  (r) = Recorded By, (t) = Taken By, (c) = Cosigned By      Initials Name Provider Type     Sesar Desir, PTA Physical Therapist Assistant                       OP Exercises       Row Name 07/26/24 1535 07/26/24 1400          Subjective    Subjective Comments -- pt states her back and side continue to hurt; pt reports she is not walking much with her cane at home because she is still too afraid  -        Subjective Pain    Able to rate subjective pain? -- yes  -     Pre-Treatment Pain Level -- 4  -        Total Minutes    24606 - Gait Training Minutes  10  - --     53823 - PT Therapeutic Exercise Minutes 35  - --        Exercise 1    Exercise Name 1 -- (B) seated gastroc/hamstring stretch  -     Cueing 1 -- Verbal;Tactile;[facility]     Reps 1 -- 5  -     Time 1 -- 10 sec  -     Additional Comments -- cues to hold stretch  -        Exercise 2    Exercise Name 2 -- seated ball squeeze  -     Cueing 2 -- Verbal  -     Reps 2 -- 20  -     Time 2 -- 5 sec  -        Exercise 3    Exercise Name 3 -- alt march w/abdominal brace  -     Cueing 3 -- Verbal;Demo  [facility]     Reps 3 -- 10  -        Exercise 4    Exercise Name 4 -- seated rows vs TB  -     Cueing 4 -- Verbal;Demo  [facility]     Reps 4 -- 20  -     Time 4 -- green  -        Exercise 5    Exercise Name 5 -- sit to stand  -     Cueing 5 -- Verbal;Tactile;[facility]     Sets 5 -- red arm chair  -     Reps 5 -- 10  -     Additional Comments -- no UE assist initially then single UE support as pt  "fatigued  -        Exercise 8    Exercise Name 8 -- standing (B) hip ABD  -MH     Cueing 8 -- Verbal;Demo  -MH     Reps 8 -- 10  -MH        Exercise 9    Exercise Name 9 -- (B) hamstring curl  -MH     Cueing 9 -- Verbal;Demo  -MH     Reps 9 -- 10  -MH        Exercise 10    Exercise Name 10 -- (B) hip extension  -MH     Cueing 10 -- Verbal;Tactile;Demo  -     Reps 10 -- 10  -MH        Exercise 11    Exercise Name 11 -- alt march - foam pad  -     Cueing 11 -- Verbal  -MH     Sets 11 -- 2  -MH     Reps 11 -- 10  -MH        Exercise 12    Exercise Name 12 -- 4\" forward step up  -MH     Cueing 12 -- Verbal;Demo  -     Reps 12 -- 2  -MH     Time 12 -- 5  -MH        Exercise 13    Exercise Name 13 -- Gait: SPC and CGA/min(A) x 10 ft prior to standing ex's then additional 20 ft after.  Pt anxious and fearful with gait, pace varies along with step length.  Pt with occasional sway backwards as she becomes more anxious.  -               User Key  (r) = Recorded By, (t) = Taken By, (c) = Cosigned By      Initials Name Provider Type     Sesar Desir PTA Physical Therapist Assistant                                     Therapy Education  Given: HEP, Symptoms/condition management, Mobility training  Program: Reinforced, Progressed  How Provided: Verbal, Demonstration  Provided to: Patient, Caregiver (pt's  present)  Level of Understanding: Teach back education performed, Verbalized              Time Calculation:   Start Time: 1400  Stop Time: 1458  Time Calculation (min): 58 min  Timed Charges  39853 - PT Therapeutic Exercise Minutes: 35  44493 - Gait Training Minutes : 10  Total Minutes  Timed Charges Total Minutes: 45   Total Minutes: 45  Therapy Charges for Today       Code Description Service Date Service Provider Modifiers Qty    01750314290 HC PT THER PROC EA 15 MIN 7/26/2024 Sesar Desir PTA GP 2    63690366952 HC GAIT TRAINING EA 15 MIN 7/26/2024 Sesar Desir PTA GP 1              "         Sesar Desir, PTA  7/26/2024

## 2024-07-29 DIAGNOSIS — I10 ESSENTIAL HYPERTENSION: ICD-10-CM

## 2024-07-29 DIAGNOSIS — E78.2 MIXED HYPERLIPIDEMIA: Chronic | ICD-10-CM

## 2024-07-29 RX ORDER — SIMVASTATIN 40 MG
40 TABLET ORAL NIGHTLY
Qty: 90 TABLET | Refills: 1 | Status: SHIPPED | OUTPATIENT
Start: 2024-07-29

## 2024-07-29 RX ORDER — METOPROLOL SUCCINATE 50 MG/1
50 TABLET, EXTENDED RELEASE ORAL DAILY
Qty: 90 TABLET | Refills: 1 | Status: SHIPPED | OUTPATIENT
Start: 2024-07-29

## 2024-08-01 ENCOUNTER — HOSPITAL ENCOUNTER (OUTPATIENT)
Dept: PHYSICAL THERAPY | Facility: HOSPITAL | Age: 84
Setting detail: THERAPIES SERIES
Discharge: HOME | End: 2024-08-01
Payer: MEDICARE

## 2024-08-01 DIAGNOSIS — M54.16 LUMBAR RADICULOPATHY: Primary | ICD-10-CM

## 2024-08-01 DIAGNOSIS — Z98.1 S/P LUMBAR SPINAL FUSION: ICD-10-CM

## 2024-08-01 PROCEDURE — 97116 GAIT TRAINING THERAPY: CPT

## 2024-08-01 PROCEDURE — 97110 THERAPEUTIC EXERCISES: CPT

## 2024-08-01 NOTE — THERAPY TREATMENT NOTE
Outpatient Physical Therapy Ortho Treatment Note  GORDO Loco     Patient Name: Anitra Orta  : 1940  MRN: [id number]  Today's Date: 2024      Visit Date: 2024    Visit Dx:    ICD-10-CM ICD-9-CM   1. Lumbar radiculopathy  M54.16 724.4   2. S/P lumbar spinal fusion  Z98.1 V45.4       Patient Active Problem List   Diagnosis    Urinary tract infection due to ESBL Klebsiella    Simple renal cyst    Former smoker    Hyperlipidemia    Osteoporosis    Panic disorder    Psoriasis    Urge incontinence of urine    Vitamin D deficiency    Post-menopause    Acute UTI (urinary tract infection)    Chronic bilateral low back pain without sciatica    Spinal stenosis of lumbar region with neurogenic claudication    Other chronic pain    Mixed incontinence    GERD without esophagitis    Anxiety    Hyperglycemia    e.coli bacteremia    Ureteral stone with hydronephrosis    Obstructive uropathy    Nephrolithiasis    Anemia    Metabolic acidosis    Essential hypertension    History of ventricular tachycardia    Left ureteral stone    Chronic idiopathic constipation    Personal history of colonic polyps        Past Medical History:   Diagnosis Date    Acute kidney failure 2021    Allergic     weather allergies    Anxiety     Arthritis of back     At risk for sleep apnea     Cataract     BILAT-SCHEDULED FOR THIS AND HAD TO CANCEL D/T SEPSIS    Chronic pain disorder     ALL OVER PAIN    Chronic UTI     Closed displaced fracture of surgical neck of left humerus 2021    Closed fracture of left proximal humerus 06/15/2021    Closed fracture of right distal radius 06/15/2021    Colon polyp     COVID-19 vaccine series completed     2ND DOSE 3/23/21    DDD (degenerative disc disease), lumbar     e.coli bacteremia 2021    Elevated cholesterol     Fracture, humerus     LEFT. h/o    GERD (gastroesophageal reflux disease)     History of recent fall     21    History of sepsis     MOST RECENT JULY  2021-R/T KIDNEY STONE, UTI.  STATES THIS IS HER 3RD SEPSIS DIAGNOSIS    History of UTI     HL (hearing loss)     HTN (hypertension)     Hyperlipidemia     Hypokalemia 03/25/2021    Kidney stones     LEFT    Left humeral fracture 07/11/2021    Low back pain     Macular degeneration, bilateral     WORSE ON RIGHT-ONLY PERIPHERAL VISION    Metabolic encephalopathy 03/25/2021    Mixed hyperlipidemia 09/16/2016    Mixed incontinence     Obesity     Osteoarthritis     Osteopenia     Osteoporosis     Panic disorder     Personal history of urinary calculi     PONV (postoperative nausea and vomiting)     Scoliosis     Sepsis, unspecified organism 03/25/2021    Tachycardia, unspecified     Ventricular tachycardia 09/09/2019    Visual impairment     Wrist fracture     RIGHT-CURRENTLY NOT WEARING BRACE FOR THIS        Past Surgical History:   Procedure Laterality Date    ADENOIDECTOMY      BREAST BIOPSY Left     benign    BREAST LUMPECTOMY Left     benign    BUNIONECTOMY Right     COLONOSCOPY N/A 11/30/2016    Procedure: COLONOSCOPY polypectomy;  Surgeon: Adali Willard MD;  Location: [facility] OR;  Service:     CYSTOSCOPY BOTOX INJECTION OF BLADDER N/A 07/21/2017    Procedure: CYSTOSCOPY COAPTITE INJECTION;  Surgeon: Kevon Clark MD;  Location: [facility] OR;  Service:     CYSTOSCOPY URETEROSCOPY LASER LITHOTRIPSY Right 05/01/2023    Procedure: RIGHT URETEROSCOPY LASER LITHOTRIPSY STONE BASKET EXTRACTION AND STENT;  Surgeon: Kevon Clark MD;  Location: [facility] OR;  Service: Urology;  Laterality: Right;    CYSTOSCOPY W/ LITHOLAPAXY / EHL      CYSTOSCOPY W/ URETERAL STENT PLACEMENT Left 09/12/2016    Procedure: CYSTOSCOPY URETERAL STENT INSERTION;  Surgeon: Kevon Clark MD;  Location: [facility] OR;  Service:     CYSTOSCOPY W/ URETERAL STENT PLACEMENT Left 08/01/2019    Procedure: CYSTOSCOPY URETERAL CATHETER/STENT INSERTION;  Surgeon: Kevon Clark MD;  Location: [facility] OR;  Service: Urology    CYSTOSCOPY W/  URETERAL STENT PLACEMENT Left 03/25/2021    Procedure: CYSTOSCOPY URETERAL CATHETER/STENT INSERTION;  Surgeon: Kevon Clark MD;  Location: [facility] OR;  Service: Urology;  Laterality: Left;    CYSTOSCOPY W/ URETERAL STENT PLACEMENT Left 07/11/2021    Procedure: CYSTOSCOPY URETERAL CATHETER/STENT INSERTION;  Surgeon: Lul Guzman MD;  Location: [facility] OR;  Service: Urology;  Laterality: Left;  CYSTOSCOPY LEFT URETERAL CATHETER/ STENT PLACEMENT    CYSTOSCOPY W/ URETERAL STENT PLACEMENT Right 04/20/2023    Procedure: CYSTOSCOPY STENT PLACEMENT;  Surgeon: Matthew Ndiaye MD;  Location: [facility] OR;  Service: Urology;  Laterality: Right;    EYE SURGERY      JOINT REPLACEMENT Right     total knee    LAMINECTOMY  11/2022    LUMBAR EPIDURAL INJECTION      TONSILLECTOMY AND ADENOIDECTOMY      TUBAL ABDOMINAL LIGATION      URETEROSCOPY LASER LITHOTRIPSY WITH STENT INSERTION Left 10/05/2016    Procedure: LT URETEROSCOPY LASER LITHOTRIPSY STONE BASKET EXTRACTION AND STENT ;  Surgeon: Kevon Clark MD;  Location: [facility];  Service:     URETEROSCOPY LASER LITHOTRIPSY WITH STENT INSERTION Left 07/23/2021    Procedure: LEFT URETEROSCOPY STONE MANIPULATION STENT EXCHANGE, LASER LITHOTRIPSY, CYSTOSCOPY, STONE BASKET EXTRACTION;  Surgeon: Kevon Clark MD;  Location: [facility];  Service: Urology;  Laterality: Left;        PT Ortho       Row Name 08/01/24 1300       Subjective    Subjective Comments pt reports continued pain into (R)  side and hip; pt continues to express frustration with limited mobility but is afraid of falling  -MH       Precautions and Contraindications    Precautions/Limitations spinal precautions  -       Gait/Stairs (Locomotion)    Comment, (Gait/Stairs) --  -              User Key  (r) = Recorded By, (t) = Taken By, (c) = Cosigned By      Initials Name Provider Type    Sesar De La Cruz, PTA Physical Therapist Assistant                                 PT  Assessment/Plan       Row Name 08/01/24 1522          PT Assessment    Assessment Comments pt with continued complaints of (R) side and hip pain but showed increased tolerance to ex's today; had pt focus on engaging abs with all sitting ex's; pt continues to be fearful and anxious with gait, expressing frustration with her limited mobility; however, pt did show increased balance, speed and step length with gait today  -        PT Plan    PT Plan Comments Cont per POC, pt returns to MD next week  -               User Key  (r) = Recorded By, (t) = Taken By, (c) = Cosigned By      Initials Name Provider Type     Sesar Desir, PTA Physical Therapist Assistant                       OP Exercises       Row Name 08/01/24 1529 08/01/24 1300          Subjective    Subjective Comments -- pt reports continued pain into (R)  side and hip; pt continues to express frustration with limited mobility but is afraid of falling  -        Total Minutes    54199 - Gait Training Minutes  15  -MH --     39425 - PT Therapeutic Exercise Minutes 30  -MH --        Exercise 1    Exercise Name 1 -- (B) seated gastroc/hamstring stretch  -     Cueing 1 -- Verbal;Tactile;Demo  [facility]     Reps 1 -- 5  -     Time 1 -- 10 sec  -     Additional Comments -- spinal precautions  -        Exercise 2    Exercise Name 2 -- seated ball squeeze  -     Cueing 2 -- Verbal  -     Sets 2 -- ABDOMINAL BRACIN  -     Reps 2 -- 20  -     Time 2 -- 5 sec  -        Exercise 3    Exercise Name 3 -- alt march w/abdominal brace  -     Cueing 3 -- Verbal;Demo  -     Sets 3 -- ABDOMINAL BRACIN  -     Reps 3 -- 15  -        Exercise 4    Exercise Name 4 -- seated rows vs TB  -     Cueing 4 -- Verbal;Demo  [facility]     Sets 4 -- ABDOMINAL BRACIN  -     Reps 4 -- 20  -     Time 4 -- green  -        Exercise 5    Exercise Name 5 -- seated (B) shoulder extension  -     Cueing 5 -- Verbal;Tactile;Demo  [facility]     Sets 5 -- ABDOMINAL BRACIN  -   "   Reps 5 -- 20  -MH     Time 5 -- green  -MH        Exercise 6    Exercise Name 6 -- sit to stand  -        Exercise 8    Exercise Name 8 -- standing (B) hip ABD  -MH        Exercise 9    Exercise Name 9 -- (B) hamstring curl  -     Cueing 9 -- Verbal;Demo  -MH     Reps 9 -- 10  -MH        Exercise 10    Exercise Name 10 -- (B) hip extension  -MH        Exercise 11    Exercise Name 11 -- alt march - foam pad  -     Cueing 11 -- Verbal  -     Reps 11 -- 15  -MH        Exercise 12    Exercise Name 12 -- 4\" forward step up  -     Cueing 12 -- Verbal;Demo  -     Time 12 -- 10  -MH        Exercise 13    Exercise Name 13 -- Gait: Pt ambulated 30 ft x 2 reps with seated rest break between reps with SBQC and CGA/min(A). Pt with increased speed and step length with no notable LOB/backwards sway and only 2 occurences of hesitation.  -               User Key  (r) = Recorded By, (t) = Taken By, (c) = Cosigned By      Initials Name Provider Type     Sesar Desir PTA Physical Therapist Assistant                                     Therapy Education  Given: HEP, Symptoms/condition management, Mobility training  Program: Reinforced  How Provided: Verbal, Demonstration  Provided to: Patient  Level of Understanding: Teach back education performed, Verbalized, Demonstrated              Time Calculation:   Start Time: 1300  Stop Time: 1403  Time Calculation (min): 63 min  Timed Charges  93987 - PT Therapeutic Exercise Minutes: 30  42235 - Gait Training Minutes : 15  Total Minutes  Timed Charges Total Minutes: 45   Total Minutes: 45  Therapy Charges for Today       Code Description Service Date Service Provider Modifiers Qty    52369929237 HC PT THER PROC EA 15 MIN 8/1/2024 Sesar Desir PTA GP 2    32902813384 HC GAIT TRAINING EA 15 MIN 8/1/2024 Sesar Desir PTA GP 1                      Sesar Desir PTA  8/1/2024     "

## 2024-08-08 ENCOUNTER — HOSPITAL ENCOUNTER (OUTPATIENT)
Dept: PHYSICAL THERAPY | Facility: HOSPITAL | Age: 84
Setting detail: THERAPIES SERIES
Discharge: HOME | End: 2024-08-08
Payer: MEDICARE

## 2024-08-08 DIAGNOSIS — Z98.1 S/P LUMBAR SPINAL FUSION: ICD-10-CM

## 2024-08-08 DIAGNOSIS — M54.16 LUMBAR RADICULOPATHY: Primary | ICD-10-CM

## 2024-08-08 PROCEDURE — 97110 THERAPEUTIC EXERCISES: CPT

## 2024-08-08 PROCEDURE — 97116 GAIT TRAINING THERAPY: CPT

## 2024-08-08 NOTE — THERAPY RE-EVALUATION
Outpatient Physical Therapy Ortho Re-Evaluation  [facility]     Patient Name: Anitra Orta  : 1940  MRN: [id number]  Today's Date: 2024        Visit Date: 2024    Patient Active Problem List   Diagnosis    Urinary tract infection due to ESBL Klebsiella    Simple renal cyst    Former smoker    Hyperlipidemia    Osteoporosis    Panic disorder    Psoriasis    Urge incontinence of urine    Vitamin D deficiency    Post-menopause    Acute UTI (urinary tract infection)    Chronic bilateral low back pain without sciatica    Spinal stenosis of lumbar region with neurogenic claudication    Other chronic pain    Mixed incontinence    GERD without esophagitis    Anxiety    Hyperglycemia    e.coli bacteremia    Ureteral stone with hydronephrosis    Obstructive uropathy    Nephrolithiasis    Anemia    Metabolic acidosis    Essential hypertension    History of ventricular tachycardia    Left ureteral stone    Chronic idiopathic constipation    Personal history of colonic polyps        Past Medical History:   Diagnosis Date    Acute kidney failure 2021    Allergic     weather allergies    Anxiety     Arthritis of back     At risk for sleep apnea     Cataract     BILAT-SCHEDULED FOR THIS AND HAD TO CANCEL D/T SEPSIS    Chronic pain disorder     ALL OVER PAIN    Chronic UTI     Closed displaced fracture of surgical neck of left humerus 2021    Closed fracture of left proximal humerus 06/15/2021    Closed fracture of right distal radius 06/15/2021    Colon polyp     COVID-19 vaccine series completed     2ND DOSE 3/23/21    DDD (degenerative disc disease), lumbar     e.coli bacteremia 2021    Elevated cholesterol     Fracture, humerus     LEFT. h/o    GERD (gastroesophageal reflux disease)     History of recent fall     21    History of sepsis     MOST RECENT 2021-R/T KIDNEY STONE, UTI.  STATES THIS IS HER 3RD SEPSIS DIAGNOSIS    History of UTI     HL (hearing loss)     HTN  (hypertension)     Hyperlipidemia     Hypokalemia 03/25/2021    Kidney stones     LEFT    Left humeral fracture 07/11/2021    Low back pain     Macular degeneration, bilateral     WORSE ON RIGHT-ONLY PERIPHERAL VISION    Metabolic encephalopathy 03/25/2021    Mixed hyperlipidemia 09/16/2016    Mixed incontinence     Obesity     Osteoarthritis     Osteopenia     Osteoporosis     Panic disorder     Personal history of urinary calculi     PONV (postoperative nausea and vomiting)     Scoliosis     Sepsis, unspecified organism 03/25/2021    Tachycardia, unspecified     Ventricular tachycardia 09/09/2019    Visual impairment     Wrist fracture     RIGHT-CURRENTLY NOT WEARING BRACE FOR THIS        Past Surgical History:   Procedure Laterality Date    ADENOIDECTOMY      BREAST BIOPSY Left     benign    BREAST LUMPECTOMY Left     benign    BUNIONECTOMY Right     COLONOSCOPY N/A 11/30/2016    Procedure: COLONOSCOPY polypectomy;  Surgeon: Adali Willard MD;  Location: [facility] OR;  Service:     CYSTOSCOPY BOTOX INJECTION OF BLADDER N/A 07/21/2017    Procedure: CYSTOSCOPY COAPTITE INJECTION;  Surgeon: Kevon Clark MD;  Location: [facility];  Service:     CYSTOSCOPY URETEROSCOPY LASER LITHOTRIPSY Right 05/01/2023    Procedure: RIGHT URETEROSCOPY LASER LITHOTRIPSY STONE BASKET EXTRACTION AND STENT;  Surgeon: Kevon Clark MD;  Location: [facility] OR;  Service: Urology;  Laterality: Right;    CYSTOSCOPY W/ LITHOLAPAXY / EHL      CYSTOSCOPY W/ URETERAL STENT PLACEMENT Left 09/12/2016    Procedure: CYSTOSCOPY URETERAL STENT INSERTION;  Surgeon: Kevon Clark MD;  Location: [facility] OR;  Service:     CYSTOSCOPY W/ URETERAL STENT PLACEMENT Left 08/01/2019    Procedure: CYSTOSCOPY URETERAL CATHETER/STENT INSERTION;  Surgeon: Kevon Clark MD;  Location: [facility] OR;  Service: Urology    CYSTOSCOPY W/ URETERAL STENT PLACEMENT Left 03/25/2021    Procedure: CYSTOSCOPY URETERAL CATHETER/STENT INSERTION;  Surgeon: Eduardo  "Kevon POND MD;  Location: [facility];  Service: Urology;  Laterality: Left;    CYSTOSCOPY W/ URETERAL STENT PLACEMENT Left 07/11/2021    Procedure: CYSTOSCOPY URETERAL CATHETER/STENT INSERTION;  Surgeon: Lul Guzman MD;  Location: [facility] OR;  Service: Urology;  Laterality: Left;  CYSTOSCOPY LEFT URETERAL CATHETER/ STENT PLACEMENT    CYSTOSCOPY W/ URETERAL STENT PLACEMENT Right 04/20/2023    Procedure: CYSTOSCOPY STENT PLACEMENT;  Surgeon: Matthew Ndiaye MD;  Location: [facility] OR;  Service: Urology;  Laterality: Right;    EYE SURGERY      JOINT REPLACEMENT Right     total knee    LAMINECTOMY  11/2022    LUMBAR EPIDURAL INJECTION      TONSILLECTOMY AND ADENOIDECTOMY      TUBAL ABDOMINAL LIGATION      URETEROSCOPY LASER LITHOTRIPSY WITH STENT INSERTION Left 10/05/2016    Procedure: LT URETEROSCOPY LASER LITHOTRIPSY STONE BASKET EXTRACTION AND STENT ;  Surgeon: Kevon Clark MD;  Location: [facility];  Service:     URETEROSCOPY LASER LITHOTRIPSY WITH STENT INSERTION Left 07/23/2021    Procedure: LEFT URETEROSCOPY STONE MANIPULATION STENT EXCHANGE, LASER LITHOTRIPSY, CYSTOSCOPY, STONE BASKET EXTRACTION;  Surgeon: Kevon Clark MD;  Location: [facility];  Service: Urology;  Laterality: Left;       Visit Dx:     ICD-10-CM ICD-9-CM   1. Lumbar radiculopathy  M54.16 724.4   2. S/P lumbar spinal fusion  Z98.1 V45.4              PT Ortho       Row Name 08/08/24 1400       Subjective    Subjective Comments Pt reports pain comes and goes and is in R hip/abdomen and into groin and right thigh. \"I feel better when I lie down.\" Pt reports she is still having trouble with walking with cane and \"I feel like I can't get my right leg to move.\" Pt reports doing her exercises at home a little bit; doing more in bed. Pt reports her  changed her cane from a SBQC to a SPC because she kept kicking the SBQC. \"I am having a toe taken off next week and the doctor said once I recover, I will be able to " wear tennis shoes again.  -LN       Precautions and Contraindications    Precautions/Limitations spinal precautions  -LN       Subjective Pain    Able to rate subjective pain? yes  -LN    Pre-Treatment Pain Level 4  -LN       Posture/Observations    Posture/Observations Comments Pt came to PT via wheelchair.  -LN       Head/Neck/Trunk    Trunk Extension AROM 50% in sitting  -LN    Trunk Flexion AROM 75% in sitting  -LN    Trunk Lt Lateral Flexion AROM 75%- mild pain  -LN    Trunk Rt Lateral Flexion AROM WFL  -LN    Trunk Lt Rotation AROM 75%  -LN    Trunk Rt Rotation AROM 75%  -LN       MMT Neck/Trunk    Trunk Flexion MMT, Gross Movement (4-/5) good minus  -LN    Trunk Extension MMT, Gross Movement (3/5) fair  -LN       MMT Right Lower Ext    Rt Hip Flexion MMT, Gross Movement (4+/5) good plus  -LN    Rt Hip Extension MMT, Gross Movement (5/5) normal  -LN    Rt Hip ABduction MMT, Gross Movement (4+/5) good plus  -LN    Rt Hip ADduction MMT, Gross Movement (4/5) good  -LN    Rt Knee Extension MMT, Gross Movement (5/5) normal  -LN    Rt Knee Flexion MMT, Gross Movement (5/5) normal  -LN    Rt Ankle Plantarflexion MMT, Gross Movement (4+/5) good plus  -LN    Rt Ankle Dorsiflexion MMT, Gross Movement (4/5) good  -LN       MMT Left Lower Ext    Lt Hip Flexion MMT, Gross Movement (5/5) normal  -LN    Lt Hip Extension MMT, Gross Movement (5/5) normal  -LN    Lt Hip ABduction MMT, Gross Movement (4+/5) good plus  -LN    Lt Hip ADduction MMT, Gross Movement (4+/5) good plus  -LN    Lt Knee Extension MMT, Gross Movement (5/5) normal  -LN    Lt Knee Flexion MMT, Gross Movement (5/5) normal  -LN    Lt Ankle Plantarflexion MMT, Gross Movement (4+/5) good plus  -LN    Lt Ankle Dorsiflexion MMT, Gross Movement (4+/5) good plus  -LN       Sensation    Sensation WNL? WNL  -LN    Light Touch No apparent deficits  -LN    Additional Comments no c/o any N/T in legs  -LN       Lower Extremity Flexibility    Hamstrings Bilateral:;Mildly  limited;Moderately limited  -LN    ITB Bilateral:;Mildly limited;Moderately limited  -LN    Gastrocnemius Bilateral:;Mildly limited;Moderately limited  -LN    Soleus Bilateral:;Mildly limited  -LN       Transfers    Sit-Stand [place] (Transfers) standby assist  -LN    Stand-Sit [place] (Transfers) standby assist  -LN       Gait/Stairs (Locomotion)    [place] Level (Gait) contact guard;minimum assist (75% patient effort);verbal cues;nonverbal cues (demo/gesture)  -LN    Assistive Device (Gait) cane, straight  -LN    Distance in Feet (Gait) --  5 feet from wheelchair to chair and then 20 feet to stairs; 5 feet  -LN    Deviations/Abnormal Patterns (Gait) bilateral deviations;antalgic;base of support, wide;cristy decreased;gait speed decreased;stride length decreased;weight shifting decreased  -LN    Bilateral Gait Deviations forward flexed posture  -LN    Comment, (Gait/Stairs) Pt still very fearful with ambulating with SPC and CGA-minimal assist. Pt takes very guarded steps and has more difficulty stepping forward on R. Pt only able to tolerate about 15-20 feet max and when she gets close to destination, she starts to walk faster and with less control and reaches out; her balance decreases as she gets closer to destination. Pt appears very anxious with ambulating with SPC and shakey.  -LN              User Key  (r) = Recorded By, (t) = Taken By, (c) = Cosigned By      Initials Name Provider Type    Macrina Pinto, PT Physical Therapist                                Therapy Education  Education Details: Pt encouraged to work on her seated and supine exercises daily at home. Pt to continue to use FWW at this time for safety.  Given: HEP, Symptoms/condition management, Mobility training, Posture/body mechanics  Program: Reinforced  How Provided: Verbal, Demonstration  Provided to: Patient, Caregiver ( present)  Level of Understanding: Teach back education performed, Verbalized,  Demonstrated      PT OP Goals       Row Name 08/08/24 1300          PT Short Term Goals    STG Date to Achieve 08/22/24  -LN     STG 1 Pt to verbally report decreased LBP and R hip pain to <4/10 with ADLs and everyday activities.  -LN     STG 1 Progress Ongoing;Progressing  -LN     STG 1 Progress Comments She rated pain at 4/10 today.  -LN     STG 2 Pt independent with initial HEP issued by therapist.  -LN     STG 2 Progress Met  -LN     STG 3 Trunk ROM improved by 25%.  -LN     STG 3 Progress Met  -LN     STG 4 R LE strength improved by 1/2 mm grade.  -LN     STG 4 Progress Met  -LN     STG 5 Pt able to ambulate with SPC/QC home distances with only minimal antalgic gait and good balance noted.  -LN     STG 5 Progress Ongoing  -LN     STG 5 Progress Comments Pt still has difficulty and fear with ambulating with cane; needs continued practice. She can only tolerate 15-20 feet max & needs CGA/min A and still not safe.  -LN     STG 6 Pt able to tolerate standing for 15-20 minutes without any c/o increased pain in LB or R hip.  -LN     STG 6 Progress Ongoing  -LN     STG 6 Progress Comments Pt still with very limited standing tolerance.  -LN        Long Term Goals    LTG Date to Achieve 09/05/24  -LN     LTG 1 Pt to verbally report decreased LBP and R hip pain to <2/10 with ADLs and everyday activities.  -LN     LTG 1 Progress Ongoing;Progressing  -LN     LTG 1 Progress Comments She rated pain at 4/10 today.  -LN     LTG 2 Pt independent with more advanced HEP issued by therapist.  -LN     LTG 2 Progress Ongoing;Progressing  -LN     LTG 3 B LE strength improved to 4+-5/5.  -LN     LTG 3 Progress Partially Met;Ongoing;Progressing  -LN     LTG 3 Progress Comments met for all planes except R hip adduction and ankle df  -LN     LTG 4 Core strength improved to 4/5 when tested.  -LN     LTG 4 Progress Ongoing;Progressing  -LN     LTG 4 Progress Comments still decreased, but flexion is now improved to 4-/5 & extension 3/5.   -LN     LTG 5 Pt able to ambulate with SPC/QC home and short community distances with only nominal antalgic gait & good balance noted.  -LN     LTG 5 Progress Ongoing  -LN     LTG 5 Progress Comments Pt still has difficulty and fear with ambulating with cane; needs continued practice. She can only tolerate 15-20 feet max & needs CGA/min A and still not safe.  -LN     LTG 6 Pt able to tolerate standing for 20-30 minutes without any c/o increased pain in LB or R hip.  -LN     LTG 6 Progress Ongoing  -LN     LTG 6 Progress Comments Pt still with very limited standing tolerance.  -LN        Time Calculation    PT Goal Re-Cert Due Date 09/05/24  -LN               User Key  (r) = Recorded By, (t) = Taken By, (c) = Cosigned By      Initials Name Provider Type    Macrina Pinto, PT Physical Therapist                     PT Assessment/Plan       Row Name 08/08/24 1400          PT Assessment    Assessment Comments Pt is progressing nicely with PT with overall decreased pain but continues to report pain in R hip/abdomen and into groin & right thigh. Pt with improved core and B LE strength but still with weakness primarily in core and R hip adductor and ankle df. Pt with improved flexibility in B legs. Pt is coming sit to stand much better but she still has very limited standing tolerance. She is doing well with exercises and HEP but unsure how often she does exercises at home. Pt ambulates well with FWW but still comes to PT in wheelchair. She is still having a lot of difficulty ambulating with SPC/SBQC and is very fearful and anxious. She takes guarded steps and has more trouble bringing right leg forward; she can only do about 15-20 feet max with CGA/min A and when she gets close to her destination, she picks up her speed and reaches out; becomes shakey and unsafe. She is definitely not safe ambulating with cane at this time.  She has met 3 out of 6 STG and has partially met 1 STG and has partially met 1 LTG. She  "is making progress towards all remaining goals. She will benefit from continued PT to progress with core and LE strengthening and continue to work on gait and balance training; continue to work on ambulating with cane and increasing her activity tolerance. Pt is having foot surgery next week; having a toe taken off per patient, which should allow her to wear tennis shoes again, which I feel may make her ambulation better and safer.  -LN        PT Plan    PT Frequency 1x/week;2x/week  -LN     Predicted Duration of Therapy Intervention (PT) 4 weeks  -LN     PT Plan Comments Continue per POC; progress exercises as tolerated. Continue gait training with SPC/SBQC as tolerated. MH PRN. Gait training and balance exercises. Pt education. Continue towards all remaining goals. Pt is having foot surgery next week, so will hold PT for now until after she recovers from this surgery. Pt to call and schedule further appts. when she is ready.  -LN               User Key  (r) = Recorded By, (t) = Taken By, (c) = Cosigned By      Initials Name Provider Type    Macrina Pinto, PT Physical Therapist                       OP Exercises       Row Name 08/08/24 1400 08/08/24 1300          Precautions    Existing Precautions/Restrictions spinal  -LN --        Subjective    Subjective Comments Pt reports pain comes and goes and is in R hip/abdomen and into groin and right thigh. \"I feel better when I lie down.\" Pt reports she is still having trouble with walking with cane and \"I feel like I can't get my right leg to move.\" Pt reports doing her exercises at home a little bit; doing more in bed. Pt reports her  changed her cane from a SBQC to a SPC because she kept kicking the SBQC. \"I am having a toe taken off next week and the doctor said once I recover, I will be able to wear tennis shoes again.  -LN --        Subjective Pain    Able to rate subjective pain? yes  -LN --     Pre-Treatment Pain Level 4  -LN --        Total " "Minutes    11005 - Gait Training Minutes  -- 10  -LN     51251 - PT Therapeutic Exercise Minutes -- 35  -LN        Exercise 1    Exercise Name 1 -- (B) seated gastroc/hamstring stretch  -LN     Cueing 1 -- Verbal;Tactile;Demo  -LN     Reps 1 -- 5  -LN     Time 1 -- 10 sec  -LN     Additional Comments -- spinal precautions  -LN        Exercise 2    Exercise Name 2 -- seated ball squeeze  -LN     Cueing 2 -- Verbal  -LN     Sets 2 -- ABDOMINAL BRACIN  -LN     Reps 2 -- 20  -LN     Time 2 -- 5 sec  -LN        Exercise 3    Exercise Name 3 -- alt march w/abdominal brace  -LN     Cueing 3 -- Verbal;Demo  -LN     Sets 3 -- ABDOMINAL BRACIN  -LN     Reps 3 -- 15  -LN        Exercise 4    Exercise Name 4 -- seated rows vs TB  -LN     Cueing 4 -- Verbal;Demo  -LN     Sets 4 -- ABDOMINAL BRACIN  -LN     Reps 4 -- 20  -LN     Time 4 -- green  -LN        Exercise 5    Exercise Name 5 -- seated (B) shoulder extension  -LN     Cueing 5 -- Verbal;Tactile;Demo  -LN     Sets 5 -- ABDOMINAL BRACIN  -LN     Reps 5 -- 20  -LN     Time 5 -- green  -LN        Exercise 6    Exercise Name 6 -- sit to stand  -LN        Exercise 8    Exercise Name 8 -- standing (B) hip ABD  -LN        Exercise 9    Exercise Name 9 -- (B) hamstring curl  -LN     Cueing 9 -- Verbal;Demo  -LN     Reps 9 -- 10  -LN     Additional Comments -- while standing on balance pad  -LN        Exercise 10    Exercise Name 10 -- (B) hip extension  -LN        Exercise 11    Exercise Name 11 -- alt march - balance pad  -LN     Cueing 11 -- Verbal  -LN     Reps 11 -- 15  -LN        Exercise 12    Exercise Name 12 -- 4\" forward step up  -LN     Cueing 12 -- Verbal;Demo  -LN     Time 12 -- 10 each  -LN        Exercise 13    Exercise Name 13 -- Gait: worked on ambulation with SPC with CGA/minimal assist and cueing.  -LN               User Key  (r) = Recorded By, (t) = Taken By, (c) = Cosigned By      Initials Name Provider Type    Macrina Pinto, PT Physical Therapist "                                            Time Calculation:     Start Time: 1405  Stop Time: 1500  Time Calculation (min): 55 min  Timed Charges  15886 - PT Therapeutic Exercise Minutes: 35  89589 - Gait Training Minutes : 10  Total Minutes  Timed Charges Total Minutes: 45   Total Minutes: 45     Therapy Charges for Today       Code Description Service Date Service Provider Modifiers Qty    08630372855  PT THER PROC EA 15 MIN 8/8/2024 Macrina Owens, PT GP 2    [id number]  GAIT TRAINING EA 15 MIN 8/8/2024 Macrina Owens, PT GP 1                      Macrina Owens, PT  8/8/2024

## 2024-08-16 DIAGNOSIS — Z12.31 ENCOUNTER FOR SCREENING MAMMOGRAM FOR MALIGNANT NEOPLASM OF BREAST: Primary | ICD-10-CM

## 2024-08-16 DIAGNOSIS — R92.2 INCONCLUSIVE MAMMOGRAM: ICD-10-CM

## 2024-08-20 ENCOUNTER — LAB (OUTPATIENT)
Dept: INTERNAL MEDICINE | Facility: CLINIC | Age: 84
End: 2024-08-20
Payer: MEDICARE

## 2024-08-20 DIAGNOSIS — M81.0 AGE-RELATED OSTEOPOROSIS WITHOUT CURRENT PATHOLOGICAL FRACTURE: ICD-10-CM

## 2024-09-09 ENCOUNTER — LAB (OUTPATIENT)
Dept: INTERNAL MEDICINE | Facility: CLINIC | Age: 84
End: 2024-09-09
Payer: MEDICARE

## 2024-09-09 DIAGNOSIS — M81.0 AGE-RELATED OSTEOPOROSIS WITHOUT CURRENT PATHOLOGICAL FRACTURE: ICD-10-CM

## 2024-09-12 ENCOUNTER — HOSPITAL ENCOUNTER (OUTPATIENT)
Dept: PHYSICAL THERAPY | Facility: HOSPITAL | Age: 84
Setting detail: THERAPIES SERIES
Discharge: HOME | End: 2024-09-12
Payer: MEDICARE

## 2024-09-12 DIAGNOSIS — M54.16 LUMBAR RADICULOPATHY: Primary | ICD-10-CM

## 2024-09-12 DIAGNOSIS — Z98.1 S/P LUMBAR SPINAL FUSION: ICD-10-CM

## 2024-09-12 LAB
25(OH)D3+25(OH)D2 SERPL-MCNC: 52.8 NG/ML (ref 30–100)
ALBUMIN SERPL-MCNC: 4.2 G/DL (ref 3.5–5.2)
ALBUMIN/GLOB SERPL: 1.6 G/DL
ALP SERPL-CCNC: 132 U/L (ref 39–117)
ALT SERPL-CCNC: 15 U/L (ref 1–33)
AST SERPL-CCNC: 20 U/L (ref 1–32)
BILIRUB SERPL-MCNC: 0.3 MG/DL (ref 0–1.2)
BUN SERPL-MCNC: 22 MG/DL (ref 8–23)
BUN/CREAT SERPL: 19.8 (ref 7–25)
CA-I SERPL ISE-MCNC: 4.9 MG/DL (ref 4.5–5.6)
CALCIUM SERPL-MCNC: 9.4 MG/DL (ref 8.6–10.5)
CHLORIDE SERPL-SCNC: 104 MMOL/L (ref 98–107)
CO2 SERPL-SCNC: 26.7 MMOL/L (ref 22–29)
CREAT SERPL-MCNC: 1.11 MG/DL (ref 0.57–1)
EGFRCR SERPLBLD CKD-EPI 2021: 49.4 ML/MIN/1.73
GLOBULIN SER CALC-MCNC: 2.6 GM/DL
GLUCOSE SERPL-MCNC: 112 MG/DL (ref 65–99)
MAGNESIUM SERPL-MCNC: 2.2 MG/DL (ref 1.6–2.4)
PHOSPHATE SERPL-MCNC: 3.3 MG/DL (ref 2.5–4.5)
POTASSIUM SERPL-SCNC: 4.8 MMOL/L (ref 3.5–5.2)
PROT SERPL-MCNC: 6.8 G/DL (ref 6–8.5)
SODIUM SERPL-SCNC: 140 MMOL/L (ref 136–145)

## 2024-09-12 PROCEDURE — 97110 THERAPEUTIC EXERCISES: CPT

## 2024-09-12 PROCEDURE — 97116 GAIT TRAINING THERAPY: CPT

## 2024-09-12 NOTE — THERAPY RE-EVALUATION
Outpatient Physical Therapy Ortho Re-Evaluation  [facility]     Patient Name: Anitra Orta  : 1940  MRN: [id number]  Today's Date: 2024        Visit Date: 2024    Patient Active Problem List   Diagnosis    Urinary tract infection due to ESBL Klebsiella    Simple renal cyst    Former smoker    Hyperlipidemia    Osteoporosis    Panic disorder    Psoriasis    Urge incontinence of urine    Vitamin D deficiency    Post-menopause    Acute UTI (urinary tract infection)    Chronic bilateral low back pain without sciatica    Spinal stenosis of lumbar region with neurogenic claudication    Other chronic pain    Mixed incontinence    GERD without esophagitis    Anxiety    Hyperglycemia    e.coli bacteremia    Ureteral stone with hydronephrosis    Obstructive uropathy    Nephrolithiasis    Anemia    Metabolic acidosis    Essential hypertension    History of ventricular tachycardia    Left ureteral stone    Chronic idiopathic constipation    Personal history of colonic polyps        Past Medical History:   Diagnosis Date    Acute kidney failure 2021    Allergic     weather allergies    Anxiety     Arthritis of back     At risk for sleep apnea     Cataract     BILAT-SCHEDULED FOR THIS AND HAD TO CANCEL D/T SEPSIS    Chronic pain disorder     ALL OVER PAIN    Chronic UTI     Closed displaced fracture of surgical neck of left humerus 2021    Closed fracture of left proximal humerus 06/15/2021    Closed fracture of right distal radius 06/15/2021    Colon polyp     COVID-19 vaccine series completed     2ND DOSE 3/23/21    DDD (degenerative disc disease), lumbar     e.coli bacteremia 2021    Elevated cholesterol     Fracture, humerus     LEFT. h/o    GERD (gastroesophageal reflux disease)     History of recent fall     21    History of sepsis     MOST RECENT 2021-R/T KIDNEY STONE, UTI.  STATES THIS IS HER 3RD SEPSIS DIAGNOSIS    History of UTI     HL (hearing loss)     HTN  (hypertension)     Hyperlipidemia     Hypokalemia 03/25/2021    Kidney stones     LEFT    Left humeral fracture 07/11/2021    Low back pain     Macular degeneration, bilateral     WORSE ON RIGHT-ONLY PERIPHERAL VISION    Metabolic encephalopathy 03/25/2021    Mixed hyperlipidemia 09/16/2016    Mixed incontinence     Obesity     Osteoarthritis     Osteopenia     Osteoporosis     Panic disorder     Personal history of urinary calculi     PONV (postoperative nausea and vomiting)     Scoliosis     Sepsis, unspecified organism 03/25/2021    Tachycardia, unspecified     Ventricular tachycardia 09/09/2019    Visual impairment     Wrist fracture     RIGHT-CURRENTLY NOT WEARING BRACE FOR THIS        Past Surgical History:   Procedure Laterality Date    ADENOIDECTOMY      BREAST BIOPSY Left     benign    BREAST LUMPECTOMY Left     benign    BUNIONECTOMY Right     COLONOSCOPY N/A 11/30/2016    Procedure: COLONOSCOPY polypectomy;  Surgeon: Adali Willard MD;  Location: [facility] OR;  Service:     CYSTOSCOPY BOTOX INJECTION OF BLADDER N/A 07/21/2017    Procedure: CYSTOSCOPY COAPTITE INJECTION;  Surgeon: Kevon Clark MD;  Location: [facility];  Service:     CYSTOSCOPY URETEROSCOPY LASER LITHOTRIPSY Right 05/01/2023    Procedure: RIGHT URETEROSCOPY LASER LITHOTRIPSY STONE BASKET EXTRACTION AND STENT;  Surgeon: Kevon Clark MD;  Location: [facility] OR;  Service: Urology;  Laterality: Right;    CYSTOSCOPY W/ LITHOLAPAXY / EHL      CYSTOSCOPY W/ URETERAL STENT PLACEMENT Left 09/12/2016    Procedure: CYSTOSCOPY URETERAL STENT INSERTION;  Surgeon: Kevon Clark MD;  Location: [facility] OR;  Service:     CYSTOSCOPY W/ URETERAL STENT PLACEMENT Left 08/01/2019    Procedure: CYSTOSCOPY URETERAL CATHETER/STENT INSERTION;  Surgeon: Kevon Clark MD;  Location: [facility] OR;  Service: Urology    CYSTOSCOPY W/ URETERAL STENT PLACEMENT Left 03/25/2021    Procedure: CYSTOSCOPY URETERAL CATHETER/STENT INSERTION;  Surgeon: Eduardo  "Kevon POND MD;  Location: [facility];  Service: Urology;  Laterality: Left;    CYSTOSCOPY W/ URETERAL STENT PLACEMENT Left 07/11/2021    Procedure: CYSTOSCOPY URETERAL CATHETER/STENT INSERTION;  Surgeon: Lul Guzman MD;  Location: [facility] OR;  Service: Urology;  Laterality: Left;  CYSTOSCOPY LEFT URETERAL CATHETER/ STENT PLACEMENT    CYSTOSCOPY W/ URETERAL STENT PLACEMENT Right 04/20/2023    Procedure: CYSTOSCOPY STENT PLACEMENT;  Surgeon: Matthew Ndiaye MD;  Location: [facility] OR;  Service: Urology;  Laterality: Right;    EYE SURGERY      JOINT REPLACEMENT Right     total knee    LAMINECTOMY  11/2022    LUMBAR EPIDURAL INJECTION      TONSILLECTOMY AND ADENOIDECTOMY      TUBAL ABDOMINAL LIGATION      URETEROSCOPY LASER LITHOTRIPSY WITH STENT INSERTION Left 10/05/2016    Procedure: LT URETEROSCOPY LASER LITHOTRIPSY STONE BASKET EXTRACTION AND STENT ;  Surgeon: Kevon Clark MD;  Location: [facility];  Service:     URETEROSCOPY LASER LITHOTRIPSY WITH STENT INSERTION Left 07/23/2021    Procedure: LEFT URETEROSCOPY STONE MANIPULATION STENT EXCHANGE, LASER LITHOTRIPSY, CYSTOSCOPY, STONE BASKET EXTRACTION;  Surgeon: Kevon Clark MD;  Location: [facility];  Service: Urology;  Laterality: Left;       Visit Dx:     ICD-10-CM ICD-9-CM   1. Lumbar radiculopathy  M54.16 724.4   2. S/P lumbar spinal fusion  Z98.1 V45.4              PT Ortho       Row Name 09/12/24 1300       Subjective    Subjective Comments Pt reports she had the 2nd toe on her L foot amputated  about 4 weeks ago and now can wear a shoe on L foot. She reports she can't wear all her shoes but did get shoes on today and wears shoes when she goes out.  \"My back doesn't hurt\" Pt still having some pain on R side/lower abdomen & at times across her abdomen; no c/o any right hip pain. \"I have been taking hydrocodone ,but now trying to get off it and taking 4 Advil twice a day.\"  \"I haven't done any of my exercises at home since I was " "here.\" \"I sleep good.\" Pt with no toe pain but reports it may be a little swollen. Pt has been using FWW or rollator at home for ambulation and hasn't tried using the cane since having her toe amputated. \"My right leg still gets really shakey and it feels heavy and weak and I have trouble moving it forward when I walk.\"  reports her stamina is really low right now.  -LN       Precautions and Contraindications    Precautions/Limitations spinal precautions  -LN       Subjective Pain    Able to rate subjective pain? yes  -LN    Pre-Treatment Pain Level 4  -LN    Subjective Pain Comment primarily R side/lower abdomen  -LN       Posture/Observations    Posture/Observations Comments Pt came to PT via wheelchair.  -LN       Head/Neck/Trunk    Trunk Extension AROM 75% in sitting  all done in sitting  -LN    Trunk Flexion AROM 75% in sitting  -LN    Trunk Lt Lateral Flexion AROM WFL  -LN    Trunk Rt Lateral Flexion AROM WFL  -LN    Trunk Lt Rotation AROM WFL  -LN    Trunk Rt Rotation AROM WFL  -LN       MMT Neck/Trunk    Trunk Flexion MMT, Gross Movement (4-/5) good minus  -LN    Trunk Extension MMT, Gross Movement (3/5) fair  -LN       MMT Right Lower Ext    Rt Hip Flexion MMT, Gross Movement (4+/5) good plus  -LN    Rt Hip Extension MMT, Gross Movement (5/5) normal  -LN    Rt Hip ABduction MMT, Gross Movement (4+/5) good plus  -LN    Rt Hip ADduction MMT, Gross Movement (4/5) good  -LN    Rt Knee Extension MMT, Gross Movement (5/5) normal  -LN    Rt Knee Flexion MMT, Gross Movement (4+/5) good plus  -LN    Rt Ankle Plantarflexion MMT, Gross Movement (4+/5) good plus  -LN    Rt Ankle Dorsiflexion MMT, Gross Movement (4/5) good  -LN       MMT Left Lower Ext    Lt Hip Flexion MMT, Gross Movement (4+/5) good plus  -LN    Lt Hip Extension MMT, Gross Movement (5/5) normal  -LN    Lt Hip ABduction MMT, Gross Movement (4+/5) good plus  -LN    Lt Hip ADduction MMT, Gross Movement (4/5) good  -LN    Lt Knee Extension MMT, " Gross Movement (5/5) normal  -LN    Lt Knee Flexion MMT, Gross Movement (4+/5) good plus  -LN    Lt Ankle Plantarflexion MMT, Gross Movement (4+/5) good plus  -LN    Lt Ankle Dorsiflexion MMT, Gross Movement (4-/5) good minus  recent toe amputation  -LN       Sensation    Sensation WNL? WNL  -LN    Light Touch No apparent deficits  -LN    Additional Comments no c/o any N/T in legs  -LN       Lower Extremity Flexibility    Hamstrings Bilateral:;Mildly limited  -LN    ITB Bilateral:;Mildly limited  -LN    Gastrocnemius Bilateral:;Mildly limited  -LN    Soleus Bilateral:;Mildly limited  -LN       Balance Skills Training    Balance Comments Pt with good balance in static standing without AD initially but then becomes fearful and balance becomes fair. Dynamic standing balance is good with FWW/rollator but fair-poor with cane due to R leg weakness/fatigue and fear; she gets very nervous when ambulating with cane.  -LN       Transfers    Sit-Stand Wilder (Transfers) standby assist  -LN    Stand-Sit Wilder (Transfers) standby assist  -LN    Transfers, Sit-Stand-Sit, Assist Device rolling walker;quad cane  -LN       Gait/Stairs (Locomotion)    53481 - Gait Training Minutes  10  -LN    Wilder Level (Gait) contact guard;minimum assist (75% patient effort);verbal cues;nonverbal cues (demo/gesture)  -LN    Assistive Device (Gait) cane, quad  used LBQC today  -LN    Distance in Feet (Gait) 50  50 feet in clinic today with LBQC and cueing needed for proper sequencing. 25 feet and then turned and then additional 25 feet  -LN    Deviations/Abnormal Patterns (Gait) antalgic;cristy decreased;gait speed decreased;stride length decreased;bilateral deviations  -LN    Bilateral Gait Deviations forward flexed posture  -LN    Comment, (Gait/Stairs) Pt still very fearful with ambulating with cane (used LBQC today)  and CGA-minimal assist; with 1 episode of mod assist needed due to LOB posteriorly.  Pt takes very guarded  steps and has more difficulty stepping forward on R.  Pt appears very anxious/nervous with ambulating with a cane and becomes shakey. She did seem to take better steps overall with tennis shoes on. Pt did need frequent cueing for proper sequencing with use of LBQC.  -LN              User Key  (r) = Recorded By, (t) = Taken By, (c) = Cosigned By      Initials Name Provider Type    Macrina Pinto, PT Physical Therapist                                Therapy Education  Education Details: Pt to work on seated and bed exercises daily at home. Advised her to try and get up every 30 min to 1 hour and walk around her home with FWW. She can practice ambulating with her cane at home with  assist if they feel comfortable doing so. Also advised pt to start doing more standing at home; like in kitchen; and gradually increase the time she stands. Possibly try standing at the sink and washing a few dishes; etc.  Given: HEP, Symptoms/condition management, Mobility training, Posture/body mechanics  Program: Reinforced  How Provided: Verbal, Demonstration  Provided to: Patient, Caregiver ( present)  Level of Understanding: Teach back education performed, Verbalized, Demonstrated      PT OP Goals       Row Name 09/12/24 1300          PT Short Term Goals    STG Date to Achieve 09/26/24  -LN     STG 1 Pt to verbally report decreased LBP and R hip pain to <4/10 with ADLs and everyday activities.  -LN     STG 1 Progress Ongoing;Progressing  -LN     STG 1 Progress Comments She rated pain at 4/10 today.  -LN     STG 2 Pt independent with initial HEP issued by therapist.  -LN     STG 2 Progress Ongoing  -LN     STG 2 Progress Comments Pt had not done any of her HEP in last month; since having toe amputation on L foot.  -LN     STG 3 Trunk ROM improved by 25%.  -LN     STG 3 Progress Met  -LN     STG 3 Progress Comments All planes WFL except trunk flexion and extension 75% in sitting; all planes painfree.  -LN      STG 4 R LE strength improved by 1/2 mm grade.  -LN     STG 4 Progress Ongoing;Progressing  -LN     STG 4 Progress Comments No change in most planes since last re-evaluation on 8/8/24 but has not had had any PT since then due to having toe amputation on L foot.  These planes actually decreased by 1/2 mm grade: right HS, L hip flexion and adduction and L ankle df (s/p 2nd toe amputation).  -LN     STG 5 Pt able to ambulate with SPC/QC home distances with only minimal antalgic gait and good balance noted.  -LN     STG 5 Progress Ongoing  -LN     STG 5 Progress Comments Pt has only been ambulating with FWW/rollator since toe amputation on 8/8/24. She was able to ambulate 50 feet in PT clinic today with LBQC and CGA- min A with 1 episode of moderate assist needed secondary to pt losing her balance posteriorly. She needed frequent cueing for proper sequencing with LBQC.  -LN     STG 6 Pt able to tolerate standing for 15-20 minutes without any c/o increased pain in LB or R hip.  -LN     STG 6 Progress Ongoing  -LN     STG 6 Progress Comments Pt still with very limited standing tolerance; low stamina reported  -        Long Term Goals    LTG Date to Achieve 10/10/24  -LN     LTG 1 Pt to verbally report decreased LBP and R hip pain to <2/10 with ADLs and everyday activities.  -LN     LTG 1 Progress Ongoing;Progressing  -LN     LTG 1 Progress Comments She rated pain at 4/10 today.  -LN     LTG 2 Pt independent with more advanced HEP issued by therapist.  -LN     LTG 2 Progress Ongoing;Progressing  -LN     LTG 3 B LE strength improved to 4+-5/5.  -LN     LTG 3 Progress Partially Met;Ongoing;Progressing  -LN     LTG 3 Progress Comments Met for all planes except B hip adduction and B ankle df.  -LN     LTG 4 Core strength improved to 4/5 when tested.  -LN     LTG 4 Progress Ongoing;Progressing  -LN     LTG 4 Progress Comments Core strength still decreased and same as when tested 8/8/24.  -LN     LTG 5 Pt able to ambulate with  SPC/QC home and short community distances with only nominal antalgic gait & good balance noted.  -LN     LTG 5 Progress Ongoing  -LN     LTG 6 Pt able to tolerate standing for 20-30 minutes without any c/o increased pain in LB or R hip.  -LN     LTG 6 Progress Ongoing  -LN     LTG 6 Progress Comments Pt still with very limited standing tolerance; low stamina reported  -LN        Time Calculation    PT Goal Re-Cert Due Date 10/10/24  -LN               User Key  (r) = Recorded By, (t) = Taken By, (c) = Cosigned By      Initials Name Provider Type    Macrina Pinto, PT Physical Therapist                     PT Assessment/Plan       Row Name 09/12/24 1400          PT Assessment    Assessment Comments Pt returns to PT after not having any PT x 1 month after having 2nd toe on L foot amputated. She is now 7 months s/p L2-L5 spinal fusion/decompression. Pt with overall improved seated trunk ROM but still limited in flexion and extension by 25%; decreased core and B LE strength; c/o pain in R side of lower abdomen and recently reports having some lateral L knee pain (?OA), but no c/o any LB or right hip pain. Pt with decreased flexibility in B LE; overall decreased endurance level and stamina and decreased functional mobility and gait. She had not done any of her HEP since having toe amputation. She does well ambulating with FWW/rollator for short distances but still has trouble with use of cane; she did ambulate 50 feet in PT clinic with LBQC with CGA- min A, but did have 1 episode of LOB posteriorly and needed mod A to recover balance & needed cues for proper sequencing with cane. She also becomes very nervous/anxious when ambulating with cane and her legs become shakey. She also has very limited standing tolerance/stamina to legs getting weak & shakey. LE strength stayed the same for most planes since last re-eval on 8/8/24 but did have 1/2 mm grade weakness noted in R HS, L hip flexion and adduction and L  ankle df. She has met 1 out of 6 STG and has partially met 1 out of 6 LTG at this time. Pt will benefit from continued PT for progressive core and LE strengthening and gait and balance training; continue to work on weaning pt off her walker to use a cane (SPC vs QC) and work on increasing her overall activity tolerance. She was noted to transfer sit to stand and ambulate overall better with tennis shoes on vs the sandals she used to have to wear.  -LN     Rehab Potential Good  -LN        PT Plan    PT Frequency 1x/week;2x/week  Pt requesting 1 x week right now.  -LN     Predicted Duration of Therapy Intervention (PT) 6-8 weeks  -LN     Planned CPT's? PT RE-EVAL: 23496;PT THER PROC EA 15 MIN: 97344;PT GAIT TRAINING EA 15 MIN: 89052;PT HOT OR COLD PACK TREAT MCARE;PT ELECTRICAL STIM UNATTEND: ;PT NEUROMUSC RE-EDUCATION EA 15 MIN: 01184  -LN     Physical Therapy Interventions (Optional Details) balance training;gait training;home exercise program;lumbar stabilization;modalities;patient/family education;postural re-education;ROM (Range of Motion);strengthening;stretching;taping;transfer training  -LN     PT Plan Comments Continue per POC; progress exercises as tolerated. Gradually increase closed chain exercises as tolerated; balance and gait training to assess for appropriate AD. Pt to bring in her cane from home next visit. Modalities PRN (IFC/MH); pt and posture education. Continue towards remaining goals.  -LN               User Key  (r) = Recorded By, (t) = Taken By, (c) = Cosigned By      Initials Name Provider Type    Macrina Pinto, PT Physical Therapist                       OP Exercises       Row Name 09/12/24 1300             Precautions    Existing Precautions/Restrictions spinal  -LN         Subjective    Subjective Comments Pt reports she had the 2nd toe on her L foot amputated  about 4 weeks ago and now can wear a shoe on L foot. She reports she can't wear all her shoes but did get shoes  "on today and wears shoes when she goes out.  \"My back doesn't hurt\" Pt still having some pain on R side/lower abdomen & at times across her abdomen; no c/o any right hip pain. \"I have been taking hydrocodone ,but now trying to get off it and taking 4 Advil twice a day.\"  \"I haven't done any of my exercises at home since I was here.\" \"I sleep good.\" Pt with no toe pain but reports it may be a little swollen. Pt has been using FWW or rollator at home for ambulation and hasn't tried using the cane since having her toe amputated. \"My right leg still gets really shakey and it feels heavy and weak and I have trouble moving it forward when I walk.\"  reports her stamina is really low right now.  -LN         Subjective Pain    Able to rate subjective pain? yes  -LN      Pre-Treatment Pain Level 4  -LN      Subjective Pain Comment primarily R side/lower abdomen  -LN         Total Minutes    29916 - Gait Training Minutes  10  -LN      01611 - PT Therapeutic Exercise Minutes 35  -LN         Exercise 1    Exercise Name 1 (B) seated gastroc/hamstring stretch  -LN      Cueing 1 Verbal;Tactile;Demo  -LN      Reps 1 5  -LN      Time 1 10 sec  -LN      Additional Comments spinal precautions  -LN         Exercise 2    Exercise Name 2 seated ball squeeze  -LN      Cueing 2 Verbal  -LN      Sets 2 ABDOMINAL BRACIN  -LN      Reps 2 20  -LN      Time 2 5 sec  -LN         Exercise 3    Exercise Name 3 alt march w/abdominal brace  -LN      Cueing 3 Verbal;Demo  -LN      Sets 3 ABDOMINAL BRACIN  -LN      Reps 3 20  -LN         Exercise 4    Exercise Name 4 seated rows vs TB  -LN      Cueing 4 Verbal;Demo  -LN      Sets 4 ABDOMINAL BRACIN  -LN      Reps 4 20  -LN      Time 4 green  -LN         Exercise 5    Exercise Name 5 seated (B) shoulder extension  -LN      Cueing 5 Verbal;Tactile;Demo  -LN      Sets 5 ABDOMINAL BRACIN  -LN      Reps 5 20  -LN      Time 5 green  -LN         Exercise 6    Exercise Name 6 sit to stand  -LN      " "Cueing 6 Verbal;Tactile;Demo  -LN      Reps 6 10  -LN      Additional Comments use of hands on arms of chair  -LN         Exercise 8    Exercise Name 8 standing (B) hip ABD  -LN         Exercise 9    Exercise Name 9 (B) hamstring curl  -LN      Cueing 9 Verbal;Demo  -LN      Reps 9 2 each leg and then c/o cramps in legs and had to stop  -LN      Additional Comments while standing on balance pad  -LN         Exercise 10    Exercise Name 10 (B) hip extension  -LN         Exercise 11    Exercise Name 11 alt march - balance pad  -LN      Cueing 11 Verbal  -LN      Reps 11 15  -LN         Exercise 12    Exercise Name 12 4\" forward step up  -LN      Cueing 12 Verbal;Demo  -LN      Time 12 --  -LN         Exercise 13    Exercise Name 13 Gait: worked on ambulation with LBQC with CGA/minimal assist and cueing for proper sequencing.  -LN                User Key  (r) = Recorded By, (t) = Taken By, (c) = Cosigned By      Initials Name Provider Type    Macrina Pinto, PT Physical Therapist                                            Time Calculation:     Start Time: 1352  Stop Time: 1450  Time Calculation (min): 58 min  Timed Charges  58078 - PT Therapeutic Exercise Minutes: 35  24588 - Gait Training Minutes : 10  Total Minutes  Timed Charges Total Minutes: 45   Total Minutes: 45     Therapy Charges for Today       Code Description Service Date Service Provider Modifiers Qty    [id number]  PT THER PROC EA 15 MIN 9/12/2024 Macrina Owens, PT GP 2    [id number] HC GAIT TRAINING EA 15 MIN 9/12/2024 Macrina Owens, PT GP 1                      Macrina Owens, PT  9/12/2024        "

## 2024-09-17 ENCOUNTER — APPOINTMENT (OUTPATIENT)
Dept: PHYSICAL THERAPY | Facility: HOSPITAL | Age: 84
End: 2024-09-17
Payer: MEDICARE

## 2024-09-18 ENCOUNTER — HOSPITAL ENCOUNTER (OUTPATIENT)
Dept: PHYSICAL THERAPY | Facility: HOSPITAL | Age: 84
Setting detail: THERAPIES SERIES
Discharge: HOME | End: 2024-09-18
Payer: MEDICARE

## 2024-09-18 DIAGNOSIS — Z98.1 S/P LUMBAR SPINAL FUSION: ICD-10-CM

## 2024-09-18 DIAGNOSIS — M54.16 LUMBAR RADICULOPATHY: Primary | ICD-10-CM

## 2024-09-18 PROCEDURE — 97110 THERAPEUTIC EXERCISES: CPT

## 2024-09-18 PROCEDURE — 97116 GAIT TRAINING THERAPY: CPT

## 2024-09-24 ENCOUNTER — HOSPITAL ENCOUNTER (OUTPATIENT)
Dept: PHYSICAL THERAPY | Facility: HOSPITAL | Age: 84
Setting detail: THERAPIES SERIES
Discharge: HOME | End: 2024-09-24
Payer: MEDICARE

## 2024-09-24 DIAGNOSIS — M54.16 LUMBAR RADICULOPATHY: Primary | ICD-10-CM

## 2024-09-24 DIAGNOSIS — Z98.1 S/P LUMBAR SPINAL FUSION: ICD-10-CM

## 2024-09-24 PROCEDURE — 97110 THERAPEUTIC EXERCISES: CPT

## 2024-10-01 ENCOUNTER — HOSPITAL ENCOUNTER (OUTPATIENT)
Dept: PHYSICAL THERAPY | Facility: HOSPITAL | Age: 84
Setting detail: THERAPIES SERIES
Discharge: HOME | End: 2024-10-01
Payer: MEDICARE

## 2024-10-01 DIAGNOSIS — M54.16 LUMBAR RADICULOPATHY: Primary | ICD-10-CM

## 2024-10-01 DIAGNOSIS — Z98.1 S/P LUMBAR SPINAL FUSION: ICD-10-CM

## 2024-10-01 PROCEDURE — 97116 GAIT TRAINING THERAPY: CPT

## 2024-10-01 PROCEDURE — 97110 THERAPEUTIC EXERCISES: CPT

## 2024-10-01 NOTE — THERAPY TREATMENT NOTE
Outpatient Physical Therapy Ortho Treatment Note  [facility]     Patient Name: Anitra Orta  : 1940  MRN: [id number]  Today's Date: 10/1/2024        Visit Date: 10/01/2024    Visit Dx:    ICD-10-CM ICD-9-CM   1. Lumbar radiculopathy  M54.16 724.4   2. S/P lumbar spinal fusion  Z98.1 V45.4       Patient Active Problem List   Diagnosis    Urinary tract infection due to ESBL Klebsiella    Simple renal cyst    Former smoker    Hyperlipidemia    Osteoporosis    Panic disorder    Psoriasis    Urge incontinence of urine    Vitamin D deficiency    Post-menopause    Acute UTI (urinary tract infection)    Chronic bilateral low back pain without sciatica    Spinal stenosis of lumbar region with neurogenic claudication    Other chronic pain    Mixed incontinence    GERD without esophagitis    Anxiety    Hyperglycemia    e.coli bacteremia    Ureteral stone with hydronephrosis    Obstructive uropathy    Nephrolithiasis    Anemia    Metabolic acidosis    Essential hypertension    History of ventricular tachycardia    Left ureteral stone    Chronic idiopathic constipation    Personal history of colonic polyps        Past Medical History:   Diagnosis Date    Acute kidney failure 2021    Allergic     weather allergies    Anxiety     Arthritis of back     At risk for sleep apnea     Cataract     BILAT-SCHEDULED FOR THIS AND HAD TO CANCEL D/T SEPSIS    Chronic pain disorder     ALL OVER PAIN    Chronic UTI     Closed displaced fracture of surgical neck of left humerus 2021    Closed fracture of left proximal humerus 06/15/2021    Closed fracture of right distal radius 06/15/2021    Colon polyp     COVID-19 vaccine series completed     2ND DOSE 3/23/21    DDD (degenerative disc disease), lumbar     e.coli bacteremia 2021    Elevated cholesterol     Fracture, humerus     LEFT. h/o    GERD (gastroesophageal reflux disease)     History of recent fall     21    History of sepsis     MOST RECENT  JULY 2021-R/T KIDNEY STONE, UTI.  STATES THIS IS HER 3RD SEPSIS DIAGNOSIS    History of UTI     HL (hearing loss)     HTN (hypertension)     Hyperlipidemia     Hypokalemia 03/25/2021    Kidney stones     LEFT    Left humeral fracture 07/11/2021    Low back pain     Macular degeneration, bilateral     WORSE ON RIGHT-ONLY PERIPHERAL VISION    Metabolic encephalopathy 03/25/2021    Mixed hyperlipidemia 09/16/2016    Mixed incontinence     Obesity     Osteoarthritis     Osteopenia     Osteoporosis     Panic disorder     Personal history of urinary calculi     PONV (postoperative nausea and vomiting)     Scoliosis     Sepsis, unspecified organism 03/25/2021    Tachycardia, unspecified     Ventricular tachycardia 09/09/2019    Visual impairment     Wrist fracture     RIGHT-CURRENTLY NOT WEARING BRACE FOR THIS        Past Surgical History:   Procedure Laterality Date    ADENOIDECTOMY      BREAST BIOPSY Left     benign    BREAST LUMPECTOMY Left     benign    BUNIONECTOMY Right     COLONOSCOPY N/A 11/30/2016    Procedure: COLONOSCOPY polypectomy;  Surgeon: Adali Willard MD;  Location: [facility] OR;  Service:     CYSTOSCOPY BOTOX INJECTION OF BLADDER N/A 07/21/2017    Procedure: CYSTOSCOPY COAPTITE INJECTION;  Surgeon: Kevon Clark MD;  Location: [facility] OR;  Service:     CYSTOSCOPY URETEROSCOPY LASER LITHOTRIPSY Right 05/01/2023    Procedure: RIGHT URETEROSCOPY LASER LITHOTRIPSY STONE BASKET EXTRACTION AND STENT;  Surgeon: Kevon Clark MD;  Location: [facility] OR;  Service: Urology;  Laterality: Right;    CYSTOSCOPY W/ LITHOLAPAXY / EHL      CYSTOSCOPY W/ URETERAL STENT PLACEMENT Left 09/12/2016    Procedure: CYSTOSCOPY URETERAL STENT INSERTION;  Surgeon: Kevon Clark MD;  Location: [facility] OR;  Service:     CYSTOSCOPY W/ URETERAL STENT PLACEMENT Left 08/01/2019    Procedure: CYSTOSCOPY URETERAL CATHETER/STENT INSERTION;  Surgeon: Kevon Clark MD;  Location: [facility] OR;  Service: Urology    CYSTOSCOPY W/  "URETERAL STENT PLACEMENT Left 03/25/2021    Procedure: CYSTOSCOPY URETERAL CATHETER/STENT INSERTION;  Surgeon: Kevon Clark MD;  Location: [facility] OR;  Service: Urology;  Laterality: Left;    CYSTOSCOPY W/ URETERAL STENT PLACEMENT Left 07/11/2021    Procedure: CYSTOSCOPY URETERAL CATHETER/STENT INSERTION;  Surgeon: Lul Guzman MD;  Location: [facility] OR;  Service: Urology;  Laterality: Left;  CYSTOSCOPY LEFT URETERAL CATHETER/ STENT PLACEMENT    CYSTOSCOPY W/ URETERAL STENT PLACEMENT Right 04/20/2023    Procedure: CYSTOSCOPY STENT PLACEMENT;  Surgeon: Matthew Ndiaye MD;  Location: [facility] OR;  Service: Urology;  Laterality: Right;    EYE SURGERY      JOINT REPLACEMENT Right     total knee    LAMINECTOMY  11/2022    LUMBAR EPIDURAL INJECTION      TONSILLECTOMY AND ADENOIDECTOMY      TUBAL ABDOMINAL LIGATION      URETEROSCOPY LASER LITHOTRIPSY WITH STENT INSERTION Left 10/05/2016    Procedure: LT URETEROSCOPY LASER LITHOTRIPSY STONE BASKET EXTRACTION AND STENT ;  Surgeon: Kevon Clark MD;  Location: [facility] OR;  Service:     URETEROSCOPY LASER LITHOTRIPSY WITH STENT INSERTION Left 07/23/2021    Procedure: LEFT URETEROSCOPY STONE MANIPULATION STENT EXCHANGE, LASER LITHOTRIPSY, CYSTOSCOPY, STONE BASKET EXTRACTION;  Surgeon: Kevon Clark MD;  Location: [facility] OR;  Service: Urology;  Laterality: Left;        PT Ortho       Row Name 10/01/24 1500       Subjective    Subjective Comments Pt reports no present pain but reports her stomach is bothering her for some reason. Pt reports that she still gets occasional pain in her right side; \"it hits and then goes away.\" Pt's  reports that the other day she actually walked from her chair in the living room to the kitchen without any AD and \"then wondered how she got there.\"  Pt reports she has been practicing walking with her cane more at home; \"I don't understand it because I do better at home and then when I am here, I get " "nervous and have trouble.\"  -LN       Precautions and Contraindications    Precautions/Limitations spinal precautions  -LN       Subjective Pain    Subjective Pain Comment no present pain reported  -LN      Row Name 10/01/24 1400       Precautions and Contraindications    Precautions/Limitations --  -LN              User Key  (r) = Recorded By, (t) = Taken By, (c) = Cosigned By      Initials Name Provider Type    Macrina Pinto, PT Physical Therapist                                 PT Assessment/Plan       Row Name 10/01/24 1600          PT Assessment    Assessment Comments Pt continues to tolerate exercises fairly well but continues to fatigue easily and needs multiple standing and seated rest breaks. She overall is ambulating better with SBQC but fatigues easily and is still gets very anxious which affects her gait sequencing and balance. She did better if she had her feet further apart and made sure she shifted her weight more onto L leg when she stepped forward with right leg. She also gets very impulsive when she gets close to her destination and reaches out too soon affecting her balance. She is also doing better with sit to stands and was able to do 5 reps with hand on her knees. No c/o any pain today but still gets occasional pain in her R side.  -LN        PT Plan    PT Frequency 1x/week;2x/week  -LN Pt is requesting 1 x week right now.     PT Plan Comments Cont per POC, progressing as tolerated. Continue to work on ambulation with SBQC as tolerated. Work on SLS as tolerated.  -LN               User Key  (r) = Recorded By, (t) = Taken By, (c) = Cosigned By      Initials Name Provider Type    Macrina Pinto, PT Physical Therapist                       OP Exercises       Row Name 10/01/24 1500 10/01/24 1400          Precautions    Existing Precautions/Restrictions spinal  -LN --        Subjective    Subjective Comments Pt reports no present pain but reports her stomach is bothering her " "for some reason. Pt reports that she still gets occasional pain in her right side; \"it hits and then goes away.\" Pt's  reports that the other day she actually walked from her chair in the living room to the kitchen without any AD and \"then wondered how she got there.\"  Pt reports she has been practicing walking with her cane more at home; \"I don't understand it because I do better at home and then when I am here, I get nervous and have trouble.\"  -LN --        Subjective Pain    Able to rate subjective pain? yes  -LN --  -LN     Pre-Treatment Pain Level 0  -LN --     Subjective Pain Comment no present pain reported  -LN --        Total Minutes    06555 - Gait Training Minutes  10  -LN --     80517 - PT Therapeutic Exercise Minutes 35  -LN --        Exercise 1    Exercise Name 1 (B) seated gastroc/hamstring stretch  -LN --     Cueing 1 Verbal;Tactile;Demo  -LN --     Additional Comments HEP  -LN --        Exercise 2    Exercise Name 2 seated ball squeeze  -LN --     Cueing 2 Verbal  -LN --     Sets 2 ABDOMINAL BRACIN  -LN --     Reps 2 20  -LN --     Time 2 5 sec  -LN --        Exercise 3    Exercise Name 3 alt march w/abdominal brace with green TB  -LN --     Cueing 3 Verbal;Demo  -LN --     Sets 3 ABDOMINAL BRACIN  -LN --     Reps 3 20  -LN --     Additional Comments green TB  -LN --        Exercise 4    Exercise Name 4 seated rows vs TB  -LN --     Cueing 4 Verbal;Demo  -LN --     Sets 4 ABDOMINAL BRACIN  -LN --     Reps 4 20  -LN --     Time 4 green  -LN --        Exercise 5    Exercise Name 5 seated (B) shoulder extension  -LN --     Cueing 5 Verbal;Tactile;Demo  -LN --     Sets 5 ABDOMINAL BRACIN  -LN --     Reps 5 20  -LN --     Time 5 green  -LN --        Exercise 6    Exercise Name 6 sit to stand  -LN --     Cueing 6 Verbal;Tactile;Demo  -LN --     Reps 6 5  -LN --     Additional Comments hands on knees from chair  -LN --        Exercise 7    Exercise Name 7 seated hip abduction vs TB  -LN --     " "Cueing 7 Verbal  -LN --     Reps 7 20  -LN --     Additional Comments green TB  -LN --        Exercise 8    Exercise Name 8 standing (B) hip ABD  -LN --     Cueing 8 Verbal  -LN --     Reps 8 10 each  -LN --        Exercise 9    Exercise Name 9 (B) hamstring curl  -LN --     Cueing 9 Verbal;Demo  -LN --     Reps 9 10 each  -LN --        Exercise 10    Exercise Name 10 (B) hip extension  -LN --        Exercise 11    Exercise Name 11 --  -LN --     Cueing 11 --  -LN --     Sets 11 --  -LN --     Reps 11 --  -LN --        Exercise 12    Exercise Name 12 4\" forward step up  -LN --     Cueing 12 Verbal;Demo  -LN --     Time 12 10 each  -LN --        Exercise 13    Exercise Name 13 Gait: Pt ambulated 10 feet and then 20 feet x 2 (standing ex's seated rest break between reps) with SBQC and CGA/min(A). Pt needing increased cues, verbal and tactile, for sequence and cane placement & to make sure she shifts her weight more onto L leg when she steps forward with right leg.  Pt only had 1 episode of LOB where she needed assist to regain balance. Did try having her have SBQC in L hand, but she did not tolerate well and was more nervous/anxious so had her return to using it in right hand.  -LN --        Exercise 14    Exercise Name 14 Alternate LAQ with abdominal bracing  -LN --     Cueing 14 Verbal;Tactile;Demo  -LN --     Reps 14 20  -LN --        Exercise 15    Exercise Name 15 sidestepping at railing  -LN --     Cueing 15 Verbal;Demo  -LN --     Reps 15 4x back and forth  -LN --     Additional Comments hands on railing for support  -LN --        Exercise 16    Exercise Name 16 sidestepping over an obstacle at railing (sm red 1/2 foam roll)  -LN --     Cueing 16 Verbal;Demo  -LN --     Reps 16 10  -LN --               User Key  (r) = Recorded By, (t) = Taken By, (c) = Cosigned By      Initials Name Provider Type    LN Macrina Owens, PT Physical Therapist                                     Therapy " Education  Education Details: Continue to encourage pt to work on HEP at home; at least the seated and bed exercises and explained to her why they are so important. Pt to continue walking in home with FWW and practice with SBQC for short distances with  assist if they feel comfortable doing so. She is also to start working on some sidestepping at counter top for UE support.  Given: HEP, Symptoms/condition management, Posture/body mechanics, Mobility training  Program: Reinforced, Progressed, New (added sidestepping at railing)  How Provided: Verbal, Demonstration  Provided to: Patient, Caregiver ( present)  Level of Understanding: Teach back education performed, Verbalized, Demonstrated              Time Calculation:   Start Time: 1505  Stop Time: 1600  Time Calculation (min): 55 min  Timed Charges  67355 - PT Therapeutic Exercise Minutes: 35  66973 - Gait Training Minutes : 10  Total Minutes  Timed Charges Total Minutes: 45   Total Minutes: 45  Therapy Charges for Today       Code Description Service Date Service Provider Modifiers Qty    89720130686 HC PT THER PROC EA 15 MIN 10/1/2024 Macrina Owens, PT GP 2    31667460691 HC GAIT TRAINING EA 15 MIN 10/1/2024 Macrina Owens, PT GP 1                      Macrina Owens, PT  10/1/2024

## 2024-10-07 ENCOUNTER — TRANSCRIBE ORDERS (OUTPATIENT)
Dept: ADMINISTRATIVE | Facility: HOSPITAL | Age: 84
End: 2024-10-07
Payer: MEDICARE

## 2024-10-07 DIAGNOSIS — Z12.31 SCREENING MAMMOGRAM, ENCOUNTER FOR: Primary | ICD-10-CM

## 2024-10-10 ENCOUNTER — HOSPITAL ENCOUNTER (OUTPATIENT)
Dept: PHYSICAL THERAPY | Facility: HOSPITAL | Age: 84
Setting detail: THERAPIES SERIES
Discharge: HOME | End: 2024-10-10
Payer: MEDICARE

## 2024-10-10 DIAGNOSIS — M54.16 LUMBAR RADICULOPATHY: Primary | ICD-10-CM

## 2024-10-10 DIAGNOSIS — Z98.1 S/P LUMBAR SPINAL FUSION: ICD-10-CM

## 2024-10-10 PROCEDURE — 97110 THERAPEUTIC EXERCISES: CPT

## 2024-10-10 NOTE — THERAPY TREATMENT NOTE
Outpatient Physical Therapy Ortho Treatment Note  [facility]     Patient Name: Anitra Orta  : 1940  MRN: [id number]  Today's Date: 10/10/2024      Visit Date: 10/10/2024      Visit Dx:    ICD-10-CM ICD-9-CM   1. Lumbar radiculopathy  M54.16 724.4   2. S/P lumbar spinal fusion  Z98.1 V45.4       Patient Active Problem List   Diagnosis    Urinary tract infection due to ESBL Klebsiella    Simple renal cyst    Former smoker    Hyperlipidemia    Osteoporosis    Panic disorder    Psoriasis    Urge incontinence of urine    Vitamin D deficiency    Post-menopause    Acute UTI (urinary tract infection)    Chronic bilateral low back pain without sciatica    Spinal stenosis of lumbar region with neurogenic claudication    Other chronic pain    Mixed incontinence    GERD without esophagitis    Anxiety    Hyperglycemia    e.coli bacteremia    Ureteral stone with hydronephrosis    Obstructive uropathy    Nephrolithiasis    Anemia    Metabolic acidosis    Essential hypertension    History of ventricular tachycardia    Left ureteral stone    Chronic idiopathic constipation    Personal history of colonic polyps        Past Medical History:   Diagnosis Date    Acute kidney failure 2021    Allergic     weather allergies    Anxiety     Arthritis of back     At risk for sleep apnea     Cataract     BILAT-SCHEDULED FOR THIS AND HAD TO CANCEL D/T SEPSIS    Chronic pain disorder     ALL OVER PAIN    Chronic UTI     Closed displaced fracture of surgical neck of left humerus 2021    Closed fracture of left proximal humerus 06/15/2021    Closed fracture of right distal radius 06/15/2021    Colon polyp     COVID-19 vaccine series completed     2ND DOSE 3/23/21    DDD (degenerative disc disease), lumbar     e.coli bacteremia 2021    Elevated cholesterol     Fracture, humerus     LEFT. h/o    GERD (gastroesophageal reflux disease)     History of recent fall     21    History of sepsis     MOST RECENT  JULY 2021-R/T KIDNEY STONE, UTI.  STATES THIS IS HER 3RD SEPSIS DIAGNOSIS    History of UTI     HL (hearing loss)     HTN (hypertension)     Hyperlipidemia     Hypokalemia 03/25/2021    Kidney stones     LEFT    Left humeral fracture 07/11/2021    Low back pain     Macular degeneration, bilateral     WORSE ON RIGHT-ONLY PERIPHERAL VISION    Metabolic encephalopathy 03/25/2021    Mixed hyperlipidemia 09/16/2016    Mixed incontinence     Obesity     Osteoarthritis     Osteopenia     Osteoporosis     Panic disorder     Personal history of urinary calculi     PONV (postoperative nausea and vomiting)     Scoliosis     Sepsis, unspecified organism 03/25/2021    Tachycardia, unspecified     Ventricular tachycardia 09/09/2019    Visual impairment     Wrist fracture     RIGHT-CURRENTLY NOT WEARING BRACE FOR THIS        Past Surgical History:   Procedure Laterality Date    ADENOIDECTOMY      BREAST BIOPSY Left     benign    BREAST LUMPECTOMY Left     benign    BUNIONECTOMY Right     COLONOSCOPY N/A 11/30/2016    Procedure: COLONOSCOPY polypectomy;  Surgeon: Adali Willard MD;  Location: [facility] OR;  Service:     CYSTOSCOPY BOTOX INJECTION OF BLADDER N/A 07/21/2017    Procedure: CYSTOSCOPY COAPTITE INJECTION;  Surgeon: Kevon Clark MD;  Location: [facility] OR;  Service:     CYSTOSCOPY URETEROSCOPY LASER LITHOTRIPSY Right 05/01/2023    Procedure: RIGHT URETEROSCOPY LASER LITHOTRIPSY STONE BASKET EXTRACTION AND STENT;  Surgeon: Kevon Clark MD;  Location: [facility] OR;  Service: Urology;  Laterality: Right;    CYSTOSCOPY W/ LITHOLAPAXY / EHL      CYSTOSCOPY W/ URETERAL STENT PLACEMENT Left 09/12/2016    Procedure: CYSTOSCOPY URETERAL STENT INSERTION;  Surgeon: Kevon Clark MD;  Location: [facility] OR;  Service:     CYSTOSCOPY W/ URETERAL STENT PLACEMENT Left 08/01/2019    Procedure: CYSTOSCOPY URETERAL CATHETER/STENT INSERTION;  Surgeon: Kevon Clark MD;  Location: [facility] OR;  Service: Urology    CYSTOSCOPY W/  URETERAL STENT PLACEMENT Left 03/25/2021    Procedure: CYSTOSCOPY URETERAL CATHETER/STENT INSERTION;  Surgeon: Kevon Clark MD;  Location: [facility] OR;  Service: Urology;  Laterality: Left;    CYSTOSCOPY W/ URETERAL STENT PLACEMENT Left 07/11/2021    Procedure: CYSTOSCOPY URETERAL CATHETER/STENT INSERTION;  Surgeon: Lul Guzman MD;  Location: [facility] OR;  Service: Urology;  Laterality: Left;  CYSTOSCOPY LEFT URETERAL CATHETER/ STENT PLACEMENT    CYSTOSCOPY W/ URETERAL STENT PLACEMENT Right 04/20/2023    Procedure: CYSTOSCOPY STENT PLACEMENT;  Surgeon: Matthew Ndiaye MD;  Location: [facility] OR;  Service: Urology;  Laterality: Right;    EYE SURGERY      JOINT REPLACEMENT Right     total knee    LAMINECTOMY  11/2022    LUMBAR EPIDURAL INJECTION      TONSILLECTOMY AND ADENOIDECTOMY      TUBAL ABDOMINAL LIGATION      URETEROSCOPY LASER LITHOTRIPSY WITH STENT INSERTION Left 10/05/2016    Procedure: LT URETEROSCOPY LASER LITHOTRIPSY STONE BASKET EXTRACTION AND STENT ;  Surgeon: Kevon Clark MD;  Location: [facility];  Service:     URETEROSCOPY LASER LITHOTRIPSY WITH STENT INSERTION Left 07/23/2021    Procedure: LEFT URETEROSCOPY STONE MANIPULATION STENT EXCHANGE, LASER LITHOTRIPSY, CYSTOSCOPY, STONE BASKET EXTRACTION;  Surgeon: Kevon Clark MD;  Location: [facility];  Service: Urology;  Laterality: Left;        PT Ortho       Row Name 10/10/24 1500       Precautions and Contraindications    Precautions/Limitations spinal precautions  -LN              User Key  (r) = Recorded By, (t) = Taken By, (c) = Cosigned By      Initials Name Provider Type    Macrina Pinto, PT Physical Therapist                                 PT Assessment/Plan       Row Name 10/10/24 1600          PT Assessment    Assessment Comments Pt tolerated treatment fairly well today; improved tolerance to seated exercises with less fatigue reported but she continues to fatigue very easily with standing  exercises and gait with SBQC. Pt still needs frequent cueing for proper sequencing with SBQC but overall is doing better ambulating with SBQC with decreased shaking noted in R leg. Overall, she only needs CGA but did have 2 episodes of minor LOB today where she needed a little extra assist to regain balance. She was having some L knee pain today (OA pain). She still gets very anxious/nervous when she ambulates with SBQC, but overall is a little less.  -LN        PT Plan    PT Frequency 1x/week;2x/week  -LN     PT Plan Comments Cont per POC, progressing as tolerated. Continue to work on ambulation with SBQC as tolerated.  -LN               User Key  (r) = Recorded By, (t) = Taken By, (c) = Cosigned By      Initials Name Provider Type    Macrina Pinto, PT Physical Therapist                       OP Exercises       Row Name 10/10/24 1500             Precautions    Existing Precautions/Restrictions spinal  -LN         Subjective    Subjective Comments Pt reports she always has pain somewhere but not taking any pain meds anymore. She reports she hasn't been practicing walking with her QC too much at home because her right leg is still shakey.  -LN         Subjective Pain    Able to rate subjective pain? --  -LN         Total Minutes    04725 - Gait Training Minutes  10  -LN      38096 - PT Therapeutic Exercise Minutes 40  -LN         Exercise 1    Exercise Name 1 (B) seated gastroc/hamstring stretch  -LN      Cueing 1 Verbal;Tactile;Demo  -LN      Additional Comments HEP  -LN         Exercise 2    Exercise Name 2 seated ball squeeze  -LN      Cueing 2 Verbal  -LN      Sets 2 ABDOMINAL BRACIN  -LN      Reps 2 20  -LN      Time 2 5 sec  -LN         Exercise 3    Exercise Name 3 alt march w/abdominal brace with green TB  -LN      Cueing 3 Verbal;Demo  -LN      Sets 3 ABDOMINAL BRACIN  -LN      Reps 3 20  -LN      Additional Comments blue TB  -LN         Exercise 4    Exercise Name 4 seated rows vs TB  -LN       "Cueing 4 Verbal;Demo  -LN      Sets 4 ABDOMINAL BRACIN  -LN      Reps 4 20  -LN      Time 4 blue  -LN         Exercise 5    Exercise Name 5 seated (B) shoulder extension  -LN      Cueing 5 Verbal;Tactile;Demo  -LN      Sets 5 ABDOMINAL BRACIN  -LN      Reps 5 20  -LN      Time 5 blue  -LN         Exercise 6    Exercise Name 6 sit to stand  -LN      Cueing 6 Verbal;Tactile;Demo  -LN      Reps 6 5  -LN      Additional Comments hands on knees from chair; cueing needed for proper technique  -LN         Exercise 7    Exercise Name 7 seated hip abduction vs TB  -LN      Cueing 7 Verbal  -LN      Reps 7 20  -LN      Additional Comments blue TB  -LN         Exercise 8    Exercise Name 8 standing (B) hip ABD  -LN      Cueing 8 Verbal  -LN      Reps 8 10 each  -LN         Exercise 9    Exercise Name 9 (B) hamstring curl  -LN      Cueing 9 Verbal;Demo  -LN      Reps 9 10 each  -LN         Exercise 10    Exercise Name 10 SLS on balance pad  -LN      Cueing 10 Verbal;Tactile;Demo  -LN      Reps 10 1 each leg  -LN      Time 10 30 sec each  -LN      Additional Comments at elevated table  -LN         Exercise 11    Exercise Name 11 alt march - balance pad  -LN      Cueing 11 Verbal  -LN      Sets 11 2  -LN      Reps 11 10  -LN      Additional Comments at elevated table  -LN         Exercise 12    Exercise Name 12 4\" forward step up  -LN      Cueing 12 Verbal;Demo  -LN      Time 12 10 each  -LN         Exercise 13    Exercise Name 13 Gait: (with gait belt on) Pt ambulated 10 feet and then 20 feet x 2 (standing ex's & seated rest break between reps) with SBQC and CGA with occasional min A. Pt continues to need cues, verbal and tactile, for proper sequencing  Pt had 2 episodes of minor LOB where she needed a little extra assist to regain balance.  Decreased shaking of R leg noted today. Did have her try to step with L foot at same time with cane but had decreased balance so she returned to moving cane first and then L foot and then " right foot sequence. She also has trouble looking up when she is walking with cane and tends to look down at her feet.  -LN         Exercise 14    Exercise Name 14 --  -LN      Cueing 14 --  -LN      Reps 14 --  -LN         Exercise 15    Exercise Name 15 sidestepping at railing  -LN      Cueing 15 Verbal;Demo  -LN      Reps 15 5x back and forth  -LN      Additional Comments hands on railing for support  -LN         Exercise 16    Exercise Name 16 --  -LN      Cueing 16 --  -LN      Reps 16 --  -LN                User Key  (r) = Recorded By, (t) = Taken By, (c) = Cosigned By      Initials Name Provider Type    Macrina Pinto, PT Physical Therapist                                     Therapy Education  Education Details: Continue to encourage pt to work on HEP at home; at least the seated and bed exercises and explained to her why they are so important. Pt to continue walking in home with FWW and practice with SBQC for short distances with  assist if they feel comfortable doing so.  Given: HEP, Symptoms/condition management, Posture/body mechanics, Mobility training  Program: New, Reinforced, Progressed (added SLS on balance pad today)  How Provided: Verbal, Demonstration  Provided to: Patient  Level of Understanding: Teach back education performed, Verbalized, Demonstrated              Time Calculation:   Start Time: 1505  Stop Time: 1600  Time Calculation (min): 55 min  Timed Charges  98688 - PT Therapeutic Exercise Minutes: 40  58389 - Gait Training Minutes : 10  Total Minutes  Timed Charges Total Minutes: 50   Total Minutes: 50  Therapy Charges for Today       Code Description Service Date Service Provider Modifiers Qty    02102916704 HC PT THER PROC EA 15 MIN 10/10/2024 Macrina Owens, PT GP 3                      Macrina Owens, PT  10/10/2024

## 2024-10-17 ENCOUNTER — HOSPITAL ENCOUNTER (OUTPATIENT)
Dept: PHYSICAL THERAPY | Facility: HOSPITAL | Age: 84
Setting detail: THERAPIES SERIES
Discharge: HOME | End: 2024-10-17
Payer: MEDICARE

## 2024-10-17 DIAGNOSIS — Z98.1 S/P LUMBAR SPINAL FUSION: ICD-10-CM

## 2024-10-17 DIAGNOSIS — M54.16 LUMBAR RADICULOPATHY: Primary | ICD-10-CM

## 2024-10-17 PROCEDURE — 97110 THERAPEUTIC EXERCISES: CPT

## 2024-10-17 NOTE — THERAPY TREATMENT NOTE
Outpatient Physical Therapy Ortho Treatment Note  [facility]     Patient Name: Anitra Orta  : 1940  MRN: [id number]  Today's Date: 10/17/2024        Visit Date: 10/17/2024    Visit Dx:    ICD-10-CM ICD-9-CM   1. Lumbar radiculopathy  M54.16 724.4   2. S/P lumbar spinal fusion  Z98.1 V45.4       Patient Active Problem List   Diagnosis    Urinary tract infection due to ESBL Klebsiella    Simple renal cyst    Former smoker    Hyperlipidemia    Osteoporosis    Panic disorder    Psoriasis    Urge incontinence of urine    Vitamin D deficiency    Post-menopause    Acute UTI (urinary tract infection)    Chronic bilateral low back pain without sciatica    Spinal stenosis of lumbar region with neurogenic claudication    Other chronic pain    Mixed incontinence    GERD without esophagitis    Anxiety    Hyperglycemia    e.coli bacteremia    Ureteral stone with hydronephrosis    Obstructive uropathy    Nephrolithiasis    Anemia    Metabolic acidosis    Essential hypertension    History of ventricular tachycardia    Left ureteral stone    Chronic idiopathic constipation    Personal history of colonic polyps        Past Medical History:   Diagnosis Date    Acute kidney failure 2021    Allergic     weather allergies    Anxiety     Arthritis of back     At risk for sleep apnea     Cataract     BILAT-SCHEDULED FOR THIS AND HAD TO CANCEL D/T SEPSIS    Chronic pain disorder     ALL OVER PAIN    Chronic UTI     Closed displaced fracture of surgical neck of left humerus 2021    Closed fracture of left proximal humerus 06/15/2021    Closed fracture of right distal radius 06/15/2021    Colon polyp     COVID-19 vaccine series completed     2ND DOSE 3/23/21    DDD (degenerative disc disease), lumbar     e.coli bacteremia 2021    Elevated cholesterol     Fracture, humerus     LEFT. h/o    GERD (gastroesophageal reflux disease)     History of recent fall     21    History of sepsis     MOST RECENT  JULY 2021-R/T KIDNEY STONE, UTI.  STATES THIS IS HER 3RD SEPSIS DIAGNOSIS    History of UTI     HL (hearing loss)     HTN (hypertension)     Hyperlipidemia     Hypokalemia 03/25/2021    Kidney stones     LEFT    Left humeral fracture 07/11/2021    Low back pain     Macular degeneration, bilateral     WORSE ON RIGHT-ONLY PERIPHERAL VISION    Metabolic encephalopathy 03/25/2021    Mixed hyperlipidemia 09/16/2016    Mixed incontinence     Obesity     Osteoarthritis     Osteopenia     Osteoporosis     Panic disorder     Personal history of urinary calculi     PONV (postoperative nausea and vomiting)     Scoliosis     Sepsis, unspecified organism 03/25/2021    Tachycardia, unspecified     Ventricular tachycardia 09/09/2019    Visual impairment     Wrist fracture     RIGHT-CURRENTLY NOT WEARING BRACE FOR THIS        Past Surgical History:   Procedure Laterality Date    ADENOIDECTOMY      BREAST BIOPSY Left     benign    BREAST LUMPECTOMY Left     benign    BUNIONECTOMY Right     COLONOSCOPY N/A 11/30/2016    Procedure: COLONOSCOPY polypectomy;  Surgeon: Adali Willard MD;  Location: [facility] OR;  Service:     CYSTOSCOPY BOTOX INJECTION OF BLADDER N/A 07/21/2017    Procedure: CYSTOSCOPY COAPTITE INJECTION;  Surgeon: Kevon Clark MD;  Location: [facility] OR;  Service:     CYSTOSCOPY URETEROSCOPY LASER LITHOTRIPSY Right 05/01/2023    Procedure: RIGHT URETEROSCOPY LASER LITHOTRIPSY STONE BASKET EXTRACTION AND STENT;  Surgeon: Kevon Clark MD;  Location: [facility] OR;  Service: Urology;  Laterality: Right;    CYSTOSCOPY W/ LITHOLAPAXY / EHL      CYSTOSCOPY W/ URETERAL STENT PLACEMENT Left 09/12/2016    Procedure: CYSTOSCOPY URETERAL STENT INSERTION;  Surgeon: Kevon Clark MD;  Location: [facility] OR;  Service:     CYSTOSCOPY W/ URETERAL STENT PLACEMENT Left 08/01/2019    Procedure: CYSTOSCOPY URETERAL CATHETER/STENT INSERTION;  Surgeon: Kevon Clark MD;  Location: [facility] OR;  Service: Urology    CYSTOSCOPY W/  "URETERAL STENT PLACEMENT Left 03/25/2021    Procedure: CYSTOSCOPY URETERAL CATHETER/STENT INSERTION;  Surgeon: Kevon Clark MD;  Location: [facility] OR;  Service: Urology;  Laterality: Left;    CYSTOSCOPY W/ URETERAL STENT PLACEMENT Left 07/11/2021    Procedure: CYSTOSCOPY URETERAL CATHETER/STENT INSERTION;  Surgeon: Llu Guzman MD;  Location: [facility] OR;  Service: Urology;  Laterality: Left;  CYSTOSCOPY LEFT URETERAL CATHETER/ STENT PLACEMENT    CYSTOSCOPY W/ URETERAL STENT PLACEMENT Right 04/20/2023    Procedure: CYSTOSCOPY STENT PLACEMENT;  Surgeon: Matthew Ndiaye MD;  Location: [facility] OR;  Service: Urology;  Laterality: Right;    EYE SURGERY      JOINT REPLACEMENT Right     total knee    LAMINECTOMY  11/2022    LUMBAR EPIDURAL INJECTION      TONSILLECTOMY AND ADENOIDECTOMY      TUBAL ABDOMINAL LIGATION      URETEROSCOPY LASER LITHOTRIPSY WITH STENT INSERTION Left 10/05/2016    Procedure: LT URETEROSCOPY LASER LITHOTRIPSY STONE BASKET EXTRACTION AND STENT ;  Surgeon: Kevon Clark MD;  Location: [facility];  Service:     URETEROSCOPY LASER LITHOTRIPSY WITH STENT INSERTION Left 07/23/2021    Procedure: LEFT URETEROSCOPY STONE MANIPULATION STENT EXCHANGE, LASER LITHOTRIPSY, CYSTOSCOPY, STONE BASKET EXTRACTION;  Surgeon: Kevon Clark MD;  Location: [facility];  Service: Urology;  Laterality: Left;        PT Ortho       Row Name 10/17/24 1400       Subjective    Subjective Comments Pt reports \"I think I am getting worse instead of better; I just feel unsteady, unless I am using the walker.\"  Pt c/o pain in her foot and she reports she tried to put on a different pair of shoes but she couldn't tolerate them because they hurt her feet. \"I have been doing pretty good walking with the cane at home but then I don't know.\"  -LN       Precautions and Contraindications    Precautions/Limitations spinal precautions  -LN       Subjective Pain    Able to rate subjective pain? yes  -LN " "             User Key  (r) = Recorded By, (t) = Taken By, (c) = Cosigned By      Initials Name Provider Type    Macrina Pinto, PT Physical Therapist                                 PT Assessment/Plan       Row Name 10/17/24 1500          PT Assessment    Assessment Comments Pt is progressing with exercises fairly well and able to do standing LE exercise with 1# on each leg today. She does still fatigue very easily. She overall is ambulating better with SBQC but still has occasional LOB that she needs a little extra help to regain her balance; so she still needs to ambulate with her FWW for safety. Decreased right leg shaking noted with ambulation. Pt still gets very anxious/nervous when ambulating with SBQC and still lacks confidence.  -LN        PT Plan    PT Frequency 1x/week;2x/week  -LN     PT Plan Comments Cont per POC, progressing as tolerated. Continue to work on ambulation with SBQC as tolerated.  -LN               User Key  (r) = Recorded By, (t) = Taken By, (c) = Cosigned By      Initials Name Provider Type    Macrina Pinto, PT Physical Therapist                       OP Exercises       Row Name 10/17/24 1400             Precautions    Existing Precautions/Restrictions spinal  -LN         Subjective    Subjective Comments Pt reports \"I think I am getting worse instead of better; I just feel unsteady, unless I am using the walker.\"  Pt c/o pain in her foot and she reports she tried to put on a different pair of shoes but she couldn't tolerate them because they hurt her feet. \"I have been doing pretty good walking with the cane at home but then I don't know.\"  -LN         Subjective Pain    Able to rate subjective pain? yes  -LN      Pre-Treatment Pain Level 0  -LN         Total Minutes    09967 - Gait Training Minutes  10  -LN      43090 - PT Therapeutic Exercise Minutes 40  -LN         Exercise 1    Exercise Name 1 (B) seated gastroc/hamstring stretch  -LN      Cueing 1 " "Verbal;Tactile;Demo  -LN      Additional Comments HEP  -LN         Exercise 2    Exercise Name 2 seated ball squeeze  -LN      Cueing 2 Verbal  -LN      Sets 2 ABDOMINAL BRACIN  -LN      Reps 2 20  -LN      Time 2 5 sec  -LN         Exercise 3    Exercise Name 3 alt march w/abdominal brace with green TB  -LN      Cueing 3 Verbal;Demo  -LN      Sets 3 ABDOMINAL BRACIN  -LN      Reps 3 20  -LN      Additional Comments blue TB  -LN         Exercise 4    Exercise Name 4 seated rows vs TB  -LN      Cueing 4 Verbal;Demo  -LN      Sets 4 ABDOMINAL BRACIN  -LN      Reps 4 20  -LN      Time 4 blue  -LN         Exercise 5    Exercise Name 5 seated (B) shoulder extension  -LN      Cueing 5 Verbal;Tactile;Demo  -LN      Sets 5 ABDOMINAL BRACIN  -LN      Reps 5 20  -LN      Time 5 blue  -LN         Exercise 6    Exercise Name 6 sit to stand  -LN      Cueing 6 Verbal;Tactile;Demo  -LN      Reps 6 6  -LN      Additional Comments hands on knees from chair; cueing needed for proper technique  -LN         Exercise 7    Exercise Name 7 seated hip abduction vs TB  -LN      Cueing 7 Verbal  -LN      Reps 7 20  -LN      Additional Comments blue TB  -LN         Exercise 8    Exercise Name 8 standing (B) hip ABD  -LN      Cueing 8 Verbal  -LN      Reps 8 10 each  -LN      Additional Comments 1# on each leg  -LN         Exercise 9    Exercise Name 9 (B) hamstring curl  -LN      Cueing 9 Verbal;Demo  -LN      Reps 9 10 each  -LN      Additional Comments 1# on each leg  -LN         Exercise 10    Exercise Name 10 SLS on balance pad  -LN      Cueing 10 Verbal;Tactile;Demo  -LN      Reps 10 1 each leg  -LN      Time 10 1 minute each leg  -LN      Additional Comments at elevated table, 1# on each leg  -LN         Exercise 11    Exercise Name 11 alt march - balance pad  -LN      Cueing 11 Verbal  -LN      Sets 11 --  -LN      Reps 11 25  -LN      Additional Comments at elevated table; 1# on each leg  -LN         Exercise 12    Exercise Name 12 4\" " forward step up  -LN      Cueing 12 Verbal;Demo  -LN      Time 12 10 each  -LN         Exercise 13    Exercise Name 13 Gait: (with gait belt on) Pt ambulated 10 feet and then 20 feet x 2 (standing ex's at stairs and then ambulated back without any seated rest break) with SBQC and CGA with occasional min A. Pt continues to need cues, verbal and tactile, for proper sequencing  Pt had 2-3 episodes of minor LOB where she needed a little extra assist to regain balance.  Decreased shaking of R leg noted today. She practiced stepping with L foot at same time with cane for first 20 feet and then on way back, she returned to moving cane first and then L foot and then right foot sequence. She continues to have trouble looking up when she is walking with cane and tends to look down at her feet.  -LN         Exercise 14    Exercise Name 14 standing hip extension  -LN      Cueing 14 Verbal;Tactile;Demo  -LN      Reps 14 10 each leg  -LN      Additional Comments 1# on each leg; at elevated table  -LN         Exercise 15    Exercise Name 15 sidestepping at railing  -LN      Cueing 15 Verbal;Demo  -LN      Reps 15 5x back and forth  -LN      Additional Comments hands on railing for support  -LN                User Key  (r) = Recorded By, (t) = Taken By, (c) = Cosigned By      Initials Name Provider Type    Macrina Pinto, PT Physical Therapist                                     Therapy Education  Education Details: Continue to encourage pt to work on HEP at home; at least the seated and bed exercises and explained to her why they are so important. Pt to continue walking in home with FWW and practice with SBQC for short distances with  assist if they feel comfortable doing so. Pt can work on SLS as tolerated holding onto counter top for safety.  Given: HEP, Symptoms/condition management, Posture/body mechanics, Mobility training  Program: Reinforced, Progressed  How Provided: Verbal, Demonstration  Provided to:  Patient  Level of Understanding: Teach back education performed, Verbalized, Demonstrated              Time Calculation:   Start Time: 1500  Stop Time: 1555  Time Calculation (min): 55 min  Timed Charges  96730 - PT Therapeutic Exercise Minutes: 40  59295 - Gait Training Minutes : 10  Total Minutes  Timed Charges Total Minutes: 50   Total Minutes: 50  Therapy Charges for Today       Code Description Service Date Service Provider Modifiers Qty    [id number] HC PT THER PROC EA 15 MIN 10/17/2024 Macrina Owens, PT GP 3                      Macrina Owens, PT  10/17/2024

## 2024-10-24 ENCOUNTER — HOSPITAL ENCOUNTER (OUTPATIENT)
Dept: PHYSICAL THERAPY | Facility: HOSPITAL | Age: 84
Setting detail: THERAPIES SERIES
Discharge: HOME | End: 2024-10-24
Payer: MEDICARE

## 2024-10-24 DIAGNOSIS — Z98.1 S/P LUMBAR SPINAL FUSION: ICD-10-CM

## 2024-10-24 DIAGNOSIS — M54.16 LUMBAR RADICULOPATHY: Primary | ICD-10-CM

## 2024-10-24 PROCEDURE — 97110 THERAPEUTIC EXERCISES: CPT

## 2024-10-24 NOTE — THERAPY TREATMENT NOTE
Outpatient Physical Therapy Ortho Treatment Note  [facility]     Patient Name: Anitra Orta  : 1940  MRN: [id number]  Today's Date: 10/24/2024        Visit Date: 10/24/2024    Visit Dx:    ICD-10-CM ICD-9-CM   1. Lumbar radiculopathy  M54.16 724.4   2. S/P lumbar spinal fusion  Z98.1 V45.4       Patient Active Problem List   Diagnosis    Urinary tract infection due to ESBL Klebsiella    Simple renal cyst    Former smoker    Hyperlipidemia    Osteoporosis    Panic disorder    Psoriasis    Urge incontinence of urine    Vitamin D deficiency    Post-menopause    Acute UTI (urinary tract infection)    Chronic bilateral low back pain without sciatica    Spinal stenosis of lumbar region with neurogenic claudication    Other chronic pain    Mixed incontinence    GERD without esophagitis    Anxiety    Hyperglycemia    e.coli bacteremia    Ureteral stone with hydronephrosis    Obstructive uropathy    Nephrolithiasis    Anemia    Metabolic acidosis    Essential hypertension    History of ventricular tachycardia    Left ureteral stone    Chronic idiopathic constipation    Personal history of colonic polyps        Past Medical History:   Diagnosis Date    Acute kidney failure 2021    Allergic     weather allergies    Anxiety     Arthritis of back     At risk for sleep apnea     Cataract     BILAT-SCHEDULED FOR THIS AND HAD TO CANCEL D/T SEPSIS    Chronic pain disorder     ALL OVER PAIN    Chronic UTI     Closed displaced fracture of surgical neck of left humerus 2021    Closed fracture of left proximal humerus 06/15/2021    Closed fracture of right distal radius 06/15/2021    Colon polyp     COVID-19 vaccine series completed     2ND DOSE 3/23/21    DDD (degenerative disc disease), lumbar     e.coli bacteremia 2021    Elevated cholesterol     Fracture, humerus     LEFT. h/o    GERD (gastroesophageal reflux disease)     History of recent fall     21    History of sepsis     MOST RECENT  JULY 2021-R/T KIDNEY STONE, UTI.  STATES THIS IS HER 3RD SEPSIS DIAGNOSIS    History of UTI     HL (hearing loss)     HTN (hypertension)     Hyperlipidemia     Hypokalemia 03/25/2021    Kidney stones     LEFT    Left humeral fracture 07/11/2021    Low back pain     Macular degeneration, bilateral     WORSE ON RIGHT-ONLY PERIPHERAL VISION    Metabolic encephalopathy 03/25/2021    Mixed hyperlipidemia 09/16/2016    Mixed incontinence     Obesity     Osteoarthritis     Osteopenia     Osteoporosis     Panic disorder     Personal history of urinary calculi     PONV (postoperative nausea and vomiting)     Scoliosis     Sepsis, unspecified organism 03/25/2021    Tachycardia, unspecified     Ventricular tachycardia 09/09/2019    Visual impairment     Wrist fracture     RIGHT-CURRENTLY NOT WEARING BRACE FOR THIS        Past Surgical History:   Procedure Laterality Date    ADENOIDECTOMY      BREAST BIOPSY Left     benign    BREAST LUMPECTOMY Left     benign    BUNIONECTOMY Right     COLONOSCOPY N/A 11/30/2016    Procedure: COLONOSCOPY polypectomy;  Surgeon: Adali Willard MD;  Location: [facility] OR;  Service:     CYSTOSCOPY BOTOX INJECTION OF BLADDER N/A 07/21/2017    Procedure: CYSTOSCOPY COAPTITE INJECTION;  Surgeon: Kevon Clark MD;  Location: [facility] OR;  Service:     CYSTOSCOPY URETEROSCOPY LASER LITHOTRIPSY Right 05/01/2023    Procedure: RIGHT URETEROSCOPY LASER LITHOTRIPSY STONE BASKET EXTRACTION AND STENT;  Surgeon: Kevon Clark MD;  Location: [facility] OR;  Service: Urology;  Laterality: Right;    CYSTOSCOPY W/ LITHOLAPAXY / EHL      CYSTOSCOPY W/ URETERAL STENT PLACEMENT Left 09/12/2016    Procedure: CYSTOSCOPY URETERAL STENT INSERTION;  Surgeon: Kevon Clark MD;  Location: [facility] OR;  Service:     CYSTOSCOPY W/ URETERAL STENT PLACEMENT Left 08/01/2019    Procedure: CYSTOSCOPY URETERAL CATHETER/STENT INSERTION;  Surgeon: Kevon Clark MD;  Location: [facility] OR;  Service: Urology    CYSTOSCOPY W/  "URETERAL STENT PLACEMENT Left 03/25/2021    Procedure: CYSTOSCOPY URETERAL CATHETER/STENT INSERTION;  Surgeon: Kevon Clark MD;  Location: [facility] OR;  Service: Urology;  Laterality: Left;    CYSTOSCOPY W/ URETERAL STENT PLACEMENT Left 07/11/2021    Procedure: CYSTOSCOPY URETERAL CATHETER/STENT INSERTION;  Surgeon: Lul Guzman MD;  Location: [facility] OR;  Service: Urology;  Laterality: Left;  CYSTOSCOPY LEFT URETERAL CATHETER/ STENT PLACEMENT    CYSTOSCOPY W/ URETERAL STENT PLACEMENT Right 04/20/2023    Procedure: CYSTOSCOPY STENT PLACEMENT;  Surgeon: Matthew Ndiaye MD;  Location: [facility] OR;  Service: Urology;  Laterality: Right;    EYE SURGERY      JOINT REPLACEMENT Right     total knee    LAMINECTOMY  11/2022    LUMBAR EPIDURAL INJECTION      TONSILLECTOMY AND ADENOIDECTOMY      TUBAL ABDOMINAL LIGATION      URETEROSCOPY LASER LITHOTRIPSY WITH STENT INSERTION Left 10/05/2016    Procedure: LT URETEROSCOPY LASER LITHOTRIPSY STONE BASKET EXTRACTION AND STENT ;  Surgeon: Kevon Clark MD;  Location: [facility];  Service:     URETEROSCOPY LASER LITHOTRIPSY WITH STENT INSERTION Left 07/23/2021    Procedure: LEFT URETEROSCOPY STONE MANIPULATION STENT EXCHANGE, LASER LITHOTRIPSY, CYSTOSCOPY, STONE BASKET EXTRACTION;  Surgeon: Kevon Clark MD;  Location: [facility];  Service: Urology;  Laterality: Left;        PT Ortho       Row Name 10/24/24 1500       Subjective    Subjective Comments Pt reports she had a stomach ache when she first got up but feels better now; no c/o any LBP or side pain today. Pt reports she is still trying to practice walking with her SBQC at home but reports her legs still really weak with this and \"even if I am standing my legs feel shakey & I don't know why.\"  -LN       Precautions and Contraindications    Precautions/Limitations spinal precautions  -LN       Subjective Pain    Able to rate subjective pain? yes  -LN    Pre-Treatment Pain Level 0  -LN    " "Subjective Pain Comment no present pain reported  -LN       Posture/Observations    Posture/Observations Comments Pt ambulated into PT clinic today with FWW.  -LN              User Key  (r) = Recorded By, (t) = Taken By, (c) = Cosigned By      Initials Name Provider Type    Macrina Pinto, PT Physical Therapist                                 PT Assessment/Plan       Row Name 10/24/24 1500          PT Assessment    Assessment Comments Pt is progressing with exercises fairly well and able to do standing LE exercise with 1# on each leg today. She does still fatigue very easily but overall endurance level is improved. She overall is ambulating better with SBQC but still has occasional LOB where she needs a little extra help. She is independent ambulating with her FWW and was able to walk into PT clinic today vs being in wheelchair.  Decreased right leg shaking noted with ambulation. Pt still gets very anxious/nervous when ambulating with SBQC and still lacks confidence, which continues to affect her progress walking with the cane. No c/o pain today except does have occasional left knee pain with exercises (OA).  -LN        PT Plan    PT Frequency 1x/week;2x/week  -LN     PT Plan Comments Cont per POC, progressing as tolerated. Continue to work on ambulation with SBQC as tolerated.  -LN               User Key  (r) = Recorded By, (t) = Taken By, (c) = Cosigned By      Initials Name Provider Type    Macrina Pinto, PT Physical Therapist                       OP Exercises       Row Name 10/24/24 1500             Precautions    Existing Precautions/Restrictions spinal  -LN         Subjective    Subjective Comments Pt reports she had a stomach ache when she first got up but feels better now; no c/o any LBP or side pain today. Pt reports she is still trying to practice walking with her SBQC at home but reports her legs still really weak with this and \"even if I am standing my legs feel shakey & I don't " "know why.\"  -LN         Subjective Pain    Able to rate subjective pain? yes  -LN      Pre-Treatment Pain Level 0  -LN      Subjective Pain Comment no present pain reported  -LN         Total Minutes    37557 - Gait Training Minutes  10  -LN      21397 - PT Therapeutic Exercise Minutes 40  -LN         Exercise 1    Exercise Name 1 (B) seated gastroc/hamstring stretch  -LN      Cueing 1 Verbal;Tactile;Demo  -LN      Additional Comments HEP  -LN         Exercise 2    Exercise Name 2 seated ball squeeze  -LN      Cueing 2 Verbal  -LN      Sets 2 ABDOMINAL BRACIN  -LN      Reps 2 25  -LN      Time 2 5 sec  -LN         Exercise 3    Exercise Name 3 alt march w/abdominal brace with green TB  -LN      Cueing 3 Verbal;Demo  -LN      Sets 3 ABDOMINAL BRACIN  -LN      Reps 3 25  -LN      Additional Comments blue TB  -LN         Exercise 4    Exercise Name 4 seated rows vs TB  -LN      Cueing 4 Verbal;Demo  -LN      Sets 4 ABDOMINAL BRACIN  -LN      Reps 4 25  -LN      Time 4 blue  -LN         Exercise 5    Exercise Name 5 seated (B) shoulder extension  -LN      Cueing 5 Verbal;Tactile;Demo  -LN      Sets 5 ABDOMINAL BRACIN  -LN      Reps 5 25  -LN      Time 5 blue  -LN         Exercise 6    Exercise Name 6 sit to stand  -LN      Cueing 6 Verbal;Tactile;Demo  -LN      Reps 6 5  -LN      Additional Comments hands on knees from chair; cueing needed for proper technique  -LN         Exercise 7    Exercise Name 7 seated hip abduction vs TB  -LN      Cueing 7 Verbal  -LN      Reps 7 25  -LN      Additional Comments blue TB  -LN         Exercise 8    Exercise Name 8 standing (B) hip ABD  -LN      Cueing 8 Verbal  -LN      Reps 8 10 each  -LN      Additional Comments 1# on each leg  -LN         Exercise 9    Exercise Name 9 (B) hamstring curl  -LN      Cueing 9 Verbal;Demo  -LN      Sets 9 u  -LN      Reps 9 10 each  -LN      Additional Comments 1# on each leg  -LN         Exercise 10    Exercise Name 10 SLS on balance pad  -LN      " "Cueing 10 Verbal;Tactile;Demo  -LN      Reps 10 1 each leg  -LN      Time 10 1 minute each leg  -LN      Additional Comments at elevated table, 1# on each leg  -LN         Exercise 11    Exercise Name 11 --  -LN      Cueing 11 --  -LN      Reps 11 --  -LN      Additional Comments --  -LN         Exercise 12    Exercise Name 12 4\" forward step up  -LN      Cueing 12 Verbal;Demo  -LN      Time 12 15 each  -LN         Exercise 13    Exercise Name 13 Gait: (with gait belt on) Pt ambulated 10 feet and then 20 feet x 2 (standing ex's at stairs and then ambulated back without any seated rest break) with SBQC and CGA with 1 episode of min A when she began to lose her balance posteriorly. Pt continues to need cues, verbal and tactile, for proper sequencing, but overall less.  Pt had 2 episodes of minor LOB where she needed a little extra assist to regain balance.  Decreased shaking of R leg noted today. She continues to have trouble looking up when she is walking with cane and tends to look down at her feet.  -LN         Exercise 14    Exercise Name 14 standing hip extension  -LN      Cueing 14 Verbal;Tactile;Demo  -LN      Reps 14 10 each leg  -LN      Additional Comments 1# on each leg; at elevated table  -LN         Exercise 15    Exercise Name 15 sidestepping at railing  -LN      Cueing 15 Verbal;Demo  -LN      Reps 15 6x back and forth  -LN      Additional Comments hands on railing for support  -LN                User Key  (r) = Recorded By, (t) = Taken By, (c) = Cosigned By      Initials Name Provider Type    Macrina Pinto, PT Physical Therapist                                     Therapy Education  Education Details: Continue to encourage pt to work on HEP at home; at least the seated and bed exercises. Pt to continue walking in home with FWW and practice with SBQC for short distances with  assist if they feel comfortable doing so. Pt can work on SLS as tolerated holding onto counter top for " safety.  Given: HEP, Symptoms/condition management, Mobility training, Posture/body mechanics  Program: Reinforced, Progressed  How Provided: Verbal, Demonstration  Provided to: Patient  Level of Understanding: Teach back education performed, Verbalized, Demonstrated              Time Calculation:   Start Time: 1500  Stop Time: 1555  Time Calculation (min): 55 min  Timed Charges  47242 - PT Therapeutic Exercise Minutes: 40  94562 - Gait Training Minutes : 10  Total Minutes  Timed Charges Total Minutes: 50   Total Minutes: 50  Therapy Charges for Today       Code Description Service Date Service Provider Modifiers Qty    [id number] HC PT THER PROC EA 15 MIN 10/24/2024 Macrina Owens, PT GP 3                      Macrina Owens, PT  10/24/2024

## 2024-10-29 ENCOUNTER — HOSPITAL ENCOUNTER (OUTPATIENT)
Dept: PHYSICAL THERAPY | Facility: HOSPITAL | Age: 84
Setting detail: THERAPIES SERIES
Discharge: HOME | End: 2024-10-29
Payer: MEDICARE

## 2024-10-29 DIAGNOSIS — Z98.1 S/P LUMBAR SPINAL FUSION: ICD-10-CM

## 2024-10-29 DIAGNOSIS — M54.16 LUMBAR RADICULOPATHY: Primary | ICD-10-CM

## 2024-10-29 PROCEDURE — 97116 GAIT TRAINING THERAPY: CPT

## 2024-10-29 PROCEDURE — 97110 THERAPEUTIC EXERCISES: CPT

## 2024-10-29 RX ORDER — IMIPRAMINE HYDROCHLORIDE 50 MG/1
100 TABLET, FILM COATED ORAL NIGHTLY
Qty: 180 TABLET | Refills: 0 | Status: SHIPPED | OUTPATIENT
Start: 2024-10-29

## 2024-10-29 NOTE — THERAPY TREATMENT NOTE
Outpatient Physical Therapy Ortho Treatment Note  [facility]     Patient Name: Anitra Orta  : 1940  MRN: [id number]  Today's Date: 10/29/2024      Visit Date: 10/29/2024      Visit Dx:    ICD-10-CM ICD-9-CM   1. Lumbar radiculopathy  M54.16 724.4   2. S/P lumbar spinal fusion  Z98.1 V45.4       Patient Active Problem List   Diagnosis    Urinary tract infection due to ESBL Klebsiella    Simple renal cyst    Former smoker    Hyperlipidemia    Osteoporosis    Panic disorder    Psoriasis    Urge incontinence of urine    Vitamin D deficiency    Post-menopause    Acute UTI (urinary tract infection)    Chronic bilateral low back pain without sciatica    Spinal stenosis of lumbar region with neurogenic claudication    Other chronic pain    Mixed incontinence    GERD without esophagitis    Anxiety    Hyperglycemia    e.coli bacteremia    Ureteral stone with hydronephrosis    Obstructive uropathy    Nephrolithiasis    Anemia    Metabolic acidosis    Essential hypertension    History of ventricular tachycardia    Left ureteral stone    Chronic idiopathic constipation    Personal history of colonic polyps        Past Medical History:   Diagnosis Date    Acute kidney failure 2021    Allergic     weather allergies    Anxiety     Arthritis of back     At risk for sleep apnea     Cataract     BILAT-SCHEDULED FOR THIS AND HAD TO CANCEL D/T SEPSIS    Chronic pain disorder     ALL OVER PAIN    Chronic UTI     Closed displaced fracture of surgical neck of left humerus 2021    Closed fracture of left proximal humerus 06/15/2021    Closed fracture of right distal radius 06/15/2021    Colon polyp     COVID-19 vaccine series completed     2ND DOSE 3/23/21    DDD (degenerative disc disease), lumbar     e.coli bacteremia 2021    Elevated cholesterol     Fracture, humerus     LEFT. h/o    GERD (gastroesophageal reflux disease)     History of recent fall     21    History of sepsis     MOST RECENT  JULY 2021-R/T KIDNEY STONE, UTI.  STATES THIS IS HER 3RD SEPSIS DIAGNOSIS    History of UTI     HL (hearing loss)     HTN (hypertension)     Hyperlipidemia     Hypokalemia 03/25/2021    Kidney stones     LEFT    Left humeral fracture 07/11/2021    Low back pain     Macular degeneration, bilateral     WORSE ON RIGHT-ONLY PERIPHERAL VISION    Metabolic encephalopathy 03/25/2021    Mixed hyperlipidemia 09/16/2016    Mixed incontinence     Obesity     Osteoarthritis     Osteopenia     Osteoporosis     Panic disorder     Personal history of urinary calculi     PONV (postoperative nausea and vomiting)     Scoliosis     Sepsis, unspecified organism 03/25/2021    Tachycardia, unspecified     Ventricular tachycardia 09/09/2019    Visual impairment     Wrist fracture     RIGHT-CURRENTLY NOT WEARING BRACE FOR THIS        Past Surgical History:   Procedure Laterality Date    ADENOIDECTOMY      BREAST BIOPSY Left     benign    BREAST LUMPECTOMY Left     benign    BUNIONECTOMY Right     COLONOSCOPY N/A 11/30/2016    Procedure: COLONOSCOPY polypectomy;  Surgeon: Adali Willard MD;  Location: [facility] OR;  Service:     CYSTOSCOPY BOTOX INJECTION OF BLADDER N/A 07/21/2017    Procedure: CYSTOSCOPY COAPTITE INJECTION;  Surgeon: Kevon Clark MD;  Location: [facility] OR;  Service:     CYSTOSCOPY URETEROSCOPY LASER LITHOTRIPSY Right 05/01/2023    Procedure: RIGHT URETEROSCOPY LASER LITHOTRIPSY STONE BASKET EXTRACTION AND STENT;  Surgeon: Kevon Clark MD;  Location: [facility] OR;  Service: Urology;  Laterality: Right;    CYSTOSCOPY W/ LITHOLAPAXY / EHL      CYSTOSCOPY W/ URETERAL STENT PLACEMENT Left 09/12/2016    Procedure: CYSTOSCOPY URETERAL STENT INSERTION;  Surgeon: Kevon Clark MD;  Location: [facility] OR;  Service:     CYSTOSCOPY W/ URETERAL STENT PLACEMENT Left 08/01/2019    Procedure: CYSTOSCOPY URETERAL CATHETER/STENT INSERTION;  Surgeon: Kevon Clark MD;  Location: [facility] OR;  Service: Urology    CYSTOSCOPY W/  URETERAL STENT PLACEMENT Left 03/25/2021    Procedure: CYSTOSCOPY URETERAL CATHETER/STENT INSERTION;  Surgeon: Kevon Clark MD;  Location: [facility] OR;  Service: Urology;  Laterality: Left;    CYSTOSCOPY W/ URETERAL STENT PLACEMENT Left 07/11/2021    Procedure: CYSTOSCOPY URETERAL CATHETER/STENT INSERTION;  Surgeon: Lul Guzman MD;  Location: [facility] OR;  Service: Urology;  Laterality: Left;  CYSTOSCOPY LEFT URETERAL CATHETER/ STENT PLACEMENT    CYSTOSCOPY W/ URETERAL STENT PLACEMENT Right 04/20/2023    Procedure: CYSTOSCOPY STENT PLACEMENT;  Surgeon: Matthew Ndiaye MD;  Location: [facility] OR;  Service: Urology;  Laterality: Right;    EYE SURGERY      JOINT REPLACEMENT Right     total knee    LAMINECTOMY  11/2022    LUMBAR EPIDURAL INJECTION      TONSILLECTOMY AND ADENOIDECTOMY      TUBAL ABDOMINAL LIGATION      URETEROSCOPY LASER LITHOTRIPSY WITH STENT INSERTION Left 10/05/2016    Procedure: LT URETEROSCOPY LASER LITHOTRIPSY STONE BASKET EXTRACTION AND STENT ;  Surgeon: Kevon Clark MD;  Location: [facility];  Service:     URETEROSCOPY LASER LITHOTRIPSY WITH STENT INSERTION Left 07/23/2021    Procedure: LEFT URETEROSCOPY STONE MANIPULATION STENT EXCHANGE, LASER LITHOTRIPSY, CYSTOSCOPY, STONE BASKET EXTRACTION;  Surgeon: Kevon Clark MD;  Location: [facility];  Service: Urology;  Laterality: Left;        PT Ortho       Row Name 10/29/24 1600       Posture/Observations    Posture/Observations Comments Pt ambulated into PT clinic today with FWW independently.  -LN      Row Name 10/29/24 1300       Precautions and Contraindications    Precautions/Limitations spinal precautions  -LN       Subjective Pain    Able to rate subjective pain? yes  -LN              User Key  (r) = Recorded By, (t) = Taken By, (c) = Cosigned By      Initials Name Provider Type    LN Macrina Owens, PT Physical Therapist                                 PT Assessment/Plan       Row Name 10/29/24  "1400          PT Assessment    Assessment Comments Pt had a very good session today.  She was able to do standing LE exercises with 1.5# without any complaints but her legs, dany R leg, still fatigues easily. Pt with overall improved balance today with gait training and she was able to do a little walking without any AD with CGA and no LOB noted. Did also practice gait with SBQC and found that she does better when she doesn't focus on sequencing and just walks.(When she focuses on sequencing, she gets  in her head and becomes very anxious/nervous and that affects her balance and safety.  Still feel like she needs to use FWW for safety with gait dany outside of home. Worked on backward walking along railing and she needed CGA and cueing to take bigger steps (nervous with this). Pt still has some trouble with sit to stand from chair with hands on her knees and it sometimes takes a couple tries before she can stand up (with rocking).   -LN        PT Plan    PT Frequency 1x/week;2x/week  -LN     PT Plan Comments Cont per POC, progressing as tolerated. Continue to work on ambulation with SBQC & without AD as tolerated. Also work on backward walking as tolerated.  -LN               User Key  (r) = Recorded By, (t) = Taken By, (c) = Cosigned By      Initials Name Provider Type    Macrina Pinto, PT Physical Therapist                       OP Exercises       Row Name 10/29/24 1300             Precautions    Existing Precautions/Restrictions spinal  -LN         Subjective    Subjective Comments \"I feel alright; just depressed.\" No c/o any pain. \"I still have a pain every once in awhile in my right side.\" Pt reports having more trouble getting motivated to do therapy/exercises. \"I haven't used the cane at home but she reports that she has been walking some without anything but only very short distances. \"I don't know if I am better without the cane or with it.\" \"I think I get in my head when I try to use the cane.\"  " -LN         Subjective Pain    Able to rate subjective pain? yes  -LN      Pre-Treatment Pain Level 0  -LN      Subjective Pain Comment no present pain reported  -LN         Total Minutes    83606 - Gait Training Minutes  15  -LN      37195 - PT Therapeutic Exercise Minutes 40  -LN         Exercise 1    Exercise Name 1 (B) seated gastroc/hamstring stretch  -LN      Cueing 1 Verbal;Tactile;Demo  -LN      Additional Comments HEP  -LN         Exercise 2    Exercise Name 2 seated ball squeeze  -LN      Cueing 2 Verbal  -LN      Sets 2 ABDOMINAL BRACIN  -LN      Reps 2 30  -LN      Time 2 5 sec  -LN         Exercise 3    Exercise Name 3 alt march w/abdominal brace with green TB  -LN      Cueing 3 Verbal;Demo  -LN      Sets 3 ABDOMINAL BRACIN  -LN      Reps 3 30  -LN      Additional Comments blue TB  -LN         Exercise 4    Exercise Name 4 seated rows vs TB  -LN      Cueing 4 Verbal;Demo  -LN      Sets 4 ABDOMINAL BRACIN  -LN      Reps 4 30  -LN      Time 4 blue  -LN         Exercise 5    Exercise Name 5 seated (B) shoulder extension  -LN      Cueing 5 Verbal;Tactile;Demo  -LN      Sets 5 ABDOMINAL BRACIN  -LN      Reps 5 30  -LN      Time 5 blue  -LN         Exercise 6    Exercise Name 6 sit to stand  -LN      Cueing 6 Verbal;Tactile;Demo  -LN      Reps 6 6  -LN      Additional Comments hands on knees from chair; cueing needed for proper technique  -LN         Exercise 7    Exercise Name 7 seated hip abduction vs TB  -LN      Cueing 7 Verbal  -LN      Reps 7 30  -LN      Additional Comments blue TB  -LN         Exercise 8    Exercise Name 8 standing (B) hip ABD  -LN      Cueing 8 Verbal  -LN      Reps 8 10 each  -LN      Additional Comments 1.5# on each leg  -LN         Exercise 9    Exercise Name 9 (B) hamstring curl  -LN      Cueing 9 Verbal;Demo  -LN      Sets 9 --  -LN      Reps 9 10 each  -LN      Additional Comments 1.5# on each leg  -LN         Exercise 10    Exercise Name 10 SLS  -LN      Cueing 10  "Verbal;Tactile;Demo  -LN      Reps 10 1 each leg  -LN      Time 10 1 minute each leg  -LN      Additional Comments at elevated table, 1.5# on each leg  -LN         Exercise 11    Exercise Name 11 alt march -LN      Cueing 11 Verbal  -LN      Reps 11 30  -LN      Additional Comments at elevated table; 1.5# on each leg  -LN         Exercise 12    Exercise Name 12 4\" forward step up  -LN      Cueing 12 Verbal;Demo  -LN      Time 12 15 each  -LN         Exercise 13    Exercise Name 13 Gait: (with gait belt on) Pt ambulated 25 feet without any AD with CGA and then 20 feet with SBQC with CGA of 1 (standing ex's at stairs and then ambulated back without any seated rest break); no episodes of LOB where she needed increased assist. She did better when she didn't focus on sequencing and just walked.   Decreased shaking of R leg noted today. She continues to have trouble looking up when she is walking with cane and tends to look down at her feet.  -LN         Exercise 14    Exercise Name 14 standing hip extension  -LN      Cueing 14 Verbal;Tactile;Demo  -LN      Reps 14 10 each leg  -LN      Additional Comments 1.5# on each leg; at elevated table  -LN         Exercise 15    Exercise Name 15 sidestepping at railing  -LN      Cueing 15 Verbal;Demo  -LN      Reps 15 6x back and forth  -LN      Additional Comments hands on railing for support  -LN         Exercise 16    Exercise Name 16 forward and backward walking with R hand on rail  -LN      Cueing 16 Verbal;Tactile;Demo  -LN      Reps 16 4 x up and down rail  -LN      Additional Comments with CGA with backward walking  -LN                User Key  (r) = Recorded By, (t) = Taken By, (c) = Cosigned By      Initials Name Provider Type    Macrina Pinto, PT Physical Therapist                                     Therapy Education  Given: HEP, Symptoms/condition management, Mobility training, Posture/body mechanics  Program: Reinforced, Progressed  How Provided: Verbal, " Demonstration  Provided to: Patient  Level of Understanding: Teach back education performed, Verbalized, Demonstrated              Time Calculation:   Start Time: 1355  Stop Time: 1455  Time Calculation (min): 60 min  Timed Charges  62422 - PT Therapeutic Exercise Minutes: 40  07632 - Gait Training Minutes : 15  Total Minutes  Timed Charges Total Minutes: 55   Total Minutes: 55  Therapy Charges for Today       Code Description Service Date Service Provider Modifiers Qty    44710206455 HC PT THER PROC EA 15 MIN 10/29/2024 Macrina Owens, PT GP, KX 3    97990371242 HC GAIT TRAINING EA 15 MIN 10/29/2024 Macrina Owens, PT GP, KX 1                      Macrina Owens, PT  10/29/2024

## 2024-11-07 ENCOUNTER — APPOINTMENT (OUTPATIENT)
Dept: PHYSICAL THERAPY | Facility: HOSPITAL | Age: 84
End: 2024-11-07
Payer: MEDICARE

## 2024-11-08 RX ORDER — IMIPRAMINE HYDROCHLORIDE 50 MG/1
100 TABLET, FILM COATED ORAL NIGHTLY
Qty: 180 TABLET | Refills: 0 | Status: SHIPPED | OUTPATIENT
Start: 2024-11-08

## 2024-11-12 ENCOUNTER — HOSPITAL ENCOUNTER (OUTPATIENT)
Dept: PHYSICAL THERAPY | Facility: HOSPITAL | Age: 84
Setting detail: THERAPIES SERIES
Discharge: HOME | End: 2024-11-12
Payer: MEDICARE

## 2024-11-12 DIAGNOSIS — Z98.1 S/P LUMBAR SPINAL FUSION: ICD-10-CM

## 2024-11-12 DIAGNOSIS — M54.16 LUMBAR RADICULOPATHY: Primary | ICD-10-CM

## 2024-11-12 PROCEDURE — 97116 GAIT TRAINING THERAPY: CPT

## 2024-11-12 PROCEDURE — 97110 THERAPEUTIC EXERCISES: CPT

## 2024-11-12 NOTE — THERAPY TREATMENT NOTE
Outpatient Physical Therapy Ortho Treatment Note  [facility]     Patient Name: Anitra Orta  : 1940  MRN: [id number]  Today's Date: 2024        Visit Date: 2024    Visit Dx:    ICD-10-CM ICD-9-CM   1. Lumbar radiculopathy  M54.16 724.4   2. S/P lumbar spinal fusion  Z98.1 V45.4       Patient Active Problem List   Diagnosis    Urinary tract infection due to ESBL Klebsiella    Simple renal cyst    Former smoker    Hyperlipidemia    Osteoporosis    Panic disorder    Psoriasis    Urge incontinence of urine    Vitamin D deficiency    Post-menopause    Acute UTI (urinary tract infection)    Chronic bilateral low back pain without sciatica    Spinal stenosis of lumbar region with neurogenic claudication    Other chronic pain    Mixed incontinence    GERD without esophagitis    Anxiety    Hyperglycemia    e.coli bacteremia    Ureteral stone with hydronephrosis    Obstructive uropathy    Nephrolithiasis    Anemia    Metabolic acidosis    Essential hypertension    History of ventricular tachycardia    Left ureteral stone    Chronic idiopathic constipation    Personal history of colonic polyps        Past Medical History:   Diagnosis Date    Acute kidney failure 2021    Allergic     weather allergies    Anxiety     Arthritis of back     At risk for sleep apnea     Cataract     BILAT-SCHEDULED FOR THIS AND HAD TO CANCEL D/T SEPSIS    Chronic pain disorder     ALL OVER PAIN    Chronic UTI     Closed displaced fracture of surgical neck of left humerus 2021    Closed fracture of left proximal humerus 06/15/2021    Closed fracture of right distal radius 06/15/2021    Colon polyp     COVID-19 vaccine series completed     2ND DOSE 3/23/21    DDD (degenerative disc disease), lumbar     e.coli bacteremia 2021    Elevated cholesterol     Fracture, humerus     LEFT. h/o    GERD (gastroesophageal reflux disease)     History of recent fall     21    History of sepsis     MOST RECENT  JULY 2021-R/T KIDNEY STONE, UTI.  STATES THIS IS HER 3RD SEPSIS DIAGNOSIS    History of UTI     HL (hearing loss)     HTN (hypertension)     Hyperlipidemia     Hypokalemia 03/25/2021    Kidney stones     LEFT    Left humeral fracture 07/11/2021    Low back pain     Macular degeneration, bilateral     WORSE ON RIGHT-ONLY PERIPHERAL VISION    Metabolic encephalopathy 03/25/2021    Mixed hyperlipidemia 09/16/2016    Mixed incontinence     Obesity     Osteoarthritis     Osteopenia     Osteoporosis     Panic disorder     Personal history of urinary calculi     PONV (postoperative nausea and vomiting)     Scoliosis     Sepsis, unspecified organism 03/25/2021    Tachycardia, unspecified     Ventricular tachycardia 09/09/2019    Visual impairment     Wrist fracture     RIGHT-CURRENTLY NOT WEARING BRACE FOR THIS        Past Surgical History:   Procedure Laterality Date    ADENOIDECTOMY      BREAST BIOPSY Left     benign    BREAST LUMPECTOMY Left     benign    BUNIONECTOMY Right     COLONOSCOPY N/A 11/30/2016    Procedure: COLONOSCOPY polypectomy;  Surgeon: Adali Willard MD;  Location: [facility] OR;  Service:     CYSTOSCOPY BOTOX INJECTION OF BLADDER N/A 07/21/2017    Procedure: CYSTOSCOPY COAPTITE INJECTION;  Surgeon: Kevon Clark MD;  Location: [facility] OR;  Service:     CYSTOSCOPY URETEROSCOPY LASER LITHOTRIPSY Right 05/01/2023    Procedure: RIGHT URETEROSCOPY LASER LITHOTRIPSY STONE BASKET EXTRACTION AND STENT;  Surgeon: Kevon Clark MD;  Location: [facility] OR;  Service: Urology;  Laterality: Right;    CYSTOSCOPY W/ LITHOLAPAXY / EHL      CYSTOSCOPY W/ URETERAL STENT PLACEMENT Left 09/12/2016    Procedure: CYSTOSCOPY URETERAL STENT INSERTION;  Surgeon: Kevon Clark MD;  Location: [facility] OR;  Service:     CYSTOSCOPY W/ URETERAL STENT PLACEMENT Left 08/01/2019    Procedure: CYSTOSCOPY URETERAL CATHETER/STENT INSERTION;  Surgeon: Kevon Clark MD;  Location: [facility] OR;  Service: Urology    CYSTOSCOPY W/  "URETERAL STENT PLACEMENT Left 03/25/2021    Procedure: CYSTOSCOPY URETERAL CATHETER/STENT INSERTION;  Surgeon: Kevon Clark MD;  Location: [facility] OR;  Service: Urology;  Laterality: Left;    CYSTOSCOPY W/ URETERAL STENT PLACEMENT Left 07/11/2021    Procedure: CYSTOSCOPY URETERAL CATHETER/STENT INSERTION;  Surgeon: Lul Guzman MD;  Location:  [facility] OR;  Service: Urology;  Laterality: Left;  CYSTOSCOPY LEFT URETERAL CATHETER/ STENT PLACEMENT    CYSTOSCOPY W/ URETERAL STENT PLACEMENT Right 04/20/2023    Procedure: CYSTOSCOPY STENT PLACEMENT;  Surgeon: Matthew Ndiaye MD;  Location: [facility] OR;  Service: Urology;  Laterality: Right;    EYE SURGERY      JOINT REPLACEMENT Right     total knee    LAMINECTOMY  11/2022    LUMBAR EPIDURAL INJECTION      TONSILLECTOMY AND ADENOIDECTOMY      TUBAL ABDOMINAL LIGATION      URETEROSCOPY LASER LITHOTRIPSY WITH STENT INSERTION Left 10/05/2016    Procedure: LT URETEROSCOPY LASER LITHOTRIPSY STONE BASKET EXTRACTION AND STENT ;  Surgeon: Kevon Clark MD;  Location: [facility] OR;  Service:     URETEROSCOPY LASER LITHOTRIPSY WITH STENT INSERTION Left 07/23/2021    Procedure: LEFT URETEROSCOPY STONE MANIPULATION STENT EXCHANGE, LASER LITHOTRIPSY, CYSTOSCOPY, STONE BASKET EXTRACTION;  Surgeon: Kevon Clark MD;  Location: [facility] OR;  Service: Urology;  Laterality: Left;        PT Ortho       Row Name 11/12/24 1400       Subjective    Subjective Comments \"Yesterday was a crazy day for me; we had gone out on Sunday to lunch and so I hurt all over and also from the rain.\" Pt reports that she still has that pain on her right side/hip, but no present pain reported.  \"I just get tired so easily.\" \"I have been walking in my house some with the cane around my counter.\" Pt also reports that she has been going out more and using the rolling walker instead of being in wheelchair. \"I ordered some wheels for the back of my walker to try instead of the tennis " "balls because we have to replace the tennis balls so much.\"  -LN       Precautions and Contraindications    Precautions/Limitations spinal precautions  -LN       Subjective Pain    Able to rate subjective pain? yes  -LN       Posture/Observations    Posture/Observations Comments Pt ambulated into PT clinic today with FWW independently.  -LN              User Key  (r) = Recorded By, (t) = Taken By, (c) = Cosigned By      Initials Name Provider Type    Macrina Pinto, PT Physical Therapist                                 PT Assessment/Plan       Row Name 11/12/24 1500          PT Assessment    Assessment Comments Pt tolerated treatment fairly well today with no c/o any pain but she still fatigues easily dany with standing exercises and ambulation. She continues to ambulate well with FWW and doing much better using the walker when she goes out vs being in wheelchair. She continues to ambulate better with SBQC for short distances with less assist and cueing needed and does better when she just walks and doesn't focus on sequencing. She still tends to look down when ambulating with SBQC but is improving. She still gets nervous walking with cane but overall less and no LOB noted today. She still has trouble coming sit to stand without using arms on chair; feel this is due to core and LE weakness, c/o L knee pain and if she starts to lose her balance at all she gets scared and immediately sits down; need to continue to work on this. Feel she needs continued core strengthening.  -LN        PT Plan    PT Frequency 1x/week;2x/week  -LN     PT Plan Comments Cont per POC, progressing as tolerated. Continue to work on ambulation with SBQC & without AD as tolerated. Also work on backward walking as tolerated. Progress with core strengthening.  -LN               User Key  (r) = Recorded By, (t) = Taken By, (c) = Cosigned By      Initials Name Provider Type    Macrina Pinto, PT Physical Therapist         " "              OP Exercises       Row Name 11/12/24 1400             Precautions    Existing Precautions/Restrictions spinal  -LN         Subjective    Subjective Comments \"Yesterday was a crazy day for me; we had gone out on Sunday to lunch and so I hurt all over and also from the rain.\" Pt reports that she still has that pain on her right side/hip, but no present pain reported.  \"I just get tired so easily.\" \"I have been walking in my house some with the cane around my counter.\" Pt also reports that she has been going out more and using the rolling walker instead of being in wheelchair. \"i ordered some wheels for the back of my walker to try instead of the tennis balls because we have to replace the tennis balls so much.\"  -LN         Subjective Pain    Able to rate subjective pain? yes  -LN      Pre-Treatment Pain Level 0  -LN      Subjective Pain Comment no present pain reported  -LN         Total Minutes    83250 - Gait Training Minutes  10  -LN      19566 - PT Therapeutic Exercise Minutes 30  -LN         Exercise 1    Exercise Name 1 (B) seated gastroc/hamstring stretch  -LN      Cueing 1 Verbal;Tactile;Demo  -LN      Additional Comments HEP  -LN         Exercise 2    Exercise Name 2 seated ball squeeze  -LN      Cueing 2 Verbal  -LN      Sets 2 ABDOMINAL BRACIN  -LN      Reps 2 30  -LN      Time 2 5 sec  -LN         Exercise 3    Exercise Name 3 alt march w/abdominal brace with green TB  -LN      Cueing 3 Verbal;Demo  -LN      Sets 3 ABDOMINAL BRACIN  -LN      Reps 3 30  -LN      Additional Comments blue TB  -LN         Exercise 4    Exercise Name 4 seated rows vs TB  -LN      Cueing 4 Verbal;Demo  -LN      Sets 4 ABDOMINAL BRACIN  -LN      Reps 4 30  -LN      Time 4 blue  -LN         Exercise 5    Exercise Name 5 seated (B) shoulder extension  -LN      Cueing 5 Verbal;Tactile;Demo  -LN      Sets 5 ABDOMINAL BRACIN  -LN      Reps 5 30  -LN      Time 5 blue  -LN         Exercise 6    Exercise Name 6 sit to " "stand  -LN      Cueing 6 Verbal;Tactile;Demo  -LN      Reps 6 6  -LN      Additional Comments hands on knees  -LN         Exercise 7    Exercise Name 7 seated hip abduction vs TB  -LN      Cueing 7 Verbal  -LN      Reps 7 30  -LN      Additional Comments blue TB  -LN         Exercise 8    Exercise Name 8 standing (B) hip ABD  -LN      Cueing 8 Verbal  -LN      Reps 8 10 each  -LN      Additional Comments --  -LN         Exercise 9    Exercise Name 9 (B) hamstring curl  -LN      Cueing 9 Verbal;Demo  -LN      Reps 9 10 each  -LN      Additional Comments --  -LN         Exercise 10    Exercise Name 10 --  -LN      Cueing 10 --  -LN      Reps 10 --  -LN      Time 10 --  -LN         Exercise 11    Exercise Name 11 --  -LN      Cueing 11 --  -LN      Reps 11 --  -LN         Exercise 12    Exercise Name 12 4\" forward step up  -LN      Cueing 12 Verbal;Demo  -LN      Time 12 15 each  -LN         Exercise 13    Exercise Name 13 Gait: (with gait belt on) Pt ambulated 35 feet with SBQC with CGA of 1 (standing ex's at stairs and then ambulated 40 feet to do standing exercises at railing with no seated rest break); no episodes of LOB where she needed increased assist. She did better when she didn't focus on sequencing and just walked.   Decreased shaking of R leg noted today. She continues to have trouble looking up when she is walking with cane and tends to look down at her feet, but is improving.  -LN         Exercise 14    Exercise Name 14 --  -LN      Cueing 14 --  -LN      Reps 14 --  -LN         Exercise 15    Exercise Name 15 sidestepping at railing  -LN      Cueing 15 Verbal;Demo  -LN      Reps 15 6x back and forth  -LN      Additional Comments hands on railing for support  -LN         Exercise 16    Exercise Name 16 forward and backward walking with R hand on rail  -LN      Cueing 16 Verbal;Tactile;Demo  -LN      Reps 16 2 x up and down rail  -LN      Additional Comments with CGA with backward walking  -LN           "      User Key  (r) = Recorded By, (t) = Taken By, (c) = Cosigned By      Initials Name Provider Type    Macrina Pinto, PT Physical Therapist                                     Therapy Education  Given: HEP, Symptoms/condition management, Posture/body mechanics, Mobility training  Program: Reinforced  How Provided: Verbal, Demonstration  Provided to: Patient  Level of Understanding: Teach back education performed, Verbalized, Demonstrated              Time Calculation:   Start Time: 1455  Stop Time: 1545  Time Calculation (min): 50 min  Timed Charges  99266 - PT Therapeutic Exercise Minutes: 30  11830 - Gait Training Minutes : 10  Total Minutes  Timed Charges Total Minutes: 40   Total Minutes: 40  Therapy Charges for Today       Code Description Service Date Service Provider Modifiers Qty    18258919087 HC PT THER PROC EA 15 MIN 11/12/2024 Macrina Owens, PT GP 2    [id number]  GAIT TRAINING EA 15 MIN 11/12/2024 Macrina Owens, PT GP 1                      Macrina Owens, PT  11/12/2024

## 2024-11-18 ENCOUNTER — OFFICE VISIT (OUTPATIENT)
Dept: INTERNAL MEDICINE | Facility: CLINIC | Age: 84
End: 2024-11-18
Payer: MEDICARE

## 2024-11-18 VITALS
BODY MASS INDEX: 36.8 KG/M2 | DIASTOLIC BLOOD PRESSURE: 84 MMHG | WEIGHT: 229 LBS | OXYGEN SATURATION: 100 % | SYSTOLIC BLOOD PRESSURE: 128 MMHG | HEART RATE: 72 BPM | HEIGHT: 66 IN

## 2024-11-18 DIAGNOSIS — E03.9 HYPOTHYROIDISM, UNSPECIFIED TYPE: ICD-10-CM

## 2024-11-18 DIAGNOSIS — E78.2 MIXED HYPERLIPIDEMIA: ICD-10-CM

## 2024-11-18 DIAGNOSIS — I10 ESSENTIAL HYPERTENSION: Primary | ICD-10-CM

## 2024-11-18 DIAGNOSIS — R73.03 PREDIABETES: ICD-10-CM

## 2024-11-18 PROCEDURE — 99214 OFFICE O/P EST MOD 30 MIN: CPT | Performed by: INTERNAL MEDICINE

## 2024-11-18 PROCEDURE — 1125F AMNT PAIN NOTED PAIN PRSNT: CPT | Performed by: INTERNAL MEDICINE

## 2024-11-18 PROCEDURE — 3074F SYST BP LT 130 MM HG: CPT | Performed by: INTERNAL MEDICINE

## 2024-11-18 PROCEDURE — 3079F DIAST BP 80-89 MM HG: CPT | Performed by: INTERNAL MEDICINE

## 2024-11-19 LAB
ALBUMIN SERPL-MCNC: 4.3 G/DL (ref 3.7–4.7)
ALP SERPL-CCNC: 129 IU/L (ref 44–121)
ALT SERPL-CCNC: 20 IU/L (ref 0–32)
AST SERPL-CCNC: 22 IU/L (ref 0–40)
BILIRUB SERPL-MCNC: 0.3 MG/DL (ref 0–1.2)
BUN SERPL-MCNC: 23 MG/DL (ref 8–27)
BUN/CREAT SERPL: 20 (ref 12–28)
CALCIUM SERPL-MCNC: 9.3 MG/DL (ref 8.7–10.3)
CHLORIDE SERPL-SCNC: 107 MMOL/L (ref 96–106)
CHOLEST SERPL-MCNC: 174 MG/DL (ref 100–199)
CO2 SERPL-SCNC: 24 MMOL/L (ref 20–29)
CREAT SERPL-MCNC: 1.14 MG/DL (ref 0.57–1)
EGFRCR SERPLBLD CKD-EPI 2021: 48 ML/MIN/1.73
GLOBULIN SER CALC-MCNC: 2.5 G/DL (ref 1.5–4.5)
GLUCOSE SERPL-MCNC: 110 MG/DL (ref 70–99)
HBA1C MFR BLD: 6.4 % (ref 4.8–5.6)
HDLC SERPL-MCNC: 49 MG/DL
LDLC SERPL CALC-MCNC: 92 MG/DL (ref 0–99)
POTASSIUM SERPL-SCNC: 5.3 MMOL/L (ref 3.5–5.2)
PROT SERPL-MCNC: 6.8 G/DL (ref 6–8.5)
SODIUM SERPL-SCNC: 144 MMOL/L (ref 134–144)
T4 FREE SERPL-MCNC: 0.99 NG/DL (ref 0.82–1.77)
TRIGL SERPL-MCNC: 193 MG/DL (ref 0–149)
TSH SERPL DL<=0.005 MIU/L-ACNC: 4 UIU/ML (ref 0.45–4.5)
VLDLC SERPL CALC-MCNC: 33 MG/DL (ref 5–40)

## 2024-11-20 NOTE — PROGRESS NOTES
"Chief Complaint  Follow-up (Toe amputated, red spots all over)    Subjective        Anitra Orta presents to [facility]  History of Present Illness  Here for follow up   She recently had a toe amputated due to hammer deformity  She has cherry hemangiomas, follows with dermatology  Otherwise taking all meds as prescribed    Objective   Vital Signs:  /84 (BP Location: Left arm, Patient Position: Sitting, Cuff Size: Adult)   Pulse 72   Ht 167.6 cm (66\")   Wt 104 kg (229 lb)   SpO2 100%   BMI 36.96 kg/m²   Estimated body mass index is 36.96 kg/m² as calculated from the following:    Height as of this encounter: 167.6 cm (66\").    Weight as of this encounter: 104 kg (229 lb).    Physical Exam  Vitals and nursing note reviewed.   Constitutional:       General: She is not in acute distress.     Appearance: Normal appearance.   HENT:      Head: Normocephalic and atraumatic.      Right Ear: External ear normal.      Left Ear: External ear normal.      Nose: Nose normal. No congestion.      Mouth/Throat:      Mouth: Mucous membranes are moist.      Pharynx: No oropharyngeal exudate or posterior oropharyngeal erythema.   Eyes:      Extraocular Movements: Extraocular movements intact.      Conjunctiva/sclera: Conjunctivae normal.      Pupils: Pupils are equal, round, and reactive to light.   Cardiovascular:      Rate and Rhythm: Normal rate and regular rhythm.      Pulses: Normal pulses.      Heart sounds: Normal heart sounds. No murmur heard.  Pulmonary:      Effort: Pulmonary effort is normal. No respiratory distress.      Breath sounds: Normal breath sounds. No wheezing or rales.   Musculoskeletal:      Cervical back: Normal range of motion.   Skin:     General: Skin is warm.      Capillary Refill: Capillary refill takes less than 2 seconds.   Neurological:      General: No focal deficit present.      Mental Status: She is alert and oriented to person, place, " and time. Mental status is at baseline.      Cranial Nerves: No cranial nerve deficit.   Psychiatric:         Mood and Affect: Mood normal.         Behavior: Behavior normal.         Thought Content: Thought content normal.        Result Review :  The following data was reviewed by: Marco Antonio Bland MD on 11/18/2024:  Common labs          5/17/2024    13:40 9/9/2024    13:03 11/18/2024    13:49   Common Labs   Glucose 114  112  110    BUN 21  22  23    Creatinine 1.16  1.11  1.14    Sodium 142  140  144    Potassium 4.7  4.8  5.3    Chloride 106  104  107    Calcium 9.4  9.4  9.3    Total Protein 6.7  6.8  6.8    Albumin 4.4  4.2  4.3    Total Bilirubin 0.4  0.3  0.3    Alkaline Phosphatase 161  132  129    AST (SGOT) 23  20  22    ALT (SGPT) 18  15  20    WBC 5.71      Hemoglobin 12.0      Hematocrit 39.2      Platelets 221      Total Cholesterol 180   174    Triglycerides 148   193    HDL Cholesterol 52   49    LDL Cholesterol  102   92    Hemoglobin A1C 6.30   6.4      Data reviewed : prior office notes reviewed           Assessment and Plan   Diagnoses and all orders for this visit:    1. Essential hypertension (Primary)  -     Comprehensive metabolic panel  Controlled continue toprol XL 50mg daily    2. Mixed hyperlipidemia  -     Lipid panel  Check for ongoing monitoring, continue simvastatin 40mg daily    3. Prediabetes  -     Hemoglobin A1c    4. Hypothyroidism, unspecified type  -     TSH  -     T4, free  Check for ongoing monitoring    5. 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 Benson hemangiomas - follows with dermatology         I spent 15 minutes caring for Anitra on this date of service. This time includes time spent by me in the following activities:preparing for the visit, reviewing tests, obtaining and/or reviewing a separately obtained history, performing a medically appropriate examination and/or evaluation , counseling and educating the patient/family/caregiver, ordering medications, tests, or  procedures, and documenting information in the medical record  Follow Up   F/u 6 months  Patient was given instructions and counseling regarding her condition or for health maintenance advice. Please see specific information pulled into the AVS if appropriate.     Marco Antonio Bland MD  [facility]  [address] [place] [address]  Phone: [phone]

## 2024-11-22 ENCOUNTER — HOSPITAL ENCOUNTER (OUTPATIENT)
Dept: PHYSICAL THERAPY | Facility: HOSPITAL | Age: 84
Setting detail: THERAPIES SERIES
Discharge: HOME | End: 2024-11-22
Payer: MEDICARE

## 2024-11-22 DIAGNOSIS — M54.16 LUMBAR RADICULOPATHY: Primary | ICD-10-CM

## 2024-11-22 DIAGNOSIS — Z98.1 S/P LUMBAR SPINAL FUSION: ICD-10-CM

## 2024-11-22 PROCEDURE — 97110 THERAPEUTIC EXERCISES: CPT

## 2024-11-22 PROCEDURE — 97116 GAIT TRAINING THERAPY: CPT

## 2024-11-22 NOTE — THERAPY TREATMENT NOTE
Outpatient Physical Therapy Ortho Treatment Note  [facility]     Patient Name: Anitra Orta  : 1940  MRN: [id number]  Today's Date: 2024        Visit Date: 2024    Visit Dx:    ICD-10-CM ICD-9-CM   1. Lumbar radiculopathy  M54.16 724.4   2. S/P lumbar spinal fusion  Z98.1 V45.4       Patient Active Problem List   Diagnosis    Urinary tract infection due to ESBL Klebsiella    Simple renal cyst    Former smoker    Hyperlipidemia    Osteoporosis    Panic disorder    Psoriasis    Urge incontinence of urine    Vitamin D deficiency    Post-menopause    Acute UTI (urinary tract infection)    Chronic bilateral low back pain without sciatica    Spinal stenosis of lumbar region with neurogenic claudication    Other chronic pain    Mixed incontinence    GERD without esophagitis    Anxiety    Hyperglycemia    e.coli bacteremia    Ureteral stone with hydronephrosis    Obstructive uropathy    Nephrolithiasis    Anemia    Metabolic acidosis    Essential hypertension    History of ventricular tachycardia    Left ureteral stone    Chronic idiopathic constipation    Personal history of colonic polyps        Past Medical History:   Diagnosis Date    Acute kidney failure 2021    Allergic     weather allergies    Anxiety     Arthritis of back     At risk for sleep apnea     Cataract     BILAT-SCHEDULED FOR THIS AND HAD TO CANCEL D/T SEPSIS    Chronic pain disorder     ALL OVER PAIN    Chronic UTI     Closed displaced fracture of surgical neck of left humerus 2021    Closed fracture of left proximal humerus 06/15/2021    Closed fracture of right distal radius 06/15/2021    Colon polyp     COVID-19 vaccine series completed     2ND DOSE 3/23/21    DDD (degenerative disc disease), lumbar     e.coli bacteremia 2021    Elevated cholesterol     Fracture, humerus     LEFT. h/o    GERD (gastroesophageal reflux disease)     History of recent fall     21    History of sepsis     MOST RECENT  JULY 2021-R/T KIDNEY STONE, UTI.  STATES THIS IS HER 3RD SEPSIS DIAGNOSIS    History of UTI     HL (hearing loss)     HTN (hypertension)     Hyperlipidemia     Hypokalemia 03/25/2021    Kidney stones     LEFT    Left humeral fracture 07/11/2021    Low back pain     Macular degeneration, bilateral     WORSE ON RIGHT-ONLY PERIPHERAL VISION    Metabolic encephalopathy 03/25/2021    Mixed hyperlipidemia 09/16/2016    Mixed incontinence     Obesity     Osteoarthritis     Osteopenia     Osteoporosis     Panic disorder     Personal history of urinary calculi     PONV (postoperative nausea and vomiting)     Scoliosis     Sepsis, unspecified organism 03/25/2021    Tachycardia, unspecified     Ventricular tachycardia 09/09/2019    Visual impairment     Wrist fracture     RIGHT-CURRENTLY NOT WEARING BRACE FOR THIS        Past Surgical History:   Procedure Laterality Date    ADENOIDECTOMY      BREAST BIOPSY Left     benign    BREAST LUMPECTOMY Left     benign    BUNIONECTOMY Right     COLONOSCOPY N/A 11/30/2016    Procedure: COLONOSCOPY polypectomy;  Surgeon: Adali Willard MD;  Location: [facility] OR;  Service:     CYSTOSCOPY BOTOX INJECTION OF BLADDER N/A 07/21/2017    Procedure: CYSTOSCOPY COAPTITE INJECTION;  Surgeon: Kevon Clark MD;  Location: [facility] OR;  Service:     CYSTOSCOPY URETEROSCOPY LASER LITHOTRIPSY Right 05/01/2023    Procedure: RIGHT URETEROSCOPY LASER LITHOTRIPSY STONE BASKET EXTRACTION AND STENT;  Surgeon: Kevon Clark MD;  Location: [facility] OR;  Service: Urology;  Laterality: Right;    CYSTOSCOPY W/ LITHOLAPAXY / EHL      CYSTOSCOPY W/ URETERAL STENT PLACEMENT Left 09/12/2016    Procedure: CYSTOSCOPY URETERAL STENT INSERTION;  Surgeon: Kevon Clark MD;  Location: [facility] OR;  Service:     CYSTOSCOPY W/ URETERAL STENT PLACEMENT Left 08/01/2019    Procedure: CYSTOSCOPY URETERAL CATHETER/STENT INSERTION;  Surgeon: Kevon Clark MD;  Location: [facility] OR;  Service: Urology    CYSTOSCOPY W/  "URETERAL STENT PLACEMENT Left 03/25/2021    Procedure: CYSTOSCOPY URETERAL CATHETER/STENT INSERTION;  Surgeon: Kevon Clark MD;  Location: [facility] OR;  Service: Urology;  Laterality: Left;    CYSTOSCOPY W/ URETERAL STENT PLACEMENT Left 07/11/2021    Procedure: CYSTOSCOPY URETERAL CATHETER/STENT INSERTION;  Surgeon: Lul Guzman MD;  Location: [facility] OR;  Service: Urology;  Laterality: Left;  CYSTOSCOPY LEFT URETERAL CATHETER/ STENT PLACEMENT    CYSTOSCOPY W/ URETERAL STENT PLACEMENT Right 04/20/2023    Procedure: CYSTOSCOPY STENT PLACEMENT;  Surgeon: Matthew Ndiaye MD;  Location: [facility] OR;  Service: Urology;  Laterality: Right;    EYE SURGERY      JOINT REPLACEMENT Right     total knee    LAMINECTOMY  11/2022    LUMBAR EPIDURAL INJECTION      TONSILLECTOMY AND ADENOIDECTOMY      TUBAL ABDOMINAL LIGATION      URETEROSCOPY LASER LITHOTRIPSY WITH STENT INSERTION Left 10/05/2016    Procedure: LT URETEROSCOPY LASER LITHOTRIPSY STONE BASKET EXTRACTION AND STENT ;  Surgeon: Kevon Clark MD;  Location: [facility];  Service:     URETEROSCOPY LASER LITHOTRIPSY WITH STENT INSERTION Left 07/23/2021    Procedure: LEFT URETEROSCOPY STONE MANIPULATION STENT EXCHANGE, LASER LITHOTRIPSY, CYSTOSCOPY, STONE BASKET EXTRACTION;  Surgeon: Kevon Clark MD;  Location: [facility] OR;  Service: Urology;  Laterality: Left;        PT Ortho       Row Name 11/22/24 1400       Subjective    Subjective Comments \"My back feels sore today a little and my legs just haven't felt like they want to move today.\" Pt reports she continues to have L knee pain and she is going to call Ortho to see about getting an injection in her knee.  -LN       Precautions and Contraindications    Precautions/Limitations spinal precautions  -LN       Subjective Pain    Able to rate subjective pain? yes  -LN    Pre-Treatment Pain Level 2  -LN    Subjective Pain Comment in lower back; \"I just know it's there.\"  -LN              User " "Key  (r) = Recorded By, (t) = Taken By, (c) = Cosigned By      Initials Name Provider Type    Macrina Pinto, PT Physical Therapist                                 PT Assessment/Plan       Row Name 11/22/24 1500          PT Assessment    Assessment Comments Pt c/o mild LBP today. She tolerated treatment fairly well but had decreased tolerance to sit to stand exercise & forward step ups due to c/o L knee pain. Occasional popping also noted in L knee. She continues to fatigue very easily with exercises and walking with SBQC. Pt did well ambulating with SBQC today but continues to ambulate with decreased gait speed and continues to look down. She continues to lack confidence with cane, which affects her safety but overall balance is much improved and less assist needed in clinic when ambulating with SBQC. She ambulates very well with FWW with good balance and she feels confident with it.  -LN        PT Plan    PT Frequency 1x/week;2x/week  -LN     PT Plan Comments Cont per POC, progressing as tolerated. Continue to work on ambulation with SBQC & without AD as tolerated. Also work on backward walking as tolerated. Progress with core strengthening. Re-evaluate next visit.  -LN               User Key  (r) = Recorded By, (t) = Taken By, (c) = Cosigned By      Initials Name Provider Type    Macrina Pinto, PT Physical Therapist                       OP Exercises       Row Name 11/22/24 1500 11/22/24 1400          Precautions    Existing Precautions/Restrictions -- spinal  -LN        Subjective    Subjective Comments -- \"My back feels sore today a little and my legs just haven't felt like they want to move today.\"  -LN        Subjective Pain    Able to rate subjective pain? -- yes  -LN     Pre-Treatment Pain Level -- 2  -LN     Subjective Pain Comment -- in lower back; \"I just know it's there.\"  -LN        Total Minutes    75757 - Gait Training Minutes  10  -LN --     39821 - PT Therapeutic Exercise " "Minutes 35  -LN --        Exercise 1    Exercise Name 1 (B) seated gastroc/hamstring stretch  -LN --     Cueing 1 Verbal;Tactile;Demo  -LN --     Additional Comments HEP  -LN --        Exercise 2    Exercise Name 2 seated ball squeeze  -LN --     Cueing 2 Verbal  -LN --     Sets 2 ABDOMINAL BRACIN  -LN --     Reps 2 30  -LN --     Time 2 5 sec  -LN --        Exercise 3    Exercise Name 3 alt march w/abdominal brace with green TB  -LN --     Cueing 3 Verbal;Demo  -LN --     Sets 3 ABDOMINAL BRACIN  -LN --     Reps 3 30  -LN --     Additional Comments blue TB  -LN --        Exercise 4    Exercise Name 4 seated rows vs TB  -LN --     Cueing 4 Verbal;Demo  -LN --     Sets 4 ABDOMINAL BRACIN  -LN --     Reps 4 30  -LN --     Time 4 blue  -LN --        Exercise 5    Exercise Name 5 seated (B) shoulder extension  -LN --     Cueing 5 Verbal;Tactile;Demo  -LN --     Sets 5 ABDOMINAL BRACIN  -LN --     Reps 5 30  -LN --     Time 5 blue  -LN --        Exercise 6    Exercise Name 6 sit to stand  -LN --     Cueing 6 Verbal;Tactile;Demo  -LN --     Reps 6 4  -LN --     Additional Comments hands on knees; limited today due to L knee pain  -LN --        Exercise 7    Exercise Name 7 seated hip abduction vs TB  -LN --     Cueing 7 Verbal  -LN --     Reps 7 30  -LN --     Additional Comments blue TB  -LN --        Exercise 8    Exercise Name 8 standing (B) hip ABD  -LN --     Cueing 8 Verbal  -LN --     Reps 8 10 each  -LN --     Additional Comments no weight; at elevated table  -LN --        Exercise 9    Exercise Name 9 (B) hamstring curl  -LN --     Cueing 9 Verbal;Demo  -LN --     Reps 9 10 each  -LN --     Additional Comments no weight; at elevated table  -LN --        Exercise 10    Exercise Name 10 SLS  -LN --     Cueing 10 Verbal;Tactile;Demo  -LN --     Reps 10 1 each leg  -LN --     Time 10 1 minute each leg  -LN --     Additional Comments at elevated table  -LN --        Exercise 12    Exercise Name 12 4\" forward step up  " -LN --     Cueing 12 Verbal;Demo  -LN --     Time 12 15 each  -LN --        Exercise 13    Exercise Name 13 Gait: (with gait belt on) Pt ambulated 35 feet with SBQC with CGA of 1 (standing ex's at stairs and then ambulated 40 feet to do standing exercises at railing with no seated rest break); no episodes of LOB where she needed increased assist. She did better when she didn't focus on sequencing and just walked.   Decreased shaking of R leg noted today. She continues to have trouble looking up when she is walking with cane and tends to look down at her feet, but is improving.  -LN --        Exercise 14    Exercise Name 14 standing hip extension  -LN --     Cueing 14 Verbal;Tactile;Demo  -LN --     Reps 14 10 each leg  -LN --     Additional Comments no weight; at elevated table  -LN --        Exercise 15    Exercise Name 15 sidestepping at railing  -LN --     Cueing 15 Verbal;Demo  -LN --     Reps 15 6x back and forth  -LN --     Additional Comments hands on railing for support  -LN --        Exercise 16    Exercise Name 16 forward and backward walking with R hand on rail  -LN --     Cueing 16 Verbal;Tactile;Demo  -LN --     Reps 16 2 x up and down rail  -LN --     Additional Comments with CGA with backward walking  -LN --               User Key  (r) = Recorded By, (t) = Taken By, (c) = Cosigned By      Initials Name Provider Type    Macrina Pinto, PT Physical Therapist                                     Therapy Education  Given: HEP, Symptoms/condition management, Posture/body mechanics, Mobility training  Program: Reinforced  How Provided: Verbal, Demonstration  Provided to: Patient  Level of Understanding: Teach back education performed, Verbalized, Demonstrated              Time Calculation:   Start Time: 1500  Stop Time: 1550  Time Calculation (min): 50 min  Timed Charges  45059 - PT Therapeutic Exercise Minutes: 35  19997 - Gait Training Minutes : 10  Total Minutes  Timed Charges Total Minutes:  45   Total Minutes: 45  Therapy Charges for Today       Code Description Service Date Service Provider Modifiers Qty    54765619311 HC PT THER PROC EA 15 MIN 11/22/2024 Macrina Owens, PT GP 2    [id number] HC GAIT TRAINING EA 15 MIN 11/22/2024 Macrina Owens, PT GP 1                      Macrina Owens, PT  11/22/2024

## 2024-11-29 ENCOUNTER — HOSPITAL ENCOUNTER (OUTPATIENT)
Dept: MAMMOGRAPHY | Facility: HOSPITAL | Age: 84
Discharge: HOME | End: 2024-11-29
Admitting: INTERNAL MEDICINE
Payer: MEDICARE

## 2024-11-29 DIAGNOSIS — Z12.31 SCREENING MAMMOGRAM, ENCOUNTER FOR: ICD-10-CM

## 2024-11-29 PROCEDURE — 77063 BREAST TOMOSYNTHESIS BI: CPT

## 2024-11-29 PROCEDURE — 77067 SCR MAMMO BI INCL CAD: CPT

## 2024-12-02 ENCOUNTER — HOSPITAL ENCOUNTER (OUTPATIENT)
Dept: PHYSICAL THERAPY | Facility: HOSPITAL | Age: 84
Setting detail: THERAPIES SERIES
Discharge: HOME | End: 2024-12-02
Payer: MEDICARE

## 2024-12-02 DIAGNOSIS — Z98.1 S/P LUMBAR SPINAL FUSION: ICD-10-CM

## 2024-12-02 DIAGNOSIS — M54.16 LUMBAR RADICULOPATHY: Primary | ICD-10-CM

## 2024-12-02 PROCEDURE — 97116 GAIT TRAINING THERAPY: CPT

## 2024-12-02 PROCEDURE — 97110 THERAPEUTIC EXERCISES: CPT

## 2024-12-02 NOTE — THERAPY RE-EVALUATION
Outpatient Physical Therapy Ortho Re-Evaluation (Medicare)   Amy Dimas     Patient Name: Anitra Orta  : 1940  MRN: [id number]  Today's Date: 2024         Visit Date: 2024    Patient Active Problem List   Diagnosis    Urinary tract infection due to ESBL Klebsiella    Simple renal cyst    Former smoker    Hyperlipidemia    Osteoporosis    Panic disorder    Psoriasis    Urge incontinence of urine    Vitamin D deficiency    Post-menopause    Acute UTI (urinary tract infection)    Chronic bilateral low back pain without sciatica    Spinal stenosis of lumbar region with neurogenic claudication    Other chronic pain    Mixed incontinence    GERD without esophagitis    Anxiety    Hyperglycemia    e.coli bacteremia    Ureteral stone with hydronephrosis    Obstructive uropathy    Nephrolithiasis    Anemia    Metabolic acidosis    Essential hypertension    History of ventricular tachycardia    Left ureteral stone    Chronic idiopathic constipation    Personal history of colonic polyps        Past Medical History:   Diagnosis Date    Acute kidney failure 2021    Allergic     weather allergies    Anxiety     Arthritis of back     At risk for sleep apnea     Cataract     BILAT-SCHEDULED FOR THIS AND HAD TO CANCEL D/T SEPSIS    Chronic pain disorder     ALL OVER PAIN    Chronic UTI     Closed displaced fracture of surgical neck of left humerus 2021    Closed fracture of left proximal humerus 06/15/2021    Closed fracture of right distal radius 06/15/2021    Colon polyp     COVID-19 vaccine series completed     2ND DOSE 3/23/21    DDD (degenerative disc disease), lumbar     e.coli bacteremia 2021    Elevated cholesterol     Fracture, humerus     LEFT. h/o    GERD (gastroesophageal reflux disease)     History of recent fall     21    History of sepsis     MOST RECENT 2021-R/T KIDNEY STONE, UTI.  STATES THIS IS HER 3RD SEPSIS DIAGNOSIS    History of UTI     HL (hearing  loss)     HTN (hypertension)     Hyperlipidemia     Hypokalemia 03/25/2021    Kidney stones     LEFT    Left humeral fracture 07/11/2021    Low back pain     Macular degeneration, bilateral     WORSE ON RIGHT-ONLY PERIPHERAL VISION    Metabolic encephalopathy 03/25/2021    Mixed hyperlipidemia 09/16/2016    Mixed incontinence     Obesity     Osteoarthritis     Osteopenia     Osteoporosis     Panic disorder     Personal history of urinary calculi     PONV (postoperative nausea and vomiting)     Scoliosis     Sepsis, unspecified organism 03/25/2021    Tachycardia, unspecified     Ventricular tachycardia 09/09/2019    Visual impairment     Wrist fracture     RIGHT-CURRENTLY NOT WEARING BRACE FOR THIS        Past Surgical History:   Procedure Laterality Date    ADENOIDECTOMY      BREAST BIOPSY Left     benign    BREAST LUMPECTOMY Left     benign    BUNIONECTOMY Right     COLONOSCOPY N/A 11/30/2016    Procedure: COLONOSCOPY polypectomy;  Surgeon: Adali Willard MD;  Location: [facility] OR;  Service:     CYSTOSCOPY BOTOX INJECTION OF BLADDER N/A 07/21/2017    Procedure: CYSTOSCOPY COAPTITE INJECTION;  Surgeon: Kevon Clark MD;  Location: [facility] OR;  Service:     CYSTOSCOPY URETEROSCOPY LASER LITHOTRIPSY Right 05/01/2023    Procedure: RIGHT URETEROSCOPY LASER LITHOTRIPSY STONE BASKET EXTRACTION AND STENT;  Surgeon: Kevon Clark MD;  Location: [facility] OR;  Service: Urology;  Laterality: Right;    CYSTOSCOPY W/ LITHOLAPAXY / EHL      CYSTOSCOPY W/ URETERAL STENT PLACEMENT Left 09/12/2016    Procedure: CYSTOSCOPY URETERAL STENT INSERTION;  Surgeon: Kevon Clark MD;  Location: [facility] OR;  Service:     CYSTOSCOPY W/ URETERAL STENT PLACEMENT Left 08/01/2019    Procedure: CYSTOSCOPY URETERAL CATHETER/STENT INSERTION;  Surgeon: Kevon Clark MD;  Location: [facility] OR;  Service: Urology    CYSTOSCOPY W/ URETERAL STENT PLACEMENT Left 03/25/2021    Procedure: CYSTOSCOPY URETERAL CATHETER/STENT INSERTION;   "Surgeon: Kevon Clark MD;  Location: [facility] OR;  Service: Urology;  Laterality: Left;    CYSTOSCOPY W/ URETERAL STENT PLACEMENT Left 07/11/2021    Procedure: CYSTOSCOPY URETERAL CATHETER/STENT INSERTION;  Surgeon: Lul Guzman MD;  Location: [facility] OR;  Service: Urology;  Laterality: Left;  CYSTOSCOPY LEFT URETERAL CATHETER/ STENT PLACEMENT    CYSTOSCOPY W/ URETERAL STENT PLACEMENT Right 04/20/2023    Procedure: CYSTOSCOPY STENT PLACEMENT;  Surgeon: Matthew Ndiaye MD;  Location: [facility] OR;  Service: Urology;  Laterality: Right;    EYE SURGERY      JOINT REPLACEMENT Right     total knee    LAMINECTOMY  11/2022    LUMBAR EPIDURAL INJECTION      TONSILLECTOMY AND ADENOIDECTOMY      TUBAL ABDOMINAL LIGATION      URETEROSCOPY LASER LITHOTRIPSY WITH STENT INSERTION Left 10/05/2016    Procedure: LT URETEROSCOPY LASER LITHOTRIPSY STONE BASKET EXTRACTION AND STENT ;  Surgeon: Kevon Clark MD;  Location: [facility];  Service:     URETEROSCOPY LASER LITHOTRIPSY WITH STENT INSERTION Left 07/23/2021    Procedure: LEFT URETEROSCOPY STONE MANIPULATION STENT EXCHANGE, LASER LITHOTRIPSY, CYSTOSCOPY, STONE BASKET EXTRACTION;  Surgeon: Kevon Clark MD;  Location: [facility] OR;  Service: Urology;  Laterality: Left;       Visit Dx:     ICD-10-CM ICD-9-CM   1. Lumbar radiculopathy  M54.16 724.4   2. S/P lumbar spinal fusion  Z98.1 V45.4              PT Ortho       Row Name 12/02/24 1400       Subjective    Subjective Comments \"I am in a bad way today; I am just achey all over.\" \"My back is achey.\" 'I feel like over the last week I have been more shakey and can't be on my feet very long.\"  -LN       Precautions and Contraindications    Precautions/Limitations spinal precautions  -LN       Subjective Pain    Able to rate subjective pain? yes  -LN    Pre-Treatment Pain Level 4  -LN       Posture/Observations    Posture/Observations Comments Pt ambulated into PT clinic today with FWW independently.  " -LN       Head/Neck/Trunk    Trunk Extension AROM 75% in sitting  -LN    Trunk Flexion AROM WFL in sitting  -LN    Trunk Lt Lateral Flexion AROM WFL  -LN    Trunk Rt Lateral Flexion AROM WFL  -LN    Trunk Lt Rotation AROM WFL  -LN    Trunk Rt Rotation AROM WFL  -LN       MMT Neck/Trunk    Trunk Flexion MMT, Gross Movement (4/5) good  -LN    Trunk Extension MMT, Gross Movement (3+/5) fair plus  -LN       MMT Right Lower Ext    Rt Hip Flexion MMT, Gross Movement (5/5) normal  -LN    Rt Hip Extension MMT, Gross Movement (5/5) normal  -LN    Rt Hip ABduction MMT, Gross Movement (4+/5) good plus  -LN    Rt Hip ADduction MMT, Gross Movement (4+/5) good plus  -LN    Rt Knee Extension MMT, Gross Movement (5/5) normal  -LN    Rt Knee Flexion MMT, Gross Movement (5/5) normal  -LN    Rt Ankle Plantarflexion MMT, Gross Movement (4+/5) good plus  -LN    Rt Ankle Dorsiflexion MMT, Gross Movement (4+/5) good plus  -LN       MMT Left Lower Ext    Lt Hip Flexion MMT, Gross Movement (5/5) normal  -LN    Lt Hip Extension MMT, Gross Movement (5/5) normal  -LN    Lt Hip ABduction MMT, Gross Movement (4+/5) good plus  -LN    Lt Hip ADduction MMT, Gross Movement (4+/5) good plus  -LN    Lt Knee Extension MMT, Gross Movement (5/5) normal  -LN    Lt Knee Flexion MMT, Gross Movement (5/5) normal  -LN    Lt Ankle Plantarflexion MMT, Gross Movement (4+/5) good plus  -LN    Lt Ankle Dorsiflexion MMT, Gross Movement (4+/5) good plus  -LN       Sensation    Sensation WNL? WNL  -LN    Light Touch No apparent deficits  -LN    Additional Comments no c/o any N/T in legs  -LN       Lower Extremity Flexibility    Hamstrings Bilateral:;Mildly limited  -LN    ITB Bilateral:;Mildly limited  -LN    Gastrocnemius Bilateral:;Mildly limited  -LN    Soleus Bilateral:;Mildly limited  -LN       Balance Skills Training    Balance Comments Pt with good balance in static standing without AD initially but then becomes fearful and balance becomes fair. Dynamic  standing balance is good with FWW/rollator but fair with cane primarily due to fear; she still gets nervous when ambulating with cane.  -LN       Transfers    Sit-Stand [place] (Transfers) standby assist  -LN    Stand-Sit [place] (Transfers) standby assist  -LN    Transfers, Sit-Stand-Sit, Assist Device rolling walker;quad cane  SBQC  -LN    Comment, (Transfers) Still needs to use arms on chair with sit to stand transfers  -LN       Gait/Stairs (Locomotion)    [place] Level (Gait) standby assist;supervision;contact guard  1 episode of min A when she kicked the cane with her right foot and lost her balance  -LN    Assistive Device (Gait) cane, quad  SBQC  -LN    Distance in Feet (Gait) 150  -LN    Deviations/Abnormal Patterns (Gait) cristy decreased;gait speed decreased;stride length decreased;bilateral deviations;base of support, wide  -LN    Bilateral Gait Deviations forward flexed posture;heel strike decreased  as she fatigues she does not pick her feet up fully and decreased heel to toe gait noted  -LN    Comment, (Gait/Stairs) Pt ambulates with FWW independently and with good balance noted. Gait with SBQC is still limited by her fear and she does fatigue easily when ambulating with cane.  -LN              User Key  (r) = Recorded By, (t) = Taken By, (c) = Cosigned By      Initials Name Provider Type    Macrina Pinto, PT Physical Therapist                                Therapy Education  Education Details: Continue to encourage pt to work on daily HEP at home; at least the seated and bed exercises. Pt to continue walking in home with FWW and practice with SBQC for short distances with  assist if they feel comfortable doing so. Pt can work on SLS as tolerated holding onto counter top for safety.  Given: HEP, Symptoms/condition management, Posture/body mechanics, Mobility training  Program: Reinforced, Progressed  How Provided: Verbal, Demonstration  Provided to: Patient  Level  of Understanding: Teach back education performed, Verbalized, Demonstrated      PT OP Goals       Row Name 12/02/24 1400          PT Short Term Goals    STG Date to Achieve 12/16/24  -LN     STG 1 Pt to verbally report decreased LBP and R hip pain to <4/10 with ADLs and everyday activities.  -LN     STG 1 Progress Partially Met;Ongoing;Progressing  -LN     STG 1 Progress Comments Pt rated pain at 4/10 today but had been rating pain at 0-2/10; increased pain since cold weather began (reports aching all over).  -LN     STG 2 Pt independent with initial HEP issued by therapist.  -LN     STG 2 Progress Met  -LN     STG 3 Trunk ROM improved by 25%.  -LN     STG 3 Progress Met  -LN     STG 4 R LE strength improved by 1/2 mm grade.  -LN     STG 4 Progress Met  -LN     STG 5 Pt able to ambulate with SPC/QC home distances with only minimal antalgic gait and good balance noted.  -LN     STG 5 Progress Partially Met;Ongoing;Progressing  -LN     STG 5 Progress Comments Pt was able to ambulate about 150 feet today in PT with SBQC and SBA-CGA with 1 episode of minimal assist when she kicked her cane with her right foot and lost her balance. Nominal to minimal antalgic gait noted.  -LN     STG 6 Pt able to tolerate standing for 15-20 minutes without any c/o increased pain in LB or R hip.  -LN     STG 6 Progress Ongoing;Progressing  -LN     STG 6 Progress Comments Pt still with limited standing tolerance; low stamina reported but is improving slowly.  -LN        Long Term Goals    LTG Date to Achieve 12/30/24  -LN     LTG 1 Pt to verbally report decreased LBP and R hip pain to <2/10 with ADLs and everyday activities.  -LN     LTG 1 Progress Ongoing;Progressing;Partially Met  -LN     LTG 1 Progress Comments Pt rated pain at 4/10 today but had been rating pain at 0-2/10; increased pain since cold weather began (reports aching all over).  -LN     LTG 2 Pt independent with more advanced HEP issued by therapist.  -LN     LTG 2 Progress  Ongoing;Progressing  -LN     LTG 3 B LE strength improved to 4+-5/5.  -LN     LTG 3 Progress Met  -LN     LTG 4 Core strength improved to 4/5 when tested.  -LN     LTG 4 Progress Ongoing;Progressing;Partially Met  -LN     LTG 4 Progress Comments met for trunk flexion  -LN     LTG 5 Pt able to ambulate with SPC/QC home and short community distances with only nominal antalgic gait & good balance noted.  -LN     LTG 5 Progress Ongoing;Progressing  -LN     LTG 5 Progress Comments Pt was able to ambulate about 150 feet today in PT with SBQC and SBA-CGA with 1 episode of minimal assist when she kicked her cane with her right foot and lost her balance. Nominal to minimal antalgic gait noted.  -LN     LTG 6 Pt able to tolerate standing for 20-30 minutes without any c/o increased pain in LB or R hip.  -LN     LTG 6 Progress Ongoing;Progressing  -LN     LTG 6 Progress Comments Pt still with limited standing tolerance; low stamina reported but is improving slowly.  -LN        Time Calculation    PT Goal Re-Cert Due Date 12/30/24  -LN               User Key  (r) = Recorded By, (t) = Taken By, (c) = Cosigned By      Initials Name Provider Type    Macrina Pinto, PT Physical Therapist                     PT Assessment/Plan       Row Name 12/02/24 1500          PT Assessment    Assessment Comments Pt has been reporting very minimal pain but increased pain/aching reported since cold weather began. Pt continues to make progress with PT with improved LE & core strength but still has weakness in trunk extension and B hip abduction/adduction and B ankles (but improved). She has overall improved exercise tolerance but does still fatigue easily with ambulation with SBQC and standing exercises. She is independent ambulating with FWW with good balance noted. Pt with improved gait overall with SBQC but she still lacks confidence and becomes anxious/fearful which affects her safety with ambulating with it. She now needs SBA-CGA  "and had 1 episode of LOB when she kicked her cane with her right foot and lost her balance and needed minimal assist.  Decreased shaking of right leg noted. She does still tend to look down when she walks with the SBQC. At this time, she is safest to continue to walk with FWW, but will continue to work with her on ambulation with SBQC. Standing tolerance is slowly improving but is still limited. She also still has trouble coming sit to stand unless she uses her arms on arm rests to push up on. Pt has met 3 out of 6 STG & partially met 2 STG and she has met 1 out of 6 LTG & partially met 2 LTG. She is making progress towards all remaining goals. Feel pt would benefit from continued PT for 1 more month for continued progressive LE and core strengthening as well as continued gait training with SBQC and balance exercises to improve her balance and safety with mobility and gait.  -LN        PT Plan    PT Frequency 1x/week;2x/week  -LN     Predicted Duration of Therapy Intervention (PT) 4 weeks  -LN     PT Plan Comments Cont per POC, progressing as tolerated. Continue to work on ambulation with SBQC & without AD as tolerated. Also work on backward walking as tolerated. Progress with core strengthening. Continue towards all remaining goals.  Plan on 1-2 x week for 4 more weeks and then plan on discharge with HEP.  -LN               User Key  (r) = Recorded By, (t) = Taken By, (c) = Cosigned By      Initials Name Provider Type    Macrina Pinto, PT Physical Therapist                       OP Exercises       Row Name 12/02/24 1400             Precautions    Existing Precautions/Restrictions spinal  -LN         Subjective    Subjective Comments \"I am in a bad way today; I am just achey all over.\" \"My back is achey.\" 'I feel like over the last week I have been more shakey and can't be on my feet very long.\"  -LN         Subjective Pain    Able to rate subjective pain? yes  -LN      Pre-Treatment Pain Level 4  -LN   "       Total Minutes    19234 - Gait Training Minutes  15  -LN      12642 - PT Therapeutic Exercise Minutes 35  -LN         Exercise 1    Exercise Name 1 (B) seated gastroc/hamstring stretch  -LN      Cueing 1 Verbal;Tactile;Demo  -LN      Additional Comments HEP  -LN         Exercise 2    Exercise Name 2 seated ball squeeze  -LN      Cueing 2 Verbal  -LN      Sets 2 ABDOMINAL BRACIN  -LN      Reps 2 30  -LN      Time 2 5 sec  -LN         Exercise 3    Exercise Name 3 alt march w/abdominal brace with green TB  -LN      Cueing 3 Verbal;Demo  -LN      Sets 3 ABDOMINAL BRACIN  -LN      Reps 3 30  -LN      Additional Comments black TB  -LN         Exercise 4    Exercise Name 4 seated rows vs TB  -LN      Cueing 4 Verbal;Demo  -LN      Sets 4 ABDOMINAL BRACIN  -LN      Reps 4 30  -LN      Time 4 black  -LN         Exercise 5    Exercise Name 5 seated (B) shoulder extension  -LN      Cueing 5 Verbal;Tactile;Demo  -LN      Sets 5 ABDOMINAL BRACIN  -LN      Reps 5 30  -LN      Time 5 black TB  -LN         Exercise 6    Exercise Name 6 sit to stand  -LN      Cueing 6 Verbal;Tactile;Demo  -LN      Reps 6 8  -LN      Additional Comments hands on knees 2x and with use of arm rests for 6 x  -LN         Exercise 7    Exercise Name 7 seated hip abduction vs TB  -LN      Cueing 7 Verbal  -LN      Reps 7 30  -LN      Additional Comments black TB  -LN         Exercise 8    Exercise Name 8 standing (B) hip ABD  -LN      Cueing 8 Verbal  -LN      Reps 8 10 each  -LN      Additional Comments no weight; at elevated table  -LN         Exercise 9    Exercise Name 9 (B) hamstring curl  -LN      Cueing 9 Verbal;Demo  -LN      Reps 9 10 each  -LN      Additional Comments no weight; at elevated table  -LN         Exercise 10    Exercise Name 10 SLS  -LN      Cueing 10 Verbal;Tactile;Demo  -LN      Reps 10 1 each leg  -LN      Time 10 1 minute each leg  -LN      Additional Comments at elevated table  -LN         Exercise 12    Exercise Name 12  "4\" forward step up  -LN      Cueing 12 Verbal;Demo  -LN      Time 12 15 each  -LN         Exercise 13    Exercise Name 13 Gait: (with gait belt on) Pt ambulated 35 feet with SBQC with SBA-CGA of 1 & 1 episode of minimal assist when she kicked her cane with her R foot and lost her balance (standing ex's at stairs and then ambulated 25 feet to do standing exercises at railing with no seated rest break).  Pt then ambulated 150 feet with SBQC with SBA and she occasionally reached out for railing on the wall. She did better when she didn't focus on sequencing and just walked.   Decreased shaking of R leg noted today. She continues to have trouble looking up when she is walking with cane and tends to look down at her feet, but is improving.  -LN         Exercise 14    Exercise Name 14 standing hip extension  -LN      Cueing 14 Verbal;Tactile;Demo  -LN      Reps 14 10 each leg  -LN      Additional Comments no weight; at elevated table  -LN         Exercise 15    Exercise Name 15 sidestepping at railing  -LN      Cueing 15 Verbal;Demo  -LN      Reps 15 6x back and forth  -LN      Additional Comments hands on railing for support  -LN         Exercise 16    Exercise Name 16 forward and backward walking with R hand on rail  -LN      Cueing 16 Verbal;Tactile;Demo  -LN      Reps 16 4 x up and down rail  -LN      Additional Comments with SBA with backward walking  -LN                User Key  (r) = Recorded By, (t) = Taken By, (c) = Cosigned By      Initials Name Provider Type    Macrina Pinto, PT Physical Therapist                                            Time Calculation:     Start Time: 1500  Stop Time: 1555  Time Calculation (min): 55 min  Timed Charges  52282 - PT Therapeutic Exercise Minutes: 35  27298 - Gait Training Minutes : 15  Total Minutes  Timed Charges Total Minutes: 50   Total Minutes: 50     Therapy Charges for Today       Code Description Service Date Service Provider Modifiers Qty    [id number] " HC PT THER PROC EA 15 MIN 12/2/2024 Macrina Owens, PT GP 2    [id number] HC GAIT TRAINING EA 15 MIN 12/2/2024 Macrina Owens, PT GP 1                      Macrina Owens, PT  12/2/2024

## 2024-12-03 ENCOUNTER — TELEPHONE (OUTPATIENT)
Dept: INTERNAL MEDICINE | Facility: CLINIC | Age: 84
End: 2024-12-03

## 2024-12-03 NOTE — TELEPHONE ENCOUNTER
"  Caller: Anitra Orta \"Pat\"    Relationship: Self    Best call back number: [phone]    What orders are you requesting (i.e. lab or imaging): FURTHER IMAGING AFTER MAMMOGRAM    In what timeframe would the patient need to come in: AS SOON AS AVAILABLE    Where will you receive your lab/imaging services: [facility]     Additional notes: STATED THAT THEY CALLED THE HOSPITAL AND ARE NOT SURE OF WHAT THE FURTHER IMAGING IS THAT THEY WERE TALKING ABOUT BUT THEY CANNOT SCHEDULE UNTIL THERE IS AN ORDER IN FOR THEM. PLEASE CALL AND ADVISE       "

## 2024-12-03 NOTE — TELEPHONE ENCOUNTER
"  Caller: Anitra Orta \"Pat\"    Relationship: Self    Best call back number: [phone]    Who are you requesting to speak with (clinical staff, provider,  specific staff member): CLINICAL STAFF    What was the call regarding: STATED THAT THEY WANT TO LET DR. BASHIR KNOW THAT THEY GOT A LETTER ABOUT NEEDING FURTHER IMAGING BASED ON THE RESULTS OF THE MAMMOGRAM. STATED THAT THEY ARE ABOUT TO CALL THE HOSPITAL ABOUT SCHEDULING WHATEVER TESTING THAT WAS. PLEASE CALL AND ADVISE IF NEEDED     "

## 2024-12-04 DIAGNOSIS — N64.89 BREAST ASYMMETRY: Primary | ICD-10-CM

## 2024-12-13 ENCOUNTER — HOSPITAL ENCOUNTER (OUTPATIENT)
Dept: PHYSICAL THERAPY | Facility: HOSPITAL | Age: 84
Setting detail: THERAPIES SERIES
Discharge: HOME | End: 2024-12-13
Payer: MEDICARE

## 2024-12-13 DIAGNOSIS — M54.16 LUMBAR RADICULOPATHY: Primary | ICD-10-CM

## 2024-12-13 DIAGNOSIS — Z98.1 S/P LUMBAR SPINAL FUSION: ICD-10-CM

## 2024-12-13 PROCEDURE — 97116 GAIT TRAINING THERAPY: CPT

## 2024-12-13 PROCEDURE — 97110 THERAPEUTIC EXERCISES: CPT

## 2024-12-13 NOTE — THERAPY TREATMENT NOTE
Outpatient Physical Therapy Ortho Treatment Note  GORDO L[facility]     Patient Name: Anitra Orta  : 1940  MRN: [id number]  Today's Date: 2024        Visit Date: 2024    Visit Dx:    ICD-10-CM ICD-9-CM   1. Lumbar radiculopathy  M54.16 724.4   2. S/P lumbar spinal fusion  Z98.1 V45.4       Patient Active Problem List   Diagnosis    Urinary tract infection due to ESBL Klebsiella    Simple renal cyst    Former smoker    Hyperlipidemia    Osteoporosis    Panic disorder    Psoriasis    Urge incontinence of urine    Vitamin D deficiency    Post-menopause    Acute UTI (urinary tract infection)    Chronic bilateral low back pain without sciatica    Spinal stenosis of lumbar region with neurogenic claudication    Other chronic pain    Mixed incontinence    GERD without esophagitis    Anxiety    Hyperglycemia    e.coli bacteremia    Ureteral stone with hydronephrosis    Obstructive uropathy    Nephrolithiasis    Anemia    Metabolic acidosis    Essential hypertension    History of ventricular tachycardia    Left ureteral stone    Chronic idiopathic constipation    Personal history of colonic polyps        Past Medical History:   Diagnosis Date    Acute kidney failure 2021    Allergic     weather allergies    Anxiety     Arthritis of back     At risk for sleep apnea     Cataract     BILAT-SCHEDULED FOR THIS AND HAD TO CANCEL D/T SEPSIS    Chronic pain disorder     ALL OVER PAIN    Chronic UTI     Closed displaced fracture of surgical neck of left humerus 2021    Closed fracture of left proximal humerus 06/15/2021    Closed fracture of right distal radius 06/15/2021    Colon polyp     COVID-19 vaccine series completed     2ND DOSE 3/23/21    DDD (degenerative disc disease), lumbar     e.coli bacteremia 2021    Elevated cholesterol     Fracture, humerus     LEFT. h/o    GERD (gastroesophageal reflux disease)     History of recent fall     21    History of sepsis     MOST RECENT  JULY 2021-R/T KIDNEY STONE, UTI.  STATES THIS IS HER 3RD SEPSIS DIAGNOSIS    History of UTI     HL (hearing loss)     HTN (hypertension)     Hyperlipidemia     Hypokalemia 03/25/2021    Kidney stones     LEFT    Left humeral fracture 07/11/2021    Low back pain     Macular degeneration, bilateral     WORSE ON RIGHT-ONLY PERIPHERAL VISION    Metabolic encephalopathy 03/25/2021    Mixed hyperlipidemia 09/16/2016    Mixed incontinence     Obesity     Osteoarthritis     Osteopenia     Osteoporosis     Panic disorder     Personal history of urinary calculi     PONV (postoperative nausea and vomiting)     Scoliosis     Sepsis, unspecified organism 03/25/2021    Tachycardia, unspecified     Ventricular tachycardia 09/09/2019    Visual impairment     Wrist fracture     RIGHT-CURRENTLY NOT WEARING BRACE FOR THIS        Past Surgical History:   Procedure Laterality Date    ADENOIDECTOMY      BREAST BIOPSY Left     benign    BREAST LUMPECTOMY Left     benign    BUNIONECTOMY Right     COLONOSCOPY N/A 11/30/2016    Procedure: COLONOSCOPY polypectomy;  Surgeon: Adali Willard MD;  Location: [facility] OR;  Service:     CYSTOSCOPY BOTOX INJECTION OF BLADDER N/A 07/21/2017    Procedure: CYSTOSCOPY COAPTITE INJECTION;  Surgeon: Kevon Clark MD;  Location: [facility] OR;  Service:     CYSTOSCOPY URETEROSCOPY LASER LITHOTRIPSY Right 05/01/2023    Procedure: RIGHT URETEROSCOPY LASER LITHOTRIPSY STONE BASKET EXTRACTION AND STENT;  Surgeon: Kevon Clark MD;  Location: [facility] OR;  Service: Urology;  Laterality: Right;    CYSTOSCOPY W/ LITHOLAPAXY / EHL      CYSTOSCOPY W/ URETERAL STENT PLACEMENT Left 09/12/2016    Procedure: CYSTOSCOPY URETERAL STENT INSERTION;  Surgeon: Kevon Clark MD;  Location: [facility] OR;  Service:     CYSTOSCOPY W/ URETERAL STENT PLACEMENT Left 08/01/2019    Procedure: CYSTOSCOPY URETERAL CATHETER/STENT INSERTION;  Surgeon: Kevon Clark MD;  Location: [facility] OR;  Service: Urology    CYSTOSCOPY W/  "URETERAL STENT PLACEMENT Left 03/25/2021    Procedure: CYSTOSCOPY URETERAL CATHETER/STENT INSERTION;  Surgeon: Kevon Clark MD;  Location: [facility] OR;  Service: Urology;  Laterality: Left;    CYSTOSCOPY W/ URETERAL STENT PLACEMENT Left 07/11/2021    Procedure: CYSTOSCOPY URETERAL CATHETER/STENT INSERTION;  Surgeon: Lul Guzman MD;  Location: [facility] OR;  Service: Urology;  Laterality: Left;  CYSTOSCOPY LEFT URETERAL CATHETER/ STENT PLACEMENT    CYSTOSCOPY W/ URETERAL STENT PLACEMENT Right 04/20/2023    Procedure: CYSTOSCOPY STENT PLACEMENT;  Surgeon: Matthew Ndiaye MD;  Location: [facility] OR;  Service: Urology;  Laterality: Right;    EYE SURGERY      JOINT REPLACEMENT Right     total knee    LAMINECTOMY  11/2022    LUMBAR EPIDURAL INJECTION      TONSILLECTOMY AND ADENOIDECTOMY      TUBAL ABDOMINAL LIGATION      URETEROSCOPY LASER LITHOTRIPSY WITH STENT INSERTION Left 10/05/2016    Procedure: LT URETEROSCOPY LASER LITHOTRIPSY STONE BASKET EXTRACTION AND STENT ;  Surgeon: Kevon Clark MD;  Location: [facility] OR;  Service:     URETEROSCOPY LASER LITHOTRIPSY WITH STENT INSERTION Left 07/23/2021    Procedure: LEFT URETEROSCOPY STONE MANIPULATION STENT EXCHANGE, LASER LITHOTRIPSY, CYSTOSCOPY, STONE BASKET EXTRACTION;  Surgeon: Kevon Clark MD;  Location: [facility] OR;  Service: Urology;  Laterality: Left;        PT Ortho       Row Name 12/13/24 1400       Subjective    Subjective Comments \"I have been in pain in my back and in my knee.\" \"I get my 3rd gel injection next Friday.\"  \"I  the cane and walk around the kitchen sometimes but I don't feel comfortable and my legs get shakey.\" Pt reports she walks with her FWW because she is comfortable with it and it helps her walk with her knee pain.  -LN       Precautions and Contraindications    Precautions/Limitations spinal precautions  -LN       Subjective Pain    Able to rate subjective pain? yes  -LN    Pre-Treatment Pain Level 4 " " -LN    Subjective Pain Comment \"I feel like the back of my knee is swollen.\"  -LN              User Key  (r) = Recorded By, (t) = Taken By, (c) = Cosigned By      Initials Name Provider Type    Macrina Pinto, PT Physical Therapist                                 PT Assessment/Plan       Row Name 12/13/24 1400          PT Assessment    Assessment Comments Pt continues to report some LBP, but more limited by c/o L knee pain (has her 3rd gel injection next Friday per pt). Pt tolerated exercises fairly well; she continues to fatigue quickly with standing exercises and gait. She is also limited in standing exercises due to c/o L knee pain (dany sit to stand exercise, forward step ups and SLS on L leg).  She continues to have low confidence with ambulation with SBQC but ambulates very well with FWW without any assist needed and good confidence; have recommended that she continue to use her FWW for ambulation because she is not completely safe with SBQC and has an increased fall risk when using cane.  -LN        PT Plan    PT Frequency 1x/week;2x/week  -LN     Predicted Duration of Therapy Intervention (PT) plan on 3 more weeks  -LN     PT Plan Comments Cont per POC, progressing as tolerated. Continue to work on ambulation with SBQC & without AD as tolerated. Also work on backward walking as tolerated. Progress with core strengthening.  -LN               User Key  (r) = Recorded By, (t) = Taken By, (c) = Cosigned By      Initials Name Provider Type    Macrina Pinto, PT Physical Therapist                       OP Exercises       Row Name 12/13/24 1400             Precautions    Existing Precautions/Restrictions spinal  -LN         Subjective    Subjective Comments \"I have been in pain in my back and in my knee.\" \"I get my 3rd gel injection next Friday.\"  \"I  the cane and walk around the kitchen sometimes but I don't feel comfortable and my legs get shakey.\" Pt reports she walks with her FWW " "because she is comfortable with it and it helps her walk with her knee pain.  -LN         Subjective Pain    Able to rate subjective pain? yes  -LN      Pre-Treatment Pain Level 4  -LN      Subjective Pain Comment \"I feel like the back of my knee is swollen.\"  -LN         Total Minutes    41633 - Gait Training Minutes  15  -LN      66078 - PT Therapeutic Exercise Minutes 35  -LN         Exercise 1    Exercise Name 1 (B) seated gastroc/hamstring stretch  -LN      Cueing 1 Verbal;Tactile;Demo  -LN      Additional Comments HEP  -LN         Exercise 2    Exercise Name 2 seated ball squeeze  -LN      Cueing 2 Verbal  -LN      Sets 2 ABDOMINAL BRACIN  -LN      Reps 2 30  -LN      Time 2 5 sec  -LN         Exercise 3    Exercise Name 3 alt march w/abdominal brace  -LN      Cueing 3 Verbal;Demo  -LN      Sets 3 ABDOMINAL BRACIN  -LN      Reps 3 30  -LN      Additional Comments black TB  -LN         Exercise 4    Exercise Name 4 seated rows vs TB  -LN      Cueing 4 Verbal;Demo  -LN      Sets 4 ABDOMINAL BRACIN  -LN      Reps 4 30  -LN      Time 4 black  -LN         Exercise 5    Exercise Name 5 seated (B) shoulder extension  -LN      Cueing 5 Verbal;Tactile;Demo  -LN      Sets 5 ABDOMINAL BRACIN  -LN      Reps 5 30  -LN      Time 5 black TB  -LN         Exercise 6    Exercise Name 6 sit to stand  -LN      Cueing 6 Verbal;Tactile;Demo  -LN      Reps 6 8  -LN      Additional Comments hands on knees 1x and with use of arm rests for  7x  Pt limited by L knee pain with sit to stand exercise; reports knee pain dany when she attempts to stand with her hands on her knees.  -LN         Exercise 7    Exercise Name 7 seated hip abduction vs TB  -LN      Cueing 7 Verbal  -LN      Reps 7 30  -LN      Additional Comments black TB  -LN         Exercise 8    Exercise Name 8 standing (B) hip ABD  -LN      Cueing 8 Verbal  -LN      Reps 8 10 each  -LN      Additional Comments no weight; at elevated table  -LN         Exercise 9    Exercise " "Name 9 (B) hamstring curl  -LN      Cueing 9 Verbal;Demo  -LN      Reps 9 10 each  -LN      Additional Comments no weight; at elevated table  -LN         Exercise 10    Exercise Name 10 SLS  -LN      Cueing 10 Verbal;Tactile;Demo  -LN      Reps 10 1 right leg; unable to do on left leg today due to L knee pain  -LN      Time 10 1 minute each leg  -LN      Additional Comments at elevated table  -LN         Exercise 12    Exercise Name 12 4\" forward step up  -LN      Cueing 12 Verbal;Demo  -LN      Time 12 --  -LN      Additional Comments held today due to knee pain  -LN         Exercise 13    Exercise Name 13 Gait: (with gait belt on) Pt ambulated 150 feet with SBQC with SBA-CGA and she occasionally reached out for railing on the wall. She did better when she didn't focus on sequencing and just walked.   Decreased shaking of R leg noted today, but she still c/o shaking in LE. She continues to have trouble looking up when she is walking with cane and tends to look down at her feet, but is improving.  -LN         Exercise 14    Exercise Name 14 standing hip extension  -LN      Cueing 14 Verbal;Tactile;Demo  -LN      Reps 14 10 each leg  -LN      Additional Comments no weight; at elevated table  -LN         Exercise 15    Exercise Name 15 sidestepping at railing  -LN      Cueing 15 Verbal;Demo  -LN      Reps 15 6x back and forth  -LN      Additional Comments hands on railing for support  -LN         Exercise 16    Exercise Name 16 forward and backward walking with R hand on rail  -LN      Cueing 16 Verbal;Tactile;Demo  -LN      Reps 16 4 x up and down rail  -LN      Additional Comments with SBA with backward walking  -LN                User Key  (r) = Recorded By, (t) = Taken By, (c) = Cosigned By      Initials Name Provider Type    Macrina Pinto, PT Physical Therapist                                     Therapy Education  Education Details: Continue to encourage pt to work on daily HEP (or at least every " other day) at home; dany the seated and supine exercises. Pt to use HP on LB as needed for LBP.  Given: HEP, Symptoms/condition management, Posture/body mechanics, Mobility training  Program: Reinforced  How Provided: Verbal, Demonstration  Provided to: Patient  Level of Understanding: Teach back education performed, Verbalized, Demonstrated              Time Calculation:   Start Time: 1455  Stop Time: 1555  Time Calculation (min): 60 min  Timed Charges  76052 - PT Therapeutic Exercise Minutes: 35  32678 - Gait Training Minutes : 15  Total Minutes  Timed Charges Total Minutes: 50   Total Minutes: 50  Therapy Charges for Today       Code Description Service Date Service Provider Modifiers Qty    93919598776 HC PT THER PROC EA 15 MIN 12/13/2024 Macrina Owens, PT GP 2    85120337628 HC GAIT TRAINING EA 15 MIN 12/13/2024 Macrina Owens, PT GP 1                      Macrina Owens, PT  12/13/2024

## 2024-12-18 ENCOUNTER — APPOINTMENT (OUTPATIENT)
Dept: PHYSICAL THERAPY | Facility: HOSPITAL | Age: 84
End: 2024-12-18
Payer: MEDICARE

## 2025-01-23 ENCOUNTER — HOSPITAL ENCOUNTER (OUTPATIENT)
Dept: MAMMOGRAPHY | Facility: HOSPITAL | Age: 85
Discharge: HOME | End: 2025-01-23
Payer: MEDICARE

## 2025-01-23 ENCOUNTER — HOSPITAL ENCOUNTER (OUTPATIENT)
Dept: ULTRASOUND IMAGING | Facility: HOSPITAL | Age: 85
Discharge: HOME | End: 2025-01-23
Payer: MEDICARE

## 2025-01-23 DIAGNOSIS — N64.89 BREAST ASYMMETRY: ICD-10-CM

## 2025-01-23 PROCEDURE — G0279 TOMOSYNTHESIS, MAMMO: HCPCS | Performed by: RADIOLOGY

## 2025-01-23 PROCEDURE — G0279 TOMOSYNTHESIS, MAMMO: HCPCS

## 2025-01-23 PROCEDURE — 77065 DX MAMMO INCL CAD UNI: CPT

## 2025-01-23 PROCEDURE — 76642 ULTRASOUND BREAST LIMITED: CPT

## 2025-01-23 PROCEDURE — 77065 DX MAMMO INCL CAD UNI: CPT | Performed by: RADIOLOGY

## 2025-01-23 PROCEDURE — 76642 ULTRASOUND BREAST LIMITED: CPT | Performed by: RADIOLOGY

## 2025-01-27 DIAGNOSIS — N64.89 BREAST ASYMMETRY: Primary | ICD-10-CM

## 2025-01-28 ENCOUNTER — APPOINTMENT (OUTPATIENT)
Dept: GENERAL RADIOLOGY | Facility: HOSPITAL | Age: 85
End: 2025-01-28
Payer: MEDICARE

## 2025-01-28 ENCOUNTER — HOSPITAL ENCOUNTER (EMERGENCY)
Facility: HOSPITAL | Age: 85
Discharge: HOME | End: 2025-01-28
Attending: EMERGENCY MEDICINE | Admitting: EMERGENCY MEDICINE
Payer: MEDICARE

## 2025-01-28 VITALS
OXYGEN SATURATION: 96 % | SYSTOLIC BLOOD PRESSURE: 165 MMHG | HEIGHT: 65 IN | TEMPERATURE: 97.5 F | BODY MASS INDEX: 37.82 KG/M2 | RESPIRATION RATE: 20 BRPM | WEIGHT: 227 LBS | HEART RATE: 70 BPM | DIASTOLIC BLOOD PRESSURE: 69 MMHG

## 2025-01-28 DIAGNOSIS — M25.551 ACUTE RIGHT HIP PAIN: Primary | ICD-10-CM

## 2025-01-28 PROCEDURE — 99283 EMERGENCY DEPT VISIT LOW MDM: CPT | Performed by: EMERGENCY MEDICINE

## 2025-01-28 PROCEDURE — 63710000001 ONDANSETRON ODT 4 MG TABLET DISPERSIBLE: Performed by: EMERGENCY MEDICINE

## 2025-01-28 PROCEDURE — 63710000001 PREDNISONE PER 1 MG: Performed by: EMERGENCY MEDICINE

## 2025-01-28 PROCEDURE — 73502 X-RAY EXAM HIP UNI 2-3 VIEWS: CPT

## 2025-01-28 RX ORDER — ONDANSETRON 4 MG/1
4 TABLET, ORALLY DISINTEGRATING ORAL ONCE
Status: COMPLETED | OUTPATIENT
Start: 2025-01-28 | End: 2025-01-28

## 2025-01-28 RX ORDER — PREDNISONE 20 MG/1
TABLET ORAL
Qty: 12 TABLET | Refills: 0 | Status: SHIPPED | OUTPATIENT
Start: 2025-01-28 | End: 2025-02-03

## 2025-01-28 RX ORDER — HYDROCODONE BITARTRATE AND ACETAMINOPHEN 5; 325 MG/1; MG/1
2 TABLET ORAL ONCE
Status: COMPLETED | OUTPATIENT
Start: 2025-01-28 | End: 2025-01-28

## 2025-01-28 RX ORDER — AMOXICILLIN 250 MG
2 CAPSULE ORAL NIGHTLY PRN
Qty: 20 TABLET | Refills: 0 | Status: SHIPPED | OUTPATIENT
Start: 2025-01-28 | End: 2025-02-03

## 2025-01-28 RX ORDER — PREDNISONE 20 MG/1
60 TABLET ORAL ONCE
Status: COMPLETED | OUTPATIENT
Start: 2025-01-28 | End: 2025-01-28

## 2025-01-28 RX ORDER — HYDROCODONE BITARTRATE AND ACETAMINOPHEN 7.5; 325 MG/1; MG/1
1 TABLET ORAL EVERY 6 HOURS PRN
Qty: 12 TABLET | Refills: 0 | Status: SHIPPED | OUTPATIENT
Start: 2025-01-28

## 2025-01-28 RX ADMIN — ONDANSETRON 4 MG: 4 TABLET, ORALLY DISINTEGRATING ORAL at 09:22

## 2025-01-28 RX ADMIN — PREDNISONE 60 MG: 20 TABLET ORAL at 09:23

## 2025-01-28 RX ADMIN — HYDROCODONE BITARTRATE AND ACETAMINOPHEN 2 TABLET: 5; 325 TABLET ORAL at 09:23

## 2025-01-28 NOTE — ED PROVIDER NOTES
Subjective   History of Present Illness  Patient presents complaining of new onset of right hip pain.  Patient said it started 4 days ago and got worse 2 days ago.  Patient says she woke up with it and it started gradually and was initially sore and achy and then became more constant and painful.  Patient denies any recent trauma and no recent straining or heavy lifting.  No previous episodes.  Patient describes it as a stinging and burning in her right lateral hip that goes down into the proximal leg.  Worse with standing or range of motion of the right hip.  Patient denies any sensory changes or weakness in her right leg.  Patient's been taking some ibuprofen does give her some minor relief but she is only taking 800 mg once a day.  Patient denies seeing any redness or bruising.  Patient does have a chronic history of back issues but says this feels like it is different.        Review of Systems   All other systems reviewed and are negative.      Past Medical History:   Diagnosis Date    Acute kidney failure 07/11/2021    Allergic     weather allergies    Anxiety     Arthritis of back     At risk for sleep apnea     Cataract     BILAT-SCHEDULED FOR THIS AND HAD TO CANCEL D/T SEPSIS    Chronic pain disorder     ALL OVER PAIN    Chronic UTI     Closed displaced fracture of surgical neck of left humerus 06/25/2021    Closed fracture of left proximal humerus 06/15/2021    Closed fracture of right distal radius 06/15/2021    Colon polyp     COVID-19 vaccine series completed     2ND DOSE 3/23/21    DDD (degenerative disc disease), lumbar     e.coli bacteremia 03/25/2021    Elevated cholesterol     Fracture, humerus     LEFT. h/o    GERD (gastroesophageal reflux disease)     History of recent fall     JUNE 6-9-21    History of sepsis     MOST RECENT JULY 2021-R/T KIDNEY STONE, UTI.  STATES THIS IS HER 3RD SEPSIS DIAGNOSIS    History of UTI     HL (hearing loss)     HTN (hypertension)     Hyperlipidemia     Hypokalemia  03/25/2021    Kidney stones     LEFT    Left humeral fracture 07/11/2021    Low back pain     Macular degeneration, bilateral     WORSE ON RIGHT-ONLY PERIPHERAL VISION    Metabolic encephalopathy 03/25/2021    Mixed hyperlipidemia 09/16/2016    Mixed incontinence     Obesity     Osteoarthritis     Osteopenia     Osteoporosis     Panic disorder     Personal history of urinary calculi     PONV (postoperative nausea and vomiting)     Scoliosis     Sepsis, unspecified organism 03/25/2021    Tachycardia, unspecified     Ventricular tachycardia 09/09/2019    Visual impairment     Wrist fracture     RIGHT-CURRENTLY NOT WEARING BRACE FOR THIS       Allergies   Allergen Reactions    Bactrim [Sulfamethoxazole-Trimethoprim] Nausea Only    Ciprofloxacin Other (See Comments)     UNKNOWN    Macrobid [Nitrofurantoin] Other (See Comments)     UNKNOWN; PT CAN TAKE IN SMALL DOSES- FLU LIKE SYMPTOMS    Cortisone Headache     Pt states had headache with IM injection states has been ok with epidural steroid injections       Past Surgical History:   Procedure Laterality Date    ADENOIDECTOMY      BREAST BIOPSY Left     benign    BREAST LUMPECTOMY Left     benign    BUNIONECTOMY Right     COLONOSCOPY N/A 11/30/2016    Procedure: COLONOSCOPY polypectomy;  Surgeon: Adali Willard MD;  Location: [facility];  Service:     CYSTOSCOPY BOTOX INJECTION OF BLADDER N/A 07/21/2017    Procedure: CYSTOSCOPY COAPTITE INJECTION;  Surgeon: Kevon Clark MD;  [facility];  Service:     CYSTOSCOPY URETEROSCOPY LASER LITHOTRIPSY Right 05/01/2023    Procedure: RIGHT URETEROSCOPY LASER LITHOTRIPSY STONE BASKET EXTRACTION AND STENT;  Surgeon: Kevon Clark MD;  Location: [facility] OR;  Service: Urology;  Laterality: Right;    CYSTOSCOPY W/ LITHOLAPAXY / EHL      CYSTOSCOPY W/ URETERAL STENT PLACEMENT Left 09/12/2016    Procedure: CYSTOSCOPY URETERAL STENT INSERTION;  Surgeon: Kevon Clark MD;  Location: [facility];  Service:      CYSTOSCOPY W/ URETERAL STENT PLACEMENT Left 08/01/2019    Procedure: CYSTOSCOPY URETERAL CATHETER/STENT INSERTION;  Surgeon: Kevon Clark MD;  Location: [facility] OR;  Service: Urology    CYSTOSCOPY W/ URETERAL STENT PLACEMENT Left 03/25/2021    Procedure: CYSTOSCOPY URETERAL CATHETER/STENT INSERTION;  Surgeon: Kevon Clark MD;  Location: [facility] OR;  Service: Urology;  Laterality: Left;    CYSTOSCOPY W/ URETERAL STENT PLACEMENT Left 07/11/2021    Procedure: CYSTOSCOPY URETERAL CATHETER/STENT INSERTION;  Surgeon: Lul Guzman MD;  Location: [facility] OR;  Service: Urology;  Laterality: Left;  CYSTOSCOPY LEFT URETERAL CATHETER/ STENT PLACEMENT    CYSTOSCOPY W/ URETERAL STENT PLACEMENT Right 04/20/2023    Procedure: CYSTOSCOPY STENT PLACEMENT;  Surgeon: Matthew Ndiaye MD;  Location: [facility] OR;  Service: Urology;  Laterality: Right;    EYE SURGERY      JOINT REPLACEMENT Right     total knee    LAMINECTOMY  11/2022    LUMBAR EPIDURAL INJECTION      TONSILLECTOMY AND ADENOIDECTOMY      TUBAL ABDOMINAL LIGATION      URETEROSCOPY LASER LITHOTRIPSY WITH STENT INSERTION Left 10/05/2016    Procedure: LT URETEROSCOPY LASER LITHOTRIPSY STONE BASKET EXTRACTION AND STENT ;  Surgeon: Kevon Clark MD;  Location: [facility] OR;  Service:     URETEROSCOPY LASER LITHOTRIPSY WITH STENT INSERTION Left 07/23/2021    Procedure: LEFT URETEROSCOPY STONE MANIPULATION STENT EXCHANGE, LASER LITHOTRIPSY, CYSTOSCOPY, STONE BASKET EXTRACTION;  Surgeon: Kevon Clark MD;  Location: [facility];  Service: Urology;  Laterality: Left;       Family History   Problem Relation Age of Onset    Heart defect Mother     Heart attack Mother 70    Sudden death Mother     Early death Mother     Heart defect Father     Heart attack Father 49    Hypertension Father     Sudden death Father     Early death Father     Valvular heart disease Brother     Anxiety disorder Daughter     Malig Hyperthermia Neg Hx     Breast cancer  Neg Hx        Social History     Socioeconomic History    Marital status:    Tobacco Use    Smoking status: Former     Current packs/day: 0.00     Average packs/day: 1 pack/day for 24.0 years (24.0 ttl pk-yrs)     Types: Cigarettes     Start date: 1956     Quit date: 1980     Years since quittin.1     Passive exposure: Never    Smokeless tobacco: Never    Tobacco comments:     quit    Vaping Use    Vaping status: Never Used   Substance and Sexual Activity    Alcohol use: No    Drug use: Not Currently    Sexual activity: Not Currently     Partners: Male     Comment: Frequent UTIs           Objective   Physical Exam  Vitals and nursing note reviewed.   HENT:      Head: Normocephalic.   Cardiovascular:      Pulses:           Dorsalis pedis pulses are 2+ on the right side.        Posterior tibial pulses are 2+ on the right side.   Pulmonary:      Effort: Pulmonary effort is normal.   Musculoskeletal:      Cervical back: Neck supple.      Right lower leg: No edema.        Legs:       Comments: Tender to deep palpation at the circled area.  Line extends showing the basic radiation.  No induration of the tissue.  Surrounding tissue is nontender to palpation.  No tenderness to palpation of the lower leg, knee, or thigh.  No low back pain tenderness to palpation.   Skin:     General: Skin is warm and dry.      Capillary Refill: Capillary refill takes 2 to 3 seconds.   Neurological:      Mental Status: She is alert.      Sensory: No sensory deficit.      Motor: No weakness.   Psychiatric:         Mood and Affect: Mood normal.         Procedures           ED Course                                                       Medical Decision Making  Ddx arthritis, tendinitis, myositis, sprain, strain, fracture, dislocation    XR Hip With or Without Pelvis 2 - 3 View Right    Result Date: 2025  Impression: Mild degenerative osteoarthritis of the hip joints without convincing acute osseous abnormality  within limitations of osseous demineralization. Electronically Signed: Guillermo Chauhan MD  1/28/2025 9:55 AM EST  Workstation ID: [id number]    1010 Pt seen again prior to d/c.  Imaging reviewed and are unremarkable.  Symptoms improved, vitals stable and pt. in NAD. Non-toxic. Comfortable.   All questions personally answered at the bedside and all d/c instructions personally reviewed with pt.  Discussed the importance of close outpt. f/u and pt. understands this and agrees to do so.  Pt agrees to return to ED immediately for any new, persistent, or worsening symptoms.  Patient spouse is present for the entire evaluation and discharge instructions.    EMR Dragon/Transcription disclaimer:  Much of this encounter note is an electronic transcription/translation of spoken language to printed text using the Dragon Dictation System       Amount and/or Complexity of Data Reviewed  Radiology: ordered.    Risk  Prescription drug management.        Final diagnoses:   Acute right hip pain       ED Disposition  ED Disposition       ED Disposition   Discharge    Condition   Stable    Comment   --               Marco Antonio Bland MD  [address]  [place] [id number]  [phone]    In 3 days           Medication List        New Prescriptions      HYDROcodone-acetaminophen 7.5-325 MG per tablet  Commonly known as: NORCO  Take 1 tablet by mouth Every 6 (Six) Hours As Needed for Moderate Pain.     predniSONE 20 MG tablet  Commonly known as: DELTASONE  Take 3 tabs p.o. daily on days 1-2, then 2 tabs p.o. on days 3-4, then 1 tab p.o. on days 5-6, begin on 1/29/25.     sennosides-docusate 8.6-50 MG per tablet  Commonly known as: PERICOLACE  Take 2 tablets by mouth At Night As Needed for Constipation (Take while taking pain medicine). Take with 12oz of water.               Where to Get Your Medications        These medications were sent to [facility] [id number] - [place], [place] - [address] - [phone] [facility] [phone]   [address] [address]      Phone: [phone]   HYDROcodone-acetaminophen 7.5-325 MG per tablet  predniSONE 20 MG tablet  sennosides-docusate 8.6-50 MG per tablet            Marco Antonio Correia MD  01/28/25 1020

## 2025-01-28 NOTE — ED NOTES
[employer] provided patient with wheelchair for departure, Patient was able to standup unassisted to wheelchair and standup unassisted to car safety

## 2025-01-30 ENCOUNTER — OFFICE VISIT (OUTPATIENT)
Dept: SURGERY | Facility: CLINIC | Age: 85
End: 2025-01-30
Payer: MEDICARE

## 2025-01-30 VITALS
OXYGEN SATURATION: 95 % | HEART RATE: 79 BPM | DIASTOLIC BLOOD PRESSURE: 86 MMHG | WEIGHT: 234.8 LBS | HEIGHT: 65 IN | SYSTOLIC BLOOD PRESSURE: 140 MMHG | BODY MASS INDEX: 39.12 KG/M2

## 2025-01-30 DIAGNOSIS — K64.8 INTERNAL HEMORRHOIDS WITHOUT COMPLICATION: ICD-10-CM

## 2025-01-30 DIAGNOSIS — K59.03 DRUG-INDUCED CONSTIPATION: Primary | ICD-10-CM

## 2025-01-30 NOTE — PROGRESS NOTES
History of Present Illness  The patient is an 84-year-old female presenting for evaluation of hemorrhoids.    She reports the presence of a single hemorrhoid, which she describes as resembling a pinecone. This hemorrhoid does not cause her discomfort. She has observed that after bowel movements, the initial stool is well-formed, but subsequent stools are mushy and have a consistency similar to peanut butter. She reports no bleeding or significant pain associated with the hemorrhoid. However, she experiences occasional rectal muscle spasms, a symptom she has been dealing with for several years. She has not undergone a recent colonoscopy, as her primary care physician advised against it due to her age. Her last colonoscopy was performed at [facility] by Dr. Anabel Saab, during which polyps were identified. She believes she has previously taken Citrucel.    She underwent back surgery in February 2024 and spent 3 weeks in rehabilitation, during which she experienced constipation. She recalls consuming prune juice, which resulted in the production of formed stools. She maintains a daily bowel movement schedule, facilitated by the intake of MiraLAX and apple juice. She also takes a stool softener to aid in bowel movements.    Supplemental Information  She has urinary incontinence and uses 3 pads every time. She had sepsis 5 times last year from urinary tract infections. She takes Uqora, which has reduced her urinary tract infections to about 1 per year.      Past Medical History:   Diagnosis Date    Acute kidney failure 07/11/2021    Allergic     weather allergies    Anxiety     Arthritis of back     At risk for sleep apnea     Cataract     BILAT-SCHEDULED FOR THIS AND HAD TO CANCEL D/T SEPSIS    Chronic pain disorder     ALL OVER PAIN    Chronic UTI     Closed displaced fracture of surgical neck of left humerus 06/25/2021    Closed fracture of left proximal humerus 06/15/2021    Closed fracture of right distal  radius 06/15/2021    Colon polyp     COVID-19 vaccine series completed     2ND DOSE 3/23/21    DDD (degenerative disc disease), lumbar     e.coli bacteremia 03/25/2021    Elevated cholesterol     Fracture, humerus     LEFT. h/o    GERD (gastroesophageal reflux disease)     History of recent fall     JUNE 6-9-21    History of sepsis     MOST RECENT JULY 2021-R/T KIDNEY STONE, UTI.  STATES THIS IS HER 3RD SEPSIS DIAGNOSIS    History of UTI     HL (hearing loss)     HTN (hypertension)     Hyperlipidemia     Hypokalemia 03/25/2021    Kidney stones     LEFT    Left humeral fracture 07/11/2021    Low back pain     Macular degeneration, bilateral     WORSE ON RIGHT-ONLY PERIPHERAL VISION    Metabolic encephalopathy 03/25/2021    Mixed hyperlipidemia 09/16/2016    Mixed incontinence     Obesity     Osteoarthritis     Osteopenia     Osteoporosis     Panic disorder     Personal history of urinary calculi     PONV (postoperative nausea and vomiting)     Scoliosis     Sepsis, unspecified organism 03/25/2021    Tachycardia, unspecified     Ventricular tachycardia 09/09/2019    Visual impairment     Wrist fracture     RIGHT-CURRENTLY NOT WEARING BRACE FOR THIS       Past Surgical History:   Procedure Laterality Date    ADENOIDECTOMY      BREAST BIOPSY Left     benign    BREAST LUMPECTOMY Left     benign    BUNIONECTOMY Right     COLONOSCOPY N/A 11/30/2016    Procedure: COLONOSCOPY polypectomy;  Surgeon: Adali Willard MD;  Location: [facility] OR;  Service:     CYSTOSCOPY BOTOX INJECTION OF BLADDER N/A 07/21/2017    Procedure: CYSTOSCOPY COAPTITE INJECTION;  Surgeon: Kevon Clark MD;  Location: [facility] OR;  Service:     CYSTOSCOPY URETEROSCOPY LASER LITHOTRIPSY Right 05/01/2023    Procedure: RIGHT URETEROSCOPY LASER LITHOTRIPSY STONE BASKET EXTRACTION AND STENT;  Surgeon: Kevon Clark MD;  Location: [facility] OR;  Service: Urology;  Laterality: Right;    CYSTOSCOPY W/ LITHOLAPAXY / EHL      CYSTOSCOPY W/ URETERAL STENT  PLACEMENT Left 09/12/2016    Procedure: CYSTOSCOPY URETERAL STENT INSERTION;  Surgeon: Kevon Clark MD;  Location: [facility] OR;  Service:     CYSTOSCOPY W/ URETERAL STENT PLACEMENT Left 08/01/2019    Procedure: CYSTOSCOPY URETERAL CATHETER/STENT INSERTION;  Surgeon: Kevon Clark MD;  Location: [facility] OR;  Service: Urology    CYSTOSCOPY W/ URETERAL STENT PLACEMENT Left 03/25/2021    Procedure: CYSTOSCOPY URETERAL CATHETER/STENT INSERTION;  Surgeon: Kevon Clark MD;  Location: [facility] OR;  Service: Urology;  Laterality: Left;    CYSTOSCOPY W/ URETERAL STENT PLACEMENT Left 07/11/2021    Procedure: CYSTOSCOPY URETERAL CATHETER/STENT INSERTION;  Surgeon: Lul Guzman MD;  Location: [facility] OR;  Service: Urology;  Laterality: Left;  CYSTOSCOPY LEFT URETERAL CATHETER/ STENT PLACEMENT    CYSTOSCOPY W/ URETERAL STENT PLACEMENT Right 04/20/2023    Procedure: CYSTOSCOPY STENT PLACEMENT;  Surgeon: Matthew Ndiaye MD;  Location: [facility] OR;  Service: Urology;  Laterality: Right;    EYE SURGERY      JOINT REPLACEMENT Right     total knee    LAMINECTOMY  11/2022    LUMBAR EPIDURAL INJECTION      TONSILLECTOMY AND ADENOIDECTOMY      TUBAL ABDOMINAL LIGATION      URETEROSCOPY LASER LITHOTRIPSY WITH STENT INSERTION Left 10/05/2016    Procedure: LT URETEROSCOPY LASER LITHOTRIPSY STONE BASKET EXTRACTION AND STENT ;  Surgeon: Kevon Clark MD;  Location: [facility];  Service:     URETEROSCOPY LASER LITHOTRIPSY WITH STENT INSERTION Left 07/23/2021    Procedure: LEFT URETEROSCOPY STONE MANIPULATION STENT EXCHANGE, LASER LITHOTRIPSY, CYSTOSCOPY, STONE BASKET EXTRACTION;  Surgeon: Kevon Clark MD;  Location: [facility];  Service: Urology;  Laterality: Left;       Social History:   reports that she quit smoking about 45 years ago. Her smoking use included cigarettes. She started smoking about 69 years ago. She has a 24 pack-year smoking history. She has never been exposed to tobacco smoke. She  has never used smokeless tobacco. She reports that she does not currently use drugs. She reports that she does not drink alcohol.      Marriage status:     Family History   Problem Relation Age of Onset    Heart defect Mother     Heart attack Mother 70    Sudden death Mother     Early death Mother     Heart defect Father     Heart attack Father 49    Hypertension Father     Sudden death Father     Early death Father     Valvular heart disease Brother     Anxiety disorder Daughter     Hearing loss Daughter     Shamika Hyperthermia Neg Hx     Breast cancer Neg Hx          Current Outpatient Medications:     HYDROcodone-acetaminophen (NORCO) 7.5-325 MG per tablet, Take 1 tablet by mouth Every 6 (Six) Hours As Needed for Moderate Pain., Disp: 12 tablet, Rfl: 0    imipramine (TOFRANIL) 50 MG tablet, Take 2 tablets by mouth Every Night., Disp: 180 tablet, Rfl: 0    metoprolol succinate XL (TOPROL-XL) 50 MG 24 hr tablet, Take 1 tablet by mouth Daily., Disp: 90 tablet, Rfl: 1    predniSONE (DELTASONE) 20 MG tablet, Take 3 tabs p.o. daily on days 1-2, then 2 tabs p.o. on days 3-4, then 1 tab p.o. on days 5-6, begin on 1/29/25., Disp: 12 tablet, Rfl: 0    sennosides-docusate (senna-docusate sodium) 8.6-50 MG per tablet, Take 2 tablets by mouth At Night As Needed for Constipation (Take while taking pain medicine). Take with 12oz of water., Disp: 20 tablet, Rfl: 0    simvastatin (ZOCOR) 40 MG tablet, Take 1 tablet by mouth Every Night. for cholesterol, Disp: 90 tablet, Rfl: 1    VITAMIN D PO, Take 2 tablets by mouth Daily. Pt doesn't know strength, Disp: , Rfl:     Allergy  Bactrim [sulfamethoxazole-trimethoprim], Ciprofloxacin, Macrobid [nitrofurantoin], and Cortisone    Vitals:    01/30/25 1332   BP: 140/86   Pulse: 79   SpO2: 95%   -  Body mass index is 39.07 kg/m².    Physical Exam  No acute distress  Chaperone present  Perianal exam: anal skin tags present. JOVANY: no masses. Anoscopy performed: Grade 2-3 x 3 internal  hemorrhoids     Assessment & Plan  1. Hemorrhoids.  Upon examination, only skin tags were observed, with no other abnormalities detected. The option of undergoing a colonoscopy was discussed, but she expressed a preference to avoid this procedure due to previous adverse experiences with anesthesia. A handout detailing various fiber supplements, including Metamucil, Benefiber, FiberCon, and Citrucel, was provided. She was advised to incorporate these into her diet to help form her stool and make it easier to manage.    2. Constipation.  She reports taking MiraLAX daily to manage her bowel movements. She also takes a stool softener, which has helped reduce the need for straining. She was advised to continue her current regimen and consider adding fiber supplements to improve stool consistency.       Patient or patient representative verbalized consent for the use of Ambient Listening during the visit with  Leni Ortega PA-C for chart documentation. 1/30/2025  15:57 MACK Ortega PA-C  Physician Assistant  Colorectal Surgery

## 2025-01-31 DIAGNOSIS — I10 ESSENTIAL HYPERTENSION: ICD-10-CM

## 2025-01-31 RX ORDER — METOPROLOL SUCCINATE 50 MG/1
50 TABLET, EXTENDED RELEASE ORAL DAILY
Qty: 90 TABLET | Refills: 1 | Status: SHIPPED | OUTPATIENT
Start: 2025-01-31

## 2025-02-03 ENCOUNTER — OFFICE VISIT (OUTPATIENT)
Dept: INTERNAL MEDICINE | Facility: CLINIC | Age: 85
End: 2025-02-03
Payer: MEDICARE

## 2025-02-03 VITALS
HEART RATE: 85 BPM | DIASTOLIC BLOOD PRESSURE: 82 MMHG | SYSTOLIC BLOOD PRESSURE: 130 MMHG | OXYGEN SATURATION: 100 % | WEIGHT: 235 LBS | BODY MASS INDEX: 39.15 KG/M2 | RESPIRATION RATE: 20 BRPM | HEIGHT: 65 IN

## 2025-02-03 DIAGNOSIS — R35.89 POLYURIA: ICD-10-CM

## 2025-02-03 DIAGNOSIS — R73.03 PREDIABETES: ICD-10-CM

## 2025-02-03 DIAGNOSIS — I10 ESSENTIAL HYPERTENSION: Primary | ICD-10-CM

## 2025-02-03 DIAGNOSIS — M25.551 RIGHT HIP PAIN: ICD-10-CM

## 2025-02-03 DIAGNOSIS — E78.2 MIXED HYPERLIPIDEMIA: ICD-10-CM

## 2025-02-03 DIAGNOSIS — E03.9 HYPOTHYROIDISM, UNSPECIFIED TYPE: ICD-10-CM

## 2025-02-03 LAB
BILIRUB BLD-MCNC: NEGATIVE MG/DL
CLARITY, POC: ABNORMAL
COLOR UR: YELLOW
EXPIRATION DATE: ABNORMAL
GLUCOSE UR STRIP-MCNC: NEGATIVE MG/DL
KETONES UR QL: ABNORMAL
LEUKOCYTE EST, POC: ABNORMAL
Lab: ABNORMAL
NITRITE UR-MCNC: NEGATIVE MG/ML
PH UR: 6 [PH] (ref 5–8)
PROT UR STRIP-MCNC: ABNORMAL MG/DL
RBC # UR STRIP: ABNORMAL /UL
SP GR UR: 1.02 (ref 1–1.03)
UROBILINOGEN UR QL: NORMAL

## 2025-02-03 PROCEDURE — 3079F DIAST BP 80-89 MM HG: CPT | Performed by: INTERNAL MEDICINE

## 2025-02-03 PROCEDURE — 1160F RVW MEDS BY RX/DR IN RCRD: CPT | Performed by: INTERNAL MEDICINE

## 2025-02-03 PROCEDURE — 1159F MED LIST DOCD IN RCRD: CPT | Performed by: INTERNAL MEDICINE

## 2025-02-03 PROCEDURE — 99214 OFFICE O/P EST MOD 30 MIN: CPT | Performed by: INTERNAL MEDICINE

## 2025-02-03 PROCEDURE — 81003 URINALYSIS AUTO W/O SCOPE: CPT | Performed by: INTERNAL MEDICINE

## 2025-02-03 PROCEDURE — 3075F SYST BP GE 130 - 139MM HG: CPT | Performed by: INTERNAL MEDICINE

## 2025-02-03 PROCEDURE — 1126F AMNT PAIN NOTED NONE PRSNT: CPT | Performed by: INTERNAL MEDICINE

## 2025-02-05 RX ORDER — PREDNISONE 20 MG/1
TABLET ORAL
Qty: 25 TABLET | Refills: 0 | Status: SHIPPED | OUTPATIENT
Start: 2025-02-05 | End: 2025-02-25

## 2025-02-08 LAB
ALBUMIN SERPL-MCNC: 4.3 G/DL (ref 3.7–4.7)
ALP SERPL-CCNC: 134 IU/L (ref 44–121)
ALT SERPL-CCNC: 21 IU/L (ref 0–32)
AST SERPL-CCNC: 19 IU/L (ref 0–40)
BACTERIA UR CULT: ABNORMAL
BACTERIA UR CULT: ABNORMAL
BILIRUB SERPL-MCNC: 0.4 MG/DL (ref 0–1.2)
BUN SERPL-MCNC: 30 MG/DL (ref 8–27)
BUN/CREAT SERPL: 24 (ref 12–28)
CALCIUM SERPL-MCNC: 9.3 MG/DL (ref 8.7–10.3)
CHLORIDE SERPL-SCNC: 102 MMOL/L (ref 96–106)
CHOLEST SERPL-MCNC: 195 MG/DL (ref 100–199)
CO2 SERPL-SCNC: 24 MMOL/L (ref 20–29)
CREAT SERPL-MCNC: 1.26 MG/DL (ref 0.57–1)
EGFRCR SERPLBLD CKD-EPI 2021: 42 ML/MIN/1.73
GLOBULIN SER CALC-MCNC: 2.6 G/DL (ref 1.5–4.5)
GLUCOSE SERPL-MCNC: 112 MG/DL (ref 70–99)
HBA1C MFR BLD: 6.6 % (ref 4.8–5.6)
HDLC SERPL-MCNC: 68 MG/DL
LDLC SERPL CALC-MCNC: 102 MG/DL (ref 0–99)
OTHER ANTIBIOTIC SUSC ISLT: ABNORMAL
POTASSIUM SERPL-SCNC: 4.4 MMOL/L (ref 3.5–5.2)
PROT SERPL-MCNC: 6.9 G/DL (ref 6–8.5)
SODIUM SERPL-SCNC: 141 MMOL/L (ref 134–144)
T4 FREE SERPL-MCNC: 0.98 NG/DL (ref 0.82–1.77)
TRIGL SERPL-MCNC: 144 MG/DL (ref 0–149)
TSH SERPL DL<=0.005 MIU/L-ACNC: 2.79 UIU/ML (ref 0.45–4.5)
VLDLC SERPL CALC-MCNC: 25 MG/DL (ref 5–40)

## 2025-02-10 ENCOUNTER — HOSPITAL ENCOUNTER (OUTPATIENT)
Dept: GENERAL RADIOLOGY | Facility: HOSPITAL | Age: 85
Discharge: HOME | End: 2025-02-10
Admitting: ORTHOPAEDIC SURGERY
Payer: MEDICARE

## 2025-02-10 ENCOUNTER — TRANSCRIBE ORDERS (OUTPATIENT)
Dept: ADMINISTRATIVE | Facility: HOSPITAL | Age: 85
End: 2025-02-10
Payer: MEDICARE

## 2025-02-10 DIAGNOSIS — M41.9 KYPHOSCOLIOSIS: Primary | ICD-10-CM

## 2025-02-10 DIAGNOSIS — M41.9 KYPHOSCOLIOSIS: ICD-10-CM

## 2025-02-10 PROCEDURE — 72110 X-RAY EXAM L-2 SPINE 4/>VWS: CPT

## 2025-02-10 RX ORDER — CEPHALEXIN 500 MG/1
500 CAPSULE ORAL 2 TIMES DAILY
Qty: 14 CAPSULE | Refills: 0 | Status: SHIPPED | OUTPATIENT
Start: 2025-02-10 | End: 2025-02-17

## 2025-02-11 RX ORDER — IMIPRAMINE HYDROCHLORIDE 50 MG/1
TABLET, FILM COATED ORAL
Qty: 180 TABLET | Refills: 0 | Status: SHIPPED | OUTPATIENT
Start: 2025-02-11

## 2025-02-12 NOTE — PROGRESS NOTES
Chief Complaint  Hospital Follow Up Visit (Went to [facility] on 1/28/25 for RT hip pain)    Subjective        Anitra Orta presents to [facility]  History of Present Illness  Here for ER f/u     Per their note:   Patient presents complaining of new onset of right hip pain. Patient said it started 4 days ago and got worse 2 days ago. Patient says she woke up with it and it started gradually and was initially sore and achy and then became more constant and painful. Patient denies any recent trauma and no recent straining or heavy lifting. No previous episodes. Patient describes it as a stinging and burning in her right lateral hip that goes down into the proximal leg. Worse with standing or range of motion of the right hip. Patient denies any sensory changes or weakness in her right leg. Patient's been taking some ibuprofen does give her some minor relief but she is only taking 800 mg once a day. Patient denies seeing any redness or bruising. Patient does have a chronic history of back issues but says this feels like it is different.     XR Hip With or Without Pelvis 2 - 3 View Right     Result Date: 1/28/2025  Impression: Mild degenerative osteoarthritis of the hip joints without convincing acute osseous abnormality within limitations of osseous demineralization. Electronically Signed: Guillermo Chauhan MD  1/28/2025 9:55 AM EST  Workstation ID: [id number]     1010 Pt seen again prior to d/c.  Imaging reviewed and are unremarkable.  Symptoms improved, vitals stable and pt. in NAD. Non-toxic. Comfortable.   All questions personally answered at the bedside and all d/c instructions personally reviewed with pt.  Discussed the importance of close outpt. f/u and pt. understands this and agrees to do so.  Pt agrees to return to ED immediately for any new, persistent, or worsening symptoms.  Patient spouse is present for the entire evaluation and discharge  "instructions.    Steroids have helped some, pain gradually improving.       Objective   Vital Signs:  /82   Pulse 85   Resp 20   Ht 165.1 cm (65\")   Wt 107 kg (235 lb)   SpO2 100%   BMI 39.11 kg/m²   Estimated body mass index is 39.11 kg/m² as calculated from the following:    Height as of this encounter: 165.1 cm (65\").    Weight as of this encounter: 107 kg (235 lb).            Physical Exam  Vitals and nursing note reviewed.   Constitutional:       General: She is not in acute distress.     Appearance: Normal appearance.   HENT:      Head: Normocephalic and atraumatic.      Right Ear: External ear normal.      Left Ear: External ear normal.      Nose: Nose normal. No congestion.      Mouth/Throat:      Mouth: Mucous membranes are moist.      Pharynx: No oropharyngeal exudate or posterior oropharyngeal erythema.   Eyes:      Extraocular Movements: Extraocular movements intact.      Conjunctiva/sclera: Conjunctivae normal.      Pupils: Pupils are equal, round, and reactive to light.   Cardiovascular:      Rate and Rhythm: Normal rate and regular rhythm.      Pulses: Normal pulses.      Heart sounds: Normal heart sounds. No murmur heard.  Pulmonary:      Effort: Pulmonary effort is normal. No respiratory distress.      Breath sounds: Normal breath sounds. No wheezing or rales.   Musculoskeletal:      Cervical back: Normal range of motion.   Skin:     General: Skin is warm.      Capillary Refill: Capillary refill takes less than 2 seconds.   Neurological:      General: No focal deficit present.      Mental Status: She is alert and oriented to person, place, and time. Mental status is at baseline.      Cranial Nerves: No cranial nerve deficit.   Psychiatric:         Mood and Affect: Mood normal.         Behavior: Behavior normal.         Thought Content: Thought content normal.        Result Review :  The following data was reviewed by: Marco Antonio Bland MD on 02/03/2025:  Common labs          9/9/2024    13:03 " 11/18/2024    13:49 2/3/2025    13:35   Common Labs   Glucose 112  110  112    BUN 22  23  30    Creatinine 1.11  1.14  1.26    Sodium 140  144  141    Potassium 4.8  5.3  4.4    Chloride 104  107  102    Calcium 9.4  9.3  9.3    Albumin 4.2  4.3  4.3    Total Bilirubin 0.3  0.3  0.4    Alkaline Phosphatase 132  129  134    AST (SGOT) 20  22  19    ALT (SGPT) 15  20  21    Total Cholesterol  174  195    Triglycerides  193  144    HDL Cholesterol  49  68    LDL Cholesterol   92  102    Hemoglobin A1C  6.4  6.6      Data reviewed : ER note reviewed           Assessment and Plan   Diagnoses and all orders for this visit:    1. Essential hypertension (Primary)  -     Comprehensive metabolic panel    2. Mixed hyperlipidemia  -     Comprehensive metabolic panel  -     Lipid panel    3. Prediabetes  -     Hemoglobin A1c    4. Polyuria  -     POCT urinalysis dipstick, automated  -     Urine Culture - Urine, Urine, Clean Catch    5. Hypothyroidism, unspecified type  -     TSH  -     T4, free    6. Right hip pain  Complete steroid course as prescribed  May consider course of PT, MRI pending responsiveness    Check labs for ongoing monitoring, we will adjust meds pending labs          I spent 15 minutes caring for Anitra on this date of service. This time includes time spent by me in the following activities:preparing for the visit, reviewing tests, obtaining and/or reviewing a separately obtained history, performing a medically appropriate examination and/or evaluation , counseling and educating the patient/family/caregiver, ordering medications, tests, or procedures, and documenting information in the medical record  Follow Up   F/u 3 months  Patient was given instructions and counseling regarding her condition or for health maintenance advice. Please see specific information pulled into the AVS if appropriate.     Marco Antonio Bland MD  [facility]  [address]  Phone:  [phone]

## 2025-03-25 DIAGNOSIS — E78.2 MIXED HYPERLIPIDEMIA: Chronic | ICD-10-CM

## 2025-03-25 RX ORDER — SIMVASTATIN 40 MG
40 TABLET ORAL NIGHTLY
Qty: 90 TABLET | Refills: 1 | Status: SHIPPED | OUTPATIENT
Start: 2025-03-25

## 2025-03-31 ENCOUNTER — OFFICE VISIT (OUTPATIENT)
Dept: GASTROENTEROLOGY | Facility: CLINIC | Age: 85
End: 2025-03-31
Payer: MEDICARE

## 2025-03-31 VITALS
SYSTOLIC BLOOD PRESSURE: 130 MMHG | HEIGHT: 65 IN | BODY MASS INDEX: 40.05 KG/M2 | DIASTOLIC BLOOD PRESSURE: 80 MMHG | WEIGHT: 240.4 LBS

## 2025-03-31 DIAGNOSIS — Z12.11 ENCOUNTER FOR SCREENING FOR MALIGNANT NEOPLASM OF COLON: Primary | ICD-10-CM

## 2025-03-31 DIAGNOSIS — K58.1 IRRITABLE BOWEL SYNDROME WITH CONSTIPATION: ICD-10-CM

## 2025-03-31 NOTE — H&P (VIEW-ONLY)
PATIENT INFORMATION  Anitra Orta       - 1940    CHIEF COMPLAINT  Chief Complaint   Patient presents with    Rectal Problems     Growth       HISTORY OF PRESENT ILLNESS  Here today for evaluation for colonoscopy    OIC at [facility] had worsened with percocet <1 BM each week, using daily miralax and bisacodyl, full feeling near rectum.    Reports growth near anus and proctologist told her it was a skin tag and she reports it has not changed in side, after moving bowels and cleaning self. Miralax and fiber gummies, now off pain killers, now biggest complaint is difficult clean up, bowels moving most days, sometimes having to push, but better on miralax and fiber. All stools are solid. Just taking fiber now and stools are no longer difficult. No pain. Patient would like to pursue colonoscopy after discussing risks vs benefits.    Last surgery with difficulty for several days after general anesthesia.    GERD: Managed with 20 mg prilosec QD. Still well controlled on daily prilosec.    Last Colonoscopy 2016 with sub cm TA. No fam hx CRC or polyps. Reviewed risks and benefits of screening and patient would like to continue with screenings.        REVIEWED PERTINENT RESULTS/ LABS  Lab Results   Component Value Date    CASEREPORT  2016     Surgical Pathology Report                         Case: [id number]                                  Authorizing Provider:  Adali Willard MD          Collected:           2016 09:35 AM          Ordering Location:     [facility]   Received:            2016 12:56 PM                                 OR                                                                           Pathologist:           Guillermo Branch MD                                                          Specimens:   1) - Large Intestine, Right / Ascending Colon, ascending colon polyps x 2                           2) - Large Intestine, Transverse Colon, transverse colon  polyps  x  2                      FINALDX  11/30/2016     Testing performed at outside laboratory. See scanned report.         Lab Results   Component Value Date    HGB 12.0 05/17/2024    MCV 83.6 05/17/2024     05/17/2024    ALT 21 02/03/2025    AST 19 02/03/2025    HGBA1C 6.6 (H) 02/03/2025    INR 1.0 01/29/2024    TRIG 144 02/03/2025    FERRITIN 68.20 08/09/2021    IRON 12 (L) 07/12/2021    TIBC 489 08/09/2021      No results found.    REVIEW OF SYSTEMS  Review of Systems      ACTIVE PROBLEMS  Patient Active Problem List    Diagnosis     Encounter for screening for malignant neoplasm of colon [Z12.11]     Chronic idiopathic constipation [K59.04]     Personal history of colonic polyps [Z86.0100]     Left ureteral stone [N20.1]     Obstructive uropathy [N13.9]     Nephrolithiasis [N20.0]     Anemia [D64.9]     Metabolic acidosis [E87.20]     Essential hypertension [I10]     History of ventricular tachycardia [Z86.79]     e.coli bacteremia [A41.50]     Ureteral stone with hydronephrosis [N13.2]     Hyperglycemia [R73.9]     Mixed incontinence [N39.46]     GERD without esophagitis [K21.9]     Anxiety [F41.9]     Other chronic pain [G89.29]     Chronic bilateral low back pain without sciatica [M54.50, G89.29]     Spinal stenosis of lumbar region with neurogenic claudication [M48.062]     Acute UTI (urinary tract infection) [N39.0]     Post-menopause [Z78.0]     Simple renal cyst [N28.1]     Former smoker [Z87.891]     Hyperlipidemia [E78.5]     Osteoporosis [M81.0]     Panic disorder [F41.0]     Psoriasis [L40.9]     Urge incontinence of urine [N39.41]     Vitamin D deficiency [E55.9]     Urinary tract infection due to ESBL Klebsiella [N39.0, B96.89]          PAST MEDICAL HISTORY  Past Medical History:   Diagnosis Date    Acute kidney failure 07/11/2021    Allergic     weather allergies    Anxiety     Arthritis of back     At risk for sleep apnea     Cataract     BILAT-SCHEDULED FOR THIS AND HAD TO CANCEL D/T  SEPSIS    Cervical disc disorder     Chronic pain disorder     ALL OVER PAIN    Chronic UTI     Closed displaced fracture of surgical neck of left humerus 06/25/2021    Closed fracture of left proximal humerus 06/15/2021    Closed fracture of right distal radius 06/15/2021    Colon polyp     COVID-19 vaccine series completed     2ND DOSE 3/23/21    DDD (degenerative disc disease), lumbar     e.coli bacteremia 03/25/2021    Elevated cholesterol     Extremity pain     Fracture, humerus     LEFT. h/o    GERD (gastroesophageal reflux disease)     History of recent fall     JUNE 6-9-21    History of sepsis     MOST RECENT JULY 2021-R/T KIDNEY STONE, UTI.  STATES THIS IS HER 3RD SEPSIS DIAGNOSIS    History of UTI     HL (hearing loss)     HTN (hypertension)     Hyperlipidemia     Hypokalemia 03/25/2021    Kidney stones     LEFT    Left humeral fracture 07/11/2021    Low back pain     Lumbosacral disc disease     Macular degeneration, bilateral     WORSE ON RIGHT-ONLY PERIPHERAL VISION    Metabolic encephalopathy 03/25/2021    Mixed hyperlipidemia 09/16/2016    Mixed incontinence     Obesity     Osteoarthritis     Osteopenia     Osteoporosis     Panic disorder     Personal history of urinary calculi     PONV (postoperative nausea and vomiting)     Scoliosis     Sepsis, unspecified organism 03/25/2021    Spinal stenosis     Tachycardia, unspecified     Ventricular tachycardia 09/09/2019    Visual impairment     Wrist fracture     RIGHT-CURRENTLY NOT WEARING BRACE FOR THIS         SURGICAL HISTORY  Past Surgical History:   Procedure Laterality Date    ADENOIDECTOMY      BREAST BIOPSY Left     benign    BREAST LUMPECTOMY Left     benign    BUNIONECTOMY Right     COLONOSCOPY N/A 11/30/2016    Procedure: COLONOSCOPY polypectomy;  Surgeon: Adali Willard MD;  Location: [facility];  Service:     CYSTOSCOPY BOTOX INJECTION OF BLADDER N/A 07/21/2017    Procedure: CYSTOSCOPY COAPTITE INJECTION;  Surgeon: Kevon Clark MD;   Location: [facility] MAIN OR;  Service:     CYSTOSCOPY URETEROSCOPY LASER LITHOTRIPSY Right 05/01/2023    Procedure: RIGHT URETEROSCOPY LASER LITHOTRIPSY STONE BASKET EXTRACTION AND STENT;  Surgeon: Kevon Clark MD;  Location: [facility] OR;  Service: Urology;  Laterality: Right;    CYSTOSCOPY W/ LITHOLAPAXY / EHL      CYSTOSCOPY W/ URETERAL STENT PLACEMENT Left 09/12/2016    Procedure: CYSTOSCOPY URETERAL STENT INSERTION;  Surgeon: Kevon Clark MD;  Location: [facility] OR;  Service:     CYSTOSCOPY W/ URETERAL STENT PLACEMENT Left 08/01/2019    Procedure: CYSTOSCOPY URETERAL CATHETER/STENT INSERTION;  Surgeon: Kevon Clark MD;  Location: [facility] OR;  Service: Urology    CYSTOSCOPY W/ URETERAL STENT PLACEMENT Left 03/25/2021    Procedure: CYSTOSCOPY URETERAL CATHETER/STENT INSERTION;  Surgeon: Kevon Clark MD;  Location: [facility] MAIN OR;  Service: Urology;  Laterality: Left;    CYSTOSCOPY W/ URETERAL STENT PLACEMENT Left 07/11/2021    Procedure: CYSTOSCOPY URETERAL CATHETER/STENT INSERTION;  Surgeon: Lul Guzman MD;  Location: [facility] OR;  Service: Urology;  Laterality: Left;  CYSTOSCOPY LEFT URETERAL CATHETER/ STENT PLACEMENT    CYSTOSCOPY W/ URETERAL STENT PLACEMENT Right 04/20/2023    Procedure: CYSTOSCOPY STENT PLACEMENT;  Surgeon: Matthew Ndiaye MD;  Location: [facility] OR;  Service: Urology;  Laterality: Right;    EPIDURAL BLOCK      EYE SURGERY      JOINT REPLACEMENT Right     total knee    LAMINECTOMY  11/2022    LUMBAR EPIDURAL INJECTION      TONSILLECTOMY AND ADENOIDECTOMY      TRIGGER POINT INJECTION      TUBAL ABDOMINAL LIGATION      URETEROSCOPY LASER LITHOTRIPSY WITH STENT INSERTION Left 10/05/2016    Procedure: LT URETEROSCOPY LASER LITHOTRIPSY STONE BASKET EXTRACTION AND STENT ;  Surgeon: Kevon Clark MD;  Location: [facility] OR;  Service:     URETEROSCOPY LASER LITHOTRIPSY WITH STENT INSERTION Left 07/23/2021    Procedure: LEFT URETEROSCOPY STONE MANIPULATION STENT  EXCHANGE, LASER LITHOTRIPSY, CYSTOSCOPY, STONE BASKET EXTRACTION;  Surgeon: Kevon Clark MD;  Location: [facility] OR;  Service: Urology;  Laterality: Left;         FAMILY HISTORY  Family History   Problem Relation Age of Onset    Heart defect Mother     Heart attack Mother 70    Sudden death Mother     Early death Mother     Heart defect Father     Heart attack Father 49    Hypertension Father     Sudden death Father     Early death Father     Valvular heart disease Brother     Anxiety disorder Daughter     Hearing loss Daughter     Heart disease Brother         Valve replacement    Migraines Daughter     Malig Hyperthermia Neg Hx     Breast cancer Neg Hx          SOCIAL HISTORY  Social History     Occupational History    Not on file   Tobacco Use    Smoking status: Former     Current packs/day: 0.00     Average packs/day: 1 pack/day for 24.0 years (24.0 ttl pk-yrs)     Types: Cigarettes     Start date: 1956     Quit date: 1980     Years since quittin.2     Passive exposure: Never    Smokeless tobacco: Never    Tobacco comments:     quit    Vaping Use    Vaping status: Never Used   Substance and Sexual Activity    Alcohol use: No    Drug use: Not Currently    Sexual activity: Not Currently     Partners: Male     Comment: Frequent UTIs         CURRENT MEDICATIONS    Current Outpatient Medications:     imipramine (TOFRANIL) 50 MG tablet, TAKE 2 TABLETS BY MOUTH ONCE NIGHTLY, Disp: 180 tablet, Rfl: 0    metoprolol succinate XL (TOPROL-XL) 50 MG 24 hr tablet, TAKE 1 TABLET BY MOUTH DAILY, Disp: 90 tablet, Rfl: 1    simvastatin (ZOCOR) 40 MG tablet, TAKE 1 TABLET BY MOUTH EVERY NIGHT FOR CHOLESTEROL, Disp: 90 tablet, Rfl: 1    VITAMIN D PO, Take 2 tablets by mouth Daily. Pt doesn't know strength, Disp: , Rfl:     ALLERGIES  Bactrim [sulfamethoxazole-trimethoprim], Ciprofloxacin, Macrobid [nitrofurantoin], and Cortisone    VITALS  Vitals:    25 1407   BP: 130/80   BP Location: Left arm  "  Patient Position: Sitting   Cuff Size: Large Adult   Weight: 109 kg (240 lb 6.4 oz)   Height: 165.1 cm (65\")       PHYSICAL EXAM  Debilities/Disabilities Identified: None  Emotional Behavior: Appropriate  Wt Readings from Last 3 Encounters:   03/31/25 109 kg (240 lb 6.4 oz)   02/03/25 107 kg (235 lb)   01/30/25 107 kg (234 lb 12.8 oz)     Ht Readings from Last 1 Encounters:   03/31/25 165.1 cm (65\")     Body mass index is 40 kg/m².  Physical Exam  Constitutional:       General: She is not in acute distress.     Appearance: Normal appearance. She is not ill-appearing.   HENT:      Head: Normocephalic and atraumatic.      Mouth/Throat:      Mouth: Mucous membranes are moist.      Pharynx: No posterior oropharyngeal erythema.   Eyes:      General: No scleral icterus.  Cardiovascular:      Rate and Rhythm: Normal rate and regular rhythm.      Heart sounds: Normal heart sounds.   Pulmonary:      Effort: Pulmonary effort is normal.      Breath sounds: Normal breath sounds.   Abdominal:      General: Abdomen is flat. Bowel sounds are normal. There is no distension.      Palpations: Abdomen is soft. There is no mass.      Tenderness: There is no abdominal tenderness. There is no guarding or rebound. Negative signs include Vital's sign.      Hernia: No hernia is present.   Musculoskeletal:      Cervical back: Neck supple.   Skin:     General: Skin is warm.      Capillary Refill: Capillary refill takes less than 2 seconds.   Neurological:      General: No focal deficit present.      Mental Status: She is alert and oriented to person, place, and time.   Psychiatric:         Mood and Affect: Mood normal.         Behavior: Behavior normal.         Thought Content: Thought content normal.         Judgment: Judgment normal.       CLINICAL DATA REVIEWED   reviewed previous lab results and integrated with today's visit, reviewed notes from other physicians and/or last GI encounter, reviewed previous endoscopy results and " available photos, reviewed surgical pathology results from previous biopsies    ASSESSMENT  Diagnoses and all orders for this visit:    Encounter for screening for malignant neoplasm of colon  -     Case Request; Standing  -     Case Request  -     Case Request; Standing  -     Case Request    Irritable bowel syndrome with constipation    Other orders  -     Follow Anesthesia Guidelines / Protocol; Future  -     Verify bowel prep was successful; Standing  -     Give tap water enema if bowel prep was insufficient; Standing  -     Follow Anesthesia Guidelines / Protocol; Future  -     Verify bowel prep was successful; Standing  -     Give tap water enema if bowel prep was insufficient; Standing          PLAN    Colonoscopy  Constipation improved off opioids, but to take miralax every day the 2 weeks leading to colonoscopy    Return in about 3 months (around 6/30/2025).    I have discussed the above plan with the patient.  They verbalize understanding and are in agreement with the plan.  They have been advised to contact the office for any questions, concerns, or changes related to their health.

## 2025-03-31 NOTE — PROGRESS NOTES
PATIENT INFORMATION  Anitra Orta       - 1940    CHIEF COMPLAINT  Chief Complaint   Patient presents with    Rectal Problems     Growth       HISTORY OF PRESENT ILLNESS  Here today for evaluation for colonoscopy    OIC at [facility] had worsened with percocet <1 BM each week, using daily miralax and bisacodyl, full feeling near rectum.    Reports growth near anus and proctologist told her it was a skin tag and she reports it has not changed in side, after moving bowels and cleaning self. Miralax and fiber gummies, now off pain killers, now biggest complaint is difficult clean up, bowels moving most days, sometimes having to push, but better on miralax and fiber. All stools are solid. Just taking fiber now and stools are no longer difficult. No pain. Patient would like to pursue colonoscopy after discussing risks vs benefits.    Last surgery with difficulty for several days after general anesthesia.    GERD: Managed with 20 mg prilosec QD. Still well controlled on daily prilosec.    Last Colonoscopy 2016 with sub cm TA. No fam hx CRC or polyps. Reviewed risks and benefits of screening and patient would like to continue with screenings.        REVIEWED PERTINENT RESULTS/ LABS  Lab Results   Component Value Date    CASEREPORT  2016     Surgical Pathology Report                         Case: [id number]                                  Authorizing Provider:  Adali Willard MD          Collected:           2016 09:35 AM          Ordering Location:     [facility]   Received:            2016 12:56 PM                                 OR                                                                           Pathologist:           Guillermo Branch MD                                                          Specimens:   1) - Large Intestine, Right / Ascending Colon, ascending colon polyps x 2                           2) - Large Intestine, Transverse Colon, transverse colon  polyps  x  2                      FINALDX  11/30/2016     Testing performed at outside laboratory. See scanned report.         Lab Results   Component Value Date    HGB 12.0 05/17/2024    MCV 83.6 05/17/2024     05/17/2024    ALT 21 02/03/2025    AST 19 02/03/2025    HGBA1C 6.6 (H) 02/03/2025    INR 1.0 01/29/2024    TRIG 144 02/03/2025    FERRITIN 68.20 08/09/2021    IRON 12 (L) 07/12/2021    TIBC 489 08/09/2021      No results found.    REVIEW OF SYSTEMS  Review of Systems      ACTIVE PROBLEMS  Patient Active Problem List    Diagnosis     Encounter for screening for malignant neoplasm of colon [Z12.11]     Chronic idiopathic constipation [K59.04]     Personal history of colonic polyps [Z86.0100]     Left ureteral stone [N20.1]     Obstructive uropathy [N13.9]     Nephrolithiasis [N20.0]     Anemia [D64.9]     Metabolic acidosis [E87.20]     Essential hypertension [I10]     History of ventricular tachycardia [Z86.79]     e.coli bacteremia [A41.50]     Ureteral stone with hydronephrosis [N13.2]     Hyperglycemia [R73.9]     Mixed incontinence [N39.46]     GERD without esophagitis [K21.9]     Anxiety [F41.9]     Other chronic pain [G89.29]     Chronic bilateral low back pain without sciatica [M54.50, G89.29]     Spinal stenosis of lumbar region with neurogenic claudication [M48.062]     Acute UTI (urinary tract infection) [N39.0]     Post-menopause [Z78.0]     Simple renal cyst [N28.1]     Former smoker [Z87.891]     Hyperlipidemia [E78.5]     Osteoporosis [M81.0]     Panic disorder [F41.0]     Psoriasis [L40.9]     Urge incontinence of urine [N39.41]     Vitamin D deficiency [E55.9]     Urinary tract infection due to ESBL Klebsiella [N39.0, B96.89]          PAST MEDICAL HISTORY  Past Medical History:   Diagnosis Date    Acute kidney failure 07/11/2021    Allergic     weather allergies    Anxiety     Arthritis of back     At risk for sleep apnea     Cataract     BILAT-SCHEDULED FOR THIS AND HAD TO CANCEL D/T  SEPSIS    Cervical disc disorder     Chronic pain disorder     ALL OVER PAIN    Chronic UTI     Closed displaced fracture of surgical neck of left humerus 06/25/2021    Closed fracture of left proximal humerus 06/15/2021    Closed fracture of right distal radius 06/15/2021    Colon polyp     COVID-19 vaccine series completed     2ND DOSE 3/23/21    DDD (degenerative disc disease), lumbar     e.coli bacteremia 03/25/2021    Elevated cholesterol     Extremity pain     Fracture, humerus     LEFT. h/o    GERD (gastroesophageal reflux disease)     History of recent fall     JUNE 6-9-21    History of sepsis     MOST RECENT JULY 2021-R/T KIDNEY STONE, UTI.  STATES THIS IS HER 3RD SEPSIS DIAGNOSIS    History of UTI     HL (hearing loss)     HTN (hypertension)     Hyperlipidemia     Hypokalemia 03/25/2021    Kidney stones     LEFT    Left humeral fracture 07/11/2021    Low back pain     Lumbosacral disc disease     Macular degeneration, bilateral     WORSE ON RIGHT-ONLY PERIPHERAL VISION    Metabolic encephalopathy 03/25/2021    Mixed hyperlipidemia 09/16/2016    Mixed incontinence     Obesity     Osteoarthritis     Osteopenia     Osteoporosis     Panic disorder     Personal history of urinary calculi     PONV (postoperative nausea and vomiting)     Scoliosis     Sepsis, unspecified organism 03/25/2021    Spinal stenosis     Tachycardia, unspecified     Ventricular tachycardia 09/09/2019    Visual impairment     Wrist fracture     RIGHT-CURRENTLY NOT WEARING BRACE FOR THIS         SURGICAL HISTORY  Past Surgical History:   Procedure Laterality Date    ADENOIDECTOMY      BREAST BIOPSY Left     benign    BREAST LUMPECTOMY Left     benign    BUNIONECTOMY Right     COLONOSCOPY N/A 11/30/2016    Procedure: COLONOSCOPY polypectomy;  Surgeon: Adali Willard MD;  Location: [facility];  Service:     CYSTOSCOPY BOTOX INJECTION OF BLADDER N/A 07/21/2017    Procedure: CYSTOSCOPY COAPTITE INJECTION;  Surgeon: Kevon Clark MD;   Location: [facility] MAIN OR;  Service:     CYSTOSCOPY URETEROSCOPY LASER LITHOTRIPSY Right 05/01/2023    Procedure: RIGHT URETEROSCOPY LASER LITHOTRIPSY STONE BASKET EXTRACTION AND STENT;  Surgeon: Kevon Clark MD;  Location: [facility] OR;  Service: Urology;  Laterality: Right;    CYSTOSCOPY W/ LITHOLAPAXY / EHL      CYSTOSCOPY W/ URETERAL STENT PLACEMENT Left 09/12/2016    Procedure: CYSTOSCOPY URETERAL STENT INSERTION;  Surgeon: Kevon Clark MD;  Location: [facility] OR;  Service:     CYSTOSCOPY W/ URETERAL STENT PLACEMENT Left 08/01/2019    Procedure: CYSTOSCOPY URETERAL CATHETER/STENT INSERTION;  Surgeon: Kevon Clark MD;  Location: [facility] OR;  Service: Urology    CYSTOSCOPY W/ URETERAL STENT PLACEMENT Left 03/25/2021    Procedure: CYSTOSCOPY URETERAL CATHETER/STENT INSERTION;  Surgeon: Kevon Clark MD;  Location: [facility] MAIN OR;  Service: Urology;  Laterality: Left;    CYSTOSCOPY W/ URETERAL STENT PLACEMENT Left 07/11/2021    Procedure: CYSTOSCOPY URETERAL CATHETER/STENT INSERTION;  Surgeon: Lul Guzmna MD;  Location: [facility] OR;  Service: Urology;  Laterality: Left;  CYSTOSCOPY LEFT URETERAL CATHETER/ STENT PLACEMENT    CYSTOSCOPY W/ URETERAL STENT PLACEMENT Right 04/20/2023    Procedure: CYSTOSCOPY STENT PLACEMENT;  Surgeon: Matthew Ndiaye MD;  Location: [facility] OR;  Service: Urology;  Laterality: Right;    EPIDURAL BLOCK      EYE SURGERY      JOINT REPLACEMENT Right     total knee    LAMINECTOMY  11/2022    LUMBAR EPIDURAL INJECTION      TONSILLECTOMY AND ADENOIDECTOMY      TRIGGER POINT INJECTION      TUBAL ABDOMINAL LIGATION      URETEROSCOPY LASER LITHOTRIPSY WITH STENT INSERTION Left 10/05/2016    Procedure: LT URETEROSCOPY LASER LITHOTRIPSY STONE BASKET EXTRACTION AND STENT ;  Surgeon: Kevon Clark MD;  Location: [facility] OR;  Service:     URETEROSCOPY LASER LITHOTRIPSY WITH STENT INSERTION Left 07/23/2021    Procedure: LEFT URETEROSCOPY STONE MANIPULATION STENT  EXCHANGE, LASER LITHOTRIPSY, CYSTOSCOPY, STONE BASKET EXTRACTION;  Surgeon: Kevon Clark MD;  Location: [facility] OR;  Service: Urology;  Laterality: Left;         FAMILY HISTORY  Family History   Problem Relation Age of Onset    Heart defect Mother     Heart attack Mother 70    Sudden death Mother     Early death Mother     Heart defect Father     Heart attack Father 49    Hypertension Father     Sudden death Father     Early death Father     Valvular heart disease Brother     Anxiety disorder Daughter     Hearing loss Daughter     Heart disease Brother         Valve replacement    Migraines Daughter     Malig Hyperthermia Neg Hx     Breast cancer Neg Hx          SOCIAL HISTORY  Social History     Occupational History    Not on file   Tobacco Use    Smoking status: Former     Current packs/day: 0.00     Average packs/day: 1 pack/day for 24.0 years (24.0 ttl pk-yrs)     Types: Cigarettes     Start date: 1956     Quit date: 1980     Years since quittin.2     Passive exposure: Never    Smokeless tobacco: Never    Tobacco comments:     quit    Vaping Use    Vaping status: Never Used   Substance and Sexual Activity    Alcohol use: No    Drug use: Not Currently    Sexual activity: Not Currently     Partners: Male     Comment: Frequent UTIs         CURRENT MEDICATIONS    Current Outpatient Medications:     imipramine (TOFRANIL) 50 MG tablet, TAKE 2 TABLETS BY MOUTH ONCE NIGHTLY, Disp: 180 tablet, Rfl: 0    metoprolol succinate XL (TOPROL-XL) 50 MG 24 hr tablet, TAKE 1 TABLET BY MOUTH DAILY, Disp: 90 tablet, Rfl: 1    simvastatin (ZOCOR) 40 MG tablet, TAKE 1 TABLET BY MOUTH EVERY NIGHT FOR CHOLESTEROL, Disp: 90 tablet, Rfl: 1    VITAMIN D PO, Take 2 tablets by mouth Daily. Pt doesn't know strength, Disp: , Rfl:     ALLERGIES  Bactrim [sulfamethoxazole-trimethoprim], Ciprofloxacin, Macrobid [nitrofurantoin], and Cortisone    VITALS  Vitals:    25 1407   BP: 130/80   BP Location: Left arm  "  Patient Position: Sitting   Cuff Size: Large Adult   Weight: 109 kg (240 lb 6.4 oz)   Height: 165.1 cm (65\")       PHYSICAL EXAM  Debilities/Disabilities Identified: None  Emotional Behavior: Appropriate  Wt Readings from Last 3 Encounters:   03/31/25 109 kg (240 lb 6.4 oz)   02/03/25 107 kg (235 lb)   01/30/25 107 kg (234 lb 12.8 oz)     Ht Readings from Last 1 Encounters:   03/31/25 165.1 cm (65\")     Body mass index is 40 kg/m².  Physical Exam  Constitutional:       General: She is not in acute distress.     Appearance: Normal appearance. She is not ill-appearing.   HENT:      Head: Normocephalic and atraumatic.      Mouth/Throat:      Mouth: Mucous membranes are moist.      Pharynx: No posterior oropharyngeal erythema.   Eyes:      General: No scleral icterus.  Cardiovascular:      Rate and Rhythm: Normal rate and regular rhythm.      Heart sounds: Normal heart sounds.   Pulmonary:      Effort: Pulmonary effort is normal.      Breath sounds: Normal breath sounds.   Abdominal:      General: Abdomen is flat. Bowel sounds are normal. There is no distension.      Palpations: Abdomen is soft. There is no mass.      Tenderness: There is no abdominal tenderness. There is no guarding or rebound. Negative signs include Vital's sign.      Hernia: No hernia is present.   Musculoskeletal:      Cervical back: Neck supple.   Skin:     General: Skin is warm.      Capillary Refill: Capillary refill takes less than 2 seconds.   Neurological:      General: No focal deficit present.      Mental Status: She is alert and oriented to person, place, and time.   Psychiatric:         Mood and Affect: Mood normal.         Behavior: Behavior normal.         Thought Content: Thought content normal.         Judgment: Judgment normal.       CLINICAL DATA REVIEWED   reviewed previous lab results and integrated with today's visit, reviewed notes from other physicians and/or last GI encounter, reviewed previous endoscopy results and " available photos, reviewed surgical pathology results from previous biopsies    ASSESSMENT  Diagnoses and all orders for this visit:    Encounter for screening for malignant neoplasm of colon  -     Case Request; Standing  -     Case Request  -     Case Request; Standing  -     Case Request    Irritable bowel syndrome with constipation    Other orders  -     Follow Anesthesia Guidelines / Protocol; Future  -     Verify bowel prep was successful; Standing  -     Give tap water enema if bowel prep was insufficient; Standing  -     Follow Anesthesia Guidelines / Protocol; Future  -     Verify bowel prep was successful; Standing  -     Give tap water enema if bowel prep was insufficient; Standing          PLAN    Colonoscopy  Constipation improved off opioids, but to take miralax every day the 2 weeks leading to colonoscopy    Return in about 3 months (around 6/30/2025).    I have discussed the above plan with the patient.  They verbalize understanding and are in agreement with the plan.  They have been advised to contact the office for any questions, concerns, or changes related to their health.

## 2025-04-01 ENCOUNTER — TELEPHONE (OUTPATIENT)
Dept: GASTROENTEROLOGY | Facility: CLINIC | Age: 85
End: 2025-04-01

## 2025-04-01 NOTE — TELEPHONE ENCOUNTER
"  Caller: Anitra Orta \"Pat\"    Relationship to patient: Self    Best call back number: [phone]    Patient is needing: WHAT CAN PATIENT TAKE FOR ANXIETY FOR THE NIGHT BEFORE HER C-SCOPE.  SHE COULDN'T REMEMBER THE NAME OF THE MEDICATION SHE TOOK BEFORE.    "

## 2025-04-02 RX ORDER — HYDROXYZINE PAMOATE 25 MG/1
25 CAPSULE ORAL 3 TIMES DAILY PRN
Qty: 20 CAPSULE | Refills: 0 | Status: SHIPPED | OUTPATIENT
Start: 2025-04-02

## 2025-04-03 NOTE — TELEPHONE ENCOUNTER
Spoke with patient, she would like to know if she could take imipramine instead. She stated she already has the medication.

## 2025-04-09 ENCOUNTER — ANESTHESIA EVENT (OUTPATIENT)
Dept: PERIOP | Facility: HOSPITAL | Age: 85
End: 2025-04-09
Payer: MEDICARE

## 2025-04-10 RX ORDER — OMEPRAZOLE 40 MG/1
40 CAPSULE, DELAYED RELEASE ORAL DAILY
COMMUNITY

## 2025-04-10 NOTE — PAT
PT states that she has had exposure to all of meds listed in allergy list and has tolerated them all.    Has had some uncomfortable sx occasionally with each but has not had a consistent allergy experience with the meds

## 2025-04-11 ENCOUNTER — HOSPITAL ENCOUNTER (OUTPATIENT)
Facility: HOSPITAL | Age: 85
Setting detail: HOSPITAL OUTPATIENT SURGERY
Discharge: HOME | End: 2025-04-11
Attending: INTERNAL MEDICINE | Admitting: INTERNAL MEDICINE
Payer: MEDICARE

## 2025-04-11 ENCOUNTER — ANESTHESIA (OUTPATIENT)
Dept: PERIOP | Facility: HOSPITAL | Age: 85
End: 2025-04-11
Payer: MEDICARE

## 2025-04-11 VITALS
DIASTOLIC BLOOD PRESSURE: 91 MMHG | OXYGEN SATURATION: 93 % | TEMPERATURE: 97.8 F | RESPIRATION RATE: 16 BRPM | BODY MASS INDEX: 39.17 KG/M2 | HEART RATE: 65 BPM | SYSTOLIC BLOOD PRESSURE: 183 MMHG | WEIGHT: 235.4 LBS

## 2025-04-11 DIAGNOSIS — Z12.11 ENCOUNTER FOR SCREENING FOR MALIGNANT NEOPLASM OF COLON: ICD-10-CM

## 2025-04-11 PROCEDURE — 25810000003 LACTATED RINGERS PER 1000 ML: Performed by: NURSE ANESTHETIST, CERTIFIED REGISTERED

## 2025-04-11 PROCEDURE — 45380 COLONOSCOPY AND BIOPSY: CPT | Performed by: INTERNAL MEDICINE

## 2025-04-11 PROCEDURE — 25010000002 LIDOCAINE 2% SOLUTION: Performed by: NURSE ANESTHETIST, CERTIFIED REGISTERED

## 2025-04-11 PROCEDURE — 25010000002 PROPOFOL 200 MG/20ML EMULSION: Performed by: NURSE ANESTHETIST, CERTIFIED REGISTERED

## 2025-04-11 PROCEDURE — 88305 TISSUE EXAM BY PATHOLOGIST: CPT | Performed by: INTERNAL MEDICINE

## 2025-04-11 RX ORDER — LIDOCAINE HYDROCHLORIDE 20 MG/ML
INJECTION, SOLUTION INFILTRATION; PERINEURAL AS NEEDED
Status: DISCONTINUED | OUTPATIENT
Start: 2025-04-11 | End: 2025-04-11 | Stop reason: SURG

## 2025-04-11 RX ORDER — SODIUM CHLORIDE 0.9 % (FLUSH) 0.9 %
10 SYRINGE (ML) INJECTION AS NEEDED
Status: DISCONTINUED | OUTPATIENT
Start: 2025-04-11 | End: 2025-04-11 | Stop reason: HOSPADM

## 2025-04-11 RX ORDER — LIDOCAINE HYDROCHLORIDE 10 MG/ML
0.5 INJECTION, SOLUTION EPIDURAL; INFILTRATION; INTRACAUDAL; PERINEURAL ONCE AS NEEDED
Status: DISCONTINUED | OUTPATIENT
Start: 2025-04-11 | End: 2025-04-11 | Stop reason: HOSPADM

## 2025-04-11 RX ORDER — SODIUM CHLORIDE, SODIUM LACTATE, POTASSIUM CHLORIDE, CALCIUM CHLORIDE 600; 310; 30; 20 MG/100ML; MG/100ML; MG/100ML; MG/100ML
9 INJECTION, SOLUTION INTRAVENOUS CONTINUOUS
Status: DISCONTINUED | OUTPATIENT
Start: 2025-04-11 | End: 2025-04-11 | Stop reason: HOSPADM

## 2025-04-11 RX ORDER — SODIUM CHLORIDE, SODIUM LACTATE, POTASSIUM CHLORIDE, CALCIUM CHLORIDE 600; 310; 30; 20 MG/100ML; MG/100ML; MG/100ML; MG/100ML
100 INJECTION, SOLUTION INTRAVENOUS CONTINUOUS
Status: DISCONTINUED | OUTPATIENT
Start: 2025-04-11 | End: 2025-04-11 | Stop reason: HOSPADM

## 2025-04-11 RX ORDER — PROPOFOL 10 MG/ML
INJECTION, EMULSION INTRAVENOUS AS NEEDED
Status: DISCONTINUED | OUTPATIENT
Start: 2025-04-11 | End: 2025-04-11 | Stop reason: SURG

## 2025-04-11 RX ADMIN — LIDOCAINE HYDROCHLORIDE 100 MG: 20 INJECTION, SOLUTION INFILTRATION; PERINEURAL at 08:07

## 2025-04-11 RX ADMIN — PROPOFOL 50 MG: 10 INJECTION, EMULSION INTRAVENOUS at 08:07

## 2025-04-11 RX ADMIN — PROPOFOL 50 MG: 10 INJECTION, EMULSION INTRAVENOUS at 08:15

## 2025-04-11 RX ADMIN — PROPOFOL 50 MG: 10 INJECTION, EMULSION INTRAVENOUS at 08:11

## 2025-04-11 RX ADMIN — PROPOFOL 50 MG: 10 INJECTION, EMULSION INTRAVENOUS at 08:19

## 2025-04-11 RX ADMIN — PROPOFOL 50 MG: 10 INJECTION, EMULSION INTRAVENOUS at 08:09

## 2025-04-11 RX ADMIN — PROPOFOL 50 MG: 10 INJECTION, EMULSION INTRAVENOUS at 08:23

## 2025-04-11 RX ADMIN — SODIUM CHLORIDE, SODIUM LACTATE, POTASSIUM CHLORIDE, CALCIUM CHLORIDE 9 ML/HR: 600; 310; 30; 20 INJECTION, SOLUTION INTRAVENOUS at 07:28

## 2025-04-11 NOTE — ANESTHESIA POSTPROCEDURE EVALUATION
Patient: Anitra Orta    Procedure Summary       Date: 04/11/25 Room / Location: [facility] ENDOSCOPY 1 /  [facility] OR    Anesthesia Start: 0800 Anesthesia Stop: 0828    Procedure: COLONOSCOPY WITH POLYPECTOMY Diagnosis:       Encounter for screening for malignant neoplasm of colon      Diverticulosis      Colon polyp      (Encounter for screening for malignant neoplasm of colon [Z12.11])    Surgeons: Billy Sherman MD Provider: David Aguayo CRNA    Anesthesia Type: MAC ASA Status: 3            Anesthesia Type: MAC    Vitals  Vitals Value Taken Time   /91 04/11/25 08:50   Temp 97.8 °F (36.6 °C) 04/11/25 08:35   Pulse 70 04/11/25 08:55   Resp 16 04/11/25 08:50   SpO2 88 % 04/11/25 08:53   Vitals shown include unfiled device data.        Post Anesthesia Care and Evaluation    Patient location during evaluation: PHASE II  Patient participation: complete - patient participated  Level of consciousness: awake and alert  Pain score: 0  Pain management: adequate    Airway patency: patent  Anesthetic complications: No anesthetic complications  PONV Status: none  Cardiovascular status: acceptable  Respiratory status: acceptable  Hydration status: acceptable

## 2025-04-11 NOTE — INTERVAL H&P NOTE
Vital Signs  /79 (BP Location: Left arm, Patient Position: Lying)   Temp 98.1 °F (36.7 °C) (Oral)   Resp 22   Wt 107 kg (235 lb 6.4 oz)   SpO2 95%   BMI 39.17 kg/m²     H&P reviewed. The patient was examined and there are no changes to the H&P.

## 2025-04-11 NOTE — ANESTHESIA PREPROCEDURE EVALUATION
Anesthesia Evaluation     Patient summary reviewed and Nursing notes reviewed   history of anesthetic complications:  PONV  NPO Solid Status: > 8 hours  NPO Liquid Status: > 8 hours           Airway   Mallampati: III  TM distance: >3 FB  Neck ROM: full  No difficulty expected  Dental - normal exam     Comment: Very loose tooth. Discussed with patient about possibility of it coming out if airway manipulation is needed      Pulmonary - negative pulmonary ROS and normal exam    breath sounds clear to auscultation  Cardiovascular - normal exam  Exercise tolerance: poor (<4 METS)    ECG reviewed  Beta blocker given within 24 hours of surgery  Rhythm: regular  Rate: normal    (+) hypertension, hyperlipidemia    ROS comment: EKG 4/19/23:  - ABNORMAL ECG -  Sinus tachycardia with irregular rate  Right bundle branch block  Borderline ST elevation, lateral leads  Heart rate slighty better otherewise NO SIGNIFICANT CHANGE FROM PREVIOUS ECG    Neuro/Psych  (+) psychiatric history Anxiety  GI/Hepatic/Renal/Endo    (+) morbid obesity, GERD, renal disease- stones    Musculoskeletal     (+) back pain  Abdominal   (+) obese   Substance History - negative use     OB/GYN negative ob/gyn ROS         Other   arthritis,                     Anesthesia Plan    ASA 3     MAC     intravenous induction     Anesthetic plan, risks, benefits, and alternatives have been provided, discussed and informed consent has been obtained with: patient.  Pre-procedure education provided  Use of blood products discussed with patient  Consented to blood products.    Plan discussed with CRNA.    CODE STATUS:

## 2025-04-11 NOTE — BRIEF OP NOTE
COLONOSCOPY WITH POLYPECTOMY  Progress Note    Anitra Orta  4/11/2025    Pre-op Diagnosis:   Encounter for screening for malignant neoplasm of colon [Z12.11]       Post-Op Diagnosis Codes:     * Encounter for screening for malignant neoplasm of colon [Z12.11]     * Diverticulosis [K57.90]     * Colon polyp [K63.5]    Procedure(s):      Procedure(s):  COLONOSCOPY WITH POLYPECTOMY              Surgeon(s):  Billy Sherman MD    Anesthesia: Monitored Anesthesia Care    Staff:   Circulator: Marlene Carvalho RN  Scrub Person: Jon Turcios       Estimated Blood Loss: none    Urine Voided: * No values recorded between 4/11/2025  8:00 AM and 4/11/2025  8:25 AM *    Specimens:                Specimens       ID Source Type Tests Collected By Collected At Frozen?    A Large Intestine, Sigmoid Colon Polyp TISSUE PATHOLOGY EXAM   Billy Sherman MD 4/11/25 0822     Description: Sigmoid polyp x 1              Drains:   Ureteral Drain/Stent Left ureter 6 Fr. (Active)       Findings: Colon to Cecum Good prep  Sigmoid Diverticulosis  Polyp-Biopsy      Complications: none          Billy Sherman MD     Date: 4/11/2025  Time: 08:29 EDT

## 2025-04-14 LAB
CYTO UR: NORMAL
LAB AP CASE REPORT: NORMAL
PATH REPORT.FINAL DX SPEC: NORMAL
PATH REPORT.GROSS SPEC: NORMAL

## 2025-04-17 ENCOUNTER — HOSPITAL ENCOUNTER (EMERGENCY)
Facility: HOSPITAL | Age: 85
Discharge: HOME | End: 2025-04-17
Attending: EMERGENCY MEDICINE
Payer: MEDICARE

## 2025-04-17 VITALS
BODY MASS INDEX: 41.37 KG/M2 | SYSTOLIC BLOOD PRESSURE: 145 MMHG | RESPIRATION RATE: 18 BRPM | DIASTOLIC BLOOD PRESSURE: 70 MMHG | WEIGHT: 248.3 LBS | TEMPERATURE: 98.4 F | HEART RATE: 104 BPM | OXYGEN SATURATION: 95 % | HEIGHT: 65 IN

## 2025-04-17 DIAGNOSIS — N39.0 ACUTE UTI: Primary | ICD-10-CM

## 2025-04-17 LAB
ALBUMIN SERPL-MCNC: 3.7 G/DL (ref 3.5–5.2)
ALBUMIN/GLOB SERPL: 1.4 G/DL
ALP SERPL-CCNC: 113 U/L (ref 39–117)
ALT SERPL W P-5'-P-CCNC: 32 U/L (ref 1–33)
AMORPH URATE CRY URNS QL MICRO: ABNORMAL /HPF
ANION GAP SERPL CALCULATED.3IONS-SCNC: 12.7 MMOL/L (ref 5–15)
AST SERPL-CCNC: 40 U/L (ref 1–32)
BACTERIA UR QL AUTO: ABNORMAL /HPF
BASOPHILS # BLD AUTO: 0.02 10*3/MM3 (ref 0–0.2)
BASOPHILS NFR BLD AUTO: 0.2 % (ref 0–1.5)
BILIRUB SERPL-MCNC: 0.7 MG/DL (ref 0–1.2)
BILIRUB UR QL STRIP: NEGATIVE
BUN SERPL-MCNC: 20 MG/DL (ref 8–23)
BUN/CREAT SERPL: 19 (ref 7–25)
CALCIUM SPEC-SCNC: 8.9 MG/DL (ref 8.6–10.5)
CHLORIDE SERPL-SCNC: 103 MMOL/L (ref 98–107)
CLARITY UR: ABNORMAL
CO2 SERPL-SCNC: 22.3 MMOL/L (ref 22–29)
COLOR UR: ABNORMAL
CREAT SERPL-MCNC: 1.05 MG/DL (ref 0.57–1)
D-LACTATE SERPL-SCNC: 1.3 MMOL/L (ref 0.5–2)
DEPRECATED RDW RBC AUTO: 45.2 FL (ref 37–54)
EGFRCR SERPLBLD CKD-EPI 2021: 52.8 ML/MIN/1.73
EOSINOPHIL # BLD AUTO: 0.02 10*3/MM3 (ref 0–0.4)
EOSINOPHIL NFR BLD AUTO: 0.2 % (ref 0.3–6.2)
ERYTHROCYTE [DISTWIDTH] IN BLOOD BY AUTOMATED COUNT: 14 % (ref 12.3–15.4)
FLUAV RNA RESP QL NAA+PROBE: NOT DETECTED
FLUBV RNA RESP QL NAA+PROBE: NOT DETECTED
GLOBULIN UR ELPH-MCNC: 2.6 GM/DL
GLUCOSE SERPL-MCNC: 134 MG/DL (ref 65–99)
GLUCOSE UR STRIP-MCNC: NEGATIVE MG/DL
HCT VFR BLD AUTO: 36.5 % (ref 34–46.6)
HGB BLD-MCNC: 12.1 G/DL (ref 12–15.9)
HGB UR QL STRIP.AUTO: ABNORMAL
HOLD SPECIMEN: NORMAL
HYALINE CASTS UR QL AUTO: ABNORMAL /LPF
IMM GRANULOCYTES # BLD AUTO: 0.05 10*3/MM3 (ref 0–0.05)
IMM GRANULOCYTES NFR BLD AUTO: 0.5 % (ref 0–0.5)
KETONES UR QL STRIP: ABNORMAL
LEUKOCYTE ESTERASE UR QL STRIP.AUTO: ABNORMAL
LYMPHOCYTES # BLD AUTO: 0.28 10*3/MM3 (ref 0.7–3.1)
LYMPHOCYTES NFR BLD AUTO: 3 % (ref 19.6–45.3)
MCH RBC QN AUTO: 29.2 PG (ref 26.6–33)
MCHC RBC AUTO-ENTMCNC: 33.2 G/DL (ref 31.5–35.7)
MCV RBC AUTO: 88.2 FL (ref 79–97)
MONOCYTES # BLD AUTO: 0.58 10*3/MM3 (ref 0.1–0.9)
MONOCYTES NFR BLD AUTO: 6.3 % (ref 5–12)
MUCOUS THREADS URNS QL MICRO: ABNORMAL /HPF
NEUTROPHILS NFR BLD AUTO: 8.29 10*3/MM3 (ref 1.7–7)
NEUTROPHILS NFR BLD AUTO: 89.8 % (ref 42.7–76)
NITRITE UR QL STRIP: NEGATIVE
NRBC BLD AUTO-RTO: 0 /100 WBC (ref 0–0.2)
PH UR STRIP.AUTO: 6 [PH] (ref 4.5–8)
PLATELET # BLD AUTO: 159 10*3/MM3 (ref 140–450)
PMV BLD AUTO: 9.4 FL (ref 6–12)
POTASSIUM SERPL-SCNC: 4 MMOL/L (ref 3.5–5.2)
PROT SERPL-MCNC: 6.3 G/DL (ref 6–8.5)
PROT UR QL STRIP: ABNORMAL
RBC # BLD AUTO: 4.14 10*6/MM3 (ref 3.77–5.28)
RBC # UR STRIP: ABNORMAL /HPF
REF LAB TEST METHOD: ABNORMAL
RSV RNA RESP QL NAA+PROBE: NOT DETECTED
SARS-COV-2 RNA RESP QL NAA+PROBE: NOT DETECTED
SODIUM SERPL-SCNC: 138 MMOL/L (ref 136–145)
SP GR UR STRIP: 1.02 (ref 1–1.03)
SQUAMOUS #/AREA URNS HPF: ABNORMAL /HPF
UROBILINOGEN UR QL STRIP: ABNORMAL
WBC # UR STRIP: ABNORMAL /HPF
WBC CASTS #/AREA URNS LPF: ABNORMAL /LPF
WBC NRBC COR # BLD AUTO: 9.24 10*3/MM3 (ref 3.4–10.8)
WHOLE BLOOD HOLD COAG: NORMAL
WHOLE BLOOD HOLD SPECIMEN: NORMAL

## 2025-04-17 PROCEDURE — 83605 ASSAY OF LACTIC ACID: CPT | Performed by: EMERGENCY MEDICINE

## 2025-04-17 PROCEDURE — 81001 URINALYSIS AUTO W/SCOPE: CPT | Performed by: EMERGENCY MEDICINE

## 2025-04-17 PROCEDURE — 80053 COMPREHEN METABOLIC PANEL: CPT

## 2025-04-17 PROCEDURE — 85025 COMPLETE CBC W/AUTO DIFF WBC: CPT

## 2025-04-17 PROCEDURE — 87637 SARSCOV2&INF A&B&RSV AMP PRB: CPT

## 2025-04-17 PROCEDURE — 99283 EMERGENCY DEPT VISIT LOW MDM: CPT | Performed by: EMERGENCY MEDICINE

## 2025-04-17 RX ORDER — CEFUROXIME AXETIL 250 MG/1
250 TABLET ORAL ONCE
Status: COMPLETED | OUTPATIENT
Start: 2025-04-17 | End: 2025-04-17

## 2025-04-17 RX ORDER — SODIUM CHLORIDE 0.9 % (FLUSH) 0.9 %
10 SYRINGE (ML) INJECTION AS NEEDED
Status: DISCONTINUED | OUTPATIENT
Start: 2025-04-17 | End: 2025-04-18 | Stop reason: HOSPADM

## 2025-04-17 RX ORDER — CEFUROXIME AXETIL 250 MG/1
250 TABLET ORAL 2 TIMES DAILY
Qty: 10 TABLET | Refills: 0 | Status: SHIPPED | OUTPATIENT
Start: 2025-04-17 | End: 2025-04-24

## 2025-04-17 RX ORDER — ACETAMINOPHEN 500 MG
1000 TABLET ORAL ONCE
Status: COMPLETED | OUTPATIENT
Start: 2025-04-17 | End: 2025-04-17

## 2025-04-17 RX ADMIN — CEFUROXIME AXETIL 250 MG: 250 TABLET, FILM COATED ORAL at 23:15

## 2025-04-17 RX ADMIN — ACETAMINOPHEN 1000 MG: 500 TABLET ORAL at 20:30

## 2025-04-18 ENCOUNTER — PATIENT OUTREACH (OUTPATIENT)
Dept: CASE MANAGEMENT | Facility: OTHER | Age: 85
End: 2025-04-18
Payer: MEDICARE

## 2025-04-18 NOTE — OUTREACH NOTE
"AMBULATORY CASE MANAGEMENT NOTE    Names and Relationships of Patient/Support Persons: Contact: Anitra Orta \"Pat\"; Relationship: Self -     Outgoing call to the patient for ED visit follow up.  Introduced self and explained role and purpose of outreach.  She denies needs related to her ED visit.  She states she is not interested in ambulatory case management services with the [facility] program when attempted to discuss.       Bonnie GHOSH  Ambulatory Case Management    4/18/2025, 11:14 EDT  "

## 2025-04-18 NOTE — ED PROVIDER NOTES
Subjective   History of Present Illness    Chief complaint: Chills and urinary frequent    Location: Home    Quality/Severity: Moderate    Timing/Onset/Duration: Noted tonight    Modifying Factors: No modifying factor    Associated Symptoms: Mild headache.  Patient has had chills.  No cough sore throat earache or nasal congestion.  No chest pain or shortness of breath.  No abdominal pain.  No diarrhea.  No nausea or vomiting.    Narrative: This 84-year-old white female presents with chills and frequency of urination.    PCP:Marco Antonio Bland MD      Review of Systems   Constitutional:  Positive for chills. Negative for fever.   HENT:  Negative for congestion, ear pain and sore throat.    Respiratory:  Negative for cough and shortness of breath.    Cardiovascular:  Negative for chest pain.   Gastrointestinal:  Negative for abdominal pain, diarrhea, nausea and vomiting.   Genitourinary:  Negative for difficulty urinating.        Urinary frequency   Musculoskeletal:  Negative for back pain.   Skin:  Negative for rash.   Neurological:  Positive for headaches.       Past Medical History:   Diagnosis Date    Acute kidney failure 07/11/2021    pt  denies    Allergic     weather allergies    Anxiety     Arthritis of back     At risk for sleep apnea     Cataract     BILAT-SCHEDULED FOR THIS AND HAD TO CANCEL D/T SEPSIS    Cervical disc disorder     Chronic pain disorder     ALL OVER PAIN    Chronic UTI     Closed displaced fracture of surgical neck of left humerus 06/25/2021    Closed fracture of left proximal humerus 06/15/2021    Closed fracture of right distal radius 06/15/2021    Colon polyp     COVID-19 vaccine series completed     2ND DOSE 3/23/21    DDD (degenerative disc disease), lumbar     e.coli bacteremia 03/25/2021    Elevated cholesterol     Extremity pain     Fracture, humerus     LEFT. h/o    GERD (gastroesophageal reflux disease)     History of recent fall     JUNE 6-9-21    History of sepsis     MOST RECENT JULY  2021-R/T KIDNEY STONE, UTI.  STATES THIS IS HER 3RD SEPSIS DIAGNOSIS    History of UTI     HL (hearing loss)     HTN (hypertension)     Hyperlipidemia     Hypokalemia 03/25/2021    Kidney stones     LEFT    Left humeral fracture 07/11/2021    Low back pain     Lumbosacral disc disease     Macular degeneration, bilateral     WORSE ON RIGHT-ONLY PERIPHERAL VISION    Metabolic encephalopathy 03/25/2021    Mixed hyperlipidemia 09/16/2016    Mixed incontinence     Obesity     Osteoarthritis     Osteopenia     Osteoporosis     Panic disorder     Personal history of urinary calculi     PONV (postoperative nausea and vomiting)     Scoliosis     Sepsis, unspecified organism 03/25/2021    Spinal stenosis     Tachycardia, unspecified     Ventricular tachycardia 09/09/2019    Visual impairment     Wrist fracture     RIGHT-CURRENTLY NOT WEARING BRACE FOR THIS       Allergies   Allergen Reactions    Bactrim [Sulfamethoxazole-Trimethoprim] Nausea Only    Ciprofloxacin Other (See Comments)     UNKNOWN    Macrobid [Nitrofurantoin] Other (See Comments)     UNKNOWN; PT CAN TAKE IN SMALL DOSES- FLU LIKE SYMPTOMS    Cortisone Headache     Pt states had headache with IM injection states has been ok with epidural steroid injections       Past Surgical History:   Procedure Laterality Date    ADENOIDECTOMY      BREAST BIOPSY Left     benign    BREAST LUMPECTOMY Left     benign    BUNIONECTOMY Right     COLONOSCOPY N/A 11/30/2016    Procedure: COLONOSCOPY polypectomy;  Surgeon: Adali Willard MD;  Location: [facility] OR;  Service:     COLONOSCOPY W/ POLYPECTOMY N/A 4/11/2025    Procedure: COLONOSCOPY WITH POLYPECTOMY;  Surgeon: Billy Sherman MD;  Location: [facility] OR;  Service: Gastroenterology;  Laterality: N/A;  Diverticulosis; Sigmoid polyp x 1    CYSTOSCOPY BOTOX INJECTION OF BLADDER N/A 07/21/2017    Procedure: CYSTOSCOPY COAPTITE INJECTION;  Surgeon: Kevon Clark MD;  Location: [facility] OR;  Service:     CYSTOSCOPY  URETEROSCOPY LASER LITHOTRIPSY Right 05/01/2023    Procedure: RIGHT URETEROSCOPY LASER LITHOTRIPSY STONE BASKET EXTRACTION AND STENT;  Surgeon: Kevon Clark MD;  Location: [facility];  Service: Urology;  Laterality: Right;    CYSTOSCOPY W/ LITHOLAPAXY / EHL      CYSTOSCOPY W/ URETERAL STENT PLACEMENT Left 09/12/2016    Procedure: CYSTOSCOPY URETERAL STENT INSERTION;  Surgeon: Kevon Clark MD;  Location: [facility] OR;  Service:     CYSTOSCOPY W/ URETERAL STENT PLACEMENT Left 08/01/2019    Procedure: CYSTOSCOPY URETERAL CATHETER/STENT INSERTION;  Surgeon: Kevon Clark MD;  Location: [facility] OR;  Service: Urology    CYSTOSCOPY W/ URETERAL STENT PLACEMENT Left 03/25/2021    Procedure: CYSTOSCOPY URETERAL CATHETER/STENT INSERTION;  Surgeon: Kevon Clark MD;  Location: [facility] OR;  Service: Urology;  Laterality: Left;    CYSTOSCOPY W/ URETERAL STENT PLACEMENT Left 07/11/2021    Procedure: CYSTOSCOPY URETERAL CATHETER/STENT INSERTION;  Surgeon: Lul Guzman MD;  Location: [facility] OR;  Service: Urology;  Laterality: Left;  CYSTOSCOPY LEFT URETERAL CATHETER/ STENT PLACEMENT    CYSTOSCOPY W/ URETERAL STENT PLACEMENT Right 04/20/2023    Procedure: CYSTOSCOPY STENT PLACEMENT;  Surgeon: Matthew Ndiaye MD;  Location: [facility] OR;  Service: Urology;  Laterality: Right;    EPIDURAL BLOCK      EYE SURGERY      JOINT REPLACEMENT Right     total knee    LAMINECTOMY  11/2022    LUMBAR EPIDURAL INJECTION      TONSILLECTOMY AND ADENOIDECTOMY      TRIGGER POINT INJECTION      TUBAL ABDOMINAL LIGATION      URETEROSCOPY LASER LITHOTRIPSY WITH STENT INSERTION Left 10/05/2016    Procedure: LT URETEROSCOPY LASER LITHOTRIPSY STONE BASKET EXTRACTION AND STENT ;  Surgeon: Kevon Clark MD;  Location: [facility];  Service:     URETEROSCOPY LASER LITHOTRIPSY WITH STENT INSERTION Left 07/23/2021    Procedure: LEFT URETEROSCOPY STONE MANIPULATION STENT EXCHANGE, LASER LITHOTRIPSY, CYSTOSCOPY, STONE BASKET  EXTRACTION;  Surgeon: Kevon Clark MD;  Location: [facility] OR;  Service: Urology;  Laterality: Left;       Family History   Problem Relation Age of Onset    Heart defect Mother     Heart attack Mother 70    Sudden death Mother     Early death Mother     Heart defect Father     Heart attack Father 49    Hypertension Father     Sudden death Father     Early death Father     Valvular heart disease Brother     Anxiety disorder Daughter     Hearing loss Daughter     Heart disease Brother         Valve replacement    Migraines Daughter     Malig Hyperthermia Neg Hx     Breast cancer Neg Hx        Social History     Socioeconomic History    Marital status:    Tobacco Use    Smoking status: Former     Current packs/day: 0.00     Average packs/day: 1 pack/day for 24.0 years (24.0 ttl pk-yrs)     Types: Cigarettes     Start date: 1956     Quit date: 1980     Years since quittin.3     Passive exposure: Never    Smokeless tobacco: Never    Tobacco comments:     quit    Vaping Use    Vaping status: Never Used   Substance and Sexual Activity    Alcohol use: No    Drug use: Not Currently    Sexual activity: Not Currently     Partners: Male     Birth control/protection: Post-menopausal     Comment: Frequent UTIs           Objective   Physical Exam  Vitals (The temperature is 100.8 °F, heart rate 1 2, respirations 18, /67, pulse ox 95%.) and nursing note reviewed.   Constitutional:       Appearance: Normal appearance.   HENT:      Head: Normocephalic and atraumatic.      Right Ear: Tympanic membrane normal.      Left Ear: Tympanic membrane normal.      Nose: Nose normal. No congestion or rhinorrhea.      Mouth/Throat:      Mouth: Mucous membranes are moist.   Cardiovascular:      Rate and Rhythm: Normal rate and regular rhythm.      Pulses: Normal pulses.      Heart sounds: Normal heart sounds. No murmur heard.     No friction rub. No gallop.   Pulmonary:      Effort: Pulmonary effort is  normal. No respiratory distress.      Breath sounds: Normal breath sounds. No stridor. No wheezing, rhonchi or rales.   Chest:      Chest wall: No tenderness.   Abdominal:      General: Abdomen is flat. Bowel sounds are normal. There is no distension.      Palpations: Abdomen is soft. There is no mass.      Tenderness: There is no abdominal tenderness. There is no right CVA tenderness, left CVA tenderness, guarding or rebound.      Hernia: No hernia is present.   Musculoskeletal:         General: No swelling or tenderness. Normal range of motion.      Cervical back: Normal range of motion and neck supple. No rigidity.   Lymphadenopathy:      Cervical: No cervical adenopathy.   Skin:     General: Skin is warm and dry.      Findings: No rash.   Neurological:      General: No focal deficit present.      Mental Status: She is alert and oriented to person, place, and time.      Cranial Nerves: No cranial nerve deficit.      Sensory: No sensory deficit.      Motor: No weakness.         Procedures           ED Course  ED Course as of 04/17/25 2316   u Apr 17, 2025 2159 The urinalysis shows 15 mg/dL ketones, bilirubin negative, blood small, protein 100 mg/dL, leukocytes moderate, nitrite negative.  The urine microscopic shows 6-10 RBCs, 6-10 WBCs, trace bacteria, 7-12 squamous epithelial cells, otherwise unremarkable    The white blood cell count is 9.2, the neutrophil percent is 89%, the absolute neutrophil count is 8.2, the CBC is otherwise unremarkable. [RC]   2159 The creatinine is 1.05, the AST is 40, the GFR is 52, the CMP is otherwise unremarkable. [RC]   2200 COVID flu is negative. [RC]   2257 Lactic acid is 1.3.  This is normal [RC]      ED Course User Index  [RC] David Solis MD                                                       Medical Decision Making  Amount and/or Complexity of Data Reviewed  Labs: ordered.    Risk  OTC drugs.  Prescription drug management.        Final diagnoses:   None       ED  Disposition  ED Disposition       None            No follow-up provider specified.       Medication List      No changes were made to your prescriptions during this visit.       No orders to display     Labs Reviewed   COMPREHENSIVE METABOLIC PANEL - Abnormal; Notable for the following components:       Result Value    Glucose 134 (*)     Creatinine 1.05 (*)     AST (SGOT) 40 (*)     eGFR 52.8 (*)     All other components within normal limits    Narrative:     GFR Categories in Chronic Kidney Disease (CKD)      GFR Category          GFR (mL/min/1.73)    Interpretation  G1                     90 or greater         Normal or high (1)  G2                      60-89                Mild decrease (1)  G3a                   45-59                Mild to moderate decrease  G3b                   30-44                Moderate to severe decrease  G4                    15-29                Severe decrease  G5                    14 or less           Kidney failure          (1)In the absence of evidence of kidney disease, neither GFR category G1 or G2 fulfill the criteria for CKD.    eGFR calculation 2021 CKD-EPI creatinine equation, which does not include race as a factor   CBC WITH AUTO DIFFERENTIAL - Abnormal; Notable for the following components:    Neutrophil % 89.8 (*)     Lymphocyte % 3.0 (*)     Eosinophil % 0.2 (*)     Neutrophils, Absolute 8.29 (*)     Lymphocytes, Absolute 0.28 (*)     All other components within normal limits   COVID-19/FLUA&B/RSV, NP SWAB IN TRANSPORT MEDIA 1 HR TAT - Normal    Narrative:     Fact sheet for providers: https://www.fda.gov/media/603976/download    Fact sheet for patients: https://www.fda.gov/media/101025/download    Test performed by PCR.   RAINBOW DRAW    Narrative:     The following orders were created for panel order [facility] Draw.  Procedure                               Abnormality         Status                     ---------                               -----------         ------                      Green Top (Gel)[[id number]]                                  Final result               Lavender Top[[id number]]                                     Final result               Gold Top - SST[[id number]]                                   Final result               Lira Top[[id number]]                                         Final result               Light Blue Top[[id number]]                                   Final result                 Please view results for these tests on the individual orders.   URINALYSIS W/ MICROSCOPIC IF INDICATED (NO CULTURE)   URINALYSIS, MICROSCOPIC ONLY   GREEN TOP   LAVENDER TOP   GOLD TOP - SST   GRAY TOP   LIGHT BLUE TOP   CBC AND DIFFERENTIAL    Narrative:     The following orders were created for panel order CBC & Differential.  Procedure                               Abnormality         Status                     ---------                               -----------         ------                     CBC Auto Differential[[id number]]        Abnormal            Final result                 Please view results for these tests on the individual orders.     No results found.    Final diagnoses:   None         ED Medications:  Medications   sodium chloride 0.9 % flush 10 mL (has no administration in time range)   acetaminophen (TYLENOL) tablet 1,000 mg (1,000 mg Oral Given 4/17/25 2030)       New Medications:     Medication List        ASK your doctor about these medications      hydrOXYzine pamoate 25 MG capsule  Commonly known as: Vistaril  Take 1 capsule by mouth 3 (Three) Times a Day As Needed for Anxiety.     imipramine 50 MG tablet  Commonly known as: TOFRANIL  TAKE 2 TABLETS BY MOUTH ONCE NIGHTLY     metoprolol succinate XL 50 MG 24 hr tablet  Commonly known as: TOPROL-XL  TAKE 1 TABLET BY MOUTH DAILY     omeprazole 40 MG capsule  Commonly known as: priLOSEC     PRESERVISION AREDS 2 PO     simvastatin 40 MG tablet  Commonly known as: ZOCOR  TAKE 1 TABLET BY MOUTH EVERY  NIGHT FOR CHOLESTEROL     VITAMIN D PO              Stopped Medications:     Medication List        ASK your doctor about these medications      hydrOXYzine pamoate 25 MG capsule  Commonly known as: Vistaril  Take 1 capsule by mouth 3 (Three) Times a Day As Needed for Anxiety.     imipramine 50 MG tablet  Commonly known as: TOFRANIL  TAKE 2 TABLETS BY MOUTH ONCE NIGHTLY     metoprolol succinate XL 50 MG 24 hr tablet  Commonly known as: TOPROL-XL  TAKE 1 TABLET BY MOUTH DAILY     omeprazole 40 MG capsule  Commonly known as: priLOSEC     PRESERVISION AREDS 2 PO     simvastatin 40 MG tablet  Commonly known as: ZOCOR  TAKE 1 TABLET BY MOUTH EVERY NIGHT FOR CHOLESTEROL     VITAMIN D PO                   David Solis MD  04/17/25 7710

## 2025-04-18 NOTE — DISCHARGE INSTRUCTIONS
Take Motrin or Tylenol as needed as directed for fever.  Take the Ceftin as prescribed.  Call Dr. Bland in the morning for follow-up appointment within 1 week.  Return to the emergency department there is vomiting, worsening pain, worse in any way at all.

## 2025-04-23 ENCOUNTER — TRANSCRIBE ORDERS (OUTPATIENT)
Dept: ADMINISTRATIVE | Facility: HOSPITAL | Age: 85
End: 2025-04-23
Payer: MEDICARE

## 2025-04-23 DIAGNOSIS — N20.2 RENAL AND URETERIC CALCULUS: ICD-10-CM

## 2025-04-23 DIAGNOSIS — N30.20 CHRONIC CYSTITIS: Primary | ICD-10-CM

## 2025-04-24 ENCOUNTER — OFFICE VISIT (OUTPATIENT)
Dept: INTERNAL MEDICINE | Facility: CLINIC | Age: 85
End: 2025-04-24
Payer: MEDICARE

## 2025-04-24 VITALS
HEART RATE: 78 BPM | OXYGEN SATURATION: 97 % | DIASTOLIC BLOOD PRESSURE: 70 MMHG | RESPIRATION RATE: 16 BRPM | WEIGHT: 236.8 LBS | SYSTOLIC BLOOD PRESSURE: 128 MMHG | HEIGHT: 65 IN | BODY MASS INDEX: 39.45 KG/M2

## 2025-04-24 DIAGNOSIS — N39.0 URINARY TRACT INFECTION WITHOUT HEMATURIA, SITE UNSPECIFIED: Primary | ICD-10-CM

## 2025-04-24 LAB
BILIRUB BLD-MCNC: NEGATIVE MG/DL
CLARITY, POC: CLEAR
COLOR UR: YELLOW
EXPIRATION DATE: NORMAL
GLUCOSE UR STRIP-MCNC: NEGATIVE MG/DL
KETONES UR QL: NEGATIVE
LEUKOCYTE EST, POC: NEGATIVE
Lab: NORMAL
NITRITE UR-MCNC: NEGATIVE MG/ML
PH UR: 6 [PH] (ref 5–8)
PROT UR STRIP-MCNC: NEGATIVE MG/DL
RBC # UR STRIP: NEGATIVE /UL
SP GR UR: 1.01 (ref 1–1.03)
UROBILINOGEN UR QL: NORMAL

## 2025-04-24 NOTE — PROGRESS NOTES
"Chief Complaint  Hospital Follow Up Visit (Went to [facility] on 4/17/25 for acute UTI)    Subjective        Anitra Orta presents to [facility]  History of Present Illness  Recently seen at [facility] ER for UTI  Wasn't feeling well, had shakes, and difficulty walking. Diagnosed with UTI. Labs reviewed.   Finished course of Cipro 2 days ago.   Stated that today she is feeling better.   Planning to go for cystoscopy next week.     Objective   Vital Signs:  /70   Pulse 78   Resp 16   Ht 165.1 cm (65\")   Wt 107 kg (236 lb 12.8 oz)   SpO2 97%   BMI 39.41 kg/m²   Estimated body mass index is 39.41 kg/m² as calculated from the following:    Height as of this encounter: 165.1 cm (65\").    Weight as of this encounter: 107 kg (236 lb 12.8 oz).            Physical Exam  Vitals and nursing note reviewed.   Constitutional:       Appearance: Normal appearance.   HENT:      Head: Normocephalic and atraumatic.   Cardiovascular:      Rate and Rhythm: Normal rate and regular rhythm.   Pulmonary:      Effort: Pulmonary effort is normal.      Breath sounds: Normal breath sounds.   Abdominal:      General: Abdomen is flat.      Palpations: Abdomen is soft.   Musculoskeletal:         General: No swelling or deformity.      Cervical back: Neck supple.      Right lower leg: No edema.      Left lower leg: No edema.   Skin:     General: Skin is warm.      Capillary Refill: Capillary refill takes less than 2 seconds.      Findings: No rash.   Neurological:      General: No focal deficit present.      Mental Status: She is alert and oriented to person, place, and time.        Result Review :  The following data was reviewed by: Marco Antonio Bland MD on 04/24/2025:  Common labs          11/18/2024    13:49 2/3/2025    13:35 4/17/2025    20:28   Common Labs   Glucose 110  112  134    BUN 23  30  20    Creatinine 1.14  1.26  1.05    Sodium 144  141  138    Potassium 5.3  4.4  " 4.0    Chloride 107  102  103    Calcium 9.3  9.3  8.9    Albumin 4.3  4.3  3.7    Total Bilirubin 0.3  0.4  0.7    Alkaline Phosphatase 129  134  113    AST (SGOT) 22  19  40    ALT (SGPT) 20  21  32    WBC   9.24    Hemoglobin   12.1    Hematocrit   36.5    Platelets   159    Total Cholesterol 174  195     Triglycerides 193  144     HDL Cholesterol 49  68     LDL Cholesterol  92  102     Hemoglobin A1C 6.4  6.6       Data reviewed : recent office visit notes, ED note           Assessment and Plan   Diagnoses and all orders for this visit:    1. Urinary tract infection without hematuria, site unspecified (Primary)  -     POCT urinalysis dipstick, automated  -     Urine Culture - Urine, Urine, Clean Catch  Completed course of antibiotics as prescribed  Repeat urine clear, sent for culture to ensure no residual UTI     F/u for AWV    Patient seen and examined personally by me following resident evaluation. Agree with assessment and plan as above unless documented below.    Marco Antonio Bland MD   [facility]

## 2025-04-28 LAB
BACTERIA UR CULT: ABNORMAL
BACTERIA UR CULT: ABNORMAL
OTHER ANTIBIOTIC SUSC ISLT: ABNORMAL

## 2025-04-30 RX ORDER — AMOXICILLIN 500 MG/1
1000 CAPSULE ORAL 3 TIMES DAILY
Qty: 42 CAPSULE | Refills: 0 | Status: SHIPPED | OUTPATIENT
Start: 2025-04-30 | End: 2025-05-07

## 2025-05-19 ENCOUNTER — OFFICE VISIT (OUTPATIENT)
Dept: INTERNAL MEDICINE | Facility: CLINIC | Age: 85
End: 2025-05-19
Payer: MEDICARE

## 2025-05-19 VITALS
DIASTOLIC BLOOD PRESSURE: 80 MMHG | HEART RATE: 72 BPM | SYSTOLIC BLOOD PRESSURE: 136 MMHG | HEIGHT: 65 IN | WEIGHT: 236.4 LBS | RESPIRATION RATE: 16 BRPM | OXYGEN SATURATION: 98 % | TEMPERATURE: 96.9 F | BODY MASS INDEX: 39.39 KG/M2

## 2025-05-19 DIAGNOSIS — R73.03 PREDIABETES: ICD-10-CM

## 2025-05-19 DIAGNOSIS — Z13.820 OSTEOPOROSIS SCREENING: ICD-10-CM

## 2025-05-19 DIAGNOSIS — E78.2 MIXED HYPERLIPIDEMIA: ICD-10-CM

## 2025-05-19 DIAGNOSIS — E55.9 VITAMIN D DEFICIENCY: Primary | ICD-10-CM

## 2025-05-19 DIAGNOSIS — N95.8 OTHER SPECIFIED MENOPAUSAL AND PERIMENOPAUSAL DISORDERS: ICD-10-CM

## 2025-05-19 DIAGNOSIS — I10 ESSENTIAL HYPERTENSION: ICD-10-CM

## 2025-05-19 DIAGNOSIS — Z00.00 MEDICARE ANNUAL WELLNESS VISIT, SUBSEQUENT: ICD-10-CM

## 2025-05-19 DIAGNOSIS — E03.9 HYPOTHYROIDISM, UNSPECIFIED TYPE: ICD-10-CM

## 2025-05-19 PROCEDURE — 3075F SYST BP GE 130 - 139MM HG: CPT | Performed by: INTERNAL MEDICINE

## 2025-05-19 PROCEDURE — 1126F AMNT PAIN NOTED NONE PRSNT: CPT | Performed by: INTERNAL MEDICINE

## 2025-05-19 PROCEDURE — 1159F MED LIST DOCD IN RCRD: CPT | Performed by: INTERNAL MEDICINE

## 2025-05-19 PROCEDURE — G0439 PPPS, SUBSEQ VISIT: HCPCS | Performed by: INTERNAL MEDICINE

## 2025-05-19 PROCEDURE — 1160F RVW MEDS BY RX/DR IN RCRD: CPT | Performed by: INTERNAL MEDICINE

## 2025-05-19 PROCEDURE — 3079F DIAST BP 80-89 MM HG: CPT | Performed by: INTERNAL MEDICINE

## 2025-05-19 PROCEDURE — 99214 OFFICE O/P EST MOD 30 MIN: CPT | Performed by: INTERNAL MEDICINE

## 2025-05-19 NOTE — PROGRESS NOTES
Subjective   The ABCs of the Annual Wellness Visit  Medicare Wellness Visit      Anitra Orta is a 84 y.o. patient who presents for a Medicare Wellness Visit.    The following portions of the patient's history were reviewed and   updated as appropriate: allergies, current medications, past family history, past medical history, past social history, past surgical history, and problem list.    Compared to one year ago, the patient's physical   health is the same.  Compared to one year ago, the patient's mental   health is the same.    Recent Hospitalizations:  She was not admitted to the hospital during the last year.     Current Medical Providers:  Patient Care Team:  Marco Antonio Bland MD as PCP - General (Internal Medicine)  Dr. Phillips as Consulting Physician (Ophthalmology)  Justo Leahy MD as Consulting Physician (Infectious Diseases)  Kevon Clark MD as Consulting Physician (Urology)  Jian Ndiaye MD as Consulting Physician (Ophthalmology)  Guillermo Fermin MD as Consulting Physician (Orthopedic Surgery)  Leni Ortega PA-C as Physician Assistant (Physician Assistant)    Outpatient Medications Prior to Visit   Medication Sig Dispense Refill    imipramine (TOFRANIL) 50 MG tablet TAKE 2 TABLETS BY MOUTH ONCE NIGHTLY 180 tablet 0    metoprolol succinate XL (TOPROL-XL) 50 MG 24 hr tablet TAKE 1 TABLET BY MOUTH DAILY 90 tablet 1    Multiple Vitamins-Minerals (PRESERVISION AREDS 2 PO) Take  by mouth.      omeprazole (priLOSEC) 40 MG capsule Take 1 capsule by mouth Daily.      simvastatin (ZOCOR) 40 MG tablet TAKE 1 TABLET BY MOUTH EVERY NIGHT FOR CHOLESTEROL 90 tablet 1    VITAMIN D PO Take 2 tablets by mouth Daily. Pt doesn't know strength       No facility-administered medications prior to visit.     No opioid medication identified on active medication list. I have reviewed chart for other potential  high risk medication/s and harmful drug interactions in the elderly.      Aspirin is not on active  "medication list.  Aspirin use is not indicated based on review of current medical condition/s. Risk of harm outweighs potential benefits.  .    Patient Active Problem List   Diagnosis    Urinary tract infection due to ESBL Klebsiella    Simple renal cyst    Former smoker    Hyperlipidemia    Osteoporosis    Panic disorder    Psoriasis    Urge incontinence of urine    Vitamin D deficiency    Post-menopause    Acute UTI (urinary tract infection)    Chronic bilateral low back pain without sciatica    Spinal stenosis of lumbar region with neurogenic claudication    Other chronic pain    Mixed incontinence    GERD without esophagitis    Anxiety    Hyperglycemia    e.coli bacteremia    Ureteral stone with hydronephrosis    Obstructive uropathy    Nephrolithiasis    Anemia    Metabolic acidosis    Essential hypertension    History of ventricular tachycardia    Left ureteral stone    Chronic idiopathic constipation    Personal history of colonic polyps    Encounter for screening for malignant neoplasm of colon     Advance Care Planning Advance Directive is on file.  ACP discussion was held with the patient during this visit. Patient has an advance directive in EMR which is still valid.             Objective   Vitals:    05/19/25 1321   BP: 136/80   BP Location: Left arm   Patient Position: Sitting   Cuff Size: Large Adult   Pulse: 72   Resp: 16   Temp: 96.9 °F (36.1 °C)   TempSrc: Temporal   SpO2: 98%   Weight: 107 kg (236 lb 6.4 oz)   Height: 165.1 cm (65\")       Estimated body mass index is 39.34 kg/m² as calculated from the following:    Height as of this encounter: 165.1 cm (65\").    Weight as of this encounter: 107 kg (236 lb 6.4 oz).                Does the patient have evidence of cognitive impairment? No                                                                                                Health  Risk Assessment    Smoking Status:  Social History     Tobacco Use   Smoking Status Former    Current packs/day: " 0.00    Average packs/day: 1 pack/day for 24.0 years (24.0 ttl pk-yrs)    Types: Cigarettes    Start date: 1956    Quit date: 1980    Years since quittin.4    Passive exposure: Never   Smokeless Tobacco Never   Tobacco Comments    quit      Alcohol Consumption:  Social History     Substance and Sexual Activity   Alcohol Use No       Fall Risk Screen  STEADI Fall Risk Assessment was completed, and patient is at MODERATE risk for falls. Assessment completed on:2025    Depression Screening   The PHQ has not been completed during this encounter.     Health Habits and Functional and Cognitive Screenin/12/2025     6:29 PM   Functional & Cognitive Status   Do you have difficulty preparing food and eating? No   Do you have difficulty bathing yourself, getting dressed or grooming yourself? No   Do you have difficulty using the toilet? No   Do you have difficulty moving around from place to place? No   Do you have trouble with steps or getting out of a bed or a chair? No   Current Diet Well Balanced Diet   Dental Exam Up to date   Eye Exam Up to date   Exercise (times per week) 0 times per week   Current Exercises Include No Regular Exercise   Do you need help using the phone?  No   Are you deaf or do you have serious difficulty hearing?  Yes   Do you need help to go to places out of walking distance? Yes   Do you need help shopping? Yes   Do you need help preparing meals?  Yes   Do you need help with housework?  Yes   Do you need help with laundry? No   Do you need help taking your medications? No   Do you need help managing money? No   Do you ever drive or ride in a car without wearing a seat belt? No   Have you felt unusual stress, anger or loneliness in the last month? No   Who do you live with? Spouse   If you need help, do you have trouble finding someone available to you? No   Have you been bothered in the last four weeks by sexual problems? No   Do you have difficulty concentrating,  remembering or making decisions? No           Age-appropriate Screening Schedule:  Refer to the list below for future screening recommendations based on patient's age, sex and/or medical conditions. Orders for these recommended tests are listed in the plan section. The patient has been provided with a written plan.    Health Maintenance List  Health Maintenance   Topic Date Due    ZOSTER VACCINE (2 of 3) 11/04/2009    RSV Vaccine - Adults (1 - 1-dose 75+ series) Never done    COVID-19 Vaccine (4 - 2024-25 season) 09/01/2024    INFLUENZA VACCINE  07/01/2025    DXA SCAN  11/24/2025    LIPID PANEL  02/03/2026    ANNUAL WELLNESS VISIT  05/19/2026    TDAP/TD VACCINES (2 - Td or Tdap) 01/21/2029    Pneumococcal Vaccine 50+  Completed    MAMMOGRAM  Discontinued    HEMOGLOBIN A1C  Discontinued    URINE MICROALBUMIN-CREATININE RATIO (uACR)  Discontinued    COLORECTAL CANCER SCREENING  Discontinued                                                                                                                                                CMS Preventative Services Quick Reference  Risk Factors Identified During Encounter  Immunizations Discussed/Encouraged: Shingrix and RSV (Respiratory Syncytial Virus)    The above risks/problems have been discussed with the patient.  Pertinent information has been shared with the patient in the After Visit Summary.  An After Visit Summary and PPPS were made available to the patient.    Follow Up:   Next Medicare Wellness visit to be scheduled in 1 year.         Additional E&M Note during same encounter follows:  Patient has additional, significant, and separately identifiable condition(s)/problem(s) that require work above and beyond the Medicare Wellness Visit     Chief Complaint  Medicare Wellness-subsequent    Subjective   HPI  Pat is also being seen today for an annual adult preventative physical exam.  and Pat is also being seen today for additional medical problem/s.    Review of  "Systems   All other systems reviewed and are negative.             Objective   Vital Signs:  /80 (BP Location: Left arm, Patient Position: Sitting, Cuff Size: Large Adult)   Pulse 72   Temp 96.9 °F (36.1 °C) (Temporal)   Resp 16   Ht 165.1 cm (65\")   Wt 107 kg (236 lb 6.4 oz)   SpO2 98%   BMI 39.34 kg/m²   Physical Exam  Vitals and nursing note reviewed.   Constitutional:       General: She is not in acute distress.     Appearance: Normal appearance.   HENT:      Head: Normocephalic and atraumatic.      Right Ear: Tympanic membrane and ear canal normal.      Left Ear: Tympanic membrane and ear canal normal.      Nose: Nose normal. No congestion.      Mouth/Throat:      Mouth: Mucous membranes are moist.      Pharynx: No oropharyngeal exudate or posterior oropharyngeal erythema.   Eyes:      Extraocular Movements: Extraocular movements intact.      Conjunctiva/sclera: Conjunctivae normal.      Pupils: Pupils are equal, round, and reactive to light.   Cardiovascular:      Rate and Rhythm: Normal rate and regular rhythm.      Pulses: Normal pulses.      Heart sounds: Normal heart sounds. No murmur heard.  Pulmonary:      Effort: Pulmonary effort is normal. No respiratory distress.      Breath sounds: Normal breath sounds. No wheezing or rales.   Abdominal:      General: Abdomen is flat. Bowel sounds are normal. There is no distension.      Palpations: Abdomen is soft.      Tenderness: There is no abdominal tenderness.   Musculoskeletal:      Cervical back: Normal range of motion and neck supple. No rigidity.   Lymphadenopathy:      Cervical: No cervical adenopathy.   Skin:     General: Skin is warm.      Capillary Refill: Capillary refill takes less than 2 seconds.   Neurological:      General: No focal deficit present.      Mental Status: She is alert.   Psychiatric:         Mood and Affect: Mood normal.         The following data was reviewed by: Marco Antonio Bland MD on 05/19/2025:  Data reviewed : prior office " notes reviewed  Common labs          11/18/2024    13:49 2/3/2025    13:35 4/17/2025    20:28   Common Labs   Glucose 110  112  134    BUN 23  30  20    Creatinine 1.14  1.26  1.05    Sodium 144  141  138    Potassium 5.3  4.4  4.0    Chloride 107  102  103    Calcium 9.3  9.3  8.9    Albumin 4.3  4.3  3.7    Total Bilirubin 0.3  0.4  0.7    Alkaline Phosphatase 129  134  113    AST (SGOT) 22  19  40    ALT (SGPT) 20  21  32    WBC   9.24    Hemoglobin   12.1    Hematocrit   36.5    Platelets   159    Total Cholesterol 174  195     Triglycerides 193  144     HDL Cholesterol 49  68     LDL Cholesterol  92  102     Hemoglobin A1C 6.4  6.6            Assessment and Plan Additional age appropriate preventative wellness advice topics were discussed during today's preventative wellness exam(some topics already addressed during AWV portion of the note above):    Physical Activity: Advised cardiovascular activity 150 minutes per week as tolerated. (example brisk walk for 30 minutes, 5 days a week).     Nutrition: Discussed nutrition plan with patient. Information shared in after visit summary. Goal is for a well balanced diet to enhance overall health.     Healthy Weight: Discussed current and goal BMI with patient. Steps to attain this goal discussed. Information shared in after visit summary.     Vitamin D deficiency    Orders:    Vitamin D 25 hydroxy    Mixed hyperlipidemia  Check FLP, continue simvastatin 40mg qhs    Orders:    Lipid panel    Essential hypertension  Hypertension is stable and controlled  Continue current treatment regimen.  Blood pressure will be reassessed in 6 months.    Orders:    Comprehensive metabolic panel    Prediabetes    Orders:    CBC w AUTO Differential    Comprehensive metabolic panel    Hemoglobin A1c    Hypothyroidism, unspecified type    Orders:    TSH    T4, free    Medicare annual wellness visit, subsequent    Orders:    CBC w AUTO Differential    Comprehensive metabolic panel    TSH     T4, free    Lipid panel    Hemoglobin A1c    Vitamin D 25 hydroxy    Osteoporosis screening    Orders:    DEXA Bone Density Axial; Future    Other specified menopausal and perimenopausal disorders    Orders:    DEXA Bone Density Axial; Future        F/u 6 months or sooner prlamin Bland MD  [facility]  [address] [place] [address]  Phone: [phone]

## 2025-05-19 NOTE — ASSESSMENT & PLAN NOTE
Hypertension is stable and controlled  Continue current treatment regimen.  Blood pressure will be reassessed in 6 months.    Orders:    Comprehensive metabolic panel

## 2025-05-20 LAB
25(OH)D3+25(OH)D2 SERPL-MCNC: 54.9 NG/ML (ref 30–100)
ALBUMIN SERPL-MCNC: 4.1 G/DL (ref 3.5–5.2)
ALBUMIN/GLOB SERPL: 1.5 G/DL
ALP SERPL-CCNC: 143 U/L (ref 39–117)
ALT SERPL-CCNC: 15 U/L (ref 1–33)
AST SERPL-CCNC: 22 U/L (ref 1–32)
BASOPHILS # BLD AUTO: 0.02 10*3/MM3 (ref 0–0.2)
BASOPHILS NFR BLD AUTO: 0.3 % (ref 0–1.5)
BILIRUB SERPL-MCNC: 0.4 MG/DL (ref 0–1.2)
BUN SERPL-MCNC: 23 MG/DL (ref 8–23)
BUN/CREAT SERPL: 19.8 (ref 7–25)
CALCIUM SERPL-MCNC: 9 MG/DL (ref 8.6–10.5)
CHLORIDE SERPL-SCNC: 107 MMOL/L (ref 98–107)
CHOLEST SERPL-MCNC: 175 MG/DL (ref 0–200)
CO2 SERPL-SCNC: 22.9 MMOL/L (ref 22–29)
CREAT SERPL-MCNC: 1.16 MG/DL (ref 0.57–1)
EGFRCR SERPLBLD CKD-EPI 2021: 46.9 ML/MIN/1.73
EOSINOPHIL # BLD AUTO: 0.13 10*3/MM3 (ref 0–0.4)
EOSINOPHIL NFR BLD AUTO: 2.3 % (ref 0.3–6.2)
ERYTHROCYTE [DISTWIDTH] IN BLOOD BY AUTOMATED COUNT: 13.4 % (ref 12.3–15.4)
GLOBULIN SER CALC-MCNC: 2.8 GM/DL
GLUCOSE SERPL-MCNC: 114 MG/DL (ref 65–99)
HBA1C MFR BLD: 6.2 % (ref 4.8–5.6)
HCT VFR BLD AUTO: 40.3 % (ref 34–46.6)
HDLC SERPL-MCNC: 50 MG/DL (ref 40–60)
HGB BLD-MCNC: 13.1 G/DL (ref 12–15.9)
IMM GRANULOCYTES # BLD AUTO: 0.02 10*3/MM3 (ref 0–0.05)
IMM GRANULOCYTES NFR BLD AUTO: 0.3 % (ref 0–0.5)
LDLC SERPL CALC-MCNC: 104 MG/DL (ref 0–100)
LYMPHOCYTES # BLD AUTO: 1.08 10*3/MM3 (ref 0.7–3.1)
LYMPHOCYTES NFR BLD AUTO: 18.8 % (ref 19.6–45.3)
MCH RBC QN AUTO: 29.2 PG (ref 26.6–33)
MCHC RBC AUTO-ENTMCNC: 32.5 G/DL (ref 31.5–35.7)
MCV RBC AUTO: 89.8 FL (ref 79–97)
MONOCYTES # BLD AUTO: 0.37 10*3/MM3 (ref 0.1–0.9)
MONOCYTES NFR BLD AUTO: 6.4 % (ref 5–12)
NEUTROPHILS # BLD AUTO: 4.14 10*3/MM3 (ref 1.7–7)
NEUTROPHILS NFR BLD AUTO: 71.9 % (ref 42.7–76)
NRBC BLD AUTO-RTO: 0 /100 WBC (ref 0–0.2)
PLATELET # BLD AUTO: 239 10*3/MM3 (ref 140–450)
POTASSIUM SERPL-SCNC: 4.5 MMOL/L (ref 3.5–5.2)
PROT SERPL-MCNC: 6.9 G/DL (ref 6–8.5)
RBC # BLD AUTO: 4.49 10*6/MM3 (ref 3.77–5.28)
SODIUM SERPL-SCNC: 143 MMOL/L (ref 136–145)
T4 FREE SERPL-MCNC: 0.93 NG/DL (ref 0.92–1.68)
TRIGL SERPL-MCNC: 119 MG/DL (ref 0–150)
TSH SERPL DL<=0.005 MIU/L-ACNC: 2.92 UIU/ML (ref 0.27–4.2)
VLDLC SERPL CALC-MCNC: 21 MG/DL (ref 5–40)
WBC # BLD AUTO: 5.76 10*3/MM3 (ref 3.4–10.8)

## 2025-05-21 RX ORDER — IMIPRAMINE HYDROCHLORIDE 50 MG/1
100 TABLET, FILM COATED ORAL EVERY EVENING
Qty: 60 TABLET | Refills: 11 | Status: SHIPPED | OUTPATIENT
Start: 2025-05-21

## 2025-05-27 ENCOUNTER — HOSPITAL ENCOUNTER (EMERGENCY)
Facility: HOSPITAL | Age: 85
Discharge: HOME | End: 2025-05-27
Attending: EMERGENCY MEDICINE | Admitting: EMERGENCY MEDICINE
Payer: MEDICARE

## 2025-05-27 VITALS
OXYGEN SATURATION: 96 % | SYSTOLIC BLOOD PRESSURE: 169 MMHG | HEIGHT: 66 IN | RESPIRATION RATE: 18 BRPM | HEART RATE: 81 BPM | DIASTOLIC BLOOD PRESSURE: 90 MMHG | WEIGHT: 236 LBS | TEMPERATURE: 98 F | BODY MASS INDEX: 37.93 KG/M2

## 2025-05-27 DIAGNOSIS — S80.11XA HEMATOMA OF RIGHT LOWER LEG: Primary | ICD-10-CM

## 2025-05-27 PROCEDURE — 99282 EMERGENCY DEPT VISIT SF MDM: CPT | Performed by: EMERGENCY MEDICINE

## 2025-05-27 NOTE — ED PROVIDER NOTES
Subjective   History of Present Illness  Patient presents complaining of waking up this morning and having some right lower leg pain.  Patient says it is in the midportion of her anterior right lower leg.  No radiation.  No known trauma or injury.  No previous episodes.  Patient denies any history of DVT or PE.  Nothing seems to make it better or worse.  No therapy taken prior to arrival.      Review of Systems   All other systems reviewed and are negative.      Past Medical History:   Diagnosis Date    Acute kidney failure 07/11/2021    pt  denies    Allergic     weather allergies    Anxiety     Arthritis of back     At risk for sleep apnea     Cataract     BILAT-SCHEDULED FOR THIS AND HAD TO CANCEL D/T SEPSIS    Cervical disc disorder     Chronic pain disorder     ALL OVER PAIN    Chronic UTI     Closed displaced fracture of surgical neck of left humerus 06/25/2021    Closed fracture of left proximal humerus 06/15/2021    Closed fracture of right distal radius 06/15/2021    Colon polyp     COVID-19 vaccine series completed     2ND DOSE 3/23/21    DDD (degenerative disc disease), lumbar     e.coli bacteremia 03/25/2021    Elevated cholesterol     Extremity pain     Fracture, humerus     LEFT. h/o    GERD (gastroesophageal reflux disease)     History of recent fall     JUNE 6-9-21    History of sepsis     MOST RECENT JULY 2021-R/T KIDNEY STONE, UTI.  STATES THIS IS HER 3RD SEPSIS DIAGNOSIS    History of UTI     HL (hearing loss)     HTN (hypertension)     Hyperlipidemia     Hypokalemia 03/25/2021    Kidney stones     LEFT    Left humeral fracture 07/11/2021    Low back pain     Lumbosacral disc disease     Macular degeneration, bilateral     WORSE ON RIGHT-ONLY PERIPHERAL VISION    Metabolic encephalopathy 03/25/2021    Mixed hyperlipidemia 09/16/2016    Mixed incontinence     Obesity     Osteoarthritis     Osteopenia     Osteoporosis     Panic disorder     Personal history of urinary calculi     PONV (postoperative  nausea and vomiting)     Scoliosis     Sepsis, unspecified organism 03/25/2021    Spinal stenosis     Tachycardia, unspecified     Ventricular tachycardia 09/09/2019    Visual impairment     Wrist fracture     RIGHT-CURRENTLY NOT WEARING BRACE FOR THIS       Allergies   Allergen Reactions    Bactrim [Sulfamethoxazole-Trimethoprim] Nausea Only    Ciprofloxacin Other (See Comments)     UNKNOWN    Macrobid [Nitrofurantoin] Other (See Comments)     UNKNOWN; PT CAN TAKE IN SMALL DOSES- FLU LIKE SYMPTOMS    Cortisone Headache     Pt states had headache with IM injection states has been ok with epidural steroid injections       Past Surgical History:   Procedure Laterality Date    ADENOIDECTOMY      BREAST BIOPSY Left     benign    BREAST LUMPECTOMY Left     benign    BUNIONECTOMY Right     COLONOSCOPY N/A 11/30/2016    Procedure: COLONOSCOPY polypectomy;  Surgeon: Adali Willard MD;  Location: [facility] OR;  Service:     COLONOSCOPY W/ POLYPECTOMY N/A 4/11/2025    Procedure: COLONOSCOPY WITH POLYPECTOMY;  Surgeon: Billy Sherman MD;  Location: [facility] OR;  Service: Gastroenterology;  Laterality: N/A;  Diverticulosis; Sigmoid polyp x 1    CYSTOSCOPY BOTOX INJECTION OF BLADDER N/A 07/21/2017    Procedure: CYSTOSCOPY COAPTITE INJECTION;  Surgeon: Kevon Clark MD;  Location: [facility] OR;  Service:     CYSTOSCOPY URETEROSCOPY LASER LITHOTRIPSY Right 05/01/2023    Procedure: RIGHT URETEROSCOPY LASER LITHOTRIPSY STONE BASKET EXTRACTION AND STENT;  Surgeon: Kevon Clark MD;  Location: [facility] OR;  Service: Urology;  Laterality: Right;    CYSTOSCOPY W/ LITHOLAPAXY / EHL      CYSTOSCOPY W/ URETERAL STENT PLACEMENT Left 09/12/2016    Procedure: CYSTOSCOPY URETERAL STENT INSERTION;  Surgeon: Kevon Clark MD;  Location: [facility] OR;  Service:     CYSTOSCOPY W/ URETERAL STENT PLACEMENT Left 08/01/2019    Procedure: CYSTOSCOPY URETERAL CATHETER/STENT INSERTION;  Surgeon: Kevon Clark MD;  Location: [facility]  OR;  Service: Urology    CYSTOSCOPY W/ URETERAL STENT PLACEMENT Left 03/25/2021    Procedure: CYSTOSCOPY URETERAL CATHETER/STENT INSERTION;  Surgeon: Kevon Clark MD;  Location: [facility] OR;  Service: Urology;  Laterality: Left;    CYSTOSCOPY W/ URETERAL STENT PLACEMENT Left 07/11/2021    Procedure: CYSTOSCOPY URETERAL CATHETER/STENT INSERTION;  Surgeon: Lul Guzman MD;  Location: [facility] OR;  Service: Urology;  Laterality: Left;  CYSTOSCOPY LEFT URETERAL CATHETER/ STENT PLACEMENT    CYSTOSCOPY W/ URETERAL STENT PLACEMENT Right 04/20/2023    Procedure: CYSTOSCOPY STENT PLACEMENT;  Surgeon: Matthew Ndiaye MD;  Location: [facility] OR;  Service: Urology;  Laterality: Right;    EPIDURAL BLOCK      EYE SURGERY      JOINT REPLACEMENT Right     total knee    LAMINECTOMY  11/2022    LUMBAR EPIDURAL INJECTION      TONSILLECTOMY AND ADENOIDECTOMY      TRIGGER POINT INJECTION      TUBAL ABDOMINAL LIGATION      URETEROSCOPY LASER LITHOTRIPSY WITH STENT INSERTION Left 10/05/2016    Procedure: LT URETEROSCOPY LASER LITHOTRIPSY STONE BASKET EXTRACTION AND STENT ;  Surgeon: Kevon Clark MD;  Location: [facility] OR;  Service:     URETEROSCOPY LASER LITHOTRIPSY WITH STENT INSERTION Left 07/23/2021    Procedure: LEFT URETEROSCOPY STONE MANIPULATION STENT EXCHANGE, LASER LITHOTRIPSY, CYSTOSCOPY, STONE BASKET EXTRACTION;  Surgeon: Kevon Clark MD;  Location: [facility] OR;  Service: Urology;  Laterality: Left;       Family History   Problem Relation Age of Onset    Heart defect Mother     Heart attack Mother 70    Sudden death Mother     Early death Mother     Heart defect Father     Heart attack Father 49    Hypertension Father     Sudden death Father     Early death Father     Valvular heart disease Brother     Anxiety disorder Daughter     Hearing loss Daughter     Heart disease Brother         Valve replacement    Migraines Daughter     Malig Hyperthermia Neg Hx     Breast cancer Neg Hx         Social History     Socioeconomic History    Marital status:    Tobacco Use    Smoking status: Former     Current packs/day: 0.00     Average packs/day: 1 pack/day for 24.0 years (24.0 ttl pk-yrs)     Types: Cigarettes     Start date: 1956     Quit date: 1980     Years since quittin.4     Passive exposure: Never    Smokeless tobacco: Never    Tobacco comments:     quit    Vaping Use    Vaping status: Never Used   Substance and Sexual Activity    Alcohol use: No    Drug use: Not Currently    Sexual activity: Not Currently     Partners: Male     Birth control/protection: Post-menopausal     Comment: Frequent UTIs           Objective   Physical Exam  Vitals and nursing note reviewed.   Constitutional:       Comments: Patient sitting in chair comfortably, talkative, friendly, no signs of distress.  Cooperative with exam.   HENT:      Head: Normocephalic.      Right Ear: External ear normal.      Left Ear: External ear normal.      Nose: Nose normal.      Mouth/Throat:      Pharynx: Oropharynx is clear.   Eyes:      Conjunctiva/sclera: Conjunctivae normal.   Cardiovascular:      Pulses:           Dorsalis pedis pulses are 2+ on the right side.        Posterior tibial pulses are 2+ on the right side.   Pulmonary:      Effort: Pulmonary effort is normal.   Musculoskeletal:      Cervical back: Neck supple.      Right lower leg: No edema.      Left lower leg: No edema.        Legs:       Comments: 3 x 4 cm ecchymosis.  Mild tenderness to palpation.  No swelling or erythema.   Skin:     General: Skin is warm and dry.      Capillary Refill: Capillary refill takes 2 to 3 seconds.   Neurological:      Mental Status: She is alert.      Sensory: No sensory deficit.      Motor: No weakness.   Psychiatric:         Mood and Affect: Mood normal.         Behavior: Behavior normal.         Procedures           ED Course                                                       Medical Decision Making  Ddx  contusion, sprain, strain, spontaneous capillary rupture, erythema nodosum    0840 patient has no clinical evidence of any deep vein thrombosis and no history of it.  Patient's pain is located on her shin and there is moderate size bruise.  Discussed with the patient that maybe she could have done something in the night or she could have spontaneous capillary rupture.  Advised using heat and cooling alternating pattern as well as anti-inflammatories.  Advised patient to return should she develop shortness of breath, chest pain, or fever or spreading redness.  All questions personally answered at the bedside and all d/c instructions personally reviewed with pt.  Discussed the importance of close outpt. f/u and pt. understands this and agrees to do so.  Pt agrees to return to ED immediately for any new, persistent, or worsening symptoms.  Patient's spouse was present for the entire evaluation and discharge instructions.    EMR Dragon/Transcription disclaimer:  Much of this encounter note is an electronic transcription/translation of spoken language to printed text using the Dragon Dictation System       Problems Addressed:  Hematoma of right lower leg: acute illness or injury        Final diagnoses:   Hematoma of right lower leg       ED Disposition  ED Disposition       ED Disposition   Discharge    Condition   Stable    Comment   --               Marco Antonio Bland MD  [address]  [facility] [address]  [phone]    In 3 days  If symptoms worsen         Medication List      No changes were made to your prescriptions during this visit.            Marco Antonio Correia MD  05/29/25 6805

## 2025-07-29 ENCOUNTER — HOSPITAL ENCOUNTER (OUTPATIENT)
Dept: ULTRASOUND IMAGING | Facility: HOSPITAL | Age: 85
Discharge: HOME | End: 2025-07-29
Payer: MEDICARE

## 2025-07-29 ENCOUNTER — HOSPITAL ENCOUNTER (OUTPATIENT)
Dept: MAMMOGRAPHY | Facility: HOSPITAL | Age: 85
Discharge: HOME | End: 2025-07-29
Payer: MEDICARE

## 2025-07-29 DIAGNOSIS — N64.89 BREAST ASYMMETRY: ICD-10-CM

## 2025-07-29 DIAGNOSIS — I10 ESSENTIAL HYPERTENSION: ICD-10-CM

## 2025-07-29 PROCEDURE — 76642 ULTRASOUND BREAST LIMITED: CPT

## 2025-07-29 PROCEDURE — G0279 TOMOSYNTHESIS, MAMMO: HCPCS | Performed by: RADIOLOGY

## 2025-07-29 PROCEDURE — G0279 TOMOSYNTHESIS, MAMMO: HCPCS

## 2025-07-29 PROCEDURE — 77065 DX MAMMO INCL CAD UNI: CPT | Performed by: RADIOLOGY

## 2025-07-29 PROCEDURE — 77065 DX MAMMO INCL CAD UNI: CPT

## 2025-07-29 PROCEDURE — 76642 ULTRASOUND BREAST LIMITED: CPT | Performed by: RADIOLOGY

## 2025-07-29 RX ORDER — METOPROLOL SUCCINATE 50 MG/1
50 TABLET, EXTENDED RELEASE ORAL DAILY
Qty: 90 TABLET | Refills: 1 | Status: SHIPPED | OUTPATIENT
Start: 2025-07-29

## 2025-08-07 ENCOUNTER — HOSPITAL ENCOUNTER (EMERGENCY)
Facility: HOSPITAL | Age: 85
Discharge: HOME | End: 2025-08-07
Attending: STUDENT IN AN ORGANIZED HEALTH CARE EDUCATION/TRAINING PROGRAM | Admitting: STUDENT IN AN ORGANIZED HEALTH CARE EDUCATION/TRAINING PROGRAM
Payer: MEDICARE

## 2025-08-07 VITALS
OXYGEN SATURATION: 95 % | RESPIRATION RATE: 18 BRPM | DIASTOLIC BLOOD PRESSURE: 98 MMHG | TEMPERATURE: 98.7 F | SYSTOLIC BLOOD PRESSURE: 186 MMHG | HEART RATE: 115 BPM

## 2025-08-07 DIAGNOSIS — N30.01 ACUTE CYSTITIS WITH HEMATURIA: Primary | ICD-10-CM

## 2025-08-07 LAB
BACTERIA UR QL AUTO: ABNORMAL /HPF
BILIRUB UR QL STRIP: NEGATIVE
CLARITY UR: ABNORMAL
COLOR UR: YELLOW
GLUCOSE UR STRIP-MCNC: NEGATIVE MG/DL
HGB UR QL STRIP.AUTO: ABNORMAL
HYALINE CASTS UR QL AUTO: ABNORMAL /LPF
KETONES UR QL STRIP: NEGATIVE
LEUKOCYTE ESTERASE UR QL STRIP.AUTO: ABNORMAL
NITRITE UR QL STRIP: POSITIVE
PH UR STRIP.AUTO: 6 [PH] (ref 4.5–8)
PROT UR QL STRIP: ABNORMAL
RBC # UR STRIP: ABNORMAL /HPF
REF LAB TEST METHOD: ABNORMAL
SP GR UR STRIP: 1.01 (ref 1–1.03)
SQUAMOUS #/AREA URNS HPF: ABNORMAL /HPF
UROBILINOGEN UR QL STRIP: ABNORMAL
WBC # UR STRIP: ABNORMAL /HPF

## 2025-08-07 PROCEDURE — 99283 EMERGENCY DEPT VISIT LOW MDM: CPT | Performed by: STUDENT IN AN ORGANIZED HEALTH CARE EDUCATION/TRAINING PROGRAM

## 2025-08-07 PROCEDURE — 87088 URINE BACTERIA CULTURE: CPT | Performed by: STUDENT IN AN ORGANIZED HEALTH CARE EDUCATION/TRAINING PROGRAM

## 2025-08-07 PROCEDURE — 87086 URINE CULTURE/COLONY COUNT: CPT | Performed by: STUDENT IN AN ORGANIZED HEALTH CARE EDUCATION/TRAINING PROGRAM

## 2025-08-07 PROCEDURE — 87186 SC STD MICRODIL/AGAR DIL: CPT | Performed by: STUDENT IN AN ORGANIZED HEALTH CARE EDUCATION/TRAINING PROGRAM

## 2025-08-07 PROCEDURE — 81001 URINALYSIS AUTO W/SCOPE: CPT | Performed by: STUDENT IN AN ORGANIZED HEALTH CARE EDUCATION/TRAINING PROGRAM

## 2025-08-07 PROCEDURE — 99283 EMERGENCY DEPT VISIT LOW MDM: CPT

## 2025-08-07 RX ORDER — CEFUROXIME AXETIL 250 MG/1
250 TABLET ORAL ONCE
Status: COMPLETED | OUTPATIENT
Start: 2025-08-07 | End: 2025-08-07

## 2025-08-07 RX ORDER — CEFUROXIME AXETIL 250 MG/1
1000 TABLET ORAL 2 TIMES DAILY
Qty: 56 TABLET | Refills: 0 | Status: SHIPPED | OUTPATIENT
Start: 2025-08-07 | End: 2025-08-11

## 2025-08-07 RX ADMIN — CEFUROXIME AXETIL 250 MG: 250 TABLET, FILM COATED ORAL at 23:44

## 2025-08-08 ENCOUNTER — TELEPHONE (OUTPATIENT)
Dept: INTERNAL MEDICINE | Facility: CLINIC | Age: 85
End: 2025-08-08
Payer: MEDICARE

## 2025-08-10 LAB — BACTERIA SPEC AEROBE CULT: ABNORMAL

## (undated) DEVICE — LOU CYSTO: Brand: MEDLINE INDUSTRIES, INC.

## (undated) DEVICE — PK CYSTO 90

## (undated) DEVICE — SEAL PRT BIOP ADJ STRL

## (undated) DEVICE — FRCP BX RADJAW4 NDL 2.8 240CM LG OG BX40

## (undated) DEVICE — CATH URETRL FLXITP POLLACK STD 5F 70CM

## (undated) DEVICE — NITINOL WIRE WITH HYDROPHILIC TIP: Brand: SENSOR

## (undated) DEVICE — GLV SURG SENSICARE PI MIC PF SZ8 LF STRL

## (undated) DEVICE — HYBRID TUBING/CAP SET FOR OLYMPUS® SCOPES: Brand: ERBE

## (undated) DEVICE — EXTRCT STN NCIRCLE TIPLSS 2.2F1X115CM

## (undated) DEVICE — JACKT LAB F/R KNIT CUFF/COLR XLG BLU

## (undated) DEVICE — GLV SURG SIGNATURE ESSENTIAL PF LTX SZ8

## (undated) DEVICE — VIAL FORMALIN CAP 10P 40ML

## (undated) DEVICE — SYR LL W/SCALE/MARK 3ML STRL

## (undated) DEVICE — NDL INJ CYSTO SIDEKICK SPNLTIP CONCV 21G 14.6

## (undated) DEVICE — TIDISHIELD UROLOGY DRAIN BAGS FROSTY VINYL STERILE FITS SIEMENS UROSKOP ACCESS 20 PER CASE: Brand: TIDISHIELD

## (undated) DEVICE — LINER SURG CANSTR SXN S/RIGD 1500CC

## (undated) DEVICE — SOL IRR H2O BTL 1000ML STRL

## (undated) DEVICE — GLV SURG SENSICARE W/ALOE PF LF 7 STRL

## (undated) DEVICE — PK URETSCP 40

## (undated) DEVICE — SYS IRR PUMP SGL ACTN VAC SYR 10CC

## (undated) DEVICE — ADAPT CLN BIOGUARD AIR/H2O DISP

## (undated) DEVICE — TRANSPOSAL ULTRAFLEX DUO/QUAD ULTRA CART MANIFOLD

## (undated) DEVICE — BW-412T DISP COMBO CLEANING BRUSH: Brand: SINGLE USE COMBINATION CLEANING BRUSH

## (undated) DEVICE — Device

## (undated) DEVICE — KT ORCA ORCAPOD DISP STRL

## (undated) DEVICE — GLV SURG TRIUMPH CLASSIC PF LTX 8 STRL

## (undated) DEVICE — IRRIGATOR BULB ASEPTO 60CC STRL

## (undated) DEVICE — HYDROGEL COATED LATEX URINE METER FOLEY TRAY,16 FR/CH (5.3 MM), 5 ML CATHETER PRE-CONNECTED TO 2000 ML DRAINAGE BAG WITH NEEDLE SAMPLING: Brand: DOVER

## (undated) DEVICE — FIBR LASR HOLMIUM ACCUMAX 200 1PT USE

## (undated) DEVICE — FIBR LASR HOLMIUM SINGLEFLEX200 FLT/TP DISP

## (undated) DEVICE — URETERAL DILATATION SYSTEM

## (undated) DEVICE — GLV SURG SENSICARE W/ALOE PF LF 7.5 STRL

## (undated) DEVICE — GLV SURG SENSICARE MICRO PF LF 7.5 STRL